# Patient Record
Sex: MALE | Race: WHITE | NOT HISPANIC OR LATINO | Employment: OTHER | URBAN - METROPOLITAN AREA
[De-identification: names, ages, dates, MRNs, and addresses within clinical notes are randomized per-mention and may not be internally consistent; named-entity substitution may affect disease eponyms.]

---

## 2017-03-17 ENCOUNTER — LAB (OUTPATIENT)
Dept: LAB | Facility: HOSPITAL | Age: 67
End: 2017-03-17

## 2017-03-17 ENCOUNTER — OFFICE VISIT (OUTPATIENT)
Dept: ONCOLOGY | Facility: CLINIC | Age: 67
End: 2017-03-17

## 2017-03-17 VITALS
DIASTOLIC BLOOD PRESSURE: 70 MMHG | RESPIRATION RATE: 18 BRPM | WEIGHT: 171.2 LBS | SYSTOLIC BLOOD PRESSURE: 120 MMHG | BODY MASS INDEX: 24.51 KG/M2 | TEMPERATURE: 97.7 F | OXYGEN SATURATION: 97 % | HEART RATE: 50 BPM | HEIGHT: 70 IN

## 2017-03-17 DIAGNOSIS — D72.829 LEUKOCYTOSIS, UNSPECIFIED TYPE: Primary | ICD-10-CM

## 2017-03-17 DIAGNOSIS — C91.10 CLL (CHRONIC LYMPHOCYTIC LEUKEMIA) (HCC): ICD-10-CM

## 2017-03-17 LAB
ALBUMIN SERPL-MCNC: 4.2 G/DL (ref 3.5–5.2)
ALBUMIN/GLOB SERPL: 2 G/DL (ref 1.1–2.4)
ALP SERPL-CCNC: 86 U/L (ref 38–116)
ALT SERPL W P-5'-P-CCNC: 24 U/L (ref 0–41)
ANION GAP SERPL CALCULATED.3IONS-SCNC: 12.3 MMOL/L
AST SERPL-CCNC: 22 U/L (ref 0–40)
BASOPHILS # BLD AUTO: 0.1 10*3/MM3 (ref 0–0.1)
BASOPHILS NFR BLD AUTO: 0.4 % (ref 0–1.1)
BILIRUB SERPL-MCNC: 0.6 MG/DL (ref 0.1–1.2)
BUN BLD-MCNC: 12 MG/DL (ref 6–20)
BUN/CREAT SERPL: 12 (ref 7.3–30)
CALCIUM SPEC-SCNC: 9.3 MG/DL (ref 8.5–10.2)
CHLORIDE SERPL-SCNC: 100 MMOL/L (ref 98–107)
CO2 SERPL-SCNC: 27.7 MMOL/L (ref 22–29)
CREAT BLD-MCNC: 1 MG/DL (ref 0.7–1.3)
DEPRECATED RDW RBC AUTO: 46.5 FL (ref 37–49)
EOSINOPHIL # BLD AUTO: 0.4 10*3/MM3 (ref 0–0.36)
EOSINOPHIL NFR BLD AUTO: 1.6 % (ref 1–5)
ERYTHROCYTE [DISTWIDTH] IN BLOOD BY AUTOMATED COUNT: 13.7 % (ref 11.7–14.5)
GFR SERPL CREATININE-BSD FRML MDRD: 75 ML/MIN/1.73
GLOBULIN UR ELPH-MCNC: 2.1 GM/DL (ref 1.8–3.5)
GLUCOSE BLD-MCNC: 196 MG/DL (ref 74–124)
HCT VFR BLD AUTO: 47.5 % (ref 40–49)
HGB BLD-MCNC: 16 G/DL (ref 13.5–16.5)
HOLD SPECIMEN: NORMAL
IMM GRANULOCYTES # BLD: 0.07 10*3/MM3 (ref 0–0.03)
IMM GRANULOCYTES NFR BLD: 0.3 % (ref 0–0.5)
LDH SERPL-CCNC: 147 U/L (ref 99–259)
LYMPHOCYTES # BLD AUTO: 19.26 10*3/MM3 (ref 1–3.5)
LYMPHOCYTES NFR BLD AUTO: 76.7 % (ref 20–49)
MCH RBC QN AUTO: 31.6 PG (ref 27–33)
MCHC RBC AUTO-ENTMCNC: 33.7 G/DL (ref 32–35)
MCV RBC AUTO: 93.7 FL (ref 83–97)
MONOCYTES # BLD AUTO: 0.46 10*3/MM3 (ref 0.25–0.8)
MONOCYTES NFR BLD AUTO: 1.8 % (ref 4–12)
NEUTROPHILS # BLD AUTO: 4.81 10*3/MM3 (ref 1.5–7)
NEUTROPHILS NFR BLD AUTO: 19.2 % (ref 39–75)
NRBC BLD MANUAL-RTO: 0.1 /100 WBC (ref 0–0)
PLATELET # BLD AUTO: 164 10*3/MM3 (ref 150–375)
PMV BLD AUTO: 9.8 FL (ref 8.9–12.1)
POTASSIUM BLD-SCNC: 4.4 MMOL/L (ref 3.5–4.7)
PROT SERPL-MCNC: 6.3 G/DL (ref 6.3–8)
RBC # BLD AUTO: 5.07 10*6/MM3 (ref 4.3–5.5)
SODIUM BLD-SCNC: 140 MMOL/L (ref 134–145)
WBC NRBC COR # BLD: 25.1 10*3/MM3 (ref 4–10)

## 2017-03-17 PROCEDURE — 85025 COMPLETE CBC W/AUTO DIFF WBC: CPT

## 2017-03-17 PROCEDURE — 80053 COMPREHEN METABOLIC PANEL: CPT

## 2017-03-17 PROCEDURE — 99213 OFFICE O/P EST LOW 20 MIN: CPT | Performed by: INTERNAL MEDICINE

## 2017-03-17 PROCEDURE — 36415 COLL VENOUS BLD VENIPUNCTURE: CPT

## 2017-03-17 PROCEDURE — 83615 LACTATE (LD) (LDH) ENZYME: CPT

## 2017-03-19 PROBLEM — C91.10 CLL (CHRONIC LYMPHOCYTIC LEUKEMIA) (HCC): Status: ACTIVE | Noted: 2017-03-19

## 2017-03-20 NOTE — PROGRESS NOTES
Subjective     REASON FOR VISIT:  Chronic lymphoid leukemia, stage 0.     Patient ID: Macho Stephenson is a 66 y.o. year old male   History of Present Illness    The patient is a 59-year-old gentleman with the above history, currently here for followup. He denies any complaints. His white count is stable around 19,000. He does not have any recurrent infections, he is not fatigued and he feels reasonably good. He has not started any new medications. He just recently retired from his job.       PAST MEDICAL HISTORY:   1. Recurrent atrial fibrillation followed by cardiology. He has had drug eluting stent placed x 3.   2. High cholesterol.   3. Hypertension.       HEMATOLOGIC/ONCOLOGIC HISTORY:       The patient is 62-year-old gentleman with the above history currently here for followup. He has no complaints. He denies fever, night sweats or weight loss. He does not have any lymphadenopathy. He feels good. He had some fatigue but his CBC shows a go od hemoglobin.   The patient is followed by Dr. Kevin Chandra. He had seen Dr. Chandra 7/18/13 for a history of coronary artery disease status drug eluting stent placement to the right coronary artery and left anterior descending artery in February 2011. Histor y of paroxysmal atrial fibrillation and hyperlipidemia. He presented to [facility] on 6/23/13 with palpitations and was found to have atrial fibrillation with rapid ventricular response. He was given IV Cardizem and converted spontaneously to normal sinus rhythm. He was found to have elevated white count at 18.9 and denied any symptoms of infection or urinary complaints. TSH was normal, troponin levels were negative. He was asked to continue aspirin and metoprolol for that. If he contin u ed to have atrial fibrillation, they will consider antiarrhythmic therapy with or without a short term anticoagulation therapy. However, currently the patient is feeling reasonably good. He has no complaints. His CBC  7/22/13 showed WBC 17,000, hemoglob i n 16.4 and platelet count of 174,000. Back 9/28/13 his hemoglobin was 15.1, WBC 13.3 and platelets 197,000. He has had a persistently elevated WBC but he is totally asymptomatic. He does not have any fever, night sweats or lymphadenopathy. He is here to be evaluated for his elevated white count.       Peripheral blood flow cytometry done 08/16/13 shows 22% chronic lymphoid leukemia cells, and they expressed ZAP-70 but not CD38. The total number of cells approximated 60% T cells, 32% B cells and 1% natural k iller cells. The B cells were monoclonal and expressed CD19, CD20, CD22, CD5, CD23, CD11c, ZAP-70 and T cell antigens. There was a normal CD4/CD8 ratio. CT of the chest, abdomen and pelvis does not show evidence of metastasis. Peripheral blood for DILLON -2 was negative. It was negative for T11:14 translocation.       CT scan done on 08/21/2013 shows 5 to 6 mm noncalcified nodule in the left lower lobe which is unchanged from December 2010. Otherwise no evidence of any lymphadenopathy or hepatosplenomegaly.       MRI of the spine shows arthritis and disc bulge and likely hemangiomas, with 1 lesion showing increased signal intensity at T2, which is likely a hemangioma. Bone scan could be done, but patient does not even have much pain there. CT scan of the chest, a bdomen and pelvis was done on 11/23/2014. He has tiny lymph nodes in the neck which are difficult to palpate. Right jugular one is 1.4 x 0.9 and left supraclavicular one is 1.5 x 1.1. He also has a subcarinal lymph node which is 1.7 x 1.2 cm, and he ha s a portocaval lymph node which has increased from 2.5 to 2.8. Patient is totally asymptomatic. He does not have any B symptoms, so will continue followup with observation.       MEDICATIONS: The current medication list was reviewed with the patient and updated in the EMR this date per the medical assistant. Medication dosages and frequencies were confirmed to be  accurate.       ALLERGIES: PENICILLIN which causes him to swell up. He is allergic to IV CONTRAST DYE.   SOCIAL HISTORY: He is . He smokes one pack per day for 35 years. He consumes three to four alcoholic drinks per week. He has no tattoos and no previous transfusions.       FAMILY HISTORY: Father  at 82 with MI. Mother  at 82 with bone cancer. He has one brother age 65 in good health and two sisters 69 and 57 in good health.   Past Medical History, Social History, and Family History are unchanged from my prior documentation on     Review of Systems     Objective      Chief Complaint   Patient presents with   • Follow-up     yrly, no concerns        Patient Active Problem List   Diagnosis   • Leukocytosis         MEDICATIONS:  Medication Reconciliation for the patient has been reviewed by me and documented in the patients chart.    ALLERGIES:    Allergies   Allergen Reactions   • Penicillins      swelling   • Shellfish-Derived Products Swelling         Vitals:    17 0900   BP: 120/70   Pulse: 50   Resp: 18   Temp: 97.7 °F (36.5 °C)   SpO2: 97%        Current Status 3/17/2017   ECOG score 0       Physical Exam   GENERAL:  Well-developed, well-nourished in no acute distress.   SKIN:  Warm, dry without rashes, purpura or petechiae.  EYES:  Pupils equal, round and reactive to light.  EOMs intact.  Conjunctivae normal.  EARS:  Hearing intact.  NOSE:  Septum midline.  No excoriations or nasal discharge.  MOUTH:  Tongue is well-papillated; no stomatitis or ulcers.  Lips normal.  THROAT:  Oropharynx without lesions or exudates.  NECK:  Supple with good range of motion; no thyromegaly or masses, no JVD.  LYMPHATICS:  No cervical, supraclavicular, axillary or inguinal adenopathy.  CHEST:  Lungs clear to auscultation. Good airflow.  CARDIAC:  Regular rate and rhythm without murmurs, rubs or gallops. Normal S1,S2.  ABDOMEN:  Soft, nontender with no hepatosplenomegaly or masses.  EXTREMITIES:   No clubbing, cyanosis or edema.  NEUROLOGICAL:  Cranial Nerves II-XII grossly intact.  No focal neurological deficits.  PSYCHIATRIC:  Normal affect and mood.      RECENT LABS:  Hematology WBC   Date Value Ref Range Status   03/17/2017 25.10 (H) 4.00 - 10.00 10*3/mm3 Final   03/03/2015 19.69 (H) 4.50 - 10.70 K/Cumm Final     RBC   Date Value Ref Range Status   03/17/2017 5.07 4.30 - 5.50 10*6/mm3 Final   03/03/2015 5.08 4.60 - 6.00 Million Final     HEMOGLOBIN   Date Value Ref Range Status   03/17/2017 16.0 13.5 - 16.5 g/dL Final   03/03/2015 15.7 13.7 - 17.6 g/dL Final     HEMATOCRIT   Date Value Ref Range Status   03/17/2017 47.5 40.0 - 49.0 % Final   03/03/2015 45.9 40.4 - 52.2 % Final     PLATELETS   Date Value Ref Range Status   03/17/2017 164 150 - 375 10*3/mm3 Final   03/03/2015 186 140 - 500 K/Cumm Final        Assessment/Plan   This is a patient with a history of stage 0 CLL without evidence of any disease progression. We will plan to bring the patient back in 12 months with labs at 6 months, with nurse review, CBC, CMP. Currently he does not show any evidence of progression. There is no lymphadenopathy, no evidence of recurrent infections, no anemia or thrombocytopenia, so we will go ahead and see t he patient in 6 months with labs, just with nurse review, and see the patient back in 12 months with OTONIEL Moya MD

## 2017-03-25 RX ORDER — ATORVASTATIN CALCIUM 20 MG/1
TABLET, FILM COATED ORAL
Qty: 90 TABLET | Refills: 0 | Status: SHIPPED | OUTPATIENT
Start: 2017-03-25 | End: 2017-03-29 | Stop reason: SDUPTHER

## 2017-03-29 RX ORDER — ATORVASTATIN CALCIUM 20 MG/1
20 TABLET, FILM COATED ORAL DAILY
Qty: 90 TABLET | Refills: 3 | Status: SHIPPED | OUTPATIENT
Start: 2017-03-29 | End: 2018-01-02 | Stop reason: SDUPTHER

## 2017-09-01 ENCOUNTER — LAB (OUTPATIENT)
Dept: LAB | Facility: HOSPITAL | Age: 67
End: 2017-09-01

## 2017-09-01 ENCOUNTER — CLINICAL SUPPORT (OUTPATIENT)
Dept: ONCOLOGY | Facility: HOSPITAL | Age: 67
End: 2017-09-01

## 2017-09-01 DIAGNOSIS — C91.10 CLL (CHRONIC LYMPHOCYTIC LEUKEMIA) (HCC): Primary | ICD-10-CM

## 2017-09-01 LAB
BASOPHILS # BLD AUTO: 0.15 10*3/MM3 (ref 0–0.1)
BASOPHILS NFR BLD AUTO: 0.4 % (ref 0–1.1)
DEPRECATED RDW RBC AUTO: 46.5 FL (ref 37–49)
EOSINOPHIL # BLD AUTO: 0.44 10*3/MM3 (ref 0–0.36)
EOSINOPHIL NFR BLD AUTO: 1.2 % (ref 1–5)
ERYTHROCYTE [DISTWIDTH] IN BLOOD BY AUTOMATED COUNT: 13.6 % (ref 11.7–14.5)
HCT VFR BLD AUTO: 41.5 % (ref 40–49)
HGB BLD-MCNC: 14.3 G/DL (ref 13.5–16.5)
IMM GRANULOCYTES # BLD: 0.21 10*3/MM3 (ref 0–0.03)
IMM GRANULOCYTES NFR BLD: 0.6 % (ref 0–0.5)
LYMPHOCYTES # BLD AUTO: 28.79 10*3/MM3 (ref 1–3.5)
LYMPHOCYTES NFR BLD AUTO: 75.8 % (ref 20–49)
MCH RBC QN AUTO: 31.7 PG (ref 27–33)
MCHC RBC AUTO-ENTMCNC: 34.5 G/DL (ref 32–35)
MCV RBC AUTO: 92 FL (ref 83–97)
MONOCYTES # BLD AUTO: 0.78 10*3/MM3 (ref 0.25–0.8)
MONOCYTES NFR BLD AUTO: 2.1 % (ref 4–12)
NEUTROPHILS # BLD AUTO: 7.63 10*3/MM3 (ref 1.5–7)
NEUTROPHILS NFR BLD AUTO: 19.9 % (ref 39–75)
NRBC BLD MANUAL-RTO: 0.2 /100 WBC (ref 0–0)
PLATELET # BLD AUTO: 142 10*3/MM3 (ref 150–375)
PMV BLD AUTO: 9.8 FL (ref 8.9–12.1)
RBC # BLD AUTO: 4.51 10*6/MM3 (ref 4.3–5.5)
WBC NRBC COR # BLD: 38 10*3/MM3 (ref 4–10)

## 2017-09-01 PROCEDURE — 85025 COMPLETE CBC W/AUTO DIFF WBC: CPT | Performed by: INTERNAL MEDICINE

## 2017-09-01 PROCEDURE — 36416 COLLJ CAPILLARY BLOOD SPEC: CPT | Performed by: INTERNAL MEDICINE

## 2017-09-01 NOTE — PROGRESS NOTES
Pt here today for CBC and RN review.  CBC reviewed with pt and copy given to pt.  Hgb 14.3, Plts 142, ANC 7.6, WBC 38.  WBC and ANC have increased since last visit.  Pt denies having any s/sx of infection or bleeding.  Pt voiced no complaints or concerns.  Denied having night sweats.   Dr Moya with another pt at this time.  Explained to pt RN will review CBC with MD and if any changes will contact pt.  Pt v/u.    Pt v/u to call office with any questions or concerns.

## 2017-09-01 NOTE — PROGRESS NOTES
Reviewed CBC with Dr Moya. MD Ordered for pt to return in 2 months to see MD and have CBC drawn.  Called pt and reviewed with pt and pt v/u.  Note sent to scheduling and informed pt scheduling would call him to make appt.

## 2017-09-06 ENCOUNTER — TELEPHONE (OUTPATIENT)
Dept: ONCOLOGY | Facility: CLINIC | Age: 67
End: 2017-09-06

## 2017-11-06 ENCOUNTER — LAB (OUTPATIENT)
Dept: LAB | Facility: HOSPITAL | Age: 67
End: 2017-11-06

## 2017-11-06 ENCOUNTER — OFFICE VISIT (OUTPATIENT)
Dept: ONCOLOGY | Facility: CLINIC | Age: 67
End: 2017-11-06

## 2017-11-06 VITALS
HEIGHT: 71 IN | SYSTOLIC BLOOD PRESSURE: 148 MMHG | WEIGHT: 170 LBS | DIASTOLIC BLOOD PRESSURE: 78 MMHG | BODY MASS INDEX: 23.8 KG/M2 | HEART RATE: 47 BPM | OXYGEN SATURATION: 96 % | TEMPERATURE: 97.6 F | RESPIRATION RATE: 16 BRPM

## 2017-11-06 DIAGNOSIS — C91.10 CLL (CHRONIC LYMPHOCYTIC LEUKEMIA) (HCC): ICD-10-CM

## 2017-11-06 DIAGNOSIS — C91.10 CLL (CHRONIC LYMPHOCYTIC LEUKEMIA) (HCC): Primary | ICD-10-CM

## 2017-11-06 LAB
BASOPHILS # BLD AUTO: 0.19 10*3/MM3 (ref 0–0.1)
BASOPHILS NFR BLD AUTO: 0.6 % (ref 0–1.1)
DEPRECATED RDW RBC AUTO: 44.5 FL (ref 37–49)
EOSINOPHIL # BLD AUTO: 0.49 10*3/MM3 (ref 0–0.36)
EOSINOPHIL NFR BLD AUTO: 1.4 % (ref 1–5)
ERYTHROCYTE [DISTWIDTH] IN BLOOD BY AUTOMATED COUNT: 13.2 % (ref 11.7–14.5)
HCT VFR BLD AUTO: 47.1 % (ref 40–49)
HGB BLD-MCNC: 16 G/DL (ref 13.5–16.5)
IMM GRANULOCYTES # BLD: 0.18 10*3/MM3 (ref 0–0.03)
IMM GRANULOCYTES NFR BLD: 0.5 % (ref 0–0.5)
LYMPHOCYTES # BLD AUTO: 25.24 10*3/MM3 (ref 1–3.5)
LYMPHOCYTES NFR BLD AUTO: 74.5 % (ref 20–49)
MCH RBC QN AUTO: 31 PG (ref 27–33)
MCHC RBC AUTO-ENTMCNC: 34 G/DL (ref 32–35)
MCV RBC AUTO: 91.3 FL (ref 83–97)
MONOCYTES # BLD AUTO: 0.69 10*3/MM3 (ref 0.25–0.8)
MONOCYTES NFR BLD AUTO: 2 % (ref 4–12)
NEUTROPHILS # BLD AUTO: 7.08 10*3/MM3 (ref 1.5–7)
NEUTROPHILS NFR BLD AUTO: 21 % (ref 39–75)
NRBC BLD MANUAL-RTO: 0.1 /100 WBC (ref 0–0)
PLATELET # BLD AUTO: 147 10*3/MM3 (ref 150–375)
PMV BLD AUTO: 10.2 FL (ref 8.9–12.1)
RBC # BLD AUTO: 5.16 10*6/MM3 (ref 4.3–5.5)
WBC NRBC COR # BLD: 33.87 10*3/MM3 (ref 4–10)

## 2017-11-06 PROCEDURE — 85025 COMPLETE CBC W/AUTO DIFF WBC: CPT | Performed by: INTERNAL MEDICINE

## 2017-11-06 PROCEDURE — 36416 COLLJ CAPILLARY BLOOD SPEC: CPT | Performed by: INTERNAL MEDICINE

## 2017-11-06 PROCEDURE — 99213 OFFICE O/P EST LOW 20 MIN: CPT | Performed by: INTERNAL MEDICINE

## 2017-11-06 RX ORDER — AMLODIPINE BESYLATE 10 MG/1
TABLET ORAL
COMMUNITY
Start: 2017-08-01 | End: 2017-11-06

## 2017-11-06 NOTE — PROGRESS NOTES
Subjective     REASON FOR VISIT:  Chronic lymphoid leukemia, stage 0.     Patient ID: Macho Stephenson is a 67 y.o. year old male   History of Present Illness    The patient is a 59-year-old gentleman with the above history, currently here for followup. He denies any complaints. His white count is stable around 19,000. He does not have any recurrent infections, he is not fatigued and he feels reasonably good. He has not started any new medications. He just recently retired from his job.   Patient denies any complaints.  No fever or night sweats.  No weight loss.      PAST MEDICAL HISTORY:   1. Recurrent atrial fibrillation followed by cardiology. He has had drug eluting stent placed x 3.   2. High cholesterol.   3. Hypertension.       HEMATOLOGIC/ONCOLOGIC HISTORY:       The patient is 62-year-old gentleman with the above history currently here for followup. He has no complaints. He denies fever, night sweats or weight loss. He does not have any lymphadenopathy. He feels good. He had some fatigue but his CBC shows a go od hemoglobin.   The patient is followed by Dr. Kevin Chandra. He had seen Dr. Chandra 7/18/13 for a history of coronary artery disease status drug eluting stent placement to the right coronary artery and left anterior descending artery in February 2011. Histor y of paroxysmal atrial fibrillation and hyperlipidemia. He presented to [facility] on 6/23/13 with palpitations and was found to have atrial fibrillation with rapid ventricular response. He was given IV Cardizem and converted spontaneously to normal sinus rhythm. He was found to have elevated white count at 18.9 and denied any symptoms of infection or urinary complaints. TSH was normal, troponin levels were negative. He was asked to continue aspirin and metoprolol for that. If he contin u ed to have atrial fibrillation, they will consider antiarrhythmic therapy with or without a short term anticoagulation therapy. However,  currently the patient is feeling reasonably good. He has no complaints. His CBC 7/22/13 showed WBC 17,000, hemoglob i n 16.4 and platelet count of 174,000. Back 9/28/13 his hemoglobin was 15.1, WBC 13.3 and platelets 197,000. He has had a persistently elevated WBC but he is totally asymptomatic. He does not have any fever, night sweats or lymphadenopathy. He is here to be evaluated for his elevated white count.       Peripheral blood flow cytometry done 08/16/13 shows 22% chronic lymphoid leukemia cells, and they expressed ZAP-70 but not CD38. The total number of cells approximated 60% T cells, 32% B cells and 1% natural k iller cells. The B cells were monoclonal and expressed CD19, CD20, CD22, CD5, CD23, CD11c, ZAP-70 and T cell antigens. There was a normal CD4/CD8 ratio. CT of the chest, abdomen and pelvis does not show evidence of metastasis. Peripheral blood for DILLON -2 was negative. It was negative for T11:14 translocation.       CT scan done on 08/21/2013 shows 5 to 6 mm noncalcified nodule in the left lower lobe which is unchanged from December 2010. Otherwise no evidence of any lymphadenopathy or hepatosplenomegaly.       MRI of the spine shows arthritis and disc bulge and likely hemangiomas, with 1 lesion showing increased signal intensity at T2, which is likely a hemangioma. Bone scan could be done, but patient does not even have much pain there. CT scan of the chest, a bdomen and pelvis was done on 11/23/2014. He has tiny lymph nodes in the neck which are difficult to palpate. Right jugular one is 1.4 x 0.9 and left supraclavicular one is 1.5 x 1.1. He also has a subcarinal lymph node which is 1.7 x 1.2 cm, and he ha s a portocaval lymph node which has increased from 2.5 to 2.8. Patient is totally asymptomatic. He does not have any B symptoms, so will continue followup with observation.       MEDICATIONS: The current medication list was reviewed with the patient and updated in the EMR this date per the  medical assistant. Medication dosages and frequencies were confirmed to be accurate.       ALLERGIES: PENICILLIN which causes him to swell up. He is allergic to IV CONTRAST DYE.   SOCIAL HISTORY: He is . He smokes one pack per day for 35 years. He consumes three to four alcoholic drinks per week. He has no tattoos and no previous transfusions.       FAMILY HISTORY: Father  at 82 with MI. Mother  at 82 with bone cancer. He has one brother age 65 in good health and two sisters 69 and 57 in good health.   Past Medical History, Social History, and Family History are unchanged from my prior documentation on     Review of Systems     Objective      Chief Complaint   Patient presents with   • Follow-up     blood work, pt has c/o upset stomach and fatigue        Patient Active Problem List   Diagnosis   • CLL (chronic lymphocytic leukemia)         MEDICATIONS:  Medication Reconciliation for the patient has been reviewed by me and documented in the patients chart.    ALLERGIES:    Allergies   Allergen Reactions   • Penicillins      swelling         Vitals:    17 0946   BP: 148/78   Pulse: (!) 47   Resp: 16   Temp: 97.6 °F (36.4 °C)   SpO2: 96%        Current Status 2017   ECOG score 0       Physical Exam   GENERAL:  Well-developed, well-nourished in no acute distress.   SKIN:  Warm, dry without rashes, purpura or petechiae.  EYES:  Pupils equal, round and reactive to light.  EOMs intact.  Conjunctivae normal.  EARS:  Hearing intact.  NOSE:  Septum midline.  No excoriations or nasal discharge.  MOUTH:  Tongue is well-papillated; no stomatitis or ulcers.  Lips normal.  THROAT:  Oropharynx without lesions or exudates.  NECK:  Supple with good range of motion; no thyromegaly or masses, no JVD.  LYMPHATICS:  No cervical, supraclavicular, axillary or inguinal adenopathy.  CHEST:  Lungs clear to auscultation. Good airflow.  CARDIAC:  Regular rate and rhythm without murmurs, rubs or gallops.  Normal S1,S2.  ABDOMEN:  Soft, nontender with no hepatosplenomegaly or masses.  EXTREMITIES:  No clubbing, cyanosis or edema.  NEUROLOGICAL:  Cranial Nerves II-XII grossly intact.  No focal neurological deficits.  PSYCHIATRIC:  Normal affect and mood.      RECENT LABS:  Hematology WBC   Date Value Ref Range Status   11/06/2017 33.87 (H) 4.00 - 10.00 10*3/mm3 Final     RBC   Date Value Ref Range Status   11/06/2017 5.16 4.30 - 5.50 10*6/mm3 Final     Hemoglobin   Date Value Ref Range Status   11/06/2017 16.0 13.5 - 16.5 g/dL Final     Hematocrit   Date Value Ref Range Status   11/06/2017 47.1 40.0 - 49.0 % Final     Platelets   Date Value Ref Range Status   11/06/2017 147 (L) 150 - 375 10*3/mm3 Final        Assessment/Plan   This is a patient with a history of stage 0 CLL without evidence of any disease progression. We will plan to bring the patient back in 12 months with labs at 6 months, with nurse review, CBC, CMP. Currently he does not show any evidence of progression. There is no lymphadenopathy, no evidence of recurrent infections, no anemia or thrombocytopenia, so we will go ahead and see t he patient in 4 months with labs,And obtain CT scan 1 week prior to M.D. Appointment.    Susannah Moya MD

## 2018-01-02 RX ORDER — ATORVASTATIN CALCIUM 20 MG/1
TABLET, FILM COATED ORAL
Qty: 90 TABLET | Refills: 0 | Status: SHIPPED | OUTPATIENT
Start: 2018-01-02 | End: 2018-01-15 | Stop reason: SDUPTHER

## 2018-01-15 ENCOUNTER — TELEPHONE (OUTPATIENT)
Dept: FAMILY MEDICINE CLINIC | Facility: CLINIC | Age: 68
End: 2018-01-15

## 2018-01-15 RX ORDER — ATORVASTATIN CALCIUM 20 MG/1
20 TABLET, FILM COATED ORAL NIGHTLY
Qty: 90 TABLET | Refills: 1 | Status: SHIPPED | OUTPATIENT
Start: 2018-01-15 | End: 2018-07-01 | Stop reason: SDUPTHER

## 2018-03-19 ENCOUNTER — HOSPITAL ENCOUNTER (OUTPATIENT)
Dept: PET IMAGING | Facility: HOSPITAL | Age: 68
Discharge: HOME | End: 2018-03-19
Attending: INTERNAL MEDICINE | Admitting: INTERNAL MEDICINE

## 2018-03-19 DIAGNOSIS — C91.10 CLL (CHRONIC LYMPHOCYTIC LEUKEMIA) (HCC): ICD-10-CM

## 2018-03-19 LAB — CREAT BLDA-MCNC: 1 MG/DL (ref 0.6–1.3)

## 2018-03-19 PROCEDURE — 71260 CT THORAX DX C+: CPT

## 2018-03-19 PROCEDURE — 82565 ASSAY OF CREATININE: CPT

## 2018-03-19 PROCEDURE — 70491 CT SOFT TISSUE NECK W/DYE: CPT

## 2018-03-19 PROCEDURE — 25010000002 IOPAMIDOL 61 % SOLUTION: Performed by: INTERNAL MEDICINE

## 2018-03-19 PROCEDURE — 74177 CT ABD & PELVIS W/CONTRAST: CPT

## 2018-03-19 PROCEDURE — 0 DIATRIZOATE MEGLUMINE & SODIUM PER 1 ML: Performed by: INTERNAL MEDICINE

## 2018-03-19 RX ADMIN — IOPAMIDOL 85 ML: 612 INJECTION, SOLUTION INTRAVENOUS at 08:20

## 2018-03-19 RX ADMIN — DIATRIZOATE MEGLUMINE AND DIATRIZOATE SODIUM 30 ML: 660; 100 LIQUID ORAL; RECTAL at 07:20

## 2018-03-26 ENCOUNTER — APPOINTMENT (OUTPATIENT)
Dept: LAB | Facility: HOSPITAL | Age: 68
End: 2018-03-26

## 2018-04-16 ENCOUNTER — DOCUMENTATION (OUTPATIENT)
Dept: ONCOLOGY | Facility: CLINIC | Age: 68
End: 2018-04-16

## 2018-04-16 DIAGNOSIS — C91.10 CLL (CHRONIC LYMPHOCYTIC LEUKEMIA) (HCC): Primary | ICD-10-CM

## 2018-04-17 ENCOUNTER — OFFICE VISIT (OUTPATIENT)
Dept: ONCOLOGY | Facility: CLINIC | Age: 68
End: 2018-04-17

## 2018-04-17 ENCOUNTER — LAB (OUTPATIENT)
Dept: LAB | Facility: HOSPITAL | Age: 68
End: 2018-04-17

## 2018-04-17 VITALS
BODY MASS INDEX: 23.6 KG/M2 | WEIGHT: 168.6 LBS | HEIGHT: 71 IN | TEMPERATURE: 98 F | DIASTOLIC BLOOD PRESSURE: 60 MMHG | OXYGEN SATURATION: 98 % | RESPIRATION RATE: 20 BRPM | SYSTOLIC BLOOD PRESSURE: 120 MMHG | HEART RATE: 82 BPM

## 2018-04-17 DIAGNOSIS — I71.9 AORTIC ANEURYSM WITHOUT RUPTURE, UNSPECIFIED PORTION OF AORTA (HCC): Primary | ICD-10-CM

## 2018-04-17 DIAGNOSIS — C91.10 CLL (CHRONIC LYMPHOCYTIC LEUKEMIA) (HCC): ICD-10-CM

## 2018-04-17 LAB
ALBUMIN SERPL-MCNC: 4.5 G/DL (ref 3.5–5.2)
ALBUMIN/GLOB SERPL: 2.3 G/DL (ref 1.1–2.4)
ALP SERPL-CCNC: 95 U/L (ref 38–116)
ALT SERPL W P-5'-P-CCNC: 16 U/L (ref 0–41)
ANION GAP SERPL CALCULATED.3IONS-SCNC: 9.8 MMOL/L
AST SERPL-CCNC: 18 U/L (ref 0–40)
BASOPHILS # BLD AUTO: 0.14 10*3/MM3 (ref 0–0.1)
BASOPHILS NFR BLD AUTO: 0.5 % (ref 0–1.1)
BILIRUB SERPL-MCNC: 0.4 MG/DL (ref 0.1–1.2)
BUN BLD-MCNC: 13 MG/DL (ref 6–20)
BUN/CREAT SERPL: 13.1 (ref 7.3–30)
CALCIUM SPEC-SCNC: 9.3 MG/DL (ref 8.5–10.2)
CHLORIDE SERPL-SCNC: 103 MMOL/L (ref 98–107)
CO2 SERPL-SCNC: 29.2 MMOL/L (ref 22–29)
CREAT BLD-MCNC: 0.99 MG/DL (ref 0.7–1.3)
DEPRECATED RDW RBC AUTO: 50.7 FL (ref 37–49)
EOSINOPHIL # BLD AUTO: 0.28 10*3/MM3 (ref 0–0.36)
EOSINOPHIL NFR BLD AUTO: 0.9 % (ref 1–5)
ERYTHROCYTE [DISTWIDTH] IN BLOOD BY AUTOMATED COUNT: 14.8 % (ref 11.7–14.5)
GFR SERPL CREATININE-BSD FRML MDRD: 75 ML/MIN/1.73
GLOBULIN UR ELPH-MCNC: 2 GM/DL (ref 1.8–3.5)
GLUCOSE BLD-MCNC: 114 MG/DL (ref 74–124)
HCT VFR BLD AUTO: 44.6 % (ref 40–49)
HGB BLD-MCNC: 14.2 G/DL (ref 13.5–16.5)
IMM GRANULOCYTES # BLD: 0.07 10*3/MM3 (ref 0–0.03)
IMM GRANULOCYTES NFR BLD: 0.2 % (ref 0–0.5)
LDH SERPL-CCNC: 160 U/L (ref 99–259)
LYMPHOCYTES # BLD AUTO: 25.06 10*3/MM3 (ref 1–3.5)
LYMPHOCYTES NFR BLD AUTO: 81.8 % (ref 20–49)
MCH RBC QN AUTO: 29.6 PG (ref 27–33)
MCHC RBC AUTO-ENTMCNC: 31.8 G/DL (ref 32–35)
MCV RBC AUTO: 92.9 FL (ref 83–97)
MONOCYTES # BLD AUTO: 0.65 10*3/MM3 (ref 0.25–0.8)
MONOCYTES NFR BLD AUTO: 2.1 % (ref 4–12)
NEUTROPHILS # BLD AUTO: 4.44 10*3/MM3 (ref 1.5–7)
NEUTROPHILS NFR BLD AUTO: 14.5 % (ref 39–75)
NRBC BLD MANUAL-RTO: 0 /100 WBC (ref 0–0)
PLATELET # BLD AUTO: 147 10*3/MM3 (ref 150–375)
PMV BLD AUTO: 10.1 FL (ref 8.9–12.1)
POTASSIUM BLD-SCNC: 4.3 MMOL/L (ref 3.5–4.7)
PROT SERPL-MCNC: 6.5 G/DL (ref 6.3–8)
RBC # BLD AUTO: 4.8 10*6/MM3 (ref 4.3–5.5)
SODIUM BLD-SCNC: 142 MMOL/L (ref 134–145)
WBC NRBC COR # BLD: 30.64 10*3/MM3 (ref 4–10)

## 2018-04-17 PROCEDURE — 99214 OFFICE O/P EST MOD 30 MIN: CPT | Performed by: INTERNAL MEDICINE

## 2018-04-17 PROCEDURE — 80053 COMPREHEN METABOLIC PANEL: CPT | Performed by: INTERNAL MEDICINE

## 2018-04-17 PROCEDURE — 36415 COLL VENOUS BLD VENIPUNCTURE: CPT | Performed by: INTERNAL MEDICINE

## 2018-04-17 PROCEDURE — 85025 COMPLETE CBC W/AUTO DIFF WBC: CPT | Performed by: INTERNAL MEDICINE

## 2018-04-17 PROCEDURE — 83615 LACTATE (LD) (LDH) ENZYME: CPT | Performed by: INTERNAL MEDICINE

## 2018-04-17 RX ORDER — WARFARIN SODIUM 5 MG/1
TABLET ORAL
COMMUNITY
Start: 2018-01-15 | End: 2018-10-02

## 2018-04-17 RX ORDER — SOTALOL HYDROCHLORIDE 80 MG/1
80 TABLET ORAL 2 TIMES DAILY
COMMUNITY
Start: 2018-01-23

## 2018-04-18 NOTE — PROGRESS NOTES
Subjective     REASON FOR VISIT:  Chronic lymphoid leukemia, stage 0.     Patient ID: Macho Stephenson is a 67 y.o. year old male   History of Present Illness    The patient is a 59-year-old gentleman with the above history, currently here for followup. He denies any complaints. His white count is stable around 19,000. He does not have any recurrent infections, he is not fatigued and he feels reasonably good. He has not started any new medications. He just recently retired from his job.   Patient denies any complaints.  No fever or night sweats.  No weight loss.    Interval history: Patient continues to deny any weight loss or fevers.  He does not have night sweats.  He has a good appetite.  We had obtained a CT scan and he is here for the results.  I have reviewed the CT scan personally with the patient and there is minimal progression but patient is asymptomatic.  CT was done on March 19, 2018.      PAST MEDICAL HISTORY:   1. Recurrent atrial fibrillation followed by cardiology. He has had drug eluting stent placed x 3.   2. High cholesterol.   3. Hypertension.       HEMATOLOGIC/ONCOLOGIC HISTORY:       The patient is 62-year-old gentleman with the above history currently here for followup. He has no complaints. He denies fever, night sweats or weight loss. He does not have any lymphadenopathy. He feels good. He had some fatigue but his CBC shows a go od hemoglobin.   The patient is followed by Dr. Kevin Chandra. He had seen Dr. Chandra 7/18/13 for a history of coronary artery disease status drug eluting stent placement to the right coronary artery and left anterior descending artery in February 2011. Histor y of paroxysmal atrial fibrillation and hyperlipidemia. He presented to [facility] on 6/23/13 with palpitations and was found to have atrial fibrillation with rapid ventricular response. He was given IV Cardizem and converted spontaneously to normal sinus rhythm. He was found to have elevated  white count at 18.9 and denied any symptoms of infection or urinary complaints. TSH was normal, troponin levels were negative. He was asked to continue aspirin and metoprolol for that. If he contin u ed to have atrial fibrillation, they will consider antiarrhythmic therapy with or without a short term anticoagulation therapy. However, currently the patient is feeling reasonably good. He has no complaints. His CBC 7/22/13 showed WBC 17,000, hemoglob i n 16.4 and platelet count of 174,000. Back 9/28/13 his hemoglobin was 15.1, WBC 13.3 and platelets 197,000. He has had a persistently elevated WBC but he is totally asymptomatic. He does not have any fever, night sweats or lymphadenopathy. He is here to be evaluated for his elevated white count.       Peripheral blood flow cytometry done 08/16/13 shows 22% chronic lymphoid leukemia cells, and they expressed ZAP-70 but not CD38. The total number of cells approximated 60% T cells, 32% B cells and 1% natural k iller cells. The B cells were monoclonal and expressed CD19, CD20, CD22, CD5, CD23, CD11c, ZAP-70 and T cell antigens. There was a normal CD4/CD8 ratio. CT of the chest, abdomen and pelvis does not show evidence of metastasis. Peripheral blood for DILLON -2 was negative. It was negative for T11:14 translocation.       CT scan done on 08/21/2013 shows 5 to 6 mm noncalcified nodule in the left lower lobe which is unchanged from December 2010. Otherwise no evidence of any lymphadenopathy or hepatosplenomegaly.       MRI of the spine shows arthritis and disc bulge and likely hemangiomas, with 1 lesion showing increased signal intensity at T2, which is likely a hemangioma. Bone scan could be done, but patient does not even have much pain there. CT scan of the chest, a bdomen and pelvis was done on 11/23/2014. He has tiny lymph nodes in the neck which are difficult to palpate. Right jugular one is 1.4 x 0.9 and left supraclavicular one is 1.5 x 1.1. He also has a subcarinal  lymph node which is 1.7 x 1.2 cm, and he ha s a portocaval lymph node which has increased from 2.5 to 2.8. Patient is totally asymptomatic. He does not have any B symptoms, so will continue followup with observation.       MEDICATIONS: The current medication list was reviewed with the patient and updated in the EMR this date per the medical assistant. Medication dosages and frequencies were confirmed to be accurate.       ALLERGIES: PENICILLIN which causes him to swell up. He is allergic to IV CONTRAST DYE.   SOCIAL HISTORY: He is . He smokes one pack per day for 35 years. He consumes three to four alcoholic drinks per week. He has no tattoos and no previous transfusions.       FAMILY HISTORY: Father  at 82 with MI. Mother  at 82 with bone cancer. He has one brother age 65 in good health and two sisters 69 and 57 in good health.   Past Medical History, Social History, and Family History are unchanged from my prior documentation on     Review of Systems   Constitutional: Negative for fatigue and unexpected weight change.   Respiratory: Negative for cough, chest tightness and shortness of breath.    Cardiovascular: Negative for palpitations and leg swelling.   Gastrointestinal: Negative for abdominal pain, nausea and vomiting.   All other systems reviewed and are negative.       Objective      Chief Complaint   Patient presents with   • Follow-up     scan results        Patient Active Problem List   Diagnosis   • CLL (chronic lymphocytic leukemia)         MEDICATIONS:  Medication Reconciliation for the patient has been reviewed by me and documented in the patients chart.    ALLERGIES:    Allergies   Allergen Reactions   • Penicillins      swelling         Vitals:    18 0817   BP: 120/60   Pulse: 82   Resp: 20   Temp: 98 °F (36.7 °C)   SpO2: 98%        Current Status 2018   ECOG score 0       Physical Exam   GENERAL:  Well-developed, well-nourished in no acute distress.   NECK:   Supple with good range of motion; no thyromegaly or masses, no JVD.  LYMPHATICS:  No cervical, supraclavicular, axillary or inguinal adenopathy.  CHEST:  Lungs clear to auscultation. Good airflow.  CARDIAC:  Regular rate and rhythm without murmurs, rubs or gallops. Normal S1,S2.  ABDOMEN:  Soft, nontender with no hepatosplenomegaly or masses.  EXTREMITIES:  No clubbing, cyanosis or edema.  NEUROLOGICAL:  Cranial Nerves II-XII grossly intact.  No focal neurological deficits.  PSYCHIATRIC:  Normal affect and mood.      RECENT LABS:  Hematology WBC   Date Value Ref Range Status   04/17/2018 30.64 (H) 4.00 - 10.00 10*3/mm3 Final     RBC   Date Value Ref Range Status   04/17/2018 4.80 4.30 - 5.50 10*6/mm3 Final     Hemoglobin   Date Value Ref Range Status   04/17/2018 14.2 13.5 - 16.5 g/dL Final     Hematocrit   Date Value Ref Range Status   04/17/2018 44.6 40.0 - 49.0 % Final     Platelets   Date Value Ref Range Status   04/17/2018 147 (L) 150 - 375 10*3/mm3 Final        Assessment/Plan   1.This is a patient with a history of stage 0 CLL without evidence of any disease progression. We will plan to bring the patient back in 12 months with labs at 6 months, with nurse review, CBC, CMP. Currently he does not show any evidence of progression    I have reviewed the CT scan from 3/19/2018 there is minimal progression but clinically patient is asymptomatic and we will follow with observation.  His white count is 30,000 and decreased is stable.    2.  Mild thrombocytopenia likely secondary to bone marrow involvement with CLL.  There is no evidence of any active bleeding.    3.  Aortic aneurysm which is slightly increased as well as right I'll iliac artery stenosis.  This was seen on his present CT scan.    Plan 1.  Refer patient to vascular surgery.    2.  I'll see him back in 6 months with labs.          Stacey Moya MD

## 2018-06-29 ENCOUNTER — TRANSCRIBE ORDERS (OUTPATIENT)
Dept: ADMINISTRATIVE | Facility: HOSPITAL | Age: 68
End: 2018-06-29

## 2018-06-29 ENCOUNTER — TRANSCRIBE ORDERS (OUTPATIENT)
Dept: WOUND CARE | Facility: HOSPITAL | Age: 68
End: 2018-06-29

## 2018-06-29 DIAGNOSIS — R10.31 GROIN DISCOMFORT, RIGHT: ICD-10-CM

## 2018-06-29 DIAGNOSIS — I73.9 PAD (PERIPHERAL ARTERY DISEASE) (HCC): Primary | ICD-10-CM

## 2018-07-02 RX ORDER — ATORVASTATIN CALCIUM 20 MG/1
TABLET, FILM COATED ORAL
Qty: 90 TABLET | Refills: 1 | Status: SHIPPED | OUTPATIENT
Start: 2018-07-02 | End: 2019-01-02 | Stop reason: SDUPTHER

## 2018-07-05 ENCOUNTER — HOSPITAL ENCOUNTER (OUTPATIENT)
Dept: CARDIOLOGY | Facility: HOSPITAL | Age: 68
Discharge: HOME | End: 2018-07-05
Attending: SURGERY | Admitting: SURGERY

## 2018-07-05 DIAGNOSIS — I73.9 PAD (PERIPHERAL ARTERY DISEASE) (HCC): ICD-10-CM

## 2018-07-05 LAB
BH CV LOWER ARTERIAL LEFT ABI RATIO: 1
BH CV LOWER ARTERIAL LEFT DORSALIS PEDIS SYS MAX: 138 MMHG
BH CV LOWER ARTERIAL LEFT GREAT TOE SYS MAX: 105 MMHG
BH CV LOWER ARTERIAL LEFT POST EX ABI RATIO: 1
BH CV LOWER ARTERIAL LEFT POST TIBIAL SYS MAX: 130 MMHG
BH CV LOWER ARTERIAL LEFT TBI RATIO: 0.78
BH CV LOWER ARTERIAL RIGHT ABI RATIO: 1
BH CV LOWER ARTERIAL RIGHT DORSALIS PEDIS SYS MAX: 146 MMHG
BH CV LOWER ARTERIAL RIGHT GREAT TOE SYS MAX: 88 MMHG
BH CV LOWER ARTERIAL RIGHT POST EX ABI RATIO: 1
BH CV LOWER ARTERIAL RIGHT POST TIBIAL SYS MAX: 134 MMHG
BH CV LOWER ARTERIAL RIGHT TBI RATIO: 0.65
UPPER ARTERIAL LEFT ARM BRACHIAL SYS MAX: 135 MMHG
UPPER ARTERIAL RIGHT ARM BRACHIAL SYS MAX: 134 MMHG

## 2018-07-05 PROCEDURE — 93924 LWR XTR VASC STDY BILAT: CPT

## 2018-10-01 NOTE — PROGRESS NOTES
Subjective     REASON FOR VISIT:  Chronic lymphoid leukemia, stage 2.     Patient ID: Macho Stephenson is a 68 y.o. year old male   History of Present Illness    The patient is a 59-year-old gentleman with the above history, currently here for followup. He denies any complaints. His white count is stable around 19,000. He does not have any recurrent infections, he is not fatigued and he feels reasonably good. He has not started any new medications. He just recently retired from his job.   Patient denies any complaints.  No fever or night sweats.  No weight loss.    Interval history: Patient is totally asymptomatic.  He did code the vascular surgeon  since the CT abdomen had shown evidence of abdominal aortic aneurysm at the level of the renal vessels.  Patient underwent Dopplers and was told that there is no concern at present.  Patient has no fevers, night sweats or weight loss.  He doesn't have any lymphadenopathy.  His last CT had shown minimal increase in the spleen size from 12.5-13.5 cm.  His white count is stable at 33,000 and his platelets are stable at 1 42,000.  PAST MEDICAL HISTORY:   1. Recurrent atrial fibrillation followed by cardiology. He has had drug eluting stent placed x 3.   2. High cholesterol.   3. Hypertension.       HEMATOLOGIC/ONCOLOGIC HISTORY:       The patient is 62-year-old gentleman with the above history currently here for followup. He has no complaints. He denies fever, night sweats or weight loss. He does not have any lymphadenopathy. He feels good. He had some fatigue but his CBC shows a go od hemoglobin.   The patient is followed by Dr. Kevin Chandra. He had seen Dr. Chandra 7/18/13 for a history of coronary artery disease status drug eluting stent placement to the right coronary artery and left anterior descending artery in February 2011. Histor y of paroxysmal atrial fibrillation and hyperlipidemia. He presented to [facility] on 6/23/13 with palpitations and was  found to have atrial fibrillation with rapid ventricular response. He was given IV Cardizem and converted spontaneously to normal sinus rhythm. He was found to have elevated white count at 18.9 and denied any symptoms of infection or urinary complaints. TSH was normal, troponin levels were negative. He was asked to continue aspirin and metoprolol for that. If he contin u ed to have atrial fibrillation, they will consider antiarrhythmic therapy with or without a short term anticoagulation therapy. However, currently the patient is feeling reasonably good. He has no complaints. His CBC 7/22/13 showed WBC 17,000, hemoglob i n 16.4 and platelet count of 174,000. Back 9/28/13 his hemoglobin was 15.1, WBC 13.3 and platelets 197,000. He has had a persistently elevated WBC but he is totally asymptomatic. He does not have any fever, night sweats or lymphadenopathy. He is here to be evaluated for his elevated white count.       Peripheral blood flow cytometry done 08/16/13 shows 22% chronic lymphoid leukemia cells, and they expressed ZAP-70 but not CD38. The total number of cells approximated 60% T cells, 32% B cells and 1% natural k iller cells. The B cells were monoclonal and expressed CD19, CD20, CD22, CD5, CD23, CD11c, ZAP-70 and T cell antigens. There was a normal CD4/CD8 ratio. CT of the chest, abdomen and pelvis does not show evidence of metastasis. Peripheral blood for DILLON -2 was negative. It was negative for T11:14 translocation.       CT scan done on 08/21/2013 shows 5 to 6 mm noncalcified nodule in the left lower lobe which is unchanged from December 2010. Otherwise no evidence of any lymphadenopathy or hepatosplenomegaly.       MRI of the spine shows arthritis and disc bulge and likely hemangiomas, with 1 lesion showing increased signal intensity at T2, which is likely a hemangioma. Bone scan could be done, but patient does not even have much pain there. CT scan of the chest, a bdomen and pelvis was done on  2014. He has tiny lymph nodes in the neck which are difficult to palpate. Right jugular one is 1.4 x 0.9 and left supraclavicular one is 1.5 x 1.1. He also has a subcarinal lymph node which is 1.7 x 1.2 cm, and he ha s a portocaval lymph node which has increased from 2.5 to 2.8. Patient is totally asymptomatic. He does not have any B symptoms, so will continue followup with observation.       MEDICATIONS: The current medication list was reviewed with the patient and updated in the EMR this date per the medical assistant. Medication dosages and frequencies were confirmed to be accurate.       ALLERGIES: PENICILLIN which causes him to swell up. He is allergic to IV CONTRAST DYE.   SOCIAL HISTORY: He is . He smokes one pack per day for 35 years. He consumes three to four alcoholic drinks per week. He has no tattoos and no previous transfusions.       FAMILY HISTORY: Father  at 82 with MI. Mother  at 82 with bone cancer. He has one brother age 65 in good health and two sisters 69 and 57 in good health.   Past Medical History, Social History, and Family History are unchanged from my prior documentation on     Review of Systems   Constitutional: Negative for appetite change, chills, diaphoresis, fatigue, fever and unexpected weight change.   HENT: Negative for hearing loss, sore throat and trouble swallowing.    Respiratory: Negative for cough, chest tightness, shortness of breath and wheezing.    Cardiovascular: Negative for chest pain, palpitations and leg swelling.   Gastrointestinal: Negative for abdominal distention, abdominal pain, constipation, diarrhea, nausea and vomiting.   Genitourinary: Negative for dysuria, frequency, hematuria and urgency.   Musculoskeletal: Negative for joint swelling.        No muscle weakness.   Skin: Negative for rash and wound.   Neurological: Negative for seizures, syncope, speech difficulty, weakness, numbness and headaches.   Hematological:  Negative for adenopathy. Does not bruise/bleed easily.   Psychiatric/Behavioral: Negative for behavioral problems, confusion and suicidal ideas.   All other systems reviewed and are negative.       Objective      Chief Complaint   Patient presents with   • Follow-up        Patient Active Problem List   Diagnosis   • CLL (chronic lymphocytic leukemia) (CMS/HCC)         MEDICATIONS:  Medication Reconciliation for the patient has been reviewed by me and documented in the patients chart.    ALLERGIES:    Allergies   Allergen Reactions   • Penicillins      swelling         Vitals:    10/02/18 0931   BP: 118/64   Pulse: 52   Resp: 16   Temp: 97.7 °F (36.5 °C)   SpO2: 98%        Current Status 10/2/2018   ECOG score 0       Physical Exam     GENERAL:  Well-developed, well-nourished in no acute distress.   SKIN:  Warm, dry without rashes, purpura or petechiae.  EYES:  Pupils equal, round and reactive to light.  EOMs intact.  Conjunctivae normal.  EARS:  Hearing intact.  NOSE:  Septum midline.  No excoriations or nasal discharge.  MOUTH:  Tongue is well-papillated; no stomatitis or ulcers.  Lips normal.  THROAT:  Oropharynx without lesions or exudates.  NECK:  Supple with good range of motion; no thyromegaly or masses, no JVD.  LYMPHATICS:  No cervical, supraclavicular, axillary or inguinal adenopathy.  CHEST:  Lungs clear to auscultation. Good airflow.  CARDIAC:  Regular rate and rhythm without murmurs, rubs or gallops. Normal S1,S2.  ABDOMEN:  Soft, nontender with no hepatosplenomegaly or masses.  EXTREMITIES:  No clubbing, cyanosis or edema.  NEUROLOGICAL:  Cranial Nerves II-XII grossly intact.  No focal neurological deficits.  PSYCHIATRIC:  Normal affect and mood.    RECENT LABS:  Hematology WBC   Date Value Ref Range Status   10/02/2018 33.87 (H) 4.00 - 10.00 10*3/mm3 Final     RBC   Date Value Ref Range Status   10/02/2018 4.34 4.30 - 5.50 10*6/mm3 Final     Hemoglobin   Date Value Ref Range Status   10/02/2018 13.0  (L) 13.5 - 16.5 g/dL Final     Hematocrit   Date Value Ref Range Status   10/02/2018 40.2 40.0 - 49.0 % Final     Platelets   Date Value Ref Range Status   10/02/2018 145 (L) 150 - 375 10*3/mm3 Final        Assessment/Plan   1.This is a patient with a history of stage 2 CLL without evidence of any disease progression.  I have reviewed the CT scan from 3/19/2018 there is minimal progression but clinically patient is asymptomatic and we will follow with observation.  His white count is 33,000 and decreased is stable.  No evidence of any frequent infections, no worsening of his white count.  He has no fever or night sweats.    · With mild lymphadenopathy and screen him again is seen in the last CT from March 2018 he is more like a stage II CLL rather than a stage 0 now.    2.  Mild thrombocytopenia likely secondary to bone marrow involvement with CLL.  There is no evidence of any active bleeding.  Mild anemia and thrombocytopenia could also be because of slightly increased spleen.    3.  Aortic aneurysm which is slightly increased as well as right I'll iliac artery stenosis.  This was seen on his present CT scan.  Vascular has already seen him.    Plan 1.  Follow-up in 6 months with labs.    2.  Will follow with observation.      Susannah Moya MD        Cc: Dr. Kevin Chandra

## 2018-10-02 ENCOUNTER — LAB (OUTPATIENT)
Dept: LAB | Facility: HOSPITAL | Age: 68
End: 2018-10-02

## 2018-10-02 ENCOUNTER — OFFICE VISIT (OUTPATIENT)
Dept: ONCOLOGY | Facility: CLINIC | Age: 68
End: 2018-10-02

## 2018-10-02 VITALS
RESPIRATION RATE: 16 BRPM | TEMPERATURE: 97.7 F | HEIGHT: 71 IN | BODY MASS INDEX: 23.88 KG/M2 | SYSTOLIC BLOOD PRESSURE: 118 MMHG | HEART RATE: 52 BPM | OXYGEN SATURATION: 98 % | WEIGHT: 170.6 LBS | DIASTOLIC BLOOD PRESSURE: 64 MMHG

## 2018-10-02 DIAGNOSIS — C91.10 CLL (CHRONIC LYMPHOCYTIC LEUKEMIA) (HCC): Primary | ICD-10-CM

## 2018-10-02 DIAGNOSIS — I71.9 AORTIC ANEURYSM WITHOUT RUPTURE, UNSPECIFIED PORTION OF AORTA (HCC): ICD-10-CM

## 2018-10-02 LAB
ALBUMIN SERPL-MCNC: 4.3 G/DL (ref 3.5–5.2)
ALBUMIN/GLOB SERPL: 2.4 G/DL (ref 1.1–2.4)
ALP SERPL-CCNC: 81 U/L (ref 38–116)
ALT SERPL W P-5'-P-CCNC: 10 U/L (ref 0–41)
ANION GAP SERPL CALCULATED.3IONS-SCNC: 10.9 MMOL/L
AST SERPL-CCNC: 17 U/L (ref 0–40)
BASOPHILS # BLD AUTO: 0.11 10*3/MM3 (ref 0–0.1)
BASOPHILS NFR BLD AUTO: 0.3 % (ref 0–1.1)
BILIRUB SERPL-MCNC: 0.6 MG/DL (ref 0.1–1.2)
BUN BLD-MCNC: 13 MG/DL (ref 6–20)
BUN/CREAT SERPL: 13.4 (ref 7.3–30)
CALCIUM SPEC-SCNC: 8.7 MG/DL (ref 8.5–10.2)
CHLORIDE SERPL-SCNC: 103 MMOL/L (ref 98–107)
CO2 SERPL-SCNC: 26.1 MMOL/L (ref 22–29)
CREAT BLD-MCNC: 0.97 MG/DL (ref 0.7–1.3)
DEPRECATED RDW RBC AUTO: 48.2 FL (ref 37–49)
EOSINOPHIL # BLD AUTO: 0.34 10*3/MM3 (ref 0–0.36)
EOSINOPHIL NFR BLD AUTO: 1 % (ref 1–5)
ERYTHROCYTE [DISTWIDTH] IN BLOOD BY AUTOMATED COUNT: 14.2 % (ref 11.7–14.5)
GFR SERPL CREATININE-BSD FRML MDRD: 77 ML/MIN/1.73
GLOBULIN UR ELPH-MCNC: 1.8 GM/DL (ref 1.8–3.5)
GLUCOSE BLD-MCNC: 118 MG/DL (ref 74–124)
HCT VFR BLD AUTO: 40.2 % (ref 40–49)
HGB BLD-MCNC: 13 G/DL (ref 13.5–16.5)
IMM GRANULOCYTES # BLD: 0.12 10*3/MM3 (ref 0–0.03)
IMM GRANULOCYTES NFR BLD: 0.4 % (ref 0–0.5)
LDH SERPL-CCNC: 170 U/L (ref 99–259)
LYMPHOCYTES # BLD AUTO: 27.94 10*3/MM3 (ref 1–3.5)
LYMPHOCYTES NFR BLD AUTO: 82.5 % (ref 20–49)
MCH RBC QN AUTO: 30 PG (ref 27–33)
MCHC RBC AUTO-ENTMCNC: 32.3 G/DL (ref 32–35)
MCV RBC AUTO: 92.6 FL (ref 83–97)
MONOCYTES # BLD AUTO: 1.11 10*3/MM3 (ref 0.25–0.8)
MONOCYTES NFR BLD AUTO: 3.3 % (ref 4–12)
NEUTROPHILS # BLD AUTO: 4.25 10*3/MM3 (ref 1.5–7)
NEUTROPHILS NFR BLD AUTO: 12.5 % (ref 39–75)
NRBC BLD MANUAL-RTO: 0.1 /100 WBC (ref 0–0)
PLATELET # BLD AUTO: 145 10*3/MM3 (ref 150–375)
PMV BLD AUTO: 9.4 FL (ref 8.9–12.1)
POTASSIUM BLD-SCNC: 4.4 MMOL/L (ref 3.5–4.7)
PROT SERPL-MCNC: 6.1 G/DL (ref 6.3–8)
RBC # BLD AUTO: 4.34 10*6/MM3 (ref 4.3–5.5)
SODIUM BLD-SCNC: 140 MMOL/L (ref 134–145)
WBC NRBC COR # BLD: 33.87 10*3/MM3 (ref 4–10)

## 2018-10-02 PROCEDURE — 99213 OFFICE O/P EST LOW 20 MIN: CPT | Performed by: INTERNAL MEDICINE

## 2018-10-02 PROCEDURE — 83615 LACTATE (LD) (LDH) ENZYME: CPT | Performed by: INTERNAL MEDICINE

## 2018-10-02 PROCEDURE — 36415 COLL VENOUS BLD VENIPUNCTURE: CPT | Performed by: INTERNAL MEDICINE

## 2018-10-02 PROCEDURE — 85025 COMPLETE CBC W/AUTO DIFF WBC: CPT | Performed by: INTERNAL MEDICINE

## 2018-10-02 PROCEDURE — 80053 COMPREHEN METABOLIC PANEL: CPT | Performed by: INTERNAL MEDICINE

## 2018-10-02 RX ORDER — INFLUENZA A VIRUS A/MICHIGAN/45/2015 X-275 (H1N1) ANTIGEN (FORMALDEHYDE INACTIVATED), INFLUENZA A VIRUS A/SINGAPORE/INFIMH-16-0019/2016 IVR-186 (H3N2) ANTIGEN (FORMALDEHYDE INACTIVATED), AND INFLUENZA B VIRUS B/MARYLAND/15/2016 BX-69A (A B/COLORADO/6/2017-LIKE VIRUS) ANTIGEN (FORMALDEHYDE INACTIVATED) 60; 60; 60 UG/.5ML; UG/.5ML; UG/.5ML
INJECTION, SUSPENSION INTRAMUSCULAR
Refills: 0 | COMMUNITY
Start: 2018-09-19 | End: 2020-07-24

## 2019-01-02 RX ORDER — ATORVASTATIN CALCIUM 20 MG/1
TABLET, FILM COATED ORAL
Qty: 90 TABLET | Refills: 1 | Status: SHIPPED | OUTPATIENT
Start: 2019-01-02 | End: 2019-08-10 | Stop reason: SDUPTHER

## 2019-04-09 ENCOUNTER — APPOINTMENT (OUTPATIENT)
Dept: LAB | Facility: HOSPITAL | Age: 69
End: 2019-04-09

## 2019-04-09 ENCOUNTER — APPOINTMENT (OUTPATIENT)
Dept: ONCOLOGY | Facility: CLINIC | Age: 69
End: 2019-04-09

## 2019-05-02 ENCOUNTER — LAB (OUTPATIENT)
Dept: LAB | Facility: HOSPITAL | Age: 69
End: 2019-05-02

## 2019-05-02 ENCOUNTER — OFFICE VISIT (OUTPATIENT)
Dept: ONCOLOGY | Facility: CLINIC | Age: 69
End: 2019-05-02

## 2019-05-02 VITALS
HEART RATE: 54 BPM | OXYGEN SATURATION: 99 % | SYSTOLIC BLOOD PRESSURE: 133 MMHG | WEIGHT: 165.9 LBS | HEIGHT: 71 IN | TEMPERATURE: 97.6 F | RESPIRATION RATE: 16 BRPM | DIASTOLIC BLOOD PRESSURE: 63 MMHG | BODY MASS INDEX: 23.23 KG/M2

## 2019-05-02 DIAGNOSIS — C91.10 CLL (CHRONIC LYMPHOCYTIC LEUKEMIA) (HCC): ICD-10-CM

## 2019-05-02 DIAGNOSIS — E61.1 IRON DEFICIENCY: ICD-10-CM

## 2019-05-02 DIAGNOSIS — C91.10 CLL (CHRONIC LYMPHOCYTIC LEUKEMIA) (HCC): Primary | ICD-10-CM

## 2019-05-02 LAB
ALBUMIN SERPL-MCNC: 4.4 G/DL (ref 3.5–5.2)
ALBUMIN/GLOB SERPL: 2 G/DL (ref 1.1–2.4)
ALP SERPL-CCNC: 85 U/L (ref 38–116)
ALT SERPL W P-5'-P-CCNC: 11 U/L (ref 0–41)
ANION GAP SERPL CALCULATED.3IONS-SCNC: 11.1 MMOL/L
AST SERPL-CCNC: 18 U/L (ref 0–40)
BASOPHILS # BLD AUTO: 0.09 10*3/MM3 (ref 0–0.2)
BASOPHILS NFR BLD AUTO: 0.2 % (ref 0–1.5)
BILIRUB SERPL-MCNC: 0.6 MG/DL (ref 0.2–1.2)
BUN BLD-MCNC: 15 MG/DL (ref 6–20)
BUN/CREAT SERPL: 14.4 (ref 7.3–30)
CALCIUM SPEC-SCNC: 9.2 MG/DL (ref 8.5–10.2)
CHLORIDE SERPL-SCNC: 105 MMOL/L (ref 98–107)
CO2 SERPL-SCNC: 24.9 MMOL/L (ref 22–29)
CREAT BLD-MCNC: 1.04 MG/DL (ref 0.7–1.3)
DEPRECATED RDW RBC AUTO: 50.1 FL (ref 37–54)
EOSINOPHIL # BLD AUTO: 0.28 10*3/MM3 (ref 0–0.4)
EOSINOPHIL NFR BLD AUTO: 0.7 % (ref 0.3–6.2)
ERYTHROCYTE [DISTWIDTH] IN BLOOD BY AUTOMATED COUNT: 15.7 % (ref 12.3–15.4)
ERYTHROCYTE [SEDIMENTATION RATE] IN BLOOD: 5 MM/HR (ref 0–20)
FERRITIN SERPL-MCNC: 22.9 NG/ML (ref 30–400)
GFR SERPL CREATININE-BSD FRML MDRD: 71 ML/MIN/1.73
GLOBULIN UR ELPH-MCNC: 2.2 GM/DL (ref 1.8–3.5)
GLUCOSE BLD-MCNC: 97 MG/DL (ref 74–124)
HCT VFR BLD AUTO: 39.9 % (ref 37.5–51)
HGB BLD-MCNC: 12.2 G/DL (ref 13–17.7)
IMM GRANULOCYTES # BLD AUTO: 0.15 10*3/MM3 (ref 0–0.05)
IMM GRANULOCYTES NFR BLD AUTO: 0.4 % (ref 0–0.5)
IRON 24H UR-MRATE: 41 MCG/DL (ref 59–158)
IRON SATN MFR SERPL: 9 % (ref 14–48)
LDH SERPL-CCNC: 161 U/L (ref 99–259)
LYMPHOCYTES # BLD AUTO: 30.61 10*3/MM3 (ref 0.7–3.1)
LYMPHOCYTES NFR BLD AUTO: 75.6 % (ref 19.6–45.3)
MCH RBC QN AUTO: 27.2 PG (ref 26.6–33)
MCHC RBC AUTO-ENTMCNC: 30.6 G/DL (ref 31.5–35.7)
MCV RBC AUTO: 88.9 FL (ref 79–97)
MONOCYTES # BLD AUTO: 2.45 10*3/MM3 (ref 0.1–0.9)
MONOCYTES NFR BLD AUTO: 6 % (ref 5–12)
NEUTROPHILS # BLD AUTO: 6.93 10*3/MM3 (ref 1.7–7)
NEUTROPHILS NFR BLD AUTO: 17.1 % (ref 42.7–76)
NRBC BLD AUTO-RTO: 0 /100 WBC (ref 0–0.2)
PLATELET # BLD AUTO: 158 10*3/MM3 (ref 140–450)
PMV BLD AUTO: 9.5 FL (ref 6–12)
POTASSIUM BLD-SCNC: 4.5 MMOL/L (ref 3.5–4.7)
PROT SERPL-MCNC: 6.6 G/DL (ref 6.3–8)
RBC # BLD AUTO: 4.49 10*6/MM3 (ref 4.14–5.8)
SODIUM BLD-SCNC: 141 MMOL/L (ref 134–145)
TIBC SERPL-MCNC: 445 MCG/DL (ref 249–505)
TRANSFERRIN SERPL-MCNC: 318 MG/DL (ref 200–360)
VIT B12 BLD-MCNC: 417 PG/ML (ref 211–946)
WBC NRBC COR # BLD: 40.51 10*3/MM3 (ref 3.4–10.8)

## 2019-05-02 PROCEDURE — 83540 ASSAY OF IRON: CPT | Performed by: INTERNAL MEDICINE

## 2019-05-02 PROCEDURE — 84466 ASSAY OF TRANSFERRIN: CPT | Performed by: INTERNAL MEDICINE

## 2019-05-02 PROCEDURE — 85025 COMPLETE CBC W/AUTO DIFF WBC: CPT | Performed by: INTERNAL MEDICINE

## 2019-05-02 PROCEDURE — 82728 ASSAY OF FERRITIN: CPT | Performed by: INTERNAL MEDICINE

## 2019-05-02 PROCEDURE — 85652 RBC SED RATE AUTOMATED: CPT | Performed by: INTERNAL MEDICINE

## 2019-05-02 PROCEDURE — 82607 VITAMIN B-12: CPT | Performed by: INTERNAL MEDICINE

## 2019-05-02 PROCEDURE — 83615 LACTATE (LD) (LDH) ENZYME: CPT | Performed by: INTERNAL MEDICINE

## 2019-05-02 PROCEDURE — 80053 COMPREHEN METABOLIC PANEL: CPT | Performed by: INTERNAL MEDICINE

## 2019-05-02 PROCEDURE — 36415 COLL VENOUS BLD VENIPUNCTURE: CPT | Performed by: INTERNAL MEDICINE

## 2019-05-02 PROCEDURE — 99213 OFFICE O/P EST LOW 20 MIN: CPT | Performed by: INTERNAL MEDICINE

## 2019-05-02 NOTE — PROGRESS NOTES
Subjective     REASON FOR VISIT:  Chronic lymphoid leukemia, stage 2.     Patient ID: Macho Stephenson is a 68 y.o. year old male   History of Present Illness    The patient is a 59-year-old gentleman with the above history, currently here for followup. He does not have any recurrent infections, he is not fatigued and he feels reasonably good. He has not started any new medications. Patient denies any complaints.  No fever, fatigue, shortness of breath, or night sweats.  No weight loss. Patient is totally asymptomatic.     Labs done today with platelets of 158, WBC of 40. Lymphocyte of 75.6. His WBC have been stable in 30's.     His hemoglobin is a little low at 12.2. He denies any blood in stool. Last colonoscopy was normal done 5 years ago which was normal.     PAST MEDICAL HISTORY:   1. Recurrent atrial fibrillation followed by cardiology. He has had drug eluting stent placed x 3.   2. High cholesterol.   3. Hypertension.       HEMATOLOGIC/ONCOLOGIC HISTORY:       The patient is 62-year-old gentleman with the above history currently here for followup. He has no complaints. He denies fever, night sweats or weight loss. He does not have any lymphadenopathy. He feels good. He had some fatigue but his CBC shows a go od hemoglobin.   The patient is followed by Dr. Kevin Chandra. He had seen Dr. Chandra 7/18/13 for a history of coronary artery disease status drug eluting stent placement to the right coronary artery and left anterior descending artery in February 2011. Histor y of paroxysmal atrial fibrillation and hyperlipidemia. He presented to [facility] on 6/23/13 with palpitations and was found to have atrial fibrillation with rapid ventricular response. He was given IV Cardizem and converted spontaneously to normal sinus rhythm. He was found to have elevated white count at 18.9 and denied any symptoms of infection or urinary complaints. TSH was normal, troponin levels were negative. He was asked to  continue aspirin and metoprolol for that. If he contin u ed to have atrial fibrillation, they will consider antiarrhythmic therapy with or without a short term anticoagulation therapy. However, currently the patient is feeling reasonably good. He has no complaints. His CBC 7/22/13 showed WBC 17,000, hemoglob i n 16.4 and platelet count of 174,000. Back 9/28/13 his hemoglobin was 15.1, WBC 13.3 and platelets 197,000. He has had a persistently elevated WBC but he is totally asymptomatic. He does not have any fever, night sweats or lymphadenopathy. He is here to be evaluated for his elevated white count.       Peripheral blood flow cytometry done 08/16/13 shows 22% chronic lymphoid leukemia cells, and they expressed ZAP-70 but not CD38. The total number of cells approximated 60% T cells, 32% B cells and 1% natural k iller cells. The B cells were monoclonal and expressed CD19, CD20, CD22, CD5, CD23, CD11c, ZAP-70 and T cell antigens. There was a normal CD4/CD8 ratio. CT of the chest, abdomen and pelvis does not show evidence of metastasis. Peripheral blood for DILLON -2 was negative. It was negative for T11:14 translocation.       CT scan done on 08/21/2013 shows 5 to 6 mm noncalcified nodule in the left lower lobe which is unchanged from December 2010. Otherwise no evidence of any lymphadenopathy or hepatosplenomegaly.       MRI of the spine shows arthritis and disc bulge and likely hemangiomas, with 1 lesion showing increased signal intensity at T2, which is likely a hemangioma. Bone scan could be done, but patient does not even have much pain there. CT scan of the chest, a bdomen and pelvis was done on 11/23/2014. He has tiny lymph nodes in the neck which are difficult to palpate. Right jugular one is 1.4 x 0.9 and left supraclavicular one is 1.5 x 1.1. He also has a subcarinal lymph node which is 1.7 x 1.2 cm, and he ha s a portocaval lymph node which has increased from 2.5 to 2.8. Patient is totally asymptomatic. He  does not have any B symptoms, so will continue followup with observation.       MEDICATIONS: The current medication list was reviewed with the patient and updated in the EMR this date per the medical assistant. Medication dosages and frequencies were confirmed to be accurate.       ALLERGIES: PENICILLIN which causes him to swell up. He is allergic to IV CONTRAST DYE.   SOCIAL HISTORY: He is . He smokes one pack per day for 35 years. He consumes three to four alcoholic drinks per week. He has no tattoos and no previous transfusions.       FAMILY HISTORY: Father  at 82 with MI. Mother  at 82 with bone cancer. He has one brother age 65 in good health and two sisters 69 and 57 in good health.   Past Medical History, Social History, and Family History are unchanged from my prior documentation on     Review of Systems   Constitutional: Negative for appetite change, chills, diaphoresis, fatigue, fever and unexpected weight change.   HENT: Negative for hearing loss, sore throat and trouble swallowing.    Respiratory: Negative for cough, chest tightness, shortness of breath and wheezing.    Cardiovascular: Negative for chest pain, palpitations and leg swelling.   Gastrointestinal: Negative for abdominal distention, abdominal pain, constipation, diarrhea, nausea and vomiting.   Genitourinary: Negative for dysuria, frequency, hematuria and urgency.   Musculoskeletal: Negative for joint swelling.        No muscle weakness.   Skin: Negative for rash and wound.   Neurological: Negative for seizures, syncope, speech difficulty, weakness, numbness and headaches.   Hematological: Negative for adenopathy. Does not bruise/bleed easily.   Psychiatric/Behavioral: Negative for behavioral problems, confusion and suicidal ideas.   All other systems reviewed and are negative.       Objective      No chief complaint on file.       Patient Active Problem List   Diagnosis   • CLL (chronic lymphocytic leukemia)  (CMS/[facility])         MEDICATIONS:  Medication Reconciliation for the patient has been reviewed by me and documented in the patients chart.    ALLERGIES:    Allergies   Allergen Reactions   • Penicillins      swelling         There were no vitals filed for this visit.     Current Status 10/2/2018   ECOG score 0       Physical Exam   GENERAL:  Well-developed, well-nourished in no acute distress.   NECK:  Supple with good range of motion; no thyromegaly or masses, no JVD.  LYMPHATICS:  No cervical, supraclavicular, axillary or inguinal adenopathy.  CHEST:  Lungs clear to auscultation. Good airflow.  CARDIAC:  Regular rate and rhythm without murmurs, rubs or gallops. Normal S1,S2.  ABDOMEN:  Soft, nontender with no hepatosplenomegaly or masses.  EXTREMITIES:  No clubbing, cyanosis or edema.  NEUROLOGICAL:  Cranial Nerves II-XII grossly intact.  No focal neurological deficits.  PSYCHIATRIC:  Normal affect and mood.      RECENT LABS:  Hematology WBC   Date Value Ref Range Status   10/02/2018 33.87 (H) 4.00 - 10.00 10*3/mm3 Final     RBC   Date Value Ref Range Status   10/02/2018 4.34 4.30 - 5.50 10*6/mm3 Final     Hemoglobin   Date Value Ref Range Status   10/02/2018 13.0 (L) 13.5 - 16.5 g/dL Final     Hematocrit   Date Value Ref Range Status   10/02/2018 40.2 40.0 - 49.0 % Final     Platelets   Date Value Ref Range Status   10/02/2018 145 (L) 150 - 375 10*3/mm3 Final        Assessment/Plan   1.This is a patient with a history of stage 2 CLL without evidence of any disease progression.  I have reviewed the CT scan from 3/19/2018 there is minimal progression but clinically patient is asymptomatic and we will follow with observation.  His white count is slightly elevated at 40,000, and his baseline in past has been around 30,000. Will monitor with labs.   No evidence of any frequent infections, no major worsening of his white count. He has no fever or night sweats or any other symptoms.     2.  Mild thrombocytopenia- Improved.  Platelets of 158 today.    3. Mild anemia- Patient's hb today is 12.2. He is currently asymptomatic and does not complain of fatigue, blood in stool or any other symptom. Ordered labs today to further evaluate and will call patient with results.     Plan   1. Patient's labs have been stable; mild anemia with Hb of 12.2- Ordered labs to be done today including iron profile, ferritin, B-12, sed rate, FERNANDO, direct quirino and TIARA, PE, FLC serum; will call patient with results.   2. Labs (CBC) in 3 months with nurse review  3. Follow-up in 6 months with labs.    I, Zahra Sinha, acted as scribe for Susannah Moya MD  in documenting the service or procedure. To the best of my knowledge, I recorded what was dictated by MD Susannah Daugherty MD      Cc: Dr. Kevin Chandra

## 2019-05-05 DIAGNOSIS — E61.1 IRON DEFICIENCY: Primary | ICD-10-CM

## 2019-05-05 RX ORDER — FERROUS SULFATE 325(65) MG
325 TABLET ORAL
Qty: 90 TABLET | Refills: 2 | Status: SHIPPED | OUTPATIENT
Start: 2019-05-05 | End: 2020-07-24

## 2019-05-06 LAB
Lab: NORMAL
METHYLMALONATE SERPL-SCNC: 240 NMOL/L (ref 0–378)

## 2019-07-22 ENCOUNTER — OFFICE VISIT (OUTPATIENT)
Dept: FAMILY MEDICINE CLINIC | Facility: CLINIC | Age: 69
End: 2019-07-22

## 2019-07-22 VITALS
TEMPERATURE: 97.9 F | SYSTOLIC BLOOD PRESSURE: 136 MMHG | BODY MASS INDEX: 22.82 KG/M2 | WEIGHT: 163 LBS | HEIGHT: 71 IN | OXYGEN SATURATION: 98 % | DIASTOLIC BLOOD PRESSURE: 66 MMHG | HEART RATE: 53 BPM

## 2019-07-22 DIAGNOSIS — C91.10 CLL (CHRONIC LYMPHOCYTIC LEUKEMIA) (HCC): ICD-10-CM

## 2019-07-22 DIAGNOSIS — D50.8 OTHER IRON DEFICIENCY ANEMIA: ICD-10-CM

## 2019-07-22 DIAGNOSIS — Z72.0 TOBACCO ABUSE: ICD-10-CM

## 2019-07-22 DIAGNOSIS — R10.30 LOWER ABDOMINAL PAIN: Primary | ICD-10-CM

## 2019-07-22 DIAGNOSIS — R63.4 WEIGHT LOSS: ICD-10-CM

## 2019-07-22 DIAGNOSIS — Z00.00 MEDICARE ANNUAL WELLNESS VISIT, INITIAL: ICD-10-CM

## 2019-07-22 LAB
ALBUMIN SERPL-MCNC: 4.5 G/DL (ref 3.5–5.2)
ALBUMIN/GLOB SERPL: 1.6 G/DL
ALP SERPL-CCNC: 91 U/L (ref 39–117)
ALT SERPL W P-5'-P-CCNC: 17 U/L (ref 1–41)
AMORPH URATE CRY URNS QL MICRO: ABNORMAL /HPF
ANION GAP SERPL CALCULATED.3IONS-SCNC: 9.2 MMOL/L (ref 5–15)
AST SERPL-CCNC: 22 U/L (ref 1–40)
BACTERIA UR QL AUTO: ABNORMAL /HPF
BILIRUB SERPL-MCNC: 0.4 MG/DL (ref 0.2–1.2)
BILIRUB UR QL STRIP: NEGATIVE
BUN BLD-MCNC: 15 MG/DL (ref 8–23)
BUN/CREAT SERPL: 16.9 (ref 7–25)
CALCIUM SPEC-SCNC: 10 MG/DL (ref 8.6–10.5)
CHLORIDE SERPL-SCNC: 101 MMOL/L (ref 98–107)
CLARITY UR: CLEAR
CO2 SERPL-SCNC: 28.8 MMOL/L (ref 22–29)
COLOR UR: YELLOW
CREAT BLD-MCNC: 0.89 MG/DL (ref 0.76–1.27)
ERYTHROCYTE [DISTWIDTH] IN BLOOD BY AUTOMATED COUNT: 17.4 % (ref 12.3–15.4)
GFR SERPL CREATININE-BSD FRML MDRD: 85 ML/MIN/1.73
GLOBULIN UR ELPH-MCNC: 2.8 GM/DL
GLUCOSE BLD-MCNC: 119 MG/DL (ref 65–99)
GLUCOSE UR STRIP-MCNC: NEGATIVE MG/DL
HCT VFR BLD AUTO: 41.4 % (ref 37.5–51)
HGB BLD-MCNC: 13.4 G/DL (ref 13–17.7)
HGB UR QL STRIP.AUTO: ABNORMAL
KETONES UR QL STRIP: NEGATIVE
LEUKOCYTE ESTERASE UR QL STRIP.AUTO: NEGATIVE
LYMPHOCYTES # BLD AUTO: 35.8 10*3/MM3 (ref 0.7–3.1)
LYMPHOCYTES NFR BLD AUTO: 79.8 % (ref 19.6–45.3)
MCH RBC QN AUTO: 28 PG (ref 26.6–33)
MCHC RBC AUTO-ENTMCNC: 32.3 G/DL (ref 31.5–35.7)
MCV RBC AUTO: 86.6 FL (ref 79–97)
MONOCYTES # BLD AUTO: 1.8 10*3/MM3 (ref 0.1–0.9)
MONOCYTES NFR BLD AUTO: 4.1 % (ref 5–12)
NEUTROPHILS # BLD AUTO: 7.2 10*3/MM3 (ref 1.7–7)
NEUTROPHILS NFR BLD AUTO: 16.1 % (ref 42.7–76)
NITRITE UR QL STRIP: NEGATIVE
PH UR STRIP.AUTO: 6.5 [PH] (ref 4.6–8)
PLATELET # BLD AUTO: 153 10*3/MM3 (ref 140–450)
PMV BLD AUTO: 7.7 FL (ref 6–12)
POTASSIUM BLD-SCNC: 4.7 MMOL/L (ref 3.5–5.2)
PROT SERPL-MCNC: 7.3 G/DL (ref 6–8.5)
PROT UR QL STRIP: ABNORMAL
RBC # BLD AUTO: 4.78 10*6/MM3 (ref 4.14–5.8)
RBC # UR: ABNORMAL /HPF
REF LAB TEST METHOD: ABNORMAL
SODIUM BLD-SCNC: 139 MMOL/L (ref 136–145)
SP GR UR STRIP: 1.01 (ref 1–1.03)
SQUAMOUS #/AREA URNS HPF: ABNORMAL /HPF
UROBILINOGEN UR QL STRIP: ABNORMAL
WBC NRBC COR # BLD: 44.8 10*3/MM3 (ref 3.4–10.8)
WBC UR QL AUTO: ABNORMAL /HPF

## 2019-07-22 PROCEDURE — 80053 COMPREHEN METABOLIC PANEL: CPT | Performed by: FAMILY MEDICINE

## 2019-07-22 PROCEDURE — 99213 OFFICE O/P EST LOW 20 MIN: CPT | Performed by: FAMILY MEDICINE

## 2019-07-22 PROCEDURE — 85025 COMPLETE CBC W/AUTO DIFF WBC: CPT | Performed by: FAMILY MEDICINE

## 2019-07-22 PROCEDURE — 36415 COLL VENOUS BLD VENIPUNCTURE: CPT | Performed by: FAMILY MEDICINE

## 2019-07-22 PROCEDURE — G0438 PPPS, INITIAL VISIT: HCPCS | Performed by: FAMILY MEDICINE

## 2019-07-22 PROCEDURE — G0009 ADMIN PNEUMOCOCCAL VACCINE: HCPCS | Performed by: FAMILY MEDICINE

## 2019-07-22 PROCEDURE — 81001 URINALYSIS AUTO W/SCOPE: CPT | Performed by: FAMILY MEDICINE

## 2019-07-22 PROCEDURE — 90732 PPSV23 VACC 2 YRS+ SUBQ/IM: CPT | Performed by: FAMILY MEDICINE

## 2019-07-22 RX ORDER — NISOLDIPINE 17 MG/1
TABLET, FILM COATED, EXTENDED RELEASE ORAL
Qty: 180 TABLET | Refills: 1 | Status: SHIPPED | OUTPATIENT
Start: 2019-07-22 | End: 2019-07-22

## 2019-07-22 NOTE — PROGRESS NOTES
The ABCs of the Annual Wellness Visit  Initial Medicare Wellness Visit    Chief Complaint   Patient presents with   • Abdominal Pain     low lt side       Subjective   History of Present Illness:  Macho Stephenson is a 68 y.o. male who presents for an Initial Medicare Wellness Visit.    HEALTH RISK ASSESSMENT    Recent Hospitalizations:  No hospitalization(s) within the last year.    Current Medical Providers:  Patient Care Team:  Kevin Chandra MD as PCP - General  Susannah Moya MD as PCP - Claims Attributed  Susannah Moya MD as Consulting Physician (Hematology and Oncology)  Kevin Chandra MD as Referring Physician (Family Medicine)  Robert Rodriguez MD as Consulting Physician (Interventional Cardiology)    Smoking Status:  Social History     Tobacco Use   Smoking Status Current Every Day Smoker   • Packs/day: 0.01   • Years: 40.00   • Pack years: 0.40   • Types: Cigarettes   Smokeless Tobacco Never Used   Tobacco Comment    quit 5 yrs ago       Alcohol Consumption:  Social History     Substance and Sexual Activity   Alcohol Use Yes    Comment: occasionally       Depression Screen:   PHQ-2/PHQ-9 Depression Screening 7/22/2019   Little interest or pleasure in doing things 0   Feeling down, depressed, or hopeless 0   Trouble falling or staying asleep, or sleeping too much 1   Feeling tired or having little energy 0   Poor appetite or overeating 0   Feeling bad about yourself - or that you are a failure or have let yourself or your family down 0   Trouble concentrating on things, such as reading the newspaper or watching television 0   Moving or speaking so slowly that other people could have noticed. Or the opposite - being so fidgety or restless that you have been moving around a lot more than usual 0   Thoughts that you would be better off dead, or of hurting yourself in some way 0   Total Score 1   If you checked off any problems, how difficult have these problems made it for you to do your work, take  care of things at home, or get along with other people? Not difficult at all       Fall Risk Screen:  CORIN Fall Risk Assessment has not been completed.    Health Habits and Functional and Cognitive Screening:  Functional & Cognitive Status 7/22/2019   Do you have difficulty preparing food and eating? No   Do you have difficulty bathing yourself, getting dressed or grooming yourself? No   Do you have difficulty using the toilet? No   Do you have difficulty moving around from place to place? No   Do you have trouble with steps or getting out of a bed or a chair? No   Current Diet Well Balanced Diet   Dental Exam Up to date   Eye Exam Up to date   Exercise (times per week) 0 times per week   Current Exercise Activities Include None   Do you need help using the phone?  No   Are you deaf or do you have serious difficulty hearing?  No   Do you need help with transportation? No   Do you need help shopping? No   Do you need help preparing meals?  No   Do you need help with housework?  No   Do you need help with laundry? No   Do you need help taking your medications? No   Do you need help managing money? No   Do you ever drive or ride in a car without wearing a seat belt? No   Have you felt unusual stress, anger or loneliness in the last month? No   Who do you live with? Spouse   If you need help, do you have trouble finding someone available to you? No   Do you have difficulty concentrating, remembering or making decisions? No         Does the patient have evidence of cognitive impairment? No    Asprin use counseling:Taking ASA appropriately as indicated    Age-appropriate Screening Schedule:  Refer to the list below for future screening recommendations based on patient's age, sex and/or medical conditions. Orders for these recommended tests are listed in the plan section. The patient has been provided with a written plan.    Health Maintenance   Topic Date Due   • TDAP/TD VACCINES (1 - Tdap) 09/05/1968   • ZOSTER VACCINE  (1 of 2) 09/05/1999   • LIPID PANEL  08/26/2016   • INFLUENZA VACCINE  08/01/2019   • COLONOSCOPY  07/16/2029   • PNEUMOCOCCAL VACCINES (65+ LOW/MEDIUM RISK)  Completed          The following portions of the patient's history were reviewed and updated as appropriate: past family history and past social history.    Outpatient Medications Prior to Visit   Medication Sig Dispense Refill   • aspirin 81 MG EC tablet Take 81 mg by mouth daily.     • atorvastatin (LIPITOR) 20 MG tablet TAKE 1 TABLET EVERY NIGHT 90 tablet 1   • ferrous sulfate 325 (65 FE) MG tablet Take 1 tablet by mouth 3 (Three) Times a Day With Meals. 90 tablet 2   • lisinopril (PRINIVIL,ZESTRIL) 10 MG tablet TK 1 T PO QD  0   • nitroglycerin (NITROSTAT) 0.4 MG SL tablet Place 0.4 mg under the tongue every 5 (five) minutes as needed for chest pain. Take no more than 3 doses in 15 minutes.     • Omega-3 Fatty Acids (FISH OIL) 1000 MG capsule capsule Take 1 capsule by mouth Daily.     • sotalol (BETAPACE) 80 MG tablet TAKE 1 TABLET BY MOUTH TWO  TIMES DAILY     • warfarin (COUMADIN) 5 MG tablet 1.5 daily  2   • FLUZONE HIGH-DOSE 0.5 ML suspension prefilled syringe injection TO BE ADMINISTERED BY PHARMACIST FOR IMMUNIZATION  0     No facility-administered medications prior to visit.        Patient Active Problem List   Diagnosis   • CLL (chronic lymphocytic leukemia) (CMS/formerly [facility])       Advanced Care Planning:  Patient does not have an advance directive - not interested in additional information    Review of Systems    Compared to one year ago, the patient feels his physical health is the same.  Compared to one year ago, the patient feels his mental health is the same.    Reviewed chart for potential of high risk medication in the elderly: not applicable  Reviewed chart for potential of harmful drug interactions in the elderly:not applicable    Objective         Vitals:    07/22/19 1124   BP: 136/66   BP Location: Left arm   Patient Position: Sitting   Cuff  "Size: Adult   Pulse: 53   Temp: 97.9 °F (36.6 °C)   TempSrc: Oral   SpO2: 98%   Weight: 73.9 kg (163 lb)   Height: 180 cm (70.87\")   PainSc:   4   PainLoc: Abdomen       Body mass index is 22.82 kg/m².  Discussed the patient's BMI with him. The BMI is above average; no BMI management plan is appropriate..    Physical Exam          Assessment/Plan   Medicare Risks and Personalized Health Plan  CMS Preventative Services Quick Reference  Cardiovascular risk  Tobacco Use/Dependance (use dotphrase .tobaccocessation for documentation)    The above risks/problems have been discussed with the patient.  Pertinent information has been shared with the patient in the After Visit Summary.  Follow up plans and orders are seen below in the Assessment/Plan Section.    Diagnoses and all orders for this visit:    1. Lower abdominal pain (Primary)  -     CBC & Differential  -     Urinalysis With Microscopic - Urine, Clean Catch  -     Cancel: Basic Metabolic Panel  -     Comprehensive Metabolic Panel  -     CBC Auto Differential  -     Urinalysis without microscopic (no culture) - Urine, Clean Catch  -     Urinalysis, Microscopic Only - Urine, Clean Catch  -     CT Abdomen Pelvis With Contrast; Future    2. CLL (chronic lymphocytic leukemia) (CMS/[facility])    3. Weight loss    4. Other iron deficiency anemia    5. Tobacco abuse    6. Medicare annual wellness visit, initial    Other orders  -     Pneumococcal Polysaccharide Vaccine 23-Valent (PPSV23) Greater Than or Equal To 1yo Subcutaneous / IM  -     Discontinue: nisoldipine (SULAR) 17 MG 24 hr tablet; Take 2 tablets  daily  Dispense: 180 tablet; Refill: 1      Follow Up:  No Follow-up on file.     An After Visit Summary and PPPS were given to the patient.           "

## 2019-07-22 NOTE — PROGRESS NOTES
SUBJECTIVE:  The patient is a 68-year-old white male who is here for abdominal pain.  This is been going on intermittently for about 3 weeks.  He has a history of leukemia.  He actually had a colonoscopy a week ago and states a polyp was found.  There is no evidence of diverticulosis.   refer to report.  He denies any  symptoms.  He states he is also losing weight.    PAST MEDICAL HISTORY:  Reviewed.    REVIEW OF SYSTEMS:  Please see above; all others reviewed and are negative.      OBJECTIVE:   Vitals signs are reviewed and are stable.    General:  Well-nourished.  Alert and oriented x3 in no acute distress.  HEENT: PERRLA.   Neck:  Supple.   Lungs:  Clear.    Heart:  Regular rate and rhythm.   Abdomen:   Soft, nontender.  Bowel sounds present  Extremities:  No cyanosis, clubbing or edema.   Neurological:  Grossly intact without motor or sensory deficits.     ASSESSMENT:    Abdominal pain  Weight loss  CLL-WBC today is 44,000.  Hemoglobin up slightly at 13.2  PLAN: CBC CMP urinalysis ordered.  CAT scan of the abdomen pelvis ordered.  Patient will follow-up on labs.  He will follow-up after his CT scan.  He will follow-up with his oncologist.  He will call me if any problems or go to the emergency room.  Discontinue smoking.    Dictated utilizing Dragon dictation.

## 2019-07-22 NOTE — PATIENT INSTRUCTIONS
Medicare Wellness  Personal Prevention Plan of Service     Date of Office Visit:  2019  Encounter Provider:  Kevin Chandra MD  Place of Service:  [facility]  Patient Name: Macho Stephenson  :  1950    As part of the Medicare Wellness portion of your visit today, we are providing you with this personalized preventive plan of services (PPPS). This plan is based upon recommendations of the [place] Preventive Services Task Force (USPSTF) and the Advisory Committee on Immunization Practices (ACIP).    This lists the preventive care services that should be considered, and provides dates of when you are due. Items listed as completed are up-to-date and do not require any further intervention.    Health Maintenance   Topic Date Due   • TDAP/TD VACCINES (1 - Tdap) 1968   • ZOSTER VACCINE (1 of 2) 1999   • HEPATITIS C SCREENING  2016   • LIPID PANEL  2016   • INFLUENZA VACCINE  2019   • MEDICARE ANNUAL WELLNESS  2020   • COLONOSCOPY  2029   • PNEUMOCOCCAL VACCINES (65+ LOW/MEDIUM RISK)  Completed   • AAA SCREEN (ONE-TIME)  Completed       Orders Placed This Encounter   Procedures   • CT Abdomen Pelvis With Contrast     Standing Status:   Future     Standing Expiration Date:   2020     Scheduling Instructions:      Abdominal pain.  Negative colonoscopy.  Having weight loss and anemia.     Order Specific Question:   Will Oral Contrast be needed for this procedure?     Answer:   No   • Pneumococcal Polysaccharide Vaccine 23-Valent (PPSV23) Greater Than or Equal To 3yo Subcutaneous / IM   • Comprehensive Metabolic Panel   • CBC Auto Differential   • Urinalysis without microscopic (no culture) - Urine, Clean Catch   • Urinalysis, Microscopic Only - Urine, Clean Catch   • CBC & Differential     Order Specific Question:   Manual Differential     Answer:   No   • Urinalysis With Microscopic - Urine, Clean Catch       No  Follow-up on file.

## 2019-07-25 ENCOUNTER — APPOINTMENT (OUTPATIENT)
Dept: LAB | Facility: HOSPITAL | Age: 69
End: 2019-07-25

## 2019-07-25 ENCOUNTER — APPOINTMENT (OUTPATIENT)
Dept: ONCOLOGY | Facility: HOSPITAL | Age: 69
End: 2019-07-25

## 2019-08-05 ENCOUNTER — HOSPITAL ENCOUNTER (OUTPATIENT)
Dept: CT IMAGING | Facility: HOSPITAL | Age: 69
Discharge: HOME | End: 2019-08-05
Admitting: FAMILY MEDICINE

## 2019-08-05 DIAGNOSIS — R10.30 LOWER ABDOMINAL PAIN: ICD-10-CM

## 2019-08-05 LAB — CREAT BLDA-MCNC: 1 MG/DL (ref 0.6–1.3)

## 2019-08-05 PROCEDURE — 82565 ASSAY OF CREATININE: CPT

## 2019-08-05 PROCEDURE — 74177 CT ABD & PELVIS W/CONTRAST: CPT

## 2019-08-05 PROCEDURE — 25010000002 IOPAMIDOL 61 % SOLUTION: Performed by: FAMILY MEDICINE

## 2019-08-05 RX ADMIN — IOPAMIDOL 85 ML: 612 INJECTION, SOLUTION INTRAVENOUS at 09:03

## 2019-08-06 DIAGNOSIS — R10.9 ABDOMINAL PAIN, UNSPECIFIED ABDOMINAL LOCATION: ICD-10-CM

## 2019-08-06 DIAGNOSIS — R16.1 SPLENOMEGALY: Primary | ICD-10-CM

## 2019-08-11 RX ORDER — ATORVASTATIN CALCIUM 20 MG/1
TABLET, FILM COATED ORAL
Qty: 90 TABLET | Refills: 1 | Status: SHIPPED | OUTPATIENT
Start: 2019-08-11 | End: 2019-12-31

## 2019-10-10 ENCOUNTER — OFFICE VISIT (OUTPATIENT)
Dept: ONCOLOGY | Facility: CLINIC | Age: 69
End: 2019-10-10

## 2019-10-10 ENCOUNTER — LAB (OUTPATIENT)
Dept: LAB | Facility: HOSPITAL | Age: 69
End: 2019-10-10

## 2019-10-10 VITALS
OXYGEN SATURATION: 97 % | RESPIRATION RATE: 16 BRPM | WEIGHT: 166.8 LBS | SYSTOLIC BLOOD PRESSURE: 124 MMHG | HEART RATE: 58 BPM | TEMPERATURE: 98.5 F | BODY MASS INDEX: 23.35 KG/M2 | HEIGHT: 71 IN | DIASTOLIC BLOOD PRESSURE: 73 MMHG

## 2019-10-10 DIAGNOSIS — D64.9 ANEMIA, UNSPECIFIED TYPE: ICD-10-CM

## 2019-10-10 DIAGNOSIS — C91.10 CLL (CHRONIC LYMPHOCYTIC LEUKEMIA) (HCC): ICD-10-CM

## 2019-10-10 DIAGNOSIS — C91.10 CLL (CHRONIC LYMPHOCYTIC LEUKEMIA) (HCC): Primary | ICD-10-CM

## 2019-10-10 LAB
BASOPHILS # BLD AUTO: 0.13 10*3/MM3 (ref 0–0.2)
BASOPHILS NFR BLD AUTO: 0.3 % (ref 0–1.5)
DEPRECATED RDW RBC AUTO: 52.8 FL (ref 37–54)
EOSINOPHIL # BLD AUTO: 0.28 10*3/MM3 (ref 0–0.4)
EOSINOPHIL NFR BLD AUTO: 0.7 % (ref 0.3–6.2)
ERYTHROCYTE [DISTWIDTH] IN BLOOD BY AUTOMATED COUNT: 16 % (ref 12.3–15.4)
HCT VFR BLD AUTO: 42.6 % (ref 37.5–51)
HGB BLD-MCNC: 13.3 G/DL (ref 13–17.7)
IMM GRANULOCYTES # BLD AUTO: 0.1 10*3/MM3 (ref 0–0.05)
IMM GRANULOCYTES NFR BLD AUTO: 0.3 % (ref 0–0.5)
LYMPHOCYTES # BLD AUTO: 31.11 10*3/MM3 (ref 0.7–3.1)
LYMPHOCYTES NFR BLD AUTO: 82.6 % (ref 19.6–45.3)
MCH RBC QN AUTO: 28.2 PG (ref 26.6–33)
MCHC RBC AUTO-ENTMCNC: 31.2 G/DL (ref 31.5–35.7)
MCV RBC AUTO: 90.4 FL (ref 79–97)
MONOCYTES # BLD AUTO: 1.24 10*3/MM3 (ref 0.1–0.9)
MONOCYTES NFR BLD AUTO: 3.3 % (ref 5–12)
NEUTROPHILS # BLD AUTO: 4.8 10*3/MM3 (ref 1.7–7)
NEUTROPHILS NFR BLD AUTO: 12.8 % (ref 42.7–76)
NRBC BLD AUTO-RTO: 0 /100 WBC (ref 0–0.2)
PLATELET # BLD AUTO: 134 10*3/MM3 (ref 140–450)
PMV BLD AUTO: 10.2 FL (ref 6–12)
RBC # BLD AUTO: 4.71 10*6/MM3 (ref 4.14–5.8)
WBC NRBC COR # BLD: 37.66 10*3/MM3 (ref 3.4–10.8)

## 2019-10-10 PROCEDURE — 36416 COLLJ CAPILLARY BLOOD SPEC: CPT

## 2019-10-10 PROCEDURE — G0463 HOSPITAL OUTPT CLINIC VISIT: HCPCS | Performed by: INTERNAL MEDICINE

## 2019-10-10 PROCEDURE — 85025 COMPLETE CBC W/AUTO DIFF WBC: CPT

## 2019-10-10 PROCEDURE — 99214 OFFICE O/P EST MOD 30 MIN: CPT | Performed by: INTERNAL MEDICINE

## 2019-10-10 NOTE — PROGRESS NOTES
Subjective     REASON FOR VISIT:  Chronic lymphoid leukemia, stage 2.     Patient ID: Macho Stephenson is a 69 y.o. year old male   History of Present Illness    The patient is a 59-year-old gentleman with the above history, currently here for followup.     Patient denies any fatigue, tingling, numbness, and back pain.  He is not working anymore.  He denies any rashes, fever, night sweats, and infections.    His labs from 10/10/19 are normal.      He underwent colonoscopy.  He was iron-deficient but had no blood in his stools.  Pt has angiodysplasia and required cauterization.    He has Hess's esophagus and he will get scoped again in a couple of years.    PAST MEDICAL HISTORY:   1. Recurrent atrial fibrillation followed by cardiology. He has had drug eluting stent placed x 3.   2. High cholesterol.   3. Hypertension.       HEMATOLOGIC/ONCOLOGIC HISTORY:       The patient is 62-year-old gentleman with the above history currently here for followup. He has no complaints. He denies fever, night sweats or weight loss. He does not have any lymphadenopathy. He feels good. He had some fatigue but his CBC shows a go od hemoglobin.   The patient is followed by Dr. Kevin Chandra. He had seen Dr. Chandra 7/18/13 for a history of coronary artery disease status drug eluting stent placement to the right coronary artery and left anterior descending artery in February 2011. Histor y of paroxysmal atrial fibrillation and hyperlipidemia. He presented to [facility] on 6/23/13 with palpitations and was found to have atrial fibrillation with rapid ventricular response. He was given IV Cardizem and converted spontaneously to normal sinus rhythm. He was found to have elevated white count at 18.9 and denied any symptoms of infection or urinary complaints. TSH was normal, troponin levels were negative. He was asked to continue aspirin and metoprolol for that. If he contin u ed to have atrial fibrillation, they will consider  antiarrhythmic therapy with or without a short term anticoagulation therapy. However, currently the patient is feeling reasonably good. He has no complaints. His CBC 7/22/13 showed WBC 17,000, hemoglob i n 16.4 and platelet count of 174,000. Back 9/28/13 his hemoglobin was 15.1, WBC 13.3 and platelets 197,000. He has had a persistently elevated WBC but he is totally asymptomatic. He does not have any fever, night sweats or lymphadenopathy. He is here to be evaluated for his elevated white count.       Peripheral blood flow cytometry done 08/16/13 shows 22% chronic lymphoid leukemia cells, and they expressed ZAP-70 but not CD38. The total number of cells approximated 60% T cells, 32% B cells and 1% natural k iller cells. The B cells were monoclonal and expressed CD19, CD20, CD22, CD5, CD23, CD11c, ZAP-70 and T cell antigens. There was a normal CD4/CD8 ratio. CT of the chest, abdomen and pelvis does not show evidence of metastasis. Peripheral blood for DILLON -2 was negative. It was negative for T11:14 translocation.       CT scan done on 08/21/2013 shows 5 to 6 mm noncalcified nodule in the left lower lobe which is unchanged from December 2010. Otherwise no evidence of any lymphadenopathy or hepatosplenomegaly.       MRI of the spine shows arthritis and disc bulge and likely hemangiomas, with 1 lesion showing increased signal intensity at T2, which is likely a hemangioma. Bone scan could be done, but patient does not even have much pain there. CT scan of the chest, a bdomen and pelvis was done on 11/23/2014. He has tiny lymph nodes in the neck which are difficult to palpate. Right jugular one is 1.4 x 0.9 and left supraclavicular one is 1.5 x 1.1. He also has a subcarinal lymph node which is 1.7 x 1.2 cm, and he ha s a portocaval lymph node which has increased from 2.5 to 2.8. Patient is totally asymptomatic. He does not have any B symptoms, so will continue followup with observation.       MEDICATIONS: The current  medication list was reviewed with the patient and updated in the EMR this date per the medical assistant. Medication dosages and frequencies were confirmed to be accurate.       ALLERGIES: PENICILLIN which causes him to swell up. He is allergic to IV CONTRAST DYE.   SOCIAL HISTORY: He is . He smokes one pack per day for 35 years. He consumes three to four alcoholic drinks per week. He has no tattoos and no previous transfusions.       FAMILY HISTORY: Father  at 82 with MI. Mother  at 82 with bone cancer. He has one brother age 65 in good health and two sisters 69 and 57 in good health.   Past Medical History, Social History, and Family History are unchanged from my prior documentation on     Review of Systems   Constitutional: Negative for appetite change, chills, diaphoresis, fatigue, fever and unexpected weight change.   HENT: Negative for hearing loss, sore throat and trouble swallowing.    Respiratory: Negative for cough, chest tightness, shortness of breath and wheezing.    Cardiovascular: Negative for chest pain, palpitations and leg swelling.   Gastrointestinal: Negative for abdominal distention, abdominal pain, constipation, diarrhea, nausea and vomiting.   Genitourinary: Negative for dysuria, frequency, hematuria and urgency.   Musculoskeletal: Negative for joint swelling.        No muscle weakness.   Skin: Negative for rash and wound.   Neurological: Negative for seizures, syncope, speech difficulty, weakness, numbness and headaches.   Hematological: Negative for adenopathy. Does not bruise/bleed easily.   Psychiatric/Behavioral: Negative for behavioral problems, confusion and suicidal ideas.   All other systems reviewed and are negative.       Objective      Chief Complaint   Patient presents with   • Follow-up        Patient Active Problem List   Diagnosis   • CLL (chronic lymphocytic leukemia) (CMS/[facility])         MEDICATIONS:  Medication Reconciliation for the patient has  been reviewed by me and documented in the patients chart.    ALLERGIES:    Allergies   Allergen Reactions   • Penicillins      swelling         Vitals:    10/10/19 1013   BP: 124/73   Pulse: 58   Resp: 16   Temp: 98.5 °F (36.9 °C)   SpO2: 97%        Current Status 10/10/2019   ECOG score 0       Physical Exam   GENERAL:  Well-developed, well-nourished in no acute distress.   NECK:  Supple with good range of motion; no thyromegaly or masses, no JVD.  LYMPHATICS:  No cervical, supraclavicular, axillary or inguinal adenopathy.  CHEST:  Lungs clear to auscultation. Good airflow.  CARDIAC:  Regular rate and rhythm without murmurs, rubs or gallops. Normal S1,S2.  ABDOMEN:  Soft, nontender with no hepatosplenomegaly or masses.  EXTREMITIES:  No clubbing, cyanosis or edema.  NEUROLOGICAL:  Cranial Nerves II-XII grossly intact.  No focal neurological deficits.  PSYCHIATRIC:  Normal affect and mood.      RECENT LABS:  Hematology WBC   Date Value Ref Range Status   10/10/2019 37.66 (H) 3.40 - 10.80 10*3/mm3 Final     RBC   Date Value Ref Range Status   10/10/2019 4.71 4.14 - 5.80 10*6/mm3 Final     Hemoglobin   Date Value Ref Range Status   10/10/2019 13.3 13.0 - 17.7 g/dL Final     Hematocrit   Date Value Ref Range Status   10/10/2019 42.6 37.5 - 51.0 % Final     Platelets   Date Value Ref Range Status   10/10/2019 134 (L) 140 - 450 10*3/mm3 Final        08/05/19 - CT Abdomen Pelvis  IMPRESSION:  Mild splenomegaly with slight increase in size of the spleen  since the most recent previous CT scan of the abdomen dated 03/19/2018.  Mild to moderately enlarged lymph nodes in the shorty hepatis showing  equivocal slight increase in size. Moderate sigmoid diverticulosis  without evidence of diverticulitis. Cholelithiasis. Fusiform infrarenal  abdominal aortic aneurysm measuring 3.6 cm in diameter showing slight  increase in size. Moderately high-grade stenosis involving the distal  right common iliac artery. Bilateral spondylolysis  at L5-S1 with grade 1  spondylolisthesis.    Assessment/Plan   1.This is a patient with a history of stage 2 CLL without evidence of any disease progression.  I have reviewed the CT scan from 3/19/2018 there is minimal progression but clinically patient is asymptomatic and we will follow with observation.  Will monitor with labs.   No evidence of any frequent infections, no major worsening of his white count. He has no fever or night sweats or any other symptoms.     2.  Mild thrombocytopenia- Improved.     3. Mild anemia- He is currently asymptomatic and does not complain of fatigue, blood in stool or any other symptom. Ordered labs today to further evaluate and will call patient with results.     Plan   1. Labs (CBC) in 3 months with nurse review  2. Follow-up with Nita in 6 months with labs.  3. We reviewed his CT scans from 10/05/19.  Pt has mild increase in splenomegaly and mild increase in in abdominal lymphadenopathy but asymptomatic and will follow him with observation.    I, Winifred Carlin, acted as scribe for Susannah Moya MD  in documenting the service or procedure. To the best of my knowledge, I recorded what was dictated by MD Susannah Daugherty MD      Cc: Dr. Kevin Chandra

## 2019-11-05 ENCOUNTER — OFFICE VISIT (OUTPATIENT)
Dept: FAMILY MEDICINE CLINIC | Facility: CLINIC | Age: 69
End: 2019-11-05

## 2019-11-05 VITALS
OXYGEN SATURATION: 99 % | DIASTOLIC BLOOD PRESSURE: 60 MMHG | HEART RATE: 56 BPM | RESPIRATION RATE: 18 BRPM | SYSTOLIC BLOOD PRESSURE: 140 MMHG | WEIGHT: 170 LBS | HEIGHT: 71 IN | TEMPERATURE: 98 F | BODY MASS INDEX: 23.8 KG/M2

## 2019-11-05 DIAGNOSIS — F41.9 ANXIETY: ICD-10-CM

## 2019-11-05 DIAGNOSIS — Z72.0 TOBACCO ABUSE: ICD-10-CM

## 2019-11-05 DIAGNOSIS — E78.2 MIXED HYPERLIPIDEMIA: ICD-10-CM

## 2019-11-05 DIAGNOSIS — I10 ESSENTIAL HYPERTENSION: Primary | ICD-10-CM

## 2019-11-05 DIAGNOSIS — C91.10 CLL (CHRONIC LYMPHOCYTIC LEUKEMIA) (HCC): ICD-10-CM

## 2019-11-05 LAB
ALBUMIN SERPL-MCNC: 4.4 G/DL (ref 3.5–5.2)
ALBUMIN/GLOB SERPL: 2.4 G/DL
ALP SERPL-CCNC: 89 U/L (ref 39–117)
ALT SERPL W P-5'-P-CCNC: 15 U/L (ref 1–41)
ANION GAP SERPL CALCULATED.3IONS-SCNC: 9.4 MMOL/L (ref 5–15)
AST SERPL-CCNC: 14 U/L (ref 1–40)
BILIRUB SERPL-MCNC: 0.3 MG/DL (ref 0.2–1.2)
BUN BLD-MCNC: 19 MG/DL (ref 8–23)
BUN/CREAT SERPL: 19.2 (ref 7–25)
CALCIUM SPEC-SCNC: 9.5 MG/DL (ref 8.6–10.5)
CHLORIDE SERPL-SCNC: 98 MMOL/L (ref 98–107)
CHOLEST SERPL-MCNC: 96 MG/DL (ref 0–200)
CO2 SERPL-SCNC: 26.6 MMOL/L (ref 22–29)
CREAT BLD-MCNC: 0.99 MG/DL (ref 0.76–1.27)
GFR SERPL CREATININE-BSD FRML MDRD: 75 ML/MIN/1.73
GLOBULIN UR ELPH-MCNC: 1.8 GM/DL
GLUCOSE BLD-MCNC: 137 MG/DL (ref 65–99)
HDLC SERPL-MCNC: 20 MG/DL (ref 40–60)
LDLC SERPL CALC-MCNC: 39 MG/DL (ref 0–100)
LDLC/HDLC SERPL: 1.96 {RATIO}
POTASSIUM BLD-SCNC: 4.4 MMOL/L (ref 3.5–5.2)
PROT SERPL-MCNC: 6.2 G/DL (ref 6–8.5)
SODIUM BLD-SCNC: 134 MMOL/L (ref 136–145)
TRIGL SERPL-MCNC: 184 MG/DL (ref 0–150)
TSH SERPL DL<=0.05 MIU/L-ACNC: 2.3 UIU/ML (ref 0.27–4.2)
VLDLC SERPL-MCNC: 36.8 MG/DL (ref 5–40)

## 2019-11-05 PROCEDURE — 80053 COMPREHEN METABOLIC PANEL: CPT | Performed by: FAMILY MEDICINE

## 2019-11-05 PROCEDURE — 80061 LIPID PANEL: CPT | Performed by: FAMILY MEDICINE

## 2019-11-05 PROCEDURE — 84443 ASSAY THYROID STIM HORMONE: CPT | Performed by: FAMILY MEDICINE

## 2019-11-05 PROCEDURE — 36415 COLL VENOUS BLD VENIPUNCTURE: CPT | Performed by: FAMILY MEDICINE

## 2019-11-05 PROCEDURE — 99214 OFFICE O/P EST MOD 30 MIN: CPT | Performed by: FAMILY MEDICINE

## 2019-11-05 RX ORDER — BUPROPION HYDROCHLORIDE 150 MG/1
150 TABLET, EXTENDED RELEASE ORAL 2 TIMES DAILY
Qty: 180 TABLET | Refills: 3 | Status: SHIPPED | OUTPATIENT
Start: 2019-11-05 | End: 2019-12-04

## 2019-11-05 NOTE — PROGRESS NOTES
SUBJECTIVE:  The patient is a 69-year-old white male.  He comes in today because of symptoms of anxiety.  He has CLL, coronary artery disease, hypertension and hyperlipidemia.  He has paroxysmal atrial fibrillation.  His wife was diagnosed with thyroid cancer.  He has a lot of health issues himself.  States he is open to trying medication to help his anxiety symptoms.  He states he is not really depressed but more anxious.  He has not been treated for anxiety or depression in the past.    PAST MEDICAL HISTORY:  Reviewed.    REVIEW OF SYSTEMS:  Please see above; 14 point ROS negative.      OBJECTIVE:   Vitals signs are reviewed and are stable.    General:  Well-nourished.  Alert and oriented x3 in no acute distress.  HEENT: PERRLA.   Neck:  Supple.   Lungs:  Clear.    Heart:  Regular rate and rhythm.   Abdomen:   Soft, nontender.   Extremities:  No cyanosis, clubbing or edema.   Neurological:  Grossly intact without motor or sensory deficits.     ASSESSMENT:    Anxiety  Hypertension  Hyperlipidemia  CLL  Tobacco abuse  PLAN: CMP fasting lipids TSH thyroid profile ordered.  Wellbutrin  mg daily for 1 week then twice daily.  Discontinue smoking.  Patient will me know if he is feeling better in 2 to 3 weeks.  He will call if problems.  Follow-up on labs.    Dictated utilizing Dragon dictation.

## 2019-11-18 ENCOUNTER — TELEPHONE (OUTPATIENT)
Dept: FAMILY MEDICINE CLINIC | Facility: CLINIC | Age: 69
End: 2019-11-18

## 2019-11-19 RX ORDER — BUPROPION HYDROCHLORIDE 150 MG/1
150 TABLET, EXTENDED RELEASE ORAL 2 TIMES DAILY
Qty: 180 TABLET | Refills: 3 | Status: SHIPPED | OUTPATIENT
Start: 2019-11-19 | End: 2019-12-04 | Stop reason: SDUPTHER

## 2019-11-19 RX ORDER — BUSPIRONE HYDROCHLORIDE 5 MG/1
5 TABLET ORAL 3 TIMES DAILY
Qty: 90 TABLET | Refills: 3 | Status: SHIPPED | OUTPATIENT
Start: 2019-11-19 | End: 2019-12-16 | Stop reason: SDUPTHER

## 2019-12-04 ENCOUNTER — OFFICE VISIT (OUTPATIENT)
Dept: FAMILY MEDICINE CLINIC | Facility: CLINIC | Age: 69
End: 2019-12-04

## 2019-12-04 VITALS
WEIGHT: 169 LBS | BODY MASS INDEX: 23.66 KG/M2 | HEIGHT: 71 IN | OXYGEN SATURATION: 97 % | RESPIRATION RATE: 18 BRPM | HEART RATE: 60 BPM | TEMPERATURE: 98 F | DIASTOLIC BLOOD PRESSURE: 70 MMHG | SYSTOLIC BLOOD PRESSURE: 128 MMHG

## 2019-12-04 DIAGNOSIS — M25.521 RIGHT ELBOW PAIN: Primary | ICD-10-CM

## 2019-12-04 PROCEDURE — 73080 X-RAY EXAM OF ELBOW: CPT | Performed by: FAMILY MEDICINE

## 2019-12-04 PROCEDURE — 99213 OFFICE O/P EST LOW 20 MIN: CPT | Performed by: FAMILY MEDICINE

## 2019-12-04 NOTE — PROGRESS NOTES
SUBJECTIVE:  The patient is a 69-year-old white male.  He is here because of right elbow pain.  He fell 3 weeks ago.    PAST MEDICAL HISTORY:  Reviewed.    REVIEW OF SYSTEMS:  Please see above; 14 point ROS negative.      OBJECTIVE:   Vitals signs are reviewed and are stable.    General:  Well-nourished.  Alert and oriented x3 in no acute distress.    Extremities:  No cyanosis, clubbing or edema.  Tenderness is present over the ulnar aspect of the elbow.  There is pain with supination and pronation.  There is also some discomfort with flexion.  Distal neurovascular status intact.  No ecchymoses warmth or redness..   Three-view x-ray of the right elbow was done here and interpreted by me.  None for comparison.  Indication right elbow pain from fall.  X-ray shows no obvious fracture.  There is a spur posteriorly but the patient is not in any discomfort in this area.  ASSESSMENT:    Right elbow pain  PLAN: He will wrap his elbow use ice/heat.  I will refer him to orthopedist since it is getting worse in the fall was 3 weeks ago.

## 2019-12-16 RX ORDER — BUSPIRONE HYDROCHLORIDE 5 MG/1
5 TABLET ORAL 3 TIMES DAILY
Qty: 270 TABLET | Refills: 3 | Status: SHIPPED | OUTPATIENT
Start: 2019-12-16 | End: 2020-07-24

## 2019-12-31 RX ORDER — ATORVASTATIN CALCIUM 20 MG/1
TABLET, FILM COATED ORAL
Qty: 90 TABLET | Refills: 1 | Status: SHIPPED | OUTPATIENT
Start: 2019-12-31 | End: 2021-01-18 | Stop reason: SDUPTHER

## 2020-01-10 ENCOUNTER — APPOINTMENT (OUTPATIENT)
Dept: LAB | Facility: HOSPITAL | Age: 70
End: 2020-01-10

## 2020-01-10 ENCOUNTER — APPOINTMENT (OUTPATIENT)
Dept: ONCOLOGY | Facility: HOSPITAL | Age: 70
End: 2020-01-10

## 2020-01-20 ENCOUNTER — LAB (OUTPATIENT)
Dept: LAB | Facility: HOSPITAL | Age: 70
End: 2020-01-20

## 2020-01-20 ENCOUNTER — CLINICAL SUPPORT (OUTPATIENT)
Dept: ONCOLOGY | Facility: HOSPITAL | Age: 70
End: 2020-01-20

## 2020-01-20 VITALS — TEMPERATURE: 98.3 F

## 2020-01-20 DIAGNOSIS — C91.10 CLL (CHRONIC LYMPHOCYTIC LEUKEMIA) (HCC): Primary | ICD-10-CM

## 2020-01-20 LAB
BASOPHILS # BLD AUTO: 0.18 10*3/MM3 (ref 0–0.2)
BASOPHILS NFR BLD AUTO: 0.3 % (ref 0–1.5)
DEPRECATED RDW RBC AUTO: 50.5 FL (ref 37–54)
EOSINOPHIL # BLD AUTO: 0.3 10*3/MM3 (ref 0–0.4)
EOSINOPHIL NFR BLD AUTO: 0.6 % (ref 0.3–6.2)
ERYTHROCYTE [DISTWIDTH] IN BLOOD BY AUTOMATED COUNT: 15.6 % (ref 12.3–15.4)
HCT VFR BLD AUTO: 38.2 % (ref 37.5–51)
HGB BLD-MCNC: 12.1 G/DL (ref 13–17.7)
IMM GRANULOCYTES # BLD AUTO: 0.14 10*3/MM3 (ref 0–0.05)
IMM GRANULOCYTES NFR BLD AUTO: 0.3 % (ref 0–0.5)
LYMPHOCYTES # BLD AUTO: 45.67 10*3/MM3 (ref 0.7–3.1)
LYMPHOCYTES NFR BLD AUTO: 88 % (ref 19.6–45.3)
MCH RBC QN AUTO: 28.5 PG (ref 26.6–33)
MCHC RBC AUTO-ENTMCNC: 31.7 G/DL (ref 31.5–35.7)
MCV RBC AUTO: 90.1 FL (ref 79–97)
MONOCYTES # BLD AUTO: 1.35 10*3/MM3 (ref 0.1–0.9)
MONOCYTES NFR BLD AUTO: 2.6 % (ref 5–12)
NEUTROPHILS # BLD AUTO: 4.28 10*3/MM3 (ref 1.7–7)
NEUTROPHILS NFR BLD AUTO: 8.2 % (ref 42.7–76)
NRBC BLD AUTO-RTO: 0 /100 WBC (ref 0–0.2)
PLATELET # BLD AUTO: 150 10*3/MM3 (ref 140–450)
PMV BLD AUTO: 9.7 FL (ref 6–12)
RBC # BLD AUTO: 4.24 10*6/MM3 (ref 4.14–5.8)
WBC NRBC COR # BLD: 51.92 10*3/MM3 (ref 3.4–10.8)

## 2020-01-20 PROCEDURE — 36415 COLL VENOUS BLD VENIPUNCTURE: CPT

## 2020-01-20 PROCEDURE — 85025 COMPLETE CBC W/AUTO DIFF WBC: CPT

## 2020-01-20 NOTE — PROGRESS NOTES
Pt is here for lab with RN review.  CBC reviewed with Dr. Moya and pt. No new orders at this time. Pt has no complaints and is afebrile today.  Copy of labs given to pt and f/u appt reviewed. Pt is instructed to call the office with any concerns or new symptoms prior to next visit. Pt vu.   Lab Results   Component Value Date    WBC 51.92 (H) 01/20/2020    HGB 12.1 (L) 01/20/2020    HCT 38.2 01/20/2020    MCV 90.1 01/20/2020     01/20/2020

## 2020-04-20 ENCOUNTER — OFFICE VISIT (OUTPATIENT)
Dept: ONCOLOGY | Facility: CLINIC | Age: 70
End: 2020-04-20

## 2020-04-20 DIAGNOSIS — C91.10 CLL (CHRONIC LYMPHOCYTIC LEUKEMIA) (HCC): Primary | ICD-10-CM

## 2020-04-20 PROCEDURE — 99442 PR PHYS/QHP TELEPHONE EVALUATION 11-20 MIN: CPT | Performed by: INTERNAL MEDICINE

## 2020-04-20 NOTE — PROGRESS NOTES
Subjective     You have chosen to receive care through a telephone visit. Do you consent to use a telephone visit for your medical care today?  Yes    REASON FOR VISIT:    1. Chronic lymphoid leukemia, stage 2.     2.  History of angiodysplasia requiring cauterization and history of Hess's esophagus    Patient ID: Macho Stephenson is a 69 y.o. year old male   History of Present Illness    The patient is a 59-year-old gentleman with the above history, currently here for followup.     Patient denies any fatigue, tingling, numbness, and back pain.  He is not working anymore.  He denies any rashes, fever, night sweats, and infections.  He has Hess's esophagus and he will get scoped again in a couple of years.    He denies any fever or night sweats or weight loss.  He does not have any sore throat.  I told him that given that he has CLL he needs to be careful not to get infections and needs to use a mask and close when he goes to the grocery store    PAST MEDICAL HISTORY:   1. Recurrent atrial fibrillation followed by cardiology. He has had drug eluting stent placed x 3.   2. High cholesterol.   3. Hypertension.       HEMATOLOGIC/ONCOLOGIC HISTORY:       The patient is 62-year-old gentleman with the above history currently here for followup. He has no complaints. He denies fever, night sweats or weight loss. He does not have any lymphadenopathy. He feels good. He had some fatigue but his CBC shows a go od hemoglobin.   The patient is followed by Dr. Kevin Chandra. He had seen Dr. Chandra 7/18/13 for a history of coronary artery disease status drug eluting stent placement to the right coronary artery and left anterior descending artery in February 2011. Histor y of paroxysmal atrial fibrillation and hyperlipidemia. He presented to [facility] on 6/23/13 with palpitations and was found to have atrial fibrillation with rapid ventricular response. He was given IV Cardizem and converted spontaneously to  normal sinus rhythm. He was found to have elevated white count at 18.9 and denied any symptoms of infection or urinary complaints. TSH was normal, troponin levels were negative. He was asked to continue aspirin and metoprolol for that. If he contin u ed to have atrial fibrillation, they will consider antiarrhythmic therapy with or without a short term anticoagulation therapy. However, currently the patient is feeling reasonably good. He has no complaints. His CBC 7/22/13 showed WBC 17,000, hemoglob i n 16.4 and platelet count of 174,000. Back 9/28/13 his hemoglobin was 15.1, WBC 13.3 and platelets 197,000. He has had a persistently elevated WBC but he is totally asymptomatic. He does not have any fever, night sweats or lymphadenopathy. He is here to be evaluated for his elevated white count.       Peripheral blood flow cytometry done 08/16/13 shows 22% chronic lymphoid leukemia cells, and they expressed ZAP-70 but not CD38. The total number of cells approximated 60% T cells, 32% B cells and 1% natural k iller cells. The B cells were monoclonal and expressed CD19, CD20, CD22, CD5, CD23, CD11c, ZAP-70 and T cell antigens. There was a normal CD4/CD8 ratio. CT of the chest, abdomen and pelvis does not show evidence of metastasis. Peripheral blood for DILLON -2 was negative. It was negative for T11:14 translocation.       CT scan done on 08/21/2013 shows 5 to 6 mm noncalcified nodule in the left lower lobe which is unchanged from December 2010. Otherwise no evidence of any lymphadenopathy or hepatosplenomegaly.       MRI of the spine shows arthritis and disc bulge and likely hemangiomas, with 1 lesion showing increased signal intensity at T2, which is likely a hemangioma. Bone scan could be done, but patient does not even have much pain there. CT scan of the chest, a bdomen and pelvis was done on 11/23/2014. He has tiny lymph nodes in the neck which are difficult to palpate. Right jugular one is 1.4 x 0.9 and left  supraclavicular one is 1.5 x 1.1. He also has a subcarinal lymph node which is 1.7 x 1.2 cm, and he ha s a portocaval lymph node which has increased from 2.5 to 2.8. Patient is totally asymptomatic. He does not have any B symptoms, so will continue followup with observation.       MEDICATIONS: The current medication list was reviewed with the patient and updated in the EMR this date per the medical assistant. Medication dosages and frequencies were confirmed to be accurate.       ALLERGIES: PENICILLIN which causes him to swell up. He is allergic to IV CONTRAST DYE.   SOCIAL HISTORY: He is . He smokes one pack per day for 35 years. He consumes three to four alcoholic drinks per week. He has no tattoos and no previous transfusions.       FAMILY HISTORY: Father  at 82 with MI. Mother  at 82 with bone cancer. He has one brother age 65 in good health and two sisters 69 and 57 in good health.   Past Medical History, Social History, and Family History are unchanged from my prior documentation on     Review of Systems   Constitutional: Negative for appetite change, chills, diaphoresis, fatigue, fever and unexpected weight change.   HENT: Negative for hearing loss, sore throat and trouble swallowing.    Respiratory: Negative for cough, chest tightness, shortness of breath and wheezing.    Cardiovascular: Negative for chest pain, palpitations and leg swelling.   Gastrointestinal: Negative for abdominal distention, abdominal pain, constipation, diarrhea, nausea and vomiting.   Genitourinary: Negative for dysuria, frequency, hematuria and urgency.   Musculoskeletal: Negative for joint swelling.        No muscle weakness.   Skin: Negative for rash and wound.   Neurological: Negative for seizures, syncope, speech difficulty, weakness, numbness and headaches.   Hematological: Negative for adenopathy. Does not bruise/bleed easily.   Psychiatric/Behavioral: Negative for behavioral problems, confusion  and suicidal ideas.   All other systems reviewed and are negative.  No change in his review of systems    Objective      No chief complaint on file.       Patient Active Problem List   Diagnosis   • CLL (chronic lymphocytic leukemia) (CMS/HCC)   • Anemia         MEDICATIONS:  Medication Reconciliation for the patient has been reviewed by me and documented in the patients chart.    ALLERGIES:    Allergies   Allergen Reactions   • Penicillins      swelling         There were no vitals filed for this visit.     Current Status 10/10/2019   ECOG score 0       Physical Exam   This is a telephone visit and no physical exam done    RECENT LABS:  Hematology WBC   Date Value Ref Range Status   01/20/2020 51.92 (H) 3.40 - 10.80 10*3/mm3 Final     RBC   Date Value Ref Range Status   01/20/2020 4.24 4.14 - 5.80 10*6/mm3 Final     Hemoglobin   Date Value Ref Range Status   01/20/2020 12.1 (L) 13.0 - 17.7 g/dL Final     Hematocrit   Date Value Ref Range Status   01/20/2020 38.2 37.5 - 51.0 % Final     Platelets   Date Value Ref Range Status   01/20/2020 150 140 - 450 10*3/mm3 Final        08/05/19 - CT Abdomen Pelvis  IMPRESSION:  Mild splenomegaly with slight increase in size of the spleen  since the most recent previous CT scan of the abdomen dated 03/19/2018.  Mild to moderately enlarged lymph nodes in the shorty hepatis showing  equivocal slight increase in size. Moderate sigmoid diverticulosis  without evidence of diverticulitis. Cholelithiasis. Fusiform infrarenal  abdominal aortic aneurysm measuring 3.6 cm in diameter showing slight  increase in size. Moderately high-grade stenosis involving the distal  right common iliac artery. Bilateral spondylolysis at L5-S1 with grade 1  spondylolisthesis.    Assessment/Plan   1.This is a patient with a history of stage 2 CLL without evidence of any disease progression.  I have reviewed the CT scan from 3/19/2018 there is minimal progression but clinically patient is asymptomatic and we  will follow with observation.  Will monitor with labs.   No evidence of any frequent infections, no major worsening of his white count. He has no fever or night sweats or any other symptoms.   · Patient knows that he is prone for infections and he is mainly staying at home during the COVID-19 situation time    2.  Mild thrombocytopenia- Improved.     3.  History of angiodysplasia requiring cauterization and Hess's esophagus mild anemia- He is currently asymptomatic        Plan     1. Follow-up in 3 months with labs CBC CMP.  2. Patient to call us prior to that if he has any  Problems  3. He has been informed that he is prone for infection with his history of CLL and needs to be careful      This visit has been rescheduled as a phone visit to comply with patient safety concerns in accordance with CDC recommendations. Total time of discussion was 15 minutes.      Susannah Moya MD      Cc: Dr. Kevin Chandra

## 2020-07-13 ENCOUNTER — TELEPHONE (OUTPATIENT)
Dept: ONCOLOGY | Facility: CLINIC | Age: 70
End: 2020-07-13

## 2020-07-13 ENCOUNTER — APPOINTMENT (OUTPATIENT)
Dept: LAB | Facility: HOSPITAL | Age: 70
End: 2020-07-13

## 2020-07-15 ENCOUNTER — TELEPHONE (OUTPATIENT)
Dept: ONCOLOGY | Facility: CLINIC | Age: 70
End: 2020-07-15

## 2020-07-15 NOTE — TELEPHONE ENCOUNTER
PT WOULD LIKE TO SCHEDULE HIS HOSPITAL FOLLOW UP APPT WITH DR CEJA. HE HAS BEEN DISCHARGED FROM [facility] FOR LOST OF BLOOD. THEY GAVE HIM 3 PINTS OF BLOOD AND WAS CONCERNED ABOUT HIS PLATELETS.    PLEASE GIVE PT A CALL -[phone]

## 2020-07-17 ENCOUNTER — LAB (OUTPATIENT)
Dept: LAB | Facility: HOSPITAL | Age: 70
End: 2020-07-17

## 2020-07-17 ENCOUNTER — OFFICE VISIT (OUTPATIENT)
Dept: ONCOLOGY | Facility: CLINIC | Age: 70
End: 2020-07-17

## 2020-07-17 ENCOUNTER — TELEPHONE (OUTPATIENT)
Dept: ONCOLOGY | Facility: CLINIC | Age: 70
End: 2020-07-17

## 2020-07-17 VITALS
BODY MASS INDEX: 22.3 KG/M2 | DIASTOLIC BLOOD PRESSURE: 60 MMHG | RESPIRATION RATE: 16 BRPM | HEIGHT: 71 IN | SYSTOLIC BLOOD PRESSURE: 95 MMHG | WEIGHT: 159.3 LBS | HEART RATE: 65 BPM | TEMPERATURE: 97.8 F | OXYGEN SATURATION: 99 %

## 2020-07-17 DIAGNOSIS — C91.10 CLL (CHRONIC LYMPHOCYTIC LEUKEMIA) (HCC): Primary | ICD-10-CM

## 2020-07-17 DIAGNOSIS — D69.6 THROMBOCYTOPENIA (HCC): ICD-10-CM

## 2020-07-17 DIAGNOSIS — Z11.59 ENCOUNTER FOR HEPATITIS C SCREENING TEST FOR LOW RISK PATIENT: ICD-10-CM

## 2020-07-17 DIAGNOSIS — D64.9 ANEMIA, UNSPECIFIED TYPE: ICD-10-CM

## 2020-07-17 DIAGNOSIS — E61.1 IRON DEFICIENCY: ICD-10-CM

## 2020-07-17 LAB
ALBUMIN SERPL-MCNC: 4 G/DL (ref 3.5–5.2)
ALBUMIN/GLOB SERPL: 2 G/DL (ref 1.1–2.4)
ALP SERPL-CCNC: 80 U/L (ref 38–116)
ALT SERPL W P-5'-P-CCNC: 12 U/L (ref 0–41)
ANION GAP SERPL CALCULATED.3IONS-SCNC: 12 MMOL/L (ref 5–15)
AST SERPL-CCNC: 19 U/L (ref 0–40)
B2 MICROGLOB SERPL-MCNC: 4.2 MG/L (ref 0.8–2.2)
BASOPHILS # BLD AUTO: 0.07 10*3/MM3 (ref 0–0.2)
BASOPHILS NFR BLD AUTO: 0.3 % (ref 0–1.5)
BILIRUB SERPL-MCNC: 1.2 MG/DL (ref 0.2–1.2)
BUN SERPL-MCNC: 18 MG/DL (ref 6–20)
BUN/CREAT SERPL: 15.9 (ref 7.3–30)
CALCIUM SPEC-SCNC: 9.1 MG/DL (ref 8.5–10.2)
CHLORIDE SERPL-SCNC: 102 MMOL/L (ref 98–107)
CO2 SERPL-SCNC: 24 MMOL/L (ref 22–29)
CREAT SERPL-MCNC: 1.13 MG/DL (ref 0.7–1.3)
DAT POLY-SP REAG RBC QL: NEGATIVE
DEPRECATED RDW RBC AUTO: 58.3 FL (ref 37–54)
EOSINOPHIL # BLD AUTO: 0.04 10*3/MM3 (ref 0–0.4)
EOSINOPHIL NFR BLD AUTO: 0.2 % (ref 0.3–6.2)
ERYTHROCYTE [DISTWIDTH] IN BLOOD BY AUTOMATED COUNT: 18.1 % (ref 12.3–15.4)
FERRITIN SERPL-MCNC: 89.6 NG/ML (ref 30–400)
GFR SERPL CREATININE-BSD FRML MDRD: 64 ML/MIN/1.73
GLOBULIN UR ELPH-MCNC: 2 GM/DL (ref 1.8–3.5)
GLUCOSE SERPL-MCNC: 131 MG/DL (ref 74–124)
HAPTOGLOB SERPL-MCNC: 148 MG/DL (ref 30–200)
HCT VFR BLD AUTO: 29.6 % (ref 37.5–51)
HGB BLD-MCNC: 9.4 G/DL (ref 13–17.7)
HGB RETIC QN AUTO: 30.9 PG (ref 29.8–36.1)
IGA1 MFR SER: 46 MG/DL (ref 70–400)
IGG1 SER-MCNC: 360 MG/DL (ref 700–1600)
IGM SERPL-MCNC: 9 MG/DL (ref 40–230)
IMM GRANULOCYTES # BLD AUTO: 0.17 10*3/MM3 (ref 0–0.05)
IMM GRANULOCYTES NFR BLD AUTO: 0.8 % (ref 0–0.5)
IMM RETICS NFR: 30.1 % (ref 3–15.8)
IRON 24H UR-MRATE: 147 MCG/DL (ref 59–158)
IRON SATN MFR SERPL: 41 % (ref 14–48)
LDH SERPL-CCNC: 288 U/L (ref 99–259)
LYMPHOCYTES # BLD AUTO: 18.16 10*3/MM3 (ref 0.7–3.1)
LYMPHOCYTES NFR BLD AUTO: 90.4 % (ref 19.6–45.3)
MCH RBC QN AUTO: 29.4 PG (ref 26.6–33)
MCHC RBC AUTO-ENTMCNC: 31.8 G/DL (ref 31.5–35.7)
MCV RBC AUTO: 92.5 FL (ref 79–97)
MONOCYTES # BLD AUTO: 0.43 10*3/MM3 (ref 0.1–0.9)
MONOCYTES NFR BLD AUTO: 2.1 % (ref 5–12)
NEUTROPHILS NFR BLD AUTO: 1.22 10*3/MM3 (ref 1.7–7)
NEUTROPHILS NFR BLD AUTO: 6.2 % (ref 42.7–76)
NRBC BLD AUTO-RTO: 0.9 /100 WBC (ref 0–0.2)
PLATELET # BLD AUTO: 35 10*3/MM3 (ref 140–450)
PLATELETS.RETICULATED NFR BLD AUTO: 6.3 % (ref 0.9–6.5)
PMV BLD AUTO: 9.7 FL (ref 6–12)
POTASSIUM SERPL-SCNC: 4.5 MMOL/L (ref 3.5–4.7)
PROT SERPL-MCNC: 6 G/DL (ref 6.3–8)
RBC # BLD AUTO: 3.2 10*6/MM3 (ref 4.14–5.8)
RETICS/RBC NFR AUTO: 2.32 % (ref 0.7–1.9)
SODIUM SERPL-SCNC: 138 MMOL/L (ref 134–145)
TIBC SERPL-MCNC: 357 MCG/DL (ref 249–505)
TRANSFERRIN SERPL-MCNC: 255 MG/DL (ref 200–360)
VIT B12 BLD-MCNC: 334 PG/ML (ref 211–946)
WBC # BLD AUTO: 20.09 10*3/MM3 (ref 3.4–10.8)

## 2020-07-17 PROCEDURE — 84466 ASSAY OF TRANSFERRIN: CPT | Performed by: INTERNAL MEDICINE

## 2020-07-17 PROCEDURE — 99214 OFFICE O/P EST MOD 30 MIN: CPT | Performed by: INTERNAL MEDICINE

## 2020-07-17 PROCEDURE — 82232 ASSAY OF BETA-2 PROTEIN: CPT | Performed by: INTERNAL MEDICINE

## 2020-07-17 PROCEDURE — 83615 LACTATE (LD) (LDH) ENZYME: CPT | Performed by: INTERNAL MEDICINE

## 2020-07-17 PROCEDURE — 82607 VITAMIN B-12: CPT | Performed by: INTERNAL MEDICINE

## 2020-07-17 PROCEDURE — 83540 ASSAY OF IRON: CPT | Performed by: INTERNAL MEDICINE

## 2020-07-17 PROCEDURE — 82784 ASSAY IGA/IGD/IGG/IGM EACH: CPT | Performed by: INTERNAL MEDICINE

## 2020-07-17 PROCEDURE — 36415 COLL VENOUS BLD VENIPUNCTURE: CPT

## 2020-07-17 PROCEDURE — 82728 ASSAY OF FERRITIN: CPT | Performed by: INTERNAL MEDICINE

## 2020-07-17 PROCEDURE — 85055 RETICULATED PLATELET ASSAY: CPT | Performed by: INTERNAL MEDICINE

## 2020-07-17 PROCEDURE — 80053 COMPREHEN METABOLIC PANEL: CPT

## 2020-07-17 PROCEDURE — 85025 COMPLETE CBC W/AUTO DIFF WBC: CPT

## 2020-07-17 PROCEDURE — 88377 M/PHMTRC ALYS ISHQUANT/SEMIQ: CPT

## 2020-07-17 PROCEDURE — 86880 COOMBS TEST DIRECT: CPT | Performed by: INTERNAL MEDICINE

## 2020-07-17 PROCEDURE — 83010 ASSAY OF HAPTOGLOBIN QUANT: CPT | Performed by: INTERNAL MEDICINE

## 2020-07-17 PROCEDURE — 85046 RETICYTE/HGB CONCENTRATE: CPT | Performed by: INTERNAL MEDICINE

## 2020-07-17 NOTE — TELEPHONE ENCOUNTER
Today I received a call from the wife of Macho Stephenson.    He had an appt on 7.17.20 at 8:30. When he checked in, he was accidently given the white sticker issued from [facility] for Lakhwinder Sánchez. Both are [facility] patients who had appointments in the office on 7.17.20 with labs.    I notified Tia with the front office at the [facility] and also entered a SAFE report for Macho Stephenson.    Tia recorded the pt's info and immediately notified management.

## 2020-07-17 NOTE — PROGRESS NOTES
CT Guided bone marrow biopsy and aspiration with labs: tissue pathology, flow cytometry and comprehensive panel to be done early next week per in-basket message Dr. Moya

## 2020-07-17 NOTE — H&P (VIEW-ONLY)
Subjective       REASON FOR VISIT:    1. Chronic lymphoid leukemia, stage 2.     2.  History of angiodysplasia requiring cauterization and history of Hess's esophagus    Patient ID: Macho Stephenson is a 69 y.o. year old male   History of Present Illness    Patient is a 69-year-old gentleman with history of chronic lymphocytic leukemia/SLL who recently got admitted to [facility] because of GI bleed.  He was seen by Dr. Montero.  He underwent EGD colonoscopy.  He was found to have angiodysplasia for which he underwent argon coagulation.  He required 3 units of blood.  Patient had a normal esophagus normal stomach and normal duodenum CLOtest was negative he was asked to take Protonix 40 mg daily.  Colonoscopy showed blood in the entire examined colon but there was a single bleeding colonic angiectasia which was treated with argon plasma coagulation.    He was admitted twice.  Initially he was admitted on July 10 at which time he stayed 3 days and he was evaluated for chest pain.  He underwent a cardiac work-up with stress test and echo.  The echo was normal but the stress test showed medium sized moderate severe severity fixed perfusion defect.  Of the inferior wall consistent with infarction.  However according to patient cardiology did not think he had any cardiac problems.  I have left a message for patient's cardiologist to call me today.  Patient subsequently got readmitted with GI bleed and required 1/3 unit of transfusion.  He was found to have thrombocytopenia and hematology was consulted.    His hemoglobin had dropped down to as low as 7 and his platelets had gone down to 87.  Today it is 35 and his hemoglobin is 9.4 today.  He denies active bleeding.  He has lost weight recently.  He does not have any fever or night sweats.    He had ultrasound of spleen which showed enlarged spleen centimeters in length        PAST MEDICAL HISTORY:   1. Recurrent atrial fibrillation followed by cardiology. He has  had drug eluting stent placed x 3.   2. High cholesterol.   3. Hypertension.       HEMATOLOGIC/ONCOLOGIC HISTORY:       The patient is 62-year-old gentleman with the above history currently here for followup. He has no complaints. He denies fever, night sweats or weight loss. He does not have any lymphadenopathy. He feels good. He had some fatigue but his CBC shows a go od hemoglobin.   The patient is followed by Dr. Kevin Chandra. He had seen Dr. Chandra 7/18/13 for a history of coronary artery disease status drug eluting stent placement to the right coronary artery and left anterior descending artery in February 2011. Histor y of paroxysmal atrial fibrillation and hyperlipidemia. He presented to [facility] on 6/23/13 with palpitations and was found to have atrial fibrillation with rapid ventricular response. He was given IV Cardizem and converted spontaneously to normal sinus rhythm. He was found to have elevated white count at 18.9 and denied any symptoms of infection or urinary complaints. TSH was normal, troponin levels were negative. He was asked to continue aspirin and metoprolol for that. If he contin u ed to have atrial fibrillation, they will consider antiarrhythmic therapy with or without a short term anticoagulation therapy. However, currently the patient is feeling reasonably good. He has no complaints. His CBC 7/22/13 showed WBC 17,000, hemoglob i n 16.4 and platelet count of 174,000. Back 9/28/13 his hemoglobin was 15.1, WBC 13.3 and platelets 197,000. He has had a persistently elevated WBC but he is totally asymptomatic. He does not have any fever, night sweats or lymphadenopathy. He is here to be evaluated for his elevated white count.       Peripheral blood flow cytometry done 08/16/13 shows 22% chronic lymphoid leukemia cells, and they expressed ZAP-70 but not CD38. The total number of cells approximated 60% T cells, 32% B cells and 1% natural k iller cells. The B cells were monoclonal  and expressed CD19, CD20, CD22, CD5, CD23, CD11c, ZAP-70 and T cell antigens. There was a normal CD4/CD8 ratio. CT of the chest, abdomen and pelvis does not show evidence of metastasis. Peripheral blood for DILLON -2 was negative. It was negative for T11:14 translocation.       CT scan done on 2013 shows 5 to 6 mm noncalcified nodule in the left lower lobe which is unchanged from 2010. Otherwise no evidence of any lymphadenopathy or hepatosplenomegaly.       MRI of the spine shows arthritis and disc bulge and likely hemangiomas, with 1 lesion showing increased signal intensity at T2, which is likely a hemangioma. Bone scan could be done, but patient does not even have much pain there. CT scan of the chest, a bdomen and pelvis was done on 2014. He has tiny lymph nodes in the neck which are difficult to palpate. Right jugular one is 1.4 x 0.9 and left supraclavicular one is 1.5 x 1.1. He also has a subcarinal lymph node which is 1.7 x 1.2 cm, and he ha s a portocaval lymph node which has increased from 2.5 to 2.8. Patient is totally asymptomatic. He does not have any B symptoms, so will continue followup with observation.       MEDICATIONS: The current medication list was reviewed with the patient and updated in the EMR this date per the medical assistant. Medication dosages and frequencies were confirmed to be accurate.       ALLERGIES: PENICILLIN which causes him to swell up. He is allergic to IV CONTRAST DYE.   SOCIAL HISTORY: He is . He smokes one pack per day for 35 years. He consumes three to four alcoholic drinks per week. He has no tattoos and no previous transfusions.       FAMILY HISTORY: Father  at 82 with MI. Mother  at 82 with bone cancer. He has one brother age 65 in good health and two sisters 69 and 57 in good health.   Past Medical History, Social History, and Family History are unchanged from my prior documentation on     Review of Systems    Constitutional: Negative for appetite change, chills, diaphoresis, fatigue, fever and unexpected weight change.   HENT: Negative for hearing loss, sore throat and trouble swallowing.    Respiratory: Negative for cough, chest tightness, shortness of breath and wheezing.    Cardiovascular: Negative for chest pain, palpitations and leg swelling.   Gastrointestinal: Negative for abdominal distention, abdominal pain, constipation, diarrhea, nausea and vomiting.   Genitourinary: Negative for dysuria, frequency, hematuria and urgency.   Musculoskeletal: Negative for joint swelling.        No muscle weakness.   Skin: Negative for rash and wound.   Neurological: Negative for seizures, syncope, speech difficulty, weakness, numbness and headaches.   Hematological: Negative for adenopathy. Does not bruise/bleed easily.   Psychiatric/Behavioral: Negative for behavioral problems, confusion and suicidal ideas.   All other systems reviewed and are negative.  I have reviewed and confirmed the accuracy of the ROS as documented by the MA/LPN/RN Susannah Moya MD        Objective      Chief Complaint   Patient presents with   • Follow-up        Patient Active Problem List   Diagnosis   • CLL (chronic lymphocytic leukemia) (CMS/[facility])   • Anemia         MEDICATIONS:  Medication Reconciliation for the patient has been reviewed by me and documented in the patients chart.    ALLERGIES:    Allergies   Allergen Reactions   • Contrast Dye Unknown - High Severity   • Penicillins      swelling         Vitals:    07/17/20 0838   BP: 95/60   Pulse: 65   Resp: 16   Temp: 97.8 °F (36.6 °C)   SpO2: 99%        Current Status 10/10/2019   ECOG score 0       Physical Exam       CONSTITUTIONAL:  Vital signs reviewed.  No distress, looks comfortable.  EYES:  Conjunctiva and lids unremarkable.  PERRLA  EARS,NOSE,MOUTH,THROAT:  Ears and nose appear unremarkable.  Lips, teeth, gums appear unremarkable.  RESPIRATORY:  Normal respiratory effort.  Lungs clear  to auscultation bilaterally.  CARDIOVASCULAR:  Normal S1, S2.  No murmurs rubs or gallops.  No significant lower extremity edema.  GASTROINTESTINAL: Abdomen appears unremarkable.  Nontender.  No hepatomegaly.  No splenomegaly.  LYMPHATIC:  No cervical, supraclavicular, axillary lymphadenopathy.  SKIN:  Warm.  No rashes.  PSYCHIATRIC:  Normal judgment and insight.  Normal mood and affect.  Occultly appreciate any adenopathy in the neck axilla or groin  RECENT LABS:  Hematology WBC   Date Value Ref Range Status   07/17/2020 20.09 (H) 3.40 - 10.80 10*3/mm3 Final   07/15/2020 13.77 (H) 4.5 - 11.0 10*3/uL Final     RBC   Date Value Ref Range Status   07/17/2020 3.20 (L) 4.14 - 5.80 10*6/mm3 Final   07/15/2020 2.96 (L) 4.5 - 5.9 10*6/uL Final     Hemoglobin   Date Value Ref Range Status   07/17/2020 9.4 (L) 13.0 - 17.7 g/dL Final   07/15/2020 8.5 (L) 13.5 - 17.5 g/dL Final     Hematocrit   Date Value Ref Range Status   07/17/2020 29.6 (L) 37.5 - 51.0 % Final   07/15/2020 26.4 (L) 41.0 - 53.0 % Final     Platelets   Date Value Ref Range Status   07/17/2020 35 (L) 140 - 450 10*3/mm3 Final   07/15/2020 27 (L) 140 - 440 10*3/uL Final     Comment:     CHECKED        08/05/19 - CT Abdomen Pelvis  IMPRESSION:  Mild splenomegaly with slight increase in size of the spleen  since the most recent previous CT scan of the abdomen dated 03/19/2018.  Mild to moderately enlarged lymph nodes in the shorty hepatis showing  equivocal slight increase in size. Moderate sigmoid diverticulosis  without evidence of diverticulitis. Cholelithiasis. Fusiform infrarenal  abdominal aortic aneurysm measuring 3.6 cm in diameter showing slight  increase in size. Moderately high-grade stenosis involving the distal  right common iliac artery. Bilateral spondylolysis at L5-S1 with grade 1  spondylolisthesis.    Assessment/Plan   1.This is a patient with a history of stage 2 CLL without evidence of any disease progression.  I have reviewed the CT scan from  3/19/2018 there is minimal progression but clinically patient is asymptomatic and we will follow with observation.  Will monitor with labs.   No evidence of any frequent infections, no major worsening of his white count. He has no fever or night sweats or any other symptoms.   · Patient knows that he is prone for infections and he is mainly staying at home during the COVID-19 situation time  · Recent admission to [facility] July 10 2020×2.  Initially admitted with chest pain and underwent stress test and echo.  Echo was negative.  Stress test is abnormal but have left message for patient's cardiologist to call us.  His cardiologist is been sent [place].  · He then got admitted the second time with worsening GI bleed and required total of 3 units of blood.  EGD was negative but colonoscopy showed angiectasia for which he underwent argon ablation.  Currently hemoglobin stable and no active bleeding.  · He was also found to have low platelets with platelets dropping down to 27k and hemoglobin was 7.  Ultrasound showed splenomegaly 15 cm.  Concern is whether he has progressed from his CLL point of view and requires treatment.    2.  Worsening thrombocytopenia, looking back his platelets were always in the 150s and during this admission he dropped down to 27K.  His LDH also was elevated and he was anemic.    3.  History of angiodysplasia requiring cauterization and Hess's esophagus mild anemia- He is currently asymptomatic status post recent cauterization       Plan     1. Reviewed his recent admission from [facility] where he got admitted with GI bleed and required transfusion  2. He was thrombocytopenic with a platelet of 27K, hematology consulted but do not see the notes.  Concern was whether he has progressed  3. Reviewed ultrasound of the spleen which shows enlargement  4. Will obtain bone marrow aspiration biopsy  5. Obtain CT scan of the neck chest abdomen pelvis  6. Will check LDH, haptoglobin,  beta-2 microglobulin, C-reactive protein, CMP today as well as hepatitis profile  7. Patient to follow-up with nurse practitioner in 1 week and follow-up with me in 2 weeks with labs.      Susannah Moya MD    July 17 2020    Cc: Dr. Kevin Chandra

## 2020-07-17 NOTE — PROGRESS NOTES
Subjective       REASON FOR VISIT:    1. Chronic lymphoid leukemia, stage 2.     2.  History of angiodysplasia requiring cauterization and history of Hess's esophagus    Patient ID: Macho Stephenson is a 69 y.o. year old male   History of Present Illness    Patient is a 69-year-old gentleman with history of chronic lymphocytic leukemia/SLL who recently got admitted to [facility] because of GI bleed.  He was seen by Dr. Montero.  He underwent EGD colonoscopy.  He was found to have angiodysplasia for which he underwent argon coagulation.  He required 3 units of blood.  Patient had a normal esophagus normal stomach and normal duodenum CLOtest was negative he was asked to take Protonix 40 mg daily.  Colonoscopy showed blood in the entire examined colon but there was a single bleeding colonic angiectasia which was treated with argon plasma coagulation.    He was admitted twice.  Initially he was admitted on July 10 at which time he stayed 3 days and he was evaluated for chest pain.  He underwent a cardiac work-up with stress test and echo.  The echo was normal but the stress test showed medium sized moderate severe severity fixed perfusion defect.  Of the inferior wall consistent with infarction.  However according to patient cardiology did not think he had any cardiac problems.  I have left a message for patient's cardiologist to call me today.  Patient subsequently got readmitted with GI bleed and required 1/3 unit of transfusion.  He was found to have thrombocytopenia and hematology was consulted.    His hemoglobin had dropped down to as low as 7 and his platelets had gone down to 87.  Today it is 35 and his hemoglobin is 9.4 today.  He denies active bleeding.  He has lost weight recently.  He does not have any fever or night sweats.    He had ultrasound of spleen which showed enlarged spleen centimeters in length        PAST MEDICAL HISTORY:   1. Recurrent atrial fibrillation followed by cardiology. He has  had drug eluting stent placed x 3.   2. High cholesterol.   3. Hypertension.       HEMATOLOGIC/ONCOLOGIC HISTORY:       The patient is 62-year-old gentleman with the above history currently here for followup. He has no complaints. He denies fever, night sweats or weight loss. He does not have any lymphadenopathy. He feels good. He had some fatigue but his CBC shows a go od hemoglobin.   The patient is followed by Dr. Kevin Chandra. He had seen Dr. Chandra 7/18/13 for a history of coronary artery disease status drug eluting stent placement to the right coronary artery and left anterior descending artery in February 2011. Histor y of paroxysmal atrial fibrillation and hyperlipidemia. He presented to [facility] on 6/23/13 with palpitations and was found to have atrial fibrillation with rapid ventricular response. He was given IV Cardizem and converted spontaneously to normal sinus rhythm. He was found to have elevated white count at 18.9 and denied any symptoms of infection or urinary complaints. TSH was normal, troponin levels were negative. He was asked to continue aspirin and metoprolol for that. If he contin u ed to have atrial fibrillation, they will consider antiarrhythmic therapy with or without a short term anticoagulation therapy. However, currently the patient is feeling reasonably good. He has no complaints. His CBC 7/22/13 showed WBC 17,000, hemoglob i n 16.4 and platelet count of 174,000. Back 9/28/13 his hemoglobin was 15.1, WBC 13.3 and platelets 197,000. He has had a persistently elevated WBC but he is totally asymptomatic. He does not have any fever, night sweats or lymphadenopathy. He is here to be evaluated for his elevated white count.       Peripheral blood flow cytometry done 08/16/13 shows 22% chronic lymphoid leukemia cells, and they expressed ZAP-70 but not CD38. The total number of cells approximated 60% T cells, 32% B cells and 1% natural k iller cells. The B cells were monoclonal  and expressed CD19, CD20, CD22, CD5, CD23, CD11c, ZAP-70 and T cell antigens. There was a normal CD4/CD8 ratio. CT of the chest, abdomen and pelvis does not show evidence of metastasis. Peripheral blood for DILLON -2 was negative. It was negative for T11:14 translocation.       CT scan done on 2013 shows 5 to 6 mm noncalcified nodule in the left lower lobe which is unchanged from 2010. Otherwise no evidence of any lymphadenopathy or hepatosplenomegaly.       MRI of the spine shows arthritis and disc bulge and likely hemangiomas, with 1 lesion showing increased signal intensity at T2, which is likely a hemangioma. Bone scan could be done, but patient does not even have much pain there. CT scan of the chest, a bdomen and pelvis was done on 2014. He has tiny lymph nodes in the neck which are difficult to palpate. Right jugular one is 1.4 x 0.9 and left supraclavicular one is 1.5 x 1.1. He also has a subcarinal lymph node which is 1.7 x 1.2 cm, and he ha s a portocaval lymph node which has increased from 2.5 to 2.8. Patient is totally asymptomatic. He does not have any B symptoms, so will continue followup with observation.       MEDICATIONS: The current medication list was reviewed with the patient and updated in the EMR this date per the medical assistant. Medication dosages and frequencies were confirmed to be accurate.       ALLERGIES: PENICILLIN which causes him to swell up. He is allergic to IV CONTRAST DYE.   SOCIAL HISTORY: He is . He smokes one pack per day for 35 years. He consumes three to four alcoholic drinks per week. He has no tattoos and no previous transfusions.       FAMILY HISTORY: Father  at 82 with MI. Mother  at 82 with bone cancer. He has one brother age 65 in good health and two sisters 69 and 57 in good health.   Past Medical History, Social History, and Family History are unchanged from my prior documentation on     Review of Systems    Constitutional: Negative for appetite change, chills, diaphoresis, fatigue, fever and unexpected weight change.   HENT: Negative for hearing loss, sore throat and trouble swallowing.    Respiratory: Negative for cough, chest tightness, shortness of breath and wheezing.    Cardiovascular: Negative for chest pain, palpitations and leg swelling.   Gastrointestinal: Negative for abdominal distention, abdominal pain, constipation, diarrhea, nausea and vomiting.   Genitourinary: Negative for dysuria, frequency, hematuria and urgency.   Musculoskeletal: Negative for joint swelling.        No muscle weakness.   Skin: Negative for rash and wound.   Neurological: Negative for seizures, syncope, speech difficulty, weakness, numbness and headaches.   Hematological: Negative for adenopathy. Does not bruise/bleed easily.   Psychiatric/Behavioral: Negative for behavioral problems, confusion and suicidal ideas.   All other systems reviewed and are negative.  I have reviewed and confirmed the accuracy of the ROS as documented by the MA/LPN/RN Susannah Moya MD        Objective      Chief Complaint   Patient presents with   • Follow-up        Patient Active Problem List   Diagnosis   • CLL (chronic lymphocytic leukemia) (CMS/[facility])   • Anemia         MEDICATIONS:  Medication Reconciliation for the patient has been reviewed by me and documented in the patients chart.    ALLERGIES:    Allergies   Allergen Reactions   • Contrast Dye Unknown - High Severity   • Penicillins      swelling         Vitals:    07/17/20 0838   BP: 95/60   Pulse: 65   Resp: 16   Temp: 97.8 °F (36.6 °C)   SpO2: 99%        Current Status 10/10/2019   ECOG score 0       Physical Exam       CONSTITUTIONAL:  Vital signs reviewed.  No distress, looks comfortable.  EYES:  Conjunctiva and lids unremarkable.  PERRLA  EARS,NOSE,MOUTH,THROAT:  Ears and nose appear unremarkable.  Lips, teeth, gums appear unremarkable.  RESPIRATORY:  Normal respiratory effort.  Lungs clear  to auscultation bilaterally.  CARDIOVASCULAR:  Normal S1, S2.  No murmurs rubs or gallops.  No significant lower extremity edema.  GASTROINTESTINAL: Abdomen appears unremarkable.  Nontender.  No hepatomegaly.  No splenomegaly.  LYMPHATIC:  No cervical, supraclavicular, axillary lymphadenopathy.  SKIN:  Warm.  No rashes.  PSYCHIATRIC:  Normal judgment and insight.  Normal mood and affect.  Occultly appreciate any adenopathy in the neck axilla or groin  RECENT LABS:  Hematology WBC   Date Value Ref Range Status   07/17/2020 20.09 (H) 3.40 - 10.80 10*3/mm3 Final   07/15/2020 13.77 (H) 4.5 - 11.0 10*3/uL Final     RBC   Date Value Ref Range Status   07/17/2020 3.20 (L) 4.14 - 5.80 10*6/mm3 Final   07/15/2020 2.96 (L) 4.5 - 5.9 10*6/uL Final     Hemoglobin   Date Value Ref Range Status   07/17/2020 9.4 (L) 13.0 - 17.7 g/dL Final   07/15/2020 8.5 (L) 13.5 - 17.5 g/dL Final     Hematocrit   Date Value Ref Range Status   07/17/2020 29.6 (L) 37.5 - 51.0 % Final   07/15/2020 26.4 (L) 41.0 - 53.0 % Final     Platelets   Date Value Ref Range Status   07/17/2020 35 (L) 140 - 450 10*3/mm3 Final   07/15/2020 27 (L) 140 - 440 10*3/uL Final     Comment:     CHECKED        08/05/19 - CT Abdomen Pelvis  IMPRESSION:  Mild splenomegaly with slight increase in size of the spleen  since the most recent previous CT scan of the abdomen dated 03/19/2018.  Mild to moderately enlarged lymph nodes in the shorty hepatis showing  equivocal slight increase in size. Moderate sigmoid diverticulosis  without evidence of diverticulitis. Cholelithiasis. Fusiform infrarenal  abdominal aortic aneurysm measuring 3.6 cm in diameter showing slight  increase in size. Moderately high-grade stenosis involving the distal  right common iliac artery. Bilateral spondylolysis at L5-S1 with grade 1  spondylolisthesis.    Assessment/Plan   1.This is a patient with a history of stage 2 CLL without evidence of any disease progression.  I have reviewed the CT scan from  3/19/2018 there is minimal progression but clinically patient is asymptomatic and we will follow with observation.  Will monitor with labs.   No evidence of any frequent infections, no major worsening of his white count. He has no fever or night sweats or any other symptoms.   · Patient knows that he is prone for infections and he is mainly staying at home during the COVID-19 situation time  · [facility]  Initially admitted with chest pain and underwent stress test and echo.  Echo was negative.  Stress test is abnormal but have left message for patient's cardiologist to call us.  His cardiologist is been sent [facility].  · He then got admitted the second time with worsening GI bleed and required total of 3 units of blood.  EGD was negative but colonoscopy showed angiectasia for which he underwent argon ablation.  Currently hemoglobin stable and no active bleeding.  · He was also found to have low platelets with platelets dropping down to 27k and hemoglobin was 7.  Ultrasound showed splenomegaly 15 cm.  Concern is whether he has progressed from his CLL point of view and requires treatment.    2.  Worsening thrombocytopenia, looking back his platelets were always in the 150s and during this admission he dropped down to 27K.  His LDH also was elevated and he was anemic.    3.  History of angiodysplasia requiring cauterization and Hess's esophagus mild anemia- He is currently asymptomatic status post recent cauterization       Plan     1. Reviewed [facility] got [facility] with GI bleed and required transfusion  2. He was thrombocytopenic with a platelet of 27K, hematology consulted but do not see the notes.  Concern was whether he has progressed  3. Reviewed ultrasound of the spleen which shows enlargement  4. Will obtain bone marrow aspiration biopsy  5. Obtain CT scan of the neck chest abdomen pelvis  6. Will check LDH, haptoglobin,  beta-2 microglobulin, C-reactive protein, CMP today as well as hepatitis profile  7. Patient to follow-up with nurse practitioner in 1 week and follow-up with me in 2 weeks with labs.      Susannah Moya MD    July 17 2020    Cc: Dr. Kevin Chandra

## 2020-07-19 LAB
FOLATE BLD-MCNC: 329 NG/ML
FOLATE RBC-MCNC: 1089 NG/ML
HCT VFR BLD AUTO: 30.2 % (ref 37.5–51)

## 2020-07-20 ENCOUNTER — HOSPITAL ENCOUNTER (OUTPATIENT)
Dept: CT IMAGING | Facility: HOSPITAL | Age: 70
Discharge: HOME | End: 2020-07-20
Admitting: INTERNAL MEDICINE

## 2020-07-20 DIAGNOSIS — C91.10 CLL (CHRONIC LYMPHOCYTIC LEUKEMIA) (HCC): ICD-10-CM

## 2020-07-20 LAB
ALBUMIN SERPL-MCNC: 3.5 G/DL (ref 2.9–4.4)
ALBUMIN/GLOB SERPL: 1.6 {RATIO} (ref 0.7–1.7)
ALPHA1 GLOB FLD ELPH-MCNC: 0.3 G/DL (ref 0–0.4)
ALPHA2 GLOB SERPL ELPH-MCNC: 0.8 G/DL (ref 0.4–1)
B-GLOBULIN SERPL ELPH-MCNC: 0.8 G/DL (ref 0.7–1.3)
GAMMA GLOB SERPL ELPH-MCNC: 0.3 G/DL (ref 0.4–1.8)
GLOBULIN SER CALC-MCNC: 2.2 G/DL (ref 2.2–3.9)
HBV CORE AB SER DONR QL IA: NEGATIVE
HBV CORE IGM SERPL QL IA: NEGATIVE
HBV E AB SERPL QL IA: NEGATIVE
HBV E AG SERPL QL IA: NEGATIVE
HBV SURFACE AB SER QL: NON REACTIVE
HBV SURFACE AG SERPL QL IA: NEGATIVE
HCV AB S/CO SERPL IA: <0.1 S/CO RATIO (ref 0–0.9)
IGA SERPL-MCNC: 47 MG/DL (ref 61–437)
IGG SERPL-MCNC: 333 MG/DL (ref 603–1613)
IGM SERPL-MCNC: 9 MG/DL (ref 20–172)
INTERPRETATION SERPL IEP-IMP: ABNORMAL
KAPPA LC SERPL-MCNC: 89.6 MG/L (ref 3.3–19.4)
KAPPA LC/LAMBDA SER: 11.2 {RATIO} (ref 0.26–1.65)
LABORATORY COMMENT REPORT: NORMAL
LAMBDA LC FREE SERPL-MCNC: 8 MG/L (ref 5.7–26.3)
Lab: ABNORMAL
M-SPIKE: ABNORMAL G/DL
PROT SERPL-MCNC: 5.7 G/DL (ref 6–8.5)

## 2020-07-20 PROCEDURE — 71250 CT THORAX DX C-: CPT

## 2020-07-20 PROCEDURE — 74176 CT ABD & PELVIS W/O CONTRAST: CPT

## 2020-07-20 PROCEDURE — 70490 CT SOFT TISSUE NECK W/O DYE: CPT

## 2020-07-20 PROCEDURE — 0 DIATRIZOATE MEGLUMINE & SODIUM PER 1 ML: Performed by: INTERNAL MEDICINE

## 2020-07-20 RX ADMIN — DIATRIZOATE MEGLUMINE AND DIATRIZOATE SODIUM 30 ML: 660; 100 LIQUID ORAL; RECTAL at 07:50

## 2020-07-22 ENCOUNTER — HOSPITAL ENCOUNTER (OUTPATIENT)
Dept: CT IMAGING | Facility: HOSPITAL | Age: 70
Discharge: HOME | End: 2020-07-22
Admitting: RADIOLOGY

## 2020-07-22 VITALS
OXYGEN SATURATION: 97 % | DIASTOLIC BLOOD PRESSURE: 56 MMHG | BODY MASS INDEX: 20.54 KG/M2 | WEIGHT: 155 LBS | SYSTOLIC BLOOD PRESSURE: 101 MMHG | HEART RATE: 61 BPM | HEIGHT: 73 IN | TEMPERATURE: 97.7 F | RESPIRATION RATE: 20 BRPM

## 2020-07-22 DIAGNOSIS — E61.1 IRON DEFICIENCY: ICD-10-CM

## 2020-07-22 DIAGNOSIS — C91.10 CLL (CHRONIC LYMPHOCYTIC LEUKEMIA) (HCC): ICD-10-CM

## 2020-07-22 DIAGNOSIS — D64.9 ANEMIA, UNSPECIFIED TYPE: ICD-10-CM

## 2020-07-22 LAB — REF LAB TEST METHOD: NORMAL

## 2020-07-22 PROCEDURE — 88185 FLOWCYTOMETRY/TC ADD-ON: CPT

## 2020-07-22 PROCEDURE — 88305 TISSUE EXAM BY PATHOLOGIST: CPT | Performed by: INTERNAL MEDICINE

## 2020-07-22 PROCEDURE — 88377 M/PHMTRC ALYS ISHQUANT/SEMIQ: CPT

## 2020-07-22 PROCEDURE — A9270 NON-COVERED ITEM OR SERVICE: HCPCS | Performed by: RADIOLOGY

## 2020-07-22 PROCEDURE — 88313 SPECIAL STAINS GROUP 2: CPT

## 2020-07-22 PROCEDURE — 88184 FLOWCYTOMETRY/ TC 1 MARKER: CPT

## 2020-07-22 PROCEDURE — 88280 CHROMOSOME KARYOTYPE STUDY: CPT

## 2020-07-22 PROCEDURE — 88237 TISSUE CULTURE BONE MARROW: CPT

## 2020-07-22 PROCEDURE — 88342 IMHCHEM/IMCYTCHM 1ST ANTB: CPT | Performed by: INTERNAL MEDICINE

## 2020-07-22 PROCEDURE — 88323 CONSLTJ&REPRT MATRL PREP SLD: CPT

## 2020-07-22 PROCEDURE — 88311 DECALCIFY TISSUE: CPT | Performed by: INTERNAL MEDICINE

## 2020-07-22 PROCEDURE — 77012 CT SCAN FOR NEEDLE BIOPSY: CPT

## 2020-07-22 PROCEDURE — 88341 IMHCHEM/IMCYTCHM EA ADD ANTB: CPT | Performed by: INTERNAL MEDICINE

## 2020-07-22 PROCEDURE — 88285 CHROMOSOME COUNT ADDITIONAL: CPT

## 2020-07-22 PROCEDURE — 25010000003 LIDOCAINE 1 % SOLUTION: Performed by: RADIOLOGY

## 2020-07-22 PROCEDURE — 88182 CELL MARKER STUDY: CPT

## 2020-07-22 PROCEDURE — 88300 SURGICAL PATH GROSS: CPT | Performed by: INTERNAL MEDICINE

## 2020-07-22 PROCEDURE — 88261 CHROMOSOME ANALYSIS 5: CPT

## 2020-07-22 PROCEDURE — 63710000001 ALPRAZOLAM 0.5 MG TABLET: Performed by: RADIOLOGY

## 2020-07-22 PROCEDURE — 88360 TUMOR IMMUNOHISTOCHEM/MANUAL: CPT

## 2020-07-22 PROCEDURE — 88313 SPECIAL STAINS GROUP 2: CPT | Performed by: INTERNAL MEDICINE

## 2020-07-22 PROCEDURE — 85097 BONE MARROW INTERPRETATION: CPT | Performed by: INTERNAL MEDICINE

## 2020-07-22 RX ORDER — ALPRAZOLAM 0.5 MG/1
0.5 TABLET ORAL ONCE
Status: COMPLETED | OUTPATIENT
Start: 2020-07-22 | End: 2020-07-22

## 2020-07-22 RX ORDER — SODIUM CHLORIDE 0.9 % (FLUSH) 0.9 %
1-10 SYRINGE (ML) INJECTION AS NEEDED
Status: DISCONTINUED | OUTPATIENT
Start: 2020-07-22 | End: 2020-07-23 | Stop reason: HOSPADM

## 2020-07-22 RX ORDER — LIDOCAINE HYDROCHLORIDE 10 MG/ML
20 INJECTION, SOLUTION INFILTRATION; PERINEURAL ONCE
Status: COMPLETED | OUTPATIENT
Start: 2020-07-22 | End: 2020-07-22

## 2020-07-22 RX ORDER — SODIUM CHLORIDE 0.9 % (FLUSH) 0.9 %
3 SYRINGE (ML) INJECTION EVERY 12 HOURS SCHEDULED
Status: DISCONTINUED | OUTPATIENT
Start: 2020-07-22 | End: 2020-07-23 | Stop reason: HOSPADM

## 2020-07-22 RX ADMIN — LIDOCAINE HYDROCHLORIDE 20 ML: 10 INJECTION, SOLUTION INFILTRATION; PERINEURAL at 08:18

## 2020-07-22 RX ADMIN — ALPRAZOLAM 0.5 MG: 0.5 TABLET ORAL at 07:10

## 2020-07-22 NOTE — DISCHARGE INSTRUCTIONS
EDUCATION /DISCHARGE INSTRUCTIONS  CT/US guided biopsy:  A biopsy is a procedure done to remove tissue for further analysis.  Before images are taken to locate the target area.  Images can be obtained using ultrasound, CT or MRI.  A physician will clean your skin with antiseptic soap, place a sterile towel around the site and administer a local anesthetic to numb the area.  The physician will then insert a special needle.  Sometimes images are taken of the needle after it is inserted to ensure the needle is in the correct area to be biopsied.   A sample is obtained and sent to the laboratory for study.  Occasionally the laboratory is unable to make a diagnosis from the sample and the procedure may need to be repeated.  Within a week the radiologist will send a report to your physician.  A pathologist will also examine the tissue and send a report.    Risks of the procedure include but are not limited to:   *  Bleeding    *  Infection   *  Puncture of surrounding organs *  Death     *  Lung collapse if the biopsy is near the chest which may require insertion of a      chest tube to re-inflate the lung if severe.    Benefits of the procedure:  Using x-ray helps to locate the area that requires a biopsy. The procedure is less invasive than a surgical procedure, there are no large incisions and it does not require anesthesia.    Alternatives to the procedure:  A biopsy can be performed surgically.  Risks of a surgical biopsy include exposure to anesthesia, infection, excessive bleeding and injury to abdominal organs.  A benefit of surgical biopsy is the ability to see the area to be biopsied and remove of a larger piece of tissue.    THIS EDUCATION INFORMATION WAS REVIEWED PRIOR TO PROCEDURE AND CONSENT. Patient initials__________________Time____0700_______________    Post Procedure:    *  Expect the biopsy site may be tender up to one week.    *  Rest today (no pushing pulling or straining).   *  Slowly increase  activity tomorrow.    *  If you received sedation do not drive for 24 hours.   *  Keep dressing clean and dry.   *  Leave dressing on puncture site for 24 hours.    *  You may shower when dressing removed.  Call your doctor if experiencing:   *  Signs of infection such as redness, swelling, excessive pain and / or foul        smelling drainage from the puncture site.   *  Chills or fever over 101 degrees (by mouth).   *  Unrelieved pain.   *  Any new or severe symptoms.   *  If experiencing sudden / severe shortness of breath or chest pain go to the       nearest emergency room.   Following the procedure:     Follow-up with the ordering physician as directed.    Continue to take other medications as directed by your physician unless    otherwise instructed.   If you have any concerns please call the Radiology Nurses Desk at [phone].  You are the most important factor in your recovery.  Follow the above instructions carefully.

## 2020-07-22 NOTE — NURSING NOTE
Patient in triage bay 2, mask compliant, I am wearing a mask and glasses to care for him. His wife is scheduled to drive him home today.

## 2020-07-22 NOTE — NURSING NOTE
Patient dressed, to wheelchair, taken to discharge via w/c with staff member, wife driving him home today.

## 2020-07-23 ENCOUNTER — TELEPHONE (OUTPATIENT)
Dept: INTERVENTIONAL RADIOLOGY/VASCULAR | Facility: HOSPITAL | Age: 70
End: 2020-07-23

## 2020-07-24 ENCOUNTER — OFFICE VISIT (OUTPATIENT)
Dept: ONCOLOGY | Facility: CLINIC | Age: 70
End: 2020-07-24

## 2020-07-24 ENCOUNTER — LAB (OUTPATIENT)
Dept: LAB | Facility: HOSPITAL | Age: 70
End: 2020-07-24

## 2020-07-24 VITALS
TEMPERATURE: 97.5 F | HEIGHT: 73 IN | WEIGHT: 159.9 LBS | DIASTOLIC BLOOD PRESSURE: 57 MMHG | RESPIRATION RATE: 18 BRPM | BODY MASS INDEX: 21.19 KG/M2 | HEART RATE: 60 BPM | OXYGEN SATURATION: 99 % | SYSTOLIC BLOOD PRESSURE: 106 MMHG

## 2020-07-24 DIAGNOSIS — D69.6 THROMBOCYTOPENIA (HCC): ICD-10-CM

## 2020-07-24 DIAGNOSIS — C91.10 CLL (CHRONIC LYMPHOCYTIC LEUKEMIA) (HCC): Primary | ICD-10-CM

## 2020-07-24 DIAGNOSIS — C91.10 CLL (CHRONIC LYMPHOCYTIC LEUKEMIA) (HCC): ICD-10-CM

## 2020-07-24 DIAGNOSIS — D64.9 ANEMIA, UNSPECIFIED TYPE: ICD-10-CM

## 2020-07-24 PROBLEM — E78.5 HYPERLIPIDEMIA: Status: ACTIVE | Noted: 2020-07-24

## 2020-07-24 PROBLEM — F17.200 SMOKER: Status: ACTIVE | Noted: 2019-12-05
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
## 2020-07-24 PROBLEM — S52.023A FRACTURE, OLECRANON: Status: ACTIVE | Noted: 2020-04-11

## 2020-07-24 PROBLEM — K92.2 LOWER GI BLEED: Status: ACTIVE | Noted: 2020-07-11

## 2020-07-24 PROBLEM — Z79.01 LONG TERM (CURRENT) USE OF ANTICOAGULANTS: Status: ACTIVE | Noted: 2018-10-25

## 2020-07-24 PROBLEM — M70.20 OLECRANON BURSITIS: Status: ACTIVE | Noted: 2020-04-11

## 2020-07-24 PROBLEM — M25.519 SHOULDER PAIN: Status: ACTIVE | Noted: 2020-04-11

## 2020-07-24 PROBLEM — I48.91 ATRIAL FIBRILLATION (HCC): Status: ACTIVE | Noted: 2020-07-24

## 2020-07-24 PROBLEM — Z95.5 STENTED CORONARY ARTERY: Status: ACTIVE | Noted: 2020-07-24

## 2020-07-24 LAB
ALBUMIN SERPL-MCNC: 4.3 G/DL (ref 3.5–5.2)
ALBUMIN/GLOB SERPL: 2.2 G/DL (ref 1.1–2.4)
ALP SERPL-CCNC: 83 U/L (ref 38–116)
ALT SERPL W P-5'-P-CCNC: 11 U/L (ref 0–41)
ANION GAP SERPL CALCULATED.3IONS-SCNC: 12.2 MMOL/L (ref 5–15)
AST SERPL-CCNC: 18 U/L (ref 0–40)
BASOPHILS # BLD AUTO: 0.09 10*3/MM3 (ref 0–0.2)
BASOPHILS NFR BLD AUTO: 0.4 % (ref 0–1.5)
BILIRUB SERPL-MCNC: 1.1 MG/DL (ref 0.2–1.2)
BUN SERPL-MCNC: 17 MG/DL (ref 6–20)
BUN/CREAT SERPL: 16.3 (ref 7.3–30)
CALCIUM SPEC-SCNC: 9.1 MG/DL (ref 8.5–10.2)
CHLORIDE SERPL-SCNC: 100 MMOL/L (ref 98–107)
CO2 SERPL-SCNC: 23.8 MMOL/L (ref 22–29)
CREAT SERPL-MCNC: 1.04 MG/DL (ref 0.7–1.3)
DEPRECATED RDW RBC AUTO: 62.4 FL (ref 37–54)
EOSINOPHIL # BLD AUTO: 0.06 10*3/MM3 (ref 0–0.4)
EOSINOPHIL NFR BLD AUTO: 0.3 % (ref 0.3–6.2)
ERYTHROCYTE [DISTWIDTH] IN BLOOD BY AUTOMATED COUNT: 18.6 % (ref 12.3–15.4)
GFR SERPL CREATININE-BSD FRML MDRD: 71 ML/MIN/1.73
GLOBULIN UR ELPH-MCNC: 2 GM/DL (ref 1.8–3.5)
GLUCOSE SERPL-MCNC: 132 MG/DL (ref 74–124)
HCT VFR BLD AUTO: 27.9 % (ref 37.5–51)
HGB BLD-MCNC: 8.7 G/DL (ref 13–17.7)
IMM GRANULOCYTES # BLD AUTO: 0.24 10*3/MM3 (ref 0–0.05)
IMM GRANULOCYTES NFR BLD AUTO: 1 % (ref 0–0.5)
LYMPHOCYTES # BLD AUTO: 20.98 10*3/MM3 (ref 0.7–3.1)
LYMPHOCYTES NFR BLD AUTO: 90.4 % (ref 19.6–45.3)
MCH RBC QN AUTO: 29.5 PG (ref 26.6–33)
MCHC RBC AUTO-ENTMCNC: 31.2 G/DL (ref 31.5–35.7)
MCV RBC AUTO: 94.6 FL (ref 79–97)
MONOCYTES # BLD AUTO: 0.38 10*3/MM3 (ref 0.1–0.9)
MONOCYTES NFR BLD AUTO: 1.6 % (ref 5–12)
NEUTROPHILS NFR BLD AUTO: 1.45 10*3/MM3 (ref 1.7–7)
NEUTROPHILS NFR BLD AUTO: 6.3 % (ref 42.7–76)
NRBC BLD AUTO-RTO: 1.2 /100 WBC (ref 0–0.2)
PLATELET # BLD AUTO: 37 10*3/MM3 (ref 140–450)
PMV BLD AUTO: 10.6 FL (ref 6–12)
POTASSIUM SERPL-SCNC: 4.6 MMOL/L (ref 3.5–4.7)
PROT SERPL-MCNC: 6.3 G/DL (ref 6.3–8)
RBC # BLD AUTO: 2.95 10*6/MM3 (ref 4.14–5.8)
SODIUM SERPL-SCNC: 136 MMOL/L (ref 134–145)
WBC # BLD AUTO: 23.2 10*3/MM3 (ref 3.4–10.8)

## 2020-07-24 PROCEDURE — 80053 COMPREHEN METABOLIC PANEL: CPT

## 2020-07-24 PROCEDURE — 99215 OFFICE O/P EST HI 40 MIN: CPT | Performed by: NURSE PRACTITIONER

## 2020-07-24 PROCEDURE — 36415 COLL VENOUS BLD VENIPUNCTURE: CPT

## 2020-07-24 PROCEDURE — 85025 COMPLETE CBC W/AUTO DIFF WBC: CPT

## 2020-07-24 NOTE — PROGRESS NOTES
Subjective       REASON FOR VISIT:    1. Chronic lymphoid leukemia, stage 2.     2.  History of angiodysplasia requiring cauterization and history of Hess's esophagus    Patient ID: Macho Stephenson is a 69 y.o. year old male     History of Present Illness patient is a 69-year-old male with the above-mentioned history who is here today to review scans and bone marrow biopsy report.  He reports that he has been doing well.  He denies fevers, chills, night sweats.  He denies any palpable adenopathy.  He reports that overall he is feeling fairly well, and his energy level is well.    Denies bleeding issues.  He has stopped his aspirin and anticoagulation due to thrombocytopenia.    PAST MEDICAL HISTORY:   1. Recurrent atrial fibrillation followed by cardiology. He has had drug eluting stent placed x 3.   2. High cholesterol.   3. Hypertension.       HEMATOLOGIC/ONCOLOGIC HISTORY:       The patient is 62-year-old gentleman with the above history currently here for followup. He has no complaints. He denies fever, night sweats or weight loss. He does not have any lymphadenopathy. He feels good. He had some fatigue but his CBC shows a go od hemoglobin.   The patient is followed by Dr. Kevin Chandra. He had seen Dr. Chandra 7/18/13 for a history of coronary artery disease status drug eluting stent placement to the right coronary artery and left anterior descending artery in February 2011. Histor y of paroxysmal atrial fibrillation and hyperlipidemia. He presented to [facility] on 6/23/13 with palpitations and was found to have atrial fibrillation with rapid ventricular response. He was given IV Cardizem and converted spontaneously to normal sinus rhythm. He was found to have elevated white count at 18.9 and denied any symptoms of infection or urinary complaints. TSH was normal, troponin levels were negative. He was asked to continue aspirin and metoprolol for that. If he contin u ed to have atrial  fibrillation, they will consider antiarrhythmic therapy with or without a short term anticoagulation therapy. However, currently the patient is feeling reasonably good. He has no complaints. His CBC 7/22/13 showed WBC 17,000, hemoglob i n 16.4 and platelet count of 174,000. Back 9/28/13 his hemoglobin was 15.1, WBC 13.3 and platelets 197,000. He has had a persistently elevated WBC but he is totally asymptomatic. He does not have any fever, night sweats or lymphadenopathy. He is here to be evaluated for his elevated white count.       Peripheral blood flow cytometry done 08/16/13 shows 22% chronic lymphoid leukemia cells, and they expressed ZAP-70 but not CD38. The total number of cells approximated 60% T cells, 32% B cells and 1% natural k iller cells. The B cells were monoclonal and expressed CD19, CD20, CD22, CD5, CD23, CD11c, ZAP-70 and T cell antigens. There was a normal CD4/CD8 ratio. CT of the chest, abdomen and pelvis does not show evidence of metastasis. Peripheral blood for DILLON -2 was negative. It was negative for T11:14 translocation.       CT scan done on 08/21/2013 shows 5 to 6 mm noncalcified nodule in the left lower lobe which is unchanged from December 2010. Otherwise no evidence of any lymphadenopathy or hepatosplenomegaly.       MRI of the spine shows arthritis and disc bulge and likely hemangiomas, with 1 lesion showing increased signal intensity at T2, which is likely a hemangioma. Bone scan could be done, but patient does not even have much pain there. CT scan of the chest, a bdomen and pelvis was done on 11/23/2014. He has tiny lymph nodes in the neck which are difficult to palpate. Right jugular one is 1.4 x 0.9 and left supraclavicular one is 1.5 x 1.1. He also has a subcarinal lymph node which is 1.7 x 1.2 cm, and he ha s a portocaval lymph node which has increased from 2.5 to 2.8. Patient is totally asymptomatic. He does not have any B symptoms, so will continue followup with observation.            Patient is a 69-year-old gentleman with history of chronic lymphocytic leukemia/SLL who recently got admitted to [facility] because of GI bleed.  He was seen by Dr. Montero.  He underwent EGD colonoscopy.  He was found to have angiodysplasia for which he underwent argon coagulation.  He required 3 units of blood.  Patient had a normal esophagus normal stomach and normal duodenum CLOtest was negative he was asked to take Protonix 40 mg daily.  Colonoscopy showed blood in the entire examined colon but there was a single bleeding colonic angiectasia which was treated with argon plasma coagulation.    He was admitted twice.  Initially he was admitted on July 10 at which time he stayed 3 days and he was evaluated for chest pain.  He underwent a cardiac work-up with stress test and echo.  The echo was normal but the stress test showed medium sized moderate severe severity fixed perfusion defect.  Of the inferior wall consistent with infarction.  However according to patient cardiology did not think he had any cardiac problems.  I have left a message for patient's cardiologist to call me today.  Patient subsequently got readmitted with GI bleed and required 1/3 unit of transfusion.  He was found to have thrombocytopenia and hematology was consulted.    His hemoglobin had dropped down to as low as 7 and his platelets had gone down to 87.  Today it is 35 and his hemoglobin is 9.4 today.  He denies active bleeding.  He has lost weight recently.  He does not have any fever or night sweats.    He had ultrasound of spleen which showed enlarged spleen centimeters in length            MEDICATIONS: The current medication list was reviewed with the patient and updated in the EMR this date per the medical assistant. Medication dosages and frequencies were confirmed to be accurate.       ALLERGIES: PENICILLIN which causes him to swell up. He is allergic to IV CONTRAST DYE.     SOCIAL HISTORY: He is maintenance  supervisor. He smokes one pack per day for 35 years. He consumes three to four alcoholic drinks per week. He has no tattoos and no previous transfusions.       FAMILY HISTORY: Father  at 82 with MI. Mother  at 82 with bone cancer. He has one brother age 65 in good health and two sisters 69 and 57 in good health.   Past Medical History, Social History, and Family History are unchanged from my prior documentation on     Review of Systems   Constitutional: Negative for activity change, appetite change, chills, fatigue and fever.   HENT: Negative for mouth sores, nosebleeds and trouble swallowing.    Respiratory: Negative for cough and shortness of breath.    Cardiovascular: Negative for chest pain and leg swelling.   Gastrointestinal: Negative for abdominal pain, constipation, diarrhea, nausea and vomiting.   Genitourinary: Negative for difficulty urinating.   Skin: Negative for rash.   Neurological: Negative for dizziness, weakness and numbness.   Hematological: Negative for adenopathy. Does not bruise/bleed easily.   Psychiatric/Behavioral: Negative for sleep disturbance.         Objective      Chief Complaint   Patient presents with   • Follow-up        Patient Active Problem List   Diagnosis   • CLL (chronic lymphocytic leukemia) (CMS/HCC)   • Anemia   • Encounter for hepatitis C screening test for low risk patient    • Thrombocytopenia (CMS/HCC)   • H/O heart artery stent   • Stented coronary artery   • Arteriosclerosis of coronary artery   • Atrial fibrillation (CMS/HCC)   • Paroxysmal atrial fibrillation (CMS/HCC)   • Chest pain, rule out acute myocardial infarction   • Fall   • Fracture, olecranon   • Hyperlipidemia   • Long term (current) use of anticoagulants   • Lower GI bleed   • Olecranon bursitis   • Shoulder pain   • Smoker   • CLL (chronic lymphocytic leukemia) (CMS/HCC)         MEDICATIONS:  Medication Reconciliation for the patient has been reviewed by me and documented in the patients  chart.    ALLERGIES:    Allergies   Allergen Reactions   • Contrast Dye Unknown - High Severity   • Penicillins      swelling         Vitals:    07/24/20 1126   BP: 106/57   Pulse: 60   Resp: 18   Temp: 97.5 °F (36.4 °C)   SpO2: 99%        Current Status 10/10/2019   ECOG score 0        Physical Exam   Constitutional: He is oriented to person, place, and time. He appears well-developed and well-nourished. No distress.   Pulmonary/Chest: Effort normal. No respiratory distress.   Musculoskeletal: He exhibits no edema.   Neurological: He is alert and oriented to person, place, and time.   Skin: Skin is warm and dry.   Psychiatric: He has a normal mood and affect.        Patient screened negative for depression based on a PHQ-9 score of 0 on 7/24/2020.     RECENT LABS:  Hematology WBC   Date Value Ref Range Status   07/24/2020 23.20 (H) 3.40 - 10.80 10*3/mm3 Final   07/15/2020 13.77 (H) 4.5 - 11.0 10*3/uL Final     RBC   Date Value Ref Range Status   07/24/2020 2.95 (L) 4.14 - 5.80 10*6/mm3 Final   07/15/2020 2.96 (L) 4.5 - 5.9 10*6/uL Final     Hemoglobin   Date Value Ref Range Status   07/24/2020 8.7 (L) 13.0 - 17.7 g/dL Final   07/15/2020 8.5 (L) 13.5 - 17.5 g/dL Final     Hematocrit   Date Value Ref Range Status   07/24/2020 27.9 (L) 37.5 - 51.0 % Final   07/15/2020 26.4 (L) 41.0 - 53.0 % Final     Platelets   Date Value Ref Range Status   07/24/2020 37 (L) 140 - 450 10*3/mm3 Final   07/15/2020 27 (L) 140 - 440 10*3/uL Final     Comment:     CHECKED        CT NECK, CHEST, ABDOMEN, AND PELVIS WITHOUT IV CONTRAST. 7/20/2020   HISTORY: 69-year-old male with CLL/SLL.     TECHNIQUE: Radiation dose reduction techniques were utilized, including  automated exposure control and exposure modulation based on body size. 3  mm images were obtained through the neck, chest, abdomen, and pelvis  without the administration of IV contrast. Compared with CT of the  abdomen and pelvis from 08/05/2019 and previous CTs from 03/19/2018.      FINDINGS:  NECK: There is no significant change in the 1.4 x 1.0 cm left  supraclavicular node or in the smaller supraclavicular nodes  bilaterally. A reference 1.4 x 0.7 cm right jugular chain node is  stable. There are shotty nodes scattered throughout the neck. No new  lymphadenopathy is seen. Noncontrasted parotid, submandibular, and  thyroid glands appear unremarkable.     CHEST: There has been interval decrease in the size of the shotty and  borderline enlarged nodes at the axilla. Reference 1.6 x 0.7 cm right  axillary node previously measured 2.0 x 1.2 cm. There has been a  decrease in the size of all of the shotty mediastinal and hilar nodes.  There are no new pulmonary opacities and there are no pleural or  pericardial effusions.     ABDOMEN/PELVIS: Splenic size has slightly increased measuring 15.4 cm in  diameter and previously measuring 13.5 cm, both measured on the coronal  reconstruction series. There is no change in the shotty nodes at the  gastrohepatic ligament and shorty hepatis. There are no pathologically  enlarged nodes. There is no acute abnormality involving the  noncontrasted liver. A single gallstone is noted within the gallbladder.  Noncontrasted pancreas, adrenals, and kidneys appear unremarkable. There  is no acute bowel abnormality. There is extensive sigmoid  diverticulosis. No free fluid. There are extensive abdominal aortic  atherosclerotic changes and there is a stable 3.7 cm infrarenal  abdominal aortic aneurysm.     IMPRESSION:  1. There has been slight interval increase in splenomegaly since the CT  of the abdomen from 08/05/2019.  2. Interval decrease in the size of the axillary nodes and the shotty  mediastinal nodes. There is no change in the shotty nodes within the  neck and supraclavicular regions. There is no new lymphadenopathy.  3. Stable 3.7 cm infrarenal abdominal aortic aneurysm.      Assessment/Plan   1.This is a patient with a history of stage 2 CLL without evidence  "of any disease progression.  I have reviewed the CT scan from 3/19/2018 there is minimal progression but clinically patient is asymptomatic and we will follow with observation.  Will monitor with labs.   No evidence of any frequent infections, no major worsening of his white count. He has no fever or night sweats or any other symptoms.   · Patient knows that he is prone for infections and he is mainly staying at home during the COVID-19 situation time  · Recent admission to [facility] July 10 2020×2.  Initially admitted with chest pain and underwent stress test and echo.  Echo was negative.  Stress test is abnormal but have left message for patient's cardiologist to call us.  His cardiologist is been sent [facility].  · He then got admitted the second time with worsening GI bleed and required total of 3 units of blood.  EGD was negative but colonoscopy showed angiectasia for which he underwent argon ablation.  Currently hemoglobin stable and no active bleeding.  · He was also found to have low platelets with platelets dropping down to 27k and hemoglobin was 7.  Ultrasound showed splenomegaly 15 cm.  Concern is whether he has progressed from his CLL point of view and requires treatment.  · CT scan performed 7/20/2020 did show the increased splenomegaly, but interval decrease in size of the axillary nodes, and shotty mediastinal nodes.  No change in the shotty nodes within the neck and supraclavicular regions.  No new lymphadenopathy.  Bone marrow biopsy however showed preliminarily that there is bone marrow involvement.  Remainder of bone marrow biopsy report is pending.  · Scans were reviewed with the patient today.  Dr. Moya also discussed with the patient and recommended he initiate treatment with chlorambucil and Gazyva.  He would not be a good candidate for Imbruvica due to his history of A. fib\".  He cannot take anticoagulation at this time.  The patient was provided with chemotherapy education today, " including  Handouts.  He will need a port placed so that we can initiate treatment.    2.  Worsening thrombocytopenia, looking back his platelets were always in the 150s and during this admission he dropped down to 27K.  His LDH also was elevated and he was anemic.  · Repeat labs 7/24/2020 show a WBC 23.2, ANC 1.45, Hgb 8.7, platelet count 37,000.  He denies bleeding, and is feeling well.  We will continue to closely monitor.    3.  History of angiodysplasia requiring cauterization and Hess's esophagus mild anemia- He is currently asymptomatic status post recent cauterization       Plan     1. Refer the patient to have a port placed.  2. Patient will return in 1 week for follow-up visit with nurse practitioner with repeat lab.  3. Patient scheduled return in 2 weeks for follow-up visit with Dr. Moya with repeat labs.  Hopefully we can start treatment sometime between then.      MARIAH Valencia    40 minutes were spent with the patient with 100% of the time in face-to-face counseling and management including reviewing scans, bone marrow biopsy, reviewing treatment/ chemotherapy plans and possible side effects.       Cc: Dr. Kevin Chandra

## 2020-07-27 ENCOUNTER — PREP FOR SURGERY (OUTPATIENT)
Dept: OTHER | Facility: HOSPITAL | Age: 70
End: 2020-07-27

## 2020-07-27 DIAGNOSIS — C91.10 CLL (CHRONIC LYMPHOCYTIC LEUKEMIA) (HCC): Primary | ICD-10-CM

## 2020-07-27 RX ORDER — CEFAZOLIN SODIUM 2 G/100ML
2 INJECTION, SOLUTION INTRAVENOUS ONCE
Status: CANCELLED | OUTPATIENT
Start: 2020-07-31 | End: 2020-07-27

## 2020-07-29 ENCOUNTER — TELEPHONE (OUTPATIENT)
Dept: ONCOLOGY | Facility: CLINIC | Age: 70
End: 2020-07-29

## 2020-07-29 ENCOUNTER — APPOINTMENT (OUTPATIENT)
Dept: PREADMISSION TESTING | Facility: HOSPITAL | Age: 70
End: 2020-07-29

## 2020-07-29 VITALS
HEIGHT: 73 IN | RESPIRATION RATE: 20 BRPM | TEMPERATURE: 98.5 F | BODY MASS INDEX: 21.2 KG/M2 | SYSTOLIC BLOOD PRESSURE: 117 MMHG | OXYGEN SATURATION: 99 % | DIASTOLIC BLOOD PRESSURE: 69 MMHG | WEIGHT: 160 LBS | HEART RATE: 67 BPM

## 2020-07-29 LAB
DEPRECATED RDW RBC AUTO: 60.9 FL (ref 37–54)
ERYTHROCYTE [DISTWIDTH] IN BLOOD BY AUTOMATED COUNT: 18.7 % (ref 12.3–15.4)
HCT VFR BLD AUTO: 23.6 % (ref 37.5–51)
HGB BLD-MCNC: 7.6 G/DL (ref 13–17.7)
LYMPHOCYTES # BLD MANUAL: 18.46 10*3/MM3 (ref 0.7–3.1)
LYMPHOCYTES NFR BLD MANUAL: 1 % (ref 5–12)
LYMPHOCYTES NFR BLD MANUAL: 92 % (ref 19.6–45.3)
MCH RBC QN AUTO: 29.5 PG (ref 26.6–33)
MCHC RBC AUTO-ENTMCNC: 32.2 G/DL (ref 31.5–35.7)
MCV RBC AUTO: 91.5 FL (ref 79–97)
MONOCYTES # BLD AUTO: 0.2 10*3/MM3 (ref 0.1–0.9)
NEUTROPHILS # BLD AUTO: 1.4 10*3/MM3 (ref 1.7–7)
NEUTROPHILS NFR BLD MANUAL: 7 % (ref 42.7–76)
NRBC SPEC MANUAL: 3 /100 WBC (ref 0–0.2)
PLAT MORPH BLD: NORMAL
PLATELET # BLD AUTO: 35 10*3/MM3 (ref 140–450)
PMV BLD AUTO: 10.3 FL (ref 6–12)
RBC # BLD AUTO: 2.58 10*6/MM3 (ref 4.14–5.8)
RBC MORPH BLD: NORMAL
WBC # BLD AUTO: 20.07 10*3/MM3 (ref 3.4–10.8)
WBC MORPH BLD: NORMAL

## 2020-07-29 PROCEDURE — U0004 COV-19 TEST NON-CDC HGH THRU: HCPCS | Performed by: NURSE PRACTITIONER

## 2020-07-29 PROCEDURE — 36415 COLL VENOUS BLD VENIPUNCTURE: CPT

## 2020-07-29 PROCEDURE — 85007 BL SMEAR W/DIFF WBC COUNT: CPT | Performed by: SURGERY

## 2020-07-29 PROCEDURE — C9803 HOPD COVID-19 SPEC COLLECT: HCPCS

## 2020-07-29 PROCEDURE — 85025 COMPLETE CBC W/AUTO DIFF WBC: CPT | Performed by: SURGERY

## 2020-07-29 RX ORDER — CHLORHEXIDINE GLUCONATE 500 MG/1
1 CLOTH TOPICAL
COMMUNITY
End: 2020-07-31 | Stop reason: HOSPADM

## 2020-07-29 RX ORDER — ACETAMINOPHEN 325 MG/1
650 TABLET ORAL EVERY 6 HOURS PRN
COMMUNITY
End: 2021-09-29

## 2020-07-29 RX ORDER — CALCIUM CARBONATE 200(500)MG
1 TABLET,CHEWABLE ORAL AS NEEDED
COMMUNITY
End: 2021-01-04

## 2020-07-30 LAB
REF LAB TEST METHOD: NORMAL
SARS-COV-2 RNA RESP QL NAA+PROBE: NOT DETECTED

## 2020-07-31 ENCOUNTER — APPOINTMENT (OUTPATIENT)
Dept: GENERAL RADIOLOGY | Facility: HOSPITAL | Age: 70
End: 2020-07-31

## 2020-07-31 ENCOUNTER — APPOINTMENT (OUTPATIENT)
Dept: LAB | Facility: HOSPITAL | Age: 70
End: 2020-07-31

## 2020-07-31 ENCOUNTER — ANESTHESIA (OUTPATIENT)
Dept: PERIOP | Facility: HOSPITAL | Age: 70
End: 2020-07-31

## 2020-07-31 ENCOUNTER — HOSPITAL ENCOUNTER (OUTPATIENT)
Facility: HOSPITAL | Age: 70
Setting detail: HOSPITAL OUTPATIENT SURGERY
Discharge: HOME | End: 2020-07-31
Attending: SURGERY | Admitting: SURGERY

## 2020-07-31 ENCOUNTER — ANESTHESIA EVENT (OUTPATIENT)
Dept: PERIOP | Facility: HOSPITAL | Age: 70
End: 2020-07-31

## 2020-07-31 VITALS
SYSTOLIC BLOOD PRESSURE: 123 MMHG | OXYGEN SATURATION: 98 % | RESPIRATION RATE: 16 BRPM | HEART RATE: 58 BPM | DIASTOLIC BLOOD PRESSURE: 61 MMHG | TEMPERATURE: 96 F

## 2020-07-31 DIAGNOSIS — C91.10 CLL (CHRONIC LYMPHOCYTIC LEUKEMIA) (HCC): ICD-10-CM

## 2020-07-31 LAB
ABO GROUP BLD: NORMAL
BLD GP AB SCN SERPL QL: NEGATIVE
RH BLD: POSITIVE
T&S EXPIRATION DATE: NORMAL

## 2020-07-31 PROCEDURE — 36430 TRANSFUSION BLD/BLD COMPNT: CPT

## 2020-07-31 PROCEDURE — C1788 PORT, INDWELLING, IMP: HCPCS | Performed by: SURGERY

## 2020-07-31 PROCEDURE — P9035 PLATELET PHERES LEUKOREDUCED: HCPCS

## 2020-07-31 PROCEDURE — 25010000002 FENTANYL CITRATE (PF) 100 MCG/2ML SOLUTION: Performed by: NURSE ANESTHETIST, CERTIFIED REGISTERED

## 2020-07-31 PROCEDURE — 86850 RBC ANTIBODY SCREEN: CPT | Performed by: SURGERY

## 2020-07-31 PROCEDURE — 25010000002 PROPOFOL 10 MG/ML EMULSION: Performed by: NURSE ANESTHETIST, CERTIFIED REGISTERED

## 2020-07-31 PROCEDURE — 77001 FLUOROGUIDE FOR VEIN DEVICE: CPT | Performed by: SURGERY

## 2020-07-31 PROCEDURE — 86900 BLOOD TYPING SEROLOGIC ABO: CPT | Performed by: SURGERY

## 2020-07-31 PROCEDURE — 76000 FLUOROSCOPY <1 HR PHYS/QHP: CPT

## 2020-07-31 PROCEDURE — 25010000003 CEFAZOLIN IN DEXTROSE 2-4 GM/100ML-% SOLUTION: Performed by: SURGERY

## 2020-07-31 PROCEDURE — 36561 INSERT TUNNELED CV CATH: CPT | Performed by: SURGERY

## 2020-07-31 PROCEDURE — S0260 H&P FOR SURGERY: HCPCS | Performed by: SURGERY

## 2020-07-31 PROCEDURE — 86901 BLOOD TYPING SEROLOGIC RH(D): CPT | Performed by: SURGERY

## 2020-07-31 DEVICE — POWERPORT M.R.I. IMPLANTABLE PORT WITH ATTACHABLE 8F CHRONOFLEX OPEN-ENDED SINGLE-LUMEN VENOUS CATHETER INTERMEDIATE KIT (WITH SUTURE PLUGS)
Type: IMPLANTABLE DEVICE | Site: SUBCLAVIAN | Status: FUNCTIONAL
Brand: POWERPORT M.R.I., CHRONOFLEX

## 2020-07-31 RX ORDER — HYDRALAZINE HYDROCHLORIDE 20 MG/ML
5 INJECTION INTRAMUSCULAR; INTRAVENOUS
Status: DISCONTINUED | OUTPATIENT
Start: 2020-07-31 | End: 2020-07-31 | Stop reason: HOSPADM

## 2020-07-31 RX ORDER — HYDROMORPHONE HYDROCHLORIDE 1 MG/ML
0.5 INJECTION, SOLUTION INTRAMUSCULAR; INTRAVENOUS; SUBCUTANEOUS
Status: DISCONTINUED | OUTPATIENT
Start: 2020-07-31 | End: 2020-07-31 | Stop reason: HOSPADM

## 2020-07-31 RX ORDER — HYDROCODONE BITARTRATE AND ACETAMINOPHEN 7.5; 325 MG/1; MG/1
1 TABLET ORAL ONCE AS NEEDED
Status: COMPLETED | OUTPATIENT
Start: 2020-07-31 | End: 2020-07-31

## 2020-07-31 RX ORDER — LABETALOL HYDROCHLORIDE 5 MG/ML
5 INJECTION, SOLUTION INTRAVENOUS
Status: DISCONTINUED | OUTPATIENT
Start: 2020-07-31 | End: 2020-07-31 | Stop reason: HOSPADM

## 2020-07-31 RX ORDER — LIDOCAINE HYDROCHLORIDE 10 MG/ML
0.5 INJECTION, SOLUTION EPIDURAL; INFILTRATION; INTRACAUDAL; PERINEURAL ONCE AS NEEDED
Status: DISCONTINUED | OUTPATIENT
Start: 2020-07-31 | End: 2020-07-31 | Stop reason: HOSPADM

## 2020-07-31 RX ORDER — FENTANYL CITRATE 50 UG/ML
50 INJECTION, SOLUTION INTRAMUSCULAR; INTRAVENOUS
Status: DISCONTINUED | OUTPATIENT
Start: 2020-07-31 | End: 2020-07-31 | Stop reason: HOSPADM

## 2020-07-31 RX ORDER — BUPIVACAINE HYDROCHLORIDE AND EPINEPHRINE 5; 5 MG/ML; UG/ML
INJECTION, SOLUTION PERINEURAL AS NEEDED
Status: DISCONTINUED | OUTPATIENT
Start: 2020-07-31 | End: 2020-07-31 | Stop reason: HOSPADM

## 2020-07-31 RX ORDER — ACETAMINOPHEN 650 MG/1
650 SUPPOSITORY RECTAL ONCE AS NEEDED
Status: DISCONTINUED | OUTPATIENT
Start: 2020-07-31 | End: 2020-07-31 | Stop reason: HOSPADM

## 2020-07-31 RX ORDER — NALOXONE HCL 0.4 MG/ML
0.2 VIAL (ML) INJECTION AS NEEDED
Status: DISCONTINUED | OUTPATIENT
Start: 2020-07-31 | End: 2020-07-31 | Stop reason: HOSPADM

## 2020-07-31 RX ORDER — PROMETHAZINE HYDROCHLORIDE 25 MG/ML
6.25 INJECTION, SOLUTION INTRAMUSCULAR; INTRAVENOUS
Status: DISCONTINUED | OUTPATIENT
Start: 2020-07-31 | End: 2020-07-31 | Stop reason: HOSPADM

## 2020-07-31 RX ORDER — PROPOFOL 10 MG/ML
VIAL (ML) INTRAVENOUS CONTINUOUS PRN
Status: DISCONTINUED | OUTPATIENT
Start: 2020-07-31 | End: 2020-07-31 | Stop reason: SURG

## 2020-07-31 RX ORDER — SODIUM CHLORIDE 0.9 % (FLUSH) 0.9 %
3-10 SYRINGE (ML) INJECTION AS NEEDED
Status: DISCONTINUED | OUTPATIENT
Start: 2020-07-31 | End: 2020-07-31 | Stop reason: HOSPADM

## 2020-07-31 RX ORDER — PROMETHAZINE HYDROCHLORIDE 25 MG/1
25 TABLET ORAL ONCE AS NEEDED
Status: DISCONTINUED | OUTPATIENT
Start: 2020-07-31 | End: 2020-07-31 | Stop reason: HOSPADM

## 2020-07-31 RX ORDER — ACETAMINOPHEN 325 MG/1
650 TABLET ORAL ONCE AS NEEDED
Status: DISCONTINUED | OUTPATIENT
Start: 2020-07-31 | End: 2020-07-31 | Stop reason: HOSPADM

## 2020-07-31 RX ORDER — LIDOCAINE HYDROCHLORIDE 20 MG/ML
INJECTION, SOLUTION INFILTRATION; PERINEURAL AS NEEDED
Status: DISCONTINUED | OUTPATIENT
Start: 2020-07-31 | End: 2020-07-31 | Stop reason: SURG

## 2020-07-31 RX ORDER — FAMOTIDINE 10 MG/ML
20 INJECTION, SOLUTION INTRAVENOUS ONCE
Status: COMPLETED | OUTPATIENT
Start: 2020-07-31 | End: 2020-07-31

## 2020-07-31 RX ORDER — PROMETHAZINE HYDROCHLORIDE 25 MG/1
25 SUPPOSITORY RECTAL ONCE AS NEEDED
Status: DISCONTINUED | OUTPATIENT
Start: 2020-07-31 | End: 2020-07-31 | Stop reason: HOSPADM

## 2020-07-31 RX ORDER — EPHEDRINE SULFATE 50 MG/ML
5 INJECTION, SOLUTION INTRAVENOUS ONCE AS NEEDED
Status: DISCONTINUED | OUTPATIENT
Start: 2020-07-31 | End: 2020-07-31 | Stop reason: HOSPADM

## 2020-07-31 RX ORDER — FLUMAZENIL 0.1 MG/ML
0.2 INJECTION INTRAVENOUS AS NEEDED
Status: DISCONTINUED | OUTPATIENT
Start: 2020-07-31 | End: 2020-07-31 | Stop reason: HOSPADM

## 2020-07-31 RX ORDER — DIPHENHYDRAMINE HYDROCHLORIDE 50 MG/ML
12.5 INJECTION INTRAMUSCULAR; INTRAVENOUS
Status: DISCONTINUED | OUTPATIENT
Start: 2020-07-31 | End: 2020-07-31 | Stop reason: HOSPADM

## 2020-07-31 RX ORDER — CEFAZOLIN SODIUM 2 G/100ML
2 INJECTION, SOLUTION INTRAVENOUS ONCE
Status: COMPLETED | OUTPATIENT
Start: 2020-07-31 | End: 2020-07-31

## 2020-07-31 RX ORDER — PROMETHAZINE HYDROCHLORIDE 25 MG/ML
12.5 INJECTION, SOLUTION INTRAMUSCULAR; INTRAVENOUS ONCE AS NEEDED
Status: DISCONTINUED | OUTPATIENT
Start: 2020-07-31 | End: 2020-07-31 | Stop reason: HOSPADM

## 2020-07-31 RX ORDER — OXYCODONE AND ACETAMINOPHEN 7.5; 325 MG/1; MG/1
1 TABLET ORAL ONCE AS NEEDED
Status: DISCONTINUED | OUTPATIENT
Start: 2020-07-31 | End: 2020-07-31 | Stop reason: HOSPADM

## 2020-07-31 RX ORDER — ONDANSETRON 4 MG/1
4 TABLET, FILM COATED ORAL EVERY 6 HOURS PRN
Qty: 10 TABLET | Refills: 1 | Status: SHIPPED | OUTPATIENT
Start: 2020-07-31 | End: 2021-09-29

## 2020-07-31 RX ORDER — HYDROCODONE BITARTRATE AND ACETAMINOPHEN 5; 325 MG/1; MG/1
TABLET ORAL
Qty: 24 TABLET | Refills: 0 | Status: SHIPPED | OUTPATIENT
Start: 2020-07-31 | End: 2020-08-20

## 2020-07-31 RX ORDER — ONDANSETRON 2 MG/ML
4 INJECTION INTRAMUSCULAR; INTRAVENOUS ONCE AS NEEDED
Status: DISCONTINUED | OUTPATIENT
Start: 2020-07-31 | End: 2020-07-31 | Stop reason: HOSPADM

## 2020-07-31 RX ORDER — SODIUM CHLORIDE 0.9 % (FLUSH) 0.9 %
3 SYRINGE (ML) INJECTION EVERY 12 HOURS SCHEDULED
Status: DISCONTINUED | OUTPATIENT
Start: 2020-07-31 | End: 2020-07-31 | Stop reason: HOSPADM

## 2020-07-31 RX ORDER — DIPHENHYDRAMINE HCL 25 MG
25 CAPSULE ORAL
Status: DISCONTINUED | OUTPATIENT
Start: 2020-07-31 | End: 2020-07-31 | Stop reason: HOSPADM

## 2020-07-31 RX ORDER — PROPOFOL 10 MG/ML
VIAL (ML) INTRAVENOUS AS NEEDED
Status: DISCONTINUED | OUTPATIENT
Start: 2020-07-31 | End: 2020-07-31 | Stop reason: SURG

## 2020-07-31 RX ORDER — FENTANYL CITRATE 50 UG/ML
INJECTION, SOLUTION INTRAMUSCULAR; INTRAVENOUS AS NEEDED
Status: DISCONTINUED | OUTPATIENT
Start: 2020-07-31 | End: 2020-07-31 | Stop reason: SURG

## 2020-07-31 RX ORDER — SODIUM CHLORIDE, SODIUM LACTATE, POTASSIUM CHLORIDE, CALCIUM CHLORIDE 600; 310; 30; 20 MG/100ML; MG/100ML; MG/100ML; MG/100ML
9 INJECTION, SOLUTION INTRAVENOUS CONTINUOUS
Status: DISCONTINUED | OUTPATIENT
Start: 2020-07-31 | End: 2020-07-31 | Stop reason: HOSPADM

## 2020-07-31 RX ADMIN — CEFAZOLIN SODIUM 2 G: 2 INJECTION, SOLUTION INTRAVENOUS at 10:48

## 2020-07-31 RX ADMIN — FENTANYL CITRATE 50 MCG: 50 INJECTION INTRAMUSCULAR; INTRAVENOUS at 11:23

## 2020-07-31 RX ADMIN — FENTANYL CITRATE 50 MCG: 50 INJECTION INTRAMUSCULAR; INTRAVENOUS at 11:15

## 2020-07-31 RX ADMIN — FAMOTIDINE 20 MG: 10 INJECTION INTRAVENOUS at 10:06

## 2020-07-31 RX ADMIN — SODIUM CHLORIDE, POTASSIUM CHLORIDE, SODIUM LACTATE AND CALCIUM CHLORIDE 9 ML/HR: 600; 310; 30; 20 INJECTION, SOLUTION INTRAVENOUS at 09:22

## 2020-07-31 RX ADMIN — PROPOFOL 50 MG: 10 INJECTION, EMULSION INTRAVENOUS at 11:15

## 2020-07-31 RX ADMIN — PROPOFOL 160 MCG/KG/MIN: 10 INJECTION, EMULSION INTRAVENOUS at 11:15

## 2020-07-31 RX ADMIN — LIDOCAINE HYDROCHLORIDE 60 MG: 20 INJECTION, SOLUTION INFILTRATION; PERINEURAL at 11:15

## 2020-07-31 RX ADMIN — HYDROCODONE BITARTRATE AND ACETAMINOPHEN 1 TABLET: 7.5; 325 TABLET ORAL at 12:26

## 2020-07-31 NOTE — ANESTHESIA PREPROCEDURE EVALUATION
Anesthesia Evaluation     Patient summary reviewed and Nursing notes reviewed   no history of anesthetic complications:  NPO Solid Status: > 8 hours  NPO Liquid Status: > 2 hours           Airway   Mallampati: II  TM distance: >3 FB  Neck ROM: full  No difficulty expected  Dental    (+) edentulous, lower dentures and upper dentures    Pulmonary - normal exam   (+) a smoker Former,   Cardiovascular   Exercise tolerance: good (4-7 METS)    ECG reviewed  Patient on routine beta blocker and Beta blocker given within 24 hours of surgery    (+) hypertension, valvular problems/murmurs murmur, CAD, cardiac stents Drug eluting stent more than 12 months ago dysrhythmias Atrial Fib, POLLARD, murmur, hyperlipidemia,   (-) angina      Neuro/Psych  GI/Hepatic/Renal/Endo    (+)  GERD, GI bleeding (lower, 3 weeks ago, received transfusion of 3 prbcs) ,     Musculoskeletal     Abdominal  - normal exam   Substance History      OB/GYN          Other   blood dyscrasia (CLL) anemia thrombocytopenia,   history of cancer                    Anesthesia Plan    ASA 3     MAC   (I have reviewed the patient's history with the patient and the chart, including all pertinent laboratory results and imaging. I have explained the risks of anesthesia including but not limited to dental damage, corneal abrasion, nerve injury, MI, stroke, and death.  )  intravenous induction     Anesthetic plan, all risks, benefits, and alternatives have been provided, discussed and informed consent has been obtained with: patient.    Plan discussed with CRNA.

## 2020-07-31 NOTE — ANESTHESIA POSTPROCEDURE EVALUATION
Patient: Macho Stephenson    Procedure Summary     Date:  07/31/20 Room / Location:   [facility]    Anesthesia Start:  1110 Anesthesia Stop:  1137    Procedure:  INSERTION VENOUS ACCESS DEVICE (N/A Abdomen) Diagnosis:       CLL (chronic lymphocytic leukemia) (CMS/HCC)      (CLL (chronic lymphocytic leukemia) (CMS/HCC) [C91.10])    Surgeon:  Darius Lui MD Provider:  Sharif Lin MD    Anesthesia Type:  MAC ASA Status:  3          Anesthesia Type: MAC    Vitals  Vitals Value Taken Time   /61 7/31/2020 12:10 PM   Temp 35.6 °C (96 °F) 7/31/2020 11:20 AM   Pulse 58 7/31/2020 12:10 PM   Resp 16 7/31/2020 12:10 PM   SpO2 98 % 7/31/2020 12:10 PM           Post Anesthesia Care and Evaluation    Patient location during evaluation: bedside  Patient participation: complete - patient participated  Level of consciousness: awake and alert  Pain management: adequate  Airway patency: patent  Anesthetic complications: No anesthetic complications    Cardiovascular status: acceptable  Respiratory status: acceptable  Hydration status: acceptable    Comments: /61 (BP Location: Left arm, Patient Position: Lying)   Pulse 58   Temp 35.6 °C (96 °F) (Temporal)   Resp 16   SpO2 98%

## 2020-07-31 NOTE — ADDENDUM NOTE
Addendum  created 07/31/20 1534 by Render, Sharif Barkley MD    Attestation recorded in Intraprocedure, Intraprocedure Attestations filed

## 2020-08-01 LAB
BH BB BLOOD EXPIRATION DATE: NORMAL
BH BB BLOOD TYPE BARCODE: 5100
BH BB DISPENSE STATUS: NORMAL
BH BB PRODUCT CODE: NORMAL
BH BB UNIT NUMBER: NORMAL
UNIT  ABO: NORMAL
UNIT  RH: NORMAL

## 2020-08-03 ENCOUNTER — TELEPHONE (OUTPATIENT)
Dept: ONCOLOGY | Facility: CLINIC | Age: 70
End: 2020-08-03

## 2020-08-03 NOTE — TELEPHONE ENCOUNTER
Patients wife Nohemi called, she would like to be with patient at his appointment on 8/6/20 it is a biopsy result appt.  Best call back number [phone]

## 2020-08-03 NOTE — TELEPHONE ENCOUNTER
----- Message from Jayant Bernardo sent at 8/3/2020 10:22 AM EDT -----  Patients wife Nohemi called, she would like to be with patient at his appointment on 8/6/20 it is a biopsy result appt.  Best call back number [phone]    Request for wife to attend nhaeed't with pt. Please let me know when you get a moment please.    Thank you! Tia

## 2020-08-06 ENCOUNTER — OFFICE VISIT (OUTPATIENT)
Dept: ONCOLOGY | Facility: CLINIC | Age: 70
End: 2020-08-06

## 2020-08-06 ENCOUNTER — LAB (OUTPATIENT)
Dept: LAB | Facility: HOSPITAL | Age: 70
End: 2020-08-06

## 2020-08-06 VITALS
RESPIRATION RATE: 16 BRPM | HEIGHT: 73 IN | HEART RATE: 72 BPM | SYSTOLIC BLOOD PRESSURE: 115 MMHG | OXYGEN SATURATION: 9 % | BODY MASS INDEX: 21.27 KG/M2 | WEIGHT: 160.5 LBS | TEMPERATURE: 97.8 F | DIASTOLIC BLOOD PRESSURE: 64 MMHG

## 2020-08-06 DIAGNOSIS — C91.10 CLL (CHRONIC LYMPHOCYTIC LEUKEMIA) (HCC): Primary | ICD-10-CM

## 2020-08-06 DIAGNOSIS — D64.9 ANEMIA, UNSPECIFIED TYPE: ICD-10-CM

## 2020-08-06 DIAGNOSIS — C91.10 CLL (CHRONIC LYMPHOCYTIC LEUKEMIA) (HCC): ICD-10-CM

## 2020-08-06 LAB
ABO GROUP BLD: NORMAL
ALBUMIN SERPL-MCNC: 4.1 G/DL (ref 3.5–5.2)
ALBUMIN/GLOB SERPL: 2.1 G/DL (ref 1.1–2.4)
ALP SERPL-CCNC: 90 U/L (ref 38–116)
ALT SERPL W P-5'-P-CCNC: 9 U/L (ref 0–41)
ANION GAP SERPL CALCULATED.3IONS-SCNC: 10.4 MMOL/L (ref 5–15)
AST SERPL-CCNC: 17 U/L (ref 0–40)
BASOPHILS # BLD AUTO: 0.07 10*3/MM3 (ref 0–0.2)
BASOPHILS NFR BLD AUTO: 0.5 % (ref 0–1.5)
BILIRUB SERPL-MCNC: 1.2 MG/DL (ref 0.2–1.2)
BLD GP AB SCN SERPL QL: NEGATIVE
BUN SERPL-MCNC: 20 MG/DL (ref 6–20)
BUN/CREAT SERPL: 18.9 (ref 7.3–30)
CALCIUM SPEC-SCNC: 9.3 MG/DL (ref 8.5–10.2)
CHLORIDE SERPL-SCNC: 104 MMOL/L (ref 98–107)
CO2 SERPL-SCNC: 23.6 MMOL/L (ref 22–29)
CREAT SERPL-MCNC: 1.06 MG/DL (ref 0.7–1.3)
DEPRECATED RDW RBC AUTO: 69.4 FL (ref 37–54)
EOSINOPHIL # BLD AUTO: 0.02 10*3/MM3 (ref 0–0.4)
EOSINOPHIL NFR BLD AUTO: 0.1 % (ref 0.3–6.2)
ERYTHROCYTE [DISTWIDTH] IN BLOOD BY AUTOMATED COUNT: 20.5 % (ref 12.3–15.4)
GFR SERPL CREATININE-BSD FRML MDRD: 69 ML/MIN/1.73
GLOBULIN UR ELPH-MCNC: 2 GM/DL (ref 1.8–3.5)
GLUCOSE SERPL-MCNC: 126 MG/DL (ref 74–124)
HCT VFR BLD AUTO: 24.8 % (ref 37.5–51)
HGB BLD-MCNC: 7.8 G/DL (ref 13–17.7)
IMM GRANULOCYTES # BLD AUTO: 0.28 10*3/MM3 (ref 0–0.05)
IMM GRANULOCYTES NFR BLD AUTO: 1.9 % (ref 0–0.5)
LYMPHOCYTES # BLD AUTO: 12.76 10*3/MM3 (ref 0.7–3.1)
LYMPHOCYTES NFR BLD AUTO: 87.6 % (ref 19.6–45.3)
MCH RBC QN AUTO: 30.1 PG (ref 26.6–33)
MCHC RBC AUTO-ENTMCNC: 31.5 G/DL (ref 31.5–35.7)
MCV RBC AUTO: 95.8 FL (ref 79–97)
MONOCYTES # BLD AUTO: 0.36 10*3/MM3 (ref 0.1–0.9)
MONOCYTES NFR BLD AUTO: 2.5 % (ref 5–12)
NEUTROPHILS NFR BLD AUTO: 1.08 10*3/MM3 (ref 1.7–7)
NEUTROPHILS NFR BLD AUTO: 7.4 % (ref 42.7–76)
NRBC BLD AUTO-RTO: 1.8 /100 WBC (ref 0–0.2)
PLATELET # BLD AUTO: 34 10*3/MM3 (ref 140–450)
PMV BLD AUTO: 10.4 FL (ref 6–12)
POTASSIUM SERPL-SCNC: 4.2 MMOL/L (ref 3.5–4.7)
PROT SERPL-MCNC: 6.1 G/DL (ref 6.3–8)
RBC # BLD AUTO: 2.59 10*6/MM3 (ref 4.14–5.8)
RH BLD: POSITIVE
SODIUM SERPL-SCNC: 138 MMOL/L (ref 134–145)
T&S EXPIRATION DATE: NORMAL
WBC # BLD AUTO: 14.57 10*3/MM3 (ref 3.4–10.8)

## 2020-08-06 PROCEDURE — 86850 RBC ANTIBODY SCREEN: CPT

## 2020-08-06 PROCEDURE — 80053 COMPREHEN METABOLIC PANEL: CPT

## 2020-08-06 PROCEDURE — 36415 COLL VENOUS BLD VENIPUNCTURE: CPT

## 2020-08-06 PROCEDURE — 86901 BLOOD TYPING SEROLOGIC RH(D): CPT

## 2020-08-06 PROCEDURE — 86900 BLOOD TYPING SEROLOGIC ABO: CPT

## 2020-08-06 PROCEDURE — 86923 COMPATIBILITY TEST ELECTRIC: CPT

## 2020-08-06 PROCEDURE — 99215 OFFICE O/P EST HI 40 MIN: CPT | Performed by: INTERNAL MEDICINE

## 2020-08-06 PROCEDURE — 85025 COMPLETE CBC W/AUTO DIFF WBC: CPT

## 2020-08-06 RX ORDER — DIPHENHYDRAMINE HCL 25 MG
25 CAPSULE ORAL ONCE
Status: CANCELLED | OUTPATIENT
Start: 2020-08-08 | End: 2020-08-06

## 2020-08-06 RX ORDER — SODIUM CHLORIDE 9 MG/ML
250 INJECTION, SOLUTION INTRAVENOUS AS NEEDED
Status: CANCELLED | OUTPATIENT
Start: 2020-08-08

## 2020-08-06 RX ORDER — ACETAMINOPHEN 325 MG/1
650 TABLET ORAL ONCE
Status: CANCELLED | OUTPATIENT
Start: 2020-08-08 | End: 2020-08-06

## 2020-08-06 NOTE — PROGRESS NOTES
Subjective       REASON FOR VISIT:    1. Chronic lymphoid leukemia, stage 4.     2.  History of angiodysplasia requiring cauterization and history of Hess's esophagus    3.  Bone marrow aspiration biopsy shows hypercellular marrow with 98% involvement with CLL    Patient ID: Macho Stephenson is a 69 y.o. year old male     History of Present Illness patient is a 69-year-old male with the above-mentioned history who is here today to review scans and bone marrow biopsy report.  He reports that he has been doing well.  He denies fevers, chills, night sweats.  He denies any palpable adenopathy.  He reports that overall he is feeling fairly well, and his energy level is well.    Denies bleeding issues.  He has stopped his aspirin and anticoagulation due to thrombocytopenia.    Interval history: I reviewed the bone marrow in length with patient it is involved with 98% CLL cells.  Patient needs to start treatment and will plan to start chlorambucil/Gazyva.  He had port placement and needs  chemo education    PAST MEDICAL HISTORY:   1. Recurrent atrial fibrillation followed by cardiology. He has had drug eluting stent placed x 3.   2. High cholesterol.   3. Hypertension.       HEMATOLOGIC/ONCOLOGIC HISTORY:       The patient is 62-year-old gentleman with the above history currently here for followup. He has no complaints. He denies fever, night sweats or weight loss. He does not have any lymphadenopathy. He feels good. He had some fatigue but his CBC shows a go od hemoglobin.   The patient is followed by Dr. Kevin Chandra. He had seen Dr. Chandra 7/18/13 for a history of coronary artery disease status drug eluting stent placement to the right coronary artery and left anterior descending artery in February 2011. Histor y of paroxysmal atrial fibrillation and hyperlipidemia. He presented to [facility] on 6/23/13 with palpitations and was found to have atrial fibrillation with rapid ventricular response. He was  given IV Cardizem and converted spontaneously to normal sinus rhythm. He was found to have elevated white count at 18.9 and denied any symptoms of infection or urinary complaints. TSH was normal, troponin levels were negative. He was asked to continue aspirin and metoprolol for that. If he contin u ed to have atrial fibrillation, they will consider antiarrhythmic therapy with or without a short term anticoagulation therapy. However, currently the patient is feeling reasonably good. He has no complaints. His CBC 7/22/13 showed WBC 17,000, hemoglob i n 16.4 and platelet count of 174,000. Back 9/28/13 his hemoglobin was 15.1, WBC 13.3 and platelets 197,000. He has had a persistently elevated WBC but he is totally asymptomatic. He does not have any fever, night sweats or lymphadenopathy. He is here to be evaluated for his elevated white count.       Peripheral blood flow cytometry done 08/16/13 shows 22% chronic lymphoid leukemia cells, and they expressed ZAP-70 but not CD38. The total number of cells approximated 60% T cells, 32% B cells and 1% natural k iller cells. The B cells were monoclonal and expressed CD19, CD20, CD22, CD5, CD23, CD11c, ZAP-70 and T cell antigens. There was a normal CD4/CD8 ratio. CT of the chest, abdomen and pelvis does not show evidence of metastasis. Peripheral blood for DILLON -2 was negative. It was negative for T11:14 translocation.       CT scan done on 08/21/2013 shows 5 to 6 mm noncalcified nodule in the left lower lobe which is unchanged from December 2010. Otherwise no evidence of any lymphadenopathy or hepatosplenomegaly.       MRI of the spine shows arthritis and disc bulge and likely hemangiomas, with 1 lesion showing increased signal intensity at T2, which is likely a hemangioma. Bone scan could be done, but patient does not even have much pain there. CT scan of the chest, a bdomen and pelvis was done on 11/23/2014. He has tiny lymph nodes in the neck which are difficult to palpate.  Right jugular one is 1.4 x 0.9 and left supraclavicular one is 1.5 x 1.1. He also has a subcarinal lymph node which is 1.7 x 1.2 cm, and he ha s a portocaval lymph node which has increased from 2.5 to 2.8. Patient is totally asymptomatic. He does not have any B symptoms, so will continue followup with observation.           Patient is a 69-year-old gentleman with history of chronic lymphocytic leukemia/SLL who recently got admitted to [facility] because of GI bleed.  He was seen by Dr. Montero.  He underwent EGD colonoscopy.  He was found to have angiodysplasia for which he underwent argon coagulation.  He required 3 units of blood.  Patient had a normal esophagus normal stomach and normal duodenum CLOtest was negative he was asked to take Protonix 40 mg daily.  Colonoscopy showed blood in the entire examined colon but there was a single bleeding colonic angiectasia which was treated with argon plasma coagulation.    He was admitted twice.  Initially he was admitted on July 10 at which time he stayed 3 days and he was evaluated for chest pain.  He underwent a cardiac work-up with stress test and echo.  The echo was normal but the stress test showed medium sized moderate severe severity fixed perfusion defect.  Of the inferior wall consistent with infarction.  However according to patient cardiology did not think he had any cardiac problems.  I have left a message for patient's cardiologist to call me today.  Patient subsequently got readmitted with GI bleed and required 1/3 unit of transfusion.  He was found to have thrombocytopenia and hematology was consulted.    His hemoglobin had dropped down to as low as 7 and his platelets had gone down to 87.  Today it is 35 and his hemoglobin is 9.4 today.  He denies active bleeding.  He has lost weight recently.  He does not have any fever or night sweats.    He had ultrasound of spleen which showed enlarged spleen centimeters in length            MEDICATIONS: The  current medication list was reviewed with the patient and updated in the EMR this date per the medical assistant. Medication dosages and frequencies were confirmed to be accurate.       ALLERGIES: PENICILLIN which causes him to swell up. He is allergic to IV CONTRAST DYE.     SOCIAL HISTORY: He is . He smokes one pack per day for 35 years. He consumes three to four alcoholic drinks per week. He has no tattoos and no previous transfusions.       FAMILY HISTORY: Father  at 82 with MI. Mother  at 82 with bone cancer. He has one brother age 65 in good health and two sisters 69 and 57 in good health.   Past Medical History, Social History, and Family History are unchanged from my prior documentation on     Review of Systems   Constitutional: Negative for activity change, appetite change, chills, diaphoresis, fatigue, fever and unexpected weight change.   HENT: Negative for hearing loss, mouth sores, nosebleeds, sore throat and trouble swallowing.    Respiratory: Negative for cough, chest tightness, shortness of breath and wheezing.    Cardiovascular: Negative for chest pain, palpitations and leg swelling.   Gastrointestinal: Negative for abdominal distention, abdominal pain, constipation, diarrhea, nausea and vomiting.   Genitourinary: Negative for difficulty urinating, dysuria, frequency, hematuria and urgency.   Musculoskeletal: Negative for joint swelling.        No muscle weakness.   Skin: Negative for rash and wound.   Neurological: Negative for dizziness, seizures, syncope, speech difficulty, weakness, numbness and headaches.   Hematological: Negative for adenopathy. Does not bruise/bleed easily.   Psychiatric/Behavioral: Negative for behavioral problems, confusion, sleep disturbance and suicidal ideas.     I have reviewed and confirmed the accuracy of the ROS as documented by the MA/LPN/RN Susannah Moya MD        Objective      Chief Complaint   Patient presents with   • Follow-up         Patient Active Problem List   Diagnosis   • CLL (chronic lymphocytic leukemia) (CMS/HCC)   • Anemia   • Encounter for hepatitis C screening test for low risk patient    • Thrombocytopenia (CMS/HCC)   • H/O heart artery stent   • Stented coronary artery   • Arteriosclerosis of coronary artery   • Atrial fibrillation (CMS/HCC)   • Paroxysmal atrial fibrillation (CMS/HCC)   • Chest pain, rule out acute myocardial infarction   • Fall   • Fracture, olecranon   • Hyperlipidemia   • Long term (current) use of anticoagulants   • Lower GI bleed   • Olecranon bursitis   • Shoulder pain   • Smoker   • CLL (chronic lymphocytic leukemia) (CMS/HCC)         MEDICATIONS:  Medication Reconciliation for the patient has been reviewed by me and documented in the patients chart.    ALLERGIES:    Allergies   Allergen Reactions   • Contrast Dye Swelling   • Penicillins Swelling         Vitals:    08/06/20 1430   BP: 115/64   Pulse: 72   Resp: 16   Temp: 97.8 °F (36.6 °C)   SpO2: (!) 9%        Current Status 8/6/2020   ECOG score 0        Physical Exam   Constitutional: He is oriented to person, place, and time. He appears well-developed and well-nourished. No distress.   Pulmonary/Chest: Effort normal. No respiratory distress.   Musculoskeletal: He exhibits no edema.   Neurological: He is alert and oriented to person, place, and time.   Skin: Skin is warm and dry.   Psychiatric: He has a normal mood and affect.      I have reexamined the patient and the results are consistent with the previously documented exam. Susannah Moya MD     RECENT LABS:  Hematology WBC   Date Value Ref Range Status   08/06/2020 14.57 (H) 3.40 - 10.80 10*3/mm3 Final   07/15/2020 13.77 (H) 4.5 - 11.0 10*3/uL Final     RBC   Date Value Ref Range Status   08/06/2020 2.59 (L) 4.14 - 5.80 10*6/mm3 Final   07/15/2020 2.96 (L) 4.5 - 5.9 10*6/uL Final     Hemoglobin   Date Value Ref Range Status   08/06/2020 7.8 (C) 13.0 - 17.7 g/dL Final   07/15/2020 8.5 (L)  13.5 - 17.5 g/dL Final     Hematocrit   Date Value Ref Range Status   08/06/2020 24.8 (L) 37.5 - 51.0 % Final   07/15/2020 26.4 (L) 41.0 - 53.0 % Final     Platelets   Date Value Ref Range Status   08/06/2020 34 (L) 140 - 450 10*3/mm3 Final   07/15/2020 27 (L) 140 - 440 10*3/uL Final     Comment:     CHECKED        XR CHEST POST CVA PORT-07/31/20    IMPRESSION:  Chest port placement. No pneumothorax.       CT NECK, CHEST, ABDOMEN, AND PELVIS WITHOUT IV CONTRAST. 7/20/2020   HISTORY: 69-year-old male with CLL/SLL.     TECHNIQUE: Radiation dose reduction techniques were utilized, including  automated exposure control and exposure modulation based on body size. 3  mm images were obtained through the neck, chest, abdomen, and pelvis  without the administration of IV contrast. Compared with CT of the  abdomen and pelvis from 08/05/2019 and previous CTs from 03/19/2018.     FINDINGS:  NECK: There is no significant change in the 1.4 x 1.0 cm left  supraclavicular node or in the smaller supraclavicular nodes  bilaterally. A reference 1.4 x 0.7 cm right jugular chain node is  stable. There are shotty nodes scattered throughout the neck. No new  lymphadenopathy is seen. Noncontrasted parotid, submandibular, and  thyroid glands appear unremarkable.     CHEST: There has been interval decrease in the size of the shotty and  borderline enlarged nodes at the axilla. Reference 1.6 x 0.7 cm right  axillary node previously measured 2.0 x 1.2 cm. There has been a  decrease in the size of all of the shotty mediastinal and hilar nodes.  There are no new pulmonary opacities and there are no pleural or  pericardial effusions.     ABDOMEN/PELVIS: Splenic size has slightly increased measuring 15.4 cm in  diameter and previously measuring 13.5 cm, both measured on the coronal  reconstruction series. There is no change in the shotty nodes at the  gastrohepatic ligament and shorty hepatis. There are no pathologically  enlarged nodes. There is  no acute abnormality involving the  noncontrasted liver. A single gallstone is noted within the gallbladder.  Noncontrasted pancreas, adrenals, and kidneys appear unremarkable. There  is no acute bowel abnormality. There is extensive sigmoid  diverticulosis. No free fluid. There are extensive abdominal aortic  atherosclerotic changes and there is a stable 3.7 cm infrarenal  abdominal aortic aneurysm.     IMPRESSION:  1. There has been slight interval increase in splenomegaly since the CT  of the abdomen from 08/05/2019.  2. Interval decrease in the size of the axillary nodes and the shotty  mediastinal nodes. There is no change in the shotty nodes within the  neck and supraclavicular regions. There is no new lymphadenopathy.  3. Stable 3.7 cm infrarenal abdominal aortic aneurysm.      Assessment/Plan   1.This is a patient with a history of stage 2 CLL without evidence of any disease progression.  I have reviewed the CT scan from 3/19/2018 there is minimal progression but clinically patient is asymptomatic and we will follow with observation.  Will monitor with labs.   No evidence of any frequent infections, no major worsening of his white count. He has no fever or night sweats or any other symptoms.   · Patient knows that he is prone for infections and he is mainly staying at home during the COVID-19 situation time  · Recent admission to [facility] July 10 2020×2.  Initially admitted with chest pain and underwent stress test and echo.  Echo was negative.  Stress test is abnormal but have left message for patient's cardiologist to call us.  His cardiologist is been sent [facility].  · He then got admitted the second time with worsening GI bleed and required total of 3 units of blood.  EGD was negative but colonoscopy showed angiectasia for which he underwent argon ablation.  Currently hemoglobin stable and no active bleeding.  · He was also found to have low platelets with platelets dropping down to 27k and  "hemoglobin was 7.  Ultrasound showed splenomegaly 15 cm.  Concern is whether he has progressed from his CLL point of view and requires treatment.  · CT scan performed 7/20/2020 did show the increased splenomegaly, but interval decrease in size of the axillary nodes, and shotty mediastinal nodes.  No change in the shotty nodes within the neck and supraclavicular regions.  No new lymphadenopathy.  Bone marrow biopsy however showed preliminarily that there is bone marrow involvement.  Remainder of bone marrow biopsy report is pending.  · Scans were reviewed with the patient today.  Dr. Moya also discussed with the patient and recommended he initiate treatment with chlorambucil and Gazyva.  He would not be a good candidate for Imbruvica due to his history of A. fib\".  He cannot take anticoagulation at this time.  The patient was provided with chemotherapy education today, including  Handouts.  He will need a port placed so that we can initiate treatment.    2.  Worsening thrombocytopenia, looking back his platelets were always in the 150s and during this admission he dropped down to 27K.  His LDH also was elevated and he was anemic.  · Repeat labs 7/24/2020 show a WBC 23.2, ANC 1.45, Hgb 8.7, platelet count 37,000.  He denies bleeding, and is feeling well.  We will continue to closely monitor.    3.  History of angiodysplasia requiring cauterization and Hess's esophagus mild anemia- He is currently asymptomatic status post recent cauterization       Plan   1.  Patient to receive 2 units of packed red blood cells  Reviewed bone marrow.   2.  Follow-up in 1 week with nurse practitioner to start Gazyva day 1 and day 2 next Tuesday  3.  Patient with me in 2 weeks at which time he will be due for Gazyva.  4.  I have educated him about side effects as well.      Susannah Moya MD        Cc: Dr. Kevin Chandra   "

## 2020-08-08 ENCOUNTER — INFUSION (OUTPATIENT)
Dept: ONCOLOGY | Facility: HOSPITAL | Age: 70
End: 2020-08-08

## 2020-08-08 VITALS
HEART RATE: 64 BPM | RESPIRATION RATE: 16 BRPM | TEMPERATURE: 97.5 F | DIASTOLIC BLOOD PRESSURE: 63 MMHG | SYSTOLIC BLOOD PRESSURE: 122 MMHG | OXYGEN SATURATION: 98 %

## 2020-08-08 DIAGNOSIS — D64.9 ANEMIA, UNSPECIFIED TYPE: ICD-10-CM

## 2020-08-08 DIAGNOSIS — C91.10 CLL (CHRONIC LYMPHOCYTIC LEUKEMIA) (HCC): ICD-10-CM

## 2020-08-08 PROCEDURE — 36430 TRANSFUSION BLD/BLD COMPNT: CPT

## 2020-08-08 PROCEDURE — 25010000003 HEPARIN LOCK FLUSH PER 10 UNITS: Performed by: INTERNAL MEDICINE

## 2020-08-08 PROCEDURE — 86900 BLOOD TYPING SEROLOGIC ABO: CPT

## 2020-08-08 PROCEDURE — P9016 RBC LEUKOCYTES REDUCED: HCPCS

## 2020-08-08 PROCEDURE — 63710000001 DIPHENHYDRAMINE PER 50 MG: Performed by: INTERNAL MEDICINE

## 2020-08-08 RX ORDER — DIPHENHYDRAMINE HCL 25 MG
25 CAPSULE ORAL ONCE
Status: COMPLETED | OUTPATIENT
Start: 2020-08-08 | End: 2020-08-08

## 2020-08-08 RX ORDER — HEPARIN SODIUM (PORCINE) LOCK FLUSH IV SOLN 100 UNIT/ML 100 UNIT/ML
500 SOLUTION INTRAVENOUS AS NEEDED
Status: DISCONTINUED | OUTPATIENT
Start: 2020-08-08 | End: 2021-09-29

## 2020-08-08 RX ORDER — ACETAMINOPHEN 325 MG/1
650 TABLET ORAL ONCE
Status: COMPLETED | OUTPATIENT
Start: 2020-08-08 | End: 2020-08-08

## 2020-08-08 RX ORDER — SODIUM CHLORIDE 9 MG/ML
250 INJECTION, SOLUTION INTRAVENOUS AS NEEDED
Status: DISCONTINUED | OUTPATIENT
Start: 2020-08-08 | End: 2020-08-08 | Stop reason: HOSPADM

## 2020-08-08 RX ADMIN — SODIUM CHLORIDE, PRESERVATIVE FREE 500 UNITS: 5 INJECTION INTRAVENOUS at 13:53

## 2020-08-08 RX ADMIN — DIPHENHYDRAMINE HYDROCHLORIDE 25 MG: 25 CAPSULE ORAL at 09:07

## 2020-08-08 RX ADMIN — ACETAMINOPHEN 650 MG: 325 TABLET, FILM COATED ORAL at 09:07

## 2020-08-09 LAB
BH BB BLOOD EXPIRATION DATE: NORMAL
BH BB BLOOD EXPIRATION DATE: NORMAL
BH BB BLOOD TYPE BARCODE: 5100
BH BB BLOOD TYPE BARCODE: 5100
BH BB DISPENSE STATUS: NORMAL
BH BB DISPENSE STATUS: NORMAL
BH BB PRODUCT CODE: NORMAL
BH BB PRODUCT CODE: NORMAL
BH BB UNIT NUMBER: NORMAL
BH BB UNIT NUMBER: NORMAL
CROSSMATCH INTERPRETATION: NORMAL
CROSSMATCH INTERPRETATION: NORMAL
UNIT  ABO: NORMAL
UNIT  ABO: NORMAL
UNIT  RH: NORMAL
UNIT  RH: NORMAL

## 2020-08-10 ENCOUNTER — TELEPHONE (OUTPATIENT)
Dept: ONCOLOGY | Facility: CLINIC | Age: 70
End: 2020-08-10

## 2020-08-10 ENCOUNTER — TELEMEDICINE (OUTPATIENT)
Dept: ONCOLOGY | Facility: CLINIC | Age: 70
End: 2020-08-10

## 2020-08-10 DIAGNOSIS — C91.10 CLL (CHRONIC LYMPHOCYTIC LEUKEMIA) (HCC): Primary | ICD-10-CM

## 2020-08-10 PROCEDURE — 99443 PR PHYS/QHP TELEPHONE EVALUATION 21-30 MIN: CPT | Performed by: NURSE PRACTITIONER

## 2020-08-10 RX ORDER — ACYCLOVIR 400 MG/1
400 TABLET ORAL 2 TIMES DAILY
Qty: 60 TABLET | Refills: 5 | Status: SHIPPED | OUTPATIENT
Start: 2020-08-10 | End: 2021-02-02 | Stop reason: SDUPTHER

## 2020-08-10 RX ORDER — PROMETHAZINE HYDROCHLORIDE 25 MG/1
25 TABLET ORAL EVERY 6 HOURS PRN
Qty: 60 TABLET | Refills: 1 | Status: SHIPPED | OUTPATIENT
Start: 2020-08-10 | End: 2021-09-29

## 2020-08-10 NOTE — TELEPHONE ENCOUNTER
Nohemi Stephenson(spouse) calling about new prescriptions. Said Dr. Moya was supposed to write two, one for shingles and one for gout. Was not sure of the name of the medication. Sonys sent to [facility] - [address] - [phone] [facility] [phone] FX. Would like a brandon;l when these have been sent over. Her callback is [phone]

## 2020-08-10 NOTE — PROGRESS NOTES
____________________PATIENT EDUCATION____________________    PATIENT EDUCATION:  Today I met with the patient and his wife via tele-visit to discuss the chemotherapy regimen recommended for treatment of his CLL.  The patient was given explanation of treatment premed side effects including office policy that prohibits patients to drive if sedating medications are administered, MD explanation given regarding benefits, side effects, toxicities and goals of treatment.  The patient received a Chemotherapy/Biotherapy Plan Summary including diagnosis and specific treatment plan.    SIDE EFFECTS:  Common side effects were discussed with the patient and/or significant other.  Discussion included hair loss/discoloration, anemia/fatigue, infection/chills/fever, appetite, bleeding risk/precautions, constipation, diarrhea, mouth sores, taste alteration, loss of appetite,nausea/vomiting, peripheral neuropathy, skin/nail changes, rash, muscle aches/weakness, photosensitivity, weight gain/loss, hearing loss, dizziness, menopausal symptoms, menstrrual irregularity, sterility, high blood pressure, heart damage, liver damage, lung damage, kidney damage, DVT/PE risk, fluid retention, pleural/pericardial effusion, somnolence, electrolyte/LFT imbalance, vein exercises and/or the possible need for vascular access/port placement.  The patient was advice that although uncommon, leakage of an infused medication from the vein or venous access device (port) may lead to skin breakdown and/or other tissue damage.  The patient was advised that he/she may have pain, bleeding, and/or bruising from the insertion of a needle in their vein or venous access device (port).  The patient was further advised that, in spite of proper technique, infection with redness and irritation may rarely occur at the site where the needle was inserted.  The patient was advised that if complications occur, additional medical treatment is available.    Discussion also  included side effects specific to drugs in the treatment plan, specifically chlorambucil and Gazyva.    PHYSICAL EXAM:  In no acute distress.  Awake, alert, appropriately verbal.  Breathing unlabored  No focal deficits.  Asking appropriate questions.    Patient already has Zofran on hand but is requesting to have Phenergan as well as he is responded well to this in the past for nausea.    We discussed prophylactic medications including allopurinol and acyclovir.  Patient will begin allopurinol 300 mg daily as soon as prescription is filled as well as acyclovir 400 mg twice a day until instructed to discontinue.  We discussed the importance of hydrating with water throughout treatment.  We discussed the importance of close monitoring of his blood work with biweekly counts at first.  He and his wife verbalized understanding.    Finally Leukeran dosing per Dr. Moya is to be 8 tablets (16 mg) given on days 1 and day 15.  Prescription sent to patient's pharmacy.    A total of 40 minutes were spent with the patient, with 100% of time spent in education and counseling.    This visit was originally intended for video visit however due to technology malfunction, this visit was rescheduled as a phone visit to comply with patient safety concerns in accordance with CDC recommendations. Total time of discussion was 40 minutes.

## 2020-08-11 ENCOUNTER — TELEPHONE (OUTPATIENT)
Dept: ONCOLOGY | Facility: CLINIC | Age: 70
End: 2020-08-11

## 2020-08-11 ENCOUNTER — DOCUMENTATION (OUTPATIENT)
Dept: ONCOLOGY | Facility: CLINIC | Age: 70
End: 2020-08-11

## 2020-08-11 DIAGNOSIS — C91.10 CLL (CHRONIC LYMPHOCYTIC LEUKEMIA) (HCC): Primary | ICD-10-CM

## 2020-08-11 RX ORDER — ALLOPURINOL 300 MG/1
300 TABLET ORAL DAILY
Qty: 30 TABLET | Refills: 2 | Status: SHIPPED | OUTPATIENT
Start: 2020-08-11 | End: 2020-10-26

## 2020-08-11 NOTE — TELEPHONE ENCOUNTER
Patient's wife, Nohemi, calling.    Patient had video visit with Valarie Mclean yesterday.  She said that she was going to call in 4 meds, but only 2 were called in.    Chlorambucil was not at the pharmacy. He is supposed to start taking this on Thursday or Friday (she isn't sure which)  The other prescription not received was for gout.    Pharmacy in [facility] is correct ([facility])    [phone]

## 2020-08-11 NOTE — TELEPHONE ENCOUNTER
Pt's wife called concerning scripts for Leukeran and allopurinol which were not received by the pt's pharmacy. They also questioned whether he is to start the Leukeran Thursday or Friday. R/w Theresa CABEZAS who had chemo ed with pt/wife yesterday. Pt is to start allopurinol and acyclovir as soon as those medications are received. Pt is to start Leukeran on Thursday (8/13). Pt received acyclovir and phenergan yesterday but Leukeran and allopurinol not yet sent. Per Theresa CABEZAS, orders for these medications were released from pt's treatment plan. Pt's wife v/u.

## 2020-08-11 NOTE — PROGRESS NOTES
Staff message rec from Lianne GLOVER RN-she states pharmacy called pt to let him know the Leukeran copay was $400. She asks if assistance is available.    I contacted Nohemi, pts wife, and explained I can look into assistance. When I asked about their income-she stated he is still working and doesn't think they will qualify. She states maybe after he stops working they will as they will be on SS only. She said they were going to pay the copay this time and see what it is at the next fill. My direct phone number was provided and I asked her to contact me should she change her mind.    Nolvia Minor, ROMIE sent to [facility]             Just sent in script for Leukeran on this pt and his pharmacy called to let them know it will be $400. Is there any assistance available for them?

## 2020-08-13 ENCOUNTER — INFUSION (OUTPATIENT)
Dept: ONCOLOGY | Facility: HOSPITAL | Age: 70
End: 2020-08-13

## 2020-08-13 VITALS
OXYGEN SATURATION: 98 % | DIASTOLIC BLOOD PRESSURE: 62 MMHG | TEMPERATURE: 97.9 F | RESPIRATION RATE: 16 BRPM | BODY MASS INDEX: 20.9 KG/M2 | WEIGHT: 158.4 LBS | SYSTOLIC BLOOD PRESSURE: 108 MMHG | HEART RATE: 62 BPM

## 2020-08-13 DIAGNOSIS — C91.10 CLL (CHRONIC LYMPHOCYTIC LEUKEMIA) (HCC): ICD-10-CM

## 2020-08-13 DIAGNOSIS — C91.10 CLL (CHRONIC LYMPHOCYTIC LEUKEMIA) (HCC): Primary | ICD-10-CM

## 2020-08-13 LAB
ALBUMIN SERPL-MCNC: 4.1 G/DL (ref 3.5–5.2)
ALBUMIN/GLOB SERPL: 2.2 G/DL (ref 1.1–2.4)
ALP SERPL-CCNC: 86 U/L (ref 38–116)
ALT SERPL W P-5'-P-CCNC: 9 U/L (ref 0–41)
ANION GAP SERPL CALCULATED.3IONS-SCNC: 10.4 MMOL/L (ref 5–15)
AST SERPL-CCNC: 17 U/L (ref 0–40)
BASOPHILS # BLD AUTO: 0.1 10*3/MM3 (ref 0–0.2)
BASOPHILS NFR BLD AUTO: 0.5 % (ref 0–1.5)
BILIRUB SERPL-MCNC: 1.2 MG/DL (ref 0.2–1.2)
BUN SERPL-MCNC: 23 MG/DL (ref 6–20)
BUN/CREAT SERPL: 23.7 (ref 7.3–30)
CALCIUM SPEC-SCNC: 9.7 MG/DL (ref 8.5–10.2)
CHLORIDE SERPL-SCNC: 101 MMOL/L (ref 98–107)
CO2 SERPL-SCNC: 25.6 MMOL/L (ref 22–29)
CREAT SERPL-MCNC: 0.97 MG/DL (ref 0.7–1.3)
DEPRECATED RDW RBC AUTO: 62.2 FL (ref 37–54)
EOSINOPHIL # BLD AUTO: 0.03 10*3/MM3 (ref 0–0.4)
EOSINOPHIL NFR BLD AUTO: 0.1 % (ref 0.3–6.2)
ERYTHROCYTE [DISTWIDTH] IN BLOOD BY AUTOMATED COUNT: 18.8 % (ref 12.3–15.4)
GFR SERPL CREATININE-BSD FRML MDRD: 77 ML/MIN/1.73
GLOBULIN UR ELPH-MCNC: 1.9 GM/DL (ref 1.8–3.5)
GLUCOSE SERPL-MCNC: 206 MG/DL (ref 74–124)
HCT VFR BLD AUTO: 34.3 % (ref 37.5–51)
HGB BLD-MCNC: 10.7 G/DL (ref 13–17.7)
IMM GRANULOCYTES # BLD AUTO: 0.19 10*3/MM3 (ref 0–0.05)
IMM GRANULOCYTES NFR BLD AUTO: 0.9 % (ref 0–0.5)
LDH SERPL-CCNC: 287 U/L (ref 99–259)
LYMPHOCYTES # BLD AUTO: 18.61 10*3/MM3 (ref 0.7–3.1)
LYMPHOCYTES NFR BLD AUTO: 91.3 % (ref 19.6–45.3)
MAGNESIUM SERPL-MCNC: 1.9 MG/DL (ref 1.8–2.5)
MCH RBC QN AUTO: 29.6 PG (ref 26.6–33)
MCHC RBC AUTO-ENTMCNC: 31.2 G/DL (ref 31.5–35.7)
MCV RBC AUTO: 94.8 FL (ref 79–97)
MONOCYTES # BLD AUTO: 0.26 10*3/MM3 (ref 0.1–0.9)
MONOCYTES NFR BLD AUTO: 1.3 % (ref 5–12)
NEUTROPHILS NFR BLD AUTO: 1.19 10*3/MM3 (ref 1.7–7)
NEUTROPHILS NFR BLD AUTO: 5.9 % (ref 42.7–76)
NRBC BLD AUTO-RTO: 1.1 /100 WBC (ref 0–0.2)
PHOSPHATE SERPL-MCNC: 3.8 MG/DL (ref 2.5–4.5)
PLATELET # BLD AUTO: 25 10*3/MM3 (ref 140–450)
PMV BLD AUTO: 10 FL (ref 6–12)
POTASSIUM SERPL-SCNC: 4 MMOL/L (ref 3.5–4.7)
PROT SERPL-MCNC: 6 G/DL (ref 6.3–8)
RBC # BLD AUTO: 3.62 10*6/MM3 (ref 4.14–5.8)
SODIUM SERPL-SCNC: 137 MMOL/L (ref 134–145)
URATE SERPL-MCNC: 5.5 MG/DL (ref 2.8–7.4)
WBC # BLD AUTO: 20.38 10*3/MM3 (ref 3.4–10.8)

## 2020-08-13 PROCEDURE — 96361 HYDRATE IV INFUSION ADD-ON: CPT

## 2020-08-13 PROCEDURE — 85025 COMPLETE CBC W/AUTO DIFF WBC: CPT

## 2020-08-13 PROCEDURE — 84550 ASSAY OF BLOOD/URIC ACID: CPT

## 2020-08-13 PROCEDURE — 84100 ASSAY OF PHOSPHORUS: CPT

## 2020-08-13 PROCEDURE — 25010000002 OBINUTUZUMAB 1000 MG/40ML SOLUTION 40 ML VIAL: Performed by: INTERNAL MEDICINE

## 2020-08-13 PROCEDURE — 80053 COMPREHEN METABOLIC PANEL: CPT

## 2020-08-13 PROCEDURE — 96375 TX/PRO/DX INJ NEW DRUG ADDON: CPT

## 2020-08-13 PROCEDURE — 96415 CHEMO IV INFUSION ADDL HR: CPT

## 2020-08-13 PROCEDURE — 96413 CHEMO IV INFUSION 1 HR: CPT

## 2020-08-13 PROCEDURE — 25010000002 DEXAMETHASONE PER 1 MG: Performed by: INTERNAL MEDICINE

## 2020-08-13 PROCEDURE — 25010000002 ONDANSETRON PER 1 MG: Performed by: NURSE PRACTITIONER

## 2020-08-13 PROCEDURE — 25010000002 DIPHENHYDRAMINE PER 50 MG: Performed by: INTERNAL MEDICINE

## 2020-08-13 PROCEDURE — 83615 LACTATE (LD) (LDH) ENZYME: CPT

## 2020-08-13 PROCEDURE — 83735 ASSAY OF MAGNESIUM: CPT

## 2020-08-13 RX ORDER — FAMOTIDINE 10 MG/ML
20 INJECTION, SOLUTION INTRAVENOUS AS NEEDED
Status: CANCELLED | OUTPATIENT
Start: 2020-08-14

## 2020-08-13 RX ORDER — FAMOTIDINE 10 MG/ML
20 INJECTION, SOLUTION INTRAVENOUS AS NEEDED
Status: CANCELLED | OUTPATIENT
Start: 2020-08-20

## 2020-08-13 RX ORDER — SODIUM CHLORIDE 9 MG/ML
250 INJECTION, SOLUTION INTRAVENOUS ONCE
Status: CANCELLED | OUTPATIENT
Start: 2020-08-20

## 2020-08-13 RX ORDER — MEPERIDINE HYDROCHLORIDE 50 MG/ML
25 INJECTION INTRAMUSCULAR; INTRAVENOUS; SUBCUTANEOUS
Status: CANCELLED | OUTPATIENT
Start: 2020-08-14

## 2020-08-13 RX ORDER — ACETAMINOPHEN 325 MG/1
650 TABLET ORAL ONCE
Status: CANCELLED | OUTPATIENT
Start: 2020-08-14

## 2020-08-13 RX ORDER — SODIUM CHLORIDE 9 MG/ML
500 INJECTION, SOLUTION INTRAVENOUS ONCE
Status: CANCELLED | OUTPATIENT
Start: 2020-08-14

## 2020-08-13 RX ORDER — FAMOTIDINE 10 MG/ML
20 INJECTION, SOLUTION INTRAVENOUS AS NEEDED
Status: CANCELLED | OUTPATIENT
Start: 2020-08-13

## 2020-08-13 RX ORDER — DIPHENHYDRAMINE HYDROCHLORIDE 50 MG/ML
50 INJECTION INTRAMUSCULAR; INTRAVENOUS AS NEEDED
Status: CANCELLED | OUTPATIENT
Start: 2020-08-20

## 2020-08-13 RX ORDER — DIPHENHYDRAMINE HYDROCHLORIDE 50 MG/ML
50 INJECTION INTRAMUSCULAR; INTRAVENOUS AS NEEDED
Status: CANCELLED | OUTPATIENT
Start: 2020-08-13

## 2020-08-13 RX ORDER — SODIUM CHLORIDE 9 MG/ML
250 INJECTION, SOLUTION INTRAVENOUS ONCE
Status: COMPLETED | OUTPATIENT
Start: 2020-08-13 | End: 2020-08-13

## 2020-08-13 RX ORDER — SODIUM CHLORIDE 9 MG/ML
250 INJECTION, SOLUTION INTRAVENOUS ONCE
Status: CANCELLED | OUTPATIENT
Start: 2020-08-14

## 2020-08-13 RX ORDER — MEPERIDINE HYDROCHLORIDE 50 MG/ML
25 INJECTION INTRAMUSCULAR; INTRAVENOUS; SUBCUTANEOUS
Status: CANCELLED | OUTPATIENT
Start: 2020-08-20

## 2020-08-13 RX ORDER — DIPHENHYDRAMINE HYDROCHLORIDE 50 MG/ML
50 INJECTION INTRAMUSCULAR; INTRAVENOUS AS NEEDED
Status: CANCELLED | OUTPATIENT
Start: 2020-08-14

## 2020-08-13 RX ORDER — DIPHENHYDRAMINE HYDROCHLORIDE 50 MG/ML
50 INJECTION INTRAMUSCULAR; INTRAVENOUS AS NEEDED
Status: CANCELLED | OUTPATIENT
Start: 2020-08-27

## 2020-08-13 RX ORDER — SODIUM CHLORIDE 9 MG/ML
500 INJECTION, SOLUTION INTRAVENOUS ONCE
Status: CANCELLED | OUTPATIENT
Start: 2020-08-27

## 2020-08-13 RX ORDER — ACETAMINOPHEN 325 MG/1
650 TABLET ORAL ONCE
Status: COMPLETED | OUTPATIENT
Start: 2020-08-13 | End: 2020-08-13

## 2020-08-13 RX ORDER — SODIUM CHLORIDE 9 MG/ML
500 INJECTION, SOLUTION INTRAVENOUS ONCE
Status: CANCELLED | OUTPATIENT
Start: 2020-08-20

## 2020-08-13 RX ORDER — ACETAMINOPHEN 325 MG/1
650 TABLET ORAL ONCE
Status: CANCELLED | OUTPATIENT
Start: 2020-08-27

## 2020-08-13 RX ORDER — FAMOTIDINE 10 MG/ML
20 INJECTION, SOLUTION INTRAVENOUS AS NEEDED
Status: CANCELLED | OUTPATIENT
Start: 2020-08-27

## 2020-08-13 RX ORDER — ACETAMINOPHEN 325 MG/1
650 TABLET ORAL ONCE
Status: CANCELLED | OUTPATIENT
Start: 2020-08-13

## 2020-08-13 RX ORDER — SODIUM CHLORIDE 9 MG/ML
250 INJECTION, SOLUTION INTRAVENOUS ONCE
Status: CANCELLED | OUTPATIENT
Start: 2020-08-27

## 2020-08-13 RX ORDER — MEPERIDINE HYDROCHLORIDE 50 MG/ML
25 INJECTION INTRAMUSCULAR; INTRAVENOUS; SUBCUTANEOUS
Status: CANCELLED | OUTPATIENT
Start: 2020-08-13

## 2020-08-13 RX ORDER — ACETAMINOPHEN 325 MG/1
650 TABLET ORAL ONCE
Status: CANCELLED | OUTPATIENT
Start: 2020-08-20

## 2020-08-13 RX ORDER — SODIUM CHLORIDE 9 MG/ML
500 INJECTION, SOLUTION INTRAVENOUS ONCE
Status: CANCELLED | OUTPATIENT
Start: 2020-08-13

## 2020-08-13 RX ORDER — SODIUM CHLORIDE 9 MG/ML
500 INJECTION, SOLUTION INTRAVENOUS ONCE
Status: COMPLETED | OUTPATIENT
Start: 2020-08-13 | End: 2020-08-13

## 2020-08-13 RX ORDER — SODIUM CHLORIDE 9 MG/ML
250 INJECTION, SOLUTION INTRAVENOUS ONCE
Status: CANCELLED | OUTPATIENT
Start: 2020-08-13

## 2020-08-13 RX ORDER — FAMOTIDINE 10 MG/ML
20 INJECTION, SOLUTION INTRAVENOUS ONCE
Status: COMPLETED | OUTPATIENT
Start: 2020-08-13 | End: 2020-08-13

## 2020-08-13 RX ORDER — MEPERIDINE HYDROCHLORIDE 50 MG/ML
25 INJECTION INTRAMUSCULAR; INTRAVENOUS; SUBCUTANEOUS
Status: CANCELLED | OUTPATIENT
Start: 2020-08-27

## 2020-08-13 RX ADMIN — SODIUM CHLORIDE 250 ML: 9 INJECTION, SOLUTION INTRAVENOUS at 09:11

## 2020-08-13 RX ADMIN — DEXAMETHASONE SODIUM PHOSPHATE 20 MG: 10 INJECTION INTRAMUSCULAR; INTRAVENOUS at 09:12

## 2020-08-13 RX ADMIN — ACETAMINOPHEN 650 MG: 325 TABLET ORAL at 09:11

## 2020-08-13 RX ADMIN — SODIUM CHLORIDE 500 ML: 9 INJECTION, SOLUTION INTRAVENOUS at 09:46

## 2020-08-13 RX ADMIN — ONDANSETRON HYDROCHLORIDE 8 MG: 2 SOLUTION INTRAMUSCULAR; INTRAVENOUS at 10:37

## 2020-08-13 RX ADMIN — FAMOTIDINE 20 MG: 10 INJECTION INTRAVENOUS at 09:44

## 2020-08-13 RX ADMIN — DIPHENHYDRAMINE HYDROCHLORIDE 50 MG: 50 INJECTION, SOLUTION INTRAMUSCULAR; INTRAVENOUS at 09:28

## 2020-08-13 RX ADMIN — OBINUTUZUMAB 100 MG: 1000 INJECTION, SOLUTION, CONCENTRATE INTRAVENOUS at 10:24

## 2020-08-13 NOTE — NURSING NOTE
Chemo consent signed by pt's wife with MARIAH Cardenas. Written info/chemo folder given and pt/wife v/u.    1032: Pt feeling very nauseous after gazyva started.  Gazyva stopped. JOCELYNN Causey at bedside.    1035: Pt vomiting.  /61, HR 61    1037: Zofran 8mg IV given.    1042: /66, HR 63    1049: nausea improving but still present. No SOA or flushing noted with episode    1108: Nausea resolved. /71, HR 55. D/W MARIAH Causey and ok to resume gazyva.  Resumed at 25ml/hr for 4 hours.    1120: Pt tolerating restart well. No further nausea/vomiting at this time.  Resting comfortably. Wife remains at bedside. Will monitor. Notified JOCELYNN Causey.  Will add zofran 8mg IV to treatment plan for tomorrow. Notified Meghan CASTRO, RN to add to day 2 tomorrow.    1155: /73. Pt continues resting comfortably

## 2020-08-14 ENCOUNTER — MEDICATION THERAPY MANAGEMENT (OUTPATIENT)
Dept: ONCOLOGY | Facility: HOSPITAL | Age: 70
End: 2020-08-14

## 2020-08-14 ENCOUNTER — INFUSION (OUTPATIENT)
Dept: ONCOLOGY | Facility: HOSPITAL | Age: 70
End: 2020-08-14

## 2020-08-14 VITALS
HEART RATE: 57 BPM | TEMPERATURE: 97.5 F | OXYGEN SATURATION: 98 % | SYSTOLIC BLOOD PRESSURE: 116 MMHG | DIASTOLIC BLOOD PRESSURE: 63 MMHG | RESPIRATION RATE: 16 BRPM | BODY MASS INDEX: 21.33 KG/M2 | WEIGHT: 161.6 LBS

## 2020-08-14 DIAGNOSIS — C91.10 CLL (CHRONIC LYMPHOCYTIC LEUKEMIA) (HCC): Primary | ICD-10-CM

## 2020-08-14 LAB
ANION GAP SERPL CALCULATED.3IONS-SCNC: 10.9 MMOL/L (ref 5–15)
BASOPHILS # BLD AUTO: 0 10*3/MM3 (ref 0–0.2)
BASOPHILS NFR BLD AUTO: 0 % (ref 0–1.5)
BUN SERPL-MCNC: 27 MG/DL (ref 6–20)
BUN/CREAT SERPL: 26.5 (ref 7.3–30)
CALCIUM SPEC-SCNC: 9.1 MG/DL (ref 8.5–10.2)
CHLORIDE SERPL-SCNC: 101 MMOL/L (ref 98–107)
CO2 SERPL-SCNC: 20.1 MMOL/L (ref 22–29)
CREAT SERPL-MCNC: 1.02 MG/DL (ref 0.7–1.3)
DEPRECATED RDW RBC AUTO: 61.1 FL (ref 37–54)
EOSINOPHIL # BLD AUTO: 0 10*3/MM3 (ref 0–0.4)
EOSINOPHIL NFR BLD AUTO: 0 % (ref 0.3–6.2)
ERYTHROCYTE [DISTWIDTH] IN BLOOD BY AUTOMATED COUNT: 18.6 % (ref 12.3–15.4)
GFR SERPL CREATININE-BSD FRML MDRD: 72 ML/MIN/1.73
GLUCOSE SERPL-MCNC: 292 MG/DL (ref 74–124)
HCT VFR BLD AUTO: 29 % (ref 37.5–51)
HGB BLD-MCNC: 9.4 G/DL (ref 13–17.7)
IMM GRANULOCYTES # BLD AUTO: 0.07 10*3/MM3 (ref 0–0.05)
IMM GRANULOCYTES NFR BLD AUTO: 2.1 % (ref 0–0.5)
LDH SERPL-CCNC: 361 U/L (ref 99–259)
LYMPHOCYTES # BLD AUTO: 1.45 10*3/MM3 (ref 0.7–3.1)
LYMPHOCYTES NFR BLD AUTO: 43 % (ref 19.6–45.3)
MAGNESIUM SERPL-MCNC: 1.9 MG/DL (ref 1.8–2.5)
MCH RBC QN AUTO: 29.9 PG (ref 26.6–33)
MCHC RBC AUTO-ENTMCNC: 32.4 G/DL (ref 31.5–35.7)
MCV RBC AUTO: 92.4 FL (ref 79–97)
MONOCYTES # BLD AUTO: 0.05 10*3/MM3 (ref 0.1–0.9)
MONOCYTES NFR BLD AUTO: 1.5 % (ref 5–12)
NEUTROPHILS NFR BLD AUTO: 1.8 10*3/MM3 (ref 1.7–7)
NEUTROPHILS NFR BLD AUTO: 53.4 % (ref 42.7–76)
NRBC BLD AUTO-RTO: 2.4 /100 WBC (ref 0–0.2)
PHOSPHATE SERPL-MCNC: 3.7 MG/DL (ref 2.5–4.5)
PLATELET # BLD AUTO: 16 10*3/MM3 (ref 140–450)
PMV BLD AUTO: 9.6 FL (ref 6–12)
POTASSIUM SERPL-SCNC: 4.4 MMOL/L (ref 3.5–4.7)
RBC # BLD AUTO: 3.14 10*6/MM3 (ref 4.14–5.8)
SODIUM SERPL-SCNC: 132 MMOL/L (ref 134–145)
URATE SERPL-MCNC: 4.9 MG/DL (ref 2.8–7.4)
WBC # BLD AUTO: 3.37 10*3/MM3 (ref 3.4–10.8)

## 2020-08-14 PROCEDURE — 25010000002 DEXAMETHASONE SODIUM PHOSPHATE 100 MG/10ML SOLUTION 10 ML VIAL: Performed by: INTERNAL MEDICINE

## 2020-08-14 PROCEDURE — 85025 COMPLETE CBC W/AUTO DIFF WBC: CPT

## 2020-08-14 PROCEDURE — 25010000002 DIPHENHYDRAMINE PER 50 MG: Performed by: INTERNAL MEDICINE

## 2020-08-14 PROCEDURE — 25010000002 ONDANSETRON PER 1 MG: Performed by: INTERNAL MEDICINE

## 2020-08-14 PROCEDURE — 96375 TX/PRO/DX INJ NEW DRUG ADDON: CPT

## 2020-08-14 PROCEDURE — 96415 CHEMO IV INFUSION ADDL HR: CPT

## 2020-08-14 PROCEDURE — 96367 TX/PROPH/DG ADDL SEQ IV INF: CPT

## 2020-08-14 PROCEDURE — 25010000002 OBINUTUZUMAB 1000 MG/40ML SOLUTION 40 ML VIAL: Performed by: INTERNAL MEDICINE

## 2020-08-14 PROCEDURE — 80048 BASIC METABOLIC PNL TOTAL CA: CPT

## 2020-08-14 PROCEDURE — 84100 ASSAY OF PHOSPHORUS: CPT

## 2020-08-14 PROCEDURE — 83735 ASSAY OF MAGNESIUM: CPT

## 2020-08-14 PROCEDURE — 84550 ASSAY OF BLOOD/URIC ACID: CPT

## 2020-08-14 PROCEDURE — 96361 HYDRATE IV INFUSION ADD-ON: CPT

## 2020-08-14 PROCEDURE — 83615 LACTATE (LD) (LDH) ENZYME: CPT

## 2020-08-14 PROCEDURE — 96413 CHEMO IV INFUSION 1 HR: CPT

## 2020-08-14 RX ORDER — SODIUM CHLORIDE 9 MG/ML
250 INJECTION, SOLUTION INTRAVENOUS ONCE
Status: COMPLETED | OUTPATIENT
Start: 2020-08-14 | End: 2020-08-14

## 2020-08-14 RX ORDER — ACETAMINOPHEN 325 MG/1
650 TABLET ORAL ONCE
Status: COMPLETED | OUTPATIENT
Start: 2020-08-14 | End: 2020-08-14

## 2020-08-14 RX ORDER — SODIUM CHLORIDE 9 MG/ML
500 INJECTION, SOLUTION INTRAVENOUS ONCE
Status: COMPLETED | OUTPATIENT
Start: 2020-08-14 | End: 2020-08-14

## 2020-08-14 RX ORDER — FAMOTIDINE 10 MG/ML
20 INJECTION, SOLUTION INTRAVENOUS ONCE
Status: COMPLETED | OUTPATIENT
Start: 2020-08-14 | End: 2020-08-14

## 2020-08-14 RX ADMIN — FAMOTIDINE 20 MG: 10 INJECTION INTRAVENOUS at 09:45

## 2020-08-14 RX ADMIN — DEXAMETHASONE SODIUM PHOSPHATE 20 MG: 10 INJECTION, SOLUTION INTRAMUSCULAR; INTRAVENOUS at 09:45

## 2020-08-14 RX ADMIN — OBINUTUZUMAB 900 MG: 1000 INJECTION, SOLUTION, CONCENTRATE INTRAVENOUS at 10:40

## 2020-08-14 RX ADMIN — SODIUM CHLORIDE 250 ML: 9 INJECTION, SOLUTION INTRAVENOUS at 08:22

## 2020-08-14 RX ADMIN — ONDANSETRON 8 MG: 2 INJECTION, SOLUTION INTRAMUSCULAR; INTRAVENOUS at 10:02

## 2020-08-14 RX ADMIN — DIPHENHYDRAMINE HYDROCHLORIDE 50 MG: 50 INJECTION, SOLUTION INTRAMUSCULAR; INTRAVENOUS at 09:30

## 2020-08-14 RX ADMIN — ACETAMINOPHEN 650 MG: 325 TABLET ORAL at 09:44

## 2020-08-14 RX ADMIN — SODIUM CHLORIDE 500 ML: 9 INJECTION, SOLUTION INTRAVENOUS at 08:22

## 2020-08-14 NOTE — PROGRESS NOTES
[facility] lab review ( gazyva+leukeran)        8/13/2020  Day 1   WBC 3.40 - 10.80 10*3/mm3 20.38 (A)   Neutrophils Absolute 1.70 - 7.00 10*3/mm3 1.19 (A)   Hemoglobin 13.0 - 17.7 g/dL 10.7 (A)   Hematocrit 37.5 - 51.0 % 34.3 (A)   Platelets 140 - 450 10*3/mm3 25 (A)   Creatinine 0.70 - 1.30 mg/dL 0.97   eGFR Non African Am >60 mL/min/1.73 77   BUN 6 - 20 mg/dL 23 (A)   Sodium 134 - 145 mmol/L 137   Potassium 3.5 - 4.7 mmol/L 4.0   Glucose 74 - 124 mg/dL 206 (A)   Magnesium 1.8 - 2.5 mg/dL 1.9   Calcium 8.5 - 10.2 mg/dL 9.7   Albumin 3.50 - 5.20 g/dL 4.10   Total Protein 6.3 - 8.0 g/dL 6.0 (A)   AST (SGOT) 0 - 40 U/L 17   ALT (SGPT) 0 - 41 U/L 9   Alkaline Phosphatase 38 - 116 U/L 86   Total Bilirubin 0.2 - 1.2 mg/dL 1.2           8/14/2020  Day 2   WBC 3.40 - 10.80 10*3/mm3 3.37 (A)   Neutrophils Absolute 1.70 - 7.00 10*3/mm3 1.80   Hemoglobin 13.0 - 17.7 g/dL 9.4 (A)   Hematocrit 37.5 - 51.0 % 29.0 (A)   Platelets 140 - 450 10*3/mm3 16 (A)   Creatinine 0.70 - 1.30 mg/dL 1.02   eGFR Non African Am >60 mL/min/1.73 72   BUN 6 - 20 mg/dL 27 (A)   Sodium 134 - 145 mmol/L 132 (A)   Potassium 3.5 - 4.7 mmol/L 4.4   Glucose 74 - 124 mg/dL 292 (A)   Magnesium 1.8 - 2.5 mg/dL 1.9   Calcium 8.5 - 10.2 mg/dL 9.1     Pharmacy will continue to follow.

## 2020-08-17 ENCOUNTER — INFUSION (OUTPATIENT)
Dept: ONCOLOGY | Facility: HOSPITAL | Age: 70
End: 2020-08-17

## 2020-08-17 VITALS
SYSTOLIC BLOOD PRESSURE: 105 MMHG | HEART RATE: 63 BPM | DIASTOLIC BLOOD PRESSURE: 66 MMHG | TEMPERATURE: 98.2 F | WEIGHT: 161.6 LBS | OXYGEN SATURATION: 99 % | BODY MASS INDEX: 21.33 KG/M2 | RESPIRATION RATE: 16 BRPM

## 2020-08-17 DIAGNOSIS — C91.10 CLL (CHRONIC LYMPHOCYTIC LEUKEMIA) (HCC): ICD-10-CM

## 2020-08-17 LAB
ALBUMIN SERPL-MCNC: 3.4 G/DL (ref 3.5–5.2)
ALBUMIN/GLOB SERPL: 1.6 G/DL (ref 1.1–2.4)
ALP SERPL-CCNC: 68 U/L (ref 38–116)
ALT SERPL W P-5'-P-CCNC: 20 U/L (ref 0–41)
ANION GAP SERPL CALCULATED.3IONS-SCNC: 9.8 MMOL/L (ref 5–15)
AST SERPL-CCNC: 26 U/L (ref 0–40)
BASOPHILS # BLD AUTO: 0.01 10*3/MM3 (ref 0–0.2)
BASOPHILS NFR BLD AUTO: 0.6 % (ref 0–1.5)
BILIRUB SERPL-MCNC: 1.3 MG/DL (ref 0.2–1.2)
BUN SERPL-MCNC: 24 MG/DL (ref 6–20)
BUN/CREAT SERPL: 25.3 (ref 7.3–30)
CALCIUM SPEC-SCNC: 8.7 MG/DL (ref 8.5–10.2)
CHLORIDE SERPL-SCNC: 99 MMOL/L (ref 98–107)
CO2 SERPL-SCNC: 23.2 MMOL/L (ref 22–29)
CREAT SERPL-MCNC: 0.95 MG/DL (ref 0.7–1.3)
DEPRECATED RDW RBC AUTO: 59.7 FL (ref 37–54)
EOSINOPHIL # BLD AUTO: 0 10*3/MM3 (ref 0–0.4)
EOSINOPHIL NFR BLD AUTO: 0 % (ref 0.3–6.2)
ERYTHROCYTE [DISTWIDTH] IN BLOOD BY AUTOMATED COUNT: 18.2 % (ref 12.3–15.4)
GFR SERPL CREATININE-BSD FRML MDRD: 78 ML/MIN/1.73
GLOBULIN UR ELPH-MCNC: 2.1 GM/DL (ref 1.8–3.5)
GLUCOSE SERPL-MCNC: 135 MG/DL (ref 74–124)
HCT VFR BLD AUTO: 29.3 % (ref 37.5–51)
HGB BLD-MCNC: 9.4 G/DL (ref 13–17.7)
IMM GRANULOCYTES # BLD AUTO: 0.14 10*3/MM3 (ref 0–0.05)
IMM GRANULOCYTES NFR BLD AUTO: 9.1 % (ref 0–0.5)
LDH SERPL-CCNC: 250 U/L (ref 99–259)
LYMPHOCYTES # BLD AUTO: 0.89 10*3/MM3 (ref 0.7–3.1)
LYMPHOCYTES NFR BLD AUTO: 57.8 % (ref 19.6–45.3)
MCH RBC QN AUTO: 29.4 PG (ref 26.6–33)
MCHC RBC AUTO-ENTMCNC: 32.1 G/DL (ref 31.5–35.7)
MCV RBC AUTO: 91.6 FL (ref 79–97)
MONOCYTES # BLD AUTO: 0.09 10*3/MM3 (ref 0.1–0.9)
MONOCYTES NFR BLD AUTO: 5.8 % (ref 5–12)
NEUTROPHILS NFR BLD AUTO: 0.41 10*3/MM3 (ref 1.7–7)
NEUTROPHILS NFR BLD AUTO: 26.7 % (ref 42.7–76)
NRBC BLD AUTO-RTO: 3.2 /100 WBC (ref 0–0.2)
PLATELET # BLD AUTO: 13 10*3/MM3 (ref 140–450)
PMV BLD AUTO: 9.7 FL (ref 6–12)
POTASSIUM SERPL-SCNC: 4 MMOL/L (ref 3.5–4.7)
PROT SERPL-MCNC: 5.5 G/DL (ref 6.3–8)
RBC # BLD AUTO: 3.2 10*6/MM3 (ref 4.14–5.8)
SODIUM SERPL-SCNC: 132 MMOL/L (ref 134–145)
URATE SERPL-MCNC: 4.1 MG/DL (ref 2.8–7.4)
WBC # BLD AUTO: 1.54 10*3/MM3 (ref 3.4–10.8)

## 2020-08-17 PROCEDURE — 83615 LACTATE (LD) (LDH) ENZYME: CPT

## 2020-08-17 PROCEDURE — 25010000003 HEPARIN LOCK FLUSH PER 10 UNITS: Performed by: INTERNAL MEDICINE

## 2020-08-17 PROCEDURE — 84550 ASSAY OF BLOOD/URIC ACID: CPT

## 2020-08-17 PROCEDURE — 96523 IRRIG DRUG DELIVERY DEVICE: CPT

## 2020-08-17 PROCEDURE — G0463 HOSPITAL OUTPT CLINIC VISIT: HCPCS

## 2020-08-17 PROCEDURE — 85025 COMPLETE CBC W/AUTO DIFF WBC: CPT

## 2020-08-17 PROCEDURE — 80053 COMPREHEN METABOLIC PANEL: CPT

## 2020-08-17 RX ORDER — LEVOFLOXACIN 500 MG/1
500 TABLET, FILM COATED ORAL DAILY
Qty: 7 TABLET | Refills: 0 | Status: SHIPPED | OUTPATIENT
Start: 2020-08-17 | End: 2020-08-21 | Stop reason: SDUPTHER

## 2020-08-17 RX ORDER — HEPARIN SODIUM (PORCINE) LOCK FLUSH IV SOLN 100 UNIT/ML 100 UNIT/ML
500 SOLUTION INTRAVENOUS ONCE
Status: COMPLETED | OUTPATIENT
Start: 2020-08-17 | End: 2020-08-17

## 2020-08-17 RX ADMIN — SODIUM CHLORIDE, PRESERVATIVE FREE 500 UNITS: 5 INJECTION INTRAVENOUS at 14:55

## 2020-08-17 NOTE — NURSING NOTE
Labs all reviewed with Meghan CABEZAS. No need for IVF at this time, however does need script for levaquin called in for a week due to neutropenia. escribed to pharmacy. Pt was instructed to call office with any fever or bleeding. Pt v/u

## 2020-08-19 ENCOUNTER — MEDICATION THERAPY MANAGEMENT (OUTPATIENT)
Dept: ONCOLOGY | Facility: HOSPITAL | Age: 70
End: 2020-08-19

## 2020-08-19 NOTE — PROGRESS NOTES
MTM telephone encounter re adherence and side effects ( Gazyva+Leukeran)      Mr Stephenson is not home this am per wife; he is at work.  She stated that she manages his medications and she voiced understanding of how to dose Leukeran on days 1 and 15- no questions in that regard.  She reported that her  had a history of cluster headaches and that since beginning infusions those headaches have returned.  We made plans for an in person visit during infusion tomorrow.  His medication list has been reviewed for DDI- none noted requiring change of therapy.  His medications have also been evaluated for HA risk.  18% of patients receiving Gazyva report HA as a side effect.  Other possible culprits include acyclovir=2%, Levaquin=6%, Ondansetron=9-27%, and Sotalol=12%.  We will discuss this tomorrow.  His wife stated he had a neurologist and he is considering an appointment.  SHe voiced concerns of DDI.  An offer was made to check any new medications for DDI with his current meds, should neurology add medications for HA.  She was also counseled that APAP as a beginning strategy for management was acceptable.

## 2020-08-20 ENCOUNTER — INFUSION (OUTPATIENT)
Dept: ONCOLOGY | Facility: HOSPITAL | Age: 70
End: 2020-08-20

## 2020-08-20 ENCOUNTER — TELEPHONE (OUTPATIENT)
Dept: ONCOLOGY | Facility: CLINIC | Age: 70
End: 2020-08-20

## 2020-08-20 ENCOUNTER — OFFICE VISIT (OUTPATIENT)
Dept: ONCOLOGY | Facility: CLINIC | Age: 70
End: 2020-08-20

## 2020-08-20 ENCOUNTER — HOSPITAL ENCOUNTER (OUTPATIENT)
Dept: CT IMAGING | Facility: HOSPITAL | Age: 70
Discharge: HOME | End: 2020-08-20
Admitting: INTERNAL MEDICINE

## 2020-08-20 ENCOUNTER — MEDICATION THERAPY MANAGEMENT (OUTPATIENT)
Dept: ONCOLOGY | Facility: HOSPITAL | Age: 70
End: 2020-08-20

## 2020-08-20 VITALS
OXYGEN SATURATION: 100 % | HEIGHT: 73 IN | HEART RATE: 68 BPM | DIASTOLIC BLOOD PRESSURE: 63 MMHG | BODY MASS INDEX: 20.79 KG/M2 | TEMPERATURE: 97.7 F | RESPIRATION RATE: 16 BRPM | WEIGHT: 156.9 LBS | SYSTOLIC BLOOD PRESSURE: 112 MMHG

## 2020-08-20 DIAGNOSIS — C91.10 CLL (CHRONIC LYMPHOCYTIC LEUKEMIA) (HCC): ICD-10-CM

## 2020-08-20 DIAGNOSIS — I48.20 CHRONIC ATRIAL FIBRILLATION (HCC): ICD-10-CM

## 2020-08-20 DIAGNOSIS — C91.10 CLL (CHRONIC LYMPHOCYTIC LEUKEMIA) (HCC): Primary | ICD-10-CM

## 2020-08-20 DIAGNOSIS — E86.0 DEHYDRATION: ICD-10-CM

## 2020-08-20 DIAGNOSIS — D64.9 ANEMIA, UNSPECIFIED TYPE: ICD-10-CM

## 2020-08-20 DIAGNOSIS — D69.6 THROMBOCYTOPENIA (HCC): ICD-10-CM

## 2020-08-20 LAB
ALBUMIN SERPL-MCNC: 3.5 G/DL (ref 3.5–5.2)
ALBUMIN/GLOB SERPL: 1.7 G/DL (ref 1.1–2.4)
ALP SERPL-CCNC: 90 U/L (ref 38–116)
ALT SERPL W P-5'-P-CCNC: 20 U/L (ref 0–41)
ANION GAP SERPL CALCULATED.3IONS-SCNC: 10.5 MMOL/L (ref 5–15)
AST SERPL-CCNC: 21 U/L (ref 0–40)
BASOPHILS # BLD AUTO: 0.01 10*3/MM3 (ref 0–0.2)
BASOPHILS NFR BLD AUTO: 0.7 % (ref 0–1.5)
BILIRUB SERPL-MCNC: 1.1 MG/DL (ref 0.2–1.2)
BUN SERPL-MCNC: 20 MG/DL (ref 6–20)
BUN/CREAT SERPL: 20.8 (ref 7.3–30)
CALCIUM SPEC-SCNC: 8.6 MG/DL (ref 8.5–10.2)
CHLORIDE SERPL-SCNC: 101 MMOL/L (ref 98–107)
CO2 SERPL-SCNC: 21.5 MMOL/L (ref 22–29)
CREAT SERPL-MCNC: 0.96 MG/DL (ref 0.7–1.3)
DEPRECATED RDW RBC AUTO: 60.1 FL (ref 37–54)
EOSINOPHIL # BLD AUTO: 0.01 10*3/MM3 (ref 0–0.4)
EOSINOPHIL NFR BLD AUTO: 0.7 % (ref 0.3–6.2)
ERYTHROCYTE [DISTWIDTH] IN BLOOD BY AUTOMATED COUNT: 18.6 % (ref 12.3–15.4)
GFR SERPL CREATININE-BSD FRML MDRD: 77 ML/MIN/1.73
GLOBULIN UR ELPH-MCNC: 2.1 GM/DL (ref 1.8–3.5)
GLUCOSE SERPL-MCNC: 241 MG/DL (ref 74–124)
HCT VFR BLD AUTO: 28.2 % (ref 37.5–51)
HGB BLD-MCNC: 9.1 G/DL (ref 13–17.7)
IMM GRANULOCYTES # BLD AUTO: 0.26 10*3/MM3 (ref 0–0.05)
IMM GRANULOCYTES NFR BLD AUTO: 17.9 % (ref 0–0.5)
LDH SERPL-CCNC: 228 U/L (ref 99–259)
LYMPHOCYTES # BLD AUTO: 0.6 10*3/MM3 (ref 0.7–3.1)
LYMPHOCYTES NFR BLD AUTO: 41.4 % (ref 19.6–45.3)
MAGNESIUM SERPL-MCNC: 2 MG/DL (ref 1.8–2.5)
MCH RBC QN AUTO: 29.1 PG (ref 26.6–33)
MCHC RBC AUTO-ENTMCNC: 32.3 G/DL (ref 31.5–35.7)
MCV RBC AUTO: 90.1 FL (ref 79–97)
MONOCYTES # BLD AUTO: 0.1 10*3/MM3 (ref 0.1–0.9)
MONOCYTES NFR BLD AUTO: 6.9 % (ref 5–12)
NEUTROPHILS NFR BLD AUTO: 0.47 10*3/MM3 (ref 1.7–7)
NEUTROPHILS NFR BLD AUTO: 32.4 % (ref 42.7–76)
NRBC BLD AUTO-RTO: 3.4 /100 WBC (ref 0–0.2)
PHOSPHATE SERPL-MCNC: 2.9 MG/DL (ref 2.5–4.5)
PLATELET # BLD AUTO: 20 10*3/MM3 (ref 140–450)
PMV BLD AUTO: 10.8 FL (ref 6–12)
POTASSIUM SERPL-SCNC: 4.5 MMOL/L (ref 3.5–4.7)
PROT SERPL-MCNC: 5.6 G/DL (ref 6.3–8)
RBC # BLD AUTO: 3.13 10*6/MM3 (ref 4.14–5.8)
SODIUM SERPL-SCNC: 133 MMOL/L (ref 134–145)
URATE SERPL-MCNC: 3.6 MG/DL (ref 2.8–7.4)
WBC # BLD AUTO: 1.45 10*3/MM3 (ref 3.4–10.8)

## 2020-08-20 PROCEDURE — 84100 ASSAY OF PHOSPHORUS: CPT

## 2020-08-20 PROCEDURE — 96375 TX/PRO/DX INJ NEW DRUG ADDON: CPT

## 2020-08-20 PROCEDURE — 25010000002 OBINUTUZUMAB 1000 MG/40ML SOLUTION 40 ML VIAL: Performed by: INTERNAL MEDICINE

## 2020-08-20 PROCEDURE — 83615 LACTATE (LD) (LDH) ENZYME: CPT

## 2020-08-20 PROCEDURE — 96415 CHEMO IV INFUSION ADDL HR: CPT

## 2020-08-20 PROCEDURE — 96413 CHEMO IV INFUSION 1 HR: CPT

## 2020-08-20 PROCEDURE — 80053 COMPREHEN METABOLIC PANEL: CPT | Performed by: INTERNAL MEDICINE

## 2020-08-20 PROCEDURE — 84550 ASSAY OF BLOOD/URIC ACID: CPT

## 2020-08-20 PROCEDURE — 85025 COMPLETE CBC W/AUTO DIFF WBC: CPT

## 2020-08-20 PROCEDURE — 70450 CT HEAD/BRAIN W/O DYE: CPT

## 2020-08-20 PROCEDURE — 83735 ASSAY OF MAGNESIUM: CPT

## 2020-08-20 PROCEDURE — 25010000002 ONDANSETRON PER 1 MG: Performed by: INTERNAL MEDICINE

## 2020-08-20 PROCEDURE — 99215 OFFICE O/P EST HI 40 MIN: CPT | Performed by: INTERNAL MEDICINE

## 2020-08-20 RX ORDER — ACETAMINOPHEN 325 MG/1
650 TABLET ORAL ONCE
Status: COMPLETED | OUTPATIENT
Start: 2020-08-20 | End: 2020-08-20

## 2020-08-20 RX ORDER — SODIUM CHLORIDE 9 MG/ML
250 INJECTION, SOLUTION INTRAVENOUS ONCE
Status: COMPLETED | OUTPATIENT
Start: 2020-08-20 | End: 2020-08-20

## 2020-08-20 RX ORDER — PREDNISONE 10 MG/1
10 TABLET ORAL DAILY
Qty: 15 TABLET | Refills: 1 | Status: SHIPPED | OUTPATIENT
Start: 2020-08-20 | End: 2021-01-04

## 2020-08-20 RX ORDER — SODIUM CHLORIDE 9 MG/ML
250 INJECTION, SOLUTION INTRAVENOUS AS NEEDED
Status: CANCELLED | OUTPATIENT
Start: 2020-08-20

## 2020-08-20 RX ORDER — SODIUM CHLORIDE 9 MG/ML
500 INJECTION, SOLUTION INTRAVENOUS ONCE
Status: COMPLETED | OUTPATIENT
Start: 2020-08-20 | End: 2020-08-20

## 2020-08-20 RX ADMIN — OBINUTUZUMAB 1000 MG: 1000 INJECTION, SOLUTION, CONCENTRATE INTRAVENOUS at 11:18

## 2020-08-20 RX ADMIN — SODIUM CHLORIDE 500 ML: 9 INJECTION, SOLUTION INTRAVENOUS at 09:49

## 2020-08-20 RX ADMIN — ONDANSETRON 8 MG: 2 INJECTION, SOLUTION INTRAMUSCULAR; INTRAVENOUS at 10:13

## 2020-08-20 RX ADMIN — ACETAMINOPHEN 650 MG: 325 TABLET ORAL at 09:49

## 2020-08-20 RX ADMIN — SODIUM CHLORIDE 250 ML: 9 INJECTION, SOLUTION INTRAVENOUS at 10:29

## 2020-08-20 NOTE — PROGRESS NOTES
Subjective       REASON FOR VISIT:    1. Chronic lymphoid leukemia, stage 4.     2.  History of angiodysplasia requiring cauterization and history of Hess's esophagus    3.  Bone marrow aspiration biopsy shows hypercellular marrow with 98% involvement with CLL    Patient ID: Macho Stephenson is a 69 y.o. year old male     History of Present Illness patient is a 69-year-old male with the above-mentioned history who is here today to review scans and bone marrow biopsy report.  He reports that he has been doing well.  He denies fevers, chills, night sweats.  He denies any palpable adenopathy.  He reports that overall he is feeling fairly well, and his energy level is well.    Denies bleeding issues.  He has stopped his aspirin and anticoagulation due to thrombocytopenia.    Interval history: I reviewed the bone marrow in length with patient it is involved with 98% CLL cells.  Patient needs to start treatment and will plan to start chlorambucil/Gazyva.  He had port placement and needs  chemo education  Patient is here to start day 8 Gazyva today.  His platelets are down to 20.  He has severe headache as a result we obtained a stat CT of the head which is negative for bleeding.  He has cluster headaches for which he used to follow-up with Dr. Len Bobby.    PAST MEDICAL HISTORY:   1. Recurrent atrial fibrillation followed by cardiology. He has had drug eluting stent placed x 3.   2. High cholesterol.   3. Hypertension.       HEMATOLOGIC/ONCOLOGIC HISTORY:       The patient is 62-year-old gentleman with the above history currently here for followup. He has no complaints. He denies fever, night sweats or weight loss. He does not have any lymphadenopathy. He feels good. He had some fatigue but his CBC shows a go od hemoglobin.   The patient is followed by Dr. Kevin Chandra. He had seen Dr. Chandra 7/18/13 for a history of coronary artery disease status drug eluting stent placement to the right coronary artery and left  anterior descending artery in February 2011. Histor y of paroxysmal atrial fibrillation and hyperlipidemia. He presented to [facility] on [facility]/23/13 with palpitations and was found to have atrial fibrillation with rapid ventricular response. He was given IV Cardizem and converted spontaneously to normal sinus rhythm. He was found to have elevated white count at 18.9 and denied any symptoms of infection or urinary complaints. TSH was normal, troponin levels were negative. He was asked to continue aspirin and metoprolol for that. If he contin u ed to have atrial fibrillation, they will consider antiarrhythmic therapy with or without a short term anticoagulation therapy. However, currently the patient is feeling reasonably good. He has no complaints. His CBC 7/22/13 showed WBC 17,000, hemoglob i n 16.4 and platelet count of 174,000. Back 9/28/13 his hemoglobin was 15.1, WBC 13.3 and platelets 197,000. He has had a persistently elevated WBC but he is totally asymptomatic. He does not have any fever, night sweats or lymphadenopathy. He is here to be evaluated for his elevated white count.       Peripheral blood flow cytometry done 08/16/13 shows 22% chronic lymphoid leukemia cells, and they expressed ZAP-70 but not CD38. The total number of cells approximated 60% T cells, 32% B cells and 1% natural k iller cells. The B cells were monoclonal and expressed CD19, CD20, CD22, CD5, CD23, CD11c, ZAP-70 and T cell antigens. There was a normal CD4/CD8 ratio. CT of the chest, abdomen and pelvis does not show evidence of metastasis. Peripheral blood for DILLON -2 was negative. It was negative for T11:14 translocation.       CT scan done on 08/21/2013 shows 5 to 6 mm noncalcified nodule in the left lower lobe which is unchanged from December 2010. Otherwise no evidence of any lymphadenopathy or hepatosplenomegaly.       MRI of the spine shows arthritis and disc bulge and likely hemangiomas, with 1 lesion showing increased  signal intensity at T2, which is likely a hemangioma. Bone scan could be done, but patient does not even have much pain there. CT scan of the chest, a bdomen and pelvis was done on 11/23/2014. He has tiny lymph nodes in the neck which are difficult to palpate. Right jugular one is 1.4 x 0.9 and left supraclavicular one is 1.5 x 1.1. He also has a subcarinal lymph node which is 1.7 x 1.2 cm, and he ha s a portocaval lymph node which has increased from 2.5 to 2.8. Patient is totally asymptomatic. He does not have any B symptoms, so will continue followup with observation.           Patient is a 69-year-old gentleman with history of chronic lymphocytic leukemia/SLL who recently got admitted to [facility] because of GI bleed.  He was seen by Dr. Montero.  He underwent EGD colonoscopy.  He was found to have angiodysplasia for which he underwent argon coagulation.  He required 3 units of blood.  Patient had a normal esophagus normal stomach and normal duodenum CLOtest was negative he was asked to take Protonix 40 mg daily.  Colonoscopy showed blood in the entire examined colon but there was a single bleeding colonic angiectasia which was treated with argon plasma coagulation.    He was admitted twice.  Initially he was admitted on July 10 at which time he stayed 3 days and he was evaluated for chest pain.  He underwent a cardiac work-up with stress test and echo.  The echo was normal but the stress test showed medium sized moderate severe severity fixed perfusion defect.  Of the inferior wall consistent with infarction.  However according to patient cardiology did not think he had any cardiac problems.  I have left a message for patient's cardiologist to call me today.  Patient subsequently got readmitted with GI bleed and required 1/3 unit of transfusion.  He was found to have thrombocytopenia and hematology was consulted.    His hemoglobin had dropped down to as low as 7 and his platelets had gone down to 87.   Today it is 35 and his hemoglobin is 9.4 today.  He denies active bleeding.  He has lost weight recently.  He does not have any fever or night sweats.    He had ultrasound of spleen which showed enlarged spleen centimeters in length            MEDICATIONS: The current medication list was reviewed with the patient and updated in the EMR this date per the medical assistant. Medication dosages and frequencies were confirmed to be accurate.       ALLERGIES: PENICILLIN which causes him to swell up. He is allergic to IV CONTRAST DYE.     SOCIAL HISTORY: He is . He smokes one pack per day for 35 years. He consumes three to four alcoholic drinks per week. He has no tattoos and no previous transfusions.       FAMILY HISTORY: Father  at 82 with MI. Mother  at 82 with bone cancer. He has one brother age 65 in good health and two sisters 69 and 57 in good health.   Past Medical History, Social History, and Family History are unchanged from my prior documentation on     Review of Systems   Constitutional: Positive for fatigue (20-New). Negative for activity change, appetite change, chills, diaphoresis, fever and unexpected weight change.   HENT: Negative for hearing loss, mouth sores, nosebleeds, sore throat and trouble swallowing.    Respiratory: Negative for cough, chest tightness, shortness of breath and wheezing.    Cardiovascular: Negative for chest pain, palpitations and leg swelling.   Gastrointestinal: Positive for nausea (20-New). Negative for abdominal distention, abdominal pain, constipation, diarrhea and vomiting.   Genitourinary: Negative for difficulty urinating, dysuria, frequency, hematuria and urgency.   Musculoskeletal: Negative for joint swelling.        No muscle weakness.   Skin: Negative for rash and wound.   Neurological: Positive for headaches (20-New). Negative for dizziness, seizures, syncope, speech difficulty, weakness and numbness.   Hematological:  Negative for adenopathy. Does not bruise/bleed easily.   Psychiatric/Behavioral: Negative for behavioral problems, confusion, sleep disturbance and suicidal ideas.     I have reviewed and confirmed the accuracy of the ROS as documented by the MA/LPN/RN Susannah Moya MD            Objective      Chief Complaint   Patient presents with   • Follow-up        Patient Active Problem List   Diagnosis   • CLL (chronic lymphocytic leukemia) (CMS/HCC)   • Anemia   • Encounter for hepatitis C screening test for low risk patient    • Thrombocytopenia (CMS/HCC)   • H/O heart artery stent   • Stented coronary artery   • Arteriosclerosis of coronary artery   • Atrial fibrillation (CMS/HCC)   • Paroxysmal atrial fibrillation (CMS/HCC)   • Chest pain, rule out acute myocardial infarction   • Fall   • Fracture, olecranon   • Hyperlipidemia   • Long term (current) use of anticoagulants   • Lower GI bleed   • Olecranon bursitis   • Shoulder pain   • Smoker   • CLL (chronic lymphocytic leukemia) (CMS/HCC)   • Dehydration         MEDICATIONS:  Medication Reconciliation for the patient has been reviewed by me and documented in the patients chart.    ALLERGIES:    Allergies   Allergen Reactions   • Contrast Dye Swelling   • Penicillins Swelling         Vitals:    08/20/20 0750   BP: 112/63   Pulse: 68   Resp: 16   Temp: 97.7 °F (36.5 °C)   SpO2: 100%        Current Status 8/20/2020   ECOG score 0        Physical Exam   Constitutional: He is oriented to person, place, and time. He appears well-developed and well-nourished. No distress.   HENT:   Head: Normocephalic and atraumatic.   Mouth/Throat: Mucous membranes are normal. Normal dentition.   Eyes: Pupils are equal, round, and reactive to light. EOM and lids are normal.   Neck: Trachea normal and normal range of motion. Neck supple. No tracheal deviation present. No thyroid mass and no thyromegaly present.   Cardiovascular: Normal rate and regular rhythm. Exam reveals no gallop and no  friction rub.   No murmur heard.  Pulmonary/Chest: Effort normal and breath sounds normal. No respiratory distress.   Abdominal: Soft. Normal appearance and bowel sounds are normal. He exhibits no distension and no mass. There is no tenderness. There is no rebound and no guarding. No hernia.   Musculoskeletal: Normal range of motion. He exhibits no edema.    Normal gait and station.  FROMx4.  No digital cyanosis.   Lymphadenopathy:     He has no cervical adenopathy.        Right: No inguinal adenopathy present.        Left: No inguinal adenopathy present.   Neurological: He is alert and oriented to person, place, and time.   Skin: Skin is warm, dry and intact. No lesion and no rash noted.   Psychiatric: He has a normal mood and affect. Thought content normal. Cognition and memory are normal.   Nursing note and vitals reviewed.       RECENT LABS:  Hematology WBC   Date Value Ref Range Status   08/20/2020 1.45 (L) 3.40 - 10.80 10*3/mm3 Final   07/15/2020 13.77 (H) 4.5 - 11.0 10*3/uL Final     RBC   Date Value Ref Range Status   08/20/2020 3.13 (L) 4.14 - 5.80 10*6/mm3 Final   07/15/2020 2.96 (L) 4.5 - 5.9 10*6/uL Final     Hemoglobin   Date Value Ref Range Status   08/20/2020 9.1 (L) 13.0 - 17.7 g/dL Final   07/15/2020 8.5 (L) 13.5 - 17.5 g/dL Final     Hematocrit   Date Value Ref Range Status   08/20/2020 28.2 (L) 37.5 - 51.0 % Final   07/15/2020 26.4 (L) 41.0 - 53.0 % Final     Platelets   Date Value Ref Range Status   08/20/2020 20 (L) 140 - 450 10*3/mm3 Final   07/15/2020 27 (L) 140 - 440 10*3/uL Final     Comment:     CHECKED        XR CHEST POST CVA PORT-07/31/20    IMPRESSION:  Chest port placement. No pneumothorax.       CT NECK, CHEST, ABDOMEN, AND PELVIS WITHOUT IV CONTRAST. 7/20/2020   HISTORY: 69-year-old male with CLL/SLL.     TECHNIQUE: Radiation dose reduction techniques were utilized, including  automated exposure control and exposure modulation based on body size. 3  mm images were obtained through  the neck, chest, abdomen, and pelvis  without the administration of IV contrast. Compared with CT of the  abdomen and pelvis from 08/05/2019 and previous CTs from 03/19/2018.     FINDINGS:  NECK: There is no significant change in the 1.4 x 1.0 cm left  supraclavicular node or in the smaller supraclavicular nodes  bilaterally. A reference 1.4 x 0.7 cm right jugular chain node is  stable. There are shotty nodes scattered throughout the neck. No new  lymphadenopathy is seen. Noncontrasted parotid, submandibular, and  thyroid glands appear unremarkable.     CHEST: There has been interval decrease in the size of the shotty and  borderline enlarged nodes at the axilla. Reference 1.6 x 0.7 cm right  axillary node previously measured 2.0 x 1.2 cm. There has been a  decrease in the size of all of the shotty mediastinal and hilar nodes.  There are no new pulmonary opacities and there are no pleural or  pericardial effusions.     ABDOMEN/PELVIS: Splenic size has slightly increased measuring 15.4 cm in  diameter and previously measuring 13.5 cm, both measured on the coronal  reconstruction series. There is no change in the shotty nodes at the  gastrohepatic ligament and shorty hepatis. There are no pathologically  enlarged nodes. There is no acute abnormality involving the  noncontrasted liver. A single gallstone is noted within the gallbladder.  Noncontrasted pancreas, adrenals, and kidneys appear unremarkable. There  is no acute bowel abnormality. There is extensive sigmoid  diverticulosis. No free fluid. There are extensive abdominal aortic  atherosclerotic changes and there is a stable 3.7 cm infrarenal  abdominal aortic aneurysm.     IMPRESSION:  1. There has been slight interval increase in splenomegaly since the CT  of the abdomen from 08/05/2019.  2. Interval decrease in the size of the axillary nodes and the shotty  mediastinal nodes. There is no change in the shotty nodes within the  neck and supraclavicular regions.  There is no new lymphadenopathy.  3. Stable 3.7 cm infrarenal abdominal aortic aneurysm.      Assessment/Plan   1.This is a patient with a history of stage 2 CLL without evidence of any disease progression.  I have reviewed the CT scan from 3/19/2018 there is minimal progression but clinically patient is asymptomatic and we will follow with observation.  Will monitor with labs.   No evidence of any frequent infections, no major worsening of his white count. He has no fever or night sweats or any other symptoms.   · Patient knows that he is prone for infections and he is mainly staying at home during the COVID-19 situation time  · Recent admission to [facility] July 10 2020×2.  Initially admitted with chest pain and underwent stress test and echo.  Echo was negative.  Stress test is abnormal but have left message for patient's cardiologist to call us.  His cardiologist is been sent [facility].  · He then got admitted the second time with worsening GI bleed and required total of 3 units of blood.  EGD was negative but colonoscopy showed angiectasia for which he underwent argon ablation.  Currently hemoglobin stable and no active bleeding.  · He was also found to have low platelets with platelets dropping down to 27k and hemoglobin was 7.  Ultrasound showed splenomegaly 15 cm.  Concern is whether he has progressed from his CLL point of view and requires treatment.  · CT scan performed 7/20/2020 did show the increased splenomegaly, but interval decrease in size of the axillary nodes, and shotty mediastinal nodes.  No change in the shotty nodes within the neck and supraclavicular regions.  No new lymphadenopathy.  Bone marrow biopsy however showed preliminarily that there is bone marrow involvement.  Remainder of bone marrow biopsy report is pending.  · Scans were reviewed with the patient today.  Dr. Moya also discussed with the patient and recommended he initiate treatment with chlorambucil and Gazyva.  He would  "not be a good candidate for Imbruvica due to his history of A. fib\".  He cannot take anticoagulation at this time.  The patient was provided with chemotherapy education today, including  Handouts.  He will need a port placed so that we can initiate treatment  · August. 13 2020: Gazyva day 1, August 14, 1980  · August 20 cycle 1 day 8 Gazyva.  He is going to receive chlorambucil along with this.  He still take 8 mg p.o. day 1 day 15.  We can increase the dose once we know he tolerates and his platelets improved  · Will need close monitoring of his CBC    2.  Worsening thrombocytopenia, looking back his platelets were always in the 150s and during this admission he dropped down to 27K.  His LDH also was elevated and he was anemic.  · Repeat labs 7/24/2020 show a WBC 23.2, ANC 1.45, Hgb 8.7, platelet count 37,000.  He denies bleeding, and is feeling well.  We will continue to closely monitor.    3.  History of angiodysplasia requiring cauterization and Hess's esophagus mild anemia- He is currently asymptomatic status post recent cauterization    4.  History of cluster headaches for which she has to be treated with steroids in the past and has followed up with Dr. Len Walker.today with significant headaches       Plan   1.  CT head ordered stat and reviewed results with the radiologist it is negative for bleed  2.  We will start prednisone 10 mg daily which hopefully will help his cluster headaches  3.   allopurinol 100 mg daily  4.  Cycle 1 day 8 Gazyva  5.  Follow-up with me in 1 week for Gazyva day 15 cycle 1  6.  Will transfuse 1 unit of platelet transfusion  7.  We will give IV fluids tomorrow normal saline 1 L and again next week.  8.  Patient to see nurse practitioner on Monday to monitor the labs as he is significantly pancytopenic      Susannah Moya MD        Cc: Dr. Kevin Chandra   "

## 2020-08-20 NOTE — PROGRESS NOTES
MTM in person visit re adherence and side effects ( leukeran)      Mr Stephenson was given a paper copy of his medications today with risk of headache associated with potential offenders.  The most likely agents are gazyva and ondansetron.  Mr Stephenson verified that he awakens every night after approximately 1 hour of sleep with a headache that feels like a gripping sensation to his left eye.  We discussed asking for a potential change in ondansetron to prochlorperazine- in basket message sent to Dr Moya.

## 2020-08-21 ENCOUNTER — APPOINTMENT (OUTPATIENT)
Dept: ONCOLOGY | Facility: HOSPITAL | Age: 70
End: 2020-08-21

## 2020-08-21 ENCOUNTER — MEDICATION THERAPY MANAGEMENT (OUTPATIENT)
Dept: ONCOLOGY | Facility: HOSPITAL | Age: 70
End: 2020-08-21

## 2020-08-21 ENCOUNTER — HOSPITAL ENCOUNTER (OUTPATIENT)
Dept: INFUSION THERAPY | Facility: HOSPITAL | Age: 70
Setting detail: INFUSION SERIES
Discharge: HOME | End: 2020-08-21

## 2020-08-21 VITALS
OXYGEN SATURATION: 100 % | SYSTOLIC BLOOD PRESSURE: 119 MMHG | DIASTOLIC BLOOD PRESSURE: 62 MMHG | RESPIRATION RATE: 20 BRPM | HEART RATE: 68 BPM | TEMPERATURE: 97.5 F

## 2020-08-21 DIAGNOSIS — E86.0 DEHYDRATION: ICD-10-CM

## 2020-08-21 DIAGNOSIS — D69.6 THROMBOCYTOPENIA (HCC): ICD-10-CM

## 2020-08-21 DIAGNOSIS — C91.10 CLL (CHRONIC LYMPHOCYTIC LEUKEMIA) (HCC): Primary | ICD-10-CM

## 2020-08-21 LAB
ALBUMIN SERPL-MCNC: 3.4 G/DL (ref 3.5–5.2)
ALBUMIN/GLOB SERPL: 2.1 G/DL
ALP SERPL-CCNC: 96 U/L (ref 39–117)
ALT SERPL W P-5'-P-CCNC: 17 U/L (ref 1–41)
ANION GAP SERPL CALCULATED.3IONS-SCNC: 6.6 MMOL/L (ref 5–15)
AST SERPL-CCNC: 15 U/L (ref 1–40)
BILIRUB SERPL-MCNC: 0.8 MG/DL (ref 0–1.2)
BUN SERPL-MCNC: 14 MG/DL (ref 8–23)
BUN/CREAT SERPL: 17.1 (ref 7–25)
CALCIUM SPEC-SCNC: 8 MG/DL (ref 8.6–10.5)
CHLORIDE SERPL-SCNC: 106 MMOL/L (ref 98–107)
CO2 SERPL-SCNC: 22.4 MMOL/L (ref 22–29)
CREAT SERPL-MCNC: 0.82 MG/DL (ref 0.76–1.27)
DEPRECATED RDW RBC AUTO: 56.2 FL (ref 37–54)
ERYTHROCYTE [DISTWIDTH] IN BLOOD BY AUTOMATED COUNT: 17.6 % (ref 12.3–15.4)
GFR SERPL CREATININE-BSD FRML MDRD: 93 ML/MIN/1.73
GLOBULIN UR ELPH-MCNC: 1.6 GM/DL
GLUCOSE SERPL-MCNC: 278 MG/DL (ref 65–99)
HCT VFR BLD AUTO: 24.1 % (ref 37.5–51)
HGB BLD-MCNC: 8.1 G/DL (ref 13–17.7)
LDH SERPL-CCNC: 190 U/L (ref 135–225)
MCH RBC QN AUTO: 29.9 PG (ref 26.6–33)
MCHC RBC AUTO-ENTMCNC: 33.6 G/DL (ref 31.5–35.7)
MCV RBC AUTO: 88.9 FL (ref 79–97)
PHOSPHATE SERPL-MCNC: 1.4 MG/DL (ref 2.5–4.5)
PLATELET # BLD AUTO: 44 10*3/MM3 (ref 140–450)
PMV BLD AUTO: 10.8 FL (ref 6–12)
POTASSIUM SERPL-SCNC: 4.3 MMOL/L (ref 3.5–5.2)
PROT SERPL-MCNC: 5 G/DL (ref 6–8.5)
RBC # BLD AUTO: 2.71 10*6/MM3 (ref 4.14–5.8)
SODIUM SERPL-SCNC: 135 MMOL/L (ref 136–145)
URATE SERPL-MCNC: 3 MG/DL (ref 3.4–7)
WBC # BLD AUTO: 0.83 10*3/MM3 (ref 3.4–10.8)

## 2020-08-21 PROCEDURE — 25010000003 HEPARIN LOCK FLUSH PER 10 UNITS: Performed by: INTERNAL MEDICINE

## 2020-08-21 PROCEDURE — 96360 HYDRATION IV INFUSION INIT: CPT

## 2020-08-21 PROCEDURE — 96374 THER/PROPH/DIAG INJ IV PUSH: CPT

## 2020-08-21 PROCEDURE — 96361 HYDRATE IV INFUSION ADD-ON: CPT

## 2020-08-21 PROCEDURE — 84100 ASSAY OF PHOSPHORUS: CPT | Performed by: INTERNAL MEDICINE

## 2020-08-21 PROCEDURE — 83615 LACTATE (LD) (LDH) ENZYME: CPT | Performed by: INTERNAL MEDICINE

## 2020-08-21 PROCEDURE — 36591 DRAW BLOOD OFF VENOUS DEVICE: CPT

## 2020-08-21 PROCEDURE — 36430 TRANSFUSION BLD/BLD COMPNT: CPT

## 2020-08-21 PROCEDURE — 96375 TX/PRO/DX INJ NEW DRUG ADDON: CPT

## 2020-08-21 PROCEDURE — 25010000002 DIPHENHYDRAMINE PER 50 MG

## 2020-08-21 PROCEDURE — 80053 COMPREHEN METABOLIC PANEL: CPT | Performed by: INTERNAL MEDICINE

## 2020-08-21 PROCEDURE — 84550 ASSAY OF BLOOD/URIC ACID: CPT | Performed by: INTERNAL MEDICINE

## 2020-08-21 PROCEDURE — P9035 PLATELET PHERES LEUKOREDUCED: HCPCS

## 2020-08-21 PROCEDURE — 85025 COMPLETE CBC W/AUTO DIFF WBC: CPT | Performed by: INTERNAL MEDICINE

## 2020-08-21 PROCEDURE — 25010000002 METHYLPREDNISOLONE PER 125 MG

## 2020-08-21 RX ORDER — PROCHLORPERAZINE MALEATE 10 MG
10 TABLET ORAL EVERY 6 HOURS PRN
Qty: 30 TABLET | Refills: 6 | Status: SHIPPED | OUTPATIENT
Start: 2020-08-21 | End: 2021-10-07

## 2020-08-21 RX ORDER — SODIUM CHLORIDE 9 MG/ML
250 INJECTION, SOLUTION INTRAVENOUS AS NEEDED
Status: DISCONTINUED | OUTPATIENT
Start: 2020-08-21 | End: 2020-08-23 | Stop reason: HOSPADM

## 2020-08-21 RX ORDER — SODIUM CHLORIDE 9 MG/ML
250 INJECTION, SOLUTION INTRAVENOUS AS NEEDED
Status: CANCELLED | OUTPATIENT
Start: 2020-08-23

## 2020-08-21 RX ORDER — DIPHENHYDRAMINE HYDROCHLORIDE 50 MG/ML
INJECTION INTRAMUSCULAR; INTRAVENOUS
Status: COMPLETED
Start: 2020-08-21 | End: 2020-08-21

## 2020-08-21 RX ORDER — DIPHENHYDRAMINE HYDROCHLORIDE 50 MG/ML
25 INJECTION INTRAMUSCULAR; INTRAVENOUS ONCE
Status: DISCONTINUED | OUTPATIENT
Start: 2020-08-21 | End: 2020-08-23 | Stop reason: HOSPADM

## 2020-08-21 RX ORDER — METHYLPREDNISOLONE SODIUM SUCCINATE 125 MG/2ML
INJECTION, POWDER, LYOPHILIZED, FOR SOLUTION INTRAMUSCULAR; INTRAVENOUS
Status: COMPLETED
Start: 2020-08-21 | End: 2020-08-21

## 2020-08-21 RX ORDER — LEVOFLOXACIN 500 MG/1
500 TABLET, FILM COATED ORAL DAILY
Qty: 7 TABLET | Refills: 0 | Status: SHIPPED | OUTPATIENT
Start: 2020-08-21 | End: 2020-09-24

## 2020-08-21 RX ORDER — HEPARIN SODIUM (PORCINE) LOCK FLUSH IV SOLN 100 UNIT/ML 100 UNIT/ML
500 SOLUTION INTRAVENOUS ONCE
Status: COMPLETED | OUTPATIENT
Start: 2020-08-21 | End: 2020-08-21

## 2020-08-21 RX ORDER — FUROSEMIDE 10 MG/ML
20 INJECTION INTRAMUSCULAR; INTRAVENOUS ONCE
Status: CANCELLED | OUTPATIENT
Start: 2020-08-23 | End: 2020-08-21

## 2020-08-21 RX ORDER — ACETAMINOPHEN 325 MG/1
650 TABLET ORAL ONCE
Status: CANCELLED | OUTPATIENT
Start: 2020-08-23 | End: 2020-08-21

## 2020-08-21 RX ORDER — DIPHENHYDRAMINE HCL 25 MG
25 CAPSULE ORAL ONCE
Status: CANCELLED | OUTPATIENT
Start: 2020-08-23 | End: 2020-08-21

## 2020-08-21 RX ORDER — SODIUM PHOSPHATE, DIBASIC, ANHYDROUS, POTASSIUM PHOSPHATE, MONOBASIC, AND SODIUM PHOSPHATE, MONOBASIC, MONOHYDRATE 852; 155; 130 MG/1; MG/1; MG/1
1 TABLET, COATED ORAL 3 TIMES DAILY
Qty: 90 EACH | Refills: 1 | Status: SHIPPED | OUTPATIENT
Start: 2020-08-21 | End: 2020-10-27 | Stop reason: SDUPTHER

## 2020-08-21 RX ADMIN — HEPARIN 500 UNITS: 100 SYRINGE at 12:06

## 2020-08-21 RX ADMIN — SODIUM CHLORIDE 1000 ML: 9 INJECTION, SOLUTION INTRAVENOUS at 08:39

## 2020-08-21 RX ADMIN — METHYLPREDNISOLONE SODIUM SUCCINATE 100 MG: 125 INJECTION, POWDER, FOR SOLUTION INTRAMUSCULAR; INTRAVENOUS at 08:31

## 2020-08-21 RX ADMIN — DIPHENHYDRAMINE HYDROCHLORIDE 25 MG: 50 INJECTION, SOLUTION INTRAMUSCULAR; INTRAVENOUS at 08:31

## 2020-08-21 NOTE — PROGRESS NOTES
Per v/o Dr Moya:  Pt hgb today 8.1, plt 44, pt to get 2 units PRBC's and 1 unit platelets tomorrow.  Dr CORTES states she is concerned his labs will drop further over the weekend in light of treatment yesterday.  He will have premed tylenol, benadryl and solu-cortef as he had some itching and redness with platelet transfusion today.  He will also get 20 IV lasix in between two units of blood. STAT CMP and Phos to be drawn and called to the on call physician.  K-Phos neutral TID and Levaquin 500 daily x7 escribed to pt pharmacy.  Contacted pt by phone, spoke with his wife and given all of the above information.  Explained after orders entered scheduling will contact her for appt.  She v/u.

## 2020-08-21 NOTE — PROGRESS NOTES
Finished platelets slowly with no further itching or hives. Complete normal saline infusion and labs drawn and sent. Medi port de-accessed and cover with band aide. Discharged home ambulatory at 1212. Instructed patient with future platelet infusions to request pre medications.

## 2020-08-21 NOTE — PROGRESS NOTES
MTM telephone encounter ( follow up on change in nausea medication)    Dr Moya agreed to try prochlorperazine for nausea management rather than ondansetron in an effort to reduce risk of headaches.  Rx has been escribed and Mr Stephenson was notified. He verbalized understanding. Prednisone 10 mg daily also added yesterday.   Labs reviewed      8/20/2020  Day 8   WBC 3.40 - 10.80 10*3/mm3 1.45 (A)   Neutrophils Absolute 1.70 - 7.00 10*3/mm3 0.47 (A)   Hemoglobin 13.0 - 17.7 g/dL 9.1 (A)   Hematocrit 37.5 - 51.0 % 28.2 (A)   Platelets 140 - 450 10*3/mm3 20 (A)   Creatinine 0.70 - 1.30 mg/dL 0.96   eGFR Non African Am >60 mL/min/1.73 77   BUN 6 - 20 mg/dL 20   Sodium 134 - 145 mmol/L 133 (A)   Potassium 3.5 - 4.7 mmol/L 4.5   Glucose 74 - 124 mg/dL 241 (A)   Magnesium 1.8 - 2.5 mg/dL 2.0   Calcium 8.5 - 10.2 mg/dL 8.6   Albumin 3.50 - 5.20 g/dL 3.50   Total Protein 6.3 - 8.0 g/dL 5.6 (A)   AST (SGOT) 0 - 40 U/L 21   ALT (SGPT) 0 - 41 U/L 20   Alkaline Phosphatase 38 - 116 U/L 90   Total Bilirubin 0.2 - 1.2 mg/dL 1.1     Mr Stephenson stated he will be back for follow up labs on Monday.

## 2020-08-21 NOTE — PROGRESS NOTES
Patient c/o itching and hives noted on right arm. Platelets stopped. Normal saline started and doctor called.

## 2020-08-21 NOTE — PROGRESS NOTES
Left mediport was already accessed when patient arrived. It has a good blood return and flushes easily. Blood consent signed.

## 2020-08-22 LAB
BH BB BLOOD EXPIRATION DATE: NORMAL
BH BB BLOOD TYPE BARCODE: 6200
BH BB DISPENSE STATUS: NORMAL
BH BB PRODUCT CODE: NORMAL
BH BB UNIT NUMBER: NORMAL
UNIT  ABO: NORMAL
UNIT  RH: NORMAL

## 2020-08-23 ENCOUNTER — INFUSION (OUTPATIENT)
Dept: ONCOLOGY | Facility: HOSPITAL | Age: 70
End: 2020-08-23

## 2020-08-23 VITALS
TEMPERATURE: 97.2 F | DIASTOLIC BLOOD PRESSURE: 57 MMHG | RESPIRATION RATE: 16 BRPM | OXYGEN SATURATION: 98 % | HEART RATE: 62 BPM | SYSTOLIC BLOOD PRESSURE: 145 MMHG

## 2020-08-23 DIAGNOSIS — C91.10 CLL (CHRONIC LYMPHOCYTIC LEUKEMIA) (HCC): ICD-10-CM

## 2020-08-23 LAB
ABO GROUP BLD: NORMAL
ANISOCYTOSIS BLD QL: ABNORMAL
BASOPHILS # BLD MANUAL: 0.02 10*3/MM3 (ref 0–0.2)
BASOPHILS NFR BLD AUTO: 1 % (ref 0–1.5)
BLD GP AB SCN SERPL QL: NEGATIVE
DEPRECATED RDW RBC AUTO: 57.4 FL (ref 37–54)
ERYTHROCYTE [DISTWIDTH] IN BLOOD BY AUTOMATED COUNT: 17.5 % (ref 12.3–15.4)
HCT VFR BLD AUTO: 25.4 % (ref 37.5–51)
HGB BLD-MCNC: 8.3 G/DL (ref 13–17.7)
LYMPHOCYTES # BLD MANUAL: 0.41 10*3/MM3 (ref 0.7–3.1)
LYMPHOCYTES NFR BLD MANUAL: 2.1 % (ref 5–12)
LYMPHOCYTES NFR BLD MANUAL: 25.8 % (ref 19.6–45.3)
MCH RBC QN AUTO: 29.6 PG (ref 26.6–33)
MCHC RBC AUTO-ENTMCNC: 32.7 G/DL (ref 31.5–35.7)
MCV RBC AUTO: 90.7 FL (ref 79–97)
MONOCYTES # BLD AUTO: 0.03 10*3/MM3 (ref 0.1–0.9)
NEUTROPHILS # BLD AUTO: 1.12 10*3/MM3 (ref 1.7–7)
NEUTROPHILS NFR BLD MANUAL: 71.1 % (ref 42.7–76)
NRBC SPEC MANUAL: 5.2 /100 WBC (ref 0–0.2)
PHOSPHATE SERPL-MCNC: 4.2 MG/DL (ref 2.5–4.5)
PLAT MORPH BLD: NORMAL
PLATELET # BLD AUTO: 37 10*3/MM3 (ref 140–450)
PMV BLD AUTO: 10.8 FL (ref 6–12)
RBC # BLD AUTO: 2.8 10*6/MM3 (ref 4.14–5.8)
RH BLD: POSITIVE
T&S EXPIRATION DATE: NORMAL
WBC # BLD AUTO: 1.57 10*3/MM3 (ref 3.4–10.8)
WBC MORPH BLD: NORMAL

## 2020-08-23 PROCEDURE — 85025 COMPLETE CBC W/AUTO DIFF WBC: CPT

## 2020-08-23 PROCEDURE — 25010000002 HYDROCORTISONE SODIUM SUCCINATE 100 MG RECONSTITUTED SOLUTION: Performed by: INTERNAL MEDICINE

## 2020-08-23 PROCEDURE — 25010000003 HEPARIN LOCK FLUSH PER 10 UNITS: Performed by: INTERNAL MEDICINE

## 2020-08-23 PROCEDURE — 86900 BLOOD TYPING SEROLOGIC ABO: CPT

## 2020-08-23 PROCEDURE — 84100 ASSAY OF PHOSPHORUS: CPT

## 2020-08-23 PROCEDURE — P9035 PLATELET PHERES LEUKOREDUCED: HCPCS

## 2020-08-23 PROCEDURE — 86850 RBC ANTIBODY SCREEN: CPT

## 2020-08-23 PROCEDURE — 80053 COMPREHEN METABOLIC PANEL: CPT

## 2020-08-23 PROCEDURE — 96374 THER/PROPH/DIAG INJ IV PUSH: CPT

## 2020-08-23 PROCEDURE — 25010000002 FUROSEMIDE PER 20 MG: Performed by: INTERNAL MEDICINE

## 2020-08-23 PROCEDURE — P9016 RBC LEUKOCYTES REDUCED: HCPCS

## 2020-08-23 PROCEDURE — 86923 COMPATIBILITY TEST ELECTRIC: CPT

## 2020-08-23 PROCEDURE — 96375 TX/PRO/DX INJ NEW DRUG ADDON: CPT

## 2020-08-23 PROCEDURE — 85007 BL SMEAR W/DIFF WBC COUNT: CPT

## 2020-08-23 PROCEDURE — 86901 BLOOD TYPING SEROLOGIC RH(D): CPT

## 2020-08-23 PROCEDURE — 63710000001 DIPHENHYDRAMINE PER 50 MG: Performed by: INTERNAL MEDICINE

## 2020-08-23 PROCEDURE — 36430 TRANSFUSION BLD/BLD COMPNT: CPT

## 2020-08-23 RX ORDER — FUROSEMIDE 10 MG/ML
20 INJECTION INTRAMUSCULAR; INTRAVENOUS ONCE
Status: COMPLETED | OUTPATIENT
Start: 2020-08-23 | End: 2020-08-23

## 2020-08-23 RX ORDER — SODIUM CHLORIDE 9 MG/ML
250 INJECTION, SOLUTION INTRAVENOUS AS NEEDED
Status: DISCONTINUED | OUTPATIENT
Start: 2020-08-23 | End: 2020-08-23 | Stop reason: HOSPADM

## 2020-08-23 RX ORDER — DIPHENHYDRAMINE HCL 25 MG
25 CAPSULE ORAL ONCE
Status: COMPLETED | OUTPATIENT
Start: 2020-08-23 | End: 2020-08-23

## 2020-08-23 RX ORDER — HEPARIN SODIUM (PORCINE) LOCK FLUSH IV SOLN 100 UNIT/ML 100 UNIT/ML
500 SOLUTION INTRAVENOUS AS NEEDED
Status: DISCONTINUED | OUTPATIENT
Start: 2020-08-23 | End: 2021-09-29

## 2020-08-23 RX ORDER — ACETAMINOPHEN 325 MG/1
650 TABLET ORAL ONCE
Status: COMPLETED | OUTPATIENT
Start: 2020-08-23 | End: 2020-08-23

## 2020-08-23 RX ADMIN — FUROSEMIDE 20 MG: 10 INJECTION, SOLUTION INTRAMUSCULAR; INTRAVENOUS at 07:59

## 2020-08-23 RX ADMIN — HYDROCORTISONE SODIUM SUCCINATE 100 MG: 100 INJECTION, POWDER, FOR SOLUTION INTRAMUSCULAR; INTRAVENOUS at 08:03

## 2020-08-23 RX ADMIN — ACETAMINOPHEN 650 MG: 325 TABLET, FILM COATED ORAL at 08:03

## 2020-08-23 RX ADMIN — DIPHENHYDRAMINE HYDROCHLORIDE 25 MG: 25 CAPSULE ORAL at 08:03

## 2020-08-23 RX ADMIN — Medication 500 UNITS: at 14:24

## 2020-08-24 ENCOUNTER — OFFICE VISIT (OUTPATIENT)
Dept: ONCOLOGY | Facility: CLINIC | Age: 70
End: 2020-08-24

## 2020-08-24 ENCOUNTER — INFUSION (OUTPATIENT)
Dept: ONCOLOGY | Facility: HOSPITAL | Age: 70
End: 2020-08-24

## 2020-08-24 VITALS
SYSTOLIC BLOOD PRESSURE: 125 MMHG | HEART RATE: 61 BPM | OXYGEN SATURATION: 98 % | DIASTOLIC BLOOD PRESSURE: 63 MMHG | RESPIRATION RATE: 18 BRPM | HEIGHT: 73 IN | WEIGHT: 163.7 LBS | BODY MASS INDEX: 21.69 KG/M2 | TEMPERATURE: 97.8 F

## 2020-08-24 DIAGNOSIS — G44.019 EPISODIC CLUSTER HEADACHE, NOT INTRACTABLE: ICD-10-CM

## 2020-08-24 DIAGNOSIS — C91.10 CLL (CHRONIC LYMPHOCYTIC LEUKEMIA) (HCC): Primary | ICD-10-CM

## 2020-08-24 DIAGNOSIS — D61.818 PANCYTOPENIA (HCC): ICD-10-CM

## 2020-08-24 LAB
ALBUMIN SERPL-MCNC: 3.5 G/DL (ref 3.5–5.2)
ALBUMIN SERPL-MCNC: 3.5 G/DL (ref 3.5–5.2)
ALBUMIN/GLOB SERPL: 1.8 G/DL
ALBUMIN/GLOB SERPL: 1.8 G/DL (ref 1.1–2.4)
ALP SERPL-CCNC: 108 U/L (ref 38–116)
ALP SERPL-CCNC: 93 U/L (ref 39–117)
ALT SERPL W P-5'-P-CCNC: 15 U/L (ref 0–41)
ALT SERPL W P-5'-P-CCNC: 17 U/L (ref 1–41)
ANION GAP SERPL CALCULATED.3IONS-SCNC: 11.2 MMOL/L (ref 5–15)
ANION GAP SERPL CALCULATED.3IONS-SCNC: 7.7 MMOL/L (ref 5–15)
AST SERPL-CCNC: 13 U/L (ref 1–40)
AST SERPL-CCNC: 14 U/L (ref 0–40)
BASOPHILS # BLD AUTO: 0.01 10*3/MM3 (ref 0–0.2)
BASOPHILS NFR BLD AUTO: 0.5 % (ref 0–1.5)
BH BB BLOOD EXPIRATION DATE: NORMAL
BH BB BLOOD TYPE BARCODE: 5100
BH BB BLOOD TYPE BARCODE: 5100
BH BB BLOOD TYPE BARCODE: 9500
BH BB DISPENSE STATUS: NORMAL
BH BB PRODUCT CODE: NORMAL
BH BB UNIT NUMBER: NORMAL
BILIRUB SERPL-MCNC: 1.2 MG/DL (ref 0.2–1.2)
BILIRUB SERPL-MCNC: 1.4 MG/DL (ref 0–1.2)
BUN SERPL-MCNC: 19 MG/DL (ref 8–23)
BUN SERPL-MCNC: 22 MG/DL (ref 6–20)
BUN/CREAT SERPL: 21.6 (ref 7–25)
BUN/CREAT SERPL: 21.8 (ref 7.3–30)
CALCIUM SPEC-SCNC: 8.6 MG/DL (ref 8.6–10.5)
CALCIUM SPEC-SCNC: 8.7 MG/DL (ref 8.5–10.2)
CHLORIDE SERPL-SCNC: 99 MMOL/L (ref 98–107)
CHLORIDE SERPL-SCNC: 99 MMOL/L (ref 98–107)
CO2 SERPL-SCNC: 24.3 MMOL/L (ref 22–29)
CO2 SERPL-SCNC: 25.8 MMOL/L (ref 22–29)
CREAT SERPL-MCNC: 0.88 MG/DL (ref 0.76–1.27)
CREAT SERPL-MCNC: 1.01 MG/DL (ref 0.7–1.3)
CROSSMATCH INTERPRETATION: NORMAL
CROSSMATCH INTERPRETATION: NORMAL
DEPRECATED RDW RBC AUTO: 62.3 FL (ref 37–54)
EOSINOPHIL # BLD AUTO: 0.01 10*3/MM3 (ref 0–0.4)
EOSINOPHIL NFR BLD AUTO: 0.5 % (ref 0.3–6.2)
ERYTHROCYTE [DISTWIDTH] IN BLOOD BY AUTOMATED COUNT: 19.5 % (ref 12.3–15.4)
GFR SERPL CREATININE-BSD FRML MDRD: 73 ML/MIN/1.73
GFR SERPL CREATININE-BSD FRML MDRD: 86 ML/MIN/1.73
GLOBULIN UR ELPH-MCNC: 1.9 GM/DL
GLOBULIN UR ELPH-MCNC: 1.9 GM/DL (ref 1.8–3.5)
GLUCOSE SERPL-MCNC: 287 MG/DL (ref 74–124)
GLUCOSE SERPL-MCNC: 310 MG/DL (ref 65–99)
HCT VFR BLD AUTO: 30.2 % (ref 37.5–51)
HGB BLD-MCNC: 9.7 G/DL (ref 13–17.7)
IMM GRANULOCYTES # BLD AUTO: 0.17 10*3/MM3 (ref 0–0.05)
IMM GRANULOCYTES NFR BLD AUTO: 8.6 % (ref 0–0.5)
LDH SERPL-CCNC: 196 U/L (ref 99–259)
LYMPHOCYTES # BLD AUTO: 0.65 10*3/MM3 (ref 0.7–3.1)
LYMPHOCYTES NFR BLD AUTO: 33 % (ref 19.6–45.3)
MCH RBC QN AUTO: 28.5 PG (ref 26.6–33)
MCHC RBC AUTO-ENTMCNC: 32.1 G/DL (ref 31.5–35.7)
MCV RBC AUTO: 88.8 FL (ref 79–97)
MONOCYTES # BLD AUTO: 0.14 10*3/MM3 (ref 0.1–0.9)
MONOCYTES NFR BLD AUTO: 7.1 % (ref 5–12)
NEUTROPHILS NFR BLD AUTO: 0.99 10*3/MM3 (ref 1.7–7)
NEUTROPHILS NFR BLD AUTO: 50.3 % (ref 42.7–76)
NRBC BLD AUTO-RTO: 6.6 /100 WBC (ref 0–0.2)
PLATELET # BLD AUTO: 46 10*3/MM3 (ref 140–450)
PMV BLD AUTO: 9.9 FL (ref 6–12)
POTASSIUM SERPL-SCNC: 3.7 MMOL/L (ref 3.5–4.7)
POTASSIUM SERPL-SCNC: 4 MMOL/L (ref 3.5–5.2)
PROT SERPL-MCNC: 5.4 G/DL (ref 6.3–8)
PROT SERPL-MCNC: 5.4 G/DL (ref 6–8.5)
RBC # BLD AUTO: 3.4 10*6/MM3 (ref 4.14–5.8)
SODIUM SERPL-SCNC: 131 MMOL/L (ref 136–145)
SODIUM SERPL-SCNC: 136 MMOL/L (ref 134–145)
UNIT  ABO: NORMAL
UNIT  RH: NORMAL
URATE SERPL-MCNC: 3.4 MG/DL (ref 2.8–7.4)
WBC # BLD AUTO: 1.97 10*3/MM3 (ref 3.4–10.8)

## 2020-08-24 PROCEDURE — 25010000003 HEPARIN LOCK FLUSH PER 10 UNITS: Performed by: NURSE PRACTITIONER

## 2020-08-24 PROCEDURE — 99214 OFFICE O/P EST MOD 30 MIN: CPT | Performed by: NURSE PRACTITIONER

## 2020-08-24 PROCEDURE — 85025 COMPLETE CBC W/AUTO DIFF WBC: CPT

## 2020-08-24 PROCEDURE — 84550 ASSAY OF BLOOD/URIC ACID: CPT

## 2020-08-24 PROCEDURE — 36591 DRAW BLOOD OFF VENOUS DEVICE: CPT

## 2020-08-24 PROCEDURE — 83615 LACTATE (LD) (LDH) ENZYME: CPT

## 2020-08-24 PROCEDURE — 80053 COMPREHEN METABOLIC PANEL: CPT

## 2020-08-24 RX ORDER — SODIUM CHLORIDE 0.9 % (FLUSH) 0.9 %
10 SYRINGE (ML) INJECTION AS NEEDED
Status: CANCELLED | OUTPATIENT
Start: 2020-08-24

## 2020-08-24 RX ORDER — HEPARIN SODIUM (PORCINE) LOCK FLUSH IV SOLN 100 UNIT/ML 100 UNIT/ML
500 SOLUTION INTRAVENOUS AS NEEDED
Status: CANCELLED | OUTPATIENT
Start: 2020-08-24

## 2020-08-24 RX ORDER — HEPARIN SODIUM (PORCINE) LOCK FLUSH IV SOLN 100 UNIT/ML 100 UNIT/ML
500 SOLUTION INTRAVENOUS AS NEEDED
Status: DISCONTINUED | OUTPATIENT
Start: 2020-08-24 | End: 2020-08-24 | Stop reason: HOSPADM

## 2020-08-24 RX ORDER — SODIUM CHLORIDE 0.9 % (FLUSH) 0.9 %
10 SYRINGE (ML) INJECTION AS NEEDED
Status: DISCONTINUED | OUTPATIENT
Start: 2020-08-24 | End: 2020-08-24 | Stop reason: HOSPADM

## 2020-08-24 RX ADMIN — SODIUM CHLORIDE, PRESERVATIVE FREE 500 UNITS: 5 INJECTION INTRAVENOUS at 12:12

## 2020-08-24 RX ADMIN — Medication 10 ML: at 12:12

## 2020-08-24 NOTE — PROGRESS NOTES
Subjective       REASON FOR VISIT:    1. Chronic lymphoid leukemia, stage 4.     2.  History of angiodysplasia requiring cauterization and history of Hess's esophagus    3.  Bone marrow aspiration biopsy shows hypercellular marrow with 98% involvement with CLL    Patient ID: Macho Stephenson is a 69 y.o. year old male     History of Present Illness patient is a 69-year-old male with the above-mentioned history who is here today for lab review and toxicity check.  Patient received cycle 1, day 8 Gazyva 8/20/2020.  He also takes chlorambucil 16 mg (eight,  2 mg tablets) on days 1 and 15.   Patient's wife assisted with history today through speaker phone.     He reports that he is feeling quite well.  He denies fevers, chills, night sweats.  He denies bleeding issues.  Patient received a transfusion of 2 units of packed red blood cells and 1 unit of platelets yesterday, 8/23/2020.  He denies issues with nausea or vomiting.  He has a good appetite and good energy level.    Patient continues to complain of issues with cluster headaches.  Dr. Moya has started him on prednisone 10 mg daily.  He states that he is still having nightly headaches.  He did have a CT scan last week which was negative.  The patient states he had to be on high-dose steroids in the past, which were prescribed by his neurologist, Dr. Walker.  He has not seen Dr. Walker in at least 5 years.      PAST MEDICAL HISTORY:   1. Recurrent atrial fibrillation followed by cardiology. He has had drug eluting stent placed x 3.   2. High cholesterol.   3. Hypertension.       HEMATOLOGIC/ONCOLOGIC HISTORY:       The patient is 62-year-old gentleman with the above history currently here for followup. He has no complaints. He denies fever, night sweats or weight loss. He does not have any lymphadenopathy. He feels good. He had some fatigue but his CBC shows a go od hemoglobin.   The patient is followed by Dr. Kevin Chandra. He had seen Dr. Chandra 7/18/13 for a  history of coronary artery disease status drug eluting stent placement to the right coronary artery and left anterior descending artery in February 2011. Histor y of paroxysmal atrial fibrillation and hyperlipidemia. He presented to [facility] on 6/23/13 with palpitations and was found to have atrial fibrillation with rapid ventricular response. He was given IV Cardizem and converted spontaneously to normal sinus rhythm. He was found to have elevated white count at 18.9 and denied any symptoms of infection or urinary complaints. TSH was normal, troponin levels were negative. He was asked to continue aspirin and metoprolol for that. If he contin u ed to have atrial fibrillation, they will consider antiarrhythmic therapy with or without a short term anticoagulation therapy. However, currently the patient is feeling reasonably good. He has no complaints. His CBC 7/22/13 showed WBC 17,000, hemoglob i n 16.4 and platelet count of 174,000. Back 9/28/13 his hemoglobin was 15.1, WBC 13.3 and platelets 197,000. He has had a persistently elevated WBC but he is totally asymptomatic. He does not have any fever, night sweats or lymphadenopathy. He is here to be evaluated for his elevated white count.       Peripheral blood flow cytometry done 08/16/13 shows 22% chronic lymphoid leukemia cells, and they expressed ZAP-70 but not CD38. The total number of cells approximated 60% T cells, 32% B cells and 1% natural k iller cells. The B cells were monoclonal and expressed CD19, CD20, CD22, CD5, CD23, CD11c, ZAP-70 and T cell antigens. There was a normal CD4/CD8 ratio. CT of the chest, abdomen and pelvis does not show evidence of metastasis. Peripheral blood for DILLON -2 was negative. It was negative for T11:14 translocation.       CT scan done on 08/21/2013 shows 5 to 6 mm noncalcified nodule in the left lower lobe which is unchanged from December 2010. Otherwise no evidence of any lymphadenopathy or hepatosplenomegaly.        MRI of the spine shows arthritis and disc bulge and likely hemangiomas, with 1 lesion showing increased signal intensity at T2, which is likely a hemangioma. Bone scan could be done, but patient does not even have much pain there. CT scan of the chest, a bdomen and pelvis was done on 11/23/2014. He has tiny lymph nodes in the neck which are difficult to palpate. Right jugular one is 1.4 x 0.9 and left supraclavicular one is 1.5 x 1.1. He also has a subcarinal lymph node which is 1.7 x 1.2 cm, and he ha s a portocaval lymph node which has increased from 2.5 to 2.8. Patient is totally asymptomatic. He does not have any B symptoms, so will continue followup with observation.           Patient is a 69-year-old gentleman with history of chronic lymphocytic leukemia/SLL who recently got admitted to [facility] because of GI bleed.  He was seen by Dr. Montero.  He underwent EGD colonoscopy.  He was found to have angiodysplasia for which he underwent argon coagulation.  He required 3 units of blood.  Patient had a normal esophagus normal stomach and normal duodenum CLOtest was negative he was asked to take Protonix 40 mg daily.  Colonoscopy showed blood in the entire examined colon but there was a single bleeding colonic angiectasia which was treated with argon plasma coagulation.    He was admitted twice.  Initially he was admitted on July 10 at which time he stayed 3 days and he was evaluated for chest pain.  He underwent a cardiac work-up with stress test and echo.  The echo was normal but the stress test showed medium sized moderate severe severity fixed perfusion defect.  Of the inferior wall consistent with infarction.  However according to patient cardiology did not think he had any cardiac problems.  I have left a message for patient's cardiologist to call me today.  Patient subsequently got readmitted with GI bleed and required 1/3 unit of transfusion.  He was found to have thrombocytopenia and hematology  was consulted.    His hemoglobin had dropped down to as low as 7 and his platelets had gone down to 87.  Today it is 35 and his hemoglobin is 9.4 today.  He denies active bleeding.  He has lost weight recently.  He does not have any fever or night sweats.    He had ultrasound of spleen which showed enlarged spleen centimeters in length            MEDICATIONS: The current medication list was reviewed with the patient and updated in the EMR this date per the medical assistant. Medication dosages and frequencies were confirmed to be accurate.       ALLERGIES: PENICILLIN which causes him to swell up. He is allergic to IV CONTRAST DYE.     SOCIAL HISTORY: He is . He smokes one pack per day for 35 years. He consumes three to four alcoholic drinks per week. He has no tattoos and no previous transfusions.       FAMILY HISTORY: Father  at 82 with MI. Mother  at 82 with bone cancer. He has one brother age 65 in good health and two sisters 69 and 57 in good health.   Past Medical History, Social History, and Family History are unchanged from my prior documentation on     Review of Systems   Constitutional: Positive for fatigue (20-New). Negative for activity change, appetite change, chills, diaphoresis, fever and unexpected weight change.   HENT: Negative for hearing loss, mouth sores, nosebleeds, sore throat and trouble swallowing.    Respiratory: Negative for cough, chest tightness, shortness of breath and wheezing.    Cardiovascular: Negative for chest pain, palpitations and leg swelling.   Gastrointestinal: Negative for abdominal distention, abdominal pain, constipation, diarrhea, nausea and vomiting.   Genitourinary: Negative for difficulty urinating, dysuria, frequency, hematuria and urgency.   Musculoskeletal: Negative for joint swelling.   Skin: Negative for rash and wound.   Neurological: Positive for headaches (see HPI). Negative for dizziness, seizures, syncope, speech difficulty,  weakness and numbness.   Hematological: Negative for adenopathy. Does not bruise/bleed easily.   Psychiatric/Behavioral: Negative for behavioral problems, confusion, sleep disturbance and suicidal ideas.   8/24/2020 ROS unchanged from above except as updated.    Objective      Chief Complaint   Patient presents with   • Follow-up        Patient Active Problem List   Diagnosis   • CLL (chronic lymphocytic leukemia) (CMS/HCC)   • Anemia   • Encounter for hepatitis C screening test for low risk patient    • Thrombocytopenia (CMS/HCC)   • H/O heart artery stent   • Stented coronary artery   • Arteriosclerosis of coronary artery   • Atrial fibrillation (CMS/HCC)   • Paroxysmal atrial fibrillation (CMS/HCC)   • Chest pain, rule out acute myocardial infarction   • Fall   • Fracture, olecranon   • Hyperlipidemia   • Long term (current) use of anticoagulants   • Lower GI bleed   • Olecranon bursitis   • Shoulder pain   • Smoker   • CLL (chronic lymphocytic leukemia) (CMS/HCC)   • Dehydration         MEDICATIONS:  Medication Reconciliation for the patient has been reviewed by me and documented in the patients chart.    ALLERGIES:    Allergies   Allergen Reactions   • Contrast Dye Swelling   • Penicillins Swelling         Vitals:    08/24/20 1253   BP: 125/63   Pulse: 61   Resp: 18   Temp: 97.8 °F (36.6 °C)   SpO2: 98%        Current Status 8/20/2020   ECOG score 0        Physical Exam   Constitutional: He is oriented to person, place, and time. He appears well-developed and well-nourished. No distress.   HENT:   Head: Normocephalic and atraumatic.   Mouth/Throat: Oropharynx is clear and moist and mucous membranes are normal. No oropharyngeal exudate.   Eyes: Pupils are equal, round, and reactive to light.   Neck: Normal range of motion.   Cardiovascular: Normal rate, regular rhythm and normal heart sounds.   No murmur heard.  Pulmonary/Chest: Effort normal and breath sounds normal. No respiratory distress. He has no wheezes.  He has no rhonchi. He has no rales.   Abdominal: Soft. Normal appearance and bowel sounds are normal. He exhibits no distension. There is no hepatosplenomegaly.   Musculoskeletal: Normal range of motion. He exhibits no edema.   Neurological: He is alert and oriented to person, place, and time.   Skin: Skin is warm and dry. No rash noted.   Psychiatric: He has a normal mood and affect.   Vitals reviewed.       RECENT LABS:  Hematology WBC   Date Value Ref Range Status   08/24/2020 1.97 (L) 3.40 - 10.80 10*3/mm3 Final   07/15/2020 13.77 (H) 4.5 - 11.0 10*3/uL Final     RBC   Date Value Ref Range Status   08/24/2020 3.40 (L) 4.14 - 5.80 10*6/mm3 Final   07/15/2020 2.96 (L) 4.5 - 5.9 10*6/uL Final     Hemoglobin   Date Value Ref Range Status   08/24/2020 9.7 (L) 13.0 - 17.7 g/dL Final   07/15/2020 8.5 (L) 13.5 - 17.5 g/dL Final     Hematocrit   Date Value Ref Range Status   08/24/2020 30.2 (L) 37.5 - 51.0 % Final   07/15/2020 26.4 (L) 41.0 - 53.0 % Final     Platelets   Date Value Ref Range Status   08/24/2020 46 (L) 140 - 450 10*3/mm3 Final   07/15/2020 27 (L) 140 - 440 10*3/uL Final     Comment:     CHECKED        XR CHEST POST CVA PORT-07/31/20    IMPRESSION:  Chest port placement. No pneumothorax.       CT NECK, CHEST, ABDOMEN, AND PELVIS WITHOUT IV CONTRAST. 7/20/2020   HISTORY: 69-year-old male with CLL/SLL.     TECHNIQUE: Radiation dose reduction techniques were utilized, including  automated exposure control and exposure modulation based on body size. 3  mm images were obtained through the neck, chest, abdomen, and pelvis  without the administration of IV contrast. Compared with CT of the  abdomen and pelvis from 08/05/2019 and previous CTs from 03/19/2018.     FINDINGS:  NECK: There is no significant change in the 1.4 x 1.0 cm left  supraclavicular node or in the smaller supraclavicular nodes  bilaterally. A reference 1.4 x 0.7 cm right jugular chain node is  stable. There are shotty nodes scattered throughout  the neck. No new  lymphadenopathy is seen. Noncontrasted parotid, submandibular, and  thyroid glands appear unremarkable.     CHEST: There has been interval decrease in the size of the shotty and  borderline enlarged nodes at the axilla. Reference 1.6 x 0.7 cm right  axillary node previously measured 2.0 x 1.2 cm. There has been a  decrease in the size of all of the shotty mediastinal and hilar nodes.  There are no new pulmonary opacities and there are no pleural or  pericardial effusions.     ABDOMEN/PELVIS: Splenic size has slightly increased measuring 15.4 cm in  diameter and previously measuring 13.5 cm, both measured on the coronal  reconstruction series. There is no change in the shotty nodes at the  gastrohepatic ligament and shorty hepatis. There are no pathologically  enlarged nodes. There is no acute abnormality involving the  noncontrasted liver. A single gallstone is noted within the gallbladder.  Noncontrasted pancreas, adrenals, and kidneys appear unremarkable. There  is no acute bowel abnormality. There is extensive sigmoid  diverticulosis. No free fluid. There are extensive abdominal aortic  atherosclerotic changes and there is a stable 3.7 cm infrarenal  abdominal aortic aneurysm.     IMPRESSION:  1. There has been slight interval increase in splenomegaly since the CT  of the abdomen from 08/05/2019.  2. Interval decrease in the size of the axillary nodes and the shotty  mediastinal nodes. There is no change in the shotty nodes within the  neck and supraclavicular regions. There is no new lymphadenopathy.  3. Stable 3.7 cm infrarenal abdominal aortic aneurysm.      Assessment/Plan   1.This is a patient with a history of stage 2 CLL without evidence of any disease progression.  I have reviewed the CT scan from 3/19/2018 there is minimal progression but clinically patient is asymptomatic and we will follow with observation.  Will monitor with labs.   No evidence of any frequent infections, no major  "worsening of his white count. He has no fever or night sweats or any other symptoms.   · Patient knows that he is prone for infections and he is mainly staying at home during the COVID-19 situation time  · Recent admission to [facility] July 10 2020×2.  Initially admitted with chest pain and underwent stress test and echo.  Echo was negative.  Stress test is abnormal but have left message for patient's cardiologist to call us.  His cardiologist is been sent [facility].  · He then got admitted the second time with worsening GI bleed and required total of 3 units of blood.  EGD was negative but colonoscopy showed angiectasia for which he underwent argon ablation.  Currently hemoglobin stable and no active bleeding.  · He was also found to have low platelets with platelets dropping down to 27k and hemoglobin was 7.  Ultrasound showed splenomegaly 15 cm.  Concern is whether he has progressed from his CLL point of view and requires treatment.  · CT scan performed 7/20/2020 did show the increased splenomegaly, but interval decrease in size of the axillary nodes, and shotty mediastinal nodes.  No change in the shotty nodes within the neck and supraclavicular regions.  No new lymphadenopathy.  Bone marrow biopsy however showed preliminarily that there is bone marrow involvement.  Remainder of bone marrow biopsy report is pending.  · Scans were reviewed with the patient today.  Dr. Moya also discussed with the patient and recommended he initiate treatment with chlorambucil and Gazyva.  He would not be a good candidate for Imbruvica due to his history of A. fib\".  He cannot take anticoagulation at this time.  The patient was provided with chemotherapy education today, including  Handouts.  He will need a port placed so that we can initiate treatment  · August 13, 2020: Gazyva day 1, August 14, 1980  · August 20 cycle 1 day 8 Gazyva.  He is going to receive chlorambucil along with this, 16 mg p.o. on day 1 day 15.  We " can increase the dose once we know he tolerates and his platelets improved  · Will need close monitoring of his CBC    2.  Worsening thrombocytopenia, looking back his platelets were always in the 150s and during this admission he dropped down to 27K.  His LDH also was elevated and he was anemic.  · Repeat labs 7/24/2020 show a WBC 23.2, ANC 1.45, Hgb 8.7, platelet count 37,000.  He denies bleeding, and is feeling well.  We will continue to closely monitor.  · 8/24/2020 plts 46,000.  Just has a platelet transfusion yesterday. Denies bleeding.     3.  History of angiodysplasia requiring cauterization and Hess's esophagus mild anemia- He is currently asymptomatic status post recent cauterization    4.  History of cluster headaches for which she has to be treated with steroids in the past and has followed up with Dr. Len Walker.today with significant headaches.  · Patient was sent for a CT of the head which was negative.  · He was started on prednisone 10 mg daily.,  So far this has not benefited.  · Patient reports he has required high-dose steroids in the past, but there is concern starting him on high-dose steroids for possible neutropenic sepsis.  He has seen neurologist, Dr. Walker in the past.  Dr. Moya plans to discuss with his neurologist       Plan   1.  Continue prednisone 10 mg daily.  2.  Dr. Moya will discuss treatment of cluster headaches with neurologist, Dr. Walker.  3.  Return on 8/27/2020 for reevaluation by Dr. Moya in anticipation of cycle 1, day 15 Gazyva.  Patient will be due for chlorambucil 16 mg that day as well.  I have instructed him to bring the medication with him to that appointment, so that we can assess his labs prior.    MARIAH Valencia        Cc: Dr. Kevin Chandra

## 2020-08-25 ENCOUNTER — MEDICATION THERAPY MANAGEMENT (OUTPATIENT)
Dept: ONCOLOGY | Facility: HOSPITAL | Age: 70
End: 2020-08-25

## 2020-08-26 NOTE — PROGRESS NOTES
Subjective       REASON FOR VISIT:    1. Chronic lymphoid leukemia, stage 4.     2.  History of angiodysplasia requiring cauterization and history of Hess's esophagus    3.  Bone marrow aspiration biopsy shows hypercellular marrow with 98% involvement with CLL    Patient ID: Macho Stephenson is a 69 y.o. year old male     History of Present Illness patient is a 69-year-old male with the above-mentioned history who is here today for lab review and toxicity check.  Patient received cycle 1, day 8 Gazyva 8/20/2020.  He also takes chlorambucil 16 mg (eight,  2 mg tablets) on days 1 and 15.   Patient's wife assisted with history today through speaker phone.     He reports that he is feeling quite well.  He denies fevers, chills, night sweats.  He denies bleeding issues.  Patient received a transfusion of 2 units of packed red blood cells and 1 unit of platelets yesterday, 8/23/2020.  He denies issues with nausea or vomiting.  He has a good appetite and good energy level.    Patient continues to complain of issues with cluster headaches.  Dr. Moya has started him on prednisone 10 mg daily.  He states that he is still having nightly headaches.  He did have a CT scan last week which was negative.  The patient states he had to be on high-dose steroids in the past, which were prescribed by his neurologist, Dr. Walker.  He has not seen Dr. Walker in at least 5 years.    Interval history: Patient's blood counts have dropped.  His blood neutrophil count continues to drop down.  He is due to receive day 15 chlorambucil today.  Patient has been on antibiotics Levaquin because of very low ANC.  He denies fever or chills    PAST MEDICAL HISTORY:   1. Recurrent atrial fibrillation followed by cardiology. He has had drug eluting stent placed x 3.   2. High cholesterol.   3. Hypertension.       HEMATOLOGIC/ONCOLOGIC HISTORY:       The patient is 62-year-old gentleman with the above history currently here for followup. He has no  complaints. He denies fever, night sweats or weight loss. He does not have any lymphadenopathy. He feels good. He had some fatigue but his CBC shows a go od hemoglobin.   The patient is followed by Dr. Kevin Chandra. He had seen Dr. Chandra 7/18/13 for a history of coronary artery disease status drug eluting stent placement to the right coronary artery and left anterior descending artery in February 2011. Histor y of paroxysmal atrial fibrillation and hyperlipidemia. He presented to [facility] on 6/23/13 with palpitations and was found to have atrial fibrillation with rapid ventricular response. He was given IV Cardizem and converted spontaneously to normal sinus rhythm. He was found to have elevated white count at 18.9 and denied any symptoms of infection or urinary complaints. TSH was normal, troponin levels were negative. He was asked to continue aspirin and metoprolol for that. If he contin u ed to have atrial fibrillation, they will consider antiarrhythmic therapy with or without a short term anticoagulation therapy. However, currently the patient is feeling reasonably good. He has no complaints. His CBC 7/22/13 showed WBC 17,000, hemoglob i n 16.4 and platelet count of 174,000. Back 9/28/13 his hemoglobin was 15.1, WBC 13.3 and platelets 197,000. He has had a persistently elevated WBC but he is totally asymptomatic. He does not have any fever, night sweats or lymphadenopathy. He is here to be evaluated for his elevated white count.       Peripheral blood flow cytometry done 08/16/13 shows 22% chronic lymphoid leukemia cells, and they expressed ZAP-70 but not CD38. The total number of cells approximated 60% T cells, 32% B cells and 1% natural k iller cells. The B cells were monoclonal and expressed CD19, CD20, CD22, CD5, CD23, CD11c, ZAP-70 and T cell antigens. There was a normal CD4/CD8 ratio. CT of the chest, abdomen and pelvis does not show evidence of metastasis. Peripheral blood for DILLON -2 was  negative. It was negative for T11:14 translocation.       CT scan done on 08/21/2013 shows 5 to 6 mm noncalcified nodule in the left lower lobe which is unchanged from December 2010. Otherwise no evidence of any lymphadenopathy or hepatosplenomegaly.       MRI of the spine shows arthritis and disc bulge and likely hemangiomas, with 1 lesion showing increased signal intensity at T2, which is likely a hemangioma. Bone scan could be done, but patient does not even have much pain there. CT scan of the chest, a bdomen and pelvis was done on 11/23/2014. He has tiny lymph nodes in the neck which are difficult to palpate. Right jugular one is 1.4 x 0.9 and left supraclavicular one is 1.5 x 1.1. He also has a subcarinal lymph node which is 1.7 x 1.2 cm, and he ha s a portocaval lymph node which has increased from 2.5 to 2.8. Patient is totally asymptomatic. He does not have any B symptoms, so will continue followup with observation.           Patient is a 69-year-old gentleman with history of chronic lymphocytic leukemia/SLL who recently got admitted to [facility] because of GI bleed.  He was seen by Dr. Montero.  He underwent EGD colonoscopy.  He was found to have angiodysplasia for which he underwent argon coagulation.  He required 3 units of blood.  Patient had a normal esophagus normal stomach and normal duodenum CLOtest was negative he was asked to take Protonix 40 mg daily.  Colonoscopy showed blood in the entire examined colon but there was a single bleeding colonic angiectasia which was treated with argon plasma coagulation.    He was admitted twice.  Initially he was admitted on July 10 at which time he stayed 3 days and he was evaluated for chest pain.  He underwent a cardiac work-up with stress test and echo.  The echo was normal but the stress test showed medium sized moderate severe severity fixed perfusion defect.  Of the inferior wall consistent with infarction.  However according to patient cardiology  did not think he had any cardiac problems.  I have left a message for patient's cardiologist to call me today.  Patient subsequently got readmitted with GI bleed and required 1/3 unit of transfusion.  He was found to have thrombocytopenia and hematology was consulted.    His hemoglobin had dropped down to as low as 7 and his platelets had gone down to 87.  Today it is 35 and his hemoglobin is 9.4 today.  He denies active bleeding.  He has lost weight recently.  He does not have any fever or night sweats.    He had ultrasound of spleen which showed enlarged spleen centimeters in length            MEDICATIONS: The current medication list was reviewed with the patient and updated in the EMR this date per the medical assistant. Medication dosages and frequencies were confirmed to be accurate.       ALLERGIES: PENICILLIN which causes him to swell up. He is allergic to IV CONTRAST DYE.     SOCIAL HISTORY: He is . He smokes one pack per day for 35 years. He consumes three to four alcoholic drinks per week. He has no tattoos and no previous transfusions.       FAMILY HISTORY: Father  at 82 with MI. Mother  at 82 with bone cancer. He has one brother age 65 in good health and two sisters 69 and 57 in good health.   Past Medical History, Social History, and Family History are unchanged from my prior documentation on     Review of Systems   Constitutional: Positive for fatigue (20-Unchanged). Negative for activity change, appetite change, chills, diaphoresis, fever and unexpected weight change.   HENT: Negative for hearing loss, mouth sores, nosebleeds, sore throat and trouble swallowing.    Respiratory: Negative for cough, chest tightness, shortness of breath and wheezing.    Cardiovascular: Negative for chest pain, palpitations and leg swelling.   Gastrointestinal: Negative for abdominal distention, abdominal pain, constipation, diarrhea, nausea and vomiting.   Genitourinary: Negative for  difficulty urinating, dysuria, frequency, hematuria and urgency.   Musculoskeletal: Negative for joint swelling.        No muscle weakness.   Skin: Negative for rash and wound.   Neurological: Positive for headaches (see HPI 08/27/20-Unchanged). Negative for dizziness, seizures, syncope, speech difficulty, weakness and numbness.   Hematological: Negative for adenopathy. Does not bruise/bleed easily.   Psychiatric/Behavioral: Negative for behavioral problems, confusion, sleep disturbance and suicidal ideas.   8/24/2020 ROS unchanged from above except as updated.    Objective      No chief complaint on file.       Patient Active Problem List   Diagnosis   • CLL (chronic lymphocytic leukemia) (CMS/HCC)   • Anemia   • Encounter for hepatitis C screening test for low risk patient    • Thrombocytopenia (CMS/HCC)   • H/O heart artery stent   • Stented coronary artery   • Arteriosclerosis of coronary artery   • Atrial fibrillation (CMS/HCC)   • Paroxysmal atrial fibrillation (CMS/HCC)   • Chest pain, rule out acute myocardial infarction   • Fall   • Fracture, olecranon   • Hyperlipidemia   • Long term (current) use of anticoagulants   • Lower GI bleed   • Olecranon bursitis   • Shoulder pain   • Smoker   • CLL (chronic lymphocytic leukemia) (CMS/HCC)   • Dehydration         MEDICATIONS:  Medication Reconciliation for the patient has been reviewed by me and documented in the patients chart.    ALLERGIES:    Allergies   Allergen Reactions   • Contrast Dye Swelling   • Penicillins Swelling         There were no vitals filed for this visit.     Current Status 8/20/2020   ECOG score 0        Physical Exam   Constitutional: He is oriented to person, place, and time. He appears well-developed and well-nourished. No distress.   HENT:   Head: Normocephalic and atraumatic.   Mouth/Throat: Oropharynx is clear and moist and mucous membranes are normal. No oropharyngeal exudate.   Eyes: Pupils are equal, round, and reactive to light.    Neck: Normal range of motion.   Cardiovascular: Normal rate, regular rhythm and normal heart sounds.   No murmur heard.  Pulmonary/Chest: Effort normal and breath sounds normal. No respiratory distress. He has no wheezes. He has no rhonchi. He has no rales.   Abdominal: Soft. Normal appearance and bowel sounds are normal. He exhibits no distension. There is no hepatosplenomegaly.   Musculoskeletal: Normal range of motion. He exhibits no edema.   Neurological: He is alert and oriented to person, place, and time.   Skin: Skin is warm and dry. No rash noted.   Psychiatric: He has a normal mood and affect.   Vitals reviewed.       RECENT LABS:  Hematology WBC   Date Value Ref Range Status   08/24/2020 1.97 (L) 3.40 - 10.80 10*3/mm3 Final   07/15/2020 13.77 (H) 4.5 - 11.0 10*3/uL Final     RBC   Date Value Ref Range Status   08/24/2020 3.40 (L) 4.14 - 5.80 10*6/mm3 Final   07/15/2020 2.96 (L) 4.5 - 5.9 10*6/uL Final     Hemoglobin   Date Value Ref Range Status   08/24/2020 9.7 (L) 13.0 - 17.7 g/dL Final   07/15/2020 8.5 (L) 13.5 - 17.5 g/dL Final     Hematocrit   Date Value Ref Range Status   08/24/2020 30.2 (L) 37.5 - 51.0 % Final   07/15/2020 26.4 (L) 41.0 - 53.0 % Final     Platelets   Date Value Ref Range Status   08/24/2020 46 (L) 140 - 450 10*3/mm3 Final   07/15/2020 27 (L) 140 - 440 10*3/uL Final     Comment:     CHECKED        STAT NONCONTRAST HEAD CT 08/20/2020  IMPRESSION:  1. No acute abnormality is seen with no significant change when compared  to prior noncontrast head CT from [facility] 03/03/2015.  2. There is mild small vessel disease in the frontal periventricular  white matter and there are calcified atherosclerotic plaques in the  cavernous internal carotid arteries bilaterally.  3. Patient has had previous paranasal sinus surgery with bilateral  uncinectomies and ethmoidectomies and the paranasal sinuses are  currently clear. The remainder of the head CT is within normal  limits,  specifically no acute intracranial hemorrhage is seen. Etiology of  headaches in this patient with history of leukemia and thrombocytopenia  is not established on this exam. Results were communicated to Susannah Moya, the oncologist taking care of the patient by telephone on  08/20/2020 at 9:20 AM.      XR CHEST POST CVA PORT-07/31/20    IMPRESSION:  Chest port placement. No pneumothorax.       CT NECK, CHEST, ABDOMEN, AND PELVIS WITHOUT IV CONTRAST. 7/20/2020   HISTORY: 69-year-old male with CLL/SLL.     TECHNIQUE: Radiation dose reduction techniques were utilized, including  automated exposure control and exposure modulation based on body size. 3  mm images were obtained through the neck, chest, abdomen, and pelvis  without the administration of IV contrast. Compared with CT of the  abdomen and pelvis from 08/05/2019 and previous CTs from 03/19/2018.     FINDINGS:  NECK: There is no significant change in the 1.4 x 1.0 cm left  supraclavicular node or in the smaller supraclavicular nodes  bilaterally. A reference 1.4 x 0.7 cm right jugular chain node is  stable. There are shotty nodes scattered throughout the neck. No new  lymphadenopathy is seen. Noncontrasted parotid, submandibular, and  thyroid glands appear unremarkable.     CHEST: There has been interval decrease in the size of the shotty and  borderline enlarged nodes at the axilla. Reference 1.6 x 0.7 cm right  axillary node previously measured 2.0 x 1.2 cm. There has been a  decrease in the size of all of the shotty mediastinal and hilar nodes.  There are no new pulmonary opacities and there are no pleural or  pericardial effusions.     ABDOMEN/PELVIS: Splenic size has slightly increased measuring 15.4 cm in  diameter and previously measuring 13.5 cm, both measured on the coronal  reconstruction series. There is no change in the shotty nodes at the  gastrohepatic ligament and shorty hepatis. There are no pathologically  enlarged nodes. There is no  acute abnormality involving the  noncontrasted liver. A single gallstone is noted within the gallbladder.  Noncontrasted pancreas, adrenals, and kidneys appear unremarkable. There  is no acute bowel abnormality. There is extensive sigmoid  diverticulosis. No free fluid. There are extensive abdominal aortic  atherosclerotic changes and there is a stable 3.7 cm infrarenal  abdominal aortic aneurysm.     IMPRESSION:  1. There has been slight interval increase in splenomegaly since the CT  of the abdomen from 08/05/2019.  2. Interval decrease in the size of the axillary nodes and the shotty  mediastinal nodes. There is no change in the shotty nodes within the  neck and supraclavicular regions. There is no new lymphadenopathy.  3. Stable 3.7 cm infrarenal abdominal aortic aneurysm.      Assessment/Plan   1.This is a patient with a history of stage 2 CLL without evidence of any disease progression.  I have reviewed the CT scan from 3/19/2018 there is minimal progression but clinically patient is asymptomatic and we will follow with observation.  Will monitor with labs.   No evidence of any frequent infections, no major worsening of his white count. He has no fever or night sweats or any other symptoms.   · Patient knows that he is prone for infections and he is mainly staying at home during the COVID-19 situation time  · [facility] 2020[facility]2.  Initially admitted with chest pain and underwent stress test and echo.  Echo was negative.  Stress test is abnormal but have left message for patient's cardiologist to call us.  His cardiologist is been sent [place].  · He then got admitted the second time with worsening GI bleed and required total of 3 units of blood.  EGD was negative but colonoscopy showed angiectasia for which he underwent argon ablation.  Currently hemoglobin stable and no active bleeding.  · He was also found to have low platelets with platelets dropping down to 27k and  "hemoglobin was 7.  Ultrasound showed splenomegaly 15 cm.  Concern is whether he has progressed from his CLL point of view and requires treatment.  · CT scan performed 7/20/2020 did show the increased splenomegaly, but interval decrease in size of the axillary nodes, and shotty mediastinal nodes.  No change in the shotty nodes within the neck and supraclavicular regions.  No new lymphadenopathy.  Bone marrow biopsy however showed preliminarily that there is bone marrow involvement.  Remainder of bone marrow biopsy report is pending.  · Scans were reviewed with the patient today.  Dr. Moya also discussed with the patient and recommended he initiate treatment with chlorambucil and Gazyva.  He would not be a good candidate for Imbruvica due to his history of A. fib\".  He cannot take anticoagulation at this time.  The patient was provided with chemotherapy education today, including  Handouts.  He will need a port placed so that we can initiate treatment  · August 13, 2020: Gazyva day 1, August 14, 1980  · August 20 cycle 1 day 8 Gazyva.  He is going to receive chlorambucil along with this, 16 mg p.o. on day 1 day 15.  We can increase the dose once we know he tolerates and his platelets improved  Will need close monitoring of his CBC  · Patient is tolerating but has become significantly neutropenic and thrombocytopenic but no active bleeding  2.  Worsening thrombocytopenia, looking back his platelets were always in the 150s and during this admission he dropped down to 27K.  His LDH also was elevated and he was anemic.  · Repeat labs 7/24/2020 show a WBC 23.2, ANC 1.45, Hgb 8.7, platelet count 37,000.  He denies bleeding, and is feeling well.  We will continue to closely monitor.  · 8/24/2020 plts 46,000.  Just has a platelet transfusion yesterday. Denies bleeding.     3.  History of angiodysplasia requiring cauterization and Hess's esophagus mild anemia- He is currently asymptomatic status post recent " cauterization    4.  History of cluster headaches for which she has to be treated with steroids in the past and has followed up with Dr. Len Walker.today with significant headaches.  · Patient was sent for a CT of the head which was negative.  · He was started on prednisone 10 mg daily.,  So far this has not benefited.  · Patient reports he has required high-dose steroids in the past, but there is concern starting him on high-dose steroids for possible neutropenic sepsis.  He has seen neurologist, Dr. Walker in the past.  Dr. Moya plans to discuss with his neurologist       Plan   1.  Continue prednisone 10 mg daily.  2.  Dr. Moya will discuss treatment of cluster headaches with neurologist, Dr. Walker.  3.  Patient is here to receive day 15 Gazfanta  4.  Given the neutropenia being significant we will give only 10 mg of Leukeran along with obinutuzumab.  Patient is taking allopurinol as well as acyclovir    We will recheck CBC tomorrow in case he needs any transfusions nurse to review.  He will be back once a week for 4 weeks with nurse review and nurse practitioner will see him with day 15 Gazyva.  We will up with me in 4 weeks   Susannah Moya MD        Cc: Dr. Kevin Chandra

## 2020-08-27 ENCOUNTER — INFUSION (OUTPATIENT)
Dept: ONCOLOGY | Facility: HOSPITAL | Age: 70
End: 2020-08-27

## 2020-08-27 ENCOUNTER — OFFICE VISIT (OUTPATIENT)
Dept: ONCOLOGY | Facility: CLINIC | Age: 70
End: 2020-08-27

## 2020-08-27 VITALS
HEART RATE: 60 BPM | SYSTOLIC BLOOD PRESSURE: 115 MMHG | TEMPERATURE: 98 F | RESPIRATION RATE: 18 BRPM | BODY MASS INDEX: 20.91 KG/M2 | OXYGEN SATURATION: 98 % | HEIGHT: 73 IN | WEIGHT: 157.8 LBS | DIASTOLIC BLOOD PRESSURE: 69 MMHG

## 2020-08-27 VITALS — DIASTOLIC BLOOD PRESSURE: 69 MMHG | SYSTOLIC BLOOD PRESSURE: 120 MMHG | HEART RATE: 54 BPM

## 2020-08-27 DIAGNOSIS — C91.10 CLL (CHRONIC LYMPHOCYTIC LEUKEMIA) (HCC): Primary | ICD-10-CM

## 2020-08-27 DIAGNOSIS — C91.10 CLL (CHRONIC LYMPHOCYTIC LEUKEMIA) (HCC): ICD-10-CM

## 2020-08-27 LAB
ALBUMIN SERPL-MCNC: 3.5 G/DL (ref 3.5–5.2)
ALBUMIN/GLOB SERPL: 1.8 G/DL (ref 1.1–2.4)
ALP SERPL-CCNC: 126 U/L (ref 38–116)
ALT SERPL W P-5'-P-CCNC: 13 U/L (ref 0–41)
ANION GAP SERPL CALCULATED.3IONS-SCNC: 10 MMOL/L (ref 5–15)
AST SERPL-CCNC: 15 U/L (ref 0–40)
BASOPHILS # BLD AUTO: 0.02 10*3/MM3 (ref 0–0.2)
BASOPHILS NFR BLD AUTO: 1.3 % (ref 0–1.5)
BILIRUB SERPL-MCNC: 1.3 MG/DL (ref 0.2–1.2)
BUN SERPL-MCNC: 20 MG/DL (ref 6–20)
BUN/CREAT SERPL: 23.8 (ref 7.3–30)
CALCIUM SPEC-SCNC: 8.5 MG/DL (ref 8.5–10.2)
CHLORIDE SERPL-SCNC: 100 MMOL/L (ref 98–107)
CO2 SERPL-SCNC: 23 MMOL/L (ref 22–29)
CREAT SERPL-MCNC: 0.84 MG/DL (ref 0.7–1.3)
DEPRECATED RDW RBC AUTO: 63.3 FL (ref 37–54)
EOSINOPHIL # BLD AUTO: 0 10*3/MM3 (ref 0–0.4)
EOSINOPHIL NFR BLD AUTO: 0 % (ref 0.3–6.2)
ERYTHROCYTE [DISTWIDTH] IN BLOOD BY AUTOMATED COUNT: 19.9 % (ref 12.3–15.4)
GFR SERPL CREATININE-BSD FRML MDRD: 90 ML/MIN/1.73
GLOBULIN UR ELPH-MCNC: 1.9 GM/DL (ref 1.8–3.5)
GLUCOSE SERPL-MCNC: 320 MG/DL (ref 74–124)
HCT VFR BLD AUTO: 32.8 % (ref 37.5–51)
HGB BLD-MCNC: 10.5 G/DL (ref 13–17.7)
IMM GRANULOCYTES # BLD AUTO: 0.21 10*3/MM3 (ref 0–0.05)
IMM GRANULOCYTES NFR BLD AUTO: 14.1 % (ref 0–0.5)
LDH SERPL-CCNC: 215 U/L (ref 99–259)
LYMPHOCYTES # BLD AUTO: 0.49 10*3/MM3 (ref 0.7–3.1)
LYMPHOCYTES NFR BLD AUTO: 32.9 % (ref 19.6–45.3)
MAGNESIUM SERPL-MCNC: 2 MG/DL (ref 1.8–2.5)
MCH RBC QN AUTO: 28.7 PG (ref 26.6–33)
MCHC RBC AUTO-ENTMCNC: 32 G/DL (ref 31.5–35.7)
MCV RBC AUTO: 89.6 FL (ref 79–97)
MONOCYTES # BLD AUTO: 0.1 10*3/MM3 (ref 0.1–0.9)
MONOCYTES NFR BLD AUTO: 6.7 % (ref 5–12)
NEUTROPHILS NFR BLD AUTO: 0.67 10*3/MM3 (ref 1.7–7)
NEUTROPHILS NFR BLD AUTO: 45 % (ref 42.7–76)
NRBC BLD AUTO-RTO: 1.3 /100 WBC (ref 0–0.2)
PHOSPHATE SERPL-MCNC: 3.6 MG/DL (ref 2.5–4.5)
PLATELET # BLD AUTO: 30 10*3/MM3 (ref 140–450)
PMV BLD AUTO: 9.5 FL (ref 6–12)
POTASSIUM SERPL-SCNC: 4.2 MMOL/L (ref 3.5–4.7)
PROT SERPL-MCNC: 5.4 G/DL (ref 6.3–8)
RBC # BLD AUTO: 3.66 10*6/MM3 (ref 4.14–5.8)
SODIUM SERPL-SCNC: 133 MMOL/L (ref 134–145)
URATE SERPL-MCNC: 3.1 MG/DL (ref 2.8–7.4)
WBC # BLD AUTO: 1.49 10*3/MM3 (ref 3.4–10.8)

## 2020-08-27 PROCEDURE — 25010000002 OBINUTUZUMAB 1000 MG/40ML SOLUTION 40 ML VIAL: Performed by: INTERNAL MEDICINE

## 2020-08-27 PROCEDURE — 83735 ASSAY OF MAGNESIUM: CPT

## 2020-08-27 PROCEDURE — 83615 LACTATE (LD) (LDH) ENZYME: CPT

## 2020-08-27 PROCEDURE — 84100 ASSAY OF PHOSPHORUS: CPT

## 2020-08-27 PROCEDURE — 25010000003 HEPARIN LOCK FLUSH PER 10 UNITS: Performed by: NURSE PRACTITIONER

## 2020-08-27 PROCEDURE — 80053 COMPREHEN METABOLIC PANEL: CPT

## 2020-08-27 PROCEDURE — 99213 OFFICE O/P EST LOW 20 MIN: CPT | Performed by: INTERNAL MEDICINE

## 2020-08-27 PROCEDURE — 96415 CHEMO IV INFUSION ADDL HR: CPT

## 2020-08-27 PROCEDURE — 84550 ASSAY OF BLOOD/URIC ACID: CPT

## 2020-08-27 PROCEDURE — 85025 COMPLETE CBC W/AUTO DIFF WBC: CPT

## 2020-08-27 PROCEDURE — 63710000001 PROCHLORPERAZINE MALEATE PER 10 MG: Performed by: INTERNAL MEDICINE

## 2020-08-27 PROCEDURE — 96413 CHEMO IV INFUSION 1 HR: CPT

## 2020-08-27 PROCEDURE — 96361 HYDRATE IV INFUSION ADD-ON: CPT

## 2020-08-27 RX ORDER — ACETAMINOPHEN 325 MG/1
650 TABLET ORAL ONCE
Status: COMPLETED | OUTPATIENT
Start: 2020-08-27 | End: 2020-08-27

## 2020-08-27 RX ORDER — SODIUM CHLORIDE 9 MG/ML
250 INJECTION, SOLUTION INTRAVENOUS ONCE
Status: COMPLETED | OUTPATIENT
Start: 2020-08-27 | End: 2020-08-27

## 2020-08-27 RX ORDER — PROCHLORPERAZINE MALEATE 10 MG
10 TABLET ORAL ONCE
Status: COMPLETED | OUTPATIENT
Start: 2020-08-27 | End: 2020-08-27

## 2020-08-27 RX ORDER — SODIUM CHLORIDE 0.9 % (FLUSH) 0.9 %
10 SYRINGE (ML) INJECTION AS NEEDED
Status: CANCELLED | OUTPATIENT
Start: 2020-08-27

## 2020-08-27 RX ORDER — PROCHLORPERAZINE MALEATE 10 MG
10 TABLET ORAL ONCE
Status: CANCELLED
Start: 2020-08-27

## 2020-08-27 RX ORDER — HEPARIN SODIUM (PORCINE) LOCK FLUSH IV SOLN 100 UNIT/ML 100 UNIT/ML
500 SOLUTION INTRAVENOUS AS NEEDED
Status: DISCONTINUED | OUTPATIENT
Start: 2020-08-27 | End: 2020-08-27 | Stop reason: HOSPADM

## 2020-08-27 RX ORDER — SODIUM CHLORIDE 9 MG/ML
500 INJECTION, SOLUTION INTRAVENOUS ONCE
Status: COMPLETED | OUTPATIENT
Start: 2020-08-27 | End: 2020-08-27

## 2020-08-27 RX ORDER — HEPARIN SODIUM (PORCINE) LOCK FLUSH IV SOLN 100 UNIT/ML 100 UNIT/ML
500 SOLUTION INTRAVENOUS AS NEEDED
Status: CANCELLED | OUTPATIENT
Start: 2020-08-27

## 2020-08-27 RX ADMIN — ACETAMINOPHEN 650 MG: 325 TABLET ORAL at 11:05

## 2020-08-27 RX ADMIN — SODIUM CHLORIDE 250 ML: 9 INJECTION, SOLUTION INTRAVENOUS at 11:03

## 2020-08-27 RX ADMIN — OBINUTUZUMAB 1000 MG: 1000 INJECTION, SOLUTION, CONCENTRATE INTRAVENOUS at 12:03

## 2020-08-27 RX ADMIN — Medication 500 UNITS: at 15:23

## 2020-08-27 RX ADMIN — SODIUM CHLORIDE 500 ML: 9 INJECTION, SOLUTION INTRAVENOUS at 11:03

## 2020-08-27 RX ADMIN — PROCHLORPERAZINE MALEATE 10 MG: 10 TABLET ORAL at 11:05

## 2020-08-28 ENCOUNTER — INFUSION (OUTPATIENT)
Dept: ONCOLOGY | Facility: HOSPITAL | Age: 70
End: 2020-08-28

## 2020-08-28 ENCOUNTER — LAB (OUTPATIENT)
Dept: LAB | Facility: HOSPITAL | Age: 70
End: 2020-08-28

## 2020-08-28 ENCOUNTER — DOCUMENTATION (OUTPATIENT)
Dept: ONCOLOGY | Facility: CLINIC | Age: 70
End: 2020-08-28

## 2020-08-28 ENCOUNTER — MEDICATION THERAPY MANAGEMENT (OUTPATIENT)
Dept: ONCOLOGY | Facility: HOSPITAL | Age: 70
End: 2020-08-28

## 2020-08-28 DIAGNOSIS — C91.10 CLL (CHRONIC LYMPHOCYTIC LEUKEMIA) (HCC): ICD-10-CM

## 2020-08-28 DIAGNOSIS — C91.10 CLL (CHRONIC LYMPHOCYTIC LEUKEMIA) (HCC): Primary | ICD-10-CM

## 2020-08-28 LAB
ALBUMIN SERPL-MCNC: 3.6 G/DL (ref 3.5–5.2)
ALBUMIN/GLOB SERPL: 1.7 G/DL (ref 1.1–2.4)
ALP SERPL-CCNC: 120 U/L (ref 38–116)
ALT SERPL W P-5'-P-CCNC: 13 U/L (ref 0–41)
ANION GAP SERPL CALCULATED.3IONS-SCNC: 10.3 MMOL/L (ref 5–15)
AST SERPL-CCNC: 15 U/L (ref 0–40)
BASOPHILS # BLD AUTO: 0.03 10*3/MM3 (ref 0–0.2)
BASOPHILS NFR BLD AUTO: 1.4 % (ref 0–1.5)
BILIRUB SERPL-MCNC: 1.3 MG/DL (ref 0.2–1.2)
BUN SERPL-MCNC: 17 MG/DL (ref 6–20)
BUN/CREAT SERPL: 21.5 (ref 7.3–30)
CALCIUM SPEC-SCNC: 8.6 MG/DL (ref 8.5–10.2)
CHLORIDE SERPL-SCNC: 101 MMOL/L (ref 98–107)
CO2 SERPL-SCNC: 23.7 MMOL/L (ref 22–29)
CREAT SERPL-MCNC: 0.79 MG/DL (ref 0.7–1.3)
DEPRECATED RDW RBC AUTO: 61.1 FL (ref 37–54)
EOSINOPHIL # BLD AUTO: 0.02 10*3/MM3 (ref 0–0.4)
EOSINOPHIL NFR BLD AUTO: 1 % (ref 0.3–6.2)
ERYTHROCYTE [DISTWIDTH] IN BLOOD BY AUTOMATED COUNT: 19.7 % (ref 12.3–15.4)
GFR SERPL CREATININE-BSD FRML MDRD: 97 ML/MIN/1.73
GLOBULIN UR ELPH-MCNC: 2.1 GM/DL (ref 1.8–3.5)
GLUCOSE SERPL-MCNC: 197 MG/DL (ref 74–124)
HCT VFR BLD AUTO: 34.6 % (ref 37.5–51)
HGB BLD-MCNC: 11.4 G/DL (ref 13–17.7)
IMM GRANULOCYTES # BLD AUTO: 0.28 10*3/MM3 (ref 0–0.05)
IMM GRANULOCYTES NFR BLD AUTO: 13.4 % (ref 0–0.5)
LYMPHOCYTES # BLD AUTO: 0.81 10*3/MM3 (ref 0.7–3.1)
LYMPHOCYTES NFR BLD AUTO: 38.8 % (ref 19.6–45.3)
MCH RBC QN AUTO: 28.6 PG (ref 26.6–33)
MCHC RBC AUTO-ENTMCNC: 32.9 G/DL (ref 31.5–35.7)
MCV RBC AUTO: 86.9 FL (ref 79–97)
MONOCYTES # BLD AUTO: 0.22 10*3/MM3 (ref 0.1–0.9)
MONOCYTES NFR BLD AUTO: 10.5 % (ref 5–12)
NEUTROPHILS NFR BLD AUTO: 0.73 10*3/MM3 (ref 1.7–7)
NEUTROPHILS NFR BLD AUTO: 34.9 % (ref 42.7–76)
NRBC BLD AUTO-RTO: 1.4 /100 WBC (ref 0–0.2)
PHOSPHATE SERPL-MCNC: 3.8 MG/DL (ref 2.5–4.5)
PLATELET # BLD AUTO: 21 10*3/MM3 (ref 140–450)
POTASSIUM SERPL-SCNC: 4.2 MMOL/L (ref 3.5–4.7)
PROT SERPL-MCNC: 5.7 G/DL (ref 6.3–8)
RBC # BLD AUTO: 3.98 10*6/MM3 (ref 4.14–5.8)
SODIUM SERPL-SCNC: 135 MMOL/L (ref 134–145)
URATE SERPL-MCNC: 3.1 MG/DL (ref 2.8–7.4)
WBC # BLD AUTO: 2.09 10*3/MM3 (ref 3.4–10.8)

## 2020-08-28 PROCEDURE — G0463 HOSPITAL OUTPT CLINIC VISIT: HCPCS

## 2020-08-28 PROCEDURE — 84100 ASSAY OF PHOSPHORUS: CPT

## 2020-08-28 PROCEDURE — 36415 COLL VENOUS BLD VENIPUNCTURE: CPT

## 2020-08-28 PROCEDURE — 80053 COMPREHEN METABOLIC PANEL: CPT | Performed by: INTERNAL MEDICINE

## 2020-08-28 PROCEDURE — 84550 ASSAY OF BLOOD/URIC ACID: CPT | Performed by: INTERNAL MEDICINE

## 2020-08-28 PROCEDURE — 85025 COMPLETE CBC W/AUTO DIFF WBC: CPT

## 2020-08-28 RX ORDER — FAMOTIDINE 10 MG/ML
20 INJECTION, SOLUTION INTRAVENOUS ONCE
Status: CANCELLED | OUTPATIENT
Start: 2020-08-29 | End: 2020-08-28

## 2020-08-28 RX ORDER — DIPHENHYDRAMINE HCL 25 MG
25 CAPSULE ORAL ONCE
Status: CANCELLED | OUTPATIENT
Start: 2020-08-29 | End: 2020-08-28

## 2020-08-28 RX ORDER — SODIUM CHLORIDE 9 MG/ML
250 INJECTION, SOLUTION INTRAVENOUS AS NEEDED
Status: CANCELLED | OUTPATIENT
Start: 2020-08-29

## 2020-08-28 RX ORDER — ACETAMINOPHEN 325 MG/1
650 TABLET ORAL ONCE
Status: CANCELLED | OUTPATIENT
Start: 2020-08-29 | End: 2020-08-28

## 2020-08-28 NOTE — PROGRESS NOTES
[facility] lab review ( Gazyva+leukeran)        8/27/2020  Day 15   WBC 3.40 - 10.80 10*3/mm3 1.49 (A)   Neutrophils Absolute 1.70 - 7.00 10*3/mm3 0.67 (A)   Hemoglobin 13.0 - 17.7 g/dL 10.5 (A)   Hematocrit 37.5 - 51.0 % 32.8 (A)   Platelets 140 - 450 10*3/mm3 30 (A)   Creatinine 0.70 - 1.30 mg/dL 0.84   eGFR Non African Am >60 mL/min/1.73 90   BUN 6 - 20 mg/dL 20   Sodium 134 - 145 mmol/L 133 (A)   Potassium 3.5 - 4.7 mmol/L 4.2   Glucose 74 - 124 mg/dL 320 (A)   Magnesium 1.8 - 2.5 mg/dL 2.0   Calcium 8.5 - 10.2 mg/dL 8.5   Albumin 3.50 - 5.20 g/dL 3.50   Total Protein 6.3 - 8.0 g/dL 5.4 (A)   AST (SGOT) 0 - 40 U/L 15   ALT (SGPT) 0 - 41 U/L 13   Alkaline Phosphatase 38 - 116 U/L 126 (A)   Total Bilirubin 0.2 - 1.2 mg/dL 1.3 (A)     Leukeran reduced to 10 mg ( neutropenia and thrombocytopenia) per Dr Moya's dictation.

## 2020-08-28 NOTE — NURSING NOTE
Pt given copy of all labs and pt verified understanding of appt time for infusion tomorrow.  Pt requested to remain accessed for port to be used for transfusion.  Added biopatch, flushed and heparin locked port.  
Pt here for RN review with labs to be reviewed with Dr. Moya.  MD ordered transfusion of 1 unit platelets for Saturday or Sunday.  Also CMP, uric acid and phos labs stat.  Pt agreeable to this plan and orders placed.  Due to previous reaction with platelets, premeds ordered included tylenol, pepcid, benadryl and solumedrol.  Pt given scheduled time for transfusion on Saturday 8/19 at 8:30am.  
124

## 2020-08-28 NOTE — PROGRESS NOTES
Staff message rec from KENDRICK Mcgarry Phamacist-Dr Moya has changed pts Leukeran dose to 10 mg.     Pt has a supply on hand.     Leela Rawls [facility] sent to [facility] Tan Dr reduced his leukeran yesterday to 10 mg due to neutropenia

## 2020-08-29 ENCOUNTER — INFUSION (OUTPATIENT)
Dept: ONCOLOGY | Facility: HOSPITAL | Age: 70
End: 2020-08-29

## 2020-08-29 VITALS
RESPIRATION RATE: 18 BRPM | HEART RATE: 64 BPM | OXYGEN SATURATION: 100 % | TEMPERATURE: 97.2 F | SYSTOLIC BLOOD PRESSURE: 104 MMHG | DIASTOLIC BLOOD PRESSURE: 64 MMHG

## 2020-08-29 DIAGNOSIS — C91.10 CLL (CHRONIC LYMPHOCYTIC LEUKEMIA) (HCC): Primary | ICD-10-CM

## 2020-08-29 PROCEDURE — 96374 THER/PROPH/DIAG INJ IV PUSH: CPT

## 2020-08-29 PROCEDURE — 25010000003 HEPARIN LOCK FLUSH PER 10 UNITS: Performed by: NURSE PRACTITIONER

## 2020-08-29 PROCEDURE — 96375 TX/PRO/DX INJ NEW DRUG ADDON: CPT

## 2020-08-29 PROCEDURE — 25010000002 HYDROCORTISONE SODIUM SUCCINATE 100 MG RECONSTITUTED SOLUTION: Performed by: INTERNAL MEDICINE

## 2020-08-29 PROCEDURE — P9035 PLATELET PHERES LEUKOREDUCED: HCPCS

## 2020-08-29 PROCEDURE — 36430 TRANSFUSION BLD/BLD COMPNT: CPT

## 2020-08-29 PROCEDURE — 63710000001 DIPHENHYDRAMINE PER 50 MG: Performed by: INTERNAL MEDICINE

## 2020-08-29 RX ORDER — SODIUM CHLORIDE 0.9 % (FLUSH) 0.9 %
10 SYRINGE (ML) INJECTION AS NEEDED
Status: CANCELLED | OUTPATIENT
Start: 2020-08-29

## 2020-08-29 RX ORDER — FAMOTIDINE 10 MG/ML
20 INJECTION, SOLUTION INTRAVENOUS ONCE
Status: COMPLETED | OUTPATIENT
Start: 2020-08-29 | End: 2020-08-29

## 2020-08-29 RX ORDER — HEPARIN SODIUM (PORCINE) LOCK FLUSH IV SOLN 100 UNIT/ML 100 UNIT/ML
500 SOLUTION INTRAVENOUS AS NEEDED
Status: DISCONTINUED | OUTPATIENT
Start: 2020-08-29 | End: 2020-08-29 | Stop reason: HOSPADM

## 2020-08-29 RX ORDER — SODIUM CHLORIDE 9 MG/ML
250 INJECTION, SOLUTION INTRAVENOUS AS NEEDED
Status: DISCONTINUED | OUTPATIENT
Start: 2020-08-29 | End: 2020-08-29 | Stop reason: HOSPADM

## 2020-08-29 RX ORDER — SODIUM CHLORIDE 0.9 % (FLUSH) 0.9 %
10 SYRINGE (ML) INJECTION AS NEEDED
Status: DISCONTINUED | OUTPATIENT
Start: 2020-08-29 | End: 2020-08-29 | Stop reason: HOSPADM

## 2020-08-29 RX ORDER — HEPARIN SODIUM (PORCINE) LOCK FLUSH IV SOLN 100 UNIT/ML 100 UNIT/ML
500 SOLUTION INTRAVENOUS AS NEEDED
Status: CANCELLED | OUTPATIENT
Start: 2020-08-29

## 2020-08-29 RX ORDER — DIPHENHYDRAMINE HCL 25 MG
25 CAPSULE ORAL ONCE
Status: COMPLETED | OUTPATIENT
Start: 2020-08-29 | End: 2020-08-29

## 2020-08-29 RX ORDER — ACETAMINOPHEN 325 MG/1
650 TABLET ORAL ONCE
Status: COMPLETED | OUTPATIENT
Start: 2020-08-29 | End: 2020-08-29

## 2020-08-29 RX ADMIN — DIPHENHYDRAMINE HYDROCHLORIDE 25 MG: 25 CAPSULE ORAL at 08:45

## 2020-08-29 RX ADMIN — HEPARIN 500 UNITS: 100 SYRINGE at 09:26

## 2020-08-29 RX ADMIN — HYDROCORTISONE SODIUM SUCCINATE 100 MG: 100 INJECTION, POWDER, FOR SOLUTION INTRAMUSCULAR; INTRAVENOUS at 08:46

## 2020-08-29 RX ADMIN — FAMOTIDINE 20 MG: 10 INJECTION, SOLUTION INTRAVENOUS at 08:46

## 2020-08-29 RX ADMIN — ACETAMINOPHEN 650 MG: 325 TABLET, FILM COATED ORAL at 08:45

## 2020-08-31 ENCOUNTER — DOCUMENTATION (OUTPATIENT)
Dept: ONCOLOGY | Facility: CLINIC | Age: 70
End: 2020-08-31

## 2020-08-31 NOTE — PROGRESS NOTES
Staff messages rec from Dr Moya-Pt cannot tolerate the Chlorambucil and needs to start Venclexta. I have submitted the PA to [facility] through [employer].    Waiting for a decision      8/29/20 at 2:19 pm  Susannah Moya MD sent to [facility] Tan             Harika   Patient cannot tolerate chlorambucil.  Can we please get approval for venetoclax.  He has got chronic lymphocytic leukemia and he needs to receive venatoclax with Gazyva.  Please let me know.     Susannah Moya MD      8/30/2020 at 2:26 pm  Susannah Moya MD sent to [facility] Tan.             Can you please make sure that this patient has approval for venetoclax as he cannot tolerate chlorambucil.  He will take venetoclax along with Gazyva.  Thanks   Susannah Moya MD

## 2020-09-02 ENCOUNTER — DOCUMENTATION (OUTPATIENT)
Dept: ONCOLOGY | Facility: CLINIC | Age: 70
End: 2020-09-02

## 2020-09-02 NOTE — PROGRESS NOTES
Staff message rec from KENDRICK Mcgarry pharmacist yesterday about ramp up dosing for Venclexta.    Venetoclax 20 mg daily for week 1   Venetoclax 50 mg daily for week 2   Venetoclax 100 mg daily for week 3 and after.    Pt will be put on Vorizonazole on Week 3    I have escribed the rx to JACQUELINE Diaz for processing

## 2020-09-03 ENCOUNTER — SPECIALTY PHARMACY (OUTPATIENT)
Dept: ONCOLOGY | Facility: CLINIC | Age: 70
End: 2020-09-03

## 2020-09-03 ENCOUNTER — DOCUMENTATION (OUTPATIENT)
Dept: ONCOLOGY | Facility: CLINIC | Age: 70
End: 2020-09-03

## 2020-09-03 ENCOUNTER — INFUSION (OUTPATIENT)
Dept: ONCOLOGY | Facility: HOSPITAL | Age: 70
End: 2020-09-03

## 2020-09-03 ENCOUNTER — LAB (OUTPATIENT)
Dept: LAB | Facility: HOSPITAL | Age: 70
End: 2020-09-03

## 2020-09-03 DIAGNOSIS — C91.10 CLL (CHRONIC LYMPHOCYTIC LEUKEMIA) (HCC): ICD-10-CM

## 2020-09-03 LAB
BASOPHILS # BLD AUTO: 0.03 10*3/MM3 (ref 0–0.2)
BASOPHILS NFR BLD AUTO: 1.1 % (ref 0–1.5)
DEPRECATED RDW RBC AUTO: 59.9 FL (ref 37–54)
EOSINOPHIL # BLD AUTO: 0.02 10*3/MM3 (ref 0–0.4)
EOSINOPHIL NFR BLD AUTO: 0.7 % (ref 0.3–6.2)
ERYTHROCYTE [DISTWIDTH] IN BLOOD BY AUTOMATED COUNT: 19.5 % (ref 12.3–15.4)
HCT VFR BLD AUTO: 30.6 % (ref 37.5–51)
HGB BLD-MCNC: 10.3 G/DL (ref 13–17.7)
IMM GRANULOCYTES # BLD AUTO: 0.3 10*3/MM3 (ref 0–0.05)
IMM GRANULOCYTES NFR BLD AUTO: 10.8 % (ref 0–0.5)
LYMPHOCYTES # BLD AUTO: 1.01 10*3/MM3 (ref 0.7–3.1)
LYMPHOCYTES NFR BLD AUTO: 36.3 % (ref 19.6–45.3)
MCH RBC QN AUTO: 29 PG (ref 26.6–33)
MCHC RBC AUTO-ENTMCNC: 33.7 G/DL (ref 31.5–35.7)
MCV RBC AUTO: 86.2 FL (ref 79–97)
MONOCYTES # BLD AUTO: 0.25 10*3/MM3 (ref 0.1–0.9)
MONOCYTES NFR BLD AUTO: 9 % (ref 5–12)
NEUTROPHILS NFR BLD AUTO: 1.17 10*3/MM3 (ref 1.7–7)
NEUTROPHILS NFR BLD AUTO: 42.1 % (ref 42.7–76)
NRBC BLD AUTO-RTO: 2.5 /100 WBC (ref 0–0.2)
PLATELET # BLD AUTO: 43 10*3/MM3 (ref 140–450)
PMV BLD AUTO: 11.5 FL (ref 6–12)
RBC # BLD AUTO: 3.55 10*6/MM3 (ref 4.14–5.8)
WBC # BLD AUTO: 2.78 10*3/MM3 (ref 3.4–10.8)

## 2020-09-03 PROCEDURE — 85025 COMPLETE CBC W/AUTO DIFF WBC: CPT

## 2020-09-03 PROCEDURE — 36415 COLL VENOUS BLD VENIPUNCTURE: CPT

## 2020-09-03 PROCEDURE — G0463 HOSPITAL OUTPT CLINIC VISIT: HCPCS

## 2020-09-03 NOTE — NURSING NOTE
Lab Results   Component Value Date    WBC 2.78 (L) 09/03/2020    HGB 10.3 (L) 09/03/2020    HCT 30.6 (L) 09/03/2020    MCV 86.2 09/03/2020    PLT 43 (L) 09/03/2020     Pt is here for lab with RN review.  CBC reviewed with pt, counts are stable for this pt at this time. Pt has no complaints.  Copy of labs given to pt and f/u appt reviewed. Pt is instructed to call the office with any concerns or new symptoms prior to next visit. Pt vu

## 2020-09-03 NOTE — PROGRESS NOTES
I was able to speak with pts wife about the copay for Venclexta ($690). I was able to get income information and I have applied on pts behalf to [facility]. Pt has been approved for $8400 and information given to JACQUELINE Diaz for processing.    Member ID: [id number]  Group ID: [id number]  Robson ID: [id number]  PCN: [id number]  Eligibility Start Date: 06/05/2020  Eligibility End Date: 09/02/2021  Assistance Amount: $8,400.00

## 2020-09-03 NOTE — PROGRESS NOTES
Oral Chemotherapy Teaching      Patient Name/:  Macho Stephenson   1950  Oral Chemotherapy Regimen:  Venetoclax 20 mg daily for 1 week, then 50 mg daily for 1 week, then 100 mg daily thereafter.  After patient has taken 1 week of venetoclax then he should begin Voriconazole 200 mg po bid and continue Venetoclax 100 mg daily for 12 cycles. ( Pt has previously received education on Gazyva from [facility]).   Date Started Medication: Plan to start   In person education session  Initial Teaching Follow Up Comments     Safety     Storage instructions (away from children; away from heat/cold, sunlight, or moisture), handling - use of gloves (caregivers), washing hands after touching pills, managing waste     “How are you storing your medications?”, reminders on storage, proper handling (caregivers using gloves, washing hands, away from children, managing waste, etc.), disposal of medication with D/C or dosage change    We discussed storage at room temp in a dry location, protected from light and secured from children and pets. He was counseled that should he become ill and his wife helped administer medications, that she should wear gloves.     Adherence      patient and/or caregiver on how to take medication, take with/without food, assess their adherence potential, stress importance of adherence, ways to manage adherence (pill boxes, phone reminders, calendars), what to do if miss a dose   “How are you taking your medication?” “How are you remembering to take your medication?”, “How many doses have you missed?”, determine reasons for non-adherence (not remembering, side effects, etc), ways to improve, overadherence? Remind patient of ways to improve/maintain adherence   Venetoclax 20 mg po daily for 7 days, then 50 mg daily for 7 days, then 100 mg daily thereafter.  Take with food at approximately same time daily. Once patient has been on Venetoclax 100 mg po daily for 7 days, then Voriconazole 200mg  po bid will be added. His wife phoned in to listen to education session and he was given an adherence calendar.  He was also counseled to stop taking Leukeran.      Side Effects/Adverse Reactions      patient on potential side effects, s/s, ways to manage, when to call MD/seek help     Determine if patient experiencing side effects, ways to manage  We discussed potential for low WBCs and infection control precautions such as handwashing, wearing a mask and avoiding crowds.  He was counseled to call for a temp exceeding 100.4.  Diarrhea and its management with Imodium was reviewed.  Nausea and its management with prochlorperazine discussed.  The family does not think ondansetron was contributing to his previous complaints of headaches, so he was counseled that he could use that for nausea control if he wished.  We discussed fatigue as a side effect as well as electrolyte abberations and TLS.  He was advised to drink 1.5-2 liters of fluids 72 hours prior to initiation as well as 72 hours prior to each ramp up.  He was also counseled to continue taking Allopurinol.  His wife verified that is available at home for him.  An in basket message has been sent o Dr Moya requesting if lab work the day after each ramp up dose is desired.       Miscellaneous     Food interactions, DDIs, financial issues Determine if patient started any new medications since being placed on oral chemo (analyze for DDI) No DDI per UP To Date     Additional Notes:  He was given information re delivery of medication from [facility].  He asked about copay amounts and message was sent to Harika Tan, pharmacy benefit advocate.  She obtained copay assistance and has left a VM for his wife.  He and his wife were given the opportunity to ask questions.  CCA and consents were signed.

## 2020-09-04 ENCOUNTER — MEDICATION THERAPY MANAGEMENT (OUTPATIENT)
Dept: ONCOLOGY | Facility: HOSPITAL | Age: 70
End: 2020-09-04

## 2020-09-04 ENCOUNTER — TELEPHONE (OUTPATIENT)
Dept: ONCOLOGY | Facility: CLINIC | Age: 70
End: 2020-09-04

## 2020-09-04 NOTE — TELEPHONE ENCOUNTER
----- Message from Leela Rawls RPH sent at 9/4/2020  8:06 AM EDT -----  So based on how he will be taking his Venetoclax, it would make sense for him to get labs checked on 9/15 9/22  9/29 and 10/6 if you can schedule that with him.  Thanks!    Osvaldo [place]  ----- Message -----  From: Susannah Moya MD  Sent: 9/3/2020   7:21 PM EDT  To: Leela Rawls RPH    Reasonable to do it once a week.  If he can make sure it scheduled    Susannah Moya MD      ----- Message -----  From: Leela Rawls RPH  Sent: 9/3/2020   3:17 PM EDT  To: MD Dr SEBASTIAN Daugherty,     Would you like him to come in for labs after each dose increase on Venetoclax?    Thanks,   Jane

## 2020-09-04 NOTE — PROGRESS NOTES
[facility] telephone encounter re follow up on venetoclax education    Spoke with Mr Stephenson's wife today.  The have received information on their copay assistance.  They also understand that labs have been ordered on the day following each venetoclax ramp up.  Ms Stephenson was once again given an opportunity to ask questions.  No concerns were voiced today.

## 2020-09-09 NOTE — PROGRESS NOTES
Subjective       REASON FOR VISIT:    1. Chronic lymphoid leukemia, stage 4.     2.  History of angiodysplasia requiring cauterization and history of Hess's esophagus    3.  Bone marrow aspiration biopsy shows hypercellular marrow with 98% involvement with CLL    Patient ID: Macho Stephenson is a 70 y.o. year old male     History of Present Illness Mr. Stephenson returns today due for cycle 2 of Gazyva.  He is also taking chlorambucil on days 1 and 15.  He was originally taking a total dose of 16 mg however he was noted on cycle 1 day 15 to be neutropenic with an ANC of 0.73 and thrombocytopenic with platelet count of 21,000.  We therefore asked him to reduce his dose of chlorambucil to a total of 10 mg along with continuing Gazyva.  However it is felt in general the patient cannot tolerate chlorambucil and we now plan to switch him to Venetoclax.  Patient did also undergo platelet transfusion shortly thereafter.    The patient has undergone formal education with our pharmacy team for Venetoclax and the medication will be shipped to his home to begin early next week.  We plan to repeat labs as new baseline data early next week.    We reviewed that currently his ANC is 0.8, platelets improved to 55,000, hemoglobin 9.3.  He feels well today and denies any new concerns.    PAST MEDICAL HISTORY:   1. Recurrent atrial fibrillation followed by cardiology. He has had drug eluting stent placed x 3.   2. High cholesterol.   3. Hypertension.       HEMATOLOGIC/ONCOLOGIC HISTORY:       The patient is 62-year-old gentleman with the above history currently here for followup. He has no complaints. He denies fever, night sweats or weight loss. He does not have any lymphadenopathy. He feels good. He had some fatigue but his CBC shows a go od hemoglobin.   The patient is followed by Dr. Kevin Chandra. He had seen Dr. Chandra 7/18/13 for a history of coronary artery disease status drug eluting stent placement to the right coronary  artery and left anterior descending artery in February 2011. Histor y of paroxysmal atrial fibrillation and hyperlipidemia. He presented to [facility] on 6/23/13 with palpitations and was found to have atrial fibrillation with rapid ventricular response. He was given IV Cardizem and converted spontaneously to normal sinus rhythm. He was found to have elevated white count at 18.9 and denied any symptoms of infection or urinary complaints. TSH was normal, troponin levels were negative. He was asked to continue aspirin and metoprolol for that. If he contin u ed to have atrial fibrillation, they will consider antiarrhythmic therapy with or without a short term anticoagulation therapy. However, currently the patient is feeling reasonably good. He has no complaints. His CBC 7/22/13 showed WBC 17,000, hemoglob i n 16.4 and platelet count of 174,000. Back 9/28/13 his hemoglobin was 15.1, WBC 13.3 and platelets 197,000. He has had a persistently elevated WBC but he is totally asymptomatic. He does not have any fever, night sweats or lymphadenopathy. He is here to be evaluated for his elevated white count.       Peripheral blood flow cytometry done 08/16/13 shows 22% chronic lymphoid leukemia cells, and they expressed ZAP-70 but not CD38. The total number of cells approximated 60% T cells, 32% B cells and 1% natural k iller cells. The B cells were monoclonal and expressed CD19, CD20, CD22, CD5, CD23, CD11c, ZAP-70 and T cell antigens. There was a normal CD4/CD8 ratio. CT of the chest, abdomen and pelvis does not show evidence of metastasis. Peripheral blood for DILLON -2 was negative. It was negative for T11:14 translocation.       CT scan done on 08/21/2013 shows 5 to 6 mm noncalcified nodule in the left lower lobe which is unchanged from December 2010. Otherwise no evidence of any lymphadenopathy or hepatosplenomegaly.       MRI of the spine shows arthritis and disc bulge and likely hemangiomas, with 1 lesion  showing increased signal intensity at T2, which is likely a hemangioma. Bone scan could be done, but patient does not even have much pain there. CT scan of the chest, a bdomen and pelvis was done on 11/23/2014. He has tiny lymph nodes in the neck which are difficult to palpate. Right jugular one is 1.4 x 0.9 and left supraclavicular one is 1.5 x 1.1. He also has a subcarinal lymph node which is 1.7 x 1.2 cm, and he ha s a portocaval lymph node which has increased from 2.5 to 2.8. Patient is totally asymptomatic. He does not have any B symptoms, so will continue followup with observation.           Patient is a 69-year-old gentleman with history of chronic lymphocytic leukemia/SLL who recently got admitted to [facility] because of GI bleed.  He was seen by Dr. Montero.  He underwent EGD colonoscopy.  He was found to have angiodysplasia for which he underwent argon coagulation.  He required 3 units of blood.  Patient had a normal esophagus normal stomach and normal duodenum CLOtest was negative he was asked to take Protonix 40 mg daily.  Colonoscopy showed blood in the entire examined colon but there was a single bleeding colonic angiectasia which was treated with argon plasma coagulation.    He was admitted twice.  Initially he was admitted on July 10 at which time he stayed 3 days and he was evaluated for chest pain.  He underwent a cardiac work-up with stress test and echo.  The echo was normal but the stress test showed medium sized moderate severe severity fixed perfusion defect.  Of the inferior wall consistent with infarction.  However according to patient cardiology did not think he had any cardiac problems.  I have left a message for patient's cardiologist to call me today.  Patient subsequently got readmitted with GI bleed and required 1/3 unit of transfusion.  He was found to have thrombocytopenia and hematology was consulted.    His hemoglobin had dropped down to as low as 7 and his platelets had gone  down to 87.  Today it is 35 and his hemoglobin is 9.4 today.  He denies active bleeding.  He has lost weight recently.  He does not have any fever or night sweats.    He had ultrasound of spleen which showed enlarged spleen centimeters in length       MEDICATIONS: The current medication list was reviewed with the patient and updated in the EMR this date per the medical assistant. Medication dosages and frequencies were confirmed to be accurate.       ALLERGIES: PENICILLIN which causes him to swell up. He is allergic to IV CONTRAST DYE.     SOCIAL HISTORY: He is . He smokes one pack per day for 35 years. He consumes three to four alcoholic drinks per week. He has no tattoos and no previous transfusions.       FAMILY HISTORY: Father  at 82 with MI. Mother  at 82 with bone cancer. He has one brother age 65 in good health and two sisters 69 and 57 in good health.   Past Medical History, Social History, and Family History are unchanged from my prior documentation on     Review of Systems   Constitutional: Positive for fatigue (9/10/2020-Unchanged). Negative for activity change, appetite change, chills, diaphoresis, fever and unexpected weight change.   HENT: Negative for hearing loss, mouth sores, nosebleeds, sore throat and trouble swallowing.    Respiratory: Negative for cough, chest tightness, shortness of breath and wheezing.    Cardiovascular: Negative for chest pain, palpitations and leg swelling.   Gastrointestinal: Negative for abdominal distention, abdominal pain, constipation, diarrhea, nausea and vomiting.   Genitourinary: Negative for difficulty urinating, dysuria, frequency, hematuria and urgency.   Musculoskeletal: Negative for joint swelling.        No muscle weakness.   Skin: Negative for rash and wound.   Neurological: Positive for headaches (stable 9/10/2020). Negative for dizziness, seizures, syncope, speech difficulty, weakness and numbness.   Hematological: Negative for  adenopathy. Does not bruise/bleed easily.   Psychiatric/Behavioral: Negative for behavioral problems, confusion, sleep disturbance and suicidal ideas.   9/10/2020 ROS unchanged from above except as updated.    Objective      Chief Complaint   Patient presents with   • Follow-up        Patient Active Problem List   Diagnosis   • CLL (chronic lymphocytic leukemia) (CMS/HCC)   • Anemia   • Encounter for hepatitis C screening test for low risk patient    • Thrombocytopenia (CMS/HCC)   • H/O heart artery stent   • Stented coronary artery   • Arteriosclerosis of coronary artery   • Atrial fibrillation (CMS/HCC)   • Paroxysmal atrial fibrillation (CMS/HCC)   • Chest pain, rule out acute myocardial infarction   • Fall   • Fracture, olecranon   • Hyperlipidemia   • Long term (current) use of anticoagulants   • Lower GI bleed   • Olecranon bursitis   • Shoulder pain   • Smoker   • CLL (chronic lymphocytic leukemia) (CMS/HCC)   • Dehydration         MEDICATIONS:  Medication Reconciliation for the patient has been reviewed by me and documented in the patients chart.    ALLERGIES:    Allergies   Allergen Reactions   • Contrast Dye Swelling   • Penicillins Swelling         Vitals:    09/10/20 0843   BP: 108/66   Pulse: 62   Resp: 16   Temp: 97.7 °F (36.5 °C)   SpO2: 98%        Current Status 9/10/2020   ECOG score 0        Physical Exam   Constitutional: He is oriented to person, place, and time. He appears well-developed and well-nourished. No distress.   HENT:   Head: Normocephalic and atraumatic.   Mouth/Throat: Oropharynx is clear and moist and mucous membranes are normal. No oropharyngeal exudate.   Eyes: Pupils are equal, round, and reactive to light.   Neck: Normal range of motion.   Cardiovascular: Normal rate, regular rhythm and normal heart sounds.   No murmur heard.  Pulmonary/Chest: Effort normal and breath sounds normal. No respiratory distress. He has no wheezes. He has no rhonchi. He has no rales.   Abdominal:  Soft. Normal appearance and bowel sounds are normal. He exhibits no distension. There is no hepatosplenomegaly.   Musculoskeletal: Normal range of motion. He exhibits no edema.   Neurological: He is alert and oriented to person, place, and time.   Skin: Skin is warm and dry. No rash noted.   Psychiatric: He has a normal mood and affect.   Vitals reviewed.       RECENT LABS:  Results from last 7 days   Lab Units 09/10/20  0825 09/03/20  1434   WBC 10*3/mm3 1.53* 2.78*   NEUTROS ABS 10*3/mm3 0.88* 1.17*   HEMOGLOBIN g/dL 9.3* 10.3*   HEMATOCRIT % 28.9* 30.6*   PLATELETS 10*3/mm3 55* 43*     Results from last 7 days   Lab Units 09/10/20  0825   SODIUM mmol/L 135   POTASSIUM mmol/L 4.2   CHLORIDE mmol/L 100   CO2 mmol/L 25.2   BUN mg/dL 14   CREATININE mg/dL 0.93   CALCIUM mg/dL 9.0   ALBUMIN g/dL 3.80   BILIRUBIN mg/dL 1.7*   ALK PHOS U/L 156*   ALT (SGPT) U/L 10   AST (SGOT) U/L 15   GLUCOSE mg/dL 307*             STAT NONCONTRAST HEAD CT 08/20/2020  IMPRESSION:  1. No acute abnormality is seen with no significant change when compared  to prior noncontrast head CT from Psychiatric 03/03/2015.  2. There is mild small vessel disease in the frontal periventricular  white matter and there are calcified atherosclerotic plaques in the  cavernous internal carotid arteries bilaterally.  3. Patient has had previous paranasal sinus surgery with bilateral  uncinectomies and ethmoidectomies and the paranasal sinuses are  currently clear. The remainder of the head CT is within normal limits,  specifically no acute intracranial hemorrhage is seen. Etiology of  headaches in this patient with history of leukemia and thrombocytopenia  is not established on this exam. Results were communicated to Susannah Moya, the oncologist taking care of the patient by telephone on  08/20/2020 at 9:20 AM.      XR CHEST POST CVA PORT-07/31/20    IMPRESSION:  Chest port placement. No pneumothorax.       CT NECK, CHEST, ABDOMEN, AND  PELVIS WITHOUT IV CONTRAST. 7/20/2020   HISTORY: 69-year-old male with CLL/SLL.     TECHNIQUE: Radiation dose reduction techniques were utilized, including  automated exposure control and exposure modulation based on body size. 3  mm images were obtained through the neck, chest, abdomen, and pelvis  without the administration of IV contrast. Compared with CT of the  abdomen and pelvis from 08/05/2019 and previous CTs from 03/19/2018.     FINDINGS:  NECK: There is no significant change in the 1.4 x 1.0 cm left  supraclavicular node or in the smaller supraclavicular nodes  bilaterally. A reference 1.4 x 0.7 cm right jugular chain node is  stable. There are shotty nodes scattered throughout the neck. No new  lymphadenopathy is seen. Noncontrasted parotid, submandibular, and  thyroid glands appear unremarkable.     CHEST: There has been interval decrease in the size of the shotty and  borderline enlarged nodes at the axilla. Reference 1.6 x 0.7 cm right  axillary node previously measured 2.0 x 1.2 cm. There has been a  decrease in the size of all of the shotty mediastinal and hilar nodes.  There are no new pulmonary opacities and there are no pleural or  pericardial effusions.     ABDOMEN/PELVIS: Splenic size has slightly increased measuring 15.4 cm in  diameter and previously measuring 13.5 cm, both measured on the coronal  reconstruction series. There is no change in the shotty nodes at the  gastrohepatic ligament and shorty hepatis. There are no pathologically  enlarged nodes. There is no acute abnormality involving the  noncontrasted liver. A single gallstone is noted within the gallbladder.  Noncontrasted pancreas, adrenals, and kidneys appear unremarkable. There  is no acute bowel abnormality. There is extensive sigmoid  diverticulosis. No free fluid. There are extensive abdominal aortic  atherosclerotic changes and there is a stable 3.7 cm infrarenal  abdominal aortic aneurysm.     IMPRESSION:  1. There has been  slight interval increase in splenomegaly since the CT  of the abdomen from 08/05/2019.  2. Interval decrease in the size of the axillary nodes and the shotty  mediastinal nodes. There is no change in the shotty nodes within the  neck and supraclavicular regions. There is no new lymphadenopathy.  3. Stable 3.7 cm infrarenal abdominal aortic aneurysm.      Assessment/Plan   1. Stage 2 CLL without evidence of any disease progression.  I have reviewed the CT scan from 3/19/2018 there is minimal progression but clinically patient is asymptomatic and we will follow with observation.  Will monitor with labs.   No evidence of any frequent infections, no major worsening of his white count. He has no fever or night sweats or any other symptoms.   · Patient knows that he is prone for infections and he is mainly staying at home during the COVID-19 situation time  · Recent admission to [facility] July 10 2020×2.  Initially admitted with chest pain and underwent stress test and echo.  Echo was negative.  Stress test is abnormal but have left message for patient's cardiologist to call us.  His cardiologist is been sent [facility].  · He then got admitted the second time with worsening GI bleed and required total of 3 units of blood.  EGD was negative but colonoscopy showed angiectasia for which he underwent argon ablation.  Currently hemoglobin stable and no active bleeding.  · He was also found to have low platelets with platelets dropping down to 27k and hemoglobin was 7.  Ultrasound showed splenomegaly 15 cm.  Concern is whether he has progressed from his CLL point of view and requires treatment.  · CT scan performed 7/20/2020 did show the increased splenomegaly, but interval decrease in size of the axillary nodes, and shotty mediastinal nodes.  No change in the shotty nodes within the neck and supraclavicular regions.  No new lymphadenopathy.  Bone marrow biopsy however showed preliminarily that there is bone marrow  "involvement.  Remainder of bone marrow biopsy report is pending.  · Scans were reviewed with the patient today.  Dr. Moya also discussed with the patient and recommended he initiate treatment with chlorambucil and Gazyva.  He would not be a good candidate for Imbruvica due to his history of A. fib\".  He cannot take anticoagulation at this time.  The patient was provided with chemotherapy education today, including  Handouts.  He will need a port placed so that we can initiate treatment  · 8/13/2020: Gazyva day 1. Plan also [id number] mg p.o. on day 1 day 15.  We can increase the dose once we know he tolerates and his platelets improved.  · Patient developing pancytopenia when reviewed cycle 1 day 15.  Chlorambucil dose modified to 10 mg for day 15 however it is felt that he cannot tolerate this ongoing.  Plan to switch to Venetoclax along with continuing Gazyva.    · Patient due today for cycle 2-day 1 Gazyva.  He will proceed today as scheduled.  Venetoclax will be shipped to his home with plans to begin this next week on 9/14/2020.      2.  Worsening thrombocytopenia, looking back his platelets were always in the 150s and during this admission he dropped down to 27K. His LDH also was elevated and he was anemic.  · It was dropping on chlorambucil.  Plans to change therapy as detailed above.  Patient did require transfusion and platelets are currently improved to 55,000.    3.  History of angiodysplasia requiring cauterization and Hess's esophagus mild anemia. He is currently asymptomatic.    4.  History of cluster headaches for which she has to be treated with steroids in the past and has followed up with Dr. Len Péreztoday with significant headaches.  · Patient had CT of the head 8/20/2020 which was negative.  · He was started on prednisone 10 mg daily which was not helpful.  · Patient saw Dr. Bobby, neurology who gave him 2 shots of a new medication Emgality.  This is something that can be given once " a month.  Patient has not noticed much difference yet.  He still tends to get a headache roughly an hour after he goes to bed which can usually be improved by taking a hot shower interestingly.  No over-the-counter medications help.  He is continuing the prednisone for now.     PLAN:  1.  Proceed with cycle 2-day 1 Gazyva.  2.  Patient will no longer take chlorambucil as detailed above.  3.  Patient will return Monday, 9/14/2020 for CBC with RN review.  This is new baseline CBC prior to initiating Venetoclax.  4.  Patient is to initiate Venetoclax next Monday, 9/14/2020.  He has undergone formal chemotherapy education with our pharmacy team.  5.  Continue follow-up with neurology for headaches.  6.  Return in 2 weeks for follow-up with Dr. Moya and cycle 2-day 15 Gazyva.  7.  Patient to call with any concerns prior to scheduled return.      This patient is on drug therapy requiring intensive monitoring for toxicity.  We did more than just assess side effects of treatment today.       MARIAH Garcia        Cc: Dr. Kevin Chandra

## 2020-09-10 ENCOUNTER — INFUSION (OUTPATIENT)
Dept: ONCOLOGY | Facility: HOSPITAL | Age: 70
End: 2020-09-10

## 2020-09-10 ENCOUNTER — OFFICE VISIT (OUTPATIENT)
Dept: ONCOLOGY | Facility: CLINIC | Age: 70
End: 2020-09-10

## 2020-09-10 ENCOUNTER — DOCUMENTATION (OUTPATIENT)
Dept: ONCOLOGY | Facility: CLINIC | Age: 70
End: 2020-09-10

## 2020-09-10 VITALS
HEIGHT: 73 IN | TEMPERATURE: 97.7 F | HEART RATE: 62 BPM | DIASTOLIC BLOOD PRESSURE: 66 MMHG | BODY MASS INDEX: 20.73 KG/M2 | OXYGEN SATURATION: 98 % | WEIGHT: 156.4 LBS | SYSTOLIC BLOOD PRESSURE: 108 MMHG | RESPIRATION RATE: 16 BRPM

## 2020-09-10 DIAGNOSIS — C91.10 CLL (CHRONIC LYMPHOCYTIC LEUKEMIA) (HCC): Primary | ICD-10-CM

## 2020-09-10 DIAGNOSIS — Z79.899 HIGH RISK MEDICATION USE: ICD-10-CM

## 2020-09-10 DIAGNOSIS — D69.6 THROMBOCYTOPENIA (HCC): ICD-10-CM

## 2020-09-10 DIAGNOSIS — C91.10 CLL (CHRONIC LYMPHOCYTIC LEUKEMIA) (HCC): ICD-10-CM

## 2020-09-10 DIAGNOSIS — D64.9 ANEMIA, UNSPECIFIED TYPE: ICD-10-CM

## 2020-09-10 LAB
ALBUMIN SERPL-MCNC: 3.8 G/DL (ref 3.5–5.2)
ALBUMIN/GLOB SERPL: 2.1 G/DL (ref 1.1–2.4)
ALP SERPL-CCNC: 156 U/L (ref 38–116)
ALT SERPL W P-5'-P-CCNC: 10 U/L (ref 0–41)
ANION GAP SERPL CALCULATED.3IONS-SCNC: 9.8 MMOL/L (ref 5–15)
AST SERPL-CCNC: 15 U/L (ref 0–40)
BASOPHILS # BLD AUTO: 0.02 10*3/MM3 (ref 0–0.2)
BASOPHILS NFR BLD AUTO: 1.3 % (ref 0–1.5)
BILIRUB SERPL-MCNC: 1.7 MG/DL (ref 0.2–1.2)
BUN SERPL-MCNC: 14 MG/DL (ref 6–20)
BUN/CREAT SERPL: 15.1 (ref 7.3–30)
CALCIUM SPEC-SCNC: 9 MG/DL (ref 8.5–10.2)
CHLORIDE SERPL-SCNC: 100 MMOL/L (ref 98–107)
CO2 SERPL-SCNC: 25.2 MMOL/L (ref 22–29)
CREAT SERPL-MCNC: 0.93 MG/DL (ref 0.7–1.3)
DEPRECATED RDW RBC AUTO: 64.6 FL (ref 37–54)
EOSINOPHIL # BLD AUTO: 0.03 10*3/MM3 (ref 0–0.4)
EOSINOPHIL NFR BLD AUTO: 2 % (ref 0.3–6.2)
ERYTHROCYTE [DISTWIDTH] IN BLOOD BY AUTOMATED COUNT: 19.6 % (ref 12.3–15.4)
GFR SERPL CREATININE-BSD FRML MDRD: 80 ML/MIN/1.73
GLOBULIN UR ELPH-MCNC: 1.8 GM/DL (ref 1.8–3.5)
GLUCOSE SERPL-MCNC: 307 MG/DL (ref 74–124)
HCT VFR BLD AUTO: 28.9 % (ref 37.5–51)
HGB BLD-MCNC: 9.3 G/DL (ref 13–17.7)
IMM GRANULOCYTES # BLD AUTO: 0.07 10*3/MM3 (ref 0–0.05)
IMM GRANULOCYTES NFR BLD AUTO: 4.6 % (ref 0–0.5)
LYMPHOCYTES # BLD AUTO: 0.45 10*3/MM3 (ref 0.7–3.1)
LYMPHOCYTES NFR BLD AUTO: 29.4 % (ref 19.6–45.3)
MCH RBC QN AUTO: 29.2 PG (ref 26.6–33)
MCHC RBC AUTO-ENTMCNC: 32.2 G/DL (ref 31.5–35.7)
MCV RBC AUTO: 90.9 FL (ref 79–97)
MONOCYTES # BLD AUTO: 0.08 10*3/MM3 (ref 0.1–0.9)
MONOCYTES NFR BLD AUTO: 5.2 % (ref 5–12)
NEUTROPHILS NFR BLD AUTO: 0.88 10*3/MM3 (ref 1.7–7)
NEUTROPHILS NFR BLD AUTO: 57.5 % (ref 42.7–76)
NRBC BLD AUTO-RTO: 1.3 /100 WBC (ref 0–0.2)
PLATELET # BLD AUTO: 55 10*3/MM3 (ref 140–450)
PMV BLD AUTO: 9.8 FL (ref 6–12)
POTASSIUM SERPL-SCNC: 4.2 MMOL/L (ref 3.5–4.7)
PROT SERPL-MCNC: 5.6 G/DL (ref 6.3–8)
RBC # BLD AUTO: 3.18 10*6/MM3 (ref 4.14–5.8)
SODIUM SERPL-SCNC: 135 MMOL/L (ref 134–145)
WBC # BLD AUTO: 1.53 10*3/MM3 (ref 3.4–10.8)

## 2020-09-10 PROCEDURE — 63710000001 PROCHLORPERAZINE MALEATE PER 10 MG: Performed by: NURSE PRACTITIONER

## 2020-09-10 PROCEDURE — 96413 CHEMO IV INFUSION 1 HR: CPT

## 2020-09-10 PROCEDURE — 99214 OFFICE O/P EST MOD 30 MIN: CPT | Performed by: NURSE PRACTITIONER

## 2020-09-10 PROCEDURE — 80053 COMPREHEN METABOLIC PANEL: CPT

## 2020-09-10 PROCEDURE — 96360 HYDRATION IV INFUSION INIT: CPT

## 2020-09-10 PROCEDURE — 25010000002 OBINUTUZUMAB 1000 MG/40ML SOLUTION 40 ML VIAL: Performed by: NURSE PRACTITIONER

## 2020-09-10 PROCEDURE — 96415 CHEMO IV INFUSION ADDL HR: CPT

## 2020-09-10 PROCEDURE — 85025 COMPLETE CBC W/AUTO DIFF WBC: CPT

## 2020-09-10 PROCEDURE — 96361 HYDRATE IV INFUSION ADD-ON: CPT

## 2020-09-10 RX ORDER — SODIUM CHLORIDE 9 MG/ML
250 INJECTION, SOLUTION INTRAVENOUS ONCE
Status: COMPLETED | OUTPATIENT
Start: 2020-09-10 | End: 2020-09-10

## 2020-09-10 RX ORDER — DIPHENHYDRAMINE HYDROCHLORIDE 50 MG/ML
50 INJECTION INTRAMUSCULAR; INTRAVENOUS AS NEEDED
Status: CANCELLED | OUTPATIENT
Start: 2020-09-10

## 2020-09-10 RX ORDER — SODIUM CHLORIDE 9 MG/ML
250 INJECTION, SOLUTION INTRAVENOUS ONCE
Status: CANCELLED | OUTPATIENT
Start: 2020-09-10

## 2020-09-10 RX ORDER — SODIUM CHLORIDE 9 MG/ML
500 INJECTION, SOLUTION INTRAVENOUS ONCE
Status: COMPLETED | OUTPATIENT
Start: 2020-09-10 | End: 2020-09-10

## 2020-09-10 RX ORDER — PROCHLORPERAZINE MALEATE 10 MG
10 TABLET ORAL ONCE
Status: COMPLETED | OUTPATIENT
Start: 2020-09-10 | End: 2020-09-10

## 2020-09-10 RX ORDER — SODIUM CHLORIDE 9 MG/ML
500 INJECTION, SOLUTION INTRAVENOUS ONCE
Status: CANCELLED | OUTPATIENT
Start: 2020-09-10

## 2020-09-10 RX ORDER — ACETAMINOPHEN 325 MG/1
650 TABLET ORAL ONCE
Status: CANCELLED | OUTPATIENT
Start: 2020-09-10

## 2020-09-10 RX ORDER — FAMOTIDINE 10 MG/ML
20 INJECTION, SOLUTION INTRAVENOUS AS NEEDED
Status: CANCELLED | OUTPATIENT
Start: 2020-09-10

## 2020-09-10 RX ORDER — PROCHLORPERAZINE MALEATE 10 MG
10 TABLET ORAL ONCE
Status: CANCELLED
Start: 2020-09-24

## 2020-09-10 RX ORDER — ACETAMINOPHEN 325 MG/1
650 TABLET ORAL ONCE
Status: COMPLETED | OUTPATIENT
Start: 2020-09-10 | End: 2020-09-10

## 2020-09-10 RX ORDER — PROCHLORPERAZINE MALEATE 10 MG
10 TABLET ORAL ONCE
Status: CANCELLED
Start: 2020-09-10

## 2020-09-10 RX ADMIN — SODIUM CHLORIDE 500 ML: 9 INJECTION, SOLUTION INTRAVENOUS at 09:38

## 2020-09-10 RX ADMIN — PROCHLORPERAZINE MALEATE 10 MG: 10 TABLET ORAL at 09:37

## 2020-09-10 RX ADMIN — SODIUM CHLORIDE 250 ML: 9 INJECTION, SOLUTION INTRAVENOUS at 09:36

## 2020-09-10 RX ADMIN — ACETAMINOPHEN 650 MG: 325 TABLET ORAL at 09:37

## 2020-09-10 RX ADMIN — OBINUTUZUMAB 1000 MG: 1000 INJECTION, SOLUTION, CONCENTRATE INTRAVENOUS at 10:42

## 2020-09-10 NOTE — NURSING NOTE
Pt began experiencing migraine during infusion of gazyva, not reaction related.  Pt put on 1L of O2 as this has helped before to ease migraine tension.  After approx. One hour of oxygen and covering eyes with warm compress, but states migraine subsided.  Pt reports no migraine tension at end of treatment.

## 2020-09-11 ENCOUNTER — MEDICATION THERAPY MANAGEMENT (OUTPATIENT)
Dept: ONCOLOGY | Facility: HOSPITAL | Age: 70
End: 2020-09-11

## 2020-09-11 ENCOUNTER — TELEPHONE (OUTPATIENT)
Dept: ONCOLOGY | Facility: CLINIC | Age: 70
End: 2020-09-11

## 2020-09-11 NOTE — PROGRESS NOTES
MTM telephone encounter re ventoclax delivery    Mr Stephenson states he has received Venetoclax.  He understands that he should drink fluids over the weekend ( 1.5-2 liters daily), obtain baseline CBC Monday before taking Venetoclax.    He is aware of his 9/14 appointment, but wants to change the time.  In basket message sent to scheduling.  We also discussed pricing of Voriconazole which will be added at end of ramp up.  Mailing a good rx card to his home with [employer] pricing quoted as $101.48.  We made plans for him to think this over and then decide which pharmacy he would like the rx escribed.

## 2020-09-11 NOTE — TELEPHONE ENCOUNTER
----- Message from Leela Rawls RPH sent at 9/11/2020 12:00 PM EDT -----  He wants scheduling to call him.  He needs to change 9/14 time of appointment.  Thanks!

## 2020-09-11 NOTE — TELEPHONE ENCOUNTER
PT NEEDED THE AFTERNOON APPT DATE AND TIME AS HE HAS ANOTHER APPT IN THE MORNING - SPOKE WITH PT AND THIS IS A GOOD TIME FOR HIM

## 2020-09-14 ENCOUNTER — APPOINTMENT (OUTPATIENT)
Dept: ONCOLOGY | Facility: HOSPITAL | Age: 70
End: 2020-09-14

## 2020-09-14 ENCOUNTER — APPOINTMENT (OUTPATIENT)
Dept: LAB | Facility: HOSPITAL | Age: 70
End: 2020-09-14

## 2020-09-14 ENCOUNTER — LAB (OUTPATIENT)
Dept: LAB | Facility: HOSPITAL | Age: 70
End: 2020-09-14

## 2020-09-14 ENCOUNTER — INFUSION (OUTPATIENT)
Dept: ONCOLOGY | Facility: HOSPITAL | Age: 70
End: 2020-09-14

## 2020-09-14 DIAGNOSIS — C91.10 CLL (CHRONIC LYMPHOCYTIC LEUKEMIA) (HCC): Primary | ICD-10-CM

## 2020-09-14 LAB
BASOPHILS # BLD AUTO: 0.03 10*3/MM3 (ref 0–0.2)
BASOPHILS NFR BLD AUTO: 1.2 % (ref 0–1.5)
DEPRECATED RDW RBC AUTO: 62.4 FL (ref 37–54)
EOSINOPHIL # BLD AUTO: 0.03 10*3/MM3 (ref 0–0.4)
EOSINOPHIL NFR BLD AUTO: 1.2 % (ref 0.3–6.2)
ERYTHROCYTE [DISTWIDTH] IN BLOOD BY AUTOMATED COUNT: 20 % (ref 12.3–15.4)
HCT VFR BLD AUTO: 29.3 % (ref 37.5–51)
HGB BLD-MCNC: 10.2 G/DL (ref 13–17.7)
IMM GRANULOCYTES # BLD AUTO: 0.11 10*3/MM3 (ref 0–0.05)
IMM GRANULOCYTES NFR BLD AUTO: 4.2 % (ref 0–0.5)
LYMPHOCYTES # BLD AUTO: 1 10*3/MM3 (ref 0.7–3.1)
LYMPHOCYTES NFR BLD AUTO: 38.5 % (ref 19.6–45.3)
MCH RBC QN AUTO: 30.6 PG (ref 26.6–33)
MCHC RBC AUTO-ENTMCNC: 34.8 G/DL (ref 31.5–35.7)
MCV RBC AUTO: 88 FL (ref 79–97)
MONOCYTES # BLD AUTO: 0.38 10*3/MM3 (ref 0.1–0.9)
MONOCYTES NFR BLD AUTO: 14.6 % (ref 5–12)
NEUTROPHILS NFR BLD AUTO: 1.05 10*3/MM3 (ref 1.7–7)
NEUTROPHILS NFR BLD AUTO: 40.3 % (ref 42.7–76)
NRBC BLD AUTO-RTO: 1.9 /100 WBC (ref 0–0.2)
PLATELET # BLD AUTO: 48 10*3/MM3 (ref 140–450)
PMV BLD AUTO: 9.7 FL (ref 6–12)
RBC # BLD AUTO: 3.33 10*6/MM3 (ref 4.14–5.8)
WBC # BLD AUTO: 2.6 10*3/MM3 (ref 3.4–10.8)

## 2020-09-14 PROCEDURE — 85025 COMPLETE CBC W/AUTO DIFF WBC: CPT

## 2020-09-14 PROCEDURE — G0463 HOSPITAL OUTPT CLINIC VISIT: HCPCS

## 2020-09-14 PROCEDURE — 36415 COLL VENOUS BLD VENIPUNCTURE: CPT

## 2020-09-14 NOTE — NURSING NOTE
Lab Results   Component Value Date    WBC 2.60 (L) 09/14/2020    HGB 10.2 (L) 09/14/2020    HCT 29.3 (L) 09/14/2020    MCV 88.0 09/14/2020    PLT 48 (L) 09/14/2020     Pt is here for lab with RN review.  CBC reviewed with pt, counts are stable for this pt at this time. Pt has no complaints.  Copy of labs given to pt and f/u appt reviewed. Pt is instructed to call the office with any concerns or new symptoms prior to next visit. Pt vu

## 2020-09-15 ENCOUNTER — DOCUMENTATION (OUTPATIENT)
Dept: ONCOLOGY | Facility: CLINIC | Age: 70
End: 2020-09-15

## 2020-09-15 ENCOUNTER — MEDICATION THERAPY MANAGEMENT (OUTPATIENT)
Dept: ONCOLOGY | Facility: HOSPITAL | Age: 70
End: 2020-09-15

## 2020-09-15 ENCOUNTER — APPOINTMENT (OUTPATIENT)
Dept: LAB | Facility: HOSPITAL | Age: 70
End: 2020-09-15

## 2020-09-15 ENCOUNTER — APPOINTMENT (OUTPATIENT)
Dept: ONCOLOGY | Facility: HOSPITAL | Age: 70
End: 2020-09-15

## 2020-09-15 NOTE — PROGRESS NOTES
MTM telephone encounter re adherence and side effects ( Venetoclax)      No answer.   direct line [phone].

## 2020-09-15 NOTE — PROGRESS NOTES
MTM telephone encounter re adherence and side effects ( ventoclax)    Mr Stephenson reports that he is taking Venetoclax 20 mg daily as of Monday 9/14.  He stated he did experience some nausea with first dose, but he took ondansetron and it was relieved.  He has not received the Good Rx card yet- will check in later this week to see if he received it and wants Voriconazole sent to [facility].

## 2020-09-17 ENCOUNTER — APPOINTMENT (OUTPATIENT)
Dept: LAB | Facility: HOSPITAL | Age: 70
End: 2020-09-17

## 2020-09-17 ENCOUNTER — MEDICATION THERAPY MANAGEMENT (OUTPATIENT)
Dept: ONCOLOGY | Facility: HOSPITAL | Age: 70
End: 2020-09-17

## 2020-09-17 ENCOUNTER — APPOINTMENT (OUTPATIENT)
Dept: ONCOLOGY | Facility: HOSPITAL | Age: 70
End: 2020-09-17

## 2020-09-17 RX ORDER — VORICONAZOLE 200 MG/1
200 TABLET, FILM COATED ORAL 2 TIMES DAILY
Qty: 60 TABLET | Refills: 3 | Status: SHIPPED | OUTPATIENT
Start: 2020-09-17 | End: 2021-02-02 | Stop reason: SDUPTHER

## 2020-09-17 NOTE — PROGRESS NOTES
[facility] telephone encounter re adherence and side effects ( venetoclax)    Mr Stephenson left a VM yesterday stating that he had received the Good Rx card for Voriconazole.  Spoke with Ms Stephenson this am and advised her that rx for Voriconazole was escribed to [facility] this am.  We reviewed that he should not begin this medication until venetoclax ramp has reached 100 mg and he has taken that dose for a full week.  She verbalized understanding.  We also discussed DDI with Lipitor and she was counseled to call the office for any reports of myalgias and that labs with LFTs will be monitored.

## 2020-09-22 ENCOUNTER — TELEPHONE (OUTPATIENT)
Dept: ONCOLOGY | Facility: CLINIC | Age: 70
End: 2020-09-22

## 2020-09-22 ENCOUNTER — LAB (OUTPATIENT)
Dept: ONCOLOGY | Facility: HOSPITAL | Age: 70
End: 2020-09-22

## 2020-09-22 DIAGNOSIS — C91.10 CLL (CHRONIC LYMPHOCYTIC LEUKEMIA) (HCC): ICD-10-CM

## 2020-09-22 LAB
ALBUMIN SERPL-MCNC: 3.9 G/DL (ref 3.5–5.2)
ALBUMIN/GLOB SERPL: 2.1 G/DL (ref 1.1–2.4)
ALP SERPL-CCNC: 180 U/L (ref 38–116)
ALT SERPL W P-5'-P-CCNC: 10 U/L (ref 0–41)
ANION GAP SERPL CALCULATED.3IONS-SCNC: 10 MMOL/L (ref 5–15)
AST SERPL-CCNC: 15 U/L (ref 0–40)
BASOPHILS # BLD AUTO: 0.01 10*3/MM3 (ref 0–0.2)
BASOPHILS NFR BLD AUTO: 0.9 % (ref 0–1.5)
BILIRUB SERPL-MCNC: 1.2 MG/DL (ref 0.2–1.2)
BUN SERPL-MCNC: 15 MG/DL (ref 6–20)
BUN/CREAT SERPL: 17 (ref 7.3–30)
CALCIUM SPEC-SCNC: 9.2 MG/DL (ref 8.5–10.2)
CHLORIDE SERPL-SCNC: 102 MMOL/L (ref 98–107)
CO2 SERPL-SCNC: 24 MMOL/L (ref 22–29)
CREAT SERPL-MCNC: 0.88 MG/DL (ref 0.7–1.3)
DEPRECATED RDW RBC AUTO: 67.4 FL (ref 37–54)
EOSINOPHIL # BLD AUTO: 0.02 10*3/MM3 (ref 0–0.4)
EOSINOPHIL NFR BLD AUTO: 1.7 % (ref 0.3–6.2)
ERYTHROCYTE [DISTWIDTH] IN BLOOD BY AUTOMATED COUNT: 20.2 % (ref 12.3–15.4)
GFR SERPL CREATININE-BSD FRML MDRD: 85 ML/MIN/1.73
GLOBULIN UR ELPH-MCNC: 1.9 GM/DL (ref 1.8–3.5)
GLUCOSE SERPL-MCNC: 146 MG/DL (ref 74–124)
HCT VFR BLD AUTO: 28.8 % (ref 37.5–51)
HGB BLD-MCNC: 9.3 G/DL (ref 13–17.7)
IMM GRANULOCYTES # BLD AUTO: 0 10*3/MM3 (ref 0–0.05)
IMM GRANULOCYTES NFR BLD AUTO: 0 % (ref 0–0.5)
LYMPHOCYTES # BLD AUTO: 0.9 10*3/MM3 (ref 0.7–3.1)
LYMPHOCYTES NFR BLD AUTO: 76.9 % (ref 19.6–45.3)
MCH RBC QN AUTO: 29.8 PG (ref 26.6–33)
MCHC RBC AUTO-ENTMCNC: 32.3 G/DL (ref 31.5–35.7)
MCV RBC AUTO: 92.3 FL (ref 79–97)
MONOCYTES # BLD AUTO: 0.2 10*3/MM3 (ref 0.1–0.9)
MONOCYTES NFR BLD AUTO: 17.1 % (ref 5–12)
NEUTROPHILS NFR BLD AUTO: 0.04 10*3/MM3 (ref 1.7–7)
NEUTROPHILS NFR BLD AUTO: 3.4 % (ref 42.7–76)
NRBC BLD AUTO-RTO: 0 /100 WBC (ref 0–0.2)
PLATELET # BLD AUTO: 62 10*3/MM3 (ref 140–450)
PMV BLD AUTO: 9.7 FL (ref 6–12)
POTASSIUM SERPL-SCNC: 4.1 MMOL/L (ref 3.5–4.7)
PROT SERPL-MCNC: 5.8 G/DL (ref 6.3–8)
RBC # BLD AUTO: 3.12 10*6/MM3 (ref 4.14–5.8)
SODIUM SERPL-SCNC: 136 MMOL/L (ref 134–145)
WBC # BLD AUTO: 1.17 10*3/MM3 (ref 3.4–10.8)

## 2020-09-22 PROCEDURE — 36591 DRAW BLOOD OFF VENOUS DEVICE: CPT

## 2020-09-22 PROCEDURE — 85025 COMPLETE CBC W/AUTO DIFF WBC: CPT

## 2020-09-22 PROCEDURE — 80053 COMPREHEN METABOLIC PANEL: CPT

## 2020-09-22 PROCEDURE — 96523 IRRIG DRUG DELIVERY DEVICE: CPT

## 2020-09-22 NOTE — NURSING NOTE
Pt here for port flush on 9/22.  Per appt schedule pt sees Dr. Moya and gazyva infusion on Thursday.  Per Pharmacist, Neva cabrales can be used for infusion on 9/24/20.  Noted on appt line as well.   Pt also needs infusion appt time for Thursday 9/24, request sent to scheduling.  Copy of today's CBC printed and given to Dr. Moya.

## 2020-09-23 NOTE — PROGRESS NOTES
Subjective       REASON FOR VISIT:    1. Chronic lymphoid leukemia, stage 4.     2.  History of angiodysplasia requiring cauterization and history of Hess's esophagus    3.  Bone marrow aspiration biopsy shows hypercellular marrow with 98% involvement with CLL    Patient ID: Macho Stephenson is a 70 y.o. year old male     History of Present Illness Mr. Stephenson returns today due for cycle 2 of Gazyva.  He is also taking chlorambucil on days 1 and 15.  He was originally taking a total dose of 16 mg however he was noted on cycle 1 day 15 to be neutropenic with an ANC of 0.73 and thrombocytopenic with platelet count of 21,000.  We therefore asked him to reduce his dose of chlorambucil to a total of 10 mg along with continuing Gazyva.  However it is felt in general the patient cannot tolerate chlorambucil and we now plan to switch him to Venetoclax.  Patient did also undergo platelet transfusion shortly thereafter.    The patient has undergone formal education with our pharmacy team for Venetoclax and the medication will be shipped to his home to begin early next week.  We plan to repeat labs as new baseline data early next week.    Interval history: Patient is here for cycle 2-day 15.  He is now on venatoclax.  His dose has been increased from 20 mg to 50 mg.  He will start 100 mg next week.  He is going to start with voriconazole.  So far he has tolerated treatment very well.  He is planning to go to [place] between October 4 for 1 week.    His headaches are reduced.  His hemoglobin is 9.3, white count of 1.17 and a platelet count of 62.  His absolute neutrophil count is very low at 0.04.  He cannot get Levaquin because he is on sotalol and they interact.  As a result we will start him on ciprofloxacin.  We may need to check a EKG with patient receiving Cipro and venatoclax next week.     He does not have evidence of any neutropenic fevers.  He feels reasonably good.  He is tolerating venatoclax.    PAST MEDICAL  HISTORY:   1. Recurrent atrial fibrillation followed by cardiology. He has had drug eluting stent placed x 3.   2. High cholesterol.   3. Hypertension.       HEMATOLOGIC/ONCOLOGIC HISTORY:       The patient is 62-year-old gentleman with the above history currently here for followup. He has no complaints. He denies fever, night sweats or weight loss. He does not have any lymphadenopathy. He feels good. He had some fatigue but his CBC shows a go od hemoglobin.   The patient is followed by Dr. Kevin Chandra. He had seen Dr. Chandra 7/18/13 for a history of coronary artery disease status drug eluting stent placement to the right coronary artery and left anterior descending artery in February 2011. Histor y of paroxysmal atrial fibrillation and hyperlipidemia. He presented to [facility] on 6/23/13 with palpitations and was found to have atrial fibrillation with rapid ventricular response. He was given IV Cardizem and converted spontaneously to normal sinus rhythm. He was found to have elevated white count at 18.9 and denied any symptoms of infection or urinary complaints. TSH was normal, troponin levels were negative. He was asked to continue aspirin and metoprolol for that. If he contin u ed to have atrial fibrillation, they will consider antiarrhythmic therapy with or without a short term anticoagulation therapy. However, currently the patient is feeling reasonably good. He has no complaints. His CBC 7/22/13 showed WBC 17,000, hemoglob i n 16.4 and platelet count of 174,000. Back 9/28/13 his hemoglobin was 15.1, WBC 13.3 and platelets 197,000. He has had a persistently elevated WBC but he is totally asymptomatic. He does not have any fever, night sweats or lymphadenopathy. He is here to be evaluated for his elevated white count.       Peripheral blood flow cytometry done 08/16/13 shows 22% chronic lymphoid leukemia cells, and they expressed ZAP-70 but not CD38. The total number of cells approximated 60% T  cells, 32% B cells and 1% natural k iller cells. The B cells were monoclonal and expressed CD19, CD20, CD22, CD5, CD23, CD11c, ZAP-70 and T cell antigens. There was a normal CD4/CD8 ratio. CT of the chest, abdomen and pelvis does not show evidence of metastasis. Peripheral blood for DILLON -2 was negative. It was negative for T11:14 translocation.       CT scan done on 08/21/2013 shows 5 to 6 mm noncalcified nodule in the left lower lobe which is unchanged from December 2010. Otherwise no evidence of any lymphadenopathy or hepatosplenomegaly.       MRI of the spine shows arthritis and disc bulge and likely hemangiomas, with 1 lesion showing increased signal intensity at T2, which is likely a hemangioma. Bone scan could be done, but patient does not even have much pain there. CT scan of the chest, a bdomen and pelvis was done on 11/23/2014. He has tiny lymph nodes in the neck which are difficult to palpate. Right jugular one is 1.4 x 0.9 and left supraclavicular one is 1.5 x 1.1. He also has a subcarinal lymph node which is 1.7 x 1.2 cm, and he ha s a portocaval lymph node which has increased from 2.5 to 2.8. Patient is totally asymptomatic. He does not have any B symptoms, so will continue followup with observation.           Patient is a 69-year-old gentleman with history of chronic lymphocytic leukemia/SLL who recently got admitted to [facility] because of GI bleed.  He was seen by Dr. Montero.  He underwent EGD colonoscopy.  He was found to have angiodysplasia for which he underwent argon coagulation.  He required 3 units of blood.  Patient had a normal esophagus normal stomach and normal duodenum CLOtest was negative he was asked to take Protonix 40 mg daily.  Colonoscopy showed blood in the entire examined colon but there was a single bleeding colonic angiectasia which was treated with argon plasma coagulation.    He was admitted twice.  Initially he was admitted on July 10 at which time he stayed 3 days and  he was evaluated for chest pain.  He underwent a cardiac work-up with stress test and echo.  The echo was normal but the stress test showed medium sized moderate severe severity fixed perfusion defect.  Of the inferior wall consistent with infarction.  However according to patient cardiology did not think he had any cardiac problems.  I have left a message for patient's cardiologist to call me today.  Patient subsequently got readmitted with GI bleed and required 1/3 unit of transfusion.  He was found to have thrombocytopenia and hematology was consulted.    His hemoglobin had dropped down to as low as 7 and his platelets had gone down to 87.  Today it is 35 and his hemoglobin is 9.4 today.  He denies active bleeding.  He has lost weight recently.  He does not have any fever or night sweats.    He had ultrasound of spleen which showed enlarged spleen centimeters in length       MEDICATIONS: The current medication list was reviewed with the patient and updated in the EMR this date per the medical assistant. Medication dosages and frequencies were confirmed to be accurate.       ALLERGIES: PENICILLIN which causes him to swell up. He is allergic to IV CONTRAST DYE.     SOCIAL HISTORY: He is . He smokes one pack per day for 35 years. He consumes three to four alcoholic drinks per week. He has no tattoos and no previous transfusions.       FAMILY HISTORY: Father  at 82 with MI. Mother  at 82 with bone cancer. He has one brother age 65 in good health and two sisters 69 and 57 in good health.   Past Medical History, Social History, and Family History are unchanged from my prior documentation on     Review of Systems   Constitutional: Positive for fatigue (2020-Unchanged). Negative for activity change, appetite change, chills, diaphoresis, fever and unexpected weight change.   HENT: Negative for hearing loss, mouth sores, nosebleeds, sore throat and trouble swallowing.    Respiratory:  Negative for cough, chest tightness, shortness of breath and wheezing.    Cardiovascular: Negative for chest pain, palpitations and leg swelling.   Gastrointestinal: Positive for nausea (Burping and acid refux-09/24/20). Negative for abdominal distention, abdominal pain, constipation, diarrhea and vomiting.   Genitourinary: Negative for difficulty urinating, dysuria, frequency, hematuria and urgency.   Musculoskeletal: Negative for joint swelling.        No muscle weakness.   Skin: Negative for rash and wound.   Neurological: Positive for headaches (stable 9/24/2020-Unchanged). Negative for dizziness, seizures, syncope, speech difficulty, weakness and numbness.   Hematological: Negative for adenopathy. Does not bruise/bleed easily.   Psychiatric/Behavioral: Negative for behavioral problems, confusion, sleep disturbance and suicidal ideas.   All other systems reviewed and are negative.  I have reviewed and confirmed the accuracy of the ROS as documented by the MA/LPN/RN Susannah Moya MD        Objective      Chief Complaint   Patient presents with   • Follow-up        Patient Active Problem List   Diagnosis   • CLL (chronic lymphocytic leukemia) (CMS/HCC)   • Anemia   • Encounter for hepatitis C screening test for low risk patient    • Thrombocytopenia (CMS/HCC)   • H/O heart artery stent   • Stented coronary artery   • Arteriosclerosis of coronary artery   • Atrial fibrillation (CMS/HCC)   • Paroxysmal atrial fibrillation (CMS/HCC)   • Chest pain, rule out acute myocardial infarction   • Fall   • Fracture, olecranon   • Hyperlipidemia   • Long term (current) use of anticoagulants   • Lower GI bleed   • Olecranon bursitis   • Shoulder pain   • Smoker   • CLL (chronic lymphocytic leukemia) (CMS/HCC)   • Dehydration         MEDICATIONS:  Medication Reconciliation for the patient has been reviewed by me and documented in the patients chart.    ALLERGIES:    Allergies   Allergen Reactions   • Contrast Dye Swelling   •  Penicillins Swelling         Vitals:    09/24/20 0747   BP: 110/52   Pulse: 68   Resp: 16   Temp: 97.1 °F (36.2 °C)   SpO2: 99%        Current Status 9/24/2020   ECOG score 0        Physical Exam   Constitutional: He is oriented to person, place, and time. He appears well-developed. No distress.   HENT:   Head: Normocephalic and atraumatic.   Mouth/Throat: No oropharyngeal exudate.   Eyes: Pupils are equal, round, and reactive to light.   Neck: Normal range of motion.   Cardiovascular: Normal rate, regular rhythm and normal heart sounds.   No murmur heard.  Pulmonary/Chest: Effort normal and breath sounds normal. No respiratory distress. He has no wheezes. He has no rhonchi. He has no rales.   Abdominal: Soft. Normal appearance and bowel sounds are normal. He exhibits no distension.   Musculoskeletal: Normal range of motion.   Neurological: He is alert and oriented to person, place, and time.   Skin: Skin is warm and dry. No rash noted.   Vitals reviewed.   I have reexamined the patient and the results are consistent with the previously documented exam. Susannah Moya MD       RECENT LABS:  Results from last 7 days   Lab Units 09/22/20  1447   WBC 10*3/mm3 1.17*   NEUTROS ABS 10*3/mm3 0.04*   HEMOGLOBIN g/dL 9.3*   HEMATOCRIT % 28.8*   PLATELETS 10*3/mm3 62*     Results from last 7 days   Lab Units 09/22/20  1452   SODIUM mmol/L 136   POTASSIUM mmol/L 4.1   CHLORIDE mmol/L 102   CO2 mmol/L 24.0   BUN mg/dL 15   CREATININE mg/dL 0.88   CALCIUM mg/dL 9.2   ALBUMIN g/dL 3.90   BILIRUBIN mg/dL 1.2   ALK PHOS U/L 180*   ALT (SGPT) U/L 10   AST (SGOT) U/L 15   GLUCOSE mg/dL 146*             STAT NONCONTRAST HEAD CT 08/20/2020  IMPRESSION:  1. No acute abnormality is seen with no significant change when compared  to prior noncontrast head CT [facility]/03[facility].  2. There is mild small vessel disease in the frontal periventricular  white matter and there are calcified atherosclerotic plaques in  the  cavernous internal carotid arteries bilaterally.  3. Patient has had previous paranasal sinus surgery with bilateral  uncinectomies and ethmoidectomies and the paranasal sinuses are  currently clear. The remainder of the head CT is within normal limits,  specifically no acute intracranial hemorrhage is seen. Etiology of  headaches in this patient with history of leukemia and thrombocytopenia  is not established on this exam. Results were communicated to Susannah Moya, the oncologist taking care of the patient by telephone on  08/20/2020 at 9:20 AM.      XR CHEST POST CVA PORT-07/31/20    IMPRESSION:  Chest port placement. No pneumothorax.       CT NECK, CHEST, ABDOMEN, AND PELVIS WITHOUT IV CONTRAST. 7/20/2020   HISTORY: 69-year-old male with CLL/SLL.     TECHNIQUE: Radiation dose reduction techniques were utilized, including  automated exposure control and exposure modulation based on body size. 3  mm images were obtained through the neck, chest, abdomen, and pelvis  without the administration of IV contrast. Compared with CT of the  abdomen and pelvis from 08/05/2019 and previous CTs from 03/19/2018.     FINDINGS:  NECK: There is no significant change in the 1.4 x 1.0 cm left  supraclavicular node or in the smaller supraclavicular nodes  bilaterally. A reference 1.4 x 0.7 cm right jugular chain node is  stable. There are shotty nodes scattered throughout the neck. No new  lymphadenopathy is seen. Noncontrasted parotid, submandibular, and  thyroid glands appear unremarkable.     CHEST: There has been interval decrease in the size of the shotty and  borderline enlarged nodes at the axilla. Reference 1.6 x 0.7 cm right  axillary node previously measured 2.0 x 1.2 cm. There has been a  decrease in the size of all of the shotty mediastinal and hilar nodes.  There are no new pulmonary opacities and there are no pleural or  pericardial effusions.     ABDOMEN/PELVIS: Splenic size has slightly increased measuring  15.4 cm in  diameter and previously measuring 13.5 cm, both measured on the coronal  reconstruction series. There is no change in the shotty nodes at the  gastrohepatic ligament and shorty hepatis. There are no pathologically  enlarged nodes. There is no acute abnormality involving the  noncontrasted liver. A single gallstone is noted within the gallbladder.  Noncontrasted pancreas, adrenals, and kidneys appear unremarkable. There  is no acute bowel abnormality. There is extensive sigmoid  diverticulosis. No free fluid. There are extensive abdominal aortic  atherosclerotic changes and there is a stable 3.7 cm infrarenal  abdominal aortic aneurysm.     IMPRESSION:  1. There has been slight interval increase in splenomegaly since the CT  of the abdomen from 08/05/2019.  2. Interval decrease in the size of the axillary nodes and the shotty  mediastinal nodes. There is no change in the shotty nodes within the  neck and supraclavicular regions. There is no new lymphadenopathy.  3. Stable 3.7 cm infrarenal abdominal aortic aneurysm.      Assessment/Plan   1. Stage 2 CLL without evidence of any disease progression.  I have reviewed the CT scan from 3/19/2018 there is minimal progression but clinically patient is asymptomatic and we will follow with observation.  Will monitor with labs.   No evidence of any frequent infections, no major worsening of his white count. He has no fever or night sweats or any other symptoms.   · Patient knows that he is prone for infections and he is mainly staying at home during the COVID-19 situation time  · [facility]  Initially admitted with chest pain and underwent stress test and echo.  Echo was negative.  Stress test is abnormal but have left message for patient's cardiologist to call us.  His cardiologist is been sent [facility].  · He then got admitted the second time with worsening GI bleed and required total of 3 units of blood.  EGD was negative  "but colonoscopy showed angiectasia for which he underwent argon ablation.  Currently hemoglobin stable and no active bleeding.  · He was also found to have low platelets with platelets dropping down to 27k and hemoglobin was 7.  Ultrasound showed splenomegaly 15 cm.  Concern is whether he has progressed from his CLL point of view and requires treatment.  · CT scan performed 7/20/2020 did show the increased splenomegaly, but interval decrease in size of the axillary nodes, and shotty mediastinal nodes.  No change in the shotty nodes within the neck and supraclavicular regions.  No new lymphadenopathy.  Bone marrow biopsy however showed preliminarily that there is bone marrow involvement.  Remainder of bone marrow biopsy report is pending.    · Bone marrow shows hypercellular marrow with 100% involvement of chronic lymphocytic leukemia/small lymphocytic lymphoma and diffuse pattern with at least 98% of the total marrow cellularity.  Rare foci of erythropoiesis and rare megakaryocytes are noted.     · Negative for BCL-2 and BCL 6.  Chromosomes shows normal karyotype.  I GVH mutation present.  Positive for gain of 1 q., monosomy 13 and loss of IGH.  Negative for deletion T p53, negative for gain of chromosomes 9 and 11, negative for 11, 14 fusion.    · The combination of monosomy 13 and gain of 1 q. is thought to be associated with plasma cell myeloma.  However this patient does not have myeloma.    · Scans were reviewed with the patient today.  Dr. Moya also discussed with the patient and recommended he initiate treatment with chlorambucil and Gazyva.  He would not be a good candidate for Imbruvica due to his history of A. fib\".  He cannot take anticoagulation at this time.  The patient was provided with chemotherapy education today, including  Handouts.  He will need a port placed so that we can initiate treatment  · 8/13/2020: Gazyva day 1. Plan also [id number] mg p.o. on day 1 day 15.  We can increase the " dose once we know he tolerates and his platelets improved.  · Patient developing pancytopenia when reviewed cycle 1 day 15.  Chlorambucil dose modified to 10 mg for day 15 however it is felt that he cannot tolerate this ongoing.   ·  Plan to switch to Venetoclax along with continuing Gazyva.    · Patient due today for cycle 2-day 1 Gazyva.  He will proceed today as scheduled.  Venetoclax will be shipped to his home with plans to begin this next week on 9/14/2020.    · Patient is tolerating venatoclax.  He is on monthly obinutuzumab starting cycle 2    2.  Worsening thrombocytopenia, looking back his platelets were always in the 150s and during this admission he dropped down to 27K. His LDH also was elevated and he was anemic.  · It was dropping on chlorambucil.  Plans to change therapy as detailed above.  Patient did require transfusion and platelets are currently improved to 55,000.    3.  History of angiodysplasia requiring cauterization and Hess's esophagus mild anemia. He is currently asymptomatic.    4.  History of cluster headaches for which she has to be treated with steroids in the past and has followed up with Dr. Len Péreztoday with significant headaches.  · Patient had CT of the head 8/20/2020 which was negative.  · He was started on prednisone 10 mg daily which was not helpful.  · Patient saw Dr. Bobby, neurology who gave him 2 shots of a new medication Emgality.  This is something that can be given once a month.    · Headaches improved    5.  Neutropenia without fever, will start Cipro    PLAN:  1.  Chlorambucil has been discontinued secondary to intolerance and headaches  2. Started venetoclax as of September 14, 2020.  Started 20 mg followed by 50 mg daily and  100 mg daily to be started next Monday  3. Patient to start voriconazole  4. Given neutropenia we have added ciprofloxacin  5. Patient is due for Gazyva on October 15.  6. Follow-up with me in 4 weeks with samra Moya,  MD        Cc: Dr. Kevin Chandra

## 2020-09-24 ENCOUNTER — OFFICE VISIT (OUTPATIENT)
Dept: ONCOLOGY | Facility: CLINIC | Age: 70
End: 2020-09-24

## 2020-09-24 ENCOUNTER — APPOINTMENT (OUTPATIENT)
Dept: ONCOLOGY | Facility: HOSPITAL | Age: 70
End: 2020-09-24

## 2020-09-24 ENCOUNTER — TELEPHONE (OUTPATIENT)
Dept: ONCOLOGY | Facility: CLINIC | Age: 70
End: 2020-09-24

## 2020-09-24 VITALS
OXYGEN SATURATION: 99 % | RESPIRATION RATE: 16 BRPM | DIASTOLIC BLOOD PRESSURE: 52 MMHG | BODY MASS INDEX: 20.87 KG/M2 | HEART RATE: 68 BPM | WEIGHT: 157.5 LBS | HEIGHT: 73 IN | SYSTOLIC BLOOD PRESSURE: 110 MMHG | TEMPERATURE: 97.1 F

## 2020-09-24 DIAGNOSIS — Z79.899 HIGH RISK MEDICATION USE: Primary | ICD-10-CM

## 2020-09-24 DIAGNOSIS — D64.9 ANEMIA, UNSPECIFIED TYPE: ICD-10-CM

## 2020-09-24 DIAGNOSIS — C91.10 CLL (CHRONIC LYMPHOCYTIC LEUKEMIA) (HCC): Primary | ICD-10-CM

## 2020-09-24 DIAGNOSIS — G44.019 EPISODIC CLUSTER HEADACHE, NOT INTRACTABLE: ICD-10-CM

## 2020-09-24 DIAGNOSIS — C91.10 CLL (CHRONIC LYMPHOCYTIC LEUKEMIA) (HCC): ICD-10-CM

## 2020-09-24 DIAGNOSIS — D61.818 PANCYTOPENIA (HCC): ICD-10-CM

## 2020-09-24 PROCEDURE — 99214 OFFICE O/P EST MOD 30 MIN: CPT | Performed by: INTERNAL MEDICINE

## 2020-09-24 RX ORDER — LEVOFLOXACIN 500 MG/1
500 TABLET, FILM COATED ORAL DAILY
Qty: 7 TABLET | Refills: 0 | Status: CANCELLED | OUTPATIENT
Start: 2020-09-24 | End: 2020-10-01

## 2020-09-24 RX ORDER — CIPROFLOXACIN 500 MG/1
500 TABLET, FILM COATED ORAL 2 TIMES DAILY
Qty: 60 TABLET | Refills: 0 | Status: SHIPPED | OUTPATIENT
Start: 2020-09-24 | End: 2020-10-12

## 2020-09-24 NOTE — TELEPHONE ENCOUNTER
----- Message from Meghan Cummins RN sent at 9/24/2020 10:58 AM EDT -----  Please add EKG appt to 10/12 appts     Thank you!

## 2020-09-24 NOTE — PROGRESS NOTES
Dr CORTES asked that I contact pharmacy for recommendation of antibiotic for Mr Stephenson.  I spoke with KENDRICK Lara pharmacist and she recommended cipro 500mg BID for prophylaxis.  Explained that this does still have an intermediate reaction risk with his sotalol, but less than Levaquin.  She recommended he have periodic EKG because of this.  Reviewed the above with Dr CORTES, and ok to proceed with this plan. Pt will have EKG when he returns in two weeks for his Gazyva.  This was ordered.  I escribed the cipro and contacted Mr Stephenson by phone.  I explained all of the above to both him and his wife.  They stated to me they would be in [place] the week of 10/4-10/10 and needed an order to have a CBC drawn while there.  This was also ordered.  They stated they needed a note from Dr CORTES explaining his medical condition to take with them.  I instructed them that I would print her office note from today when it is complete along with the cbc order and mail to their home so they have it for their trip..  They both v/u.

## 2020-09-28 ENCOUNTER — INFUSION (OUTPATIENT)
Dept: ONCOLOGY | Facility: HOSPITAL | Age: 70
End: 2020-09-28

## 2020-09-28 ENCOUNTER — TELEPHONE (OUTPATIENT)
Dept: GENERAL RADIOLOGY | Facility: HOSPITAL | Age: 70
End: 2020-09-28

## 2020-09-28 ENCOUNTER — LAB (OUTPATIENT)
Dept: LAB | Facility: HOSPITAL | Age: 70
End: 2020-09-28

## 2020-09-28 VITALS
HEART RATE: 63 BPM | SYSTOLIC BLOOD PRESSURE: 131 MMHG | OXYGEN SATURATION: 99 % | TEMPERATURE: 96.9 F | WEIGHT: 157.8 LBS | BODY MASS INDEX: 20.82 KG/M2 | DIASTOLIC BLOOD PRESSURE: 61 MMHG | RESPIRATION RATE: 16 BRPM

## 2020-09-28 DIAGNOSIS — C91.10 CLL (CHRONIC LYMPHOCYTIC LEUKEMIA) (HCC): Primary | ICD-10-CM

## 2020-09-28 LAB
ALBUMIN SERPL-MCNC: 3.9 G/DL (ref 3.5–5.2)
ALBUMIN/GLOB SERPL: 2.1 G/DL (ref 1.1–2.4)
ALP SERPL-CCNC: 188 U/L (ref 38–116)
ALT SERPL W P-5'-P-CCNC: 12 U/L (ref 0–41)
ANION GAP SERPL CALCULATED.3IONS-SCNC: 8 MMOL/L (ref 5–15)
AST SERPL-CCNC: 15 U/L (ref 0–40)
BILIRUB SERPL-MCNC: 0.9 MG/DL (ref 0.2–1.2)
BUN SERPL-MCNC: 13 MG/DL (ref 6–20)
BUN/CREAT SERPL: 15.7 (ref 7.3–30)
CALCIUM SPEC-SCNC: 9.2 MG/DL (ref 8.5–10.2)
CHLORIDE SERPL-SCNC: 102 MMOL/L (ref 98–107)
CO2 SERPL-SCNC: 25 MMOL/L (ref 22–29)
CREAT SERPL-MCNC: 0.83 MG/DL (ref 0.7–1.3)
GFR SERPL CREATININE-BSD FRML MDRD: 91 ML/MIN/1.73
GLOBULIN UR ELPH-MCNC: 1.9 GM/DL (ref 1.8–3.5)
GLUCOSE SERPL-MCNC: 343 MG/DL (ref 74–124)
LDH SERPL-CCNC: 167 U/L (ref 99–259)
PHOSPHATE SERPL-MCNC: 3.5 MG/DL (ref 2.5–4.5)
POTASSIUM SERPL-SCNC: 4 MMOL/L (ref 3.5–4.7)
PROT SERPL-MCNC: 5.8 G/DL (ref 6.3–8)
SODIUM SERPL-SCNC: 135 MMOL/L (ref 134–145)
URATE SERPL-MCNC: 3.3 MG/DL (ref 2.8–7.4)

## 2020-09-28 PROCEDURE — 80053 COMPREHEN METABOLIC PANEL: CPT

## 2020-09-28 PROCEDURE — 84100 ASSAY OF PHOSPHORUS: CPT

## 2020-09-28 PROCEDURE — 96360 HYDRATION IV INFUSION INIT: CPT

## 2020-09-28 PROCEDURE — 84550 ASSAY OF BLOOD/URIC ACID: CPT

## 2020-09-28 PROCEDURE — 96372 THER/PROPH/DIAG INJ SC/IM: CPT

## 2020-09-28 PROCEDURE — 25010000002 PEGFILGRASTIM 6 MG/0.6ML SOLUTION PREFILLED SYRINGE: Performed by: INTERNAL MEDICINE

## 2020-09-28 PROCEDURE — 83615 LACTATE (LD) (LDH) ENZYME: CPT

## 2020-09-28 PROCEDURE — 25010000003 HEPARIN LOCK FLUSH PER 10 UNITS: Performed by: NURSE PRACTITIONER

## 2020-09-28 RX ORDER — SODIUM CHLORIDE 9 MG/ML
1000 INJECTION, SOLUTION INTRAVENOUS ONCE
Status: CANCELLED
Start: 2020-09-28

## 2020-09-28 RX ORDER — SODIUM CHLORIDE 0.9 % (FLUSH) 0.9 %
10 SYRINGE (ML) INJECTION AS NEEDED
Status: DISCONTINUED | OUTPATIENT
Start: 2020-09-28 | End: 2020-09-28 | Stop reason: HOSPADM

## 2020-09-28 RX ORDER — HEPARIN SODIUM (PORCINE) LOCK FLUSH IV SOLN 100 UNIT/ML 100 UNIT/ML
500 SOLUTION INTRAVENOUS AS NEEDED
Status: CANCELLED | OUTPATIENT
Start: 2020-09-28

## 2020-09-28 RX ORDER — HEPARIN SODIUM (PORCINE) LOCK FLUSH IV SOLN 100 UNIT/ML 100 UNIT/ML
500 SOLUTION INTRAVENOUS AS NEEDED
Status: DISCONTINUED | OUTPATIENT
Start: 2020-09-28 | End: 2020-09-28 | Stop reason: HOSPADM

## 2020-09-28 RX ORDER — SODIUM CHLORIDE 0.9 % (FLUSH) 0.9 %
10 SYRINGE (ML) INJECTION AS NEEDED
Status: CANCELLED | OUTPATIENT
Start: 2020-09-28

## 2020-09-28 RX ORDER — SODIUM CHLORIDE 9 MG/ML
1000 INJECTION, SOLUTION INTRAVENOUS ONCE
Status: CANCELLED
Start: 2020-10-02

## 2020-09-28 RX ADMIN — SODIUM CHLORIDE, PRESERVATIVE FREE 10 ML: 5 INJECTION INTRAVENOUS at 14:46

## 2020-09-28 RX ADMIN — Medication 500 UNITS: at 14:47

## 2020-09-28 RX ADMIN — SODIUM CHLORIDE 1000 ML: 900 INJECTION, SOLUTION INTRAVENOUS at 13:33

## 2020-09-28 RX ADMIN — PEGFILGRASTIM 6 MG: 6 INJECTION SUBCUTANEOUS at 14:48

## 2020-09-28 NOTE — TELEPHONE ENCOUNTER
----- Message from Meghan Cummins RN sent at 9/28/2020  8:16 AM EDT -----  Per Dr CORTES, please schedule pt TODAY for labs, stat CMP, phos, uric acid, LDH, 1L normal saline and neulasta.  And please scheduled the same for Friday and add NP visit.    Thank you!

## 2020-09-28 NOTE — NURSING NOTE
Pt in for fluids and stat labs. Labs satisfactory for pt at this time when comparing them to labs from last week.  His blood glucose 343 today. He has been eating hard candy today and he thinks that may be the issue, but I informed him that the glucose has been up and down recently.  He will have CMP redrawn as scheduled on Friday.  He is scheduled to see MARIAH Chang on Friday and I did send her a message about the blood sugar today.    He also was approved for neulasta today. We went over possible side effects. He will stop on the way home to get claritin and he will take it daily until he comes back on Friday.      Instructed him to call with any questions or concerns prior to his visit on Friday.

## 2020-09-28 NOTE — TELEPHONE ENCOUNTER
----- Message from Akosua Tan RN sent at 9/28/2020  2:24 PM EDT -----  On Friday 10/2/20, pt needs a port flush appt instead of a lab appt. He is stat labs and NP visit and IV fluids that day. Port nurse needs to access port before NP visit Friday. Can you please change this on his schedule.

## 2020-09-29 ENCOUNTER — MEDICATION THERAPY MANAGEMENT (OUTPATIENT)
Dept: ONCOLOGY | Facility: HOSPITAL | Age: 70
End: 2020-09-29

## 2020-09-29 ENCOUNTER — APPOINTMENT (OUTPATIENT)
Dept: LAB | Facility: HOSPITAL | Age: 70
End: 2020-09-29

## 2020-09-29 NOTE — PROGRESS NOTES
MTM telephone encounter re adherence and side effects ( Ventoclax)    Labs reviewed      9/28/2020  Additional Day   Creatinine 0.70 - 1.30 mg/dL 0.83   eGFR Non African Am >60 mL/min/1.73 91   BUN 6 - 20 mg/dL 13   Sodium 134 - 145 mmol/L 135   Potassium 3.5 - 4.7 mmol/L 4.0   Glucose 74 - 124 mg/dL 343 (A)   Calcium 8.5 - 10.2 mg/dL 9.2   Albumin 3.50 - 5.20 g/dL 3.90   Total Protein 6.3 - 8.0 g/dL 5.8 (A)   AST (SGOT) 0 - 40 U/L 15   ALT (SGPT) 0 - 41 U/L 12   Alkaline Phosphatase 38 - 116 U/L 188 (A)   Total Bilirubin 0.2 - 1.2 mg/dL 0.9       Mr Stephenson reports that he is taking Venetoclax 100 mg daily now.  He is experiencing some nausea and states he feels 'a little yucky.'  He reported that when he ramped to 50 mg he felt the same, but after a few days on that dose, he adjusted and felt fine.  He is taking nausea medication, but is not sure whether he is using the ondansetron or the prochlorperazine. He is not vomiting and he reports that he is able to eat and hydrate  He verified that he did get Cipro rx filled and is taking 500 mg po bid.  We discussed that after he is on Venetoclax 100 mg for 7 days, that he will then initiate Voriconazole 200 mg po bid.  He verified that he has picked up that prescription from [facility] and understands that he should initiate it after ramp up to 100 mg for 1 week.  It was explained that Voriconazole affects Venetoclax's metabolism and that is why his dose will be 100 mg rather than usual 400 mg.  He is questioning how he will obtain refill for Venetoclax.  Sending in basket message to Harika Tan, pharmacy benefit advocate with his supply count.

## 2020-09-29 NOTE — TELEPHONE ENCOUNTER
Staff message rec from KENDRICK Mcgarry Pharmacist-pt wanted us to know he has enough Venetoclax (100 mg dose) through next week. He will remain on the 100 mg daily due to adding Voriconazole.    I have escribed a new rx to JACQUELINE Diaz.    Leela Rawls, [facility] sent to Harika Tan,     He started Venetoclax 100 mg this week, then he has supply for 100 mg next week.  He just wanted to make sure we knew that is all he had and will need refill after.  He stays at 100 mg bc he will be adding Voriconazole.     Thanks,   Jane

## 2020-10-02 ENCOUNTER — INFUSION (OUTPATIENT)
Dept: ONCOLOGY | Facility: HOSPITAL | Age: 70
End: 2020-10-02

## 2020-10-02 ENCOUNTER — OFFICE VISIT (OUTPATIENT)
Dept: ONCOLOGY | Facility: CLINIC | Age: 70
End: 2020-10-02

## 2020-10-02 ENCOUNTER — APPOINTMENT (OUTPATIENT)
Dept: LAB | Facility: HOSPITAL | Age: 70
End: 2020-10-02

## 2020-10-02 ENCOUNTER — TELEPHONE (OUTPATIENT)
Dept: ONCOLOGY | Facility: CLINIC | Age: 70
End: 2020-10-02

## 2020-10-02 VITALS
WEIGHT: 158.4 LBS | OXYGEN SATURATION: 99 % | RESPIRATION RATE: 18 BRPM | SYSTOLIC BLOOD PRESSURE: 109 MMHG | DIASTOLIC BLOOD PRESSURE: 65 MMHG | HEART RATE: 62 BPM | HEIGHT: 73 IN | TEMPERATURE: 97.3 F | BODY MASS INDEX: 20.99 KG/M2

## 2020-10-02 DIAGNOSIS — R25.2 MUSCLE CRAMPS: ICD-10-CM

## 2020-10-02 DIAGNOSIS — C91.10 CLL (CHRONIC LYMPHOCYTIC LEUKEMIA) (HCC): Primary | ICD-10-CM

## 2020-10-02 DIAGNOSIS — C91.10 CLL (CHRONIC LYMPHOCYTIC LEUKEMIA) (HCC): ICD-10-CM

## 2020-10-02 LAB
ALBUMIN SERPL-MCNC: 3.9 G/DL (ref 3.5–5.2)
ALBUMIN/GLOB SERPL: 2.2 G/DL (ref 1.1–2.4)
ALP SERPL-CCNC: 229 U/L (ref 38–116)
ALT SERPL W P-5'-P-CCNC: 10 U/L (ref 0–41)
ANION GAP SERPL CALCULATED.3IONS-SCNC: 10.1 MMOL/L (ref 5–15)
AST SERPL-CCNC: 14 U/L (ref 0–40)
BASOPHILS # BLD AUTO: 0.08 10*3/MM3 (ref 0–0.2)
BASOPHILS NFR BLD AUTO: 0.6 % (ref 0–1.5)
BILIRUB SERPL-MCNC: 1.1 MG/DL (ref 0.2–1.2)
BUN SERPL-MCNC: 14 MG/DL (ref 6–20)
BUN/CREAT SERPL: 15.1 (ref 7.3–30)
CALCIUM SPEC-SCNC: 8.9 MG/DL (ref 8.5–10.2)
CHLORIDE SERPL-SCNC: 104 MMOL/L (ref 98–107)
CO2 SERPL-SCNC: 24.9 MMOL/L (ref 22–29)
CREAT SERPL-MCNC: 0.93 MG/DL (ref 0.7–1.3)
DEPRECATED RDW RBC AUTO: 70.9 FL (ref 37–54)
EOSINOPHIL # BLD AUTO: 0.01 10*3/MM3 (ref 0–0.4)
EOSINOPHIL NFR BLD AUTO: 0.1 % (ref 0.3–6.2)
ERYTHROCYTE [DISTWIDTH] IN BLOOD BY AUTOMATED COUNT: 20 % (ref 12.3–15.4)
GFR SERPL CREATININE-BSD FRML MDRD: 80 ML/MIN/1.73
GLOBULIN UR ELPH-MCNC: 1.8 GM/DL (ref 1.8–3.5)
GLUCOSE SERPL-MCNC: 206 MG/DL (ref 74–124)
HCT VFR BLD AUTO: 33.3 % (ref 37.5–51)
HGB BLD-MCNC: 10.6 G/DL (ref 13–17.7)
IMM GRANULOCYTES # BLD AUTO: 0.66 10*3/MM3 (ref 0–0.05)
IMM GRANULOCYTES NFR BLD AUTO: 4.6 % (ref 0–0.5)
LDH SERPL-CCNC: 153 U/L (ref 99–259)
LYMPHOCYTES # BLD AUTO: 1.2 10*3/MM3 (ref 0.7–3.1)
LYMPHOCYTES NFR BLD AUTO: 8.5 % (ref 19.6–45.3)
MAGNESIUM SERPL-MCNC: 1.9 MG/DL (ref 1.8–2.5)
MCH RBC QN AUTO: 30.6 PG (ref 26.6–33)
MCHC RBC AUTO-ENTMCNC: 31.8 G/DL (ref 31.5–35.7)
MCV RBC AUTO: 96.2 FL (ref 79–97)
MONOCYTES # BLD AUTO: 1.02 10*3/MM3 (ref 0.1–0.9)
MONOCYTES NFR BLD AUTO: 7.2 % (ref 5–12)
NEUTROPHILS NFR BLD AUTO: 11.23 10*3/MM3 (ref 1.7–7)
NEUTROPHILS NFR BLD AUTO: 79 % (ref 42.7–76)
NRBC BLD AUTO-RTO: 0.2 /100 WBC (ref 0–0.2)
PHOSPHATE SERPL-MCNC: 3.8 MG/DL (ref 2.5–4.5)
PLATELET # BLD AUTO: 77 10*3/MM3 (ref 140–450)
PMV BLD AUTO: 9.6 FL (ref 6–12)
POTASSIUM SERPL-SCNC: 4.1 MMOL/L (ref 3.5–4.7)
PROT SERPL-MCNC: 5.7 G/DL (ref 6.3–8)
RBC # BLD AUTO: 3.46 10*6/MM3 (ref 4.14–5.8)
SODIUM SERPL-SCNC: 139 MMOL/L (ref 134–145)
URATE SERPL-MCNC: 4.3 MG/DL (ref 2.8–7.4)
WBC # BLD AUTO: 14.2 10*3/MM3 (ref 3.4–10.8)

## 2020-10-02 PROCEDURE — 25010000003 HEPARIN LOCK FLUSH PER 10 UNITS: Performed by: NURSE PRACTITIONER

## 2020-10-02 PROCEDURE — 83735 ASSAY OF MAGNESIUM: CPT | Performed by: NURSE PRACTITIONER

## 2020-10-02 PROCEDURE — 83615 LACTATE (LD) (LDH) ENZYME: CPT

## 2020-10-02 PROCEDURE — 85025 COMPLETE CBC W/AUTO DIFF WBC: CPT

## 2020-10-02 PROCEDURE — 84550 ASSAY OF BLOOD/URIC ACID: CPT

## 2020-10-02 PROCEDURE — 80053 COMPREHEN METABOLIC PANEL: CPT

## 2020-10-02 PROCEDURE — 84100 ASSAY OF PHOSPHORUS: CPT

## 2020-10-02 PROCEDURE — 99214 OFFICE O/P EST MOD 30 MIN: CPT | Performed by: NURSE PRACTITIONER

## 2020-10-02 PROCEDURE — 96361 HYDRATE IV INFUSION ADD-ON: CPT

## 2020-10-02 PROCEDURE — 96360 HYDRATION IV INFUSION INIT: CPT

## 2020-10-02 RX ORDER — HEPARIN SODIUM (PORCINE) LOCK FLUSH IV SOLN 100 UNIT/ML 100 UNIT/ML
500 SOLUTION INTRAVENOUS AS NEEDED
Status: DISCONTINUED | OUTPATIENT
Start: 2020-10-02 | End: 2020-10-02 | Stop reason: HOSPADM

## 2020-10-02 RX ORDER — SODIUM CHLORIDE 9 MG/ML
500 INJECTION, SOLUTION INTRAVENOUS ONCE
Status: COMPLETED | OUTPATIENT
Start: 2020-10-02 | End: 2020-10-02

## 2020-10-02 RX ORDER — SODIUM CHLORIDE 0.9 % (FLUSH) 0.9 %
10 SYRINGE (ML) INJECTION AS NEEDED
Status: CANCELLED | OUTPATIENT
Start: 2020-10-02

## 2020-10-02 RX ORDER — HEPARIN SODIUM (PORCINE) LOCK FLUSH IV SOLN 100 UNIT/ML 100 UNIT/ML
500 SOLUTION INTRAVENOUS AS NEEDED
Status: CANCELLED | OUTPATIENT
Start: 2020-10-02

## 2020-10-02 RX ADMIN — Medication 500 UNITS: at 10:06

## 2020-10-02 RX ADMIN — SODIUM CHLORIDE 500 ML: 9 INJECTION, SOLUTION INTRAVENOUS at 08:41

## 2020-10-02 RX ADMIN — SODIUM CHLORIDE 500 ML: 9 INJECTION, SOLUTION INTRAVENOUS at 09:23

## 2020-10-02 NOTE — NURSING NOTE
Pt's blood glucose is 206 today. Pt reports he hasn't eaten today. Reviewed with JOCELYNN Chang. Pt advised to follow up with his PCP. Pt v/u.

## 2020-10-05 ENCOUNTER — MEDICATION THERAPY MANAGEMENT (OUTPATIENT)
Dept: ONCOLOGY | Facility: HOSPITAL | Age: 70
End: 2020-10-05

## 2020-10-05 NOTE — PROGRESS NOTES
MT lab review ( gazyva+venetoclax)         10/2/2020  Additional Day   WBC 3.40 - 10.80 10*3/mm3 14.20 (A)   Neutrophils Absolute 1.70 - 7.00 10*3/mm3 11.23 (A)   Hemoglobin 13.0 - 17.7 g/dL 10.6 (A)   Hematocrit 37.5 - 51.0 % 33.3 (A)   Platelets 140 - 450 10*3/mm3 77 (A)   Creatinine 0.70 - 1.30 mg/dL 0.93   eGFR Non African Am >60 mL/min/1.73 80   BUN 6 - 20 mg/dL 14   Sodium 134 - 145 mmol/L 139   Potassium 3.5 - 4.7 mmol/L 4.1   Glucose 74 - 124 mg/dL 206 (A)   Magnesium 1.8 - 2.5 mg/dL 1.9   Calcium 8.5 - 10.2 mg/dL 8.9   Albumin 3.50 - 5.20 g/dL 3.90   Total Protein 6.3 - 8.0 g/dL 5.7 (A)   AST (SGOT) 0 - 40 U/L 14   ALT (SGPT) 0 - 41 U/L 10   Alkaline Phosphatase 38 - 116 U/L 229 (A)   Total Bilirubin 0.2 - 1.2 mg/dL 1.1     APRN dictation is noted.  Cipro discontinued for now; Voriconazole 200 mg po bid should begin today, continue Venteoclax 100mg po daily.    Pt is going on vacation- will return to office 10/12.

## 2020-10-06 ENCOUNTER — APPOINTMENT (OUTPATIENT)
Dept: LAB | Facility: HOSPITAL | Age: 70
End: 2020-10-06

## 2020-10-06 ENCOUNTER — DOCUMENTATION (OUTPATIENT)
Dept: ONCOLOGY | Facility: CLINIC | Age: 70
End: 2020-10-06

## 2020-10-12 ENCOUNTER — INFUSION (OUTPATIENT)
Dept: ONCOLOGY | Facility: HOSPITAL | Age: 70
End: 2020-10-12

## 2020-10-12 ENCOUNTER — MEDICATION THERAPY MANAGEMENT (OUTPATIENT)
Dept: ONCOLOGY | Facility: HOSPITAL | Age: 70
End: 2020-10-12

## 2020-10-12 ENCOUNTER — CLINICAL SUPPORT (OUTPATIENT)
Dept: ONCOLOGY | Facility: HOSPITAL | Age: 70
End: 2020-10-12

## 2020-10-12 ENCOUNTER — OFFICE VISIT (OUTPATIENT)
Dept: ONCOLOGY | Facility: CLINIC | Age: 70
End: 2020-10-12

## 2020-10-12 VITALS
SYSTOLIC BLOOD PRESSURE: 100 MMHG | BODY MASS INDEX: 20.41 KG/M2 | HEART RATE: 64 BPM | RESPIRATION RATE: 16 BRPM | HEIGHT: 73 IN | WEIGHT: 154 LBS | OXYGEN SATURATION: 98 % | TEMPERATURE: 97.5 F | DIASTOLIC BLOOD PRESSURE: 64 MMHG

## 2020-10-12 VITALS
SYSTOLIC BLOOD PRESSURE: 100 MMHG | TEMPERATURE: 97.5 F | RESPIRATION RATE: 16 BRPM | HEART RATE: 60 BPM | HEIGHT: 73 IN | DIASTOLIC BLOOD PRESSURE: 64 MMHG | OXYGEN SATURATION: 98 % | WEIGHT: 154 LBS | BODY MASS INDEX: 20.41 KG/M2

## 2020-10-12 DIAGNOSIS — C91.10 CLL (CHRONIC LYMPHOCYTIC LEUKEMIA) (HCC): Primary | ICD-10-CM

## 2020-10-12 DIAGNOSIS — T50.905A DRUG-INDUCED NAUSEA AND VOMITING: ICD-10-CM

## 2020-10-12 DIAGNOSIS — Z79.899 HIGH RISK MEDICATION USE: Primary | ICD-10-CM

## 2020-10-12 DIAGNOSIS — D69.6 THROMBOCYTOPENIA (HCC): ICD-10-CM

## 2020-10-12 DIAGNOSIS — D64.9 ANEMIA, UNSPECIFIED TYPE: ICD-10-CM

## 2020-10-12 DIAGNOSIS — Z79.899 HIGH RISK MEDICATION USE: ICD-10-CM

## 2020-10-12 DIAGNOSIS — R11.2 DRUG-INDUCED NAUSEA AND VOMITING: ICD-10-CM

## 2020-10-12 DIAGNOSIS — C91.10 CLL (CHRONIC LYMPHOCYTIC LEUKEMIA) (HCC): ICD-10-CM

## 2020-10-12 LAB
ALBUMIN SERPL-MCNC: 4.1 G/DL (ref 3.5–5.2)
ALBUMIN/GLOB SERPL: 2.1 G/DL (ref 1.1–2.4)
ALP SERPL-CCNC: 176 U/L (ref 38–116)
ALT SERPL W P-5'-P-CCNC: 9 U/L (ref 0–41)
ANION GAP SERPL CALCULATED.3IONS-SCNC: 8.3 MMOL/L (ref 5–15)
AST SERPL-CCNC: 14 U/L (ref 0–40)
BASOPHILS # BLD AUTO: 0.01 10*3/MM3 (ref 0–0.2)
BASOPHILS NFR BLD AUTO: 0.2 % (ref 0–1.5)
BILIRUB SERPL-MCNC: 0.8 MG/DL (ref 0.2–1.2)
BUN SERPL-MCNC: 15 MG/DL (ref 6–20)
BUN/CREAT SERPL: 15.8 (ref 7.3–30)
CALCIUM SPEC-SCNC: 9.2 MG/DL (ref 8.5–10.2)
CHLORIDE SERPL-SCNC: 102 MMOL/L (ref 98–107)
CO2 SERPL-SCNC: 25.7 MMOL/L (ref 22–29)
CREAT SERPL-MCNC: 0.95 MG/DL (ref 0.7–1.3)
DEPRECATED RDW RBC AUTO: 66.4 FL (ref 37–54)
EOSINOPHIL # BLD AUTO: 0 10*3/MM3 (ref 0–0.4)
EOSINOPHIL NFR BLD AUTO: 0 % (ref 0.3–6.2)
ERYTHROCYTE [DISTWIDTH] IN BLOOD BY AUTOMATED COUNT: 18.7 % (ref 12.3–15.4)
GFR SERPL CREATININE-BSD FRML MDRD: 78 ML/MIN/1.73
GLOBULIN UR ELPH-MCNC: 2 GM/DL (ref 1.8–3.5)
GLUCOSE SERPL-MCNC: 215 MG/DL (ref 74–124)
HCT VFR BLD AUTO: 33 % (ref 37.5–51)
HGB BLD-MCNC: 10.6 G/DL (ref 13–17.7)
IMM GRANULOCYTES # BLD AUTO: 0.04 10*3/MM3 (ref 0–0.05)
IMM GRANULOCYTES NFR BLD AUTO: 0.6 % (ref 0–0.5)
LDH SERPL-CCNC: 187 U/L (ref 99–259)
LYMPHOCYTES # BLD AUTO: 0.63 10*3/MM3 (ref 0.7–3.1)
LYMPHOCYTES NFR BLD AUTO: 9.5 % (ref 19.6–45.3)
MAGNESIUM SERPL-MCNC: 2.1 MG/DL (ref 1.8–2.5)
MCH RBC QN AUTO: 31.2 PG (ref 26.6–33)
MCHC RBC AUTO-ENTMCNC: 32.1 G/DL (ref 31.5–35.7)
MCV RBC AUTO: 97.1 FL (ref 79–97)
MONOCYTES # BLD AUTO: 0.39 10*3/MM3 (ref 0.1–0.9)
MONOCYTES NFR BLD AUTO: 5.9 % (ref 5–12)
NEUTROPHILS NFR BLD AUTO: 5.54 10*3/MM3 (ref 1.7–7)
NEUTROPHILS NFR BLD AUTO: 83.8 % (ref 42.7–76)
NRBC BLD AUTO-RTO: 0 /100 WBC (ref 0–0.2)
PLATELET # BLD AUTO: 138 10*3/MM3 (ref 140–450)
PMV BLD AUTO: 9.9 FL (ref 6–12)
POTASSIUM SERPL-SCNC: 4.8 MMOL/L (ref 3.5–4.7)
PROT SERPL-MCNC: 6.1 G/DL (ref 6.3–8)
RBC # BLD AUTO: 3.4 10*6/MM3 (ref 4.14–5.8)
SODIUM SERPL-SCNC: 136 MMOL/L (ref 134–145)
URATE SERPL-MCNC: 4 MG/DL (ref 2.8–7.4)
WBC # BLD AUTO: 6.61 10*3/MM3 (ref 3.4–10.8)

## 2020-10-12 PROCEDURE — 36415 COLL VENOUS BLD VENIPUNCTURE: CPT | Performed by: NURSE PRACTITIONER

## 2020-10-12 PROCEDURE — 80053 COMPREHEN METABOLIC PANEL: CPT

## 2020-10-12 PROCEDURE — 99214 OFFICE O/P EST MOD 30 MIN: CPT | Performed by: NURSE PRACTITIONER

## 2020-10-12 PROCEDURE — 83615 LACTATE (LD) (LDH) ENZYME: CPT | Performed by: NURSE PRACTITIONER

## 2020-10-12 PROCEDURE — 93010 ELECTROCARDIOGRAM REPORT: CPT | Performed by: INTERNAL MEDICINE

## 2020-10-12 PROCEDURE — 93005 ELECTROCARDIOGRAM TRACING: CPT | Performed by: NURSE PRACTITIONER

## 2020-10-12 PROCEDURE — 63710000001 PROCHLORPERAZINE MALEATE PER 10 MG: Performed by: NURSE PRACTITIONER

## 2020-10-12 PROCEDURE — 83735 ASSAY OF MAGNESIUM: CPT | Performed by: NURSE PRACTITIONER

## 2020-10-12 PROCEDURE — 96413 CHEMO IV INFUSION 1 HR: CPT

## 2020-10-12 PROCEDURE — 96415 CHEMO IV INFUSION ADDL HR: CPT

## 2020-10-12 PROCEDURE — 25010000002 PEGFILGRASTIM 6 MG/0.6ML PREFILLED SYRINGE KIT: Performed by: NURSE PRACTITIONER

## 2020-10-12 PROCEDURE — 25010000002 OBINUTUZUMAB 1000 MG/40ML SOLUTION 40 ML VIAL: Performed by: NURSE PRACTITIONER

## 2020-10-12 PROCEDURE — 85025 COMPLETE CBC W/AUTO DIFF WBC: CPT

## 2020-10-12 PROCEDURE — 96377 APPLICATON ON-BODY INJECTOR: CPT

## 2020-10-12 PROCEDURE — 96361 HYDRATE IV INFUSION ADD-ON: CPT

## 2020-10-12 PROCEDURE — 84550 ASSAY OF BLOOD/URIC ACID: CPT | Performed by: NURSE PRACTITIONER

## 2020-10-12 RX ORDER — SODIUM CHLORIDE 9 MG/ML
500 INJECTION, SOLUTION INTRAVENOUS ONCE
Status: CANCELLED | OUTPATIENT
Start: 2020-10-12

## 2020-10-12 RX ORDER — SODIUM CHLORIDE 9 MG/ML
250 INJECTION, SOLUTION INTRAVENOUS ONCE
Status: COMPLETED | OUTPATIENT
Start: 2020-10-12 | End: 2020-10-12

## 2020-10-12 RX ORDER — FAMOTIDINE 10 MG/ML
20 INJECTION, SOLUTION INTRAVENOUS AS NEEDED
Status: CANCELLED | OUTPATIENT
Start: 2020-10-12

## 2020-10-12 RX ORDER — FAMOTIDINE 20 MG/1
20 TABLET, FILM COATED ORAL 2 TIMES DAILY
Qty: 60 TABLET | Refills: 2 | Status: SHIPPED | OUTPATIENT
Start: 2020-10-12 | End: 2021-01-04

## 2020-10-12 RX ORDER — SODIUM CHLORIDE 9 MG/ML
250 INJECTION, SOLUTION INTRAVENOUS ONCE
Status: CANCELLED | OUTPATIENT
Start: 2020-10-12

## 2020-10-12 RX ORDER — ACETAMINOPHEN 325 MG/1
650 TABLET ORAL ONCE
Status: CANCELLED | OUTPATIENT
Start: 2020-10-12

## 2020-10-12 RX ORDER — DIPHENHYDRAMINE HYDROCHLORIDE 50 MG/ML
50 INJECTION INTRAMUSCULAR; INTRAVENOUS AS NEEDED
Status: CANCELLED | OUTPATIENT
Start: 2020-10-12

## 2020-10-12 RX ORDER — PROCHLORPERAZINE MALEATE 10 MG
10 TABLET ORAL ONCE
Status: COMPLETED | OUTPATIENT
Start: 2020-10-12 | End: 2020-10-12

## 2020-10-12 RX ORDER — PROCHLORPERAZINE MALEATE 10 MG
10 TABLET ORAL ONCE
Status: CANCELLED
Start: 2020-10-12

## 2020-10-12 RX ORDER — ACETAMINOPHEN 325 MG/1
650 TABLET ORAL ONCE
Status: COMPLETED | OUTPATIENT
Start: 2020-10-12 | End: 2020-10-12

## 2020-10-12 RX ORDER — SODIUM CHLORIDE 9 MG/ML
500 INJECTION, SOLUTION INTRAVENOUS ONCE
Status: COMPLETED | OUTPATIENT
Start: 2020-10-12 | End: 2020-10-12

## 2020-10-12 RX ADMIN — SODIUM CHLORIDE 500 ML: 9 INJECTION, SOLUTION INTRAVENOUS at 10:17

## 2020-10-12 RX ADMIN — OBINUTUZUMAB 1000 MG: 1000 INJECTION, SOLUTION, CONCENTRATE INTRAVENOUS at 11:13

## 2020-10-12 RX ADMIN — ACETAMINOPHEN 650 MG: 325 TABLET ORAL at 10:16

## 2020-10-12 RX ADMIN — PROCHLORPERAZINE MALEATE 10 MG: 10 TABLET ORAL at 10:17

## 2020-10-12 RX ADMIN — PEGFILGRASTIM 6 MG: KIT SUBCUTANEOUS at 11:20

## 2020-10-12 RX ADMIN — SODIUM CHLORIDE 250 ML: 9 INJECTION, SOLUTION INTRAVENOUS at 10:16

## 2020-10-12 NOTE — PROGRESS NOTES
Subjective       REASON FOR VISIT:    1. Chronic lymphoid leukemia, stage 4.     2.  History of angiodysplasia requiring cauterization and history of Hess's esophagus    3.  Bone marrow aspiration biopsy shows hypercellular marrow with 98% involvement with CLL    Patient ID: Macho Stephenson is a 70 y.o. year old male     History of Present Illness Mr. Stephenson currently undergoing treatment with Gazyva and venetoclax.    Interval history: Patient is here today for follow-up continuing on Gazyva and venetoclax, due for cycle 2-day 15 Gazyva today.  He r continues on venetoclax dosing at 100 mg daily.  This dosing will remain stable as he did start voriconazole.     Patient did go to [place] last week but states he stayed in the [facility] the majority of the time as people were not socially distancing or wearing masks.    He continues having some nausea.  He does feel like this is reflux related.  He has been using ondansetron in the mornings and prochlorperazine at night.  He denies any difficulties with his bowels.  His weight is down some which he thinks is related to traveling.    He denies any new areas of pain or recent fevers.    PAST MEDICAL HISTORY:   1. Recurrent atrial fibrillation followed by cardiology. He has had drug eluting stent placed x 3.   2. High cholesterol.   3. Hypertension.       HEMATOLOGIC/ONCOLOGIC HISTORY:       The patient is 62-year-old gentleman with the above history currently here for followup. He has no complaints. He denies fever, night sweats or weight loss. He does not have any lymphadenopathy. He feels good. He had some fatigue but his CBC shows a go od hemoglobin.   The patient is followed by Dr. Kevin Chandra. He had seen Dr. Chandra 7/18/13 for a history of coronary artery disease status drug eluting stent placement to the right coronary artery and left anterior descending artery in February 2011. Histor y of paroxysmal atrial fibrillation and hyperlipidemia. He presented to  [facility] on 6/23/13 with palpitations and was found to have atrial fibrillation with rapid ventricular response. He was given IV Cardizem and converted spontaneously to normal sinus rhythm. He was found to have elevated white count at 18.9 and denied any symptoms of infection or urinary complaints. TSH was normal, troponin levels were negative. He was asked to continue aspirin and metoprolol for that. If he contin u ed to have atrial fibrillation, they will consider antiarrhythmic therapy with or without a short term anticoagulation therapy. However, currently the patient is feeling reasonably good. He has no complaints. His CBC 7/22/13 showed WBC 17,000, hemoglob i n 16.4 and platelet count of 174,000. Back 9/28/13 his hemoglobin was 15.1, WBC 13.3 and platelets 197,000. He has had a persistently elevated WBC but he is totally asymptomatic. He does not have any fever, night sweats or lymphadenopathy. He is here to be evaluated for his elevated white count.       Peripheral blood flow cytometry done 08/16/13 shows 22% chronic lymphoid leukemia cells, and they expressed ZAP-70 but not CD38. The total number of cells approximated 60% T cells, 32% B cells and 1% natural k iller cells. The B cells were monoclonal and expressed CD19, CD20, CD22, CD5, CD23, CD11c, ZAP-70 and T cell antigens. There was a normal CD4/CD8 ratio. CT of the chest, abdomen and pelvis does not show evidence of metastasis. Peripheral blood for DILLON -2 was negative. It was negative for T11:14 translocation.       CT scan done on 08/21/2013 shows 5 to 6 mm noncalcified nodule in the left lower lobe which is unchanged from December 2010. Otherwise no evidence of any lymphadenopathy or hepatosplenomegaly.       MRI of the spine shows arthritis and disc bulge and likely hemangiomas, with 1 lesion showing increased signal intensity at T2, which is likely a hemangioma. Bone scan could be done, but patient does not even have much pain  there. CT scan of the chest, a bdomen and pelvis was done on 11/23/2014. He has tiny lymph nodes in the neck which are difficult to palpate. Right jugular one is 1.4 x 0.9 and left supraclavicular one is 1.5 x 1.1. He also has a subcarinal lymph node which is 1.7 x 1.2 cm, and he ha s a portocaval lymph node which has increased from 2.5 to 2.8. Patient is totally asymptomatic. He does not have any B symptoms, so will continue followup with observation.           Patient is a 69-year-old gentleman with history of chronic lymphocytic leukemia/SLL who recently got admitted to [facility] because of GI bleed.  He was seen by Dr. Montero.  He underwent EGD colonoscopy.  He was found to have angiodysplasia for which he underwent argon coagulation.  He required 3 units of blood.  Patient had a normal esophagus normal stomach and normal duodenum CLOtest was negative he was asked to take Protonix 40 mg daily.  Colonoscopy showed blood in the entire examined colon but there was a single bleeding colonic angiectasia which was treated with argon plasma coagulation.    He was admitted twice.  Initially he was admitted on July 10 at which time he stayed 3 days and he was evaluated for chest pain.  He underwent a cardiac work-up with stress test and echo.  The echo was normal but the stress test showed medium sized moderate severe severity fixed perfusion defect.  Of the inferior wall consistent with infarction.  However according to patient cardiology did not think he had any cardiac problems.  I have left a message for patient's cardiologist to call me today.  Patient subsequently got readmitted with GI bleed and required 1/3 unit of transfusion.  He was found to have thrombocytopenia and hematology was consulted.    His hemoglobin had dropped down to as low as 7 and his platelets had gone down to 87.  Today it is 35 and his hemoglobin is 9.4 today.  He denies active bleeding.  He has lost weight recently.  He does not have  any fever or night sweats.    He had ultrasound of spleen which showed enlarged spleen centimeters in length       MEDICATIONS: The current medication list was reviewed with the patient and updated in the EMR this date per the medical assistant. Medication dosages and frequencies were confirmed to be accurate.       ALLERGIES: PENICILLIN which causes him to swell up. He is allergic to IV CONTRAST DYE.     SOCIAL HISTORY: He is . He smokes one pack per day for 35 years. He consumes three to four alcoholic drinks per week. He has no tattoos and no previous transfusions.       FAMILY HISTORY: Father  at 82 with MI. Mother  at 82 with bone cancer. He has one brother age 65 in good health and two sisters 69 and 57 in good health.   Past Medical History, Social History, and Family History are unchanged from my prior documentation on     Review of Systems   Constitutional: Positive for fatigue. Negative for activity change, appetite change, chills, diaphoresis, fever and unexpected weight change.   HENT: Negative for hearing loss, mouth sores, nosebleeds, sore throat and trouble swallowing.    Respiratory: Negative for cough, chest tightness, shortness of breath and wheezing.    Cardiovascular: Negative for chest pain, palpitations and leg swelling.   Gastrointestinal: Positive for nausea. Negative for abdominal distention, abdominal pain, constipation, diarrhea and vomiting.   Genitourinary: Negative for difficulty urinating, dysuria, frequency, hematuria and urgency.   Musculoskeletal: Negative for joint swelling.        No muscle weakness.   Skin: Negative for rash and wound.   Neurological: Positive for headaches (  ). Negative for dizziness, seizures, syncope, speech difficulty, weakness and numbness.   Hematological: Negative for adenopathy. Does not bruise/bleed easily.   Psychiatric/Behavioral: Negative for behavioral problems, confusion, sleep disturbance and suicidal ideas.   All  other systems reviewed and are negative.        Objective           Patient Active Problem List   Diagnosis   • CLL (chronic lymphocytic leukemia) (CMS/HCC)   • Anemia   • Encounter for hepatitis C screening test for low risk patient    • Thrombocytopenia (CMS/HCC)   • H/O heart artery stent   • Stented coronary artery   • Arteriosclerosis of coronary artery   • Atrial fibrillation (CMS/HCC)   • Paroxysmal atrial fibrillation (CMS/HCC)   • Chest pain, rule out acute myocardial infarction   • Fall   • Fracture, olecranon   • Hyperlipidemia   • Long term (current) use of anticoagulants   • Lower GI bleed   • Olecranon bursitis   • Shoulder pain   • Smoker   • CLL (chronic lymphocytic leukemia) (CMS/HCC)   • Dehydration         MEDICATIONS:  Medication Reconciliation for the patient has been reviewed by me and documented in the patients chart.    ALLERGIES:    Allergies   Allergen Reactions   • Contrast Dye Swelling   • Penicillins Swelling         Vitals:    10/12/20 0855   BP: 100/64   Pulse: 64   Resp: 16   Temp: 97.5 °F (36.4 °C)   SpO2: 98%        Current Status 10/2/2020   ECOG score 0        Physical Exam   Constitutional: He is oriented to person, place, and time. He appears well-developed. No distress.   HENT:   Head: Normocephalic and atraumatic.   Mouth/Throat: No oropharyngeal exudate.   Eyes: Pupils are equal, round, and reactive to light.   Neck: Normal range of motion.   Cardiovascular: Normal rate, regular rhythm and normal heart sounds.   No murmur heard.  Pulmonary/Chest: Effort normal and breath sounds normal. No respiratory distress. He has no wheezes. He has no rhonchi. He has no rales.   Abdominal: Soft. Normal appearance and bowel sounds are normal. He exhibits no distension.   Musculoskeletal: Normal range of motion.   Neurological: He is alert and oriented to person, place, and time.   Skin: Skin is warm and dry. No rash noted.   Vitals reviewed.   I have reexamined the patient and the results  are consistent with the previously documented exam. MARIAH Luna       RECENT LABS:  Results from last 7 days   Lab Units 10/12/20  0902   WBC 10*3/mm3 6.61   NEUTROS ABS 10*3/mm3 5.54   HEMOGLOBIN g/dL 10.6*   HEMATOCRIT % 33.0*   PLATELETS 10*3/mm3 138*                 STAT NONCONTRAST HEAD CT 08/20/2020  IMPRESSION:  1. No acute abnormality is seen with no significant change when compared  to prior noncontrast head CT from [facility] 03/03/2015.  2. There is mild small vessel disease in the frontal periventricular  white matter and there are calcified atherosclerotic plaques in the  cavernous internal carotid arteries bilaterally.  3. Patient has had previous paranasal sinus surgery with bilateral  uncinectomies and ethmoidectomies and the paranasal sinuses are  currently clear. The remainder of the head CT is within normal limits,  specifically no acute intracranial hemorrhage is seen. Etiology of  headaches in this patient with history of leukemia and thrombocytopenia  is not established on this exam. Results were communicated to Susannah Moya, the oncologist taking care of the patient by telephone on  08/20/2020 at 9:20 AM.      XR CHEST POST CVA PORT-07/31/20    IMPRESSION:  Chest port placement. No pneumothorax.       CT NECK, CHEST, ABDOMEN, AND PELVIS WITHOUT IV CONTRAST. 7/20/2020   HISTORY: 69-year-old male with CLL/SLL.     TECHNIQUE: Radiation dose reduction techniques were utilized, including  automated exposure control and exposure modulation based on body size. 3  mm images were obtained through the neck, chest, abdomen, and pelvis  without the administration of IV contrast. Compared with CT of the  abdomen and pelvis from 08/05/2019 and previous CTs from 03/19/2018.     FINDINGS:  NECK: There is no significant change in the 1.4 x 1.0 cm left  supraclavicular node or in the smaller supraclavicular nodes  bilaterally. A reference 1.4 x 0.7 cm right jugular chain node  is  stable. There are shotty nodes scattered throughout the neck. No new  lymphadenopathy is seen. Noncontrasted parotid, submandibular, and  thyroid glands appear unremarkable.     CHEST: There has been interval decrease in the size of the shotty and  borderline enlarged nodes at the axilla. Reference 1.6 x 0.7 cm right  axillary node previously measured 2.0 x 1.2 cm. There has been a  decrease in the size of all of the shotty mediastinal and hilar nodes.  There are no new pulmonary opacities and there are no pleural or  pericardial effusions.     ABDOMEN/PELVIS: Splenic size has slightly increased measuring 15.4 cm in  diameter and previously measuring 13.5 cm, both measured on the coronal  reconstruction series. There is no change in the shotty nodes at the  gastrohepatic ligament and shorty hepatis. There are no pathologically  enlarged nodes. There is no acute abnormality involving the  noncontrasted liver. A single gallstone is noted within the gallbladder.  Noncontrasted pancreas, adrenals, and kidneys appear unremarkable. There  is no acute bowel abnormality. There is extensive sigmoid  diverticulosis. No free fluid. There are extensive abdominal aortic  atherosclerotic changes and there is a stable 3.7 cm infrarenal  abdominal aortic aneurysm.     IMPRESSION:  1. There has been slight interval increase in splenomegaly since the CT  of the abdomen from 08/05/2019.  2. Interval decrease in the size of the axillary nodes and the shotty  mediastinal nodes. There is no change in the shotty nodes within the  neck and supraclavicular regions. There is no new lymphadenopathy.  3. Stable 3.7 cm infrarenal abdominal aortic aneurysm.      Assessment/Plan   1. Stage 2 CLL without evidence of any disease progression.  I have reviewed the CT scan from 3/19/2018 there is minimal progression but clinically patient is asymptomatic and we will follow with observation.  Will monitor with labs.   No evidence of any frequent  infections, no major worsening of his white count. He has no fever or night sweats or any other symptoms.   · Patient knows that he is prone for infections and he is mainly staying at home during the COVID-19 situation time  · Recent admission to [facility] July 10 2020×2.  Initially admitted with chest pain and underwent stress test and echo.  Echo was negative.  Stress test is abnormal but have left message for patient's cardiologist to call us.  His cardiologist is been sent [facility].  · He then got admitted the second time with worsening GI bleed and required total of 3 units of blood.  EGD was negative but colonoscopy showed angiectasia for which he underwent argon ablation.  Currently hemoglobin stable and no active bleeding.  · He was also found to have low platelets with platelets dropping down to 27k and hemoglobin was 7.  Ultrasound showed splenomegaly 15 cm.  Concern is whether he has progressed from his CLL point of view and requires treatment.  · CT scan performed 7/20/2020 did show the increased splenomegaly, but interval decrease in size of the axillary nodes, and shotty mediastinal nodes.  No change in the shotty nodes within the neck and supraclavicular regions.  No new lymphadenopathy.  Bone marrow biopsy however showed preliminarily that there is bone marrow involvement.  Remainder of bone marrow biopsy report is pending.    · Bone marrow shows hypercellular marrow with 100% involvement of chronic lymphocytic leukemia/small lymphocytic lymphoma and diffuse pattern with at least 98% of the total marrow cellularity.  Rare foci of erythropoiesis and rare megakaryocytes are noted.     · Negative for BCL-2 and BCL 6.  Chromosomes shows normal karyotype.  I GVH mutation present.  Positive for gain of 1 q., monosomy 13 and loss of IGH.  Negative for deletion T p53, negative for gain of chromosomes 9 and 11, negative for 11, 14 fusion.    · The combination of monosomy 13 and gain of 1 q. is thought  "to be associated with plasma cell myeloma.  However this patient does not have myeloma.    · Scans were reviewed with the patient today.  Dr. Moya also discussed with the patient and recommended he initiate treatment with chlorambucil and Gazyva.  He would not be a good candidate for Imbruvica due to his history of A. fib\".  He cannot take anticoagulation at this time.  The patient was provided with chemotherapy education today, including  Handouts.  He will need a port placed so that we can initiate treatment  · 8/13/2020: Gazyva day 1. Plan also [id number] mg p.o. on day 1 day 15.  We can increase the dose once we know he tolerates and his platelets improved.  · Patient developing pancytopenia when reviewed cycle 1 day 15.  Chlorambucil dose modified to 10 mg for day 15 however it is felt that he cannot tolerate this ongoing.   ·  Plan to switch to Venetoclax along with continuing Gazyva.    · Patient due today for cycle 2-day 1 Gazyva.  He will proceed today as scheduled.  Venetoclax will be shipped to his home with plans to begin this next week on 9/14/2020.    · Patient did receive 1 L normal saline and Neulasta on 9/28/2020 secondary to neutropenia with an ANC of 0.04.  · Patient seen on 10/02/2020 continuing on venetoclax, currently on 100 mg dosing.  He would not increase his dose as he will start on voriconazole after being on venetoclax x1 week which affects the metabolism of venetoclax.  He is currently on day 5 of the 100 mg dosing.  Patient states overall he feels the same except for increased fatigue and nausea.  His nauseousness is controlled with ondansetron however.  Patient will be given 1 L normal saline in the office today as planned.  · Patient returns on 10/12/2020 continuing on venetoclax 100 mg daily as well as voriconazole.  He did travel to [place] last week but states he stayed in the [facility] the majority of the time due to concerns of others not socially distancing or wearing masks. "  He has had no issues with fevers, increased shortness of breath, or new cough.  He does have some nausea throughout the day.  He does feel like this could be reflux related.  He has been using ondansetron and prochlorperazine without much improvement.  He has had some weight loss but states this is more so related to his travel.  He does continue with stable fatigue.  EKG was performed in the office of the same day as appointment and compared to previous at that time showing prolonged QT.  We did discuss with the patient reducing the use of ondansetron and prochlorperazine which is the reason we also have encouraged him to use Pepcid 20 mg twice daily.    2.  Worsening thrombocytopenia, looking back his platelets were always in the 150s and during this admission he dropped down to 27K. His LDH also was elevated and he was anemic.  · It was dropping on chlorambucil.  Plans to change therapy as detailed above.  Patient did require transfusion and platelets are currently improved to 55,000.  · Patient's platelet count has improved to 138,000 today.    3.  History of angiodysplasia requiring cauterization and Hess's esophagus mild anemia. He is currently asymptomatic.    4.  History of cluster headaches for which she has to be treated with steroids in the past and has followed up with Dr. Len Péreztoday with significant headaches.  · Patient had CT of the head 8/20/2020 which was negative.  · He was started on prednisone 10 mg daily which was not helpful.  · Patient saw Dr. Bobby, neurology who gave him 2 shots of a new medication Emgality.  This is something that can be given once a month.    · Headaches improved    5.  Neutropenia without fever.Cipro 500 mg twice daily was started.  Patient was given Neulasta on 9/28/2020.  Cipro can be held today because his counts have improved.    PLAN:  1. Cycle 2-day 15 Gazyva today.  2. Continue venetoclax 100 mg daily.  3. Continue voriconazole.  4. Begin Pepcid 20 mg  twice daily.  5. Follow-up with Dr. Moya on 10/22/2020 with ECG.  Patient is on high risk medication with frequent monitoring for toxicity needed.    MARIAH Luna        Cc: Dr. Kevin Chandra     ADDENDUM: Overread from EKG read on 10/16/2020 showing prolonged QTC which when compared in the office is noted.  Had discussed with the patient reduce the use of his antiemetics to see if this helped and started the patient on Pepcid 20 mg twice daily to see if this helps control the nausea.  Will have repeat ECG when he returns the office next week.

## 2020-10-12 NOTE — PROGRESS NOTES
MTM in person encounter re adherence and side effects ( Venetoclax)    Mr Stephenson presents to the clinic today for EKG, labs, and toxicity check.  His reported adherence to Venetoclax 100 mg po daily is appropriate.  He stated that after adding Voriconazole he has been nauseated.  He achieves some relief with prochlorperazine, but stated it makes him sleepy.  He also has an rx for ondansetron, but it has possibly caused some headaches in the past for him.  He was encouraged to discuss his nausea symptoms with APRN during his toxicity check this am. He stated the nausea is not intolerable.

## 2020-10-13 ENCOUNTER — MEDICATION THERAPY MANAGEMENT (OUTPATIENT)
Dept: ONCOLOGY | Facility: HOSPITAL | Age: 70
End: 2020-10-13

## 2020-10-13 NOTE — PROGRESS NOTES
[facility] lab review ( Gazyva+ventoclax)        10/12/2020  Day 1   WBC 3.40 - 10.80 10*3/mm3 6.61   Neutrophils Absolute 1.70 - 7.00 10*3/mm3 5.54   Hemoglobin 13.0 - 17.7 g/dL 10.6 (A)   Hematocrit 37.5 - 51.0 % 33.0 (A)   Platelets 140 - 450 10*3/mm3 138 (A)   Creatinine 0.70 - 1.30 mg/dL 0.95   eGFR Non African Am >60 mL/min/1.73 78   BUN 6 - 20 mg/dL 15   Sodium 134 - 145 mmol/L 136   Potassium 3.5 - 4.7 mmol/L 4.8 (A)   Glucose 74 - 124 mg/dL 215 (A)   Magnesium 1.8 - 2.5 mg/dL 2.1   Calcium 8.5 - 10.2 mg/dL 9.2   Albumin 3.50 - 5.20 g/dL 4.10   Total Protein 6.3 - 8.0 g/dL 6.1 (A)   AST (SGOT) 0 - 40 U/L 14   ALT (SGPT) 0 - 41 U/L 9   Alkaline Phosphatase 38 - 116 U/L 176 (A)   Total Bilirubin 0.2 - 1.2 mg/dL 0.8     APRN dictation is noted.  Pepcid 20 mg po bid has been added for nausea/heartburn.  Continue Venetoclax 100 mg po daily.

## 2020-10-13 NOTE — PROGRESS NOTES
The last EKG for this patient that is scanned in was on 7/24/20.    Please correct and send back for over read.  I'm sorry, I'm not allowed to correct or change anything on the scan.    Thank you,  Arleen Cadet RN  [facility]  Triage

## 2020-10-22 ENCOUNTER — INFUSION (OUTPATIENT)
Dept: ONCOLOGY | Facility: HOSPITAL | Age: 70
End: 2020-10-22

## 2020-10-22 ENCOUNTER — OFFICE VISIT (OUTPATIENT)
Dept: ONCOLOGY | Facility: CLINIC | Age: 70
End: 2020-10-22

## 2020-10-22 ENCOUNTER — APPOINTMENT (OUTPATIENT)
Dept: ONCOLOGY | Facility: HOSPITAL | Age: 70
End: 2020-10-22

## 2020-10-22 VITALS
DIASTOLIC BLOOD PRESSURE: 66 MMHG | OXYGEN SATURATION: 98 % | RESPIRATION RATE: 18 BRPM | HEART RATE: 63 BPM | BODY MASS INDEX: 20.58 KG/M2 | TEMPERATURE: 97.1 F | HEIGHT: 73 IN | WEIGHT: 155.3 LBS | SYSTOLIC BLOOD PRESSURE: 127 MMHG

## 2020-10-22 DIAGNOSIS — C91.10 CLL (CHRONIC LYMPHOCYTIC LEUKEMIA) (HCC): Primary | ICD-10-CM

## 2020-10-22 DIAGNOSIS — G25.81 RESTLESS LEG SYNDROME: ICD-10-CM

## 2020-10-22 LAB
ALBUMIN SERPL-MCNC: 4.1 G/DL (ref 3.5–5.2)
ALBUMIN/GLOB SERPL: 1.9 G/DL (ref 1.1–2.4)
ALP SERPL-CCNC: 183 U/L (ref 38–116)
ALT SERPL W P-5'-P-CCNC: 11 U/L (ref 0–41)
ANION GAP SERPL CALCULATED.3IONS-SCNC: 10.4 MMOL/L (ref 5–15)
AST SERPL-CCNC: 19 U/L (ref 0–40)
BASOPHILS # BLD AUTO: 0.01 10*3/MM3 (ref 0–0.2)
BASOPHILS NFR BLD AUTO: 0.1 % (ref 0–1.5)
BILIRUB SERPL-MCNC: 0.6 MG/DL (ref 0.2–1.2)
BUN SERPL-MCNC: 23 MG/DL (ref 6–20)
BUN/CREAT SERPL: 23.5 (ref 7.3–30)
CALCIUM SPEC-SCNC: 9 MG/DL (ref 8.5–10.2)
CHLORIDE SERPL-SCNC: 102 MMOL/L (ref 98–107)
CO2 SERPL-SCNC: 22.6 MMOL/L (ref 22–29)
CREAT SERPL-MCNC: 0.98 MG/DL (ref 0.7–1.3)
DEPRECATED RDW RBC AUTO: 65.3 FL (ref 37–54)
EOSINOPHIL # BLD AUTO: 0 10*3/MM3 (ref 0–0.4)
EOSINOPHIL NFR BLD AUTO: 0 % (ref 0.3–6.2)
ERYTHROCYTE [DISTWIDTH] IN BLOOD BY AUTOMATED COUNT: 17.4 % (ref 12.3–15.4)
FERRITIN SERPL-MCNC: 338.5 NG/ML (ref 30–400)
GFR SERPL CREATININE-BSD FRML MDRD: 75 ML/MIN/1.73
GLOBULIN UR ELPH-MCNC: 2.2 GM/DL (ref 1.8–3.5)
GLUCOSE SERPL-MCNC: 108 MG/DL (ref 74–124)
HCT VFR BLD AUTO: 35.4 % (ref 37.5–51)
HGB BLD-MCNC: 11 G/DL (ref 13–17.7)
HGB RETIC QN AUTO: 34.1 PG (ref 29.8–36.1)
IMM GRANULOCYTES # BLD AUTO: 0.07 10*3/MM3 (ref 0–0.05)
IMM GRANULOCYTES NFR BLD AUTO: 0.7 % (ref 0–0.5)
IMM RETICS NFR: 9.3 % (ref 3–15.8)
IRON 24H UR-MRATE: 45 MCG/DL (ref 59–158)
IRON SATN MFR SERPL: 16 % (ref 14–48)
LYMPHOCYTES # BLD AUTO: 0.84 10*3/MM3 (ref 0.7–3.1)
LYMPHOCYTES NFR BLD AUTO: 8 % (ref 19.6–45.3)
MCH RBC QN AUTO: 31.3 PG (ref 26.6–33)
MCHC RBC AUTO-ENTMCNC: 31.1 G/DL (ref 31.5–35.7)
MCV RBC AUTO: 100.6 FL (ref 79–97)
MONOCYTES # BLD AUTO: 0.33 10*3/MM3 (ref 0.1–0.9)
MONOCYTES NFR BLD AUTO: 3.2 % (ref 5–12)
NEUTROPHILS NFR BLD AUTO: 88 % (ref 42.7–76)
NEUTROPHILS NFR BLD AUTO: 9.21 10*3/MM3 (ref 1.7–7)
NRBC BLD AUTO-RTO: 0 /100 WBC (ref 0–0.2)
PLATELET # BLD AUTO: 116 10*3/MM3 (ref 140–450)
PMV BLD AUTO: 10.4 FL (ref 6–12)
POTASSIUM SERPL-SCNC: 4.3 MMOL/L (ref 3.5–4.7)
PROT SERPL-MCNC: 6.3 G/DL (ref 6.3–8)
RBC # BLD AUTO: 3.52 10*6/MM3 (ref 4.14–5.8)
RETICS # AUTO: 0.04 10*6/MM3 (ref 0.02–0.13)
RETICS/RBC NFR AUTO: 1.22 % (ref 0.7–1.9)
SODIUM SERPL-SCNC: 135 MMOL/L (ref 134–145)
TIBC SERPL-MCNC: 287 MCG/DL (ref 249–505)
TRANSFERRIN SERPL-MCNC: 205 MG/DL (ref 200–360)
WBC # BLD AUTO: 10.46 10*3/MM3 (ref 3.4–10.8)

## 2020-10-22 PROCEDURE — 85025 COMPLETE CBC W/AUTO DIFF WBC: CPT

## 2020-10-22 PROCEDURE — 85046 RETICYTE/HGB CONCENTRATE: CPT | Performed by: INTERNAL MEDICINE

## 2020-10-22 PROCEDURE — 80053 COMPREHEN METABOLIC PANEL: CPT

## 2020-10-22 PROCEDURE — 84466 ASSAY OF TRANSFERRIN: CPT | Performed by: INTERNAL MEDICINE

## 2020-10-22 PROCEDURE — 82728 ASSAY OF FERRITIN: CPT | Performed by: INTERNAL MEDICINE

## 2020-10-22 PROCEDURE — 83540 ASSAY OF IRON: CPT | Performed by: INTERNAL MEDICINE

## 2020-10-22 PROCEDURE — 99214 OFFICE O/P EST MOD 30 MIN: CPT | Performed by: INTERNAL MEDICINE

## 2020-10-22 PROCEDURE — 25010000003 HEPARIN LOCK FLUSH PER 10 UNITS: Performed by: NURSE PRACTITIONER

## 2020-10-22 PROCEDURE — 36591 DRAW BLOOD OFF VENOUS DEVICE: CPT

## 2020-10-22 RX ORDER — HEPARIN SODIUM (PORCINE) LOCK FLUSH IV SOLN 100 UNIT/ML 100 UNIT/ML
500 SOLUTION INTRAVENOUS AS NEEDED
Status: CANCELLED | OUTPATIENT
Start: 2020-10-22

## 2020-10-22 RX ORDER — SODIUM CHLORIDE 0.9 % (FLUSH) 0.9 %
10 SYRINGE (ML) INJECTION AS NEEDED
Status: DISCONTINUED | OUTPATIENT
Start: 2020-10-22 | End: 2020-10-22 | Stop reason: HOSPADM

## 2020-10-22 RX ORDER — HEPARIN SODIUM (PORCINE) LOCK FLUSH IV SOLN 100 UNIT/ML 100 UNIT/ML
500 SOLUTION INTRAVENOUS AS NEEDED
Status: DISCONTINUED | OUTPATIENT
Start: 2020-10-22 | End: 2020-10-22 | Stop reason: HOSPADM

## 2020-10-22 RX ORDER — SODIUM CHLORIDE 0.9 % (FLUSH) 0.9 %
10 SYRINGE (ML) INJECTION AS NEEDED
Status: CANCELLED | OUTPATIENT
Start: 2020-10-22

## 2020-10-22 RX ADMIN — Medication 500 UNITS: at 14:02

## 2020-10-22 RX ADMIN — SODIUM CHLORIDE, PRESERVATIVE FREE 10 ML: 5 INJECTION INTRAVENOUS at 14:02

## 2020-10-23 ENCOUNTER — TELEPHONE (OUTPATIENT)
Dept: ONCOLOGY | Facility: CLINIC | Age: 70
End: 2020-10-23

## 2020-10-23 ENCOUNTER — MEDICATION THERAPY MANAGEMENT (OUTPATIENT)
Dept: ONCOLOGY | Facility: HOSPITAL | Age: 70
End: 2020-10-23

## 2020-10-23 NOTE — PROGRESS NOTES
[facility] lab review ( venetoclax)        10/22/2020   WBC 3.40 - 10.80 10*3/mm3 10.46   Neutrophils Absolute 1.70 - 7.00 10*3/mm3 9.21 (A)   Hemoglobin 13.0 - 17.7 g/dL 11.0 (A)   Hematocrit 37.5 - 51.0 % 35.4 (A)   Platelets 140 - 450 10*3/mm3 116 (A)   Creatinine 0.70 - 1.30 mg/dL 0.98   eGFR Non African Am >60 mL/min/1.73 75   BUN 6 - 20 mg/dL 23 (A)   Sodium 134 - 145 mmol/L 135   Potassium 3.5 - 4.7 mmol/L 4.3   Glucose 74 - 124 mg/dL 108   Calcium 8.5 - 10.2 mg/dL 9.0   Albumin 3.50 - 5.20 g/dL 4.10   Total Protein 6.3 - 8.0 g/dL 6.3   AST (SGOT) 0 - 40 U/L 19   ALT (SGPT) 0 - 41 U/L 11   Alkaline Phosphatase 38 - 116 U/L 183 (A)   Total Bilirubin 0.2 - 1.2 mg/dL 0.6   Reticulated Hgb 29.8 - 36.1 pg 34.1   Iron 59 - 158 mcg/dL 45 (A)   TIBC 249 - 505 mcg/dL 287   Ferritin 30.00 - 400.00 ng/mL 338.50   Iron Saturation 14 - 48 % 16     Venetoclax 100 mg po daily continues ( Voriconazole continues as well) , along with monthly Gazyva infusions.

## 2020-10-23 NOTE — TELEPHONE ENCOUNTER
Caller: JANIE GARAY    Relationship to patient: WIFE    Best call back number: [phone]    PT NEEDS TO RESCHEDULE HIS CT SCAN ON 11/2. PTS WIFE IS HAVING A PROCEDURE DONE THAT DAY AND HE NEEDS TO DRIVE HER. ASKS TO BE RESCHEDULED BEFORE 11/9 WHEN HE SEES DR CEJA

## 2020-10-25 DIAGNOSIS — C91.10 CLL (CHRONIC LYMPHOCYTIC LEUKEMIA) (HCC): ICD-10-CM

## 2020-10-26 ENCOUNTER — TELEPHONE (OUTPATIENT)
Dept: ONCOLOGY | Facility: CLINIC | Age: 70
End: 2020-10-26

## 2020-10-26 ENCOUNTER — SPECIALTY PHARMACY (OUTPATIENT)
Dept: PHARMACY | Facility: HOSPITAL | Age: 70
End: 2020-10-26

## 2020-10-26 RX ORDER — ALLOPURINOL 300 MG/1
300 TABLET ORAL DAILY
Qty: 90 TABLET | Refills: 1 | Status: SHIPPED | OUTPATIENT
Start: 2020-10-26 | End: 2021-05-11 | Stop reason: SDUPTHER

## 2020-10-26 NOTE — TELEPHONE ENCOUNTER
Pt would like to ask Jane the Pharmacist if there is something to replace the medication K Phos [place]-Sod Phos Di & Mono [id number] MG tablet.    The pill is so big and hard to swallow. A very nasty pill.    Best call back # [phone]

## 2020-10-27 ENCOUNTER — MEDICATION THERAPY MANAGEMENT (OUTPATIENT)
Dept: ONCOLOGY | Facility: HOSPITAL | Age: 70
End: 2020-10-27

## 2020-10-27 ENCOUNTER — TELEPHONE (OUTPATIENT)
Dept: ONCOLOGY | Facility: CLINIC | Age: 70
End: 2020-10-27

## 2020-10-27 ENCOUNTER — PATIENT OUTREACH (OUTPATIENT)
Dept: CASE MANAGEMENT | Facility: OTHER | Age: 70
End: 2020-10-27

## 2020-10-27 ENCOUNTER — TELEPHONE (OUTPATIENT)
Dept: PHARMACY | Facility: HOSPITAL | Age: 70
End: 2020-10-27

## 2020-10-27 RX ORDER — SODIUM PHOSPHATE, DIBASIC, ANHYDROUS, POTASSIUM PHOSPHATE, MONOBASIC, AND SODIUM PHOSPHATE, MONOBASIC, MONOHYDRATE 852; 155; 130 MG/1; MG/1; MG/1
1 TABLET, COATED ORAL 3 TIMES DAILY
Qty: 90 EACH | Refills: 1 | Status: SHIPPED | OUTPATIENT
Start: 2020-10-27 | End: 2021-01-04

## 2020-10-27 RX ORDER — SOD PHOS DI, MONO/K PHOS MONO 250 MG
TABLET ORAL
Qty: 90 TABLET | OUTPATIENT
Start: 2020-10-27

## 2020-10-27 NOTE — TELEPHONE ENCOUNTER
----- Message from Gely Cox RN sent at 10/27/2020  9:58 AM EDT -----  Dr. Nita Burger said it would be fine for Mr. Stephenson to stop K-Phos.   I have spoken with his wife and they are aware it can be stopped and will not need refill.    Thank you,  Gely  ----- Message -----  From: Carolee Muñoz  Sent: 10/26/2020   2:52 PM EDT  To: Susannah Moya MD    Does pt need to still be on K-Phos? I can not find any notes on it in the dictations.

## 2020-10-27 NOTE — TELEPHONE ENCOUNTER
Spoke with patient's wife and let her know Dr. Moya was fine with stopping the K-Phos.  She verbalized understanding.  Message also sent to FRANTZ Muñoz in response to her previous message.

## 2020-10-27 NOTE — PROGRESS NOTES
MTM telephone encounter re K Phos Neutral    Mr Stephenson called the [facility] office back and is reporting difficulty swallowing K Phos Neutral.  We discussed the possibility of dissolving these in orange juice, and he has agreed to try this approach.  He was counseled that Neutra Phos packets have potassium in them and based on his potassium levels on upper end of normal, it might affect his potassium levels to switch products.

## 2020-10-27 NOTE — OUTREACH NOTE
Care Coordination Note     Call to pt with message left regarding need for AWV. Requested pt to call the office at his earliest convenience.    Gely Sheikh RN  Ambulatory     10/27/2020, 13:09 EDT

## 2020-10-28 LAB
CYTO UR: NORMAL
DX PRELIMINARY: NORMAL
LAB AP CASE REPORT: NORMAL
LAB AP CLINICAL INFORMATION: NORMAL
LAB AP SPECIAL STAINS: NORMAL
Lab: NORMAL
PATH REPORT.ADDENDUM SPEC: NORMAL
PATH REPORT.FINAL DX SPEC: NORMAL
PATH REPORT.GROSS SPEC: NORMAL

## 2020-11-02 ENCOUNTER — APPOINTMENT (OUTPATIENT)
Dept: ONCOLOGY | Facility: HOSPITAL | Age: 70
End: 2020-11-02

## 2020-11-02 ENCOUNTER — APPOINTMENT (OUTPATIENT)
Dept: PET IMAGING | Facility: HOSPITAL | Age: 70
End: 2020-11-02

## 2020-11-04 ENCOUNTER — HOSPITAL ENCOUNTER (OUTPATIENT)
Dept: PET IMAGING | Facility: HOSPITAL | Age: 70
Discharge: HOME | End: 2020-11-04
Admitting: INTERNAL MEDICINE

## 2020-11-04 ENCOUNTER — APPOINTMENT (OUTPATIENT)
Dept: ONCOLOGY | Facility: HOSPITAL | Age: 70
End: 2020-11-04

## 2020-11-04 DIAGNOSIS — C91.10 CLL (CHRONIC LYMPHOCYTIC LEUKEMIA) (HCC): ICD-10-CM

## 2020-11-04 PROCEDURE — 74176 CT ABD & PELVIS W/O CONTRAST: CPT

## 2020-11-04 PROCEDURE — 0 DIATRIZOATE MEGLUMINE & SODIUM PER 1 ML: Performed by: INTERNAL MEDICINE

## 2020-11-04 PROCEDURE — 71250 CT THORAX DX C-: CPT

## 2020-11-04 PROCEDURE — 70490 CT SOFT TISSUE NECK W/O DYE: CPT

## 2020-11-04 RX ADMIN — DIATRIZOATE MEGLUMINE AND DIATRIZOATE SODIUM 30 ML: 660; 100 LIQUID ORAL; RECTAL at 11:40

## 2020-11-06 ENCOUNTER — TELEPHONE (OUTPATIENT)
Dept: ONCOLOGY | Facility: CLINIC | Age: 70
End: 2020-11-06

## 2020-11-06 NOTE — TELEPHONE ENCOUNTER
----- Message from Gely Cox RN sent at 11/6/2020  9:35 AM EST -----  Please make sure patient has appointment for EKG on 11/9/20 visit.  Thank you,  Gely  ----- Message -----  From: Leela Rawls, [facility]  Sent: 11/6/2020   9:19 AM EST  To: Valarie Lewis RN, Gely Cox RN    He left without getting his ekg last visit and Dr CORTES dictated to get it next time.  Just a heads up he comes in Monday 11/9

## 2020-11-09 ENCOUNTER — OFFICE VISIT (OUTPATIENT)
Dept: ONCOLOGY | Facility: CLINIC | Age: 70
End: 2020-11-09

## 2020-11-09 ENCOUNTER — CLINICAL SUPPORT (OUTPATIENT)
Dept: ONCOLOGY | Facility: HOSPITAL | Age: 70
End: 2020-11-09

## 2020-11-09 ENCOUNTER — INFUSION (OUTPATIENT)
Dept: ONCOLOGY | Facility: HOSPITAL | Age: 70
End: 2020-11-09

## 2020-11-09 VITALS
BODY MASS INDEX: 20.9 KG/M2 | WEIGHT: 157.7 LBS | OXYGEN SATURATION: 99 % | HEART RATE: 55 BPM | SYSTOLIC BLOOD PRESSURE: 181 MMHG | DIASTOLIC BLOOD PRESSURE: 56 MMHG | HEIGHT: 73 IN | RESPIRATION RATE: 16 BRPM | TEMPERATURE: 96.5 F

## 2020-11-09 VITALS — DIASTOLIC BLOOD PRESSURE: 78 MMHG | SYSTOLIC BLOOD PRESSURE: 166 MMHG | HEART RATE: 57 BPM

## 2020-11-09 DIAGNOSIS — D70.9 NEUTROPENIA, UNSPECIFIED TYPE (HCC): ICD-10-CM

## 2020-11-09 DIAGNOSIS — Z79.899 ENCOUNTER FOR LONG-TERM (CURRENT) USE OF OTHER MEDICATIONS: ICD-10-CM

## 2020-11-09 DIAGNOSIS — G25.81 RESTLESS LEG SYNDROME: ICD-10-CM

## 2020-11-09 DIAGNOSIS — C91.10 CLL (CHRONIC LYMPHOCYTIC LEUKEMIA) (HCC): ICD-10-CM

## 2020-11-09 DIAGNOSIS — C91.10 CLL (CHRONIC LYMPHOCYTIC LEUKEMIA) (HCC): Primary | ICD-10-CM

## 2020-11-09 LAB
ALBUMIN SERPL-MCNC: 3.9 G/DL (ref 3.5–5.2)
ALBUMIN/GLOB SERPL: 2.1 G/DL (ref 1.1–2.4)
ALP SERPL-CCNC: 231 U/L (ref 38–116)
ALT SERPL W P-5'-P-CCNC: 9 U/L (ref 0–41)
ANION GAP SERPL CALCULATED.3IONS-SCNC: 9.8 MMOL/L (ref 5–15)
AST SERPL-CCNC: 17 U/L (ref 0–40)
BASOPHILS # BLD AUTO: 0 10*3/MM3 (ref 0–0.2)
BASOPHILS NFR BLD AUTO: 0 % (ref 0–1.5)
BILIRUB SERPL-MCNC: 0.5 MG/DL (ref 0.2–1.2)
BUN SERPL-MCNC: 12 MG/DL (ref 6–20)
BUN/CREAT SERPL: 14 (ref 7.3–30)
CALCIUM SPEC-SCNC: 8.8 MG/DL (ref 8.5–10.2)
CHLORIDE SERPL-SCNC: 106 MMOL/L (ref 98–107)
CO2 SERPL-SCNC: 24.2 MMOL/L (ref 22–29)
CREAT SERPL-MCNC: 0.86 MG/DL (ref 0.7–1.3)
DEPRECATED RDW RBC AUTO: 55.8 FL (ref 37–54)
EOSINOPHIL # BLD AUTO: 0 10*3/MM3 (ref 0–0.4)
EOSINOPHIL NFR BLD AUTO: 0 % (ref 0.3–6.2)
ERYTHROCYTE [DISTWIDTH] IN BLOOD BY AUTOMATED COUNT: 15.6 % (ref 12.3–15.4)
GFR SERPL CREATININE-BSD FRML MDRD: 88 ML/MIN/1.73
GLOBULIN UR ELPH-MCNC: 1.9 GM/DL (ref 1.8–3.5)
GLUCOSE SERPL-MCNC: 210 MG/DL (ref 74–124)
HCT VFR BLD AUTO: 36.6 % (ref 37.5–51)
HGB BLD-MCNC: 11.8 G/DL (ref 13–17.7)
IMM GRANULOCYTES # BLD AUTO: 0.01 10*3/MM3 (ref 0–0.05)
IMM GRANULOCYTES NFR BLD AUTO: 0.5 % (ref 0–0.5)
LYMPHOCYTES # BLD AUTO: 0.49 10*3/MM3 (ref 0.7–3.1)
LYMPHOCYTES NFR BLD AUTO: 25.9 % (ref 19.6–45.3)
MCH RBC QN AUTO: 31.3 PG (ref 26.6–33)
MCHC RBC AUTO-ENTMCNC: 32.2 G/DL (ref 31.5–35.7)
MCV RBC AUTO: 97.1 FL (ref 79–97)
MONOCYTES # BLD AUTO: 0.24 10*3/MM3 (ref 0.1–0.9)
MONOCYTES NFR BLD AUTO: 12.7 % (ref 5–12)
NEUTROPHILS NFR BLD AUTO: 1.15 10*3/MM3 (ref 1.7–7)
NEUTROPHILS NFR BLD AUTO: 60.9 % (ref 42.7–76)
NRBC BLD AUTO-RTO: 0 /100 WBC (ref 0–0.2)
PLATELET # BLD AUTO: 108 10*3/MM3 (ref 140–450)
PMV BLD AUTO: 11 FL (ref 6–12)
POTASSIUM SERPL-SCNC: 4 MMOL/L (ref 3.5–4.7)
PROT SERPL-MCNC: 5.8 G/DL (ref 6.3–8)
RBC # BLD AUTO: 3.77 10*6/MM3 (ref 4.14–5.8)
SODIUM SERPL-SCNC: 140 MMOL/L (ref 134–145)
WBC # BLD AUTO: 1.89 10*3/MM3 (ref 3.4–10.8)

## 2020-11-09 PROCEDURE — 63710000001 PROCHLORPERAZINE MALEATE PER 10 MG: Performed by: INTERNAL MEDICINE

## 2020-11-09 PROCEDURE — 96415 CHEMO IV INFUSION ADDL HR: CPT

## 2020-11-09 PROCEDURE — 96361 HYDRATE IV INFUSION ADD-ON: CPT

## 2020-11-09 PROCEDURE — 85025 COMPLETE CBC W/AUTO DIFF WBC: CPT

## 2020-11-09 PROCEDURE — 96377 APPLICATON ON-BODY INJECTOR: CPT

## 2020-11-09 PROCEDURE — 25010000002 OBINUTUZUMAB 1000 MG/40ML SOLUTION 40 ML VIAL: Performed by: INTERNAL MEDICINE

## 2020-11-09 PROCEDURE — 80053 COMPREHEN METABOLIC PANEL: CPT

## 2020-11-09 PROCEDURE — 93005 ELECTROCARDIOGRAM TRACING: CPT

## 2020-11-09 PROCEDURE — 93010 ELECTROCARDIOGRAM REPORT: CPT | Performed by: INTERNAL MEDICINE

## 2020-11-09 PROCEDURE — 99215 OFFICE O/P EST HI 40 MIN: CPT | Performed by: INTERNAL MEDICINE

## 2020-11-09 PROCEDURE — 25010000002 PEGFILGRASTIM 6 MG/0.6ML PREFILLED SYRINGE KIT: Performed by: INTERNAL MEDICINE

## 2020-11-09 PROCEDURE — 25010000003 HEPARIN LOCK FLUSH PER 10 UNITS: Performed by: INTERNAL MEDICINE

## 2020-11-09 PROCEDURE — 96413 CHEMO IV INFUSION 1 HR: CPT

## 2020-11-09 RX ORDER — DIPHENHYDRAMINE HYDROCHLORIDE 50 MG/ML
50 INJECTION INTRAMUSCULAR; INTRAVENOUS AS NEEDED
Status: CANCELLED | OUTPATIENT
Start: 2020-11-09

## 2020-11-09 RX ORDER — SODIUM CHLORIDE 9 MG/ML
250 INJECTION, SOLUTION INTRAVENOUS ONCE
Status: CANCELLED | OUTPATIENT
Start: 2020-11-09

## 2020-11-09 RX ORDER — SODIUM CHLORIDE 9 MG/ML
500 INJECTION, SOLUTION INTRAVENOUS ONCE
Status: CANCELLED | OUTPATIENT
Start: 2020-11-09

## 2020-11-09 RX ORDER — ACETAMINOPHEN 325 MG/1
650 TABLET ORAL ONCE
Status: COMPLETED | OUTPATIENT
Start: 2020-11-09 | End: 2020-11-09

## 2020-11-09 RX ORDER — MEPERIDINE HYDROCHLORIDE 50 MG/ML
25 INJECTION INTRAMUSCULAR; INTRAVENOUS; SUBCUTANEOUS
Status: CANCELLED | OUTPATIENT
Start: 2020-11-09

## 2020-11-09 RX ORDER — PROCHLORPERAZINE MALEATE 10 MG
10 TABLET ORAL ONCE
Status: CANCELLED
Start: 2020-11-09

## 2020-11-09 RX ORDER — PROCHLORPERAZINE MALEATE 10 MG
10 TABLET ORAL ONCE
Status: COMPLETED | OUTPATIENT
Start: 2020-11-09 | End: 2020-11-09

## 2020-11-09 RX ORDER — SODIUM CHLORIDE 9 MG/ML
500 INJECTION, SOLUTION INTRAVENOUS ONCE
Status: COMPLETED | OUTPATIENT
Start: 2020-11-09 | End: 2020-11-09

## 2020-11-09 RX ORDER — SODIUM CHLORIDE 9 MG/ML
250 INJECTION, SOLUTION INTRAVENOUS ONCE
Status: COMPLETED | OUTPATIENT
Start: 2020-11-09 | End: 2020-11-09

## 2020-11-09 RX ORDER — ACETAMINOPHEN 325 MG/1
650 TABLET ORAL ONCE
Status: CANCELLED | OUTPATIENT
Start: 2020-11-09

## 2020-11-09 RX ORDER — FAMOTIDINE 10 MG/ML
20 INJECTION, SOLUTION INTRAVENOUS AS NEEDED
Status: CANCELLED | OUTPATIENT
Start: 2020-11-09

## 2020-11-09 RX ADMIN — Medication 500 UNITS: at 13:12

## 2020-11-09 RX ADMIN — PROCHLORPERAZINE MALEATE 10 MG: 10 TABLET ORAL at 08:47

## 2020-11-09 RX ADMIN — SODIUM CHLORIDE 250 ML: 9 INJECTION, SOLUTION INTRAVENOUS at 08:48

## 2020-11-09 RX ADMIN — SODIUM CHLORIDE 500 ML: 9 INJECTION, SOLUTION INTRAVENOUS at 08:51

## 2020-11-09 RX ADMIN — ACETAMINOPHEN 650 MG: 325 TABLET ORAL at 08:47

## 2020-11-09 RX ADMIN — OBINUTUZUMAB 1000 MG: 1000 INJECTION, SOLUTION, CONCENTRATE INTRAVENOUS at 09:43

## 2020-11-09 RX ADMIN — PEGFILGRASTIM 6 MG: KIT SUBCUTANEOUS at 13:09

## 2020-11-09 NOTE — PROGRESS NOTES
Subjective       REASON FOR VISIT:    1. Chronic lymphoid leukemia, stage 4, presented initially with significant pancytopenia    2.  History of angiodysplasia requiring cauterization and history of Hess's esophagus    3.  Bone marrow aspiration biopsy shows hypercellular marrow with 98% involvement with CLL    Patient ID: Macho Stephenson is a 70 y.o. year old male     History of Present Illness Mr. Stephenson currently undergoing treatment with Gazyva and venetoclax.  Patient is doing well with the treatment.  He has no nausea vomiting or abdominal pain.  His white count is down to 1.87 but his absolute neutrophil count is still 1.15.  He is due for Gazyva today.  He is currently on venetoclax and tolerating well.  Since we are giving him the Neulasta tomorrow I think it will be reasonable to continue venetoclax which does cause thrombocytopenia and neutropenia.  He had a CT scan of the neck chest abdomen pelvis.  I have reviewed the results with him and he had a significant response.  His spleen decreased from 15.5 to 12.4 cm.  And his lymphadenopathy is decreased.    He has been gaining weight and he has a good appetite.  He has a 1.7 cm gallstone but he does not have any evidence of obstruction and he is asymptomatic    PAST MEDICAL HISTORY:   1. Recurrent atrial fibrillation followed by cardiology. He has had drug eluting stent placed x 3.   2. High cholesterol.   3. Hypertension.       HEMATOLOGIC/ONCOLOGIC HISTORY:       The patient is 62-year-old gentleman with the above history currently here for followup. He has no complaints. He denies fever, night sweats or weight loss. He does not have any lymphadenopathy. He feels good. He had some fatigue but his CBC shows a go od hemoglobin.   The patient is followed by Dr. Kevin Chandra. He had seen Dr. Chandra 7/18/13 for a history of coronary artery disease status drug eluting stent placement to the right coronary artery and left anterior descending artery in  February 2011. Histor y of paroxysmal atrial fibrillation and hyperlipidemia. He presented to [facility] on 6/23/13 with palpitations and was found to have atrial fibrillation with rapid ventricular response. He was given IV Cardizem and converted spontaneously to normal sinus rhythm. He was found to have elevated white count at 18.9 and denied any symptoms of infection or urinary complaints. TSH was normal, troponin levels were negative. He was asked to continue aspirin and metoprolol for that. If he contin u ed to have atrial fibrillation, they will consider antiarrhythmic therapy with or without a short term anticoagulation therapy. However, currently the patient is feeling reasonably good. He has no complaints. His CBC 7/22/13 showed WBC 17,000, hemoglob i n 16.4 and platelet count of 174,000. Back 9/28/13 his hemoglobin was 15.1, WBC 13.3 and platelets 197,000. He has had a persistently elevated WBC but he is totally asymptomatic. He does not have any fever, night sweats or lymphadenopathy. He is here to be evaluated for his elevated white count.       Peripheral blood flow cytometry done 08/16/13 shows 22% chronic lymphoid leukemia cells, and they expressed ZAP-70 but not CD38. The total number of cells approximated 60% T cells, 32% B cells and 1% natural k iller cells. The B cells were monoclonal and expressed CD19, CD20, CD22, CD5, CD23, CD11c, ZAP-70 and T cell antigens. There was a normal CD4/CD8 ratio. CT of the chest, abdomen and pelvis does not show evidence of metastasis. Peripheral blood for DILLON -2 was negative. It was negative for T11:14 translocation.       CT scan done on 08/21/2013 shows 5 to 6 mm noncalcified nodule in the left lower lobe which is unchanged from December 2010. Otherwise no evidence of any lymphadenopathy or hepatosplenomegaly.       MRI of the spine shows arthritis and disc bulge and likely hemangiomas, with 1 lesion showing increased signal intensity at T2, which  is likely a hemangioma. Bone scan could be done, but patient does not even have much pain there. CT scan of the chest, a bdomen and pelvis was done on 11/23/2014. He has tiny lymph nodes in the neck which are difficult to palpate. Right jugular one is 1.4 x 0.9 and left supraclavicular one is 1.5 x 1.1. He also has a subcarinal lymph node which is 1.7 x 1.2 cm, and he ha s a portocaval lymph node which has increased from 2.5 to 2.8. Patient is totally asymptomatic. He does not have any B symptoms, so will continue followup with observation.           Patient is a 69-year-old gentleman with history of chronic lymphocytic leukemia/SLL who recently got admitted to [facility] because of GI bleed.  He was seen by Dr. Montero.  He underwent EGD colonoscopy.  He was found to have angiodysplasia for which he underwent argon coagulation.  He required 3 units of blood.  Patient had a normal esophagus normal stomach and normal duodenum CLOtest was negative he was asked to take Protonix 40 mg daily.  Colonoscopy showed blood in the entire examined colon but there was a single bleeding colonic angiectasia which was treated with argon plasma coagulation.    He was admitted twice.  Initially he was admitted on July 10 at which time he stayed 3 days and he was evaluated for chest pain.  He underwent a cardiac work-up with stress test and echo.  The echo was normal but the stress test showed medium sized moderate severe severity fixed perfusion defect.  Of the inferior wall consistent with infarction.  However according to patient cardiology did not think he had any cardiac problems.  I have left a message for patient's cardiologist to call me today.  Patient subsequently got readmitted with GI bleed and required 1/3 unit of transfusion.  He was found to have thrombocytopenia and hematology was consulted.    His hemoglobin had dropped down to as low as 7 and his platelets had gone down to 87.  Today it is 35 and his hemoglobin  is 9.4 today.  He denies active bleeding.  He has lost weight recently.  He does not have any fever or night sweats.    He had ultrasound of spleen which showed enlarged spleen centimeters in length    Patient was started on Gazyva with Leukeran but subsequently switched to venetoclax with Gazyva.  His venetoclax dose is 100 mg daily and he receives Gazyva once a month.    CT scan done 2020 shows significant response     MEDICATIONS: The current medication list was reviewed with the patient and updated in the EMR this date per the medical assistant. Medication dosages and frequencies were confirmed to be accurate.       ALLERGIES: PENICILLIN which causes him to swell up. He is allergic to IV CONTRAST DYE.     SOCIAL HISTORY: He is . He smokes one pack per day for 35 years. He consumes three to four alcoholic drinks per week. He has no tattoos and no previous transfusions.       FAMILY HISTORY: Father  at 82 with MI. Mother  at 82 with bone cancer. He has one brother age 65 in good health and two sisters 69 and 57 in good health.   Past Medical History, Social History, and Family History are unchanged from my prior documentation on     Review of Systems   Constitutional: Positive for fatigue (20-Unchanged). Negative for activity change, appetite change, chills, diaphoresis, fever and unexpected weight change.   HENT: Negative for hearing loss, mouth sores, nosebleeds, sore throat and trouble swallowing.    Respiratory: Negative for cough, chest tightness, shortness of breath and wheezing.    Cardiovascular: Negative for chest pain, palpitations and leg swelling.   Gastrointestinal: Positive for nausea (20-Unchanged). Negative for abdominal distention, abdominal pain, constipation, diarrhea and vomiting.   Genitourinary: Negative for difficulty urinating, dysuria, frequency, hematuria and urgency.   Musculoskeletal: Negative for joint swelling.        No muscle  weakness.   Skin: Negative for rash and wound.   Neurological: Positive for headaches (11/09/20-Improved). Negative for dizziness, seizures, syncope, speech difficulty, weakness and numbness.   Hematological: Negative for adenopathy. Does not bruise/bleed easily.   Psychiatric/Behavioral: Negative for behavioral problems, confusion, sleep disturbance and suicidal ideas.   All other systems reviewed and are negative.  I have reviewed and confirmed the accuracy of the ROS as documented by the MA/LPN/ROMIE Moya MD  I have reviewed and confirmed the accuracy of the ROS as documented by the MA/LPN/ROMIE Moya MD            Patient Active Problem List   Diagnosis   • CLL (chronic lymphocytic leukemia) (CMS/HCC)   • Anemia   • Encounter for hepatitis C screening test for low risk patient    • Thrombocytopenia (CMS/HCC)   • H/O heart artery stent   • Stented coronary artery   • Arteriosclerosis of coronary artery   • Atrial fibrillation (CMS/HCC)   • Paroxysmal atrial fibrillation (CMS/HCC)   • Chest pain, rule out acute myocardial infarction   • Fall   • Fracture, olecranon   • Hyperlipidemia   • Long term (current) use of anticoagulants   • Lower GI bleed   • Olecranon bursitis   • Shoulder pain   • Smoker   • CLL (chronic lymphocytic leukemia) (CMS/HCC)   • Dehydration   • Drug-induced nausea and vomiting   • Encounter for long-term (current) use of other medications         MEDICATIONS:  Medication Reconciliation for the patient has been reviewed by me and documented in the patients chart.    ALLERGIES:    Allergies   Allergen Reactions   • Contrast Dye Swelling   • Penicillins Swelling         Vitals:    11/09/20 0747   BP: (!) 181/56   Pulse: 55   Resp: 16   Temp: 96.5 °F (35.8 °C)   SpO2: 99%        Current Status 11/9/2020   ECOG score 0        CONSTITUTIONAL:  Vital signs reviewed.    No distress, looks comfortable.  EYES:  Conjunctiva and lids unremarkable.  Extraocular eye movements  intact.  EARS,NOSE,MOUTH,THROAT:  Ears and nose appear unremarkable.  Lips, teeth, gums appear unremarkable.  RESPIRATORY:  Normal respiratory effort.    CARDIOVASCULAR: Regular rate rhythm, no murmur  No significant lower extremity edema.  SKIN: No wounds.  No rashes.  MUSCULOSKELETAL/EXTREMITIES: No clubbing or cyanosis.  No apparent unilateral weakness.  NEURO: CN 2-12 appear intact. No focal neurological deficits noted.  PSYCHIATRIC:  Normal judgment and insight.  Normal mood and affect.  I have reexamined the patient and the results are consistent with the previously documented exam. Susannah Moya MD     RECENT LABS:  Results from last 7 days   Lab Units 11/09/20  0729   WBC 10*3/mm3 1.89*   NEUTROS ABS 10*3/mm3 1.15*   HEMOGLOBIN g/dL 11.8*   HEMATOCRIT % 36.6*   PLATELETS 10*3/mm3 108*     Results from last 7 days   Lab Units 11/09/20  0729   SODIUM mmol/L 140   POTASSIUM mmol/L 4.0   CHLORIDE mmol/L 106   CO2 mmol/L 24.2   BUN mg/dL 12   CREATININE mg/dL 0.86   CALCIUM mg/dL 8.8   ALBUMIN g/dL 3.90   BILIRUBIN mg/dL 0.5   ALK PHOS U/L 231*   ALT (SGPT) U/L 9   AST (SGOT) U/L 17   GLUCOSE mg/dL 210*           CT NECK, CHEST, ABDOMEN, AND PELVIS WITHOUT IV CONTRAST 11/04/20    IMPRESSION:  1. Some of the lymphadenopathy has resolved and the remaining  lymphadenopathy is significantly smaller than previously.   2. Splenic size has decreased as well. There is no new abnormality.         STAT NONCONTRAST HEAD CT 08/20/2020  IMPRESSION:  1. No acute abnormality is seen with no significant change when compared  to prior noncontrast head CT from [facility] 03/03/2015.  2. There is mild small vessel disease in the frontal periventricular  white matter and there are calcified atherosclerotic plaques in the  cavernous internal carotid arteries bilaterally.  3. Patient has had previous paranasal sinus surgery with bilateral  uncinectomies and ethmoidectomies and the paranasal sinuses are  currently  clear. The remainder of the head CT is within normal limits,  specifically no acute intracranial hemorrhage is seen. Etiology of  headaches in this patient with history of leukemia and thrombocytopenia  is not established on this exam. Results were communicated to Susannah Moya, the oncologist taking care of the patient by telephone on  08/20/2020 at 9:20 AM.      XR CHEST POST CVA PORT-07/31/20    IMPRESSION:  Chest port placement. No pneumothorax.       CT NECK, CHEST, ABDOMEN, AND PELVIS WITHOUT IV CONTRAST. 7/20/2020   HISTORY: 69-year-old male with CLL/SLL.     TECHNIQUE: Radiation dose reduction techniques were utilized, including  automated exposure control and exposure modulation based on body size. 3  mm images were obtained through the neck, chest, abdomen, and pelvis  without the administration of IV contrast. Compared with CT of the  abdomen and pelvis from 08/05/2019 and previous CTs from 03/19/2018.     FINDINGS:  NECK: There is no significant change in the 1.4 x 1.0 cm left  supraclavicular node or in the smaller supraclavicular nodes  bilaterally. A reference 1.4 x 0.7 cm right jugular chain node is  stable. There are shotty nodes scattered throughout the neck. No new  lymphadenopathy is seen. Noncontrasted parotid, submandibular, and  thyroid glands appear unremarkable.     CHEST: There has been interval decrease in the size of the shotty and  borderline enlarged nodes at the axilla. Reference 1.6 x 0.7 cm right  axillary node previously measured 2.0 x 1.2 cm. There has been a  decrease in the size of all of the shotty mediastinal and hilar nodes.  There are no new pulmonary opacities and there are no pleural or  pericardial effusions.     ABDOMEN/PELVIS: Splenic size has slightly increased measuring 15.4 cm in  diameter and previously measuring 13.5 cm, both measured on the coronal  reconstruction series. There is no change in the shotty nodes at the  gastrohepatic ligament and shorty hepatis.  There are no pathologically  enlarged nodes. There is no acute abnormality involving the  noncontrasted liver. A single gallstone is noted within the gallbladder.  Noncontrasted pancreas, adrenals, and kidneys appear unremarkable. There  is no acute bowel abnormality. There is extensive sigmoid  diverticulosis. No free fluid. There are extensive abdominal aortic  atherosclerotic changes and there is a stable 3.7 cm infrarenal  abdominal aortic aneurysm.     IMPRESSION:  1. There has been slight interval increase in splenomegaly since the CT  of the abdomen from 08/05/2019.  2. Interval decrease in the size of the axillary nodes and the shotty  mediastinal nodes. There is no change in the shotty nodes within the  neck and supraclavicular regions. There is no new lymphadenopathy.  3. Stable 3.7 cm infrarenal abdominal aortic aneurysm.      Assessment/Plan   1. Stage 2 CLL without evidence of any disease progression.  I have reviewed the CT scan from 3/19/2018 there is minimal progression but clinically patient is asymptomatic and we will follow with observation.  Will monitor with labs.   No evidence of any frequent infections, no major worsening of his white count. He has no fever or night sweats or any other symptoms.   · Patient knows that he is prone for infections and he is mainly staying at home during the COVID-19 situation time  · Recent admission to [facility] July 10 2020×2.  Initially admitted with chest pain and underwent stress test and echo.  Echo was negative.  Stress test is abnormal but have left message for patient's cardiologist to call us.  His cardiologist is been sent [facility].  · He then got admitted the second time with worsening GI bleed and required total of 3 units of blood.  EGD was negative but colonoscopy showed angiectasia for which he underwent argon ablation.  Currently hemoglobin stable and no active bleeding.  · He was also found to have low platelets with platelets dropping  "down to 27k and hemoglobin was 7.  Ultrasound showed splenomegaly 15 cm.  Concern is whether he has progressed from his CLL point of view and requires treatment.  · CT scan performed 7/20/2020 did show the increased splenomegaly, but interval decrease in size of the axillary nodes, and shotty mediastinal nodes.  No change in the shotty nodes within the neck and supraclavicular regions.  No new lymphadenopathy.  Bone marrow biopsy however showed preliminarily that there is bone marrow involvement.  Remainder of bone marrow biopsy report is pending.    · Bone marrow shows hypercellular marrow with 100% involvement of chronic lymphocytic leukemia/small lymphocytic lymphoma and diffuse pattern with at least 98% of the total marrow cellularity.  Rare foci of erythropoiesis and rare megakaryocytes are noted.     · Negative for BCL-2 and BCL 6.  Chromosomes shows normal karyotype.  I GVH mutation present.  Positive for gain of 1 q., monosomy 13 and loss of IGH.  Negative for deletion T p53, negative for gain of chromosomes 9 and 11, negative for 11, 14 fusion.    · The combination of monosomy 13 and gain of 1 q. is thought to be associated with plasma cell myeloma.  However this patient does not have myeloma.    · Scans were reviewed with the patient today.  Dr. Moya also discussed with the patient and recommended he initiate treatment with chlorambucil and Gazyva.  He would not be a good candidate for Imbruvica due to his history of A. fib\".  He cannot take anticoagulation at this time.  The patient was provided with chemotherapy education today, including  Handouts.  He will need a port placed so that we can initiate treatment  · 8/13/2020: Gazyva day 1. Plan also [id number] mg p.o. on day 1 day 15.  We can increase the dose once we know he tolerates and his platelets improved.  · Patient developing pancytopenia when reviewed cycle 1 day 15.  Chlorambucil dose modified to 10 mg for day 15 however it is felt that " he cannot tolerate this ongoing.   ·  Plan to switch to Venetoclax along with continuing Gazyva.    · Patient due today for cycle 2-day 1 Gazyva.  He will proceed today as scheduled.  Venetoclax will be shipped to his home with plans to begin this next week on 9/14/2020.    · Patient did receive 1 L normal saline and Neulasta on 9/28/2020 secondary to neutropenia with an ANC of 0.04.  · Patient seen on 10/02/2020 continuing on venetoclax, currently on 100 mg dosing.  He would not increase his dose as he will start on voriconazole after being on venetoclax x1 week which affects the metabolism of venetoclax.  He is currently on day 5 of the 100 mg dosing.  Patient states overall he feels the same except for increased fatigue and nausea.  His nauseousness is controlled with ondansetron however.  Patient will be given 1 L normal saline in the office today as planned.  · Patient returns on 10/12/2020 continuing on venetoclax 100 mg daily as well as voriconazole.  · Patient is doing well with these treatments except fatigue.  Next cycle 4 due as of number ninth prior to which will obtain CT scans  · November 9, 2020: White count down to 1.89 but patient started Pepcid.  We will hold off Pepcid.       2.  Worsening thrombocytopenia, looking back his platelets were always in the 150s and during this admission he dropped down to 27K. His LDH also was elevated and he was anemic.  · It was dropping on chlorambucil.  Plans to change therapy as detailed above.  Patient did require transfusion and platelets are currently improved to 55,000.  · Patient's platelet count has improved to 138,000 today.  · Stable platelets.  Improved platelets    3.  History of angiodysplasia requiring cauterization and Hess's esophagus mild anemia. He is currently asymptomatic.  · No evidence of active bleeding.  Stable    4.  History of cluster headaches for which she has to be treated with steroids in the past and has followed up with   Len Péreztoday with significant headaches.  · Patient had CT of the head 8/20/2020 which was negative.  · He was started on prednisone 10 mg daily which was not helpful.  · Patient saw Dr. Bobby, neurology who gave him 2 shots of a new medication Emgality.  This is something that can be given once a month.    · Headaches improved.  Resolved    5.  Neutropenia without fever.Cipro 500 mg twice daily was started.  Patient was given Neulasta on 9/28/2020.  Cipro can be held today because his counts have improved.  · Significant drop in his white count to 1.89 but ANC 1.15 as of November 9, 2020    6.  Restless leg syndrome, we will obtain iron studies today    PLAN:  1. Cycle 4 Gazyva day 1 due November 9, 2020.  Tolerating well  2. Reviewed CT scan with excellent response  3. Given neutropenia with ANC of 1.15 we will recheck labs with nurse review in 1 week  4. Nurse practitioner review in 2 weeks  5. Follow-up with me on December 7 for cycle 5 Gazyva  6. He will continue voriconazole and venetoclax  7. He will receive Neulasta on body injector today  8. Continue total of 6 cycles of Gazyva followed by venetoclax for total of year.      I spent 40 total minutes, face-to-face, caring for Macho today. >50%of this time involved counseling and/or coordination of care as documented within this note.    Susannah Moya MD          Cc: Dr. Kevin Chandra

## 2020-11-10 ENCOUNTER — MEDICATION THERAPY MANAGEMENT (OUTPATIENT)
Dept: ONCOLOGY | Facility: HOSPITAL | Age: 70
End: 2020-11-10

## 2020-11-10 VITALS — HEART RATE: 57 BPM

## 2020-11-10 NOTE — PROGRESS NOTES
[facility] lab review ( Gazyva and venetoclax)        11/9/2020  Day 1   WBC 3.40 - 10.80 10*3/mm3 1.89 (A)   Neutrophils Absolute 1.70 - 7.00 10*3/mm3 1.15 (A)   Hemoglobin 13.0 - 17.7 g/dL 11.8 (A)   Hematocrit 37.5 - 51.0 % 36.6 (A)   Platelets 140 - 450 10*3/mm3 108 (A)   Creatinine 0.70 - 1.30 mg/dL 0.86   eGFR Non African Am >60 mL/min/1.73 88   BUN 6 - 20 mg/dL 12   Sodium 134 - 145 mmol/L 140   Potassium 3.5 - 4.7 mmol/L 4.0   Glucose 74 - 124 mg/dL 210 (A)   Calcium 8.5 - 10.2 mg/dL 8.8   Albumin 3.50 - 5.20 g/dL 3.90   Total Protein 6.3 - 8.0 g/dL 5.8 (A)   AST (SGOT) 0 - 40 U/L 17   ALT (SGPT) 0 - 41 U/L 9   Alkaline Phosphatase 38 - 116 U/L 231 (A)   Total Bilirubin 0.2 - 1.2 mg/dL 0.5     K phos and pepcid discontinued. Dr Moya's dictation is noted.  Continue Gazyva for total of 6 cycles and Venetoclax for 12 cycles.

## 2020-11-17 ENCOUNTER — LAB (OUTPATIENT)
Dept: LAB | Facility: HOSPITAL | Age: 70
End: 2020-11-17

## 2020-11-17 ENCOUNTER — CLINICAL SUPPORT (OUTPATIENT)
Dept: ONCOLOGY | Facility: HOSPITAL | Age: 70
End: 2020-11-17

## 2020-11-17 DIAGNOSIS — C91.10 CLL (CHRONIC LYMPHOCYTIC LEUKEMIA) (HCC): Primary | ICD-10-CM

## 2020-11-17 LAB
BASOPHILS # BLD AUTO: 0.08 10*3/MM3 (ref 0–0.2)
BASOPHILS NFR BLD AUTO: 0.5 % (ref 0–1.5)
DEPRECATED RDW RBC AUTO: 54.4 FL (ref 37–54)
EOSINOPHIL # BLD AUTO: 0.01 10*3/MM3 (ref 0–0.4)
EOSINOPHIL NFR BLD AUTO: 0.1 % (ref 0.3–6.2)
ERYTHROCYTE [DISTWIDTH] IN BLOOD BY AUTOMATED COUNT: 15.8 % (ref 12.3–15.4)
HCT VFR BLD AUTO: 40.3 % (ref 37.5–51)
HGB BLD-MCNC: 13.5 G/DL (ref 13–17.7)
IMM GRANULOCYTES # BLD AUTO: 0.22 10*3/MM3 (ref 0–0.05)
IMM GRANULOCYTES NFR BLD AUTO: 1.4 % (ref 0–0.5)
LYMPHOCYTES # BLD AUTO: 1.44 10*3/MM3 (ref 0.7–3.1)
LYMPHOCYTES NFR BLD AUTO: 8.9 % (ref 19.6–45.3)
MCH RBC QN AUTO: 31.8 PG (ref 26.6–33)
MCHC RBC AUTO-ENTMCNC: 33.5 G/DL (ref 31.5–35.7)
MCV RBC AUTO: 94.8 FL (ref 79–97)
MONOCYTES # BLD AUTO: 0.69 10*3/MM3 (ref 0.1–0.9)
MONOCYTES NFR BLD AUTO: 4.3 % (ref 5–12)
NEUTROPHILS NFR BLD AUTO: 13.65 10*3/MM3 (ref 1.7–7)
NEUTROPHILS NFR BLD AUTO: 84.8 % (ref 42.7–76)
NRBC BLD AUTO-RTO: 0 /100 WBC (ref 0–0.2)
PLATELET # BLD AUTO: 35 10*3/MM3 (ref 140–450)
RBC # BLD AUTO: 4.25 10*6/MM3 (ref 4.14–5.8)
WBC # BLD AUTO: 16.09 10*3/MM3 (ref 3.4–10.8)

## 2020-11-17 PROCEDURE — G0463 HOSPITAL OUTPT CLINIC VISIT: HCPCS

## 2020-11-17 PROCEDURE — 36415 COLL VENOUS BLD VENIPUNCTURE: CPT

## 2020-11-17 PROCEDURE — 85025 COMPLETE CBC W/AUTO DIFF WBC: CPT

## 2020-11-17 NOTE — NURSING NOTE
Pt here for CBC and RN review. WBC 16.09, hgb 13.5, plts 35, and ANC 13.65. Pt had neulasta onpro on 11/9. Pt states he has been feeling ok, has had an upset stomach. He takes zofran, but is no longer on pepcid. No signs/symtpoms of bleeding at this time. Copy of labs given to pt and f/u appts reviewed.       Lab Results   Component Value Date    WBC 16.09 (H) 11/17/2020    HGB 13.5 11/17/2020    HCT 40.3 11/17/2020    MCV 94.8 11/17/2020    PLT 35 (L) 11/17/2020     Jane in pharmacy was concerned about pt's low plts heading into the weekend and pt on venetoclax. Called Dr. Moya to review labs. Per Dr. Moya pt to hold his venetoclax until he is back for labs and to see an NP on Monday 11/23. Called and spoke with pt's wife. Pt's wife v/u.

## 2020-11-18 ENCOUNTER — MEDICATION THERAPY MANAGEMENT (OUTPATIENT)
Dept: ONCOLOGY | Facility: HOSPITAL | Age: 70
End: 2020-11-18

## 2020-11-18 NOTE — PROGRESS NOTES
[facility] lab review ( venetoclax)        11/17/2020   WBC 3.40 - 10.80 10*3/mm3 16.09 (A)   Neutrophils Absolute 1.70 - 7.00 10*3/mm3 13.65 (A)   Hemoglobin 13.0 - 17.7 g/dL 13.5   Hematocrit 37.5 - 51.0 % 40.3   Platelets 140 - 450 10*3/mm3 35 (A)  plts verified=39     Spoke with Aminah Barnard and she is reporting platelet count to Dr Moya.

## 2020-11-23 ENCOUNTER — OFFICE VISIT (OUTPATIENT)
Dept: ONCOLOGY | Facility: CLINIC | Age: 70
End: 2020-11-23

## 2020-11-23 ENCOUNTER — INFUSION (OUTPATIENT)
Dept: ONCOLOGY | Facility: HOSPITAL | Age: 70
End: 2020-11-23

## 2020-11-23 ENCOUNTER — SPECIALTY PHARMACY (OUTPATIENT)
Dept: PHARMACY | Facility: HOSPITAL | Age: 70
End: 2020-11-23

## 2020-11-23 VITALS
BODY MASS INDEX: 20.37 KG/M2 | OXYGEN SATURATION: 98 % | HEIGHT: 73 IN | TEMPERATURE: 97.1 F | WEIGHT: 153.7 LBS | DIASTOLIC BLOOD PRESSURE: 68 MMHG | RESPIRATION RATE: 16 BRPM | SYSTOLIC BLOOD PRESSURE: 118 MMHG | HEART RATE: 59 BPM

## 2020-11-23 DIAGNOSIS — C91.10 CLL (CHRONIC LYMPHOCYTIC LEUKEMIA) (HCC): Primary | ICD-10-CM

## 2020-11-23 LAB
ALBUMIN SERPL-MCNC: 4.1 G/DL (ref 3.5–5.2)
ALBUMIN/GLOB SERPL: 1.8 G/DL (ref 1.1–2.4)
ALP SERPL-CCNC: 249 U/L (ref 38–116)
ALT SERPL W P-5'-P-CCNC: 14 U/L (ref 0–41)
ANION GAP SERPL CALCULATED.3IONS-SCNC: 11.2 MMOL/L (ref 5–15)
AST SERPL-CCNC: 23 U/L (ref 0–40)
BASOPHILS # BLD AUTO: 0 10*3/MM3 (ref 0–0.2)
BASOPHILS NFR BLD AUTO: 0 % (ref 0–1.5)
BILIRUB SERPL-MCNC: 0.4 MG/DL (ref 0.2–1.2)
BUN SERPL-MCNC: 18 MG/DL (ref 6–20)
BUN/CREAT SERPL: 21.7 (ref 7.3–30)
CALCIUM SPEC-SCNC: 9.1 MG/DL (ref 8.5–10.2)
CHLORIDE SERPL-SCNC: 103 MMOL/L (ref 98–107)
CO2 SERPL-SCNC: 23.8 MMOL/L (ref 22–29)
CREAT SERPL-MCNC: 0.83 MG/DL (ref 0.7–1.3)
DEPRECATED RDW RBC AUTO: 54.6 FL (ref 37–54)
EOSINOPHIL # BLD AUTO: 0 10*3/MM3 (ref 0–0.4)
EOSINOPHIL NFR BLD AUTO: 0 % (ref 0.3–6.2)
ERYTHROCYTE [DISTWIDTH] IN BLOOD BY AUTOMATED COUNT: 15.1 % (ref 12.3–15.4)
GFR SERPL CREATININE-BSD FRML MDRD: 91 ML/MIN/1.73
GLOBULIN UR ELPH-MCNC: 2.3 GM/DL (ref 1.8–3.5)
GLUCOSE SERPL-MCNC: 149 MG/DL (ref 74–124)
HCT VFR BLD AUTO: 38.8 % (ref 37.5–51)
HGB BLD-MCNC: 12.4 G/DL (ref 13–17.7)
IMM GRANULOCYTES # BLD AUTO: 0.02 10*3/MM3 (ref 0–0.05)
IMM GRANULOCYTES NFR BLD AUTO: 0.3 % (ref 0–0.5)
LYMPHOCYTES # BLD AUTO: 0.77 10*3/MM3 (ref 0.7–3.1)
LYMPHOCYTES NFR BLD AUTO: 12.1 % (ref 19.6–45.3)
MCH RBC QN AUTO: 31.5 PG (ref 26.6–33)
MCHC RBC AUTO-ENTMCNC: 32 G/DL (ref 31.5–35.7)
MCV RBC AUTO: 98.5 FL (ref 79–97)
MONOCYTES # BLD AUTO: 0.4 10*3/MM3 (ref 0.1–0.9)
MONOCYTES NFR BLD AUTO: 6.3 % (ref 5–12)
NEUTROPHILS NFR BLD AUTO: 5.19 10*3/MM3 (ref 1.7–7)
NEUTROPHILS NFR BLD AUTO: 81.3 % (ref 42.7–76)
NRBC BLD AUTO-RTO: 0 /100 WBC (ref 0–0.2)
PLATELET # BLD AUTO: 121 10*3/MM3 (ref 140–450)
PMV BLD AUTO: 10.6 FL (ref 6–12)
POTASSIUM SERPL-SCNC: 4.4 MMOL/L (ref 3.5–4.7)
PROT SERPL-MCNC: 6.4 G/DL (ref 6.3–8)
RBC # BLD AUTO: 3.94 10*6/MM3 (ref 4.14–5.8)
SODIUM SERPL-SCNC: 138 MMOL/L (ref 134–145)
WBC # BLD AUTO: 6.38 10*3/MM3 (ref 3.4–10.8)

## 2020-11-23 PROCEDURE — 99214 OFFICE O/P EST MOD 30 MIN: CPT | Performed by: NURSE PRACTITIONER

## 2020-11-23 PROCEDURE — 25010000003 HEPARIN LOCK FLUSH PER 10 UNITS: Performed by: INTERNAL MEDICINE

## 2020-11-23 PROCEDURE — 85025 COMPLETE CBC W/AUTO DIFF WBC: CPT

## 2020-11-23 PROCEDURE — 80053 COMPREHEN METABOLIC PANEL: CPT

## 2020-11-23 PROCEDURE — 36591 DRAW BLOOD OFF VENOUS DEVICE: CPT

## 2020-11-23 RX ORDER — SODIUM CHLORIDE 0.9 % (FLUSH) 0.9 %
10 SYRINGE (ML) INJECTION AS NEEDED
Status: CANCELLED | OUTPATIENT
Start: 2020-11-23

## 2020-11-23 RX ORDER — HEPARIN SODIUM (PORCINE) LOCK FLUSH IV SOLN 100 UNIT/ML 100 UNIT/ML
500 SOLUTION INTRAVENOUS AS NEEDED
Status: CANCELLED | OUTPATIENT
Start: 2020-11-23

## 2020-11-23 RX ORDER — SODIUM CHLORIDE 0.9 % (FLUSH) 0.9 %
10 SYRINGE (ML) INJECTION AS NEEDED
Status: DISCONTINUED | OUTPATIENT
Start: 2020-11-23 | End: 2020-11-23 | Stop reason: HOSPADM

## 2020-11-23 RX ORDER — HEPARIN SODIUM (PORCINE) LOCK FLUSH IV SOLN 100 UNIT/ML 100 UNIT/ML
500 SOLUTION INTRAVENOUS AS NEEDED
Status: DISCONTINUED | OUTPATIENT
Start: 2020-11-23 | End: 2020-11-23 | Stop reason: HOSPADM

## 2020-11-23 RX ADMIN — SODIUM CHLORIDE, PRESERVATIVE FREE 10 ML: 5 INJECTION INTRAVENOUS at 10:47

## 2020-11-23 RX ADMIN — Medication 500 UNITS: at 10:47

## 2020-11-23 NOTE — PROGRESS NOTES
Subjective       REASON FOR VISIT:    1. Chronic lymphoid leukemia, stage 4, presented initially with significant pancytopenia    2.  History of angiodysplasia requiring cauterization and history of Hess's esophagus    3.  Bone marrow aspiration biopsy shows hypercellular marrow with 98% involvement with CLL    Patient ID: Macho Stephenson is a 70 y.o. year old male     History of Present Illness Mr. Stephenson is a 70 year old male with the above mentioned history here today for follow-up.  He has been receiving treatment with Gazyva and venetoclax.  The patient was here last week, his platelet count had dropped and we advised him to hold his venetoclax.  He denies any bleeding issues.  He denies fevers or chills.  He does struggle significantly with his appetite.  The patient is drinking 1 Ensure per day.    Denies issues with nausea or vomiting.  He does have some occasional constipation for which he uses stool softener.  No diarrhea.    Patient also reports being intermittently dizzy or lightheaded.  Denies syncopal episodes or falls.  He is on 2 different blood pressure medications.  He does not check blood pressures at home typically.    PAST MEDICAL HISTORY:   1. Recurrent atrial fibrillation followed by cardiology. He has had drug eluting stent placed x 3.   2. High cholesterol.   3. Hypertension.       HEMATOLOGIC/ONCOLOGIC HISTORY:       The patient is 62-year-old gentleman with the above history currently here for followup. He has no complaints. He denies fever, night sweats or weight loss. He does not have any lymphadenopathy. He feels good. He had some fatigue but his CBC shows a go od hemoglobin.   The patient is followed by Dr. Kevin Chandra. He had seen Dr. Chandra 7/18/13 for a history of coronary artery disease status drug eluting stent placement to the right coronary artery and left anterior descending artery in February 2011. Histor y of paroxysmal atrial fibrillation and hyperlipidemia. He  presented to [facility] on 6/23/13 with palpitations and was found to have atrial fibrillation with rapid ventricular response. He was given IV Cardizem and converted spontaneously to normal sinus rhythm. He was found to have elevated white count at 18.9 and denied any symptoms of infection or urinary complaints. TSH was normal, troponin levels were negative. He was asked to continue aspirin and metoprolol for that. If he contin u ed to have atrial fibrillation, they will consider antiarrhythmic therapy with or without a short term anticoagulation therapy. However, currently the patient is feeling reasonably good. He has no complaints. His CBC 7/22/13 showed WBC 17,000, hemoglob i n 16.4 and platelet count of 174,000. Back 9/28/13 his hemoglobin was 15.1, WBC 13.3 and platelets 197,000. He has had a persistently elevated WBC but he is totally asymptomatic. He does not have any fever, night sweats or lymphadenopathy. He is here to be evaluated for his elevated white count.       Peripheral blood flow cytometry done 08/16/13 shows 22% chronic lymphoid leukemia cells, and they expressed ZAP-70 but not CD38. The total number of cells approximated 60% T cells, 32% B cells and 1% natural k iller cells. The B cells were monoclonal and expressed CD19, CD20, CD22, CD5, CD23, CD11c, ZAP-70 and T cell antigens. There was a normal CD4/CD8 ratio. CT of the chest, abdomen and pelvis does not show evidence of metastasis. Peripheral blood for DILLON -2 was negative. It was negative for T11:14 translocation.       CT scan done on 08/21/2013 shows 5 to 6 mm noncalcified nodule in the left lower lobe which is unchanged from December 2010. Otherwise no evidence of any lymphadenopathy or hepatosplenomegaly.       MRI of the spine shows arthritis and disc bulge and likely hemangiomas, with 1 lesion showing increased signal intensity at T2, which is likely a hemangioma. Bone scan could be done, but patient does not even have  much pain there. CT scan of the chest, a bdomen and pelvis was done on 11/23/2014. He has tiny lymph nodes in the neck which are difficult to palpate. Right jugular one is 1.4 x 0.9 and left supraclavicular one is 1.5 x 1.1. He also has a subcarinal lymph node which is 1.7 x 1.2 cm, and he ha s a portocaval lymph node which has increased from 2.5 to 2.8. Patient is totally asymptomatic. He does not have any B symptoms, so will continue followup with observation.           Patient is a 69-year-old gentleman with history of chronic lymphocytic leukemia/SLL who recently got admitted to [facility] because of GI bleed.  He was seen by Dr. Montero.  He underwent EGD colonoscopy.  He was found to have angiodysplasia for which he underwent argon coagulation.  He required 3 units of blood.  Patient had a normal esophagus normal stomach and normal duodenum CLOtest was negative he was asked to take Protonix 40 mg daily.  Colonoscopy showed blood in the entire examined colon but there was a single bleeding colonic angiectasia which was treated with argon plasma coagulation.    He was admitted twice.  Initially he was admitted on July 10 at which time he stayed 3 days and he was evaluated for chest pain.  He underwent a cardiac work-up with stress test and echo.  The echo was normal but the stress test showed medium sized moderate severe severity fixed perfusion defect.  Of the inferior wall consistent with infarction.  However according to patient cardiology did not think he had any cardiac problems.  I have left a message for patient's cardiologist to call me today.  Patient subsequently got readmitted with GI bleed and required 1/3 unit of transfusion.  He was found to have thrombocytopenia and hematology was consulted.    His hemoglobin had dropped down to as low as 7 and his platelets had gone down to 87.  Today it is 35 and his hemoglobin is 9.4 today.  He denies active bleeding.  He has lost weight recently.  He does  not have any fever or night sweats.    He had ultrasound of spleen which showed enlarged spleen centimeters in length    Patient was started on Gazyva with Leukeran but subsequently switched to venetoclax with Gazyva.  His venetoclax dose is 100 mg daily and he receives Gazyva once a month.    CT scan done 2020 shows significant response     MEDICATIONS: The current medication list was reviewed with the patient and updated in the EMR this date per the medical assistant. Medication dosages and frequencies were confirmed to be accurate.       ALLERGIES: PENICILLIN which causes him to swell up. He is allergic to IV CONTRAST DYE.     SOCIAL HISTORY: He is . He smokes one pack per day for 35 years. He consumes three to four alcoholic drinks per week. He has no tattoos and no previous transfusions.       FAMILY HISTORY: Father  at 82 with MI. Mother  at 82 with bone cancer. He has one brother age 65 in good health and two sisters 69 and 57 in good health.   Past Medical History, Social History, and Family History are unchanged from my prior documentation on     Review of Systems   Constitutional: Positive for appetite change and fatigue. Negative for activity change, chills and fever.   HENT: Negative for mouth sores, nosebleeds and trouble swallowing.    Respiratory: Negative for cough and shortness of breath.    Cardiovascular: Negative for chest pain and leg swelling.   Gastrointestinal: Positive for constipation. Negative for abdominal pain, diarrhea, nausea and vomiting.   Genitourinary: Negative for difficulty urinating.   Skin: Negative for rash.   Neurological: Positive for light-headedness. Negative for dizziness, weakness and numbness.   Hematological: Negative for adenopathy. Does not bruise/bleed easily.   Psychiatric/Behavioral: Negative for sleep disturbance.         Patient Active Problem List   Diagnosis   • CLL (chronic lymphocytic leukemia) (CMS/[facility])   • Anemia    • Encounter for hepatitis C screening test for low risk patient    • Thrombocytopenia (CMS/HCC)   • H/O heart artery stent   • Stented coronary artery   • Arteriosclerosis of coronary artery   • Atrial fibrillation (CMS/HCC)   • Paroxysmal atrial fibrillation (CMS/HCC)   • Chest pain, rule out acute myocardial infarction   • Fall   • Fracture, olecranon   • Hyperlipidemia   • Long term (current) use of anticoagulants   • Lower GI bleed   • Olecranon bursitis   • Shoulder pain   • Smoker   • CLL (chronic lymphocytic leukemia) (CMS/HCC)   • Dehydration   • Drug-induced nausea and vomiting   • Encounter for long-term (current) use of other medications   • Neutropenia (CMS/HCC)         MEDICATIONS:  Medication Reconciliation for the patient has been reviewed by me and documented in the patients chart.    ALLERGIES:    Allergies   Allergen Reactions   • Contrast Dye Swelling   • Penicillins Swelling         Vitals:    11/23/20 1112   Pulse: 59   Resp: 16   Temp: 97.1 °F (36.2 °C)   SpO2: 98%        Current Status 11/9/2020   ECOG score 0        Physical Exam  Vitals signs reviewed.   Constitutional:       General: He is not in acute distress.     Appearance: Normal appearance. He is well-developed.   HENT:      Head: Normocephalic and atraumatic.      Mouth/Throat:      Pharynx: No oropharyngeal exudate.   Eyes:      Pupils: Pupils are equal, round, and reactive to light.   Neck:      Musculoskeletal: Normal range of motion.   Cardiovascular:      Rate and Rhythm: Normal rate and regular rhythm.      Heart sounds: Normal heart sounds. No murmur.   Pulmonary:      Effort: Pulmonary effort is normal. No respiratory distress.      Breath sounds: Normal breath sounds. No wheezing, rhonchi or rales.   Abdominal:      General: Bowel sounds are normal. There is no distension.      Palpations: Abdomen is soft.   Musculoskeletal: Normal range of motion.   Skin:     General: Skin is warm and dry.      Findings: No rash.    Neurological:      Mental Status: He is alert and oriented to person, place, and time.         RECENT LABS:  Results from last 7 days   Lab Units 11/23/20  1047 11/17/20  1236   WBC 10*3/mm3 6.38 16.09*   NEUTROS ABS 10*3/mm3 5.19 13.65*   HEMOGLOBIN g/dL 12.4* 13.5   HEMATOCRIT % 38.8 40.3   PLATELETS 10*3/mm3 121* 35*               CT NECK, CHEST, ABDOMEN, AND PELVIS WITHOUT IV CONTRAST 11/04/20    IMPRESSION:  1. Some of the lymphadenopathy has resolved and the remaining  lymphadenopathy is significantly smaller than previously.   2. Splenic size has decreased as well. There is no new abnormality.         STAT NONCONTRAST HEAD CT 08/20/2020  IMPRESSION:  1. No acute abnormality is seen with no significant change when compared  to prior noncontrast head CT from [facility] 03/03/2015.  2. There is mild small vessel disease in the frontal periventricular  white matter and there are calcified atherosclerotic plaques in the  cavernous internal carotid arteries bilaterally.  3. Patient has had previous paranasal sinus surgery with bilateral  uncinectomies and ethmoidectomies and the paranasal sinuses are  currently clear. The remainder of the head CT is within normal limits,  specifically no acute intracranial hemorrhage is seen. Etiology of  headaches in this patient with history of leukemia and thrombocytopenia  is not established on this exam. Results were communicated to Susannah Moya, the oncologist taking care of the patient by telephone on  08/20/2020 at 9:20 AM.      XR CHEST POST CVA PORT-07/31/20    IMPRESSION:  Chest port placement. No pneumothorax.       CT NECK, CHEST, ABDOMEN, AND PELVIS WITHOUT IV CONTRAST. 7/20/2020   HISTORY: 69-year-old male with CLL/SLL.     TECHNIQUE: Radiation dose reduction techniques were utilized, including  automated exposure control and exposure modulation based on body size. 3  mm images were obtained through the neck, chest, abdomen, and pelvis  without  the administration of IV contrast. Compared with CT of the  abdomen and pelvis from 08/05/2019 and previous CTs from 03/19/2018.     FINDINGS:  NECK: There is no significant change in the 1.4 x 1.0 cm left  supraclavicular node or in the smaller supraclavicular nodes  bilaterally. A reference 1.4 x 0.7 cm right jugular chain node is  stable. There are shotty nodes scattered throughout the neck. No new  lymphadenopathy is seen. Noncontrasted parotid, submandibular, and  thyroid glands appear unremarkable.     CHEST: There has been interval decrease in the size of the shotty and  borderline enlarged nodes at the axilla. Reference 1.6 x 0.7 cm right  axillary node previously measured 2.0 x 1.2 cm. There has been a  decrease in the size of all of the shotty mediastinal and hilar nodes.  There are no new pulmonary opacities and there are no pleural or  pericardial effusions.     ABDOMEN/PELVIS: Splenic size has slightly increased measuring 15.4 cm in  diameter and previously measuring 13.5 cm, both measured on the coronal  reconstruction series. There is no change in the shotty nodes at the  gastrohepatic ligament and shorty hepatis. There are no pathologically  enlarged nodes. There is no acute abnormality involving the  noncontrasted liver. A single gallstone is noted within the gallbladder.  Noncontrasted pancreas, adrenals, and kidneys appear unremarkable. There  is no acute bowel abnormality. There is extensive sigmoid  diverticulosis. No free fluid. There are extensive abdominal aortic  atherosclerotic changes and there is a stable 3.7 cm infrarenal  abdominal aortic aneurysm.     IMPRESSION:  1. There has been slight interval increase in splenomegaly since the CT  of the abdomen from 08/05/2019.  2. Interval decrease in the size of the axillary nodes and the shotty  mediastinal nodes. There is no change in the shotty nodes within the  neck and supraclavicular regions. There is no new lymphadenopathy.  3. Stable 3.7  cm infrarenal abdominal aortic aneurysm.      Assessment/Plan   1. Stage 2 CLL without evidence of any disease progression.  I have reviewed the CT scan from 3/19/2018 there is minimal progression but clinically patient is asymptomatic and we will follow with observation.  Will monitor with labs.   No evidence of any frequent infections, no major worsening of his white count. He has no fever or night sweats or any other symptoms.   · Patient knows that he is prone for infections and he is mainly staying at home during the COVID-19 situation time  · Recent admission to [facility] July 10 2020×2.  Initially admitted with chest pain and underwent stress test and echo.  Echo was negative.  Stress test is abnormal but have left message for patient's cardiologist to call us.  His cardiologist is been sent [facility].  · He then got admitted the second time with worsening GI bleed and required total of 3 units of blood.  EGD was negative but colonoscopy showed angiectasia for which he underwent argon ablation.  Currently hemoglobin stable and no active bleeding.  · He was also found to have low platelets with platelets dropping down to 27k and hemoglobin was 7.  Ultrasound showed splenomegaly 15 cm.  Concern is whether he has progressed from his CLL point of view and requires treatment.  · CT scan performed 7/20/2020 did show the increased splenomegaly, but interval decrease in size of the axillary nodes, and shotty mediastinal nodes.  No change in the shotty nodes within the neck and supraclavicular regions.  No new lymphadenopathy.  Bone marrow biopsy however showed preliminarily that there is bone marrow involvement.  Remainder of bone marrow biopsy report is pending.    · Bone marrow shows hypercellular marrow with 100% involvement of chronic lymphocytic leukemia/small lymphocytic lymphoma and diffuse pattern with at least 98% of the total marrow cellularity.  Rare foci of erythropoiesis and rare megakaryocytes are  "noted.     · Negative for BCL-2 and BCL 6.  Chromosomes shows normal karyotype.  I GVH mutation present.  Positive for gain of 1 q., monosomy 13 and loss of IGH.  Negative for deletion T p53, negative for gain of chromosomes 9 and 11, negative for 11, 14 fusion.    · The combination of monosomy 13 and gain of 1 q. is thought to be associated with plasma cell myeloma.  However this patient does not have myeloma.    · Scans were reviewed with the patient today.  Dr. Moya also discussed with the patient and recommended he initiate treatment with chlorambucil and Gazyva.  He would not be a good candidate for Imbruvica due to his history of A. fib\".  He cannot take anticoagulation at this time.  The patient was provided with chemotherapy education today, including  Handouts.  He will need a port placed so that we can initiate treatment  · 8/13/2020: Gazyva day 1. Plan also [id number] mg p.o. on day 1 day 15.  We can increase the dose once we know he tolerates and his platelets improved.  · Patient developing pancytopenia when reviewed cycle 1 day 15.  Chlorambucil dose modified to 10 mg for day 15 however it is felt that he cannot tolerate this ongoing.   ·  Plan to switch to Venetoclax along with continuing Gazyva.    · Patient due today for cycle 2-day 1 Gazyva.  He will proceed today as scheduled.  Venetoclax will be shipped to his home with plans to begin this next week on 9/14/2020.    · Patient did receive 1 L normal saline and Neulasta on 9/28/2020 secondary to neutropenia with an ANC of 0.04.  · Patient seen on 10/02/2020 continuing on venetoclax, currently on 100 mg dosing.  He would not increase his dose as he will start on voriconazole after being on venetoclax x1 week which affects the metabolism of venetoclax.  He is currently on day 5 of the 100 mg dosing.  Patient states overall he feels the same except for increased fatigue and nausea.  His nauseousness is controlled with ondansetron however.  " Patient will be given 1 L normal saline in the office today as planned.  · Patient returns on 10/12/2020 continuing on venetoclax 100 mg daily as well as voriconazole.  · Patient is doing well with these treatments except fatigue.  Next cycle 4 due as of number ninth prior to which will obtain CT scans  · November 9, 2020: White count down to 1.89 but patient started Pepcid.  We will hold off Pepcid.  · 11/17/2020 platelet count dropped to 35,000 patient was instructed to hold venetoclax.  · Returns 11/23/2020 WBC up to 6.38, ANC 5.19, hemoglobin 12.4, platelets up to 121.  I have advised him to resume venetoclax 100 mg daily.       2.  Worsening thrombocytopenia, looking back his platelets were always in the 150s and during this admission he dropped down to 27K. His LDH also was elevated and he was anemic.  · It was dropping on chlorambucil.  Plans to change therapy as detailed above.  Patient did require transfusion and platelets are currently improved to 55,000.  · 11/17/2020 platelets dropped 35,000.  Venetoclax was temporarily held.  · 11/23/2020 platelets back up to 121 venetoclax resumed.    3.  History of angiodysplasia requiring cauterization and Hess's esophagus mild anemia. He is currently asymptomatic.  · No evidence of active bleeding.  Stable    4.  History of cluster headaches for which she has to be treated with steroids in the past and has followed up with Dr. Len Péreztoday with significant headaches.  · Patient had CT of the head 8/20/2020 which was negative.  · He was started on prednisone 10 mg daily which was not helpful.  · Patient saw Dr. Bobby, neurology who gave him 2 shots of a new medication Emgality.  This is something that can be given once a month.    · Headaches improved.  Resolved    5.  Neutropenia without fever.Cipro 500 mg twice daily was started.  Patient was given Neulasta on 9/28/2020.  Cipro can be held today because his counts have improved.  · Significant drop in his  white count to 1.89 but ANC 1.15 as of November 9, 2020  · 11/23/2020 WBC 6.38, ANC 5.19.  We will continue to closely monitor.    6.  Restless leg syndrome: Iron studies checked 10/22/2020 ferritin 338.5, iron saturation 16%.    7.  Episodes of being lightheaded: Patient reports worse when changing position.  He related to possible inner ear problems.  We also discussed that could be related to changes in blood pressure.  We discussed making sure he is drinking plenty of fluids and occasionally monitoring blood pressure at home, as he is on 2 medications that could affect his blood pressure.        PLAN:  1. Resume venetoclax 100 mg daily.  2. Continue voriconazole.  3. We discussed increasing oral intake, which the patient would like to try prior to starting on any new medication.    4. Return in 2 weeks for follow-up visit with Dr. Moya for reevaluation prior to his next cycle of Gazyva.    5. Patient has been advised to intermittently monitor blood pressure at home to make sure that he is not having too low blood pressure since he has had some weight loss.          MARIAH Valencia          Cc: Dr. Kevin Chandra

## 2020-11-24 ENCOUNTER — MEDICATION THERAPY MANAGEMENT (OUTPATIENT)
Dept: ONCOLOGY | Facility: HOSPITAL | Age: 70
End: 2020-11-24

## 2020-11-24 NOTE — PROGRESS NOTES
[facility] lab review ( venetoclax+gazyva)        11/23/2020   WBC 3.40 - 10.80 10*3/mm3 6.38   Neutrophils Absolute 1.70 - 7.00 10*3/mm3 5.19   Hemoglobin 13.0 - 17.7 g/dL 12.4 (A)   Hematocrit 37.5 - 51.0 % 38.8   Platelets 140 - 450 10*3/mm3 121 (A)   Creatinine 0.70 - 1.30 mg/dL 0.83   eGFR Non African Am >60 mL/min/1.73 91   BUN 6 - 20 mg/dL 18   Sodium 134 - 145 mmol/L 138   Potassium 3.5 - 4.7 mmol/L 4.4   Glucose 74 - 124 mg/dL 149 (A)   Calcium 8.5 - 10.2 mg/dL 9.1   Albumin 3.50 - 5.20 g/dL 4.10   Total Protein 6.3 - 8.0 g/dL 6.4   AST (SGOT) 0 - 40 U/L 23   ALT (SGPT) 0 - 41 U/L 14   Alkaline Phosphatase 38 - 116 U/L 249 (A)   Total Bilirubin 0.2 - 1.2 mg/dL 0.4     APRN dictation is noted.  Resume Venetoclax 100 mg po daily as platelets have recovered.

## 2020-11-30 ENCOUNTER — OFFICE VISIT (OUTPATIENT)
Dept: FAMILY MEDICINE CLINIC | Facility: CLINIC | Age: 70
End: 2020-11-30

## 2020-11-30 VITALS
BODY MASS INDEX: 20.81 KG/M2 | OXYGEN SATURATION: 99 % | RESPIRATION RATE: 18 BRPM | SYSTOLIC BLOOD PRESSURE: 132 MMHG | HEART RATE: 82 BPM | HEIGHT: 73 IN | TEMPERATURE: 97.5 F | WEIGHT: 157 LBS | DIASTOLIC BLOOD PRESSURE: 70 MMHG

## 2020-11-30 DIAGNOSIS — E78.5 HYPERLIPIDEMIA, UNSPECIFIED HYPERLIPIDEMIA TYPE: ICD-10-CM

## 2020-11-30 DIAGNOSIS — C91.10 CLL (CHRONIC LYMPHOCYTIC LEUKEMIA) (HCC): ICD-10-CM

## 2020-11-30 DIAGNOSIS — Z00.00 MEDICARE ANNUAL WELLNESS VISIT, SUBSEQUENT: Primary | ICD-10-CM

## 2020-11-30 DIAGNOSIS — H92.01 ACUTE OTALGIA, RIGHT: ICD-10-CM

## 2020-11-30 LAB
CHOLEST SERPL-MCNC: 79 MG/DL (ref 0–200)
HDLC SERPL-MCNC: 24 MG/DL (ref 40–60)
LDLC SERPL CALC-MCNC: 30 MG/DL (ref 0–100)
LDLC/HDLC SERPL: 1.13 {RATIO}
TRIGL SERPL-MCNC: 140 MG/DL (ref 0–150)
VLDLC SERPL-MCNC: 25 MG/DL (ref 5–40)

## 2020-11-30 PROCEDURE — 80061 LIPID PANEL: CPT | Performed by: FAMILY MEDICINE

## 2020-11-30 PROCEDURE — G0439 PPPS, SUBSEQ VISIT: HCPCS | Performed by: FAMILY MEDICINE

## 2020-11-30 PROCEDURE — 36415 COLL VENOUS BLD VENIPUNCTURE: CPT | Performed by: FAMILY MEDICINE

## 2020-11-30 NOTE — PROGRESS NOTES
"SUBJECTIVE:  The patient is a 70-year-old white male.  He will receive a Medicare wellness exam today.  He has hyperlipidemia.  He has CLL.  Chemotherapy is causing him some nausea and making him tired.  Otherwise he has no specific complaints.  His right ear was hurting him when he made the appointment however this has improved.    PAST MEDICAL HISTORY:  Reviewed.    REVIEW OF SYSTEMS:  Please see above.  All others reviewed and are negative.      OBJECTIVE:   /70 (BP Location: Right arm, Patient Position: Sitting)   Pulse 82   Temp 97.5 °F (36.4 °C) (Infrared)   Resp 18   Ht 185.2 cm (72.9\")   Wt 71.2 kg (157 lb)   SpO2 99%   BMI 20.77 kg/m²    Vitals signs are reviewed and are stable.    General:  Well-nourished.  Alert and oriented x3 in no acute distress.  HEENT: PERRLA.   Neck:  Supple.   Lungs:  Clear.    Heart:  Regular rate and rhythm.   Abdomen:   Soft, nontender.   Extremities:  No cyanosis, clubbing or edema.   Neurological:  Grossly intact without motor or sensory deficits.     ASSESSMENT:      Diagnoses and all orders for this visit:    1. Medicare annual wellness visit, subsequent (Primary)  -     Lipid Panel    2. Acute otalgia, right  -     Lipid Panel    3. Hyperlipidemia, unspecified hyperlipidemia type  -     Lipid Panel    4. CLL (chronic lymphocytic leukemia) (CMS/[facility])  -     Lipid Panel         PLAN: See above.  Patient advised to take over-the-counter antihistamine should his ear bother him again.  We will follow-up on labs.  Healthy lifestyle discussed.  Call if problems.    Dictated utilizing Dragon dictation.    "

## 2020-11-30 NOTE — PROGRESS NOTES
The ABCs of the Annual Wellness Visit  Subsequent Medicare Wellness Visit    Chief Complaint   Patient presents with   • Medicare Wellness-subsequent   • earpain     right side painful       Subjective   History of Present Illness:  Macho Stephenson is a 70 y.o. male who presents for a Subsequent Medicare Wellness Visit.    HEALTH RISK ASSESSMENT    Recent Hospitalizations:  No hospitalization(s) within the last year.    Current Medical Providers:  Patient Care Team:  Kevin Chandra MD as PCP - General  Susannah Moya MD as Consulting Physician (Hematology and Oncology)  Kevin Chandra MD as Referring Physician (Family Medicine)  Robert Rodriguez MD as Consulting Physician (Interventional Cardiology)  Leonardo Wyman MD as Consulting Physician (Vascular Surgery)  Bernabe Ulloa MD as Consulting Physician (Gastroenterology)  Len Walker DO as Consulting Physician (Neurology)    Smoking Status:  Social History     Tobacco Use   Smoking Status Former Smoker   • Packs/day: 0.50   • Years: 40.00   • Pack years: 20.00   • Types: Cigarettes   • Quit date: 7/10/2020   • Years since quittin.3   Smokeless Tobacco Never Used       Alcohol Consumption:  Social History     Substance and Sexual Activity   Alcohol Use Yes   • Alcohol/week: 2.0 standard drinks   • Types: 2 Cans of beer per week       Depression Screen:   PHQ-2/PHQ-9 Depression Screening 2020   Little interest or pleasure in doing things 0   Feeling down, depressed, or hopeless 0   Trouble falling or staying asleep, or sleeping too much 1   Feeling tired or having little energy 3   Poor appetite or overeating 0   Feeling bad about yourself - or that you are a failure or have let yourself or your family down 0   Trouble concentrating on things, such as reading the newspaper or watching television 0   Moving or speaking so slowly that other people could have noticed. Or the opposite - being so fidgety or restless that you have been moving  around a lot more than usual 0   Thoughts that you would be better off dead, or of hurting yourself in some way 0   Total Score 4   If you checked off any problems, how difficult have these problems made it for you to do your work, take care of things at home, or get along with other people? Not difficult at all       Fall Risk Screen:  CORIN Fall Risk Assessment has not been completed.    Health Habits and Functional and Cognitive Screening:  Functional & Cognitive Status 11/30/2020   Do you have difficulty preparing food and eating? No   Do you have difficulty bathing yourself, getting dressed or grooming yourself? No   Do you have difficulty using the toilet? No   Do you have difficulty moving around from place to place? No   Do you have trouble with steps or getting out of a bed or a chair? No   Current Diet Well Balanced Diet   Dental Exam Up to date   Eye Exam Up to date   Exercise (times per week) 5 times per week   Current Exercises Include Walking   Current Exercise Activities Include -   Do you need help using the phone?  No   Are you deaf or do you have serious difficulty hearing?  No   Do you need help with transportation? No   Do you need help shopping? No   Do you need help preparing meals?  No   Do you need help with housework?  No   Do you need help with laundry? No   Do you need help taking your medications? No   Do you need help managing money? No   Do you ever drive or ride in a car without wearing a seat belt? No   Have you felt unusual stress, anger or loneliness in the last month? No   Who do you live with? Spouse   If you need help, do you have trouble finding someone available to you? No   Have you been bothered in the last four weeks by sexual problems? No   Do you have difficulty concentrating, remembering or making decisions? No         Does the patient have evidence of cognitive impairment? No    Asprin use counseling:Does not need ASA (and currently is not on it)    Age-appropriate  Screening Schedule:  Refer to the list below for future screening recommendations based on patient's age, sex and/or medical conditions. Orders for these recommended tests are listed in the plan section. The patient has been provided with a written plan.    Health Maintenance   Topic Date Due   • TDAP/TD VACCINES (1 - Tdap) 09/[birth year]/1968   • ZOSTER VACCINE (1 of 2) 09/05/1999   • INFLUENZA VACCINE  08/01/2020   • LIPID PANEL  11/05/2020   • COLONOSCOPY  07/12/2030          The following portions of the patient's history were reviewed and updated as appropriate: past family history and past social history.    Outpatient Medications Prior to Visit   Medication Sig Dispense Refill   • acetaminophen (TYLENOL) 325 MG tablet Take 650 mg by mouth Every 6 (Six) Hours As Needed for Mild Pain .     • acyclovir (ZOVIRAX) 400 MG tablet Take 1 tablet by mouth 2 (Two) Times a Day. 60 tablet 5   • allopurinol (ZYLOPRIM) 300 MG tablet TAKE 1 TABLET BY MOUTH DAILY. 90 tablet 1   • atorvastatin (LIPITOR) 20 MG tablet TAKE 1 TABLET EVERY NIGHT 90 tablet 1   • calcium carbonate (TUMS) 500 MG chewable tablet Chew 1 tablet As Needed for Indigestion or Heartburn.     • famotidine (PEPCID) 20 MG tablet Take 1 tablet by mouth 2 (Two) Times a Day. 60 tablet 2   • galcanezumab-gnlm (Emgality) 120 MG/ML prefilled syringe Inject 240 mg under the skin into the appropriate area as directed Every 30 (Thirty) Days.     • K Phos [place]-Sod Phos Di & Mono (K-Phos-Neutral) [phone] MG tablet Take 1 tablet by mouth 3 (Three) Times a Day. 90 each 1   • lisinopril (PRINIVIL,ZESTRIL) 10 MG tablet Take 10 mg by mouth Every Night.  0   • nitroglycerin (NITROSTAT) 0.4 MG SL tablet Place 0.4 mg under the tongue every 5 (five) minutes as needed for chest pain. Take no more than 3 doses in 15 minutes.     • ondansetron (Zofran) 4 MG tablet Take 1 tablet by mouth Every 6 (Six) Hours As Needed for Nausea or Vomiting for up to 10 doses. 10 tablet 1   • predniSONE  (DELTASONE) 10 MG tablet Take 1 tablet by mouth Daily. 15 tablet 1   • prochlorperazine (COMPAZINE) 10 MG tablet Take 1 tablet by mouth Every 6 (Six) Hours As Needed for Nausea or Vomiting. 30 tablet 6   • promethazine (PHENERGAN) 25 MG tablet Take 1 tablet by mouth Every 6 (Six) Hours As Needed for Nausea or Vomiting for up to 60 doses. 60 tablet 1   • sotalol (BETAPACE) 80 MG tablet Take 80 mg by mouth 2 (Two) Times a Day.     • Venetoclax 100 MG tablet Take 1 tablet by mouth Daily. 30 tablet 6   • voriconazole (VFEND) 200 MG tablet Take 1 tablet by mouth 2 (Two) Times a Day. (Patient taking differently: Take 200 mg by mouth 2 (Two) Times a Day. On hold) 60 tablet 3     Facility-Administered Medications Prior to Visit   Medication Dose Route Frequency Provider Last Rate Last Admin   • heparin injection 500 Units  500 Units Intracatheter PRN Susannah Moya MD   500 Units at 08/08/20 1353   • heparin injection 500 Units  500 Units Intracatheter PRN Susannah Moya MD   500 Units at 11/23/20 1047       Patient Active Problem List   Diagnosis   • CLL (chronic lymphocytic leukemia) (CMS/HCC)   • Anemia   • Encounter for hepatitis C screening test for low risk patient    • Thrombocytopenia (CMS/HCC)   • H/O heart artery stent   • Stented coronary artery   • Arteriosclerosis of coronary artery   • Atrial fibrillation (CMS/HCC)   • Paroxysmal atrial fibrillation (CMS/HCC)   • Chest pain, rule out acute myocardial infarction   • Fall   • Fracture, olecranon   • Hyperlipidemia   • Long term (current) use of anticoagulants   • Lower GI bleed   • Olecranon bursitis   • Shoulder pain   • Smoker   • CLL (chronic lymphocytic leukemia) (CMS/HCC)   • Dehydration   • Drug-induced nausea and vomiting   • Encounter for long-term (current) use of other medications   • Neutropenia (CMS/HCC)       Advanced Care Planning:  ACP discussion was held with the patient during this visit. Patient does not have an advance directive,  "information provided.    Review of Systems    Compared to one year ago, the patient feels his physical health is worse.  Compared to one year ago, the patient feels his mental health is the same.    Reviewed chart for potential of high risk medication in the elderly: yes  Reviewed chart for potential of harmful drug interactions in the elderly:yes    Objective         Vitals:    11/30/20 0815   Height: 185.2 cm (72.9\")       Body mass index is 20.33 kg/m².  Discussed the patient's BMI with him. The BMI is in the acceptable range.    Physical Exam          Assessment/Plan   Medicare Risks and Personalized Health Plan  CMS Preventative Services Quick Reference  Advance Directive Discussion  Fall Risk  Immunizations Discussed/Encouraged (specific immunizations; Influenza )    The above risks/problems have been discussed with the patient.  Pertinent information has been shared with the patient in the After Visit Summary.  Follow up plans and orders are seen below in the Assessment/Plan Section.    There are no diagnoses linked to this encounter.  Follow Up:  No follow-ups on file.     An After Visit Summary and PPPS were given to the patient.             "

## 2020-12-07 ENCOUNTER — OFFICE VISIT (OUTPATIENT)
Dept: ONCOLOGY | Facility: CLINIC | Age: 70
End: 2020-12-07

## 2020-12-07 ENCOUNTER — INFUSION (OUTPATIENT)
Dept: ONCOLOGY | Facility: HOSPITAL | Age: 70
End: 2020-12-07

## 2020-12-07 VITALS
HEIGHT: 73 IN | SYSTOLIC BLOOD PRESSURE: 140 MMHG | WEIGHT: 156.3 LBS | HEART RATE: 56 BPM | RESPIRATION RATE: 16 BRPM | TEMPERATURE: 98.9 F | DIASTOLIC BLOOD PRESSURE: 71 MMHG | OXYGEN SATURATION: 99 % | BODY MASS INDEX: 20.72 KG/M2

## 2020-12-07 DIAGNOSIS — C91.10 CLL (CHRONIC LYMPHOCYTIC LEUKEMIA) (HCC): ICD-10-CM

## 2020-12-07 DIAGNOSIS — C91.10 CLL (CHRONIC LYMPHOCYTIC LEUKEMIA) (HCC): Primary | ICD-10-CM

## 2020-12-07 DIAGNOSIS — I48.20 CHRONIC ATRIAL FIBRILLATION (HCC): ICD-10-CM

## 2020-12-07 DIAGNOSIS — D69.6 THROMBOCYTOPENIA (HCC): ICD-10-CM

## 2020-12-07 LAB
ALBUMIN SERPL-MCNC: 4.1 G/DL (ref 3.5–5.2)
ALBUMIN/GLOB SERPL: 2.1 G/DL (ref 1.1–2.4)
ALP SERPL-CCNC: 249 U/L (ref 38–116)
ALT SERPL W P-5'-P-CCNC: 15 U/L (ref 0–41)
ANION GAP SERPL CALCULATED.3IONS-SCNC: 9.6 MMOL/L (ref 5–15)
AST SERPL-CCNC: 22 U/L (ref 0–40)
BASOPHILS # BLD AUTO: 0.01 10*3/MM3 (ref 0–0.2)
BASOPHILS NFR BLD AUTO: 0.3 % (ref 0–1.5)
BILIRUB SERPL-MCNC: 0.5 MG/DL (ref 0.2–1.2)
BUN SERPL-MCNC: 12 MG/DL (ref 6–20)
BUN/CREAT SERPL: 16.7 (ref 7.3–30)
CALCIUM SPEC-SCNC: 9.1 MG/DL (ref 8.5–10.2)
CHLORIDE SERPL-SCNC: 103 MMOL/L (ref 98–107)
CO2 SERPL-SCNC: 24.4 MMOL/L (ref 22–29)
CREAT SERPL-MCNC: 0.72 MG/DL (ref 0.7–1.3)
DEPRECATED RDW RBC AUTO: 51.9 FL (ref 37–54)
EOSINOPHIL # BLD AUTO: 0 10*3/MM3 (ref 0–0.4)
EOSINOPHIL NFR BLD AUTO: 0 % (ref 0.3–6.2)
ERYTHROCYTE [DISTWIDTH] IN BLOOD BY AUTOMATED COUNT: 14.6 % (ref 12.3–15.4)
GFR SERPL CREATININE-BSD FRML MDRD: 108 ML/MIN/1.73
GLOBULIN UR ELPH-MCNC: 2 GM/DL (ref 1.8–3.5)
GLUCOSE SERPL-MCNC: 225 MG/DL (ref 74–124)
HCT VFR BLD AUTO: 41.6 % (ref 37.5–51)
HGB BLD-MCNC: 13.6 G/DL (ref 13–17.7)
IMM GRANULOCYTES # BLD AUTO: 0.03 10*3/MM3 (ref 0–0.05)
IMM GRANULOCYTES NFR BLD AUTO: 1 % (ref 0–0.5)
LYMPHOCYTES # BLD AUTO: 0.61 10*3/MM3 (ref 0.7–3.1)
LYMPHOCYTES NFR BLD AUTO: 20.3 % (ref 19.6–45.3)
MCH RBC QN AUTO: 31.1 PG (ref 26.6–33)
MCHC RBC AUTO-ENTMCNC: 32.7 G/DL (ref 31.5–35.7)
MCV RBC AUTO: 95.2 FL (ref 79–97)
MONOCYTES # BLD AUTO: 0.35 10*3/MM3 (ref 0.1–0.9)
MONOCYTES NFR BLD AUTO: 11.6 % (ref 5–12)
NEUTROPHILS NFR BLD AUTO: 2.01 10*3/MM3 (ref 1.7–7)
NEUTROPHILS NFR BLD AUTO: 66.8 % (ref 42.7–76)
NRBC BLD AUTO-RTO: 0 /100 WBC (ref 0–0.2)
PLATELET # BLD AUTO: 95 10*3/MM3 (ref 140–450)
PMV BLD AUTO: 10.3 FL (ref 6–12)
POTASSIUM SERPL-SCNC: 4 MMOL/L (ref 3.5–4.7)
PROT SERPL-MCNC: 6.1 G/DL (ref 6.3–8)
RBC # BLD AUTO: 4.37 10*6/MM3 (ref 4.14–5.8)
SODIUM SERPL-SCNC: 137 MMOL/L (ref 134–145)
WBC # BLD AUTO: 3.01 10*3/MM3 (ref 3.4–10.8)

## 2020-12-07 PROCEDURE — 25010000002 OBINUTUZUMAB 1000 MG/40ML SOLUTION 40 ML VIAL: Performed by: INTERNAL MEDICINE

## 2020-12-07 PROCEDURE — 99215 OFFICE O/P EST HI 40 MIN: CPT | Performed by: INTERNAL MEDICINE

## 2020-12-07 PROCEDURE — 25010000003 HEPARIN LOCK FLUSH PER 10 UNITS: Performed by: NURSE PRACTITIONER

## 2020-12-07 PROCEDURE — 25010000002 PEGFILGRASTIM 6 MG/0.6ML PREFILLED SYRINGE KIT: Performed by: INTERNAL MEDICINE

## 2020-12-07 PROCEDURE — 80053 COMPREHEN METABOLIC PANEL: CPT

## 2020-12-07 PROCEDURE — 96413 CHEMO IV INFUSION 1 HR: CPT

## 2020-12-07 PROCEDURE — 63710000001 PROCHLORPERAZINE MALEATE PER 10 MG: Performed by: INTERNAL MEDICINE

## 2020-12-07 PROCEDURE — 96361 HYDRATE IV INFUSION ADD-ON: CPT

## 2020-12-07 PROCEDURE — 85025 COMPLETE CBC W/AUTO DIFF WBC: CPT

## 2020-12-07 PROCEDURE — 96377 APPLICATON ON-BODY INJECTOR: CPT

## 2020-12-07 PROCEDURE — 96415 CHEMO IV INFUSION ADDL HR: CPT

## 2020-12-07 RX ORDER — PROCHLORPERAZINE MALEATE 10 MG
10 TABLET ORAL ONCE
Status: CANCELLED
Start: 2020-12-07

## 2020-12-07 RX ORDER — SODIUM CHLORIDE 0.9 % (FLUSH) 0.9 %
10 SYRINGE (ML) INJECTION AS NEEDED
Status: DISCONTINUED | OUTPATIENT
Start: 2020-12-07 | End: 2020-12-07 | Stop reason: HOSPADM

## 2020-12-07 RX ORDER — SODIUM CHLORIDE 9 MG/ML
250 INJECTION, SOLUTION INTRAVENOUS ONCE
Status: CANCELLED | OUTPATIENT
Start: 2020-12-07

## 2020-12-07 RX ORDER — SODIUM CHLORIDE 9 MG/ML
500 INJECTION, SOLUTION INTRAVENOUS ONCE
Status: COMPLETED | OUTPATIENT
Start: 2020-12-07 | End: 2020-12-07

## 2020-12-07 RX ORDER — SODIUM CHLORIDE 0.9 % (FLUSH) 0.9 %
10 SYRINGE (ML) INJECTION AS NEEDED
Status: CANCELLED | OUTPATIENT
Start: 2020-12-07

## 2020-12-07 RX ORDER — HEPARIN SODIUM (PORCINE) LOCK FLUSH IV SOLN 100 UNIT/ML 100 UNIT/ML
500 SOLUTION INTRAVENOUS AS NEEDED
Status: DISCONTINUED | OUTPATIENT
Start: 2020-12-07 | End: 2020-12-07 | Stop reason: HOSPADM

## 2020-12-07 RX ORDER — PROCHLORPERAZINE MALEATE 10 MG
10 TABLET ORAL ONCE
Status: COMPLETED | OUTPATIENT
Start: 2020-12-07 | End: 2020-12-07

## 2020-12-07 RX ORDER — SODIUM CHLORIDE 9 MG/ML
500 INJECTION, SOLUTION INTRAVENOUS ONCE
Status: CANCELLED | OUTPATIENT
Start: 2020-12-07

## 2020-12-07 RX ORDER — ACETAMINOPHEN 325 MG/1
650 TABLET ORAL ONCE
Status: COMPLETED | OUTPATIENT
Start: 2020-12-07 | End: 2020-12-07

## 2020-12-07 RX ORDER — SODIUM CHLORIDE 9 MG/ML
250 INJECTION, SOLUTION INTRAVENOUS ONCE
Status: COMPLETED | OUTPATIENT
Start: 2020-12-07 | End: 2020-12-07

## 2020-12-07 RX ORDER — HEPARIN SODIUM (PORCINE) LOCK FLUSH IV SOLN 100 UNIT/ML 100 UNIT/ML
500 SOLUTION INTRAVENOUS AS NEEDED
Status: CANCELLED | OUTPATIENT
Start: 2020-12-07

## 2020-12-07 RX ORDER — ACETAMINOPHEN 325 MG/1
650 TABLET ORAL ONCE
Status: CANCELLED | OUTPATIENT
Start: 2020-12-07

## 2020-12-07 RX ORDER — DIPHENHYDRAMINE HYDROCHLORIDE 50 MG/ML
50 INJECTION INTRAMUSCULAR; INTRAVENOUS AS NEEDED
Status: CANCELLED | OUTPATIENT
Start: 2020-12-07

## 2020-12-07 RX ORDER — FAMOTIDINE 10 MG/ML
20 INJECTION, SOLUTION INTRAVENOUS AS NEEDED
Status: CANCELLED | OUTPATIENT
Start: 2020-12-07

## 2020-12-07 RX ORDER — MEPERIDINE HYDROCHLORIDE 50 MG/ML
25 INJECTION INTRAMUSCULAR; INTRAVENOUS; SUBCUTANEOUS
Status: CANCELLED | OUTPATIENT
Start: 2020-12-07

## 2020-12-07 RX ADMIN — SODIUM CHLORIDE 500 ML: 9 INJECTION, SOLUTION INTRAVENOUS at 08:54

## 2020-12-07 RX ADMIN — Medication 500 UNITS: at 13:22

## 2020-12-07 RX ADMIN — SODIUM CHLORIDE, PRESERVATIVE FREE 10 ML: 5 INJECTION INTRAVENOUS at 13:22

## 2020-12-07 RX ADMIN — PROCHLORPERAZINE MALEATE 10 MG: 10 TABLET ORAL at 08:53

## 2020-12-07 RX ADMIN — OBINUTUZUMAB 1000 MG: 1000 INJECTION, SOLUTION, CONCENTRATE INTRAVENOUS at 09:42

## 2020-12-07 RX ADMIN — PEGFILGRASTIM 6 MG: KIT SUBCUTANEOUS at 13:25

## 2020-12-07 RX ADMIN — ACETAMINOPHEN 650 MG: 325 TABLET ORAL at 08:52

## 2020-12-07 RX ADMIN — SODIUM CHLORIDE 250 ML: 9 INJECTION, SOLUTION INTRAVENOUS at 08:54

## 2020-12-07 NOTE — PROGRESS NOTES
Subjective       REASON FOR VISIT:    1. Chronic lymphoid leukemia, stage 4, presented initially with significant pancytopenia  · Currently on Gazyva with venetoclax  · Cycle 1 Gazyva given on August 13, 2020    2.  History of angiodysplasia requiring cauterization and history of Hess's esophagus    3.  Bone marrow aspiration biopsy shows hypercellular marrow with 98% involvement with CLL    Patient ID: Macho Stephenson is a 70 y.o. year old male     History of Present Illness Mr. Stephenson is a 70 year old male with the above mentioned history here today for follow-up.  He has been receiving treatment with Gazyva and venetoclax.      Interval history: Patient is here for cycle 5 Gazyva.  He also is on venetoclax and tolerating well.  He had a CT scan November 4, 2020.  I reviewed this personally with him and his wife.  He has decrease in the neck lymphadenopathy periportal lymphadenopathy and decrease in the size of the spleen from 15.5 cm to 12.5 cm.  His hemoglobin is 13.5.  He has not required any transfusions.  His platelets did  drop a week after Gazyva up to 35K.     He is currently doing well without any complaints.  He states his energy level has improved.  However today his white count came down to 95K from 120 K.  He has not had any fever or chills.  He wanted to know when he needs to have a flu injection.  I told him he should take it 3 to 4 days prior to his next treatment of Gazyva.    He also has required Neulasta injection.  He is taking venetoclax 100 mg daily along with voriconazole 200 mg daily and acyclovir 400 mg p.o. twice daily.          PAST MEDICAL HISTORY:   1. Recurrent atrial fibrillation followed by cardiology. He has had drug eluting stent placed x 3.   2. High cholesterol.   3. Hypertension.       HEMATOLOGIC/ONCOLOGIC HISTORY:       The patient is 62-year-old gentleman with the above history currently here for followup. He has no complaints. He denies fever, night sweats or  weight loss. He does not have any lymphadenopathy. He feels good. He had some fatigue but his CBC shows a go od hemoglobin.   The patient is followed by Dr. Kevin Chandra. He had seen Dr. Chandra 7/18/13 for a history of coronary artery disease status drug eluting stent placement to the right coronary artery and left anterior descending artery in February 2011. Histor y of paroxysmal atrial fibrillation and hyperlipidemia. He presented to [facility] on 6/23/13 with palpitations and was found to have atrial fibrillation with rapid ventricular response. He was given IV Cardizem and converted spontaneously to normal sinus rhythm. He was found to have elevated white count at 18.9 and denied any symptoms of infection or urinary complaints. TSH was normal, troponin levels were negative. He was asked to continue aspirin and metoprolol for that. If he contin u ed to have atrial fibrillation, they will consider antiarrhythmic therapy with or without a short term anticoagulation therapy. However, currently the patient is feeling reasonably good. He has no complaints. His CBC 7/22/13 showed WBC 17,000, hemoglob i n 16.4 and platelet count of 174,000. Back 9/28/13 his hemoglobin was 15.1, WBC 13.3 and platelets 197,000. He has had a persistently elevated WBC but he is totally asymptomatic. He does not have any fever, night sweats or lymphadenopathy. He is here to be evaluated for his elevated white count.       Peripheral blood flow cytometry done 08/16/13 shows 22% chronic lymphoid leukemia cells, and they expressed ZAP-70 but not CD38. The total number of cells approximated 60% T cells, 32% B cells and 1% natural k iller cells. The B cells were monoclonal and expressed CD19, CD20, CD22, CD5, CD23, CD11c, ZAP-70 and T cell antigens. There was a normal CD4/CD8 ratio. CT of the chest, abdomen and pelvis does not show evidence of metastasis. Peripheral blood for DILLON -2 was negative. It was negative for T11:14  translocation.       CT scan done on 08/21/2013 shows 5 to 6 mm noncalcified nodule in the left lower lobe which is unchanged from December 2010. Otherwise no evidence of any lymphadenopathy or hepatosplenomegaly.       MRI of the spine shows arthritis and disc bulge and likely hemangiomas, with 1 lesion showing increased signal intensity at T2, which is likely a hemangioma. Bone scan could be done, but patient does not even have much pain there. CT scan of the chest, a bdomen and pelvis was done on 11/23/2014. He has tiny lymph nodes in the neck which are difficult to palpate. Right jugular one is 1.4 x 0.9 and left supraclavicular one is 1.5 x 1.1. He also has a subcarinal lymph node which is 1.7 x 1.2 cm, and he ha s a portocaval lymph node which has increased from 2.5 to 2.8. Patient is totally asymptomatic. He does not have any B symptoms, so will continue followup with observation.           Patient is a 69-year-old gentleman with history of chronic lymphocytic leukemia/SLL who recently got admitted to [facility] because of GI bleed.  He was seen by Dr. Montero.  He underwent EGD colonoscopy.  He was found to have angiodysplasia for which he underwent argon coagulation.  He required 3 units of blood.  Patient had a normal esophagus normal stomach and normal duodenum CLOtest was negative he was asked to take Protonix 40 mg daily.  Colonoscopy showed blood in the entire examined colon but there was a single bleeding colonic angiectasia which was treated with argon plasma coagulation.    He was admitted twice.  Initially he was admitted on July 10 at which time he stayed 3 days and he was evaluated for chest pain.  He underwent a cardiac work-up with stress test and echo.  The echo was normal but the stress test showed medium sized moderate severe severity fixed perfusion defect.  Of the inferior wall consistent with infarction.  However according to patient cardiology did not think he had any cardiac  problems.  I have left a message for patient's cardiologist to call me today.  Patient subsequently got readmitted with GI bleed and required 1/3 unit of transfusion.  He was found to have thrombocytopenia and hematology was consulted.    His hemoglobin had dropped down to as low as 7 and his platelets had gone down to 87.  Today it is 35 and his hemoglobin is 9.4 today.  He denies active bleeding.  He has lost weight recently.  He does not have any fever or night sweats.    He had ultrasound of spleen which showed enlarged spleen centimeters in length    Patient was started on Gazyva with Leukeran but subsequently switched to venetoclax with Gazyva.  His venetoclax dose is 100 mg daily and he receives Gazyva once a month.    CT scan done 2020 shows significant response     MEDICATIONS: The current medication list was reviewed with the patient and updated in the EMR this date per the medical assistant. Medication dosages and frequencies were confirmed to be accurate.       ALLERGIES: PENICILLIN which causes him to swell up. He is allergic to IV CONTRAST DYE.     SOCIAL HISTORY: He is . He smokes one pack per day for 35 years. He consumes three to four alcoholic drinks per week. He has no tattoos and no previous transfusions.       FAMILY HISTORY: Father  at 82 with MI. Mother  at 82 with bone cancer. He has one brother age 65 in good health and two sisters 69 and 57 in good health.   Past Medical History, Social History, and Family History are unchanged from my prior documentation on     Review of Systems   Constitutional: Positive for fatigue (20-Improved). Negative for activity change, appetite change, chills, diaphoresis, fever and unexpected weight change.   HENT: Negative for hearing loss, mouth sores, nosebleeds, sore throat and trouble swallowing.    Respiratory: Negative for cough, chest tightness, shortness of breath and wheezing.    Cardiovascular: Negative  for chest pain, palpitations and leg swelling.   Gastrointestinal: Negative for abdominal distention, abdominal pain, constipation, diarrhea, nausea and vomiting.   Genitourinary: Negative for difficulty urinating, dysuria, frequency, hematuria and urgency.   Musculoskeletal: Negative for joint swelling.        No muscle weakness.   Skin: Negative for rash and wound.   Neurological: Negative for dizziness, seizures, syncope, speech difficulty, weakness, numbness and headaches.   Hematological: Negative for adenopathy. Does not bruise/bleed easily.   Psychiatric/Behavioral: Negative for behavioral problems, confusion, sleep disturbance and suicidal ideas.   All other systems reviewed and are negative.  I have reviewed and confirmed the accuracy of the ROS as documented by the MA/LPN/RN Susannah Moya MD  Fatigue is improved        Patient Active Problem List   Diagnosis   • CLL (chronic lymphocytic leukemia) (CMS/HCC)   • Anemia   • Encounter for hepatitis C screening test for low risk patient    • Thrombocytopenia (CMS/HCC)   • H/O heart artery stent   • Stented coronary artery   • Arteriosclerosis of coronary artery   • Atrial fibrillation (CMS/HCC)   • Paroxysmal atrial fibrillation (CMS/HCC)   • Chest pain, rule out acute myocardial infarction   • Fall   • Fracture, olecranon   • Hyperlipidemia   • Long term (current) use of anticoagulants   • Lower GI bleed   • Olecranon bursitis   • Shoulder pain   • Smoker   • CLL (chronic lymphocytic leukemia) (CMS/HCC)   • Dehydration   • Drug-induced nausea and vomiting   • Encounter for long-term (current) use of other medications   • Neutropenia (CMS/HCC)         MEDICATIONS:  Medication Reconciliation for the patient has been reviewed by me and documented in the patients chart.    ALLERGIES:    Allergies   Allergen Reactions   • Contrast Dye Swelling   • Penicillins Swelling         Vitals:    12/07/20 0749   BP: 140/71   Pulse: 56   Resp: 16   Temp: 98.9 °F (37.2 °C)    SpO2: 99%        Current Status 12/7/2020   ECOG score 0            This patient's ACP documentation is up to date, and there's nothing further left to document.    CONSTITUTIONAL:  Vital signs reviewed.    No distress, looks comfortable.  EYES:  Conjunctiva and lids unremarkable.  Extraocular eye movements intact.  EARS,NOSE,MOUTH,THROAT:  Ears and nose appear unremarkable.  Lips, teeth, gums appear unremarkable.  RESPIRATORY:  Normal respiratory effort.  , no rales  or wheezing, clear   CARDIOVASCULAR:  Regular rate and rhythm, no murmur  No significant lower extremity edema.  SKIN: No wounds.  No rashes.  MUSCULOSKELETAL/EXTREMITIES: No clubbing or cyanosis.  No apparent unilateral weakness.  NEURO: CN 2-12 appear intact. No focal neurological deficits noted.  PSYCHIATRIC:  Normal judgment and insight.  Normal mood and affect.    RECENT LABS:  Results from last 7 days   Lab Units 12/07/20  0728   WBC 10*3/mm3 3.01*   NEUTROS ABS 10*3/mm3 2.01   HEMOGLOBIN g/dL 13.6   HEMATOCRIT % 41.6   PLATELETS 10*3/mm3 95*     Results from last 7 days   Lab Units 12/07/20  0728   SODIUM mmol/L 137   POTASSIUM mmol/L 4.0   CHLORIDE mmol/L 103   CO2 mmol/L 24.4   BUN mg/dL 12   CREATININE mg/dL 0.72   CALCIUM mg/dL 9.1   ALBUMIN g/dL 4.10   BILIRUBIN mg/dL 0.5   ALK PHOS U/L 249*   ALT (SGPT) U/L 15   AST (SGOT) U/L 22   GLUCOSE mg/dL 225*           CT NECK, CHEST, ABDOMEN, AND PELVIS WITHOUT IV CONTRAST 11/04/20    IMPRESSION:  1. Some of the lymphadenopathy has resolved and the remaining  lymphadenopathy is significantly smaller than previously.   2. Splenic size has decreased as well. There is no new abnormality.         STAT NONCONTRAST HEAD CT 08/20/2020  IMPRESSION:  1. No acute abnormality is seen with no significant change when compared  to prior noncontrast head CT from [facility] 03/03/2015.  2. There is mild small vessel disease in the frontal periventricular  white matter and there are calcified  atherosclerotic plaques in the  cavernous internal carotid arteries bilaterally.  3. Patient has had previous paranasal sinus surgery with bilateral  uncinectomies and ethmoidectomies and the paranasal sinuses are  currently clear. The remainder of the head CT is within normal limits,  specifically no acute intracranial hemorrhage is seen. Etiology of  headaches in this patient with history of leukemia and thrombocytopenia  is not established on this exam. Results were communicated to Susannah Moya, the oncologist taking care of the patient by telephone on  08/20/2020 at 9:20 AM.      XR CHEST POST CVA PORT-07/31/20    IMPRESSION:  Chest port placement. No pneumothorax.       CT NECK, CHEST, ABDOMEN, AND PELVIS WITHOUT IV CONTRAST. 7/20/2020   HISTORY: 69-year-old male with CLL/SLL.     TECHNIQUE: Radiation dose reduction techniques were utilized, including  automated exposure control and exposure modulation based on body size. 3  mm images were obtained through the neck, chest, abdomen, and pelvis  without the administration of IV contrast. Compared with CT of the  abdomen and pelvis from 08/05/2019 and previous CTs from 03/19/2018.     FINDINGS:  NECK: There is no significant change in the 1.4 x 1.0 cm left  supraclavicular node or in the smaller supraclavicular nodes  bilaterally. A reference 1.4 x 0.7 cm right jugular chain node is  stable. There are shotty nodes scattered throughout the neck. No new  lymphadenopathy is seen. Noncontrasted parotid, submandibular, and  thyroid glands appear unremarkable.     CHEST: There has been interval decrease in the size of the shotty and  borderline enlarged nodes at the axilla. Reference 1.6 x 0.7 cm right  axillary node previously measured 2.0 x 1.2 cm. There has been a  decrease in the size of all of the shotty mediastinal and hilar nodes.  There are no new pulmonary opacities and there are no pleural or  pericardial effusions.     ABDOMEN/PELVIS: Splenic size has  slightly increased measuring 15.4 cm in  diameter and previously measuring 13.5 cm, both measured on the coronal  reconstruction series. There is no change in the shotty nodes at the  gastrohepatic ligament and shorty hepatis. There are no pathologically  enlarged nodes. There is no acute abnormality involving the  noncontrasted liver. A single gallstone is noted within the gallbladder.  Noncontrasted pancreas, adrenals, and kidneys appear unremarkable. There  is no acute bowel abnormality. There is extensive sigmoid  diverticulosis. No free fluid. There are extensive abdominal aortic  atherosclerotic changes and there is a stable 3.7 cm infrarenal  abdominal aortic aneurysm.     IMPRESSION:  1. There has been slight interval increase in splenomegaly since the CT  of the abdomen from 08/05/2019.  2. Interval decrease in the size of the axillary nodes and the shotty  mediastinal nodes. There is no change in the shotty nodes within the  neck and supraclavicular regions. There is no new lymphadenopathy.  3. Stable 3.7 cm infrarenal abdominal aortic aneurysm.      Assessment/Plan   1. Stage 2 CLL without evidence of any disease progression.  I have reviewed the CT scan from 3/19/2018 there is minimal progression but clinically patient is asymptomatic and we will follow with observation.  Will monitor with labs.   No evidence of any frequent infections, no major worsening of his white count. He has no fever or night sweats or any other symptoms.   · Patient knows that he is prone for infections and he is mainly staying at home during the COVID-19 situation time  · Recent admission to [facility] July 10 2020×2.  Initially admitted with chest pain and underwent stress test and echo.  Echo was negative.  Stress test is abnormal but have left message for patient's cardiologist to call us.  His cardiologist is been sent [place].  · He then got admitted the second time with worsening GI bleed and required total of 3 units  "of blood.  EGD was negative but colonoscopy showed angiectasia for which he underwent argon ablation.  Currently hemoglobin stable and no active bleeding.  · He was also found to have low platelets with platelets dropping down to 27k and hemoglobin was 7.  Ultrasound showed splenomegaly 15 cm.  Concern is whether he has progressed from his CLL point of view and requires treatment.  · CT scan performed 7/20/2020 did show the increased splenomegaly, but interval decrease in size of the axillary nodes, and shotty mediastinal nodes.  No change in the shotty nodes within the neck and supraclavicular regions.  No new lymphadenopathy.  Bone marrow biopsy however showed preliminarily that there is bone marrow involvement.  Remainder of bone marrow biopsy report is pending.    · Bone marrow shows hypercellular marrow with 100% involvement of chronic lymphocytic leukemia/small lymphocytic lymphoma and diffuse pattern with at least 98% of the total marrow cellularity.  Rare foci of erythropoiesis and rare megakaryocytes are noted.     · Negative for BCL-2 and BCL 6.  Chromosomes shows normal karyotype.  I GVH mutation present.  Positive for gain of 1 q., monosomy 13 and loss of IGH.  Negative for deletion T p53, negative for gain of chromosomes 9 and 11, negative for 11, 14 fusion.    · The combination of monosomy 13 and gain of 1 q. is thought to be associated with plasma cell myeloma.  However this patient does not have myeloma.    · Scans were reviewed with the patient today.  Dr. Moya also discussed with the patient and recommended he initiate treatment with chlorambucil and Gazyva.  He would not be a good candidate for Imbruvica due to his history of A. fib\".  He cannot take anticoagulation at this time.  The patient was provided with chemotherapy education today, including  Handouts.  He will need a port placed so that we can initiate treatment  · 8/13/2020: Gazyva day 1. Plan also [id number] mg p.o. on day 1 day " 15.  We can increase the dose once we know he tolerates and his platelets improved.  · Patient developing pancytopenia when reviewed cycle 1 day 15.  Chlorambucil dose modified to 10 mg for day 15 however it is felt that he cannot tolerate this ongoing.   ·  Plan to switch to Venetoclax along with continuing Gazyva.    · Patient due today for cycle 2-day 1 Gazyva.  He will proceed today as scheduled.  Venetoclax will be shipped to his home with plans to begin this next week on 9/14/2020.    · Patient did receive 1 L normal saline and Neulasta on 9/28/2020 secondary to neutropenia with an ANC of 0.04.  · Patient seen on 10/02/2020 continuing on venetoclax, currently on 100 mg dosing.  He would not increase his dose as he will start on voriconazole after being on venetoclax x1 week which affects the metabolism of venetoclax.  He is currently on day 5 of the 100 mg dosing.  Patient states overall he feels the same except for increased fatigue and nausea.  His nauseousness is controlled with ondansetron however.  Patient will be given 1 L normal saline in the office today as planned.  · Patient returns on 10/12/2020 continuing on venetoclax 100 mg daily as well as voriconazole.  · Patient is doing well with these treatments except fatigue.  Next cycle 4 due as of number ninth prior to which will obtain CT scans  · November 9, 2020: White count down to 1.89 but patient started Pepcid.  We will hold off Pepcid.  · 11/17/2020 platelet count dropped to 35,000 patient was instructed to hold venetoclax.  · Returns 11/23/2020 WBC up to 6.38, ANC 5.19, hemoglobin 12.4, platelets up to 121.  I have advised him to resume venetoclax 100 mg daily  · December 7, 2020: Platelets 95K.  White count 3.0 with absolute neutrophil count of 2.01.  Cycle 5 Gazyva given.  Continue venetoclax with voriconazole.        2.  Worsening thrombocytopenia, looking back his platelets were always in the 150s and during this admission he dropped down to  27K. His LDH also was elevated and he was anemic.  · It was dropping on chlorambucil.  Plans to change therapy as detailed above.  Patient did require transfusion and platelets are currently improved to 55,000.  · 11/17/2020 platelets dropped 35,000.  Venetoclax was temporarily held.  · 11/23/2020 platelets back up to 121 venetoclax resumed  · December 4, 2020 platelets 90 5K.    3.  History of angiodysplasia requiring cauterization and Hess's esophagus mild anemia. He is currently asymptomatic.  · No evidence of active bleeding.  Stable  · No active bleeding.    4.  History of cluster headaches for which she has to be treated with steroids in the past and has followed up with Dr. Len Walker.today with significant headaches.  · Patient had CT of the head 8/20/2020 which was negative.  · He was started on prednisone 10 mg daily which was not helpful.  · Patient saw Dr. Bobby, neurology who gave him 2 shots of a new medication Emgality.  This is something that can be given once a month.    · Headaches improved.  Resolved  · Stable    5.  Neutropenia without fever.Cipro 500 mg twice daily was started.  Patient was given Neulasta on 9/28/2020.  Cipro can be held today because his counts have improved.  · Significant drop in his white count to 1.89 but ANC 1.15 as of November 9, 2020  · 11/23/2020 WBC 6.38, ANC 5.19.  We will continue to closely monito  · Currently on Neulasta r.    6.  Restless leg syndrome: Iron studies checked 10/22/2020 ferritin 338.5, iron saturation 16%.  · Hemoglobin improved to 13.5    7.  Episodes of being lightheaded: Patient reports worse when changing position.  He related to possible inner ear problems.  We also discussed that could be related to changes in blood pressure.  We discussed making sure he is drinking plenty of fluids and occasionally monitoring blood pressure at home, as he is on 2 medications that could affect his blood pressure.    · No evidence of lightheadedness    8.   Atrial fibrillation, off anticoagulation given his thrombocytopenia.  · Patient followed by Dr. Robert Rodriguez  · Given pancytopenia, GI bleed secondary to angiodysplasia, intermittent significant thrombocytopenia anticoagulation had been held.      PLAN:  1. Continue venetoclax 100 mg daily.  2. Continue voriconazole.  3. Patient is here for cycle 5 Gazyva with Neulasta support  4. Follow-up weekly with CBC and every 2 weeks with CMP  5. Mild elevation of alkaline phosphatase, will observe as patient not symptomatic but has a 1.7 cm gallstone nonobstructing  6. Follow-up with me in 4 weeks with labs  7. Discussed with patient to get a flu shot 3 to 4 days prior to his next cycle of Gazyva  8. Reviewed CT scan from November 4, 2020 with good response to treatment  9. Discussed with patient that we can consider starting back baby aspirin 81 mg daily starting 2nd-4th week given his history of atrial fibrillation.  We will also discuss with his cardiologist    I spent 40 total minutes, face-to-face, caring for Macho solorio.  > 50%of this time involved counseling and/or coordination of care as documented within this note.      Susannah Moya MD          Cc: Dr. Kevin Gilmore

## 2020-12-08 ENCOUNTER — MEDICATION THERAPY MANAGEMENT (OUTPATIENT)
Dept: PHARMACY | Facility: HOSPITAL | Age: 70
End: 2020-12-08

## 2020-12-08 NOTE — PROGRESS NOTES
MT Lab Review        12/7/2020  Day 1   WBC 3.40 - 10.80 10*3/mm3 3.01 (A)   Neutrophils Absolute 1.70 - 7.00 10*3/mm3 2.01   Hemoglobin 13.0 - 17.7 g/dL 13.6   Hematocrit 37.5 - 51.0 % 41.6   Platelets 140 - 450 10*3/mm3 95 (A)   Creatinine 0.70 - 1.30 mg/dL 0.72   eGFR Non African Am >60 mL/min/1.73 108   BUN 6 - 20 mg/dL 12   Sodium 134 - 145 mmol/L 137   Potassium 3.5 - 4.7 mmol/L 4.0   Glucose 74 - 124 mg/dL 225 (A)   Calcium 8.5 - 10.2 mg/dL 9.1   Albumin 3.50 - 5.20 g/dL 4.10   Total Protein 6.3 - 8.0 g/dL 6.1 (A)   AST (SGOT) 0 - 40 U/L 22   ALT (SGPT) 0 - 41 U/L 15   Alkaline Phosphatase 38 - 116 U/L 249 (A)   Total Bilirubin 0.2 - 1.2 mg/dL 0.5     Per Md dictation 12/7/20, continue venetoclax 100mg daily.

## 2020-12-14 ENCOUNTER — LAB (OUTPATIENT)
Dept: LAB | Facility: HOSPITAL | Age: 70
End: 2020-12-14

## 2020-12-14 ENCOUNTER — INFUSION (OUTPATIENT)
Dept: ONCOLOGY | Facility: HOSPITAL | Age: 70
End: 2020-12-14

## 2020-12-14 DIAGNOSIS — C91.10 CLL (CHRONIC LYMPHOCYTIC LEUKEMIA) (HCC): ICD-10-CM

## 2020-12-14 LAB
ALBUMIN SERPL-MCNC: 4 G/DL (ref 3.5–5.2)
ALBUMIN/GLOB SERPL: 1.7 G/DL (ref 1.1–2.4)
ALP SERPL-CCNC: 292 U/L (ref 38–116)
ALT SERPL W P-5'-P-CCNC: 11 U/L (ref 0–41)
ANION GAP SERPL CALCULATED.3IONS-SCNC: 7.5 MMOL/L (ref 5–15)
AST SERPL-CCNC: 26 U/L (ref 0–40)
BASOPHILS # BLD AUTO: 0.1 10*3/MM3 (ref 0–0.2)
BASOPHILS NFR BLD AUTO: 0.8 % (ref 0–1.5)
BILIRUB SERPL-MCNC: 0.4 MG/DL (ref 0.2–1.2)
BUN SERPL-MCNC: 13 MG/DL (ref 6–20)
BUN/CREAT SERPL: 15.1 (ref 7.3–30)
CALCIUM SPEC-SCNC: 9.4 MG/DL (ref 8.5–10.2)
CHLORIDE SERPL-SCNC: 104 MMOL/L (ref 98–107)
CO2 SERPL-SCNC: 29.5 MMOL/L (ref 22–29)
CREAT SERPL-MCNC: 0.86 MG/DL (ref 0.7–1.3)
DEPRECATED RDW RBC AUTO: 56.4 FL (ref 37–54)
EOSINOPHIL # BLD AUTO: 0 10*3/MM3 (ref 0–0.4)
EOSINOPHIL NFR BLD AUTO: 0 % (ref 0.3–6.2)
ERYTHROCYTE [DISTWIDTH] IN BLOOD BY AUTOMATED COUNT: 15.9 % (ref 12.3–15.4)
GFR SERPL CREATININE-BSD FRML MDRD: 88 ML/MIN/1.73
GLOBULIN UR ELPH-MCNC: 2.3 GM/DL (ref 1.8–3.5)
GLUCOSE SERPL-MCNC: 137 MG/DL (ref 74–124)
HCT VFR BLD AUTO: 41 % (ref 37.5–51)
HGB BLD-MCNC: 13.3 G/DL (ref 13–17.7)
IMM GRANULOCYTES # BLD AUTO: 1.04 10*3/MM3 (ref 0–0.05)
IMM GRANULOCYTES NFR BLD AUTO: 8 % (ref 0–0.5)
LYMPHOCYTES # BLD AUTO: 0.9 10*3/MM3 (ref 0.7–3.1)
LYMPHOCYTES NFR BLD AUTO: 6.9 % (ref 19.6–45.3)
MCH RBC QN AUTO: 31.5 PG (ref 26.6–33)
MCHC RBC AUTO-ENTMCNC: 32.4 G/DL (ref 31.5–35.7)
MCV RBC AUTO: 97.2 FL (ref 79–97)
MONOCYTES # BLD AUTO: 0.92 10*3/MM3 (ref 0.1–0.9)
MONOCYTES NFR BLD AUTO: 7.1 % (ref 5–12)
NEUTROPHILS NFR BLD AUTO: 10.04 10*3/MM3 (ref 1.7–7)
NEUTROPHILS NFR BLD AUTO: 77.2 % (ref 42.7–76)
NRBC BLD AUTO-RTO: 0 /100 WBC (ref 0–0.2)
PLATELET # BLD AUTO: 19 10*3/MM3 (ref 140–450)
POTASSIUM SERPL-SCNC: 4.2 MMOL/L (ref 3.5–4.7)
PROT SERPL-MCNC: 6.3 G/DL (ref 6.3–8)
RBC # BLD AUTO: 4.22 10*6/MM3 (ref 4.14–5.8)
SODIUM SERPL-SCNC: 141 MMOL/L (ref 134–145)
WBC # BLD AUTO: 13 10*3/MM3 (ref 3.4–10.8)

## 2020-12-14 PROCEDURE — 80053 COMPREHEN METABOLIC PANEL: CPT

## 2020-12-14 PROCEDURE — 36415 COLL VENOUS BLD VENIPUNCTURE: CPT

## 2020-12-14 PROCEDURE — 85025 COMPLETE CBC W/AUTO DIFF WBC: CPT

## 2020-12-14 PROCEDURE — G0463 HOSPITAL OUTPT CLINIC VISIT: HCPCS

## 2020-12-14 NOTE — PROGRESS NOTES
Pt to infusion room for labs and RN review  Lab Results   Component Value Date    WBC 13.00 (H) 12/14/2020    HGB 13.3 12/14/2020    HCT 41.0 12/14/2020    MCV 97.2 (H) 12/14/2020    PLT 19 (C) 12/14/2020     Discussed with Dr Garcia; platelet count on 12/07 was 95,000  Pt denies any S/S bleeding or issues  Per Dr Garcia , schedule patient for CBC and RN review on Wednesday and Friday of this week  Instructed patient on same , He V/U.   Message sent to scheduling to set up labs and RN review on wed and fri  Advised patient to call if any S/S of bleeding.   Pt V/U

## 2020-12-16 ENCOUNTER — INFUSION (OUTPATIENT)
Dept: ONCOLOGY | Facility: HOSPITAL | Age: 70
End: 2020-12-16

## 2020-12-16 ENCOUNTER — LAB (OUTPATIENT)
Dept: LAB | Facility: HOSPITAL | Age: 70
End: 2020-12-16

## 2020-12-16 DIAGNOSIS — C91.10 CLL (CHRONIC LYMPHOCYTIC LEUKEMIA) (HCC): ICD-10-CM

## 2020-12-16 LAB
BASOPHILS # BLD AUTO: 0.09 10*3/MM3 (ref 0–0.2)
BASOPHILS NFR BLD AUTO: 0.6 % (ref 0–1.5)
DEPRECATED RDW RBC AUTO: 53.7 FL (ref 37–54)
EOSINOPHIL # BLD AUTO: 0.02 10*3/MM3 (ref 0–0.4)
EOSINOPHIL NFR BLD AUTO: 0.1 % (ref 0.3–6.2)
ERYTHROCYTE [DISTWIDTH] IN BLOOD BY AUTOMATED COUNT: 15.6 % (ref 12.3–15.4)
HCT VFR BLD AUTO: 41.1 % (ref 37.5–51)
HGB BLD-MCNC: 13.8 G/DL (ref 13–17.7)
IMM GRANULOCYTES # BLD AUTO: 0.9 10*3/MM3 (ref 0–0.05)
IMM GRANULOCYTES NFR BLD AUTO: 6.1 % (ref 0–0.5)
LYMPHOCYTES # BLD AUTO: 1.02 10*3/MM3 (ref 0.7–3.1)
LYMPHOCYTES NFR BLD AUTO: 7 % (ref 19.6–45.3)
MCH RBC QN AUTO: 31.7 PG (ref 26.6–33)
MCHC RBC AUTO-ENTMCNC: 33.6 G/DL (ref 31.5–35.7)
MCV RBC AUTO: 94.3 FL (ref 79–97)
MONOCYTES # BLD AUTO: 0.66 10*3/MM3 (ref 0.1–0.9)
MONOCYTES NFR BLD AUTO: 4.5 % (ref 5–12)
NEUTROPHILS NFR BLD AUTO: 11.96 10*3/MM3 (ref 1.7–7)
NEUTROPHILS NFR BLD AUTO: 81.7 % (ref 42.7–76)
NRBC BLD AUTO-RTO: 0 /100 WBC (ref 0–0.2)
PLATELET # BLD AUTO: 37 10*3/MM3 (ref 140–450)
RBC # BLD AUTO: 4.36 10*6/MM3 (ref 4.14–5.8)
WBC # BLD AUTO: 14.65 10*3/MM3 (ref 3.4–10.8)

## 2020-12-16 PROCEDURE — 36415 COLL VENOUS BLD VENIPUNCTURE: CPT

## 2020-12-16 PROCEDURE — G0463 HOSPITAL OUTPT CLINIC VISIT: HCPCS

## 2020-12-16 PROCEDURE — 85025 COMPLETE CBC W/AUTO DIFF WBC: CPT

## 2020-12-16 NOTE — PROGRESS NOTES
Pt here for RN review. Platelets have improved since last visit. Pt reports feeling well. Denies bleeding or bruising. Reviewed labs with Dr. CORTES. No new orders at this time. Provided pt with copy of labs. Instructed to call with any signs of bleeding. Pt v/u.    Lab Results   Component Value Date    WBC 14.65 (H) 12/16/2020    HGB 13.8 12/16/2020    HCT 41.1 12/16/2020    MCV 94.3 12/16/2020    PLT 37 (L) 12/16/2020

## 2020-12-17 ENCOUNTER — MEDICATION THERAPY MANAGEMENT (OUTPATIENT)
Dept: ONCOLOGY | Facility: HOSPITAL | Age: 70
End: 2020-12-17

## 2020-12-17 NOTE — PROGRESS NOTES
[facility] lab review ( Venetoclax+Gazyva)        12/16/2020   WBC 3.40 - 10.80 10*3/mm3 14.65 (A)   Neutrophils Absolute 1.70 - 7.00 10*3/mm3 11.96 (A)   Hemoglobin 13.0 - 17.7 g/dL 13.8   Hematocrit 37.5 - 51.0 % 41.1   Platelets 140 - 450 10*3/mm3 37 (A)     Continues on Venetoclax 100 mg po daily along with monthly Gazyva infusions.

## 2020-12-18 ENCOUNTER — LAB (OUTPATIENT)
Dept: LAB | Facility: HOSPITAL | Age: 70
End: 2020-12-18

## 2020-12-18 ENCOUNTER — INFUSION (OUTPATIENT)
Dept: ONCOLOGY | Facility: HOSPITAL | Age: 70
End: 2020-12-18

## 2020-12-18 DIAGNOSIS — C91.10 CLL (CHRONIC LYMPHOCYTIC LEUKEMIA) (HCC): ICD-10-CM

## 2020-12-18 LAB
BASOPHILS # BLD AUTO: 0.06 10*3/MM3 (ref 0–0.2)
BASOPHILS NFR BLD AUTO: 0.5 % (ref 0–1.5)
DEPRECATED RDW RBC AUTO: 53.2 FL (ref 37–54)
EOSINOPHIL # BLD AUTO: 0 10*3/MM3 (ref 0–0.4)
EOSINOPHIL NFR BLD AUTO: 0 % (ref 0.3–6.2)
ERYTHROCYTE [DISTWIDTH] IN BLOOD BY AUTOMATED COUNT: 15.5 % (ref 12.3–15.4)
HCT VFR BLD AUTO: 41.1 % (ref 37.5–51)
HGB BLD-MCNC: 13.8 G/DL (ref 13–17.7)
IMM GRANULOCYTES # BLD AUTO: 0.52 10*3/MM3 (ref 0–0.05)
IMM GRANULOCYTES NFR BLD AUTO: 4.7 % (ref 0–0.5)
LYMPHOCYTES # BLD AUTO: 1.23 10*3/MM3 (ref 0.7–3.1)
LYMPHOCYTES NFR BLD AUTO: 11 % (ref 19.6–45.3)
MCH RBC QN AUTO: 31.4 PG (ref 26.6–33)
MCHC RBC AUTO-ENTMCNC: 33.6 G/DL (ref 31.5–35.7)
MCV RBC AUTO: 93.4 FL (ref 79–97)
MONOCYTES # BLD AUTO: 0.71 10*3/MM3 (ref 0.1–0.9)
MONOCYTES NFR BLD AUTO: 6.4 % (ref 5–12)
NEUTROPHILS NFR BLD AUTO: 77.4 % (ref 42.7–76)
NEUTROPHILS NFR BLD AUTO: 8.62 10*3/MM3 (ref 1.7–7)
NRBC BLD AUTO-RTO: 0 /100 WBC (ref 0–0.2)
PLATELET # BLD AUTO: 63 10*3/MM3 (ref 140–450)
PMV BLD AUTO: 12.6 FL (ref 6–12)
RBC # BLD AUTO: 4.4 10*6/MM3 (ref 4.14–5.8)
WBC # BLD AUTO: 11.14 10*3/MM3 (ref 3.4–10.8)

## 2020-12-18 PROCEDURE — 85025 COMPLETE CBC W/AUTO DIFF WBC: CPT

## 2020-12-18 PROCEDURE — 36415 COLL VENOUS BLD VENIPUNCTURE: CPT

## 2020-12-18 PROCEDURE — G0463 HOSPITAL OUTPT CLINIC VISIT: HCPCS

## 2020-12-18 NOTE — PROGRESS NOTES
Pt here for RN review. Pt continues taking venetoclax with out any complaints. States he is eating and drinking well, no issues with n/v/d. Blood counts somewhat improved. Per Dr. Moya, pt can restart 81mg aspirin daily when platelets are above 60. If pt drops below 60, hold. Informed pt. Pt will restart baby aspirin. Pt will return Monday for labs.       Lab Results   Component Value Date    WBC 11.14 (H) 12/18/2020    HGB 13.8 12/18/2020    HCT 41.1 12/18/2020    MCV 93.4 12/18/2020    PLT 63 (L) 12/18/2020

## 2020-12-21 ENCOUNTER — LAB (OUTPATIENT)
Dept: LAB | Facility: HOSPITAL | Age: 70
End: 2020-12-21

## 2020-12-21 ENCOUNTER — CLINICAL SUPPORT (OUTPATIENT)
Dept: ONCOLOGY | Facility: HOSPITAL | Age: 70
End: 2020-12-21

## 2020-12-21 ENCOUNTER — MEDICATION THERAPY MANAGEMENT (OUTPATIENT)
Dept: ONCOLOGY | Facility: HOSPITAL | Age: 70
End: 2020-12-21

## 2020-12-21 DIAGNOSIS — C91.10 CLL (CHRONIC LYMPHOCYTIC LEUKEMIA) (HCC): ICD-10-CM

## 2020-12-21 LAB
ALBUMIN SERPL-MCNC: 4.4 G/DL (ref 3.5–5.2)
ALBUMIN/GLOB SERPL: 1.8 G/DL (ref 1.1–2.4)
ALP SERPL-CCNC: 234 U/L (ref 38–116)
ALT SERPL W P-5'-P-CCNC: 12 U/L (ref 0–41)
ANION GAP SERPL CALCULATED.3IONS-SCNC: 9.8 MMOL/L (ref 5–15)
AST SERPL-CCNC: 24 U/L (ref 0–40)
BASOPHILS # BLD AUTO: 0.04 10*3/MM3 (ref 0–0.2)
BASOPHILS NFR BLD AUTO: 0.5 % (ref 0–1.5)
BILIRUB SERPL-MCNC: 0.5 MG/DL (ref 0.2–1.2)
BUN SERPL-MCNC: 18 MG/DL (ref 6–20)
BUN/CREAT SERPL: 20 (ref 7.3–30)
CALCIUM SPEC-SCNC: 9.7 MG/DL (ref 8.5–10.2)
CHLORIDE SERPL-SCNC: 101 MMOL/L (ref 98–107)
CO2 SERPL-SCNC: 28.2 MMOL/L (ref 22–29)
CREAT SERPL-MCNC: 0.9 MG/DL (ref 0.7–1.3)
DEPRECATED RDW RBC AUTO: 54.4 FL (ref 37–54)
EOSINOPHIL # BLD AUTO: 0 10*3/MM3 (ref 0–0.4)
EOSINOPHIL NFR BLD AUTO: 0 % (ref 0.3–6.2)
ERYTHROCYTE [DISTWIDTH] IN BLOOD BY AUTOMATED COUNT: 15.3 % (ref 12.3–15.4)
GFR SERPL CREATININE-BSD FRML MDRD: 83 ML/MIN/1.73
GLOBULIN UR ELPH-MCNC: 2.5 GM/DL (ref 1.8–3.5)
GLUCOSE SERPL-MCNC: 94 MG/DL (ref 74–124)
HCT VFR BLD AUTO: 42.5 % (ref 37.5–51)
HGB BLD-MCNC: 13.8 G/DL (ref 13–17.7)
IMM GRANULOCYTES # BLD AUTO: 0.37 10*3/MM3 (ref 0–0.05)
IMM GRANULOCYTES NFR BLD AUTO: 4.8 % (ref 0–0.5)
LYMPHOCYTES # BLD AUTO: 1.52 10*3/MM3 (ref 0.7–3.1)
LYMPHOCYTES NFR BLD AUTO: 19.7 % (ref 19.6–45.3)
MCH RBC QN AUTO: 31.4 PG (ref 26.6–33)
MCHC RBC AUTO-ENTMCNC: 32.5 G/DL (ref 31.5–35.7)
MCV RBC AUTO: 96.8 FL (ref 79–97)
MONOCYTES # BLD AUTO: 0.56 10*3/MM3 (ref 0.1–0.9)
MONOCYTES NFR BLD AUTO: 7.3 % (ref 5–12)
NEUTROPHILS NFR BLD AUTO: 5.22 10*3/MM3 (ref 1.7–7)
NEUTROPHILS NFR BLD AUTO: 67.7 % (ref 42.7–76)
NRBC BLD AUTO-RTO: 0 /100 WBC (ref 0–0.2)
PLATELET # BLD AUTO: 75 10*3/MM3 (ref 140–450)
PMV BLD AUTO: 10.8 FL (ref 6–12)
POTASSIUM SERPL-SCNC: 4.8 MMOL/L (ref 3.5–4.7)
PROT SERPL-MCNC: 6.9 G/DL (ref 6.3–8)
RBC # BLD AUTO: 4.39 10*6/MM3 (ref 4.14–5.8)
SODIUM SERPL-SCNC: 139 MMOL/L (ref 134–145)
WBC # BLD AUTO: 7.71 10*3/MM3 (ref 3.4–10.8)

## 2020-12-21 PROCEDURE — 36415 COLL VENOUS BLD VENIPUNCTURE: CPT

## 2020-12-21 PROCEDURE — G0463 HOSPITAL OUTPT CLINIC VISIT: HCPCS

## 2020-12-21 PROCEDURE — 80053 COMPREHEN METABOLIC PANEL: CPT

## 2020-12-21 PROCEDURE — 85025 COMPLETE CBC W/AUTO DIFF WBC: CPT

## 2020-12-21 RX ORDER — ASPIRIN 81 MG/1
81 TABLET, CHEWABLE ORAL DAILY
COMMUNITY
End: 2022-04-13 | Stop reason: HOSPADM

## 2020-12-21 NOTE — PROGRESS NOTES
Pt here for RN review. Counts are improving including platelets. Pt reports taking baby aspirin daily. Denies issues with bleeding. Provided copy of labs and schedule. Will call pt once chemistries result.    Lab Results   Component Value Date    WBC 7.71 12/21/2020    HGB 13.8 12/21/2020    HCT 42.5 12/21/2020    MCV 96.8 12/21/2020    PLT 75 (L) 12/21/2020     Called pt to review chemistries. Potassium slightly elevated since last visit. Pt reports eating foods high in potassium. Instructed pt to cut back on foods containing high potassium. Pt v/u.    Lab Results   Component Value Date    GLUCOSE 94 12/21/2020    BUN 18 12/21/2020    CREATININE 0.90 12/21/2020    EGFRIFNONA 83 12/21/2020    BCR 20.0 12/21/2020    K 4.8 (H) 12/21/2020    CO2 28.2 12/21/2020    CALCIUM 9.7 12/21/2020    PROTENTOTREF 5.7 (L) 07/17/2020    ALBUMIN 4.40 12/21/2020    LABIL2 1.6 07/17/2020    AST 24 12/21/2020    ALT 12 12/21/2020

## 2020-12-21 NOTE — PROGRESS NOTES
[facility] lab review ( Venetoclax+Gazyva)        12/18/2020   WBC 3.40 - 10.80 10*3/mm3 11.14 (A)   Neutrophils Absolute 1.70 - 7.00 10*3/mm3 8.62 (A)   Hemoglobin 13.0 - 17.7 g/dL 13.8   Hematocrit 37.5 - 51.0 % 41.1   Platelets 140 - 450 10*3/mm3 63 (A)       Patient continues on Venetoclax 100 mg po daily and monthly Gazyva infusions.  ASA 81 mg po daily has been added back as platelets are above 60.

## 2020-12-28 ENCOUNTER — CLINICAL SUPPORT (OUTPATIENT)
Dept: ONCOLOGY | Facility: HOSPITAL | Age: 70
End: 2020-12-28

## 2020-12-28 ENCOUNTER — SPECIALTY PHARMACY (OUTPATIENT)
Dept: PHARMACY | Facility: HOSPITAL | Age: 70
End: 2020-12-28

## 2020-12-28 ENCOUNTER — LAB (OUTPATIENT)
Dept: LAB | Facility: HOSPITAL | Age: 70
End: 2020-12-28

## 2020-12-28 DIAGNOSIS — C91.10 CLL (CHRONIC LYMPHOCYTIC LEUKEMIA) (HCC): ICD-10-CM

## 2020-12-28 DIAGNOSIS — C91.10 CLL (CHRONIC LYMPHOCYTIC LEUKEMIA) (HCC): Primary | ICD-10-CM

## 2020-12-28 LAB
BASOPHILS # BLD AUTO: 0.03 10*3/MM3 (ref 0–0.2)
BASOPHILS NFR BLD AUTO: 0.6 % (ref 0–1.5)
DEPRECATED RDW RBC AUTO: 51.9 FL (ref 37–54)
EOSINOPHIL # BLD AUTO: 0.01 10*3/MM3 (ref 0–0.4)
EOSINOPHIL NFR BLD AUTO: 0.2 % (ref 0.3–6.2)
ERYTHROCYTE [DISTWIDTH] IN BLOOD BY AUTOMATED COUNT: 15.2 % (ref 12.3–15.4)
HCT VFR BLD AUTO: 40.6 % (ref 37.5–51)
HGB BLD-MCNC: 13.9 G/DL (ref 13–17.7)
IMM GRANULOCYTES # BLD AUTO: 0.23 10*3/MM3 (ref 0–0.05)
IMM GRANULOCYTES NFR BLD AUTO: 4.9 % (ref 0–0.5)
LYMPHOCYTES # BLD AUTO: 1.37 10*3/MM3 (ref 0.7–3.1)
LYMPHOCYTES NFR BLD AUTO: 29.5 % (ref 19.6–45.3)
MCH RBC QN AUTO: 31.9 PG (ref 26.6–33)
MCHC RBC AUTO-ENTMCNC: 34.2 G/DL (ref 31.5–35.7)
MCV RBC AUTO: 93.1 FL (ref 79–97)
MONOCYTES # BLD AUTO: 0.57 10*3/MM3 (ref 0.1–0.9)
MONOCYTES NFR BLD AUTO: 12.3 % (ref 5–12)
NEUTROPHILS NFR BLD AUTO: 2.44 10*3/MM3 (ref 1.7–7)
NEUTROPHILS NFR BLD AUTO: 52.5 % (ref 42.7–76)
NRBC BLD AUTO-RTO: 0 /100 WBC (ref 0–0.2)
PLATELET # BLD AUTO: 105 10*3/MM3 (ref 140–450)
PMV BLD AUTO: 11 FL (ref 6–12)
RBC # BLD AUTO: 4.36 10*6/MM3 (ref 4.14–5.8)
WBC # BLD AUTO: 4.65 10*3/MM3 (ref 3.4–10.8)

## 2020-12-28 PROCEDURE — 36415 COLL VENOUS BLD VENIPUNCTURE: CPT

## 2020-12-28 PROCEDURE — 85025 COMPLETE CBC W/AUTO DIFF WBC: CPT

## 2020-12-28 PROCEDURE — G0463 HOSPITAL OUTPT CLINIC VISIT: HCPCS

## 2020-12-28 RX ORDER — LIDOCAINE AND PRILOCAINE 25; 25 MG/G; MG/G
CREAM TOPICAL AS NEEDED
Qty: 30 G | Refills: 0 | Status: SHIPPED | OUTPATIENT
Start: 2020-12-28 | End: 2021-09-29

## 2020-12-28 NOTE — NURSING NOTE
Pt is here for lab with RN review.  CBC reviewed with pt, counts are stable for this pt at this time. Pt has no complaints. He continues Venetoclax with only minor nausea which is resolved w/ medication. Copy of labs given to pt and f/u appt reviewed. Pt asked if Emla cream could be sent to his pharmacy, script routed to Dr. Moya for approval. Pt is instructed to call the office with any concerns or new symptoms prior to next visit. Pt v/u.    Lab Results   Component Value Date    WBC 4.65 12/28/2020    HGB 13.9 12/28/2020    HCT 40.6 12/28/2020    MCV 93.1 12/28/2020     (L) 12/28/2020

## 2020-12-29 ENCOUNTER — TELEPHONE (OUTPATIENT)
Dept: ONCOLOGY | Facility: CLINIC | Age: 70
End: 2020-12-29

## 2020-12-29 NOTE — TELEPHONE ENCOUNTER
----- Message from Leela Rawls [facility] sent at 12/29/2020  8:43 AM EST -----  Please add him for a quick in person visit with pharmacy ( we can just check on him in the infusion area) while he is at [facility] 1/4/20.

## 2021-01-04 ENCOUNTER — INFUSION (OUTPATIENT)
Dept: ONCOLOGY | Facility: HOSPITAL | Age: 71
End: 2021-01-04

## 2021-01-04 ENCOUNTER — SPECIALTY PHARMACY (OUTPATIENT)
Dept: ONCOLOGY | Facility: CLINIC | Age: 71
End: 2021-01-04

## 2021-01-04 ENCOUNTER — OFFICE VISIT (OUTPATIENT)
Dept: ONCOLOGY | Facility: CLINIC | Age: 71
End: 2021-01-04

## 2021-01-04 VITALS
HEIGHT: 73 IN | WEIGHT: 153.4 LBS | RESPIRATION RATE: 16 BRPM | HEART RATE: 67 BPM | OXYGEN SATURATION: 97 % | SYSTOLIC BLOOD PRESSURE: 145 MMHG | TEMPERATURE: 97.1 F | DIASTOLIC BLOOD PRESSURE: 82 MMHG | BODY MASS INDEX: 20.33 KG/M2

## 2021-01-04 VITALS — SYSTOLIC BLOOD PRESSURE: 146 MMHG | HEART RATE: 54 BPM | DIASTOLIC BLOOD PRESSURE: 71 MMHG

## 2021-01-04 DIAGNOSIS — C91.10 CLL (CHRONIC LYMPHOCYTIC LEUKEMIA) (HCC): Primary | ICD-10-CM

## 2021-01-04 DIAGNOSIS — I48.20 CHRONIC ATRIAL FIBRILLATION (HCC): Primary | ICD-10-CM

## 2021-01-04 DIAGNOSIS — Z79.899 HIGH RISK MEDICATION USE: ICD-10-CM

## 2021-01-04 DIAGNOSIS — C91.10 CLL (CHRONIC LYMPHOCYTIC LEUKEMIA) (HCC): ICD-10-CM

## 2021-01-04 DIAGNOSIS — D69.6 THROMBOCYTOPENIA (HCC): ICD-10-CM

## 2021-01-04 LAB
ALBUMIN SERPL-MCNC: 4.2 G/DL (ref 3.5–5.2)
ALBUMIN/GLOB SERPL: 1.8 G/DL (ref 1.1–2.4)
ALP SERPL-CCNC: 236 U/L (ref 38–116)
ALT SERPL W P-5'-P-CCNC: 12 U/L (ref 0–41)
ANION GAP SERPL CALCULATED.3IONS-SCNC: 9.9 MMOL/L (ref 5–15)
AST SERPL-CCNC: 20 U/L (ref 0–40)
BASOPHILS # BLD AUTO: 0.01 10*3/MM3 (ref 0–0.2)
BASOPHILS NFR BLD AUTO: 0.3 % (ref 0–1.5)
BILIRUB SERPL-MCNC: 0.7 MG/DL (ref 0.2–1.2)
BUN SERPL-MCNC: 15 MG/DL (ref 6–20)
BUN/CREAT SERPL: 17.2 (ref 7.3–30)
CALCIUM SPEC-SCNC: 9.5 MG/DL (ref 8.5–10.2)
CHLORIDE SERPL-SCNC: 102 MMOL/L (ref 98–107)
CO2 SERPL-SCNC: 26.1 MMOL/L (ref 22–29)
CREAT SERPL-MCNC: 0.87 MG/DL (ref 0.7–1.3)
DEPRECATED RDW RBC AUTO: 50.1 FL (ref 37–54)
EOSINOPHIL # BLD AUTO: 0 10*3/MM3 (ref 0–0.4)
EOSINOPHIL NFR BLD AUTO: 0 % (ref 0.3–6.2)
ERYTHROCYTE [DISTWIDTH] IN BLOOD BY AUTOMATED COUNT: 14.6 % (ref 12.3–15.4)
GFR SERPL CREATININE-BSD FRML MDRD: 87 ML/MIN/1.73
GLOBULIN UR ELPH-MCNC: 2.3 GM/DL (ref 1.8–3.5)
GLUCOSE SERPL-MCNC: 147 MG/DL (ref 74–124)
HCT VFR BLD AUTO: 42.6 % (ref 37.5–51)
HGB BLD-MCNC: 14.3 G/DL (ref 13–17.7)
IMM GRANULOCYTES # BLD AUTO: 0.23 10*3/MM3 (ref 0–0.05)
IMM GRANULOCYTES NFR BLD AUTO: 6.7 % (ref 0–0.5)
LYMPHOCYTES # BLD AUTO: 0.82 10*3/MM3 (ref 0.7–3.1)
LYMPHOCYTES NFR BLD AUTO: 23.8 % (ref 19.6–45.3)
MCH RBC QN AUTO: 31.4 PG (ref 26.6–33)
MCHC RBC AUTO-ENTMCNC: 33.6 G/DL (ref 31.5–35.7)
MCV RBC AUTO: 93.6 FL (ref 79–97)
MONOCYTES # BLD AUTO: 0.4 10*3/MM3 (ref 0.1–0.9)
MONOCYTES NFR BLD AUTO: 11.6 % (ref 5–12)
NEUTROPHILS NFR BLD AUTO: 1.98 10*3/MM3 (ref 1.7–7)
NEUTROPHILS NFR BLD AUTO: 57.6 % (ref 42.7–76)
NRBC BLD AUTO-RTO: 0 /100 WBC (ref 0–0.2)
PLATELET # BLD AUTO: 84 10*3/MM3 (ref 140–450)
PMV BLD AUTO: 10.2 FL (ref 6–12)
POTASSIUM SERPL-SCNC: 4.2 MMOL/L (ref 3.5–4.7)
PROT SERPL-MCNC: 6.5 G/DL (ref 6.3–8)
RBC # BLD AUTO: 4.55 10*6/MM3 (ref 4.14–5.8)
SODIUM SERPL-SCNC: 138 MMOL/L (ref 134–145)
WBC # BLD AUTO: 3.44 10*3/MM3 (ref 3.4–10.8)

## 2021-01-04 PROCEDURE — 96366 THER/PROPH/DIAG IV INF ADDON: CPT

## 2021-01-04 PROCEDURE — 96377 APPLICATON ON-BODY INJECTOR: CPT

## 2021-01-04 PROCEDURE — 99214 OFFICE O/P EST MOD 30 MIN: CPT | Performed by: NURSE PRACTITIONER

## 2021-01-04 PROCEDURE — 80053 COMPREHEN METABOLIC PANEL: CPT

## 2021-01-04 PROCEDURE — 96361 HYDRATE IV INFUSION ADD-ON: CPT

## 2021-01-04 PROCEDURE — 25010000002 PEGFILGRASTIM 6 MG/0.6ML PREFILLED SYRINGE KIT: Performed by: NURSE PRACTITIONER

## 2021-01-04 PROCEDURE — 85025 COMPLETE CBC W/AUTO DIFF WBC: CPT

## 2021-01-04 PROCEDURE — 96413 CHEMO IV INFUSION 1 HR: CPT

## 2021-01-04 PROCEDURE — 25010000003 HEPARIN LOCK FLUSH PER 10 UNITS: Performed by: NURSE PRACTITIONER

## 2021-01-04 PROCEDURE — 63710000001 PROCHLORPERAZINE MALEATE PER 10 MG: Performed by: NURSE PRACTITIONER

## 2021-01-04 PROCEDURE — 96415 CHEMO IV INFUSION ADDL HR: CPT

## 2021-01-04 PROCEDURE — 25010000002 OBINUTUZUMAB 1000 MG/40ML SOLUTION 40 ML VIAL: Performed by: NURSE PRACTITIONER

## 2021-01-04 RX ORDER — FAMOTIDINE 10 MG/ML
20 INJECTION, SOLUTION INTRAVENOUS AS NEEDED
Status: CANCELLED | OUTPATIENT
Start: 2021-01-04

## 2021-01-04 RX ORDER — HEPARIN SODIUM (PORCINE) LOCK FLUSH IV SOLN 100 UNIT/ML 100 UNIT/ML
500 SOLUTION INTRAVENOUS AS NEEDED
Status: DISCONTINUED | OUTPATIENT
Start: 2021-01-04 | End: 2021-01-04 | Stop reason: HOSPADM

## 2021-01-04 RX ORDER — DIPHENHYDRAMINE HYDROCHLORIDE 50 MG/ML
50 INJECTION INTRAMUSCULAR; INTRAVENOUS AS NEEDED
Status: CANCELLED | OUTPATIENT
Start: 2021-01-04

## 2021-01-04 RX ORDER — ACETAMINOPHEN 325 MG/1
650 TABLET ORAL ONCE
Status: COMPLETED | OUTPATIENT
Start: 2021-01-04 | End: 2021-01-04

## 2021-01-04 RX ORDER — SODIUM CHLORIDE 0.9 % (FLUSH) 0.9 %
10 SYRINGE (ML) INJECTION AS NEEDED
Status: CANCELLED | OUTPATIENT
Start: 2021-01-04

## 2021-01-04 RX ORDER — SODIUM CHLORIDE 9 MG/ML
500 INJECTION, SOLUTION INTRAVENOUS ONCE
Status: CANCELLED | OUTPATIENT
Start: 2021-01-04

## 2021-01-04 RX ORDER — HEPARIN SODIUM (PORCINE) LOCK FLUSH IV SOLN 100 UNIT/ML 100 UNIT/ML
500 SOLUTION INTRAVENOUS AS NEEDED
Status: CANCELLED | OUTPATIENT
Start: 2021-01-04

## 2021-01-04 RX ORDER — SODIUM CHLORIDE 9 MG/ML
500 INJECTION, SOLUTION INTRAVENOUS ONCE
Status: COMPLETED | OUTPATIENT
Start: 2021-01-04 | End: 2021-01-04

## 2021-01-04 RX ORDER — PROCHLORPERAZINE MALEATE 10 MG
10 TABLET ORAL ONCE
Status: CANCELLED
Start: 2021-01-04

## 2021-01-04 RX ORDER — PROCHLORPERAZINE MALEATE 10 MG
10 TABLET ORAL ONCE
Status: COMPLETED | OUTPATIENT
Start: 2021-01-04 | End: 2021-01-04

## 2021-01-04 RX ORDER — ACETAMINOPHEN 325 MG/1
650 TABLET ORAL ONCE
Status: CANCELLED | OUTPATIENT
Start: 2021-01-04

## 2021-01-04 RX ORDER — SODIUM CHLORIDE 9 MG/ML
250 INJECTION, SOLUTION INTRAVENOUS ONCE
Status: COMPLETED | OUTPATIENT
Start: 2021-01-04 | End: 2021-01-04

## 2021-01-04 RX ORDER — SODIUM CHLORIDE 9 MG/ML
250 INJECTION, SOLUTION INTRAVENOUS ONCE
Status: CANCELLED | OUTPATIENT
Start: 2021-01-04

## 2021-01-04 RX ADMIN — SODIUM CHLORIDE 500 ML: 9 INJECTION, SOLUTION INTRAVENOUS at 09:25

## 2021-01-04 RX ADMIN — SODIUM CHLORIDE 250 ML: 9 INJECTION, SOLUTION INTRAVENOUS at 09:43

## 2021-01-04 RX ADMIN — Medication 500 UNITS: at 13:57

## 2021-01-04 RX ADMIN — OBINUTUZUMAB 1000 MG: 1000 INJECTION, SOLUTION, CONCENTRATE INTRAVENOUS at 10:27

## 2021-01-04 RX ADMIN — ACETAMINOPHEN 650 MG: 325 TABLET ORAL at 09:42

## 2021-01-04 RX ADMIN — PEGFILGRASTIM 6 MG: KIT SUBCUTANEOUS at 13:51

## 2021-01-04 RX ADMIN — PROCHLORPERAZINE MALEATE 10 MG: 10 TABLET ORAL at 09:42

## 2021-01-04 NOTE — PROGRESS NOTES
Subjective       REASON FOR VISIT:    1. Chronic lymphoid leukemia, stage 4, presented initially with significant pancytopenia  · Currently on Gazyva with venetoclax  · Cycle 1 Gazyva given on August 13, 2020    2.  History of angiodysplasia requiring cauterization and history of Hess's esophagus    3.  Bone marrow aspiration biopsy shows hypercellular marrow with 98% involvement with CLL    Patient ID: Macho Stephenson is a 70 y.o. year old male     History of Present Illness Mr. Stephenson is a 70 year old male with the above mentioned history here today for follow-up.  He has been receiving treatment with Gazyva and venetoclax.      Interval history:   Patient is tolerating venetoclax well he reports.  He denies persistent nausea or vomiting.  He denies any recent fevers, shortness of breath, or infections.  He denies bleeding but it does have thrombocytopenia following Gazyva.    He also has required Neulasta injection.  He is taking venetoclax 100 mg daily along with voriconazole 200 mg daily and acyclovir 400 mg p.o. twice daily.          PAST MEDICAL HISTORY:   1. Recurrent atrial fibrillation followed by cardiology. He has had drug eluting stent placed x 3.   2. High cholesterol.   3. Hypertension.       HEMATOLOGIC/ONCOLOGIC HISTORY:       The patient is 62-year-old gentleman with the above history currently here for followup. He has no complaints. He denies fever, night sweats or weight loss. He does not have any lymphadenopathy. He feels good. He had some fatigue but his CBC shows a go od hemoglobin.   The patient is followed by Dr. Kevin Chandra. He had seen Dr. Chandra 7/18/13 for a history of coronary artery disease status drug eluting stent placement to the right coronary artery and left anterior descending artery in February 2011. Histor y of paroxysmal atrial fibrillation and hyperlipidemia. He presented to [facility] on 6/23/13 with palpitations and was found to have atrial  fibrillation with rapid ventricular response. He was given IV Cardizem and converted spontaneously to normal sinus rhythm. He was found to have elevated white count at 18.9 and denied any symptoms of infection or urinary complaints. TSH was normal, troponin levels were negative. He was asked to continue aspirin and metoprolol for that. If he contin u ed to have atrial fibrillation, they will consider antiarrhythmic therapy with or without a short term anticoagulation therapy. However, currently the patient is feeling reasonably good. He has no complaints. His CBC 7/22/13 showed WBC 17,000, hemoglob i n 16.4 and platelet count of 174,000. Back 9/28/13 his hemoglobin was 15.1, WBC 13.3 and platelets 197,000. He has had a persistently elevated WBC but he is totally asymptomatic. He does not have any fever, night sweats or lymphadenopathy. He is here to be evaluated for his elevated white count.       Peripheral blood flow cytometry done 08/16/13 shows 22% chronic lymphoid leukemia cells, and they expressed ZAP-70 but not CD38. The total number of cells approximated 60% T cells, 32% B cells and 1% natural k iller cells. The B cells were monoclonal and expressed CD19, CD20, CD22, CD5, CD23, CD11c, ZAP-70 and T cell antigens. There was a normal CD4/CD8 ratio. CT of the chest, abdomen and pelvis does not show evidence of metastasis. Peripheral blood for DILLON -2 was negative. It was negative for T11:14 translocation.       CT scan done on 08/21/2013 shows 5 to 6 mm noncalcified nodule in the left lower lobe which is unchanged from December 2010. Otherwise no evidence of any lymphadenopathy or hepatosplenomegaly.       MRI of the spine shows arthritis and disc bulge and likely hemangiomas, with 1 lesion showing increased signal intensity at T2, which is likely a hemangioma. Bone scan could be done, but patient does not even have much pain there. CT scan of the chest, a bdomen and pelvis was done on 11/23/2014. He has tiny  lymph nodes in the neck which are difficult to palpate. Right jugular one is 1.4 x 0.9 and left supraclavicular one is 1.5 x 1.1. He also has a subcarinal lymph node which is 1.7 x 1.2 cm, and he ha s a portocaval lymph node which has increased from 2.5 to 2.8. Patient is totally asymptomatic. He does not have any B symptoms, so will continue followup with observation.           Patient is a 69-year-old gentleman with history of chronic lymphocytic leukemia/SLL who recently got admitted to [facility] because of GI bleed.  He was seen by Dr. Montero.  He underwent EGD colonoscopy.  He was found to have angiodysplasia for which he underwent argon coagulation.  He required 3 units of blood.  Patient had a normal esophagus normal stomach and normal duodenum CLOtest was negative he was asked to take Protonix 40 mg daily.  Colonoscopy showed blood in the entire examined colon but there was a single bleeding colonic angiectasia which was treated with argon plasma coagulation.    He was admitted twice.  Initially he was admitted on July 10 at which time he stayed 3 days and he was evaluated for chest pain.  He underwent a cardiac work-up with stress test and echo.  The echo was normal but the stress test showed medium sized moderate severe severity fixed perfusion defect.  Of the inferior wall consistent with infarction.  However according to patient cardiology did not think he had any cardiac problems.  I have left a message for patient's cardiologist to call me today.  Patient subsequently got readmitted with GI bleed and required 1/3 unit of transfusion.  He was found to have thrombocytopenia and hematology was consulted.    His hemoglobin had dropped down to as low as 7 and his platelets had gone down to 87.  Today it is 35 and his hemoglobin is 9.4 today.  He denies active bleeding.  He has lost weight recently.  He does not have any fever or night sweats.    He had ultrasound of spleen which showed enlarged  spleen centimeters in length    Patient was started on Gazyva with Leukeran but subsequently switched to venetoclax with Gazyva.  His venetoclax dose is 100 mg daily and he receives Gazyva once a month.    CT scan done 2020 shows significant response     MEDICATIONS: The current medication list was reviewed with the patient and updated in the EMR this date per the medical assistant. Medication dosages and frequencies were confirmed to be accurate.       ALLERGIES: PENICILLIN which causes him to swell up. He is allergic to IV CONTRAST DYE.     SOCIAL HISTORY: He is . He smokes one pack per day for 35 years. He consumes three to four alcoholic drinks per week. He has no tattoos and no previous transfusions.       FAMILY HISTORY: Father  at 82 with MI. Mother  at 82 with bone cancer. He has one brother age 65 in good health and two sisters 69 and 57 in good health.   Past Medical History, Social History, and Family History are unchanged from my prior documentation on     Review of Systems           Patient Active Problem List   Diagnosis   • CLL (chronic lymphocytic leukemia) (CMS/HCC)   • Anemia   • Encounter for hepatitis C screening test for low risk patient    • Thrombocytopenia (CMS/HCC)   • H/O heart artery stent   • Stented coronary artery   • Arteriosclerosis of coronary artery   • Atrial fibrillation (CMS/HCC)   • Paroxysmal atrial fibrillation (CMS/HCC)   • Chest pain, rule out acute myocardial infarction   • Fall   • Fracture, olecranon   • Hyperlipidemia   • Long term (current) use of anticoagulants   • Lower GI bleed   • Olecranon bursitis   • Shoulder pain   • Smoker   • CLL (chronic lymphocytic leukemia) (CMS/HCC)   • Dehydration   • Drug-induced nausea and vomiting   • Encounter for long-term (current) use of other medications   • Neutropenia (CMS/HCC)         MEDICATIONS:  Medication Reconciliation for the patient has been reviewed by me and documented in the  patients chart.    ALLERGIES:    Allergies   Allergen Reactions   • Contrast Dye Swelling   • Penicillins Swelling         There were no vitals filed for this visit.     Current Status 12/7/2020   ECOG score 0            This patient's ACP documentation is up to date, and there's nothing further left to document.    CONSTITUTIONAL:  Vital signs reviewed.    No distress, looks comfortable.  EYES:  Conjunctiva and lids unremarkable.  Extraocular eye movements intact.  EARS,NOSE,MOUTH,THROAT:  Ears and nose appear unremarkable.  Lips, teeth, gums appear unremarkable.  RESPIRATORY:  Normal respiratory effort,  no rales  or wheezing, clear   CARDIOVASCULAR:  Regular rate and rhythm, no murmur  No significant lower extremity edema.  SKIN: No wounds.  No rashes.  MUSCULOSKELETAL/EXTREMITIES: No clubbing or cyanosis.  No apparent unilateral weakness.  NEURO: CN 2-12 appear intact. No focal neurological deficits noted.  PSYCHIATRIC:  Normal judgment and insight.  Normal mood and affect.    RECENT LABS:  Results from last 7 days   Lab Units 12/28/20  1516   WBC 10*3/mm3 4.65   NEUTROS ABS 10*3/mm3 2.44   HEMOGLOBIN g/dL 13.9   HEMATOCRIT % 40.6   PLATELETS 10*3/mm3 105*               CT NECK, CHEST, ABDOMEN, AND PELVIS WITHOUT IV CONTRAST 11/04/20    IMPRESSION:  1. Some of the lymphadenopathy has resolved and the remaining  lymphadenopathy is significantly smaller than previously.   2. Splenic size has decreased as well. There is no new abnormality.         STAT NONCONTRAST HEAD CT 08/20/2020  IMPRESSION:  1. No acute abnormality is seen with no significant change when compared  to prior noncontrast head CT from [facility] 03/03/2015.  2. There is mild small vessel disease in the frontal periventricular  white matter and there are calcified atherosclerotic plaques in the  cavernous internal carotid arteries bilaterally.  3. Patient has had previous paranasal sinus surgery with bilateral  uncinectomies and  ethmoidectomies and the paranasal sinuses are  currently clear. The remainder of the head CT is within normal limits,  specifically no acute intracranial hemorrhage is seen. Etiology of  headaches in this patient with history of leukemia and thrombocytopenia  is not established on this exam. Results were communicated to Susannah Moya, the oncologist taking care of the patient by telephone on  08/20/2020 at 9:20 AM.      XR CHEST POST CVA PORT-07/31/20    IMPRESSION:  Chest port placement. No pneumothorax.       CT NECK, CHEST, ABDOMEN, AND PELVIS WITHOUT IV CONTRAST. 7/20/2020   HISTORY: 69-year-old male with CLL/SLL.     TECHNIQUE: Radiation dose reduction techniques were utilized, including  automated exposure control and exposure modulation based on body size. 3  mm images were obtained through the neck, chest, abdomen, and pelvis  without the administration of IV contrast. Compared with CT of the  abdomen and pelvis from 08/05/2019 and previous CTs from 03/19/2018.     FINDINGS:  NECK: There is no significant change in the 1.4 x 1.0 cm left  supraclavicular node or in the smaller supraclavicular nodes  bilaterally. A reference 1.4 x 0.7 cm right jugular chain node is  stable. There are shotty nodes scattered throughout the neck. No new  lymphadenopathy is seen. Noncontrasted parotid, submandibular, and  thyroid glands appear unremarkable.     CHEST: There has been interval decrease in the size of the shotty and  borderline enlarged nodes at the axilla. Reference 1.6 x 0.7 cm right  axillary node previously measured 2.0 x 1.2 cm. There has been a  decrease in the size of all of the shotty mediastinal and hilar nodes.  There are no new pulmonary opacities and there are no pleural or  pericardial effusions.     ABDOMEN/PELVIS: Splenic size has slightly increased measuring 15.4 cm in  diameter and previously measuring 13.5 cm, both measured on the coronal  reconstruction series. There is no change in the shotty  nodes at the  gastrohepatic ligament and shorty hepatis. There are no pathologically  enlarged nodes. There is no acute abnormality involving the  noncontrasted liver. A single gallstone is noted within the gallbladder.  Noncontrasted pancreas, adrenals, and kidneys appear unremarkable. There  is no acute bowel abnormality. There is extensive sigmoid  diverticulosis. No free fluid. There are extensive abdominal aortic  atherosclerotic changes and there is a stable 3.7 cm infrarenal  abdominal aortic aneurysm.     IMPRESSION:  1. There has been slight interval increase in splenomegaly since the CT  of the abdomen from 08/05/2019.  2. Interval decrease in the size of the axillary nodes and the shotty  mediastinal nodes. There is no change in the shotty nodes within the  neck and supraclavicular regions. There is no new lymphadenopathy.  3. Stable 3.7 cm infrarenal abdominal aortic aneurysm.      Assessment/Plan   1. Stage 2 CLL without evidence of any disease progression.  I have reviewed the CT scan from 3/19/2018 there is minimal progression but clinically patient is asymptomatic and we will follow with observation.  Will monitor with labs.   No evidence of any frequent infections, no major worsening of his white count. He has no fever or night sweats or any other symptoms.   · Patient knows that he is prone for infections and he is mainly staying at home during the COVID-19 situation time  · Recent admission to [facility] July 10 2020×2.  Initially admitted with chest pain and underwent stress test and echo.  Echo was negative.  Stress test is abnormal but have left message for patient's cardiologist to call us.  His cardiologist is been sent [facility].  · He then got admitted the second time with worsening GI bleed and required total of 3 units of blood.  EGD was negative but colonoscopy showed angiectasia for which he underwent argon ablation.  Currently hemoglobin stable and no active bleeding.  · He was also  "found to have low platelets with platelets dropping down to 27k and hemoglobin was 7.  Ultrasound showed splenomegaly 15 cm.  Concern is whether he has progressed from his CLL point of view and requires treatment.  · CT scan performed 7/20/2020 did show the increased splenomegaly, but interval decrease in size of the axillary nodes, and shotty mediastinal nodes.  No change in the shotty nodes within the neck and supraclavicular regions.  No new lymphadenopathy.  Bone marrow biopsy however showed preliminarily that there is bone marrow involvement.  Remainder of bone marrow biopsy report is pending.    · Bone marrow shows hypercellular marrow with 100% involvement of chronic lymphocytic leukemia/small lymphocytic lymphoma and diffuse pattern with at least 98% of the total marrow cellularity.  Rare foci of erythropoiesis and rare megakaryocytes are noted.     · Negative for BCL-2 and BCL 6.  Chromosomes shows normal karyotype.  I GVH mutation present.  Positive for gain of 1 q., monosomy 13 and loss of IGH.  Negative for deletion T p53, negative for gain of chromosomes 9 and 11, negative for 11, 14 fusion.    · The combination of monosomy 13 and gain of 1 q. is thought to be associated with plasma cell myeloma.  However this patient does not have myeloma.    · Scans were reviewed with the patient today.  Dr. Moya also discussed with the patient and recommended he initiate treatment with chlorambucil and Gazyva.  He would not be a good candidate for Imbruvica due to his history of A. fib\".  He cannot take anticoagulation at this time.  The patient was provided with chemotherapy education today, including  Handouts.  He will need a port placed so that we can initiate treatment  · 8/13/2020: Gazyva day 1. Plan also [id number] mg p.o. on day 1 day 15.  We can increase the dose once we know he tolerates and his platelets improved.  · Patient developing pancytopenia when reviewed cycle 1 day 15.  Chlorambucil dose " modified to 10 mg for day 15 however it is felt that he cannot tolerate this ongoing.   ·  Plan to switch to Venetoclax along with continuing Gazyva.    · Patient due today for cycle 2-day 1 Gazyva.  He will proceed today as scheduled.  Venetoclax will be shipped to his home with plans to begin this next week on 9/14/2020.    · Patient did receive 1 L normal saline and Neulasta on 9/28/2020 secondary to neutropenia with an ANC of 0.04.  · Patient seen on 10/02/2020 continuing on venetoclax, currently on 100 mg dosing.  He would not increase his dose as he will start on voriconazole after being on venetoclax x1 week which affects the metabolism of venetoclax.  He is currently on day 5 of the 100 mg dosing.  Patient states overall he feels the same except for increased fatigue and nausea.  His nauseousness is controlled with ondansetron however.  Patient will be given 1 L normal saline in the office today as planned.  · Patient returns on 10/12/2020 continuing on venetoclax 100 mg daily as well as voriconazole.  · Patient is doing well with these treatments except fatigue.  Next cycle 4 due as of number ninth prior to which will obtain CT scans  · November 9, 2020: White count down to 1.89 but patient started Pepcid.  We will hold off Pepcid.  · 11/17/2020 platelet count dropped to 35,000 patient was instructed to hold venetoclax.  · Returns 11/23/2020 WBC up to 6.38, ANC 5.19, hemoglobin 12.4, platelets up to 121.  I have advised him to resume venetoclax 100 mg daily  · December 7, 2020: Platelets 95K.  White count 3.0 with absolute neutrophil count of 2.01.  Cycle 5 Gazyva given.  Continue venetoclax with voriconazole.   · January 4, 2021: Platelets 84,000, white count 3,440, absolute neutrophil count 1,980. Cycle 6 given today.       2.  Worsening thrombocytopenia, looking back his platelets were always in the 150s and during this admission he dropped down to 27K. His LDH also was elevated and he was anemic.  · It  was dropping on chlorambucil.  Plans to change therapy as detailed above.  Patient did require transfusion and platelets are currently improved to 55,000.  · 11/17/2020 platelets dropped 35,000.  Venetoclax was temporarily held.  · 11/23/2020 platelets back up to 121 venetoclax resumed  · Aspirin 81 mg daily held when platelets are less than 60,000  · 1/4/2021 platelet count 84,000    3.  History of angiodysplasia requiring cauterization and Hess's esophagus mild anemia. He is currently asymptomatic.  · No evidence of active bleeding.  Stable  · No active bleeding.    4.  History of cluster headaches for which she has to be treated with steroids in the past and has followed up with Dr. Len Walker.today with significant headaches.  · Patient had CT of the head 8/20/2020 which was negative.  · He was started on prednisone 10 mg daily which was not helpful.  · Patient saw Dr. Bobby, neurology who gave him 2 shots of a new medication Emgality.  This is something that can be given once a month.    · Headaches improved.  Resolved  · Stable    5.  Neutropenia without fever.Cipro 500 mg twice daily was started.  Patient was given Neulasta on 9/28/2020.  Cipro can be held today because his counts have improved.  · Significant drop in his white count to 1.89 but ANC 1.15 as of November 9, 2020  · 11/23/2020 WBC 6.38, ANC 5.19.  We will continue to closely monito  · Patient continues with Neulasta each cycle.    6.  Restless leg syndrome: Iron studies checked 10/22/2020 ferritin 338.5, iron saturation 16%.  · Hemoglobin improved to 14.3    7.  Episodes of being lightheaded: Patient reports worse when changing position.  He related to possible inner ear problems.  We also discussed that could be related to changes in blood pressure.  We discussed making sure he is drinking plenty of fluids and occasionally monitoring blood pressure at home, as he is on 2 medications that could affect his blood pressure.    · No evidence of  lightheadedness    8.  Atrial fibrillation, off anticoagulation given his thrombocytopenia.  · Patient followed by Dr. Robert Rodriguez  · Given pancytopenia, GI bleed secondary to angiodysplasia, intermittent significant thrombocytopenia anticoagulation had been held.  · Patient continues on aspirin 81 mg daily if platelet count greater than 60,000.    9.  Elevated alkaline phosphatase  · Alkaline phosphatase stable today.  · Patient has a history of a 1.7CM gallstone nonobstructing      PLAN:  1. Continue venetoclax 100 mg daily.  2. Continue voriconazole.  3. We will proceed today with cycle 6 Gazyva with Neulasta support.  4. Follow-up weekly with CBC and every 2 weeks with CMP  5. Follow-up with Dr. Moya in 4 weeks    Patient continues on high risk medication with frequent monitoring.      MARIAH Luna          Cc: Dr. Kevin Gilmore

## 2021-01-04 NOTE — PROGRESS NOTES
MTM in person encounter re adherence and side effects  ( venetoclax)    Mr Stephenson presents to the clinic today and states today is last day for Gazyva infusions.  He continues on Venetoclax 100 mg po daily and his reported adherence seems appropriate.  He states he continues to feel nauseated at times, but uses prochlorperazine to manage successfully.  He continues to adhere to prophylactic Voriconazole and Acyclovir as well.  He is eating well, reports no new medication additions, nor any questions or concerns for the MTM office.

## 2021-01-05 ENCOUNTER — MEDICATION THERAPY MANAGEMENT (OUTPATIENT)
Dept: ONCOLOGY | Facility: HOSPITAL | Age: 71
End: 2021-01-05

## 2021-01-05 NOTE — PROGRESS NOTES
[facility] lab review ( Venetoclax +Gazyva)        1/4/2021  Day 1   WBC 3.40 - 10.80 10*3/mm3 3.44   Neutrophils Absolute 1.70 - 7.00 10*3/mm3 1.98   Hemoglobin 13.0 - 17.7 g/dL 14.3   Hematocrit 37.5 - 51.0 % 42.6   Platelets 140 - 450 10*3/mm3 84 (A)   Creatinine 0.70 - 1.30 mg/dL 0.87   eGFR Non African Am >60 mL/min/1.73 87   BUN 6 - 20 mg/dL 15   Sodium 134 - 145 mmol/L 138   Potassium 3.5 - 4.7 mmol/L 4.2   Glucose 74 - 124 mg/dL 147 (A)   Calcium 8.5 - 10.2 mg/dL 9.5   Albumin 3.50 - 5.20 g/dL 4.20   Total Protein 6.3 - 8.0 g/dL 6.5   AST (SGOT) 0 - 40 U/L 20   ALT (SGPT) 0 - 41 U/L 12   Alkaline Phosphatase 38 - 116 U/L 236 (A)   Total Bilirubin 0.2 - 1.2 mg/dL 0.7     APRN dictation is noted.  Received cycle 6 of Gazyva, continues Venetoclax 100mg po daily.  ASA 81 mg po daily is used as long as platelets above 60.

## 2021-01-12 ENCOUNTER — CLINICAL SUPPORT (OUTPATIENT)
Dept: ONCOLOGY | Facility: HOSPITAL | Age: 71
End: 2021-01-12

## 2021-01-12 ENCOUNTER — TELEPHONE (OUTPATIENT)
Dept: ONCOLOGY | Facility: CLINIC | Age: 71
End: 2021-01-12

## 2021-01-12 ENCOUNTER — LAB (OUTPATIENT)
Dept: LAB | Facility: HOSPITAL | Age: 71
End: 2021-01-12

## 2021-01-12 DIAGNOSIS — C91.10 CLL (CHRONIC LYMPHOCYTIC LEUKEMIA) (HCC): Primary | ICD-10-CM

## 2021-01-12 LAB
BASOPHILS # BLD AUTO: 0.03 10*3/MM3 (ref 0–0.2)
BASOPHILS NFR BLD AUTO: 0.3 % (ref 0–1.5)
DEPRECATED RDW RBC AUTO: 53.3 FL (ref 37–54)
EOSINOPHIL # BLD AUTO: 0 10*3/MM3 (ref 0–0.4)
EOSINOPHIL NFR BLD AUTO: 0 % (ref 0.3–6.2)
ERYTHROCYTE [DISTWIDTH] IN BLOOD BY AUTOMATED COUNT: 15.7 % (ref 12.3–15.4)
HCT VFR BLD AUTO: 40.9 % (ref 37.5–51)
HGB BLD-MCNC: 14 G/DL (ref 13–17.7)
IMM GRANULOCYTES # BLD AUTO: 1.43 10*3/MM3 (ref 0–0.05)
IMM GRANULOCYTES NFR BLD AUTO: 12 % (ref 0–0.5)
LYMPHOCYTES # BLD AUTO: 1.08 10*3/MM3 (ref 0.7–3.1)
LYMPHOCYTES NFR BLD AUTO: 9.1 % (ref 19.6–45.3)
MCH RBC QN AUTO: 32 PG (ref 26.6–33)
MCHC RBC AUTO-ENTMCNC: 34.2 G/DL (ref 31.5–35.7)
MCV RBC AUTO: 93.4 FL (ref 79–97)
MONOCYTES # BLD AUTO: 0.8 10*3/MM3 (ref 0.1–0.9)
MONOCYTES NFR BLD AUTO: 6.7 % (ref 5–12)
NEUTROPHILS NFR BLD AUTO: 71.9 % (ref 42.7–76)
NEUTROPHILS NFR BLD AUTO: 8.58 10*3/MM3 (ref 1.7–7)
NRBC BLD AUTO-RTO: 0 /100 WBC (ref 0–0.2)
PLATELET # BLD AUTO: 29 10*3/MM3 (ref 140–450)
RBC # BLD AUTO: 4.38 10*6/MM3 (ref 4.14–5.8)
WBC # BLD AUTO: 11.92 10*3/MM3 (ref 3.4–10.8)

## 2021-01-12 PROCEDURE — G0463 HOSPITAL OUTPT CLINIC VISIT: HCPCS

## 2021-01-12 PROCEDURE — 36415 COLL VENOUS BLD VENIPUNCTURE: CPT

## 2021-01-12 PROCEDURE — 85025 COMPLETE CBC W/AUTO DIFF WBC: CPT

## 2021-01-12 NOTE — TELEPHONE ENCOUNTER
----- Message from Arlene Magallon RN sent at 1/12/2021  2:56 PM EST -----  Per Dr. Moya, please call pt to schedule lab with CBC and RN review Friday 1/15, pt prefers later appts. Thanks

## 2021-01-12 NOTE — PROGRESS NOTES
CBC results reviewed with pt. Plt count 29k. Pt denies any bleeding. Pt c/o stomach pains that come and go. Pt states this is relieved with compazine. Denies any nausea, vomiting, or diarrhea. Pt voices no other concerns. Reviewed with Dr. Moya. Per Dr. Moya, pt to return Friday 1/15 for CBC and RN review. Pt informed and instructed to call with any c/o bleeding or if has worsening stomach pain or nausea. Pt v/u. Message sent to appt desk to call pt at his request to schedule appt Friday.     Lab Results   Component Value Date    WBC 11.92 (H) 01/12/2021    HGB 14.0 01/12/2021    HCT 40.9 01/12/2021    MCV 93.4 01/12/2021    PLT 29 (L) 01/12/2021

## 2021-01-13 ENCOUNTER — MEDICATION THERAPY MANAGEMENT (OUTPATIENT)
Dept: ONCOLOGY | Facility: HOSPITAL | Age: 71
End: 2021-01-13

## 2021-01-13 NOTE — PROGRESS NOTES
[facility] lab review ( Venetoclax)        1/12/2021   WBC 3.40 - 10.80 10*3/mm3 11.92 (A)   Neutrophils Absolute 1.70 - 7.00 10*3/mm3 8.58 (A)   Hemoglobin 13.0 - 17.7 g/dL 14.0   Hematocrit 37.5 - 51.0 % 40.9   Platelets 140 - 450 10*3/mm3 29 (A)     Continues on Venetoclax 100 mg po daily.  Follow up labs due 1/15/21

## 2021-01-15 ENCOUNTER — CLINICAL SUPPORT (OUTPATIENT)
Dept: ONCOLOGY | Facility: HOSPITAL | Age: 71
End: 2021-01-15

## 2021-01-15 ENCOUNTER — LAB (OUTPATIENT)
Dept: LAB | Facility: HOSPITAL | Age: 71
End: 2021-01-15

## 2021-01-15 DIAGNOSIS — C91.10 CLL (CHRONIC LYMPHOCYTIC LEUKEMIA) (HCC): Primary | ICD-10-CM

## 2021-01-15 LAB
BASOPHILS # BLD AUTO: 0.09 10*3/MM3 (ref 0–0.2)
BASOPHILS NFR BLD AUTO: 0.8 % (ref 0–1.5)
DEPRECATED RDW RBC AUTO: 52.3 FL (ref 37–54)
EOSINOPHIL # BLD AUTO: 0 10*3/MM3 (ref 0–0.4)
EOSINOPHIL NFR BLD AUTO: 0 % (ref 0.3–6.2)
ERYTHROCYTE [DISTWIDTH] IN BLOOD BY AUTOMATED COUNT: 15.2 % (ref 12.3–15.4)
HCT VFR BLD AUTO: 42.4 % (ref 37.5–51)
HGB BLD-MCNC: 14.4 G/DL (ref 13–17.7)
IMM GRANULOCYTES # BLD AUTO: 0.86 10*3/MM3 (ref 0–0.05)
IMM GRANULOCYTES NFR BLD AUTO: 7.4 % (ref 0–0.5)
LYMPHOCYTES # BLD AUTO: 1.06 10*3/MM3 (ref 0.7–3.1)
LYMPHOCYTES NFR BLD AUTO: 9.1 % (ref 19.6–45.3)
MCH RBC QN AUTO: 31.8 PG (ref 26.6–33)
MCHC RBC AUTO-ENTMCNC: 34 G/DL (ref 31.5–35.7)
MCV RBC AUTO: 93.6 FL (ref 79–97)
MONOCYTES # BLD AUTO: 0.72 10*3/MM3 (ref 0.1–0.9)
MONOCYTES NFR BLD AUTO: 6.2 % (ref 5–12)
NEUTROPHILS NFR BLD AUTO: 76.5 % (ref 42.7–76)
NEUTROPHILS NFR BLD AUTO: 8.86 10*3/MM3 (ref 1.7–7)
NRBC BLD AUTO-RTO: 0 /100 WBC (ref 0–0.2)
PLATELET # BLD AUTO: 84 10*3/MM3 (ref 140–450)
PMV BLD AUTO: 11.7 FL (ref 6–12)
RBC # BLD AUTO: 4.53 10*6/MM3 (ref 4.14–5.8)
WBC # BLD AUTO: 11.59 10*3/MM3 (ref 3.4–10.8)

## 2021-01-15 PROCEDURE — G0463 HOSPITAL OUTPT CLINIC VISIT: HCPCS

## 2021-01-15 PROCEDURE — 36415 COLL VENOUS BLD VENIPUNCTURE: CPT

## 2021-01-15 PROCEDURE — 85025 COMPLETE CBC W/AUTO DIFF WBC: CPT

## 2021-01-15 NOTE — NURSING NOTE
Pt is here for lab with RN review.  CBC reviewed with pt, counts are stable for this pt at this time. Pt c/o abdominal pain, decreased appetite and fatigue secondary to Venetoclex. Pt states he takes Compazine at night which helps but makes him drowsy so he doesn't take this during the day and he states zofran doesn't help. R/w Michaelle NP, pt to try adding Prilosec OTC to see if this helps with the discomfort pt is experiencing. Pt is supplementing with Ensure twice a day, pt was encouraged to continue this. Pt also reports registering for his Covid-19 vaccine which he has scheduled for 1/24/21. Pt asking if this is okay. Message sent to Dr. Moya and pt will f/u on this when he returns on Tuesday for RN review. Copy of labs given to pt and f/u appt reviewed. Pt is instructed to call the office with any concerns or new symptoms prior to next visit. Pt v/u.    Lab Results   Component Value Date    WBC 11.59 (H) 01/15/2021    HGB 14.4 01/15/2021    HCT 42.4 01/15/2021    MCV 93.6 01/15/2021    PLT 84 (L) 01/15/2021

## 2021-01-18 ENCOUNTER — TELEPHONE (OUTPATIENT)
Dept: ONCOLOGY | Facility: CLINIC | Age: 71
End: 2021-01-18

## 2021-01-18 ENCOUNTER — MEDICATION THERAPY MANAGEMENT (OUTPATIENT)
Dept: ONCOLOGY | Facility: HOSPITAL | Age: 71
End: 2021-01-18

## 2021-01-18 RX ORDER — OMEPRAZOLE 20 MG/1
20 TABLET, DELAYED RELEASE ORAL DAILY
COMMUNITY
End: 2021-09-29

## 2021-01-18 RX ORDER — ATORVASTATIN CALCIUM 20 MG/1
20 TABLET, FILM COATED ORAL NIGHTLY
Qty: 90 TABLET | Refills: 1 | Status: SHIPPED | OUTPATIENT
Start: 2021-01-18 | End: 2022-07-25

## 2021-01-18 NOTE — PROGRESS NOTES
MT lab review ( Venetoclax)        1/15/2021   WBC 3.40 - 10.80 10*3/mm3 11.59 (A)   Neutrophils Absolute 1.70 - 7.00 10*3/mm3 8.86 (A)   Hemoglobin 13.0 - 17.7 g/dL 14.4   Hematocrit 37.5 - 51.0 % 42.4   Platelets 140 - 450 10*3/mm3 84 (A)       Venetoclax 100 mg po daily continues.  Prilosec added- check for DDI, none found per Up To Date.

## 2021-01-18 NOTE — TELEPHONE ENCOUNTER
----- Message from Gely Cox RN sent at 1/18/2021  9:53 AM EST -----  Please make appointment on 1/22/21 for CBC with RN review,  patient requests appointment to be made in the afternoon.    He needs blood work checked, as he is scheduled for Covid vaccine on 1/24/21.  Please review labs with Dr. Moya.    Thank you,  Gely

## 2021-01-18 NOTE — TELEPHONE ENCOUNTER
Reviewed with Dr. Moya about patient receiving covid vaccine.  Call to patient to let him know Dr. Moya would like his CBC to be checked with RN review on day before he receives vaccine.  He verbalized understanding and stated appointment would need to be make for 1/22/21 as he is scheduled for vaccine on 1/24/21 a Sunday.  Message to scheduling and noted that patient was scheduled for labs with RN review on 1/19/21.  Will reschedule that appointment for 1/22/21 for CBC and CMP with RN review.

## 2021-01-18 NOTE — TELEPHONE ENCOUNTER
CALLED PT WITH HIS APPT DATE AND TIME LVM - ASKED THAT THE PT CALL ME BACK IF THIS WAS NOT GOOD FOR THE PT

## 2021-01-19 ENCOUNTER — APPOINTMENT (OUTPATIENT)
Dept: LAB | Facility: HOSPITAL | Age: 71
End: 2021-01-19

## 2021-01-19 ENCOUNTER — APPOINTMENT (OUTPATIENT)
Dept: ONCOLOGY | Facility: HOSPITAL | Age: 71
End: 2021-01-19

## 2021-01-20 ENCOUNTER — MEDICATION THERAPY MANAGEMENT (OUTPATIENT)
Dept: ONCOLOGY | Facility: HOSPITAL | Age: 71
End: 2021-01-20

## 2021-01-20 NOTE — PROGRESS NOTES
[facility] lab review ( Gazyva and Venetoclax)        1/15/2021   WBC 3.40 - 10.80 10*3/mm3 11.59 (A)   Neutrophils Absolute 1.70 - 7.00 10*3/mm3 8.86 (A)   Hemoglobin 13.0 - 17.7 g/dL 14.4   Hematocrit 37.5 - 51.0 % 42.4   Platelets 140 - 450 10*3/mm3 84 (A)     Continues on Venetoclax 100 mg po daily.

## 2021-01-22 ENCOUNTER — CLINICAL SUPPORT (OUTPATIENT)
Dept: ONCOLOGY | Facility: HOSPITAL | Age: 71
End: 2021-01-22

## 2021-01-22 ENCOUNTER — LAB (OUTPATIENT)
Dept: LAB | Facility: HOSPITAL | Age: 71
End: 2021-01-22

## 2021-01-22 DIAGNOSIS — C91.10 CLL (CHRONIC LYMPHOCYTIC LEUKEMIA) (HCC): Primary | ICD-10-CM

## 2021-01-22 DIAGNOSIS — C91.10 CLL (CHRONIC LYMPHOCYTIC LEUKEMIA) (HCC): ICD-10-CM

## 2021-01-22 LAB
ALBUMIN SERPL-MCNC: 3.9 G/DL (ref 3.5–5.2)
ALBUMIN/GLOB SERPL: 1.6 G/DL (ref 1.1–2.4)
ALP SERPL-CCNC: 209 U/L (ref 38–116)
ALT SERPL W P-5'-P-CCNC: 11 U/L (ref 0–41)
ANION GAP SERPL CALCULATED.3IONS-SCNC: 9.8 MMOL/L (ref 5–15)
AST SERPL-CCNC: 19 U/L (ref 0–40)
BASOPHILS # BLD AUTO: 0.01 10*3/MM3 (ref 0–0.2)
BASOPHILS NFR BLD AUTO: 0.2 % (ref 0–1.5)
BILIRUB SERPL-MCNC: 0.4 MG/DL (ref 0.2–1.2)
BUN SERPL-MCNC: 14 MG/DL (ref 6–20)
BUN/CREAT SERPL: 15.7 (ref 7.3–30)
CALCIUM SPEC-SCNC: 9.8 MG/DL (ref 8.5–10.2)
CHLORIDE SERPL-SCNC: 102 MMOL/L (ref 98–107)
CO2 SERPL-SCNC: 29.2 MMOL/L (ref 22–29)
CREAT SERPL-MCNC: 0.89 MG/DL (ref 0.7–1.3)
DEPRECATED RDW RBC AUTO: 51.9 FL (ref 37–54)
EOSINOPHIL # BLD AUTO: 0 10*3/MM3 (ref 0–0.4)
EOSINOPHIL NFR BLD AUTO: 0 % (ref 0.3–6.2)
ERYTHROCYTE [DISTWIDTH] IN BLOOD BY AUTOMATED COUNT: 14.6 % (ref 12.3–15.4)
GFR SERPL CREATININE-BSD FRML MDRD: 84 ML/MIN/1.73
GLOBULIN UR ELPH-MCNC: 2.5 GM/DL (ref 1.8–3.5)
GLUCOSE SERPL-MCNC: 146 MG/DL (ref 74–124)
HCT VFR BLD AUTO: 38.2 % (ref 37.5–51)
HGB BLD-MCNC: 12.5 G/DL (ref 13–17.7)
IMM GRANULOCYTES # BLD AUTO: 0.38 10*3/MM3 (ref 0–0.05)
IMM GRANULOCYTES NFR BLD AUTO: 9 % (ref 0–0.5)
LYMPHOCYTES # BLD AUTO: 0.78 10*3/MM3 (ref 0.7–3.1)
LYMPHOCYTES NFR BLD AUTO: 18.4 % (ref 19.6–45.3)
MCH RBC QN AUTO: 31.6 PG (ref 26.6–33)
MCHC RBC AUTO-ENTMCNC: 32.7 G/DL (ref 31.5–35.7)
MCV RBC AUTO: 96.5 FL (ref 79–97)
MONOCYTES # BLD AUTO: 0.46 10*3/MM3 (ref 0.1–0.9)
MONOCYTES NFR BLD AUTO: 10.9 % (ref 5–12)
NEUTROPHILS NFR BLD AUTO: 2.6 10*3/MM3 (ref 1.7–7)
NEUTROPHILS NFR BLD AUTO: 61.5 % (ref 42.7–76)
NRBC BLD AUTO-RTO: 0 /100 WBC (ref 0–0.2)
PLATELET # BLD AUTO: 116 10*3/MM3 (ref 140–450)
PMV BLD AUTO: 9.6 FL (ref 6–12)
POTASSIUM SERPL-SCNC: 3.9 MMOL/L (ref 3.5–4.7)
PROT SERPL-MCNC: 6.4 G/DL (ref 6.3–8)
RBC # BLD AUTO: 3.96 10*6/MM3 (ref 4.14–5.8)
SODIUM SERPL-SCNC: 141 MMOL/L (ref 134–145)
WBC # BLD AUTO: 4.23 10*3/MM3 (ref 3.4–10.8)

## 2021-01-22 PROCEDURE — G0463 HOSPITAL OUTPT CLINIC VISIT: HCPCS

## 2021-01-22 PROCEDURE — 80053 COMPREHEN METABOLIC PANEL: CPT

## 2021-01-22 PROCEDURE — 85025 COMPLETE CBC W/AUTO DIFF WBC: CPT

## 2021-01-22 PROCEDURE — 36415 COLL VENOUS BLD VENIPUNCTURE: CPT

## 2021-01-22 RX ORDER — HYDROCODONE BITARTRATE AND ACETAMINOPHEN 5; 325 MG/1; MG/1
1 TABLET ORAL EVERY 8 HOURS PRN
Qty: 20 TABLET | Refills: 0 | Status: SHIPPED | OUTPATIENT
Start: 2021-01-22 | End: 2021-01-25 | Stop reason: SDUPTHER

## 2021-01-22 NOTE — NURSING NOTE
Lab Results   Component Value Date    WBC 4.23 01/22/2021    HGB 12.5 (L) 01/22/2021    HCT 38.2 01/22/2021    MCV 96.5 01/22/2021     (L) 01/22/2021     Pt is here for lab with RN review.  CBC reviewed with pt, counts are stable for this pt at this time. He was to have Covid shot on 1/24, but that has been canceled. Ptno complains of dull ache in stomach all of the time as well as some pain in right side of abdomen. Reviewed with Dr. Moya. Prescription for Norco 1 q 8 hrs routed to Dr. Moya. Per Dr. Moya, it is ok to cancel appointment for CBC/RN review on 1/26. Instructed to call if pain worsens.  Copy of labs given to pt and f/u appt reviewed. Pt is instructed to call the office with any concerns or new symptoms prior to next visit. Pt vu

## 2021-01-25 ENCOUNTER — MEDICATION THERAPY MANAGEMENT (OUTPATIENT)
Dept: ONCOLOGY | Facility: HOSPITAL | Age: 71
End: 2021-01-25

## 2021-01-25 ENCOUNTER — TELEPHONE (OUTPATIENT)
Dept: ONCOLOGY | Facility: CLINIC | Age: 71
End: 2021-01-25

## 2021-01-25 DIAGNOSIS — C91.10 CLL (CHRONIC LYMPHOCYTIC LEUKEMIA) (HCC): ICD-10-CM

## 2021-01-25 RX ORDER — HYDROCODONE BITARTRATE AND ACETAMINOPHEN 5; 325 MG/1; MG/1
1 TABLET ORAL EVERY 8 HOURS PRN
Qty: 20 TABLET | Refills: 0 | Status: SHIPPED | OUTPATIENT
Start: 2021-01-25 | End: 2021-06-08

## 2021-01-25 NOTE — TELEPHONE ENCOUNTER
----- Message from Mar Arnett RN sent at 1/22/2021  5:24 PM EST -----  Please cancel appoint for CBC with RN review on 1/26 per Dr. SAGE Duncan.

## 2021-01-25 NOTE — TELEPHONE ENCOUNTER
Caller: JANIE GARAY    Relationship to patient: WIFE    Best call back number: [phone]    CALLING TO CHECK ON PT'S RX. STATES IT WAS SENT TO [facility], BUT PT NEEDS THE RX TO BE SENT TO [id number] - [facility] [address] [place] [address] - [phone]  - [phone] FX

## 2021-01-25 NOTE — PROGRESS NOTES
[facility] lab review ( Venetoclax)        1/22/2021   WBC 3.40 - 10.80 10*3/mm3 4.23   Neutrophils Absolute 1.70 - 7.00 10*3/mm3 2.60   Hemoglobin 13.0 - 17.7 g/dL 12.5 (A)   Hematocrit 37.5 - 51.0 % 38.2   Platelets 140 - 450 10*3/mm3 116 (A)   Creatinine 0.70 - 1.30 mg/dL 0.89   eGFR Non African Am >60 mL/min/1.73 84   BUN 6 - 20 mg/dL 14   Sodium 134 - 145 mmol/L 141   Potassium 3.5 - 4.7 mmol/L 3.9   Glucose 74 - 124 mg/dL 146 (A)   Calcium 8.5 - 10.2 mg/dL 9.8   Albumin 3.50 - 5.20 g/dL 3.90   Total Protein 6.3 - 8.0 g/dL 6.4   AST (SGOT) 0 - 40 U/L 19   ALT (SGPT) 0 - 41 U/L 11   Alkaline Phosphatase 38 - 116 U/L 209 (A)   Total Bilirubin 0.2 - 1.2 mg/dL 0.4     Continues on Venetoclax 100 mg po daily.

## 2021-01-25 NOTE — TELEPHONE ENCOUNTER
Macho is asking for his HydroCODONE to be sent to [facility] pharmacy. It was sent to [email or web address] in error     [id number] - [facility] [address] AT [place] [address] & [place] [address] - [phone]  - [phone] FX

## 2021-01-26 ENCOUNTER — APPOINTMENT (OUTPATIENT)
Dept: ONCOLOGY | Facility: HOSPITAL | Age: 71
End: 2021-01-26

## 2021-01-26 ENCOUNTER — APPOINTMENT (OUTPATIENT)
Dept: LAB | Facility: HOSPITAL | Age: 71
End: 2021-01-26

## 2021-02-01 ENCOUNTER — SPECIALTY PHARMACY (OUTPATIENT)
Dept: PHARMACY | Facility: HOSPITAL | Age: 71
End: 2021-02-01

## 2021-02-02 ENCOUNTER — INFUSION (OUTPATIENT)
Dept: ONCOLOGY | Facility: HOSPITAL | Age: 71
End: 2021-02-02

## 2021-02-02 ENCOUNTER — OFFICE VISIT (OUTPATIENT)
Dept: ONCOLOGY | Facility: CLINIC | Age: 71
End: 2021-02-02

## 2021-02-02 ENCOUNTER — TELEPHONE (OUTPATIENT)
Dept: ONCOLOGY | Facility: CLINIC | Age: 71
End: 2021-02-02

## 2021-02-02 VITALS
HEIGHT: 73 IN | WEIGHT: 151.5 LBS | OXYGEN SATURATION: 98 % | TEMPERATURE: 98.4 F | DIASTOLIC BLOOD PRESSURE: 78 MMHG | BODY MASS INDEX: 20.08 KG/M2 | SYSTOLIC BLOOD PRESSURE: 130 MMHG | HEART RATE: 64 BPM | RESPIRATION RATE: 16 BRPM

## 2021-02-02 DIAGNOSIS — C91.10 CLL (CHRONIC LYMPHOCYTIC LEUKEMIA) (HCC): Primary | ICD-10-CM

## 2021-02-02 DIAGNOSIS — D69.6 THROMBOCYTOPENIA (HCC): ICD-10-CM

## 2021-02-02 DIAGNOSIS — I48.20 CHRONIC ATRIAL FIBRILLATION (HCC): ICD-10-CM

## 2021-02-02 DIAGNOSIS — C91.10 CLL (CHRONIC LYMPHOCYTIC LEUKEMIA) (HCC): ICD-10-CM

## 2021-02-02 DIAGNOSIS — E86.0 DEHYDRATION: ICD-10-CM

## 2021-02-02 LAB
ALBUMIN SERPL-MCNC: 4.2 G/DL (ref 3.5–5.2)
ALBUMIN/GLOB SERPL: 1.8 G/DL (ref 1.1–2.4)
ALP SERPL-CCNC: 212 U/L (ref 38–116)
ALT SERPL W P-5'-P-CCNC: 15 U/L (ref 0–41)
ANION GAP SERPL CALCULATED.3IONS-SCNC: 8.8 MMOL/L (ref 5–15)
AST SERPL-CCNC: 25 U/L (ref 0–40)
BASOPHILS # BLD AUTO: 0.01 10*3/MM3 (ref 0–0.2)
BASOPHILS NFR BLD AUTO: 0.3 % (ref 0–1.5)
BILIRUB SERPL-MCNC: 0.7 MG/DL (ref 0.2–1.2)
BUN SERPL-MCNC: 17 MG/DL (ref 6–20)
BUN/CREAT SERPL: 19.5 (ref 7.3–30)
CALCIUM SPEC-SCNC: 9.6 MG/DL (ref 8.5–10.2)
CHLORIDE SERPL-SCNC: 104 MMOL/L (ref 98–107)
CO2 SERPL-SCNC: 27.2 MMOL/L (ref 22–29)
CREAT SERPL-MCNC: 0.87 MG/DL (ref 0.7–1.3)
DEPRECATED RDW RBC AUTO: 53.3 FL (ref 37–54)
EOSINOPHIL # BLD AUTO: 0 10*3/MM3 (ref 0–0.4)
EOSINOPHIL NFR BLD AUTO: 0 % (ref 0.3–6.2)
ERYTHROCYTE [DISTWIDTH] IN BLOOD BY AUTOMATED COUNT: 15.1 % (ref 12.3–15.4)
GFR SERPL CREATININE-BSD FRML MDRD: 87 ML/MIN/1.73
GLOBULIN UR ELPH-MCNC: 2.4 GM/DL (ref 1.8–3.5)
GLUCOSE SERPL-MCNC: 153 MG/DL (ref 74–124)
HCT VFR BLD AUTO: 43.3 % (ref 37.5–51)
HGB BLD-MCNC: 14.3 G/DL (ref 13–17.7)
IMM GRANULOCYTES # BLD AUTO: 0.05 10*3/MM3 (ref 0–0.05)
IMM GRANULOCYTES NFR BLD AUTO: 1.4 % (ref 0–0.5)
LYMPHOCYTES # BLD AUTO: 0.9 10*3/MM3 (ref 0.7–3.1)
LYMPHOCYTES NFR BLD AUTO: 26.1 % (ref 19.6–45.3)
MCH RBC QN AUTO: 31.8 PG (ref 26.6–33)
MCHC RBC AUTO-ENTMCNC: 33 G/DL (ref 31.5–35.7)
MCV RBC AUTO: 96.4 FL (ref 79–97)
MONOCYTES # BLD AUTO: 0.37 10*3/MM3 (ref 0.1–0.9)
MONOCYTES NFR BLD AUTO: 10.7 % (ref 5–12)
NEUTROPHILS NFR BLD AUTO: 2.12 10*3/MM3 (ref 1.7–7)
NEUTROPHILS NFR BLD AUTO: 61.5 % (ref 42.7–76)
NRBC BLD AUTO-RTO: 0 /100 WBC (ref 0–0.2)
PLATELET # BLD AUTO: 100 10*3/MM3 (ref 140–450)
PMV BLD AUTO: 9.4 FL (ref 6–12)
POTASSIUM SERPL-SCNC: 4.4 MMOL/L (ref 3.5–4.7)
PROT SERPL-MCNC: 6.6 G/DL (ref 6.3–8)
RBC # BLD AUTO: 4.49 10*6/MM3 (ref 4.14–5.8)
SODIUM SERPL-SCNC: 140 MMOL/L (ref 134–145)
WBC # BLD AUTO: 3.45 10*3/MM3 (ref 3.4–10.8)

## 2021-02-02 PROCEDURE — 36591 DRAW BLOOD OFF VENOUS DEVICE: CPT

## 2021-02-02 PROCEDURE — 99215 OFFICE O/P EST HI 40 MIN: CPT | Performed by: INTERNAL MEDICINE

## 2021-02-02 PROCEDURE — 25010000003 HEPARIN LOCK FLUSH PER 10 UNITS: Performed by: NURSE PRACTITIONER

## 2021-02-02 PROCEDURE — 80053 COMPREHEN METABOLIC PANEL: CPT

## 2021-02-02 PROCEDURE — 85025 COMPLETE CBC W/AUTO DIFF WBC: CPT

## 2021-02-02 PROCEDURE — 96523 IRRIG DRUG DELIVERY DEVICE: CPT

## 2021-02-02 RX ORDER — SODIUM CHLORIDE 0.9 % (FLUSH) 0.9 %
10 SYRINGE (ML) INJECTION AS NEEDED
Status: CANCELLED | OUTPATIENT
Start: 2021-02-02

## 2021-02-02 RX ORDER — HEPARIN SODIUM (PORCINE) LOCK FLUSH IV SOLN 100 UNIT/ML 100 UNIT/ML
500 SOLUTION INTRAVENOUS AS NEEDED
Status: CANCELLED | OUTPATIENT
Start: 2021-02-02

## 2021-02-02 RX ORDER — VORICONAZOLE 200 MG/1
200 TABLET, FILM COATED ORAL 2 TIMES DAILY
Qty: 60 TABLET | Refills: 3 | Status: SHIPPED | OUTPATIENT
Start: 2021-02-02 | End: 2021-02-02 | Stop reason: SDUPTHER

## 2021-02-02 RX ORDER — VORICONAZOLE 200 MG/1
200 TABLET, FILM COATED ORAL 2 TIMES DAILY
Qty: 60 TABLET | Refills: 3 | Status: SHIPPED | OUTPATIENT
Start: 2021-02-02 | End: 2021-06-16

## 2021-02-02 RX ORDER — ACYCLOVIR 400 MG/1
400 TABLET ORAL 2 TIMES DAILY
Qty: 60 TABLET | Refills: 5 | Status: SHIPPED | OUTPATIENT
Start: 2021-02-02 | End: 2021-06-28

## 2021-02-02 RX ORDER — HEPARIN SODIUM (PORCINE) LOCK FLUSH IV SOLN 100 UNIT/ML 100 UNIT/ML
500 SOLUTION INTRAVENOUS AS NEEDED
Status: DISCONTINUED | OUTPATIENT
Start: 2021-02-02 | End: 2021-02-02 | Stop reason: HOSPADM

## 2021-02-02 RX ORDER — SODIUM CHLORIDE 0.9 % (FLUSH) 0.9 %
10 SYRINGE (ML) INJECTION AS NEEDED
Status: DISCONTINUED | OUTPATIENT
Start: 2021-02-02 | End: 2021-02-02 | Stop reason: HOSPADM

## 2021-02-02 RX ADMIN — SODIUM CHLORIDE, PRESERVATIVE FREE 10 ML: 5 INJECTION INTRAVENOUS at 09:16

## 2021-02-02 RX ADMIN — Medication 500 UNITS: at 09:16

## 2021-02-02 NOTE — TELEPHONE ENCOUNTER
Caller: JANIE GARAY    Relationship: WIFE    Best call back number: [phone]    Medication needed: voriconazole (VFEND) 200 MG     When do you need the refill by: 02/03    What details did the patient provide when requesting the medication:    STATE THE RX WAS SENT TO [facility], BUT THE PT NEEDS THIS MEDICATION SENT TO [facility]    Does the patient have less than a 3 day supply:  [x] Yes  [] No    What is the patient's preferred pharmacy: [facility] [address] - [phone] [facility] [phone]

## 2021-02-02 NOTE — PROGRESS NOTES
Subjective       REASON FOR VISIT:    1. Chronic lymphoid leukemia, stage 4, presented initially with significant pancytopenia  · Currently on Gazyva with venetoclax  · Cycle 1 Gazyva given on August 13, 2020    2.  History of angiodysplasia requiring cauterization and history of Hess's esophagus    3.  Bone marrow aspiration biopsy shows hypercellular marrow with 98% involvement with CLL    Patient ID: Macho Stephenson is a 70 y.o. year old male     History of Present Illness Mr. Stephenson is a 70 year old male with the above mentioned history here today for follow-up.  He has been receiving treatment with Gazyva and venetoclax.      Interval history:    He is taking venetoclax 100 mg daily along with voriconazole 200 mg daily and acyclovir 400 mg p.o. twice daily.  Patient completed his 6 months of Gazyva and currently doing well.  His platelets are stable around 116,000 today with a normal white count and hemoglobin.  He is due to take the vaccination with [email or web address] vaccine tomorrow.  He does not feel as fatigued.  He is able to eat well.          PAST MEDICAL HISTORY:   1. Recurrent atrial fibrillation followed by cardiology. He has had drug eluting stent placed x 3.   2. High cholesterol.   3. Hypertension.       HEMATOLOGIC/ONCOLOGIC HISTORY:       The patient is 62-year-old gentleman with the above history currently here for followup. He has no complaints. He denies fever, night sweats or weight loss. He does not have any lymphadenopathy. He feels good. He had some fatigue but his CBC shows a go od hemoglobin.   The patient is followed by Dr. Kevin Chandra. He had seen Dr. Chandra 7/18/13 for a history of coronary artery disease status drug eluting stent placement to the right coronary artery and left anterior descending artery in February 2011. Histor y of paroxysmal atrial fibrillation and hyperlipidemia. He presented to [facility] on 6/23/13 with palpitations and was found to have atrial  fibrillation with rapid ventricular response. He was given IV Cardizem and converted spontaneously to normal sinus rhythm. He was found to have elevated white count at 18.9 and denied any symptoms of infection or urinary complaints. TSH was normal, troponin levels were negative. He was asked to continue aspirin and metoprolol for that. If he contin u ed to have atrial fibrillation, they will consider antiarrhythmic therapy with or without a short term anticoagulation therapy. However, currently the patient is feeling reasonably good. He has no complaints. His CBC 7/22/13 showed WBC 17,000, hemoglob i n 16.4 and platelet count of 174,000. Back 9/28/13 his hemoglobin was 15.1, WBC 13.3 and platelets 197,000. He has had a persistently elevated WBC but he is totally asymptomatic. He does not have any fever, night sweats or lymphadenopathy. He is here to be evaluated for his elevated white count.       Peripheral blood flow cytometry done 08/16/13 shows 22% chronic lymphoid leukemia cells, and they expressed ZAP-70 but not CD38. The total number of cells approximated 60% T cells, 32% B cells and 1% natural k iller cells. The B cells were monoclonal and expressed CD19, CD20, CD22, CD5, CD23, CD11c, ZAP-70 and T cell antigens. There was a normal CD4/CD8 ratio. CT of the chest, abdomen and pelvis does not show evidence of metastasis. Peripheral blood for DILLON -2 was negative. It was negative for T11:14 translocation.       CT scan done on 08/21/2013 shows 5 to 6 mm noncalcified nodule in the left lower lobe which is unchanged from December 2010. Otherwise no evidence of any lymphadenopathy or hepatosplenomegaly.       MRI of the spine shows arthritis and disc bulge and likely hemangiomas, with 1 lesion showing increased signal intensity at T2, which is likely a hemangioma. Bone scan could be done, but patient does not even have much pain there. CT scan of the chest, a bdomen and pelvis was done on 11/23/2014. He has tiny  lymph nodes in the neck which are difficult to palpate. Right jugular one is 1.4 x 0.9 and left supraclavicular one is 1.5 x 1.1. He also has a subcarinal lymph node which is 1.7 x 1.2 cm, and he ha s a portocaval lymph node which has increased from 2.5 to 2.8. Patient is totally asymptomatic. He does not have any B symptoms, so will continue followup with observation.           Patient is a 69-year-old gentleman with history of chronic lymphocytic leukemia/SLL who recently got admitted to [facility] because of GI bleed.  He was seen by Dr. Montero.  He underwent EGD colonoscopy.  He was found to have angiodysplasia for which he underwent argon coagulation.  He required 3 units of blood.  Patient had a normal esophagus normal stomach and normal duodenum CLOtest was negative he was asked to take Protonix 40 mg daily.  Colonoscopy showed blood in the entire examined colon but there was a single bleeding colonic angiectasia which was treated with argon plasma coagulation.    He was admitted twice.  Initially he was admitted on July 10 at which time he stayed 3 days and he was evaluated for chest pain.  He underwent a cardiac work-up with stress test and echo.  The echo was normal but the stress test showed medium sized moderate severe severity fixed perfusion defect.  Of the inferior wall consistent with infarction.  However according to patient cardiology did not think he had any cardiac problems.  I have left a message for patient's cardiologist to call me today.  Patient subsequently got readmitted with GI bleed and required 1/3 unit of transfusion.  He was found to have thrombocytopenia and hematology was consulted.    His hemoglobin had dropped down to as low as 7 and his platelets had gone down to 87.  Today it is 35 and his hemoglobin is 9.4 today.  He denies active bleeding.  He has lost weight recently.  He does not have any fever or night sweats.    He had ultrasound of spleen which showed enlarged  spleen centimeters in length    Patient was started on Gazyva with Leukeran but subsequently switched to venetoclax with Gazyva.  His venetoclax dose is 100 mg daily and he receives Gazyva once a month.    CT scan done 2020 shows significant response     MEDICATIONS: The current medication list was reviewed with the patient and updated in the EMR this date per the medical assistant. Medication dosages and frequencies were confirmed to be accurate.       ALLERGIES: PENICILLIN which causes him to swell up. He is allergic to IV CONTRAST DYE.     SOCIAL HISTORY: He is . He smokes one pack per day for 35 years. He consumes three to four alcoholic drinks per week. He has no tattoos and no previous transfusions.       FAMILY HISTORY: Father  at 82 with MI. Mother  at 82 with bone cancer. He has one brother age 65 in good health and two sisters 69 and 57 in good health.   Past Medical History, Social History, and Family History are unchanged from my prior documentation on     Review of Systems   Constitutional: Negative for appetite change, chills, diaphoresis, fatigue, fever and unexpected weight change.   HENT: Negative for hearing loss, sore throat and trouble swallowing.    Respiratory: Negative for cough, chest tightness, shortness of breath and wheezing.    Cardiovascular: Negative for chest pain, palpitations and leg swelling.   Gastrointestinal: Negative for abdominal distention, abdominal pain, constipation, diarrhea, nausea and vomiting.   Genitourinary: Negative for dysuria, frequency, hematuria and urgency.   Musculoskeletal: Negative for joint swelling.        No muscle weakness.   Skin: Negative for rash and wound.   Neurological: Negative for seizures, syncope, speech difficulty, weakness, numbness and headaches.   Hematological: Negative for adenopathy. Does not bruise/bleed easily.   Psychiatric/Behavioral: Negative for behavioral problems, confusion and suicidal  ideas.   All other systems reviewed and are negative.          Patient Active Problem List   Diagnosis   • CLL (chronic lymphocytic leukemia) (CMS/HCC)   • Anemia   • Encounter for hepatitis C screening test for low risk patient    • Thrombocytopenia (CMS/HCC)   • H/O heart artery stent   • Stented coronary artery   • Arteriosclerosis of coronary artery   • Atrial fibrillation (CMS/HCC)   • Paroxysmal atrial fibrillation (CMS/HCC)   • Chest pain, rule out acute myocardial infarction   • Fall   • Fracture, olecranon   • Hyperlipidemia   • Long term (current) use of anticoagulants   • Lower GI bleed   • Olecranon bursitis   • Shoulder pain   • Smoker   • CLL (chronic lymphocytic leukemia) (CMS/HCC)   • Dehydration   • Drug-induced nausea and vomiting   • Encounter for long-term (current) use of other medications   • Neutropenia (CMS/HCC)         MEDICATIONS:  Medication Reconciliation for the patient has been reviewed by me and documented in the patients chart.    ALLERGIES:    Allergies   Allergen Reactions   • Contrast Dye Swelling   • Penicillins Swelling         Vitals:    02/02/21 0820   BP: 130/78   Pulse: 64   Resp: 16   Temp: 98.4 °F (36.9 °C)   SpO2: 98%        Current Status 2/2/2021   ECOG score 0            This patient's ACP documentation is up to date, and there's nothing further left to document.    CONSTITUTIONAL:  Vital signs reviewed.    No distress, looks comfortable.  RESPIRATORY:  Normal respiratory effort,  no rales  or wheezing, clear   CARDIOVASCULAR:  Regular rate and rhythm, no murmur  No significant lower extremity edema.  SKIN: No wounds.  No rashes.  MUSCULOSKELETAL/EXTREMITIES: No clubbing or cyanosis.  No apparent unilateral weakness.  NEURO: CN 2-12 appear intact. No focal neurological deficits noted.  PSYCHIATRIC:  Normal judgment and insight.  Normal mood and affect.  I have reviewed and confirmed the accuracy of the ROS as documented by the MA/LPN/RN Susannah Moya MD      RECENT  LABS:  Results from last 7 days   Lab Units 02/02/21  0813   WBC 10*3/mm3 3.45   NEUTROS ABS 10*3/mm3 2.12   HEMOGLOBIN g/dL 14.3   HEMATOCRIT % 43.3   PLATELETS 10*3/mm3 100*               CT NECK, CHEST, ABDOMEN, AND PELVIS WITHOUT IV CONTRAST 11/04/20    IMPRESSION:  1. Some of the lymphadenopathy has resolved and the remaining  lymphadenopathy is significantly smaller than previously.   2. Splenic size has decreased as well. There is no new abnormality.         STAT NONCONTRAST HEAD CT 08/20/2020  IMPRESSION:  1. No acute abnormality is seen with no significant change when compared  to prior noncontrast head CT from [facility] 03/03/2015.  2. There is mild small vessel disease in the frontal periventricular  white matter and there are calcified atherosclerotic plaques in the  cavernous internal carotid arteries bilaterally.  3. Patient has had previous paranasal sinus surgery with bilateral  uncinectomies and ethmoidectomies and the paranasal sinuses are  currently clear. The remainder of the head CT is within normal limits,  specifically no acute intracranial hemorrhage is seen. Etiology of  headaches in this patient with history of leukemia and thrombocytopenia  is not established on this exam. Results were communicated to Susannah Moya, the oncologist taking care of the patient by telephone on  08/20/2020 at 9:20 AM.      XR CHEST POST CVA PORT-07/31/20    IMPRESSION:  Chest port placement. No pneumothorax.       CT NECK, CHEST, ABDOMEN, AND PELVIS WITHOUT IV CONTRAST. 7/20/2020   HISTORY: 69-year-old male with CLL/SLL.     TECHNIQUE: Radiation dose reduction techniques were utilized, including  automated exposure control and exposure modulation based on body size. 3  mm images were obtained through the neck, chest, abdomen, and pelvis  without the administration of IV contrast. Compared with CT of the  abdomen and pelvis from 08/05/2019 and previous CTs from 03/19/2018.     FINDINGS:  NECK:  There is no significant change in the 1.4 x 1.0 cm left  supraclavicular node or in the smaller supraclavicular nodes  bilaterally. A reference 1.4 x 0.7 cm right jugular chain node is  stable. There are shotty nodes scattered throughout the neck. No new  lymphadenopathy is seen. Noncontrasted parotid, submandibular, and  thyroid glands appear unremarkable.     CHEST: There has been interval decrease in the size of the shotty and  borderline enlarged nodes at the axilla. Reference 1.6 x 0.7 cm right  axillary node previously measured 2.0 x 1.2 cm. There has been a  decrease in the size of all of the shotty mediastinal and hilar nodes.  There are no new pulmonary opacities and there are no pleural or  pericardial effusions.     ABDOMEN/PELVIS: Splenic size has slightly increased measuring 15.4 cm in  diameter and previously measuring 13.5 cm, both measured on the coronal  reconstruction series. There is no change in the shotty nodes at the  gastrohepatic ligament and shorty hepatis. There are no pathologically  enlarged nodes. There is no acute abnormality involving the  noncontrasted liver. A single gallstone is noted within the gallbladder.  Noncontrasted pancreas, adrenals, and kidneys appear unremarkable. There  is no acute bowel abnormality. There is extensive sigmoid  diverticulosis. No free fluid. There are extensive abdominal aortic  atherosclerotic changes and there is a stable 3.7 cm infrarenal  abdominal aortic aneurysm.     IMPRESSION:  1. There has been slight interval increase in splenomegaly since the CT  of the abdomen from 08/05/2019.  2. Interval decrease in the size of the axillary nodes and the shotty  mediastinal nodes. There is no change in the shotty nodes within the  neck and supraclavicular regions. There is no new lymphadenopathy.  3. Stable 3.7 cm infrarenal abdominal aortic aneurysm.      Assessment/Plan   1. Stage 2 CLL without evidence of any disease progression.  I have reviewed the CT  scan from 3/19/2018 there is minimal progression but clinically patient is asymptomatic and we will follow with observation.  Will monitor with labs.   No evidence of any frequent infections, no major worsening of his white count. He has no fever or night sweats or any other symptoms.   · Patient knows that he is prone for infections and he is mainly staying at home during the COVID-19 situation time  · Recent admission to [facility] July 10 2020×2.  Initially admitted with chest pain and underwent stress test and echo.  Echo was negative.  Stress test is abnormal but have left message for patient's cardiologist to call us.  His cardiologist is been sent [facility].  · He then got admitted the second time with worsening GI bleed and required total of 3 units of blood.  EGD was negative but colonoscopy showed angiectasia for which he underwent argon ablation.  Currently hemoglobin stable and no active bleeding.  · He was also found to have low platelets with platelets dropping down to 27k and hemoglobin was 7.  Ultrasound showed splenomegaly 15 cm.  Concern is whether he has progressed from his CLL point of view and requires treatment.  · CT scan performed 7/20/2020 did show the increased splenomegaly, but interval decrease in size of the axillary nodes, and shotty mediastinal nodes.  No change in the shotty nodes within the neck and supraclavicular regions.  No new lymphadenopathy.  Bone marrow biopsy however showed preliminarily that there is bone marrow involvement.  Remainder of bone marrow biopsy report is pending.    · Bone marrow shows hypercellular marrow with 100% involvement of chronic lymphocytic leukemia/small lymphocytic lymphoma and diffuse pattern with at least 98% of the total marrow cellularity.  Rare foci of erythropoiesis and rare megakaryocytes are noted.     · Negative for BCL-2 and BCL 6.  Chromosomes shows normal karyotype.  I GVH mutation present.  Positive for gain of 1 q., monosomy 13 and  "loss of IGH.  Negative for deletion T p53, negative for gain of chromosomes 9 and 11, negative for 11, 14 fusion.    · The combination of monosomy 13 and gain of 1 q. is thought to be associated with plasma cell myeloma.  However this patient does not have myeloma.    · Scans were reviewed with the patient today.  Dr. Moya also discussed with the patient and recommended he initiate treatment with chlorambucil and Gazyva.  He would not be a good candidate for Imbruvica due to his history of A. fib\".  He cannot take anticoagulation at this time.  The patient was provided with chemotherapy education today, including  Handouts.  He will need a port placed so that we can initiate treatment  · 8/13/2020: Gazyva day 1. Plan also [id number] mg p.o. on day 1 day 15.  We can increase the dose once we know he tolerates and his platelets improved.  · Patient developing pancytopenia when reviewed cycle 1 day 15.  Chlorambucil dose modified to 10 mg for day 15 however it is felt that he cannot tolerate this ongoing.   ·  Plan to switch to Venetoclax along with continuing Gazyva.    · Patient due today for cycle 2-day 1 Gazyva.  He will proceed today as scheduled.  Venetoclax will be shipped to his home with plans to begin this next week on 9/14/2020.    · Patient did receive 1 L normal saline and Neulasta on 9/28/2020 secondary to neutropenia with an ANC of 0.04.  · Patient seen on 10/02/2020 continuing on venetoclax, currently on 100 mg dosing.  He would not increase his dose as he will start on voriconazole after being on venetoclax x1 week which affects the metabolism of venetoclax.  He is currently on day 5 of the 100 mg dosing.  Patient states overall he feels the same except for increased fatigue and nausea.  His nauseousness is controlled with ondansetron however.  Patient will be given 1 L normal saline in the office today as planned.  · Patient returns on 10/12/2020 continuing on venetoclax 100 mg daily as well " as voriconazole.  · Patient is doing well with these treatments except fatigue.  Next cycle 4 due as of number ninth prior to which will obtain CT scans  · November 9, 2020: White count down to 1.89 but patient started Pepcid.  We will hold off Pepcid.  · 11/17/2020 platelet count dropped to 35,000 patient was instructed to hold venetoclax.  · Returns 11/23/2020 WBC up to 6.38, ANC 5.19, hemoglobin 12.4, platelets up to 121.  I have advised him to resume venetoclax 100 mg daily  · December 7, 2020: Platelets 95K.  White count 3.0 with absolute neutrophil count of 2.01.  Cycle 5 Gazyva given.  Continue venetoclax with voriconazole.   · January 4, 2021: Platelets 84,000, white count 3,440, absolute neutrophil count 1,980. Cycle 6 given today.  · February 2, 2021: Platelets are 100, 000, hemoglobin 14.3 and white count of 3.45 with 61% neutrophils       2.  Worsening thrombocytopenia, looking back his platelets were always in the 150s and during this admission he dropped down to 27K. His LDH also was elevated and he was anemic.  · It was dropping on chlorambucil.  Plans to change therapy as detailed above.  Patient did require transfusion and platelets are currently improved to 55,000.  · 11/17/2020 platelets dropped 35,000.  Venetoclax was temporarily held.  · 11/23/2020 platelets back up to 121 venetoclax resumed  · Aspirin 81 mg daily held when platelets are less than 60,000  · 1/4/2021 platelet count 84,000  · Improved to 100 K    3.  History of angiodysplasia requiring cauterization and Hess's esophagus mild anemia. He is currently asymptomatic.  · No evidence of active bleeding.  Stable  · No active bleeding.    4.  History of cluster headaches for which she has to be treated with steroids in the past and has followed up with Dr. Len Walker.today with significant headaches.  · Patient had CT of the head 8/20/2020 which was negative.  · He was started on prednisone 10 mg daily which was not helpful.  · Patient  saw Dr. Bobby, neurology who gave him 2 shots of a new medication Emgality.  This is something that can be given once a month.    · Headaches improved.  Resolved  · Stable    5.  Neutropenia without fever.Cipro 500 mg twice daily was started.  Patient was given Neulasta on 9/28/2020.  Cipro can be held today because his counts have improved.  · Significant drop in his white count to 1.89 but ANC 1.15 as of November 9, 2020  · 11/23/2020 WBC 6.38, ANC 5.19.  We will continue to closely monito  · Patient continues with Neulasta each cycle.    6.  Restless leg syndrome: Iron studies checked 10/22/2020 ferritin 338.5, iron saturation 16%.  · Hemoglobin improved to 14.3    7.  Episodes of being lightheaded: Patient reports worse when changing position.  He related to possible inner ear problems.  We also discussed that could be related to changes in blood pressure.  We discussed making sure he is drinking plenty of fluids and occasionally monitoring blood pressure at home, as he is on 2 medications that could affect his blood pressure.    · No evidence of lightheadedness  · Completely resolved    8.  Atrial fibrillation, off anticoagulation given his thrombocytopenia.  · Patient followed by Dr. Robert Rodriguez  · Given pancytopenia, GI bleed secondary to angiodysplasia, intermittent significant thrombocytopenia anticoagulation had been held.  · Patient continues on aspirin 81 mg daily if platelet count greater than 60,000.    9.  Elevated alkaline phosphatase  · Alkaline phosphatase stable today.  · Patient has a history of a 1.7CM gallstone nonobstructing      PLAN:  1. Continue venetoclax 100 mg daily.  2. Continue voriconazole.  3. Continue acyclovir 400 mg p.o. twice daily  4. S/p Gazyva, completed 6 months with good response  5. Will port flush in 6 weeks  6. Patient to receive vaccination tomorrow for the COVID-19    Patient continues on high risk medication with frequent monitoring.    Susannah Moya,  MD Brittany Kaur MA          Cc: Dr. Kevin Gilmore

## 2021-02-02 NOTE — TELEPHONE ENCOUNTER
PT'S WIFE CALLING TO HAVE RX FOR VFEND SENT TO [facility], IT WAS SENT TO [facility]. CALLED [facility] AND TOLD THEM TO CANCEL THE RX. THEY V/U. RX ROUTED TO DR. CEJA TO SIGN AGAIN W/ CORRECT PHARMACY ATTACHED. [facility] SAID A PA IS NEEDED JUST FYI. MESSAGED CLAUDIA ABOUT PA NEEDED. WIFE V/U.

## 2021-02-03 ENCOUNTER — MEDICATION THERAPY MANAGEMENT (OUTPATIENT)
Dept: ONCOLOGY | Facility: HOSPITAL | Age: 71
End: 2021-02-03

## 2021-02-03 NOTE — PROGRESS NOTES
[facility] lab review ( Venetoclax)        2/2/2021   WBC 3.40 - 10.80 10*3/mm3 3.45   Neutrophils Absolute 1.70 - 7.00 10*3/mm3 2.12   Hemoglobin 13.0 - 17.7 g/dL 14.3   Hematocrit 37.5 - 51.0 % 43.3   Platelets 140 - 450 10*3/mm3 100 (A)   Creatinine 0.70 - 1.30 mg/dL 0.87   eGFR Non African Am >60 mL/min/1.73 87   BUN 6 - 20 mg/dL 17   Sodium 134 - 145 mmol/L 140   Potassium 3.5 - 4.7 mmol/L 4.4   Glucose 74 - 124 mg/dL 153 (A)   Calcium 8.5 - 10.2 mg/dL 9.6   Albumin 3.50 - 5.20 g/dL 4.20   Total Protein 6.3 - 8.0 g/dL 6.6   AST (SGOT) 0 - 40 U/L 25   ALT (SGPT) 0 - 41 U/L 15   Alkaline Phosphatase 38 - 116 U/L 212 (A)   Total Bilirubin 0.2 - 1.2 mg/dL 0.7     Continues Venetoclax 100 mg po daily.  Gazyva infusions are completed.

## 2021-02-05 ENCOUNTER — MEDICATION THERAPY MANAGEMENT (OUTPATIENT)
Dept: ONCOLOGY | Facility: HOSPITAL | Age: 71
End: 2021-02-05

## 2021-02-05 NOTE — PROGRESS NOTES
[facility] telephone encounter re adherence and side effects ( venetoclax)    No answer.   direct line [phone].

## 2021-02-09 ENCOUNTER — CLINICAL SUPPORT (OUTPATIENT)
Dept: ONCOLOGY | Facility: HOSPITAL | Age: 71
End: 2021-02-09

## 2021-02-09 ENCOUNTER — LAB (OUTPATIENT)
Dept: LAB | Facility: HOSPITAL | Age: 71
End: 2021-02-09

## 2021-02-09 DIAGNOSIS — C91.10 CLL (CHRONIC LYMPHOCYTIC LEUKEMIA) (HCC): ICD-10-CM

## 2021-02-09 DIAGNOSIS — C91.10 CLL (CHRONIC LYMPHOCYTIC LEUKEMIA) (HCC): Primary | ICD-10-CM

## 2021-02-09 LAB
BASOPHILS # BLD AUTO: 0.01 10*3/MM3 (ref 0–0.2)
BASOPHILS NFR BLD AUTO: 0.3 % (ref 0–1.5)
DEPRECATED RDW RBC AUTO: 50.7 FL (ref 37–54)
EOSINOPHIL # BLD AUTO: 0 10*3/MM3 (ref 0–0.4)
EOSINOPHIL NFR BLD AUTO: 0 % (ref 0.3–6.2)
ERYTHROCYTE [DISTWIDTH] IN BLOOD BY AUTOMATED COUNT: 14.8 % (ref 12.3–15.4)
HCT VFR BLD AUTO: 39.5 % (ref 37.5–51)
HGB BLD-MCNC: 13.8 G/DL (ref 13–17.7)
IMM GRANULOCYTES # BLD AUTO: 0.07 10*3/MM3 (ref 0–0.05)
IMM GRANULOCYTES NFR BLD AUTO: 2.2 % (ref 0–0.5)
LYMPHOCYTES # BLD AUTO: 1.06 10*3/MM3 (ref 0.7–3.1)
LYMPHOCYTES NFR BLD AUTO: 32.7 % (ref 19.6–45.3)
MCH RBC QN AUTO: 32.9 PG (ref 26.6–33)
MCHC RBC AUTO-ENTMCNC: 34.9 G/DL (ref 31.5–35.7)
MCV RBC AUTO: 94 FL (ref 79–97)
MONOCYTES # BLD AUTO: 0.54 10*3/MM3 (ref 0.1–0.9)
MONOCYTES NFR BLD AUTO: 16.7 % (ref 5–12)
NEUTROPHILS NFR BLD AUTO: 1.56 10*3/MM3 (ref 1.7–7)
NEUTROPHILS NFR BLD AUTO: 48.1 % (ref 42.7–76)
NRBC BLD AUTO-RTO: 0 /100 WBC (ref 0–0.2)
PLATELET # BLD AUTO: 92 10*3/MM3 (ref 140–450)
PMV BLD AUTO: 9.7 FL (ref 6–12)
RBC # BLD AUTO: 4.2 10*6/MM3 (ref 4.14–5.8)
WBC # BLD AUTO: 3.24 10*3/MM3 (ref 3.4–10.8)

## 2021-02-09 PROCEDURE — 85025 COMPLETE CBC W/AUTO DIFF WBC: CPT

## 2021-02-09 PROCEDURE — 36415 COLL VENOUS BLD VENIPUNCTURE: CPT

## 2021-02-09 PROCEDURE — G0463 HOSPITAL OUTPT CLINIC VISIT: HCPCS

## 2021-02-09 NOTE — NURSING NOTE
Pt here for CBC and RN review. CBC reviewed with pt. WBC 3.24, hgb 13.8, plts 92, and ANC 1.56. Pt states he is feeling well, denies any fevers or other signs of infection at this time. Pt confirmed he is taking the venetoclax with no issues. Pt also confirmed taking Vfend and acyclovir. Reviewed labs with Theresa CABEZAS, no changes at this time. Copy of labs and future appts given to pt. Pt v/u to call with any concerns.       Lab Results   Component Value Date    WBC 3.24 (L) 02/09/2021    HGB 13.8 02/09/2021    HCT 39.5 02/09/2021    MCV 94.0 02/09/2021    PLT 92 (L) 02/09/2021

## 2021-02-16 ENCOUNTER — APPOINTMENT (OUTPATIENT)
Dept: ONCOLOGY | Facility: HOSPITAL | Age: 71
End: 2021-02-16

## 2021-02-16 ENCOUNTER — APPOINTMENT (OUTPATIENT)
Dept: LAB | Facility: HOSPITAL | Age: 71
End: 2021-02-16

## 2021-02-22 NOTE — PROGRESS NOTES
Subjective       REASON FOR VISIT:    1. Chronic lymphoid leukemia, stage 4, presented initially with significant pancytopenia  · Currently on Gazyva with venetoclax  · Cycle 1 Gazyva given on August 13, 2020    2.  History of angiodysplasia requiring cauterization and history of Hess's esophagus    3.  Bone marrow aspiration biopsy shows hypercellular marrow with 98% involvement with CLL    Patient ID: Macho Stephenson is a 70 y.o. year old male     History of Present Illness Mr. Stephenson is seen back today in follow-up, continuing on Venetoclax 100 mg daily.  He also continues voriconazole 200 mg daily and acyclovir 400 mg twice daily prophylactically.  The patient has received his first COVID-19 vaccine and will get his second dose tomorrow.  Other than having a sore arm he had no side effects thankfully.    His counts remain relatively stable and these were reviewed with him today.  He states he feels very well and has no new concerns today.    PAST MEDICAL HISTORY:   1. Recurrent atrial fibrillation followed by cardiology. He has had drug eluting stent placed x 3.   2. High cholesterol.   3. Hypertension.       HEMATOLOGIC/ONCOLOGIC HISTORY:       The patient is 62-year-old gentleman with the above history currently here for followup. He has no complaints. He denies fever, night sweats or weight loss. He does not have any lymphadenopathy. He feels good. He had some fatigue but his CBC shows a go od hemoglobin.   The patient is followed by Dr. Kevin Chandra. He had seen Dr. Chandra 7/18/13 for a history of coronary artery disease status drug eluting stent placement to the right coronary artery and left anterior descending artery in February 2011. Histor y of paroxysmal atrial fibrillation and hyperlipidemia. He presented to [facility] on 6/23/13 with palpitations and was found to have atrial fibrillation with rapid ventricular response. He was given IV Cardizem and converted spontaneously to  normal sinus rhythm. He was found to have elevated white count at 18.9 and denied any symptoms of infection or urinary complaints. TSH was normal, troponin levels were negative. He was asked to continue aspirin and metoprolol for that. If he contin u ed to have atrial fibrillation, they will consider antiarrhythmic therapy with or without a short term anticoagulation therapy. However, currently the patient is feeling reasonably good. He has no complaints. His CBC 7/22/13 showed WBC 17,000, hemoglob i n 16.4 and platelet count of 174,000. Back 9/28/13 his hemoglobin was 15.1, WBC 13.3 and platelets 197,000. He has had a persistently elevated WBC but he is totally asymptomatic. He does not have any fever, night sweats or lymphadenopathy. He is here to be evaluated for his elevated white count.       Peripheral blood flow cytometry done 08/16/13 shows 22% chronic lymphoid leukemia cells, and they expressed ZAP-70 but not CD38. The total number of cells approximated 60% T cells, 32% B cells and 1% natural k iller cells. The B cells were monoclonal and expressed CD19, CD20, CD22, CD5, CD23, CD11c, ZAP-70 and T cell antigens. There was a normal CD4/CD8 ratio. CT of the chest, abdomen and pelvis does not show evidence of metastasis. Peripheral blood for DILLON -2 was negative. It was negative for T11:14 translocation.       CT scan done on 08/21/2013 shows 5 to 6 mm noncalcified nodule in the left lower lobe which is unchanged from December 2010. Otherwise no evidence of any lymphadenopathy or hepatosplenomegaly.       MRI of the spine shows arthritis and disc bulge and likely hemangiomas, with 1 lesion showing increased signal intensity at T2, which is likely a hemangioma. Bone scan could be done, but patient does not even have much pain there. CT scan of the chest, a bdomen and pelvis was done on 11/23/2014. He has tiny lymph nodes in the neck which are difficult to palpate. Right jugular one is 1.4 x 0.9 and left  supraclavicular one is 1.5 x 1.1. He also has a subcarinal lymph node which is 1.7 x 1.2 cm, and he ha s a portocaval lymph node which has increased from 2.5 to 2.8. Patient is totally asymptomatic. He does not have any B symptoms, so will continue followup with observation.     Patient is a 69-year-old gentleman with history of chronic lymphocytic leukemia/SLL who recently got admitted to [facility] because of GI bleed.  He was seen by Dr. Montero.  He underwent EGD colonoscopy.  He was found to have angiodysplasia for which he underwent argon coagulation.  He required 3 units of blood.  Patient had a normal esophagus normal stomach and normal duodenum CLOtest was negative he was asked to take Protonix 40 mg daily.  Colonoscopy showed blood in the entire examined colon but there was a single bleeding colonic angiectasia which was treated with argon plasma coagulation.    He was admitted twice.  Initially he was admitted on July 10 at which time he stayed 3 days and he was evaluated for chest pain.  He underwent a cardiac work-up with stress test and echo.  The echo was normal but the stress test showed medium sized moderate severe severity fixed perfusion defect.  Of the inferior wall consistent with infarction.  However according to patient cardiology did not think he had any cardiac problems.  I have left a message for patient's cardiologist to call me today.  Patient subsequently got readmitted with GI bleed and required 1/3 unit of transfusion.  He was found to have thrombocytopenia and hematology was consulted.    His hemoglobin had dropped down to as low as 7 and his platelets had gone down to 87.  Today it is 35 and his hemoglobin is 9.4 today.  He denies active bleeding.  He has lost weight recently.  He does not have any fever or night sweats.    He had ultrasound of spleen which showed enlarged spleen centimeters in length    Patient was started on Gazyva with Leukeran but subsequently switched to  venetoclax with Gazyva.  His venetoclax dose is 100 mg daily and he receives Gazyva once a month.    CT scan done 2020 shows significant response     MEDICATIONS: The current medication list was reviewed with the patient and updated in the EMR this date per the medical assistant. Medication dosages and frequencies were confirmed to be accurate.       ALLERGIES: PENICILLIN which causes him to swell up. He is allergic to IV CONTRAST DYE.     SOCIAL HISTORY: He is . He smokes one pack per day for 35 years. He consumes three to four alcoholic drinks per week. He has no tattoos and no previous transfusions.       FAMILY HISTORY: Father  at 82 with MI. Mother  at 82 with bone cancer. He has one brother age 65 in good health and two sisters 69 and 57 in good health.   Past Medical History, Social History, and Family History are unchanged from my prior documentation on     Review of Systems   Constitutional: Negative for appetite change, chills, diaphoresis, fatigue, fever and unexpected weight change.   HENT: Negative for hearing loss, sore throat and trouble swallowing.    Respiratory: Negative for cough, chest tightness, shortness of breath and wheezing.    Cardiovascular: Negative for chest pain, palpitations and leg swelling.   Gastrointestinal: Negative for abdominal distention, abdominal pain, constipation, diarrhea, nausea and vomiting.   Genitourinary: Negative for dysuria, frequency, hematuria and urgency.   Musculoskeletal: Negative for joint swelling.        No muscle weakness.   Skin: Negative for rash and wound.   Neurological: Negative for seizures, syncope, speech difficulty, weakness, numbness and headaches.   Hematological: Negative for adenopathy. Does not bruise/bleed easily.   Psychiatric/Behavioral: Negative for behavioral problems, confusion and suicidal ideas.   All other systems reviewed and are negative.    ROS 21 unchanged except as  updated        Patient Active Problem List   Diagnosis   • CLL (chronic lymphocytic leukemia) (CMS/HCC)   • Anemia   • Encounter for hepatitis C screening test for low risk patient    • Thrombocytopenia (CMS/HCC)   • H/O heart artery stent   • Stented coronary artery   • Arteriosclerosis of coronary artery   • Atrial fibrillation (CMS/HCC)   • Paroxysmal atrial fibrillation (CMS/HCC)   • Chest pain, rule out acute myocardial infarction   • Fall   • Fracture, olecranon   • Hyperlipidemia   • Long term (current) use of anticoagulants   • Lower GI bleed   • Olecranon bursitis   • Shoulder pain   • Smoker   • CLL (chronic lymphocytic leukemia) (CMS/HCC)   • Dehydration   • Drug-induced nausea and vomiting   • Encounter for long-term (current) use of other medications   • Neutropenia (CMS/HCC)         MEDICATIONS:  Medication Reconciliation for the patient has been reviewed by me and documented in the patients chart.    ALLERGIES:    Allergies   Allergen Reactions   • Contrast Dye Swelling   • Penicillins Swelling         Vitals:    02/23/21 1024   BP: 131/73   Pulse: 60   Resp: 16   Temp: 97.7 °F (36.5 °C)   SpO2: 97%        Current Status 2/23/2021   ECOG score 0        This patient's ACP documentation is up to date, and there's nothing further left to document.    CONSTITUTIONAL:  Vital signs reviewed.    No distress, looks comfortable.  RESPIRATORY:  Normal respiratory effort, lungs clear to auscultation bilaterally.     CARDIOVASCULAR:  Regular rate and rhythm, no murmur  No significant lower extremity edema.  SKIN: No wounds.  No rashes.  MUSCULOSKELETAL/EXTREMITIES: No clubbing or cyanosis.  No apparent unilateral weakness.  NEURO: CN 2-12 appear intact. No focal neurological deficits noted.  PSYCHIATRIC:  Normal judgment and insight.  Normal mood and affect.    I have reviewed and confirmed the accuracy of the ROS as documented by the MA/LPN/RN MARIAH Garcia      RECENT LABS:  Results from last 7  days   Lab Units 02/23/21  0945   WBC 10*3/mm3 3.01*   NEUTROS ABS 10*3/mm3 1.65*   HEMOGLOBIN g/dL 14.6   HEMATOCRIT % 41.5   PLATELETS 10*3/mm3 90*     Results from last 7 days   Lab Units 02/23/21  0945   SODIUM mmol/L 143   POTASSIUM mmol/L 4.0   CHLORIDE mmol/L 105   CO2 mmol/L 28.6   BUN mg/dL 17   CREATININE mg/dL 0.92   CALCIUM mg/dL 9.4   ALBUMIN g/dL 4.20   BILIRUBIN mg/dL 0.5   ALK PHOS U/L 184*   ALT (SGPT) U/L 15   AST (SGOT) U/L 23   GLUCOSE mg/dL 194*           CT NECK, CHEST, ABDOMEN, AND PELVIS WITHOUT IV CONTRAST 11/04/20    IMPRESSION:  1. Some of the lymphadenopathy has resolved and the remaining  lymphadenopathy is significantly smaller than previously.   2. Splenic size has decreased as well. There is no new abnormality.         STAT NONCONTRAST HEAD CT 08/20/2020  IMPRESSION:  1. No acute abnormality is seen with no significant change when compared  to prior noncontrast head CT from [facility] 03/03/2015.  2. There is mild small vessel disease in the frontal periventricular  white matter and there are calcified atherosclerotic plaques in the  cavernous internal carotid arteries bilaterally.  3. Patient has had previous paranasal sinus surgery with bilateral  uncinectomies and ethmoidectomies and the paranasal sinuses are  currently clear. The remainder of the head CT is within normal limits,  specifically no acute intracranial hemorrhage is seen. Etiology of  headaches in this patient with history of leukemia and thrombocytopenia  is not established on this exam. Results were communicated to Susannah Moya, the oncologist taking care of the patient by telephone on  08/20/2020 at 9:20 AM.      XR CHEST POST CVA PORT-07/31/20    IMPRESSION:  Chest port placement. No pneumothorax.       CT NECK, CHEST, ABDOMEN, AND PELVIS WITHOUT IV CONTRAST. 7/20/2020   HISTORY: 69-year-old male with CLL/SLL.     TECHNIQUE: Radiation dose reduction techniques were utilized, including  automated  exposure control and exposure modulation based on body size. 3  mm images were obtained through the neck, chest, abdomen, and pelvis  without the administration of IV contrast. Compared with CT of the  abdomen and pelvis from 08/05/2019 and previous CTs from 03/19/2018.     FINDINGS:  NECK: There is no significant change in the 1.4 x 1.0 cm left  supraclavicular node or in the smaller supraclavicular nodes  bilaterally. A reference 1.4 x 0.7 cm right jugular chain node is  stable. There are shotty nodes scattered throughout the neck. No new  lymphadenopathy is seen. Noncontrasted parotid, submandibular, and  thyroid glands appear unremarkable.     CHEST: There has been interval decrease in the size of the shotty and  borderline enlarged nodes at the axilla. Reference 1.6 x 0.7 cm right  axillary node previously measured 2.0 x 1.2 cm. There has been a  decrease in the size of all of the shotty mediastinal and hilar nodes.  There are no new pulmonary opacities and there are no pleural or  pericardial effusions.     ABDOMEN/PELVIS: Splenic size has slightly increased measuring 15.4 cm in  diameter and previously measuring 13.5 cm, both measured on the coronal  reconstruction series. There is no change in the shotty nodes at the  gastrohepatic ligament and shorty hepatis. There are no pathologically  enlarged nodes. There is no acute abnormality involving the  noncontrasted liver. A single gallstone is noted within the gallbladder.  Noncontrasted pancreas, adrenals, and kidneys appear unremarkable. There  is no acute bowel abnormality. There is extensive sigmoid  diverticulosis. No free fluid. There are extensive abdominal aortic  atherosclerotic changes and there is a stable 3.7 cm infrarenal  abdominal aortic aneurysm.     IMPRESSION:  1. There has been slight interval increase in splenomegaly since the CT  of the abdomen from 08/05/2019.  2. Interval decrease in the size of the axillary nodes and the  shotty  mediastinal nodes. There is no change in the shotty nodes within the  neck and supraclavicular regions. There is no new lymphadenopathy.  3. Stable 3.7 cm infrarenal abdominal aortic aneurysm.      Assessment/Plan   1. Stage 2 CLL without evidence of any disease progression.  I have reviewed the CT scan from 3/19/2018 there is minimal progression but clinically patient is asymptomatic and we will follow with observation.  Will monitor with labs.   No evidence of any frequent infections, no major worsening of his white count. He has no fever or night sweats or any other symptoms.   · Patient knows that he is prone for infections and he is mainly staying at home during the COVID-19 situation time  · Recent admission to [facility] July 10 2020×2.  Initially admitted with chest pain and underwent stress test and echo.  Echo was negative.  Stress test is abnormal but have left message for patient's cardiologist to call us.  His cardiologist is been sent [place].  · He then got admitted the second time with worsening GI bleed and required total of 3 units of blood.  EGD was negative but colonoscopy showed angiectasia for which he underwent argon ablation.  Currently hemoglobin stable and no active bleeding.  · He was also found to have low platelets with platelets dropping down to 27k and hemoglobin was 7.  Ultrasound showed splenomegaly 15 cm.  Concern is whether he has progressed from his CLL point of view and requires treatment.  · CT scan performed 7/20/2020 did show the increased splenomegaly, but interval decrease in size of the axillary nodes, and shotty mediastinal nodes.  No change in the shotty nodes within the neck and supraclavicular regions.  No new lymphadenopathy.  Bone marrow biopsy however showed preliminarily that there is bone marrow involvement.  Remainder of bone marrow biopsy report is pending.  · Bone marrow shows hypercellular marrow with 100% involvement of chronic lymphocytic  "leukemia/small lymphocytic lymphoma and diffuse pattern with at least 98% of the total marrow cellularity.  Rare foci of erythropoiesis and rare megakaryocytes are noted.   · Negative for BCL-2 and BCL 6.  Chromosomes shows normal karyotype.  I GVH mutation present.  Positive for gain of 1 q., monosomy 13 and loss of IGH.  Negative for deletion T p53, negative for gain of chromosomes 9 and 11, negative for 11, 14 fusion.  · The combination of monosomy 13 and gain of 1 q. is thought to be associated with plasma cell myeloma.  However this patient does not have myeloma.  · Scans were reviewed with the patient today.  Dr. Moya also discussed with the patient and recommended he initiate treatment with chlorambucil and Gazyva.  He would not be a good candidate for Imbruvica due to his history of A. fib\".  He cannot take anticoagulation at this time.  The patient was provided with chemotherapy education today, including  Handouts.  He will need a port placed so that we can initiate treatment  · 8/13/2020: Gazyva day 1. Plan also [id number] mg p.o. on day 1 day 15.  We can increase the dose once we know he tolerates and his platelets improved.  · Patient developing pancytopenia when reviewed cycle 1 day 15.  Chlorambucil dose modified to 10 mg for day 15 however it is felt that he cannot tolerate this ongoing.   ·  Plan to switch to Venetoclax along with continuing Gazyva.    · Patient due today for cycle 2-day 1 Gazyva.  He will proceed today as scheduled.  Venetoclax will be shipped to his home with plans to begin this next week on 9/14/2020.    · Patient did receive 1 L normal saline and Neulasta on 9/28/2020 secondary to neutropenia with an ANC of 0.04.  · Patient seen on 10/02/2020 continuing on venetoclax, currently on 100 mg dosing.  He would not increase his dose as he will start on voriconazole after being on venetoclax x1 week which affects the metabolism of venetoclax.  He is currently on day 5 of the " 100 mg dosing.  Patient states overall he feels the same except for increased fatigue and nausea.  His nauseousness is controlled with ondansetron however.  Patient will be given 1 L normal saline in the office today as planned.  · Patient returns on 10/12/2020 continuing on venetoclax 100 mg daily as well as voriconazole.  · Patient is doing well with these treatments except fatigue.  Next cycle 4 due as of number ninth prior to which will obtain CT scans  · November 9, 2020: White count down to 1.89 but patient started Pepcid.  We will hold off Pepcid.  · 11/17/2020 platelet count dropped to 35,000 patient was instructed to hold venetoclax.  · Returns 11/23/2020 WBC up to 6.38, ANC 5.19, hemoglobin 12.4, platelets up to 121.  I have advised him to resume venetoclax 100 mg daily  · December 7, 2020: Platelets 95K.  White count 3.0 with absolute neutrophil count of 2.01.  Cycle 5 Gazyva given.  Continue venetoclax with voriconazole.   · January 4, 2021: Platelets 84,000, white count 3,440, absolute neutrophil count 1,980. Cycle 6 Gazyva given.  · 2/23/2021: WBC 3.0, platelets 90,000, hemoglobin 14.6, ANC 1.65.  Counts overall stable.  Continue Venetoclax 100 mg daily.       2.  Worsening thrombocytopenia, looking back his platelets were always in the 150s and during this admission he dropped down to 27K. His LDH also was elevated and he was anemic.  · It was dropping on chlorambucil.  Plans to change therapy as detailed above.  Patient did require transfusion and platelets are currently improved to 55,000.  · 11/17/2020 platelets dropped 35,000.  Venetoclax was temporarily held.  · 11/23/2020 platelets back up to 121 venetoclax resumed  · Aspirin 81 mg daily held when platelets are less than 60,000  · 1/4/2021 platelet count 84,000  · Improved to 100 K.  · 2/23/2021: Platelets slightly lower at 90,000 though overall stable over the last several weeks.  Continue to monitor closely.    3.  History of angiodysplasia  requiring cauterization and Hess's esophagus mild anemia. He is currently asymptomatic.  · No evidence of active bleeding.  Stable  · No active bleeding.    4.  History of cluster headaches for which she has to be treated with steroids in the past and has followed up with Dr. Len Walker.today with significant headaches.  · Patient had CT of the head 8/20/2020 which was negative.  · He was started on prednisone 10 mg daily which was not helpful.  · Patient saw Dr. Bobby, neurology who gave him 2 shots of a new medication Emgality.  This is something that can be given once a month.    · Headaches improved.  Resolved  · Stable    5.  Atrial fibrillation, off anticoagulation given his thrombocytopenia.  · Patient followed by Dr. Robert Rodriguez  · Given pancytopenia, GI bleed secondary to angiodysplasia, intermittent significant thrombocytopenia anticoagulation had been held.  · Patient continues on aspirin 81 mg daily if platelet count greater than 60,000.    6.  Elevated alkaline phosphatase  · Alkaline phosphatase stable today at 184.  · Patient has a history of a 1.7 cm gallstone nonobstructing      PLAN:  1. Continue venetoclax 100 mg daily.  2. Continue voriconazole.  3. Continue acyclovir 400 mg p.o. twice daily  4. S/p Gazyva, completed 6 months with good response  5. Continue port flush every 6 weeks (due again in 3 weeks)  6. Patient has received his first COVID-19 vaccine and will receive his second dose tomorrow.      This patient is on drug therapy requiring intensive monitoring for toxicity.        MARIAH Garcia      Cc: Dr. Kevin Gilmore

## 2021-02-23 ENCOUNTER — APPOINTMENT (OUTPATIENT)
Dept: ONCOLOGY | Facility: HOSPITAL | Age: 71
End: 2021-02-23

## 2021-02-23 ENCOUNTER — LAB (OUTPATIENT)
Dept: LAB | Facility: HOSPITAL | Age: 71
End: 2021-02-23

## 2021-02-23 ENCOUNTER — OFFICE VISIT (OUTPATIENT)
Dept: ONCOLOGY | Facility: CLINIC | Age: 71
End: 2021-02-23

## 2021-02-23 VITALS
RESPIRATION RATE: 16 BRPM | WEIGHT: 157.6 LBS | SYSTOLIC BLOOD PRESSURE: 131 MMHG | HEIGHT: 73 IN | TEMPERATURE: 97.7 F | HEART RATE: 60 BPM | BODY MASS INDEX: 20.89 KG/M2 | DIASTOLIC BLOOD PRESSURE: 73 MMHG | OXYGEN SATURATION: 97 %

## 2021-02-23 DIAGNOSIS — C91.10 CLL (CHRONIC LYMPHOCYTIC LEUKEMIA) (HCC): ICD-10-CM

## 2021-02-23 DIAGNOSIS — C91.10 CLL (CHRONIC LYMPHOCYTIC LEUKEMIA) (HCC): Primary | ICD-10-CM

## 2021-02-23 DIAGNOSIS — Z79.899 ENCOUNTER FOR LONG-TERM (CURRENT) USE OF OTHER MEDICATIONS: ICD-10-CM

## 2021-02-23 LAB
ALBUMIN SERPL-MCNC: 4.2 G/DL (ref 3.5–5.2)
ALBUMIN/GLOB SERPL: 2.1 G/DL (ref 1.1–2.4)
ALP SERPL-CCNC: 184 U/L (ref 38–116)
ALT SERPL W P-5'-P-CCNC: 15 U/L (ref 0–41)
ANION GAP SERPL CALCULATED.3IONS-SCNC: 9.4 MMOL/L (ref 5–15)
AST SERPL-CCNC: 23 U/L (ref 0–40)
BASOPHILS # BLD AUTO: 0.01 10*3/MM3 (ref 0–0.2)
BASOPHILS NFR BLD AUTO: 0.3 % (ref 0–1.5)
BILIRUB SERPL-MCNC: 0.5 MG/DL (ref 0.2–1.2)
BUN SERPL-MCNC: 17 MG/DL (ref 6–20)
BUN/CREAT SERPL: 18.5 (ref 7.3–30)
CALCIUM SPEC-SCNC: 9.4 MG/DL (ref 8.5–10.2)
CHLORIDE SERPL-SCNC: 105 MMOL/L (ref 98–107)
CO2 SERPL-SCNC: 28.6 MMOL/L (ref 22–29)
CREAT SERPL-MCNC: 0.92 MG/DL (ref 0.7–1.3)
DEPRECATED RDW RBC AUTO: 51.8 FL (ref 37–54)
EOSINOPHIL # BLD AUTO: 0 10*3/MM3 (ref 0–0.4)
EOSINOPHIL NFR BLD AUTO: 0 % (ref 0.3–6.2)
ERYTHROCYTE [DISTWIDTH] IN BLOOD BY AUTOMATED COUNT: 14.8 % (ref 12.3–15.4)
GFR SERPL CREATININE-BSD FRML MDRD: 81 ML/MIN/1.73
GLOBULIN UR ELPH-MCNC: 2 GM/DL (ref 1.8–3.5)
GLUCOSE SERPL-MCNC: 194 MG/DL (ref 74–124)
HCT VFR BLD AUTO: 41.5 % (ref 37.5–51)
HGB BLD-MCNC: 14.6 G/DL (ref 13–17.7)
IMM GRANULOCYTES # BLD AUTO: 0.08 10*3/MM3 (ref 0–0.05)
IMM GRANULOCYTES NFR BLD AUTO: 2.7 % (ref 0–0.5)
LYMPHOCYTES # BLD AUTO: 0.92 10*3/MM3 (ref 0.7–3.1)
LYMPHOCYTES NFR BLD AUTO: 30.6 % (ref 19.6–45.3)
MCH RBC QN AUTO: 34 PG (ref 26.6–33)
MCHC RBC AUTO-ENTMCNC: 35.2 G/DL (ref 31.5–35.7)
MCV RBC AUTO: 96.5 FL (ref 79–97)
MONOCYTES # BLD AUTO: 0.35 10*3/MM3 (ref 0.1–0.9)
MONOCYTES NFR BLD AUTO: 11.6 % (ref 5–12)
NEUTROPHILS NFR BLD AUTO: 1.65 10*3/MM3 (ref 1.7–7)
NEUTROPHILS NFR BLD AUTO: 54.8 % (ref 42.7–76)
NRBC BLD AUTO-RTO: 0 /100 WBC (ref 0–0.2)
PLATELET # BLD AUTO: 90 10*3/MM3 (ref 140–450)
PMV BLD AUTO: 9.3 FL (ref 6–12)
POTASSIUM SERPL-SCNC: 4 MMOL/L (ref 3.5–4.7)
PROT SERPL-MCNC: 6.2 G/DL (ref 6.3–8)
RBC # BLD AUTO: 4.3 10*6/MM3 (ref 4.14–5.8)
SODIUM SERPL-SCNC: 143 MMOL/L (ref 134–145)
WBC # BLD AUTO: 3.01 10*3/MM3 (ref 3.4–10.8)

## 2021-02-23 PROCEDURE — 36415 COLL VENOUS BLD VENIPUNCTURE: CPT

## 2021-02-23 PROCEDURE — 99213 OFFICE O/P EST LOW 20 MIN: CPT | Performed by: NURSE PRACTITIONER

## 2021-02-23 PROCEDURE — 80053 COMPREHEN METABOLIC PANEL: CPT

## 2021-02-23 PROCEDURE — 85025 COMPLETE CBC W/AUTO DIFF WBC: CPT

## 2021-02-24 ENCOUNTER — MEDICATION THERAPY MANAGEMENT (OUTPATIENT)
Dept: PHARMACY | Facility: HOSPITAL | Age: 71
End: 2021-02-24

## 2021-02-24 NOTE — PROGRESS NOTES
[facility] lab review ( Venetoclax)        2/23/2021   WBC 3.40 - 10.80 10*3/mm3 3.01 (A)   Neutrophils Absolute 1.70 - 7.00 10*3/mm3 1.65 (A)   Hemoglobin 13.0 - 17.7 g/dL 14.6   Hematocrit 37.5 - 51.0 % 41.5   Platelets 140 - 450 10*3/mm3 90 (A)   Creatinine 0.70 - 1.30 mg/dL 0.92   eGFR Non African Am >60 mL/min/1.73 81   BUN 6 - 20 mg/dL 17   Sodium 134 - 145 mmol/L 143   Potassium 3.5 - 4.7 mmol/L 4.0   Glucose 74 - 124 mg/dL 194 (A)   Calcium 8.5 - 10.2 mg/dL 9.4   Albumin 3.50 - 5.20 g/dL 4.20   Total Protein 6.3 - 8.0 g/dL 6.2 (A)   AST (SGOT) 0 - 40 U/L 23   ALT (SGPT) 0 - 41 U/L 15   Alkaline Phosphatase 38 - 116 U/L 184 (A)   Total Bilirubin 0.2 - 1.2 mg/dL 0.5     APRN dictation is noted.  Continue Venetoclax 100 mg po daily.

## 2021-02-26 ENCOUNTER — MEDICATION THERAPY MANAGEMENT (OUTPATIENT)
Dept: PHARMACY | Facility: HOSPITAL | Age: 71
End: 2021-02-26

## 2021-02-26 NOTE — PROGRESS NOTES
[facility] telephone encounter re adherence and side effects ( Venetoclax)    Spoke with Mr Sachin's wife ( he was not home today).  She states he continues to do well on Venetoclax 100mg po daily, is adhering well and had no side effects to report.  He received second COVID 19 vaccine this week and had a fever that night and has had no further issues. Pharmacy will continue to follow.

## 2021-03-02 ENCOUNTER — CLINICAL SUPPORT (OUTPATIENT)
Dept: ONCOLOGY | Facility: HOSPITAL | Age: 71
End: 2021-03-02

## 2021-03-02 ENCOUNTER — LAB (OUTPATIENT)
Dept: LAB | Facility: HOSPITAL | Age: 71
End: 2021-03-02

## 2021-03-02 DIAGNOSIS — C91.10 CLL (CHRONIC LYMPHOCYTIC LEUKEMIA) (HCC): Primary | ICD-10-CM

## 2021-03-02 DIAGNOSIS — C91.10 CLL (CHRONIC LYMPHOCYTIC LEUKEMIA) (HCC): ICD-10-CM

## 2021-03-02 LAB
BASOPHILS # BLD AUTO: 0.02 10*3/MM3 (ref 0–0.2)
BASOPHILS NFR BLD AUTO: 0.6 % (ref 0–1.5)
DEPRECATED RDW RBC AUTO: 50.1 FL (ref 37–54)
EOSINOPHIL # BLD AUTO: 0 10*3/MM3 (ref 0–0.4)
EOSINOPHIL NFR BLD AUTO: 0 % (ref 0.3–6.2)
ERYTHROCYTE [DISTWIDTH] IN BLOOD BY AUTOMATED COUNT: 14.7 % (ref 12.3–15.4)
HCT VFR BLD AUTO: 41.6 % (ref 37.5–51)
HGB BLD-MCNC: 14.7 G/DL (ref 13–17.7)
IMM GRANULOCYTES # BLD AUTO: 0.06 10*3/MM3 (ref 0–0.05)
IMM GRANULOCYTES NFR BLD AUTO: 1.7 % (ref 0–0.5)
LYMPHOCYTES # BLD AUTO: 1.57 10*3/MM3 (ref 0.7–3.1)
LYMPHOCYTES NFR BLD AUTO: 44.5 % (ref 19.6–45.3)
MCH RBC QN AUTO: 33.1 PG (ref 26.6–33)
MCHC RBC AUTO-ENTMCNC: 35.3 G/DL (ref 31.5–35.7)
MCV RBC AUTO: 93.7 FL (ref 79–97)
MONOCYTES # BLD AUTO: 0.54 10*3/MM3 (ref 0.1–0.9)
MONOCYTES NFR BLD AUTO: 15.3 % (ref 5–12)
NEUTROPHILS NFR BLD AUTO: 1.34 10*3/MM3 (ref 1.7–7)
NEUTROPHILS NFR BLD AUTO: 37.9 % (ref 42.7–76)
NRBC BLD AUTO-RTO: 0 /100 WBC (ref 0–0.2)
PLATELET # BLD AUTO: 101 10*3/MM3 (ref 140–450)
PMV BLD AUTO: 9.5 FL (ref 6–12)
RBC # BLD AUTO: 4.44 10*6/MM3 (ref 4.14–5.8)
WBC # BLD AUTO: 3.53 10*3/MM3 (ref 3.4–10.8)

## 2021-03-02 PROCEDURE — 85025 COMPLETE CBC W/AUTO DIFF WBC: CPT

## 2021-03-02 PROCEDURE — G0463 HOSPITAL OUTPT CLINIC VISIT: HCPCS

## 2021-03-02 PROCEDURE — 36415 COLL VENOUS BLD VENIPUNCTURE: CPT

## 2021-03-02 NOTE — PROGRESS NOTES
Lab Results   Component Value Date    WBC 3.53 03/02/2021    HGB 14.7 03/02/2021    HCT 41.6 03/02/2021    MCV 93.7 03/02/2021     (L) 03/02/2021     CBC RESULTS R/W PT. COUNTS ARE SATISFACTORY. PT DENIES ANY C/O. PT CONTINUES ON VENETOCLAX W/O DIFFICULTY. PT HAS F/U NEXT 2 WKS. PT GIVEN COPY OF LABS.

## 2021-03-03 ENCOUNTER — MEDICATION THERAPY MANAGEMENT (OUTPATIENT)
Dept: PHARMACY | Facility: HOSPITAL | Age: 71
End: 2021-03-03

## 2021-03-03 NOTE — PROGRESS NOTES
[facility] lab review ( Venetoclax)        3/2/2021   WBC 3.40 - 10.80 10*3/mm3 3.53   Neutrophils Absolute 1.70 - 7.00 10*3/mm3 1.34 (A)   Hemoglobin 13.0 - 17.7 g/dL 14.7   Hematocrit 37.5 - 51.0 % 41.6   Platelets 140 - 450 10*3/mm3 101 (A)     Continues on Venetoclax 100 mg po daily.

## 2021-03-05 ENCOUNTER — TELEPHONE (OUTPATIENT)
Dept: ONCOLOGY | Facility: CLINIC | Age: 71
End: 2021-03-05

## 2021-03-09 ENCOUNTER — APPOINTMENT (OUTPATIENT)
Dept: ONCOLOGY | Facility: HOSPITAL | Age: 71
End: 2021-03-09

## 2021-03-09 ENCOUNTER — APPOINTMENT (OUTPATIENT)
Dept: LAB | Facility: HOSPITAL | Age: 71
End: 2021-03-09

## 2021-03-16 ENCOUNTER — OFFICE VISIT (OUTPATIENT)
Dept: ONCOLOGY | Facility: CLINIC | Age: 71
End: 2021-03-16

## 2021-03-16 ENCOUNTER — INFUSION (OUTPATIENT)
Dept: ONCOLOGY | Facility: HOSPITAL | Age: 71
End: 2021-03-16

## 2021-03-16 VITALS
WEIGHT: 158.1 LBS | RESPIRATION RATE: 16 BRPM | HEART RATE: 63 BPM | HEIGHT: 73 IN | BODY MASS INDEX: 20.95 KG/M2 | TEMPERATURE: 98.4 F | SYSTOLIC BLOOD PRESSURE: 113 MMHG | OXYGEN SATURATION: 98 % | DIASTOLIC BLOOD PRESSURE: 68 MMHG

## 2021-03-16 DIAGNOSIS — I48.20 CHRONIC ATRIAL FIBRILLATION (HCC): ICD-10-CM

## 2021-03-16 DIAGNOSIS — D69.6 THROMBOCYTOPENIA (HCC): ICD-10-CM

## 2021-03-16 DIAGNOSIS — C91.10 CLL (CHRONIC LYMPHOCYTIC LEUKEMIA) (HCC): Primary | ICD-10-CM

## 2021-03-16 LAB
ALBUMIN SERPL-MCNC: 4.6 G/DL (ref 3.5–5.2)
ALBUMIN/GLOB SERPL: 3.1 G/DL (ref 1.1–2.4)
ALP SERPL-CCNC: 211 U/L (ref 38–116)
ALT SERPL W P-5'-P-CCNC: 22 U/L (ref 0–41)
ANION GAP SERPL CALCULATED.3IONS-SCNC: 12.6 MMOL/L (ref 5–15)
AST SERPL-CCNC: 30 U/L (ref 0–40)
BASOPHILS # BLD AUTO: 0.01 10*3/MM3 (ref 0–0.2)
BASOPHILS NFR BLD AUTO: 0.3 % (ref 0–1.5)
BILIRUB SERPL-MCNC: 0.7 MG/DL (ref 0.2–1.2)
BUN SERPL-MCNC: 20 MG/DL (ref 6–20)
BUN/CREAT SERPL: 21.3 (ref 7.3–30)
CALCIUM SPEC-SCNC: 9.5 MG/DL (ref 8.5–10.2)
CHLORIDE SERPL-SCNC: 104 MMOL/L (ref 98–107)
CO2 SERPL-SCNC: 24.4 MMOL/L (ref 22–29)
CREAT SERPL-MCNC: 0.94 MG/DL (ref 0.7–1.3)
DEPRECATED RDW RBC AUTO: 55.6 FL (ref 37–54)
EOSINOPHIL # BLD AUTO: 0 10*3/MM3 (ref 0–0.4)
EOSINOPHIL NFR BLD AUTO: 0 % (ref 0.3–6.2)
ERYTHROCYTE [DISTWIDTH] IN BLOOD BY AUTOMATED COUNT: 15.4 % (ref 12.3–15.4)
GFR SERPL CREATININE-BSD FRML MDRD: 79 ML/MIN/1.73
GLOBULIN UR ELPH-MCNC: 1.5 GM/DL (ref 1.8–3.5)
GLUCOSE SERPL-MCNC: 235 MG/DL (ref 74–124)
HCT VFR BLD AUTO: 41.9 % (ref 37.5–51)
HGB BLD-MCNC: 14.1 G/DL (ref 13–17.7)
IMM GRANULOCYTES # BLD AUTO: 0.2 10*3/MM3 (ref 0–0.05)
IMM GRANULOCYTES NFR BLD AUTO: 6.1 % (ref 0–0.5)
LYMPHOCYTES # BLD AUTO: 1.03 10*3/MM3 (ref 0.7–3.1)
LYMPHOCYTES NFR BLD AUTO: 31.3 % (ref 19.6–45.3)
MCH RBC QN AUTO: 33.5 PG (ref 26.6–33)
MCHC RBC AUTO-ENTMCNC: 33.7 G/DL (ref 31.5–35.7)
MCV RBC AUTO: 99.5 FL (ref 79–97)
MONOCYTES # BLD AUTO: 0.45 10*3/MM3 (ref 0.1–0.9)
MONOCYTES NFR BLD AUTO: 13.7 % (ref 5–12)
NEUTROPHILS NFR BLD AUTO: 1.6 10*3/MM3 (ref 1.7–7)
NEUTROPHILS NFR BLD AUTO: 48.6 % (ref 42.7–76)
NRBC BLD AUTO-RTO: 0 /100 WBC (ref 0–0.2)
PLATELET # BLD AUTO: 112 10*3/MM3 (ref 140–450)
PMV BLD AUTO: 9.2 FL (ref 6–12)
POTASSIUM SERPL-SCNC: 4.3 MMOL/L (ref 3.5–4.7)
PROT SERPL-MCNC: 6.1 G/DL (ref 6.3–8)
RBC # BLD AUTO: 4.21 10*6/MM3 (ref 4.14–5.8)
SODIUM SERPL-SCNC: 141 MMOL/L (ref 134–145)
WBC # BLD AUTO: 3.29 10*3/MM3 (ref 3.4–10.8)

## 2021-03-16 PROCEDURE — 99214 OFFICE O/P EST MOD 30 MIN: CPT | Performed by: INTERNAL MEDICINE

## 2021-03-16 PROCEDURE — 25010000003 HEPARIN LOCK FLUSH PER 10 UNITS: Performed by: NURSE PRACTITIONER

## 2021-03-16 PROCEDURE — 85025 COMPLETE CBC W/AUTO DIFF WBC: CPT

## 2021-03-16 PROCEDURE — 96523 IRRIG DRUG DELIVERY DEVICE: CPT

## 2021-03-16 PROCEDURE — 36415 COLL VENOUS BLD VENIPUNCTURE: CPT

## 2021-03-16 PROCEDURE — 80053 COMPREHEN METABOLIC PANEL: CPT

## 2021-03-16 RX ORDER — HEPARIN SODIUM (PORCINE) LOCK FLUSH IV SOLN 100 UNIT/ML 100 UNIT/ML
500 SOLUTION INTRAVENOUS AS NEEDED
Status: CANCELLED | OUTPATIENT
Start: 2021-03-16

## 2021-03-16 RX ORDER — HEPARIN SODIUM (PORCINE) LOCK FLUSH IV SOLN 100 UNIT/ML 100 UNIT/ML
500 SOLUTION INTRAVENOUS AS NEEDED
Status: DISCONTINUED | OUTPATIENT
Start: 2021-03-16 | End: 2021-03-16 | Stop reason: HOSPADM

## 2021-03-16 RX ORDER — SODIUM CHLORIDE 0.9 % (FLUSH) 0.9 %
10 SYRINGE (ML) INJECTION AS NEEDED
Status: CANCELLED | OUTPATIENT
Start: 2021-03-16

## 2021-03-16 RX ADMIN — Medication 500 UNITS: at 09:31

## 2021-03-16 NOTE — PROGRESS NOTES
Subjective       REASON FOR VISIT:    1. Chronic lymphoid leukemia, stage 4, presented initially with significant pancytopenia  · Currently on Gazyva with venetoclax  · Cycle 1 Gazyva given on August 13, 2020    2.  History of angiodysplasia requiring cauterization and history of Hess's esophagus    3.  Bone marrow aspiration biopsy shows hypercellular marrow with 98% involvement with CLL    Patient ID: Macho Stephenson is a 70 y.o. year old male     History of Present Illness Mr. Stephenson is seen back today in follow-up, continuing on Venetoclax 100 mg daily.  He also continues voriconazole 200 mg daily and acyclovir 400 mg twice daily prophylactically.  The patient has received his first COVID-19 vaccine and will get his second dose tomorrow.  Other than having a sore arm he had no side effects thankfully.    His counts remain relatively stable and these were reviewed with him today.  He states he feels very well and has no new concerns today.    Interval history:    Patient is doing reasonably well.  Patient completed Gazyva and currently is on venetoclax and voriconazole and tolerating very well.  His port is in place and will need to flush it today.  He does not have any fever or night sweats.  His last CT scan had shown decrease in his lymphadenopathy as well as splenomegaly.  He is eating well and has improved and is appetite as well as weight.    PAST MEDICAL HISTORY:   1. Recurrent atrial fibrillation followed by cardiology. He has had drug eluting stent placed x 3.   2. High cholesterol.   3. Hypertension.       HEMATOLOGIC/ONCOLOGIC HISTORY:       The patient is 62-year-old gentleman with the above history currently here for followup. He has no complaints. He denies fever, night sweats or weight loss. He does not have any lymphadenopathy. He feels good. He had some fatigue but his CBC shows a go od hemoglobin.   The patient is followed by Dr. Kevin Chandra. He had seen Dr. Chandra 7/18/13 for a history  of coronary artery disease status drug eluting stent placement to the right coronary artery and left anterior descending artery in February 2011. Histor y of paroxysmal atrial fibrillation and hyperlipidemia. He presented to [facility] on 6/23/13 with palpitations and was found to have atrial fibrillation with rapid ventricular response. He was given IV Cardizem and converted spontaneously to normal sinus rhythm. He was found to have elevated white count at 18.9 and denied any symptoms of infection or urinary complaints. TSH was normal, troponin levels were negative. He was asked to continue aspirin and metoprolol for that. If he contin u ed to have atrial fibrillation, they will consider antiarrhythmic therapy with or without a short term anticoagulation therapy. However, currently the patient is feeling reasonably good. He has no complaints. His CBC 7/22/13 showed WBC 17,000, hemoglob i n 16.4 and platelet count of 174,000. Back 9/28/13 his hemoglobin was 15.1, WBC 13.3 and platelets 197,000. He has had a persistently elevated WBC but he is totally asymptomatic. He does not have any fever, night sweats or lymphadenopathy. He is here to be evaluated for his elevated white count.       Peripheral blood flow cytometry done 08/16/13 shows 22% chronic lymphoid leukemia cells, and they expressed ZAP-70 but not CD38. The total number of cells approximated 60% T cells, 32% B cells and 1% natural k iller cells. The B cells were monoclonal and expressed CD19, CD20, CD22, CD5, CD23, CD11c, ZAP-70 and T cell antigens. There was a normal CD4/CD8 ratio. CT of the chest, abdomen and pelvis does not show evidence of metastasis. Peripheral blood for DILLON -2 was negative. It was negative for T11:14 translocation.       CT scan done on 08/21/2013 shows 5 to 6 mm noncalcified nodule in the left lower lobe which is unchanged from December 2010. Otherwise no evidence of any lymphadenopathy or hepatosplenomegaly.       MRI  of the spine shows arthritis and disc bulge and likely hemangiomas, with 1 lesion showing increased signal intensity at T2, which is likely a hemangioma. Bone scan could be done, but patient does not even have much pain there. CT scan of the chest, a bdomen and pelvis was done on 11/23/2014. He has tiny lymph nodes in the neck which are difficult to palpate. Right jugular one is 1.4 x 0.9 and left supraclavicular one is 1.5 x 1.1. He also has a subcarinal lymph node which is 1.7 x 1.2 cm, and he ha s a portocaval lymph node which has increased from 2.5 to 2.8. Patient is totally asymptomatic. He does not have any B symptoms, so will continue followup with observation.     Patient is a 69-year-old gentleman with history of chronic lymphocytic leukemia/SLL who recently got admitted to [facility] because of GI bleed.  He was seen by Dr. Montero.  He underwent EGD colonoscopy.  He was found to have angiodysplasia for which he underwent argon coagulation.  He required 3 units of blood.  Patient had a normal esophagus normal stomach and normal duodenum CLOtest was negative he was asked to take Protonix 40 mg daily.  Colonoscopy showed blood in the entire examined colon but there was a single bleeding colonic angiectasia which was treated with argon plasma coagulation.    He was admitted twice.  Initially he was admitted on July 10 at which time he stayed 3 days and he was evaluated for chest pain.  He underwent a cardiac work-up with stress test and echo.  The echo was normal but the stress test showed medium sized moderate severe severity fixed perfusion defect.  Of the inferior wall consistent with infarction.  However according to patient cardiology did not think he had any cardiac problems.  I have left a message for patient's cardiologist to call me today.  Patient subsequently got readmitted with GI bleed and required 1/3 unit of transfusion.  He was found to have thrombocytopenia and hematology was  consulted.    His hemoglobin had dropped down to as low as 7 and his platelets had gone down to 87.  Today it is 35 and his hemoglobin is 9.4 today.  He denies active bleeding.  He has lost weight recently.  He does not have any fever or night sweats.    He had ultrasound of spleen which showed enlarged spleen centimeters in length    Patient was started on Gazyva with Leukeran but subsequently switched to venetoclax with Gazyva.  His venetoclax dose is 100 mg daily and he receives Gazyva once a month.    CT scan done 2020 shows significant response     MEDICATIONS: The current medication list was reviewed with the patient and updated in the EMR this date per the medical assistant. Medication dosages and frequencies were confirmed to be accurate.       ALLERGIES: PENICILLIN which causes him to swell up. He is allergic to IV CONTRAST DYE.     SOCIAL HISTORY: He is . He smokes one pack per day for 35 years. He consumes three to four alcoholic drinks per week. He has no tattoos and no previous transfusions.       FAMILY HISTORY: Father  at 82 with MI. Mother  at 82 with bone cancer. He has one brother age 65 in good health and two sisters 69 and 57 in good health.   Past Medical History, Social History, and Family History are unchanged from my prior documentation on     Review of Systems   Constitutional: Negative for appetite change, chills, diaphoresis, fatigue, fever and unexpected weight change.   HENT: Negative for hearing loss, sore throat and trouble swallowing.    Respiratory: Negative for cough, chest tightness, shortness of breath and wheezing.    Cardiovascular: Negative for chest pain, palpitations and leg swelling.   Gastrointestinal: Negative for abdominal distention, abdominal pain, constipation, diarrhea, nausea and vomiting.   Genitourinary: Negative for dysuria, frequency, hematuria and urgency.   Musculoskeletal: Negative for joint swelling.        No muscle  weakness.   Skin: Negative for rash and wound.   Neurological: Negative for seizures, syncope, speech difficulty, weakness, numbness and headaches.   Hematological: Negative for adenopathy. Does not bruise/bleed easily.   Psychiatric/Behavioral: Negative for behavioral problems, confusion and suicidal ideas.   All other systems reviewed and are negative.  I have reviewed and confirmed the accuracy of the ROS as documented by the MA/LPN/RN Susananh Moya MD            Patient Active Problem List   Diagnosis   • CLL (chronic lymphocytic leukemia) (CMS/HCC)   • Anemia   • Encounter for hepatitis C screening test for low risk patient    • Thrombocytopenia (CMS/HCC)   • H/O heart artery stent   • Stented coronary artery   • Arteriosclerosis of coronary artery   • Atrial fibrillation (CMS/HCC)   • Paroxysmal atrial fibrillation (CMS/HCC)   • Chest pain, rule out acute myocardial infarction   • Fall   • Fracture, olecranon   • Hyperlipidemia   • Long term (current) use of anticoagulants   • Lower GI bleed   • Olecranon bursitis   • Shoulder pain   • Smoker   • CLL (chronic lymphocytic leukemia) (CMS/HCC)   • Dehydration   • Drug-induced nausea and vomiting   • Encounter for long-term (current) use of other medications   • Neutropenia (CMS/HCC)         MEDICATIONS:  Medication Reconciliation for the patient has been reviewed by me and documented in the patients chart.    ALLERGIES:    Allergies   Allergen Reactions   • Contrast Dye Swelling   • Penicillins Swelling         Vitals:    03/16/21 0941   BP: 113/68   Pulse: 63   Resp: 16   Temp: 98.4 °F (36.9 °C)   SpO2: 98%        Current Status 3/16/2021   ECOG score 0        This patient's ACP documentation is up to date, and there's nothing further left to document.    Physical exam  Patient screened positive for depression based on a PHQ-9 score of 4 on 11/30/2020. Follow-up recommendations include: PCP managing depression.    Patient is not depressed today and he is not  suicidal or homicidal.      This patient's ACP documentation is up to date, and there's nothing further left to document.    CONSTITUTIONAL:  Vital signs reviewed.    No distress, looks comfortable.  EYES:  Conjunctiva and lids unremarkable.  Extraocular eye movements intact.  EARS,NOSE,MOUTH,THROAT:  Ears and nose appear unremarkable.  Lips, teeth, gums appear unremarkable.  RESPIRATORY:  Normal respiratory effort.  , no rales  or wheezing, clear   CARDIOVASCULAR:  Regular rate and rhythm, no murmur  No significant lower extremity edema.  SKIN: No wounds.  No rashes.  MUSCULOSKELETAL/EXTREMITIES: No clubbing or cyanosis.  No apparent unilateral weakness.  NEURO: CN 2-12 appear intact. No focal neurological deficits noted.  PSYCHIATRIC:  Normal judgment and insight.  Normal mood and affect.    RECENT LABS:  Results from last 7 days   Lab Units 03/16/21  0926   WBC 10*3/mm3 3.29*   NEUTROS ABS 10*3/mm3 1.60*   HEMOGLOBIN g/dL 14.1   HEMATOCRIT % 41.9   PLATELETS 10*3/mm3 112*               CT NECK, CHEST, ABDOMEN, AND PELVIS WITHOUT IV CONTRAST 11/04/20    IMPRESSION:  1. Some of the lymphadenopathy has resolved and the remaining  lymphadenopathy is significantly smaller than previously.   2. Splenic size has decreased as well. There is no new abnormality.         STAT NONCONTRAST HEAD CT 08/20/2020  IMPRESSION:  1. No acute abnormality is seen with no significant change when compared  to prior noncontrast head CT from [facility] 03/03/2015.  2. There is mild small vessel disease in the frontal periventricular  white matter and there are calcified atherosclerotic plaques in the  cavernous internal carotid arteries bilaterally.  3. Patient has had previous paranasal sinus surgery with bilateral  uncinectomies and ethmoidectomies and the paranasal sinuses are  currently clear. The remainder of the head CT is within normal limits,  specifically no acute intracranial hemorrhage is seen. Etiology  of  headaches in this patient with history of leukemia and thrombocytopenia  is not established on this exam. Results were communicated to Susannah Moya, the oncologist taking care of the patient by telephone on  08/20/2020 at 9:20 AM.      XR CHEST POST CVA PORT-07/31/20    IMPRESSION:  Chest port placement. No pneumothorax.       CT NECK, CHEST, ABDOMEN, AND PELVIS WITHOUT IV CONTRAST. 7/20/2020   HISTORY: 69-year-old male with CLL/SLL.     TECHNIQUE: Radiation dose reduction techniques were utilized, including  automated exposure control and exposure modulation based on body size. 3  mm images were obtained through the neck, chest, abdomen, and pelvis  without the administration of IV contrast. Compared with CT of the  abdomen and pelvis from 08/05/2019 and previous CTs from 03/19/2018.     FINDINGS:  NECK: There is no significant change in the 1.4 x 1.0 cm left  supraclavicular node or in the smaller supraclavicular nodes  bilaterally. A reference 1.4 x 0.7 cm right jugular chain node is  stable. There are shotty nodes scattered throughout the neck. No new  lymphadenopathy is seen. Noncontrasted parotid, submandibular, and  thyroid glands appear unremarkable.     CHEST: There has been interval decrease in the size of the shotty and  borderline enlarged nodes at the axilla. Reference 1.6 x 0.7 cm right  axillary node previously measured 2.0 x 1.2 cm. There has been a  decrease in the size of all of the shotty mediastinal and hilar nodes.  There are no new pulmonary opacities and there are no pleural or  pericardial effusions.     ABDOMEN/PELVIS: Splenic size has slightly increased measuring 15.4 cm in  diameter and previously measuring 13.5 cm, both measured on the coronal  reconstruction series. There is no change in the shotty nodes at the  gastrohepatic ligament and shorty hepatis. There are no pathologically  enlarged nodes. There is no acute abnormality involving the  noncontrasted liver. A single gallstone  is noted within the gallbladder.  Noncontrasted pancreas, adrenals, and kidneys appear unremarkable. There  is no acute bowel abnormality. There is extensive sigmoid  diverticulosis. No free fluid. There are extensive abdominal aortic  atherosclerotic changes and there is a stable 3.7 cm infrarenal  abdominal aortic aneurysm.     IMPRESSION:  1. There has been slight interval increase in splenomegaly since the CT  of the abdomen from 08/05/2019.  2. Interval decrease in the size of the axillary nodes and the shotty  mediastinal nodes. There is no change in the shotty nodes within the  neck and supraclavicular regions. There is no new lymphadenopathy.  3. Stable 3.7 cm infrarenal abdominal aortic aneurysm.      Assessment/Plan   1. Stage 2 CLL without evidence of any disease progression.  I have reviewed the CT scan from 3/19/2018 there is minimal progression but clinically patient is asymptomatic and we will follow with observation.  Will monitor with labs.   No evidence of any frequent infections, no major worsening of his white count. He has no fever or night sweats or any other symptoms.   · Patient knows that he is prone for infections and he is mainly staying at home during the COVID-19 situation time  · Recent admission to [facility] July 10 2020×2.  Initially admitted with chest pain and underwent stress test and echo.  Echo was negative.  Stress test is abnormal but have left message for patient's cardiologist to call us.  His cardiologist is been sent [facility].  · He then got admitted the second time with worsening GI bleed and required total of 3 units of blood.  EGD was negative but colonoscopy showed angiectasia for which he underwent argon ablation.  Currently hemoglobin stable and no active bleeding.  · He was also found to have low platelets with platelets dropping down to 27k and hemoglobin was 7.  Ultrasound showed splenomegaly 15 cm.  Concern is whether he has progressed from his CLL point of  "view and requires treatment.  · CT scan performed 7/20/2020 did show the increased splenomegaly, but interval decrease in size of the axillary nodes, and shotty mediastinal nodes.  No change in the shotty nodes within the neck and supraclavicular regions.  No new lymphadenopathy.  Bone marrow biopsy however showed preliminarily that there is bone marrow involvement.  Remainder of bone marrow biopsy report is pending.  · Bone marrow shows hypercellular marrow with 100% involvement of chronic lymphocytic leukemia/small lymphocytic lymphoma and diffuse pattern with at least 98% of the total marrow cellularity.  Rare foci of erythropoiesis and rare megakaryocytes are noted.   · Negative for BCL-2 and BCL 6.  Chromosomes shows normal karyotype.  I GVH mutation present.  Positive for gain of 1 q., monosomy 13 and loss of IGH.  Negative for deletion T p53, negative for gain of chromosomes 9 and 11, negative for 11, 14 fusion.  · The combination of monosomy 13 and gain of 1 q. is thought to be associated with plasma cell myeloma.  However this patient does not have myeloma.  · Scans were reviewed with the patient today.  Dr. Moya also discussed with the patient and recommended he initiate treatment with chlorambucil and Gazyva.  He would not be a good candidate for Imbruvica due to his history of A. fib\".  He cannot take anticoagulation at this time.  The patient was provided with chemotherapy education today, including  Handouts.  He will need a port placed so that we can initiate treatment  · 8/13/2020: Gazyva day 1. Plan also [id number] mg p.o. on day 1 day 15.  We can increase the dose once we know he tolerates and his platelets improved.  · Patient developing pancytopenia when reviewed cycle 1 day 15.  Chlorambucil dose modified to 10 mg for day 15 however it is felt that he cannot tolerate this ongoing.   ·  Plan to switch to Venetoclax along with continuing Gazyva.    · Patient due today for cycle 2-day 1 " Gazyva.  He will proceed today as scheduled.  Venetoclax will be shipped to his home with plans to begin this next week on 9/14/2020.    · Patient did receive 1 L normal saline and Neulasta on 9/28/2020 secondary to neutropenia with an ANC of 0.04.  · Patient seen on 10/02/2020 continuing on venetoclax, currently on 100 mg dosing.  He would not increase his dose as he will start on voriconazole after being on venetoclax x1 week which affects the metabolism of venetoclax.  He is currently on day 5 of the 100 mg dosing.  Patient states overall he feels the same except for increased fatigue and nausea.  His nauseousness is controlled with ondansetron however.  Patient will be given 1 L normal saline in the office today as planned.  · Patient returns on 10/12/2020 continuing on venetoclax 100 mg daily as well as voriconazole.  · Patient is doing well with these treatments except fatigue.  Next cycle 4 due as of number ninth prior to which will obtain CT scans  · November 9, 2020: White count down to 1.89 but patient started Pepcid.  We will hold off Pepcid.  · 11/17/2020 platelet count dropped to 35,000 patient was instructed to hold venetoclax.  · Returns 11/23/2020 WBC up to 6.38, ANC 5.19, hemoglobin 12.4, platelets up to 121.  I have advised him to resume venetoclax 100 mg daily  · December 7, 2020: Platelets 95K.  White count 3.0 with absolute neutrophil count of 2.01.  Cycle 5 Gazyva given.  Continue venetoclax with voriconazole.   · January 4, 2021: Platelets 84,000, white count 3,440, absolute neutrophil count 1,980. Cycle 6 Gazyva given.  · 2/23/2021: WBC 3.0, platelets 90,000, hemoglobin 14.6, ANC 1.65.  Counts overall stable.  Continue Venetoclax 100 mg daily.  · January 4, 2021: Last dose of Gazyva.  · March 16, 2021: Patient is on venetoclax along with voriconazole and tolerating well with plans to complete total of 1 year.       2.  Worsening thrombocytopenia, looking back his platelets were always in the  150s and during this admission he dropped down to 27K. His LDH also was elevated and he was anemic.  · It was dropping on chlorambucil.  Plans to change therapy as detailed above.  Patient did require transfusion and platelets are currently improved to 55,000.  · 11/17/2020 platelets dropped 35,000.  Venetoclax was temporarily held.  · 11/23/2020 platelets back up to 121 venetoclax resumed  · Aspirin 81 mg daily held when platelets are less than 60,000  · 1/4/2021 platelet count 84,000  · Improved to 100 K.  · 2/23/2021: Platelets slightly lower at 90,000 though overall stable over the last several weeks.  Continue to monitor closely.  · March 16, 2021: Platelets are 112 K.  With hemoglobin of 14.1 and a white count of 3.97    3.  History of angiodysplasia requiring cauterization and Hess's esophagus mild anemia. He is currently asymptomatic.  · No evidence of active bleeding.  Stable  · No active bleeding.    4.  History of cluster headaches for which she has to be treated with steroids in the past and has followed up with Dr. Len Péreztoday with significant headaches.  · Patient had CT of the head 8/20/2020 which was negative.  · He was started on prednisone 10 mg daily which was not helpful.  · Patient saw Dr. Bobby, neurology who gave him 2 shots of a new medication Emgality.  This is something that can be given once a month.    · Headaches improved.  Resolved  · Stable.  Resolved    5.  Atrial fibrillation, off anticoagulation given his thrombocytopenia.  · Patient followed by Dr. Robert Rodriguez  · Given pancytopenia, GI bleed secondary to angiodysplasia, intermittent significant thrombocytopenia anticoagulation had been held.  · Patient continues on aspirin 81 mg daily if platelet count greater than 60,000.    6.  Elevated alkaline phosphatase  · Alkaline phosphatase stable today at 184.  · Patient has a history of a 1.7 cm gallstone nonobstructing      PLAN:  1. Continue venetoclax 100 mg  daily.  2. Continue voriconazole.  3. Continue acyclovir 400 mg p.o. twice daily  4. S/p Gazyva, completed 6 months with good response  5. S/p COVID-19 vaccine, last dose last week.  Patient had fever and fatigue for a day which resolved  6. Port flush today  7. Patient to have port flush every 6 weeks with nurse practitioner in 6 weeks and follow-up with me in 3 months with CT scan    This patient is on drug therapy requiring intensive monitoring for toxicity.        Susannah Moya MD      Cc: Dr. Kevin Gilmore

## 2021-03-18 ENCOUNTER — MEDICATION THERAPY MANAGEMENT (OUTPATIENT)
Dept: PHARMACY | Facility: HOSPITAL | Age: 71
End: 2021-03-18

## 2021-03-18 NOTE — PROGRESS NOTES
[facility] lab review ( venetoclax)        3/16/2021   WBC 3.40 - 10.80 10*3/mm3 3.29 (A)   Neutrophils Absolute 1.70 - 7.00 10*3/mm3 1.60 (A)   Hemoglobin 13.0 - 17.7 g/dL 14.1   Hematocrit 37.5 - 51.0 % 41.9   Platelets 140 - 450 10*3/mm3 112 (A)   Creatinine 0.70 - 1.30 mg/dL 0.94   eGFR Non African Am >60 mL/min/1.73 79   BUN 6 - 20 mg/dL 20   Sodium 134 - 145 mmol/L 141   Potassium 3.5 - 4.7 mmol/L 4.3   Glucose 74 - 124 mg/dL 235 (A)   Calcium 8.5 - 10.2 mg/dL 9.5   Albumin 3.50 - 5.20 g/dL 4.60   Total Protein 6.3 - 8.0 g/dL 6.1 (A)   AST (SGOT) 0 - 40 U/L 30   ALT (SGPT) 0 - 41 U/L 22   Alkaline Phosphatase 38 - 116 U/L 211 (A)   Total Bilirubin 0.2 - 1.2 mg/dL 0.7     Continues on Venetoclax 100 mg po daily.

## 2021-04-27 ENCOUNTER — OFFICE VISIT (OUTPATIENT)
Dept: ONCOLOGY | Facility: CLINIC | Age: 71
End: 2021-04-27

## 2021-04-27 ENCOUNTER — INFUSION (OUTPATIENT)
Dept: ONCOLOGY | Facility: HOSPITAL | Age: 71
End: 2021-04-27

## 2021-04-27 VITALS
HEIGHT: 73 IN | TEMPERATURE: 97.1 F | RESPIRATION RATE: 16 BRPM | BODY MASS INDEX: 20.9 KG/M2 | SYSTOLIC BLOOD PRESSURE: 134 MMHG | HEART RATE: 60 BPM | DIASTOLIC BLOOD PRESSURE: 78 MMHG | WEIGHT: 157.7 LBS | OXYGEN SATURATION: 97 %

## 2021-04-27 DIAGNOSIS — C91.10 CLL (CHRONIC LYMPHOCYTIC LEUKEMIA) (HCC): Primary | ICD-10-CM

## 2021-04-27 DIAGNOSIS — D69.6 THROMBOCYTOPENIA (HCC): ICD-10-CM

## 2021-04-27 LAB
ALBUMIN SERPL-MCNC: 4.1 G/DL (ref 3.5–5.2)
ALBUMIN/GLOB SERPL: 2 G/DL (ref 1.1–2.4)
ALP SERPL-CCNC: 157 U/L (ref 38–116)
ALT SERPL W P-5'-P-CCNC: 17 U/L (ref 0–41)
ANION GAP SERPL CALCULATED.3IONS-SCNC: 9.9 MMOL/L (ref 5–15)
AST SERPL-CCNC: 25 U/L (ref 0–40)
BASOPHILS # BLD AUTO: 0.02 10*3/MM3 (ref 0–0.2)
BASOPHILS NFR BLD AUTO: 0.6 % (ref 0–1.5)
BILIRUB SERPL-MCNC: 0.7 MG/DL (ref 0.2–1.2)
BUN SERPL-MCNC: 14 MG/DL (ref 6–20)
BUN/CREAT SERPL: 17.1 (ref 7.3–30)
CALCIUM SPEC-SCNC: 9.4 MG/DL (ref 8.5–10.2)
CHLORIDE SERPL-SCNC: 101 MMOL/L (ref 98–107)
CO2 SERPL-SCNC: 28.1 MMOL/L (ref 22–29)
CREAT SERPL-MCNC: 0.82 MG/DL (ref 0.7–1.3)
DEPRECATED RDW RBC AUTO: 52.2 FL (ref 37–54)
EOSINOPHIL # BLD AUTO: 0 10*3/MM3 (ref 0–0.4)
EOSINOPHIL NFR BLD AUTO: 0 % (ref 0.3–6.2)
ERYTHROCYTE [DISTWIDTH] IN BLOOD BY AUTOMATED COUNT: 14.3 % (ref 12.3–15.4)
GFR SERPL CREATININE-BSD FRML MDRD: 93 ML/MIN/1.73
GLOBULIN UR ELPH-MCNC: 2.1 GM/DL (ref 1.8–3.5)
GLUCOSE SERPL-MCNC: 226 MG/DL (ref 74–124)
HCT VFR BLD AUTO: 40.3 % (ref 37.5–51)
HGB BLD-MCNC: 13.7 G/DL (ref 13–17.7)
IMM GRANULOCYTES # BLD AUTO: 0.15 10*3/MM3 (ref 0–0.05)
IMM GRANULOCYTES NFR BLD AUTO: 4.1 % (ref 0–0.5)
LYMPHOCYTES # BLD AUTO: 0.97 10*3/MM3 (ref 0.7–3.1)
LYMPHOCYTES NFR BLD AUTO: 26.7 % (ref 19.6–45.3)
MCH RBC QN AUTO: 34.3 PG (ref 26.6–33)
MCHC RBC AUTO-ENTMCNC: 34 G/DL (ref 31.5–35.7)
MCV RBC AUTO: 100.8 FL (ref 79–97)
MONOCYTES # BLD AUTO: 0.45 10*3/MM3 (ref 0.1–0.9)
MONOCYTES NFR BLD AUTO: 12.4 % (ref 5–12)
NEUTROPHILS NFR BLD AUTO: 2.04 10*3/MM3 (ref 1.7–7)
NEUTROPHILS NFR BLD AUTO: 56.2 % (ref 42.7–76)
NRBC BLD AUTO-RTO: 0 /100 WBC (ref 0–0.2)
PLATELET # BLD AUTO: 102 10*3/MM3 (ref 140–450)
PMV BLD AUTO: 9.2 FL (ref 6–12)
POTASSIUM SERPL-SCNC: 3.8 MMOL/L (ref 3.5–4.7)
PROT SERPL-MCNC: 6.2 G/DL (ref 6.3–8)
RBC # BLD AUTO: 4 10*6/MM3 (ref 4.14–5.8)
SODIUM SERPL-SCNC: 139 MMOL/L (ref 134–145)
WBC # BLD AUTO: 3.63 10*3/MM3 (ref 3.4–10.8)

## 2021-04-27 PROCEDURE — 80053 COMPREHEN METABOLIC PANEL: CPT

## 2021-04-27 PROCEDURE — 99213 OFFICE O/P EST LOW 20 MIN: CPT | Performed by: NURSE PRACTITIONER

## 2021-04-27 PROCEDURE — 25010000002 HEPARIN LOCK FLUSH PER 10 UNITS: Performed by: NURSE PRACTITIONER

## 2021-04-27 PROCEDURE — 85025 COMPLETE CBC W/AUTO DIFF WBC: CPT

## 2021-04-27 PROCEDURE — 36591 DRAW BLOOD OFF VENOUS DEVICE: CPT

## 2021-04-27 RX ORDER — HEPARIN SODIUM (PORCINE) LOCK FLUSH IV SOLN 100 UNIT/ML 100 UNIT/ML
500 SOLUTION INTRAVENOUS AS NEEDED
Status: DISCONTINUED | OUTPATIENT
Start: 2021-04-27 | End: 2021-04-27 | Stop reason: HOSPADM

## 2021-04-27 RX ORDER — HEPARIN SODIUM (PORCINE) LOCK FLUSH IV SOLN 100 UNIT/ML 100 UNIT/ML
500 SOLUTION INTRAVENOUS AS NEEDED
Status: CANCELLED | OUTPATIENT
Start: 2021-04-27

## 2021-04-27 RX ORDER — SODIUM CHLORIDE 0.9 % (FLUSH) 0.9 %
10 SYRINGE (ML) INJECTION AS NEEDED
Status: CANCELLED | OUTPATIENT
Start: 2021-04-27

## 2021-04-27 RX ADMIN — Medication 500 UNITS: at 10:05

## 2021-04-27 NOTE — PROGRESS NOTES
Subjective     REASON FOR VISIT:    1. Chronic lymphoid leukemia, stage 4, presented initially with significant pancytopenia  · Currently on Gazyva with venetoclax  · Cycle 1 Gazyva given on August 13, 2020    2.  History of angiodysplasia requiring cauterization and history of Hess's esophagus    3.  Bone marrow aspiration biopsy shows hypercellular marrow with 98% involvement with CLL    Patient ID: Macho Stephenson is a 70 y.o. year old male     History of Present Illness The patient is a 70 y.o. male with the above-mentioned history presents today for follow-up.  He continues on venetoclax 100 mg daily.  Also continues on his prophylactic medications including voriconazole and acyclovir.  He is feeling exceptionally well.  He has started rehab twice weekly to increase his strength.  His appetite is good and he denies any recent weight loss.  He denies fever, chills, night sweats.  He denies any new lymphadenopathy.      PAST MEDICAL HISTORY:   1. Recurrent atrial fibrillation followed by cardiology. He has had drug eluting stent placed x 3.   2. High cholesterol.   3. Hypertension.       HEMATOLOGIC/ONCOLOGIC HISTORY:       The patient is 62-year-old gentleman with the above history currently here for followup. He has no complaints. He denies fever, night sweats or weight loss. He does not have any lymphadenopathy. He feels good. He had some fatigue but his CBC shows a go od hemoglobin.   The patient is followed by Dr. Kevin Chandra. He had seen Dr. Chandra 7/18/13 for a history of coronary artery disease status drug eluting stent placement to the right coronary artery and left anterior descending artery in February 2011. Histor y of paroxysmal atrial fibrillation and hyperlipidemia. He presented to [facility] on 6/23/13 with palpitations and was found to have atrial fibrillation with rapid ventricular response. He was given IV Cardizem and converted spontaneously to normal sinus rhythm. He was  found to have elevated white count at 18.9 and denied any symptoms of infection or urinary complaints. TSH was normal, troponin levels were negative. He was asked to continue aspirin and metoprolol for that. If he contin u ed to have atrial fibrillation, they will consider antiarrhythmic therapy with or without a short term anticoagulation therapy. However, currently the patient is feeling reasonably good. He has no complaints. His CBC 7/22/13 showed WBC 17,000, hemoglob i n 16.4 and platelet count of 174,000. Back 9/28/13 his hemoglobin was 15.1, WBC 13.3 and platelets 197,000. He has had a persistently elevated WBC but he is totally asymptomatic. He does not have any fever, night sweats or lymphadenopathy. He is here to be evaluated for his elevated white count.       Peripheral blood flow cytometry done 08/16/13 shows 22% chronic lymphoid leukemia cells, and they expressed ZAP-70 but not CD38. The total number of cells approximated 60% T cells, 32% B cells and 1% natural k iller cells. The B cells were monoclonal and expressed CD19, CD20, CD22, CD5, CD23, CD11c, ZAP-70 and T cell antigens. There was a normal CD4/CD8 ratio. CT of the chest, abdomen and pelvis does not show evidence of metastasis. Peripheral blood for DILLON -2 was negative. It was negative for T11:14 translocation.       CT scan done on 08/21/2013 shows 5 to 6 mm noncalcified nodule in the left lower lobe which is unchanged from December 2010. Otherwise no evidence of any lymphadenopathy or hepatosplenomegaly.       MRI of the spine shows arthritis and disc bulge and likely hemangiomas, with 1 lesion showing increased signal intensity at T2, which is likely a hemangioma. Bone scan could be done, but patient does not even have much pain there. CT scan of the chest, a bdomen and pelvis was done on 11/23/2014. He has tiny lymph nodes in the neck which are difficult to palpate. Right jugular one is 1.4 x 0.9 and left supraclavicular one is 1.5 x 1.1. He  also has a subcarinal lymph node which is 1.7 x 1.2 cm, and he ha s a portocaval lymph node which has increased from 2.5 to 2.8. Patient is totally asymptomatic. He does not have any B symptoms, so will continue followup with observation.     Patient is a 69-year-old gentleman with history of chronic lymphocytic leukemia/SLL who recently got admitted to [facility] because of GI bleed.  He was seen by Dr. Montero.  He underwent EGD colonoscopy.  He was found to have angiodysplasia for which he underwent argon coagulation.  He required 3 units of blood.  Patient had a normal esophagus normal stomach and normal duodenum CLOtest was negative he was asked to take Protonix 40 mg daily.  Colonoscopy showed blood in the entire examined colon but there was a single bleeding colonic angiectasia which was treated with argon plasma coagulation.    He was admitted twice.  Initially he was admitted on July 10 at which time he stayed 3 days and he was evaluated for chest pain.  He underwent a cardiac work-up with stress test and echo.  The echo was normal but the stress test showed medium sized moderate severe severity fixed perfusion defect.  Of the inferior wall consistent with infarction.  However according to patient cardiology did not think he had any cardiac problems.  I have left a message for patient's cardiologist to call me today.  Patient subsequently got readmitted with GI bleed and required 1/3 unit of transfusion.  He was found to have thrombocytopenia and hematology was consulted.    His hemoglobin had dropped down to as low as 7 and his platelets had gone down to 87.  Today it is 35 and his hemoglobin is 9.4 today.  He denies active bleeding.  He has lost weight recently.  He does not have any fever or night sweats.    He had ultrasound of spleen which showed enlarged spleen centimeters in length    Patient was started on Gazyva with Leukeran but subsequently switched to venetoclax with Gazyva.  His venetoclax  dose is 100 mg daily and he receives Gazyva once a month.    CT scan done 2020 shows significant response     MEDICATIONS: The current medication list was reviewed with the patient and updated in the EMR this date per the medical assistant. Medication dosages and frequencies were confirmed to be accurate.       ALLERGIES: PENICILLIN which causes him to swell up. He is allergic to IV CONTRAST DYE.     SOCIAL HISTORY: He is . He smokes one pack per day for 35 years. He consumes three to four alcoholic drinks per week. He has no tattoos and no previous transfusions.       FAMILY HISTORY: Father  at 82 with MI. Mother  at 82 with bone cancer. He has one brother age 65 in good health and two sisters 69 and 57 in good health.   Past Medical History, Social History, and Family History are unchanged from my prior documentation on     Review of Systems   Constitutional: Negative for appetite change, chills, diaphoresis, fatigue, fever and unexpected weight change.   Respiratory: Negative for cough and shortness of breath.    Cardiovascular: Negative for leg swelling.   Gastrointestinal: Negative for abdominal distention, abdominal pain, constipation, diarrhea, nausea and vomiting.   Genitourinary: Negative for hematuria.   Musculoskeletal: Negative for joint swelling.        No muscle weakness.   Skin: Negative for rash and wound.   Neurological: Negative for weakness, numbness and headaches.   Hematological: Negative for adenopathy. Does not bruise/bleed easily.   Psychiatric/Behavioral: Negative for behavioral problems.   All other systems reviewed and are negative.  I have reviewed and confirmed the accuracy of the ROS as documented by the MA/LPN/RN MARIAH Pimentel    Patient Active Problem List   Diagnosis   • CLL (chronic lymphocytic leukemia) (CMS/HCC)   • Anemia   • Encounter for hepatitis C screening test for low risk patient    • Thrombocytopenia (CMS/HCC)   • H/O heart  artery stent   • Stented coronary artery   • Arteriosclerosis of coronary artery   • Atrial fibrillation (CMS/HCC)   • Paroxysmal atrial fibrillation (CMS/HCC)   • Chest pain, rule out acute myocardial infarction   • Fall   • Fracture, olecranon   • Hyperlipidemia   • Long term (current) use of anticoagulants   • Lower GI bleed   • Olecranon bursitis   • Shoulder pain   • Smoker   • CLL (chronic lymphocytic leukemia) (CMS/HCC)   • Dehydration   • Drug-induced nausea and vomiting   • Encounter for long-term (current) use of other medications   • Neutropenia (CMS/HCC)       MEDICATIONS:  Medication Reconciliation for the patient has been reviewed by me and documented in the patients chart.    ALLERGIES:    Allergies   Allergen Reactions   • Contrast Dye Swelling   • Penicillins Swelling         Vitals:    04/27/21 1012   BP: 134/78   Pulse: 60   Resp: 16   Temp: 97.1 °F (36.2 °C)   SpO2: 97%        Current Status 4/27/2021   ECOG score 1      This patient's ACP documentation is up to date, and there's nothing further left to document.    Physical exam  Patient screened positive for depression based on a PHQ-9 score of 4 on 11/30/2020. Follow-up recommendations include: PCP managing depression.    Patient is not depressed today and he is not suicidal or homicidal.    CONSTITUTIONAL:  Vital signs reviewed.    No distress, looks comfortable.  EYES:  Conjunctiva and lids unremarkable.  Extraocular eye movements intact.  EARS,NOSE,MOUTH,THROAT:  Ears and appear unremarkable.  Mask in place.  RESPIRATORY:  Normal respiratory effort.  , no rales  or wheezing, clear   CARDIOVASCULAR:  Regular rate and rhythm, no murmur  No significant lower extremity edema.  SKIN: No wounds.  No rashes.  MUSCULOSKELETAL/EXTREMITIES: No clubbing or cyanosis.  No apparent unilateral weakness.  NEURO: CN 2-12 appear intact. No focal neurological deficits noted.  PSYCHIATRIC:  Normal judgment and insight.  Normal mood and affect.  Review of  systems reviewed and updated on 04/27/2021    RECENT LABS:  Results from last 7 days   Lab Units 04/27/21  1000   WBC 10*3/mm3 3.63   NEUTROS ABS 10*3/mm3 2.04   HEMOGLOBIN g/dL 13.7   HEMATOCRIT % 40.3   PLATELETS 10*3/mm3 102*     Results from last 7 days   Lab Units 04/27/21  1000   SODIUM mmol/L 139   POTASSIUM mmol/L 3.8   CHLORIDE mmol/L 101   CO2 mmol/L 28.1   BUN mg/dL 14   CREATININE mg/dL 0.82   CALCIUM mg/dL 9.4   ALBUMIN g/dL 4.10   BILIRUBIN mg/dL 0.7   ALK PHOS U/L 157*   ALT (SGPT) U/L 17   AST (SGOT) U/L 25   GLUCOSE mg/dL 226*           CT NECK, CHEST, ABDOMEN, AND PELVIS WITHOUT IV CONTRAST 11/04/20    IMPRESSION:  1. Some of the lymphadenopathy has resolved and the remaining  lymphadenopathy is significantly smaller than previously.   2. Splenic size has decreased as well. There is no new abnormality.    STAT NONCONTRAST HEAD CT 08/20/2020  IMPRESSION:  1. No acute abnormality is seen with no significant change when compared  to prior noncontrast head CT from [facility] 03/03/2015.  2. There is mild small vessel disease in the frontal periventricular  white matter and there are calcified atherosclerotic plaques in the  cavernous internal carotid arteries bilaterally.  3. Patient has had previous paranasal sinus surgery with bilateral  uncinectomies and ethmoidectomies and the paranasal sinuses are  currently clear. The remainder of the head CT is within normal limits,  specifically no acute intracranial hemorrhage is seen. Etiology of  headaches in this patient with history of leukemia and thrombocytopenia  is not established on this exam. Results were communicated to Susannah Moya, the oncologist taking care of the patient by telephone on  08/20/2020 at 9:20 AM.      XR CHEST POST CVA PORT-07/31/20    IMPRESSION:  Chest port placement. No pneumothorax.       CT NECK, CHEST, ABDOMEN, AND PELVIS WITHOUT IV CONTRAST. 7/20/2020   HISTORY: 69-year-old male with CLL/SLL.     TECHNIQUE:  Radiation dose reduction techniques were utilized, including  automated exposure control and exposure modulation based on body size. 3  mm images were obtained through the neck, chest, abdomen, and pelvis  without the administration of IV contrast. Compared with CT of the  abdomen and pelvis from 08/05/2019 and previous CTs from 03/19/2018.     FINDINGS:  NECK: There is no significant change in the 1.4 x 1.0 cm left  supraclavicular node or in the smaller supraclavicular nodes  bilaterally. A reference 1.4 x 0.7 cm right jugular chain node is  stable. There are shotty nodes scattered throughout the neck. No new  lymphadenopathy is seen. Noncontrasted parotid, submandibular, and  thyroid glands appear unremarkable.     CHEST: There has been interval decrease in the size of the shotty and  borderline enlarged nodes at the axilla. Reference 1.6 x 0.7 cm right  axillary node previously measured 2.0 x 1.2 cm. There has been a  decrease in the size of all of the shotty mediastinal and hilar nodes.  There are no new pulmonary opacities and there are no pleural or  pericardial effusions.     ABDOMEN/PELVIS: Splenic size has slightly increased measuring 15.4 cm in  diameter and previously measuring 13.5 cm, both measured on the coronal  reconstruction series. There is no change in the shotty nodes at the  gastrohepatic ligament and shorty hepatis. There are no pathologically  enlarged nodes. There is no acute abnormality involving the  noncontrasted liver. A single gallstone is noted within the gallbladder.  Noncontrasted pancreas, adrenals, and kidneys appear unremarkable. There  is no acute bowel abnormality. There is extensive sigmoid  diverticulosis. No free fluid. There are extensive abdominal aortic  atherosclerotic changes and there is a stable 3.7 cm infrarenal  abdominal aortic aneurysm.     IMPRESSION:  1. There has been slight interval increase in splenomegaly since the CT  of the abdomen from 08/05/2019.  2.  Interval decrease in the size of the axillary nodes and the shotty  mediastinal nodes. There is no change in the shotty nodes within the  neck and supraclavicular regions. There is no new lymphadenopathy.  3. Stable 3.7 cm infrarenal abdominal aortic aneurysm.      Assessment/Plan   1. Stage 2 CLL without evidence of any disease progression.  I have reviewed the CT scan from 3/19/2018 there is minimal progression but clinically patient is asymptomatic and we will follow with observation.  Will monitor with labs.   No evidence of any frequent infections, no major worsening of his white count. He has no fever or night sweats or any other symptoms.   · Patient knows that he is prone for infections and he is mainly staying at home during the COVID-19 situation time  · Recent admission to [facility] July 10 2020×2.  Initially admitted with chest pain and underwent stress test and echo.  Echo was negative.  Stress test is abnormal but have left message for patient's cardiologist to call us.  His cardiologist is been sent [facility].  · He then got admitted the second time with worsening GI bleed and required total of 3 units of blood.  EGD was negative but colonoscopy showed angiectasia for which he underwent argon ablation.  Currently hemoglobin stable and no active bleeding.  · He was also found to have low platelets with platelets dropping down to 27k and hemoglobin was 7.  Ultrasound showed splenomegaly 15 cm.  Concern is whether he has progressed from his CLL point of view and requires treatment.  · CT scan performed 7/20/2020 did show the increased splenomegaly, but interval decrease in size of the axillary nodes, and shotty mediastinal nodes.  No change in the shotty nodes within the neck and supraclavicular regions.  No new lymphadenopathy.  Bone marrow biopsy however showed preliminarily that there is bone marrow involvement.  Remainder of bone marrow biopsy report is pending.  · Bone marrow shows  "hypercellular marrow with 100% involvement of chronic lymphocytic leukemia/small lymphocytic lymphoma and diffuse pattern with at least 98% of the total marrow cellularity.  Rare foci of erythropoiesis and rare megakaryocytes are noted.   · Negative for BCL-2 and BCL 6.  Chromosomes shows normal karyotype.  I GVH mutation present.  Positive for gain of 1 q., monosomy 13 and loss of IGH.  Negative for deletion T p53, negative for gain of chromosomes 9 and 11, negative for 11, 14 fusion.  · The combination of monosomy 13 and gain of 1 q. is thought to be associated with plasma cell myeloma.  However this patient does not have myeloma.  · Scans were reviewed with the patient today.  Dr. Moya also discussed with the patient and recommended he initiate treatment with chlorambucil and Gazyva.  He would not be a good candidate for Imbruvica due to his history of A. fib\".  He cannot take anticoagulation at this time.  The patient was provided with chemotherapy education today, including  Handouts.  He will need a port placed so that we can initiate treatment  · 8/13/2020: Gazyva day 1. Plan also [id number] mg p.o. on day 1 day 15.  We can increase the dose once we know he tolerates and his platelets improved.  · Patient developing pancytopenia when reviewed cycle 1 day 15.  Chlorambucil dose modified to 10 mg for day 15 however it is felt that he cannot tolerate this ongoing.   ·  Plan to switch to Venetoclax along with continuing Gazyva.    · Patient due today for cycle 2-day 1 Gazyva.  He will proceed today as scheduled.  Venetoclax will be shipped to his home with plans to begin this next week on 9/14/2020.    · Patient did receive 1 L normal saline and Neulasta on 9/28/2020 secondary to neutropenia with an ANC of 0.04.  · Patient seen on 10/02/2020 continuing on venetoclax, currently on 100 mg dosing.  He would not increase his dose as he will start on voriconazole after being on venetoclax x1 week which affects " the metabolism of venetoclax.  He is currently on day 5 of the 100 mg dosing.  Patient states overall he feels the same except for increased fatigue and nausea.  His nauseousness is controlled with ondansetron however.  Patient will be given 1 L normal saline in the office today as planned.  · Patient returns on 10/12/2020 continuing on venetoclax 100 mg daily as well as voriconazole.  · Patient is doing well with these treatments except fatigue.  Next cycle 4 due as of number ninth prior to which will obtain CT scans  · November 9, 2020: White count down to 1.89 but patient started Pepcid.  We will hold off Pepcid.  · 11/17/2020 platelet count dropped to 35,000 patient was instructed to hold venetoclax.  · Returns 11/23/2020 WBC up to 6.38, ANC 5.19, hemoglobin 12.4, platelets up to 121.  I have advised him to resume venetoclax 100 mg daily  · December 7, 2020: Platelets 95K.  White count 3.0 with absolute neutrophil count of 2.01.  Cycle 5 Gazyva given.  Continue venetoclax with voriconazole.   · January 4, 2021: Platelets 84,000, white count 3,440, absolute neutrophil count 1,980. Cycle 6 Gazyva given.  · 2/23/2021: WBC 3.0, platelets 90,000, hemoglobin 14.6, ANC 1.65.  Counts overall stable.  Continue Venetoclax 100 mg daily.  · January 4, 2021: Last dose of Gazyva.  · March 16, 2021: Patient is on venetoclax along with voriconazole and tolerating well with plans to complete total of 1 year.  · 4/27/2021: Patient continues on venetoclax 100 mg daily with voriconazole and acyclovir for prophylaxis.  He is tolerating this well.  He is scheduled for scans in 5 weeks.       2.  Worsening thrombocytopenia, looking back his platelets were always in the 150s and during this admission he dropped down to 27K. His LDH also was elevated and he was anemic.  · It was dropping on chlorambucil.  Plans to change therapy as detailed above.  Patient did require transfusion and platelets are currently improved to  55,000.  · 11/17/2020 platelets dropped 35,000.  Venetoclax was temporarily held.  · 11/23/2020 platelets back up to 121 venetoclax resumed  · Aspirin 81 mg daily held when platelets are less than 60,000  · 1/4/2021 platelet count 84,000  · Improved to 100 K.  · 2/23/2021: Platelets slightly lower at 90,000 though overall stable over the last several weeks.  Continue to monitor closely.  · March 16, 2021: Platelets are 112 K.  With hemoglobin of 14.1 and a white count of 3.97  · 4/27/2021 platelets today are 102,000.  No Signs or symptoms of bleeding.    3.  History of angiodysplasia requiring cauterization and Hess's esophagus mild anemia. He is currently asymptomatic.  · No evidence of active bleeding.  Stable  · No active bleeding.    4.  History of cluster headaches for which she has to be treated with steroids in the past and has followed up with Dr. Len Péreztoday with significant headaches.  · Patient had CT of the head 8/20/2020 which was negative.  · He was started on prednisone 10 mg daily which was not helpful.  · Patient saw Dr. Bobby, neurology who gave him 2 shots of a new medication Emgality.  This is something that can be given once a month.    · Headaches improved.  Resolved  · Stable.  Resolved    5.  Atrial fibrillation, off anticoagulation given his thrombocytopenia.  · Patient followed by Dr. Robert Rodriguez  · Given pancytopenia, GI bleed secondary to angiodysplasia, intermittent significant thrombocytopenia anticoagulation had been held.  · Patient continues on aspirin 81 mg daily if platelet count greater than 60,000.    6.  Elevated alkaline phosphatase  · Alkaline phosphatase stable today at 157.  · Patient has a history of a 1.7 cm gallstone nonobstructing      PLAN:  1. Continue venetoclax 100 mg daily.  2. Continue voriconazole.  3. Continue acyclovir 400 mg p.o. twice daily  4. Port flush today  5. CT abdomen and pelvis in 5 weeks  6. Follow up in 6 weeks with Dr. Moya for scan  review and determination of plan, patient will also have port flushed.    MARIAH Pimentel  04/27/21    I reviewed documentation performed by MARIAH Pimentel and I am in agreement with the above.  I have also spoken with the patient face-to-face and his concerns have been addressed in full.     Cc: Dr. Kevin Gilmore

## 2021-04-28 ENCOUNTER — MEDICATION THERAPY MANAGEMENT (OUTPATIENT)
Dept: PHARMACY | Facility: HOSPITAL | Age: 71
End: 2021-04-28

## 2021-04-28 NOTE — PROGRESS NOTES
[facility] lab review ( Venetoclax)          4/27/2021   WBC 3.40 - 10.80 10*3/mm3 3.63   Neutrophils Absolute 1.70 - 7.00 10*3/mm3 2.04   Hemoglobin 13.0 - 17.7 g/dL 13.7   Hematocrit 37.5 - 51.0 % 40.3   Platelets 140 - 450 10*3/mm3 102 (A)   Creatinine 0.70 - 1.30 mg/dL 0.82   eGFR Non African Am >60 mL/min/1.73 93   BUN 6 - 20 mg/dL 14   Sodium 134 - 145 mmol/L 139   Potassium 3.5 - 4.7 mmol/L 3.8   Glucose 74 - 124 mg/dL 226 (A)   Calcium 8.5 - 10.2 mg/dL 9.4   Albumin 3.50 - 5.20 g/dL 4.10   Total Protein 6.3 - 8.0 g/dL 6.2 (A)   AST (SGOT) 0 - 40 U/L 25   ALT (SGPT) 0 - 41 U/L 17   Alkaline Phosphatase 38 - 116 U/L 157 (A)   Total Bilirubin 0.2 - 1.2 mg/dL 0.7     APRN dictation is noted.  Continues on Venetoclax 100 mg po daily.  Prophylaxis medications: acyclovir and voriconazole continue.

## 2021-04-28 NOTE — TELEPHONE ENCOUNTER
Venclexta refill request rec electronically from JACQUELINE VICTOR. Per last office note-pt is to continue 100 mg daily.    Venclexta rx routed to Dr Moya for signature. Once signed it will be escribed to [facility]

## 2021-05-11 DIAGNOSIS — C91.10 CLL (CHRONIC LYMPHOCYTIC LEUKEMIA) (HCC): ICD-10-CM

## 2021-05-11 RX ORDER — ALLOPURINOL 300 MG/1
300 TABLET ORAL DAILY
Qty: 90 TABLET | Refills: 1 | Status: SHIPPED | OUTPATIENT
Start: 2021-05-11 | End: 2021-06-08 | Stop reason: SDUPTHER

## 2021-05-25 ENCOUNTER — MEDICATION THERAPY MANAGEMENT (OUTPATIENT)
Dept: PHARMACY | Facility: HOSPITAL | Age: 71
End: 2021-05-25

## 2021-05-25 NOTE — PROGRESS NOTES
[facility] telephone encounter re adherence and side effects ( venetoclax)    No answer.   direct line [phone].

## 2021-05-26 ENCOUNTER — MEDICATION THERAPY MANAGEMENT (OUTPATIENT)
Dept: PHARMACY | Facility: HOSPITAL | Age: 71
End: 2021-05-26

## 2021-06-02 ENCOUNTER — HOSPITAL ENCOUNTER (OUTPATIENT)
Dept: PET IMAGING | Facility: HOSPITAL | Age: 71
Discharge: HOME | End: 2021-06-02
Admitting: INTERNAL MEDICINE

## 2021-06-02 DIAGNOSIS — C91.10 CLL (CHRONIC LYMPHOCYTIC LEUKEMIA) (HCC): ICD-10-CM

## 2021-06-02 PROCEDURE — 71250 CT THORAX DX C-: CPT

## 2021-06-02 PROCEDURE — 70490 CT SOFT TISSUE NECK W/O DYE: CPT

## 2021-06-02 PROCEDURE — 74176 CT ABD & PELVIS W/O CONTRAST: CPT

## 2021-06-08 ENCOUNTER — OFFICE VISIT (OUTPATIENT)
Dept: ONCOLOGY | Facility: CLINIC | Age: 71
End: 2021-06-08

## 2021-06-08 ENCOUNTER — INFUSION (OUTPATIENT)
Dept: ONCOLOGY | Facility: HOSPITAL | Age: 71
End: 2021-06-08

## 2021-06-08 VITALS
SYSTOLIC BLOOD PRESSURE: 128 MMHG | RESPIRATION RATE: 16 BRPM | OXYGEN SATURATION: 97 % | BODY MASS INDEX: 20.75 KG/M2 | WEIGHT: 156.6 LBS | DIASTOLIC BLOOD PRESSURE: 69 MMHG | HEART RATE: 57 BPM | TEMPERATURE: 98.2 F | HEIGHT: 73 IN

## 2021-06-08 DIAGNOSIS — C91.10 CLL (CHRONIC LYMPHOCYTIC LEUKEMIA) (HCC): Primary | ICD-10-CM

## 2021-06-08 DIAGNOSIS — C91.10 CLL (CHRONIC LYMPHOCYTIC LEUKEMIA) (HCC): ICD-10-CM

## 2021-06-08 DIAGNOSIS — I48.20 CHRONIC ATRIAL FIBRILLATION (HCC): Primary | ICD-10-CM

## 2021-06-08 LAB
ALBUMIN SERPL-MCNC: 4.3 G/DL (ref 3.5–5.2)
ALBUMIN/GLOB SERPL: 2 G/DL (ref 1.1–2.4)
ALP SERPL-CCNC: 166 U/L (ref 38–116)
ALT SERPL W P-5'-P-CCNC: 20 U/L (ref 0–41)
ANION GAP SERPL CALCULATED.3IONS-SCNC: 10.2 MMOL/L (ref 5–15)
AST SERPL-CCNC: 26 U/L (ref 0–40)
BASOPHILS # BLD AUTO: 0.01 10*3/MM3 (ref 0–0.2)
BASOPHILS NFR BLD AUTO: 0.3 % (ref 0–1.5)
BILIRUB SERPL-MCNC: 0.4 MG/DL (ref 0.2–1.2)
BUN SERPL-MCNC: 13 MG/DL (ref 6–20)
BUN/CREAT SERPL: 16.5 (ref 7.3–30)
CALCIUM SPEC-SCNC: 9.1 MG/DL (ref 8.5–10.2)
CHLORIDE SERPL-SCNC: 103 MMOL/L (ref 98–107)
CO2 SERPL-SCNC: 25.8 MMOL/L (ref 22–29)
CREAT SERPL-MCNC: 0.79 MG/DL (ref 0.7–1.3)
DEPRECATED RDW RBC AUTO: 48.2 FL (ref 37–54)
EOSINOPHIL # BLD AUTO: 0 10*3/MM3 (ref 0–0.4)
EOSINOPHIL NFR BLD AUTO: 0 % (ref 0.3–6.2)
ERYTHROCYTE [DISTWIDTH] IN BLOOD BY AUTOMATED COUNT: 13.2 % (ref 12.3–15.4)
GFR SERPL CREATININE-BSD FRML MDRD: 97 ML/MIN/1.73
GLOBULIN UR ELPH-MCNC: 2.1 GM/DL (ref 1.8–3.5)
GLUCOSE SERPL-MCNC: 129 MG/DL (ref 74–124)
HCT VFR BLD AUTO: 39.7 % (ref 37.5–51)
HGB BLD-MCNC: 14 G/DL (ref 13–17.7)
IMM GRANULOCYTES # BLD AUTO: 0.09 10*3/MM3 (ref 0–0.05)
IMM GRANULOCYTES NFR BLD AUTO: 3 % (ref 0–0.5)
LDH SERPL-CCNC: 231 U/L (ref 99–259)
LYMPHOCYTES # BLD AUTO: 0.92 10*3/MM3 (ref 0.7–3.1)
LYMPHOCYTES NFR BLD AUTO: 30.2 % (ref 19.6–45.3)
MCH RBC QN AUTO: 35.1 PG (ref 26.6–33)
MCHC RBC AUTO-ENTMCNC: 35.3 G/DL (ref 31.5–35.7)
MCV RBC AUTO: 99.5 FL (ref 79–97)
MONOCYTES # BLD AUTO: 0.51 10*3/MM3 (ref 0.1–0.9)
MONOCYTES NFR BLD AUTO: 16.7 % (ref 5–12)
NEUTROPHILS NFR BLD AUTO: 1.52 10*3/MM3 (ref 1.7–7)
NEUTROPHILS NFR BLD AUTO: 49.8 % (ref 42.7–76)
NRBC BLD AUTO-RTO: 0 /100 WBC (ref 0–0.2)
PLATELET # BLD AUTO: 116 10*3/MM3 (ref 140–450)
PMV BLD AUTO: 9 FL (ref 6–12)
POTASSIUM SERPL-SCNC: 3.7 MMOL/L (ref 3.5–4.7)
PROT SERPL-MCNC: 6.4 G/DL (ref 6.3–8)
RBC # BLD AUTO: 3.99 10*6/MM3 (ref 4.14–5.8)
SODIUM SERPL-SCNC: 139 MMOL/L (ref 134–145)
WBC # BLD AUTO: 3.05 10*3/MM3 (ref 3.4–10.8)

## 2021-06-08 PROCEDURE — 83615 LACTATE (LD) (LDH) ENZYME: CPT

## 2021-06-08 PROCEDURE — 80053 COMPREHEN METABOLIC PANEL: CPT

## 2021-06-08 PROCEDURE — 25010000002 HEPARIN LOCK FLUSH PER 10 UNITS: Performed by: INTERNAL MEDICINE

## 2021-06-08 PROCEDURE — 99214 OFFICE O/P EST MOD 30 MIN: CPT | Performed by: INTERNAL MEDICINE

## 2021-06-08 PROCEDURE — 85025 COMPLETE CBC W/AUTO DIFF WBC: CPT

## 2021-06-08 PROCEDURE — 36591 DRAW BLOOD OFF VENOUS DEVICE: CPT

## 2021-06-08 RX ORDER — SODIUM CHLORIDE 0.9 % (FLUSH) 0.9 %
10 SYRINGE (ML) INJECTION AS NEEDED
Status: CANCELLED | OUTPATIENT
Start: 2021-06-08

## 2021-06-08 RX ORDER — ALLOPURINOL 300 MG/1
300 TABLET ORAL DAILY
Qty: 90 TABLET | Refills: 1 | Status: SHIPPED | OUTPATIENT
Start: 2021-06-08 | End: 2022-02-03 | Stop reason: SDUPTHER

## 2021-06-08 RX ORDER — HEPARIN SODIUM (PORCINE) LOCK FLUSH IV SOLN 100 UNIT/ML 100 UNIT/ML
500 SOLUTION INTRAVENOUS AS NEEDED
Status: DISCONTINUED | OUTPATIENT
Start: 2021-06-08 | End: 2021-06-08 | Stop reason: HOSPADM

## 2021-06-08 RX ORDER — SODIUM CHLORIDE 0.9 % (FLUSH) 0.9 %
10 SYRINGE (ML) INJECTION AS NEEDED
Status: DISCONTINUED | OUTPATIENT
Start: 2021-06-08 | End: 2021-06-08 | Stop reason: HOSPADM

## 2021-06-08 RX ORDER — HEPARIN SODIUM (PORCINE) LOCK FLUSH IV SOLN 100 UNIT/ML 100 UNIT/ML
500 SOLUTION INTRAVENOUS AS NEEDED
Status: CANCELLED | OUTPATIENT
Start: 2021-06-08

## 2021-06-08 RX ADMIN — SODIUM CHLORIDE, PRESERVATIVE FREE 500 UNITS: 5 INJECTION INTRAVENOUS at 13:44

## 2021-06-08 NOTE — PROGRESS NOTES
Subjective     REASON FOR VISIT:    1. Chronic lymphoid leukemia, stage 4, presented initially with significant pancytopenia  · Currently on Gazyva with venetoclax  · Cycle 1 Gazyva given on August 13, 2020    2.  History of angiodysplasia requiring cauterization and history of Hess's esophagus    3.  Bone marrow aspiration biopsy shows hypercellular marrow with 98% involvement with CLL    Patient ID: Macho Stephensno is a 70 y.o. year old male     History of Present Illness The patient is a 70 y.o. male with the above-mentioned history presents today for follow-up.  He continues on venetoclax 100 mg daily.  Also continues on his prophylactic medications including voriconazole and acyclovir.  He is feeling exceptionally well.  He has started rehab twice weekly to increase his strength.  His appetite is good and he denies any recent weight loss.  He denies fever, chills, night sweats.  He denies any new lymphadenopathy.      Interval history:    Patient is currently continuing venatoclax.  His got 2 more months to go.  The treatment protocol is total of 1 year which he completed 6 months of Gazyva and venetoclax followed by venetoclax single agent.  He is on prophylaxis.  He is tolerating it very well.  His had a CT scan and I have reviewed the results with the patient in length.  There is a decrease in size of his spleen from 12.5 cm to 11.4 cm.  He has a left lower lobe lung nodule 6 mm and is a smoker and we will need to keep a watch on that.  He has not lost weight and he has got a good appetite.    He has had history of atrial fibrillation and because of GI bleed he was placed on aspirin.  He also had significant thrombocytopenia secondary to CLL which is now improved.  His platelet count today is 116 K.  White count is 3.05.  Hemoglobin is 14.0.  He feels good.  He has a port in place and prefers to keep it for present.  He is getting it flushed every 6 weeks.          PAST MEDICAL HISTORY:   1. Recurrent  atrial fibrillation followed by cardiology. He has had drug eluting stent placed x 3.   2. High cholesterol.   3. Hypertension.       HEMATOLOGIC/ONCOLOGIC HISTORY:       The patient is 62-year-old gentleman with the above history currently here for followup. He has no complaints. He denies fever, night sweats or weight loss. He does not have any lymphadenopathy. He feels good. He had some fatigue but his CBC shows a go od hemoglobin.   The patient is followed by Dr. Kevin Chandra. He had seen Dr. Chandra 7/18/13 for a history of coronary artery disease status drug eluting stent placement to the right coronary artery and left anterior descending artery in February 2011. Histor y of paroxysmal atrial fibrillation and hyperlipidemia. He presented to [facility] on 6/23/13 with palpitations and was found to have atrial fibrillation with rapid ventricular response. He was given IV Cardizem and converted spontaneously to normal sinus rhythm. He was found to have elevated white count at 18.9 and denied any symptoms of infection or urinary complaints. TSH was normal, troponin levels were negative. He was asked to continue aspirin and metoprolol for that. If he contin u ed to have atrial fibrillation, they will consider antiarrhythmic therapy with or without a short term anticoagulation therapy. However, currently the patient is feeling reasonably good. He has no complaints. His CBC 7/22/13 showed WBC 17,000, hemoglob i n 16.4 and platelet count of 174,000. Back 9/28/13 his hemoglobin was 15.1, WBC 13.3 and platelets 197,000. He has had a persistently elevated WBC but he is totally asymptomatic. He does not have any fever, night sweats or lymphadenopathy. He is here to be evaluated for his elevated white count.       Peripheral blood flow cytometry done 08/16/13 shows 22% chronic lymphoid leukemia cells, and they expressed ZAP-70 but not CD38. The total number of cells approximated 60% T cells, 32% B cells and 1%  natural k iller cells. The B cells were monoclonal and expressed CD19, CD20, CD22, CD5, CD23, CD11c, ZAP-70 and T cell antigens. There was a normal CD4/CD8 ratio. CT of the chest, abdomen and pelvis does not show evidence of metastasis. Peripheral blood for DILLON -2 was negative. It was negative for T11:14 translocation.       CT scan done on 08/21/2013 shows 5 to 6 mm noncalcified nodule in the left lower lobe which is unchanged from December 2010. Otherwise no evidence of any lymphadenopathy or hepatosplenomegaly.       MRI of the spine shows arthritis and disc bulge and likely hemangiomas, with 1 lesion showing increased signal intensity at T2, which is likely a hemangioma. Bone scan could be done, but patient does not even have much pain there. CT scan of the chest, a bdomen and pelvis was done on 11/23/2014. He has tiny lymph nodes in the neck which are difficult to palpate. Right jugular one is 1.4 x 0.9 and left supraclavicular one is 1.5 x 1.1. He also has a subcarinal lymph node which is 1.7 x 1.2 cm, and he ha s a portocaval lymph node which has increased from 2.5 to 2.8. Patient is totally asymptomatic. He does not have any B symptoms, so will continue followup with observation.     Patient is a 69-year-old gentleman with history of chronic lymphocytic leukemia/SLL who recently got admitted to [facility] because of GI bleed.  He was seen by Dr. Montero.  He underwent EGD colonoscopy.  He was found to have angiodysplasia for which he underwent argon coagulation.  He required 3 units of blood.  Patient had a normal esophagus normal stomach and normal duodenum CLOtest was negative he was asked to take Protonix 40 mg daily.  Colonoscopy showed blood in the entire examined colon but there was a single bleeding colonic angiectasia which was treated with argon plasma coagulation.    He was admitted twice.  Initially he was admitted on July 10 at which time he stayed 3 days and he was evaluated for chest pain.   He underwent a cardiac work-up with stress test and echo.  The echo was normal but the stress test showed medium sized moderate severe severity fixed perfusion defect.  Of the inferior wall consistent with infarction.  However according to patient cardiology did not think he had any cardiac problems.  I have left a message for patient's cardiologist to call me today.  Patient subsequently got readmitted with GI bleed and required 1/3 unit of transfusion.  He was found to have thrombocytopenia and hematology was consulted.    His hemoglobin had dropped down to as low as 7 and his platelets had gone down to 87.  Today it is 35 and his hemoglobin is 9.4 today.  He denies active bleeding.  He has lost weight recently.  He does not have any fever or night sweats.    He had ultrasound of spleen which showed enlarged spleen centimeters in length    Patient was started on Gazyva with Leukeran but subsequently switched to venetoclax with Gazyva.  His venetoclax dose is 100 mg daily and he receives Gazyva once a month.    CT scan done 2020 shows significant response     MEDICATIONS: The current medication list was reviewed with the patient and updated in the EMR this date per the medical assistant. Medication dosages and frequencies were confirmed to be accurate.       ALLERGIES: PENICILLIN which causes him to swell up. He is allergic to IV CONTRAST DYE.     SOCIAL HISTORY: He is . He smokes one pack per day for 35 years. He consumes three to four alcoholic drinks per week. He has no tattoos and no previous transfusions.       FAMILY HISTORY: Father  at 82 with MI. Mother  at 82 with bone cancer. He has one brother age 65 in good health and two sisters 69 and 57 in good health.   Past Medical History, Social History, and Family History are unchanged from my prior documentation on     Review of Systems   Constitutional: Negative for appetite change, chills, diaphoresis, fatigue, fever  and unexpected weight change.   HENT: Negative for hearing loss, sore throat and trouble swallowing.    Respiratory: Negative for cough, chest tightness, shortness of breath and wheezing.    Cardiovascular: Negative for chest pain, palpitations and leg swelling.   Gastrointestinal: Negative for abdominal distention, abdominal pain, constipation, diarrhea, nausea and vomiting.   Genitourinary: Negative for dysuria, frequency, hematuria and urgency.   Musculoskeletal: Negative for joint swelling.        No muscle weakness.   Skin: Negative for rash and wound.   Neurological: Negative for seizures, syncope, speech difficulty, weakness, numbness and headaches.   Hematological: Negative for adenopathy. Does not bruise/bleed easily.   Psychiatric/Behavioral: Negative for behavioral problems, confusion and suicidal ideas.   All other systems reviewed and are negative.  I have reviewed and confirmed the accuracy of the ROS as documented by the MA/LPN/RN Susannah Moya MD    Patient Active Problem List   Diagnosis   • CLL (chronic lymphocytic leukemia) (CMS/HCC)   • Anemia   • Encounter for hepatitis C screening test for low risk patient    • Thrombocytopenia (CMS/HCC)   • H/O heart artery stent   • Stented coronary artery   • Arteriosclerosis of coronary artery   • Atrial fibrillation (CMS/HCC)   • Paroxysmal atrial fibrillation (CMS/HCC)   • Chest pain, rule out acute myocardial infarction   • Fall   • Fracture, olecranon   • Hyperlipidemia   • Long term (current) use of anticoagulants   • Lower GI bleed   • Olecranon bursitis   • Shoulder pain   • Smoker   • CLL (chronic lymphocytic leukemia) (CMS/HCC)   • Dehydration   • Drug-induced nausea and vomiting   • Encounter for long-term (current) use of other medications   • Neutropenia (CMS/HCC)       MEDICATIONS:  Medication Reconciliation for the patient has been reviewed by me and documented in the patients chart.    ALLERGIES:    Allergies   Allergen Reactions   •  Contrast Dye Swelling   • Penicillins Swelling         Vitals:    06/08/21 1414   BP: 128/69   Pulse: 57   Resp: 16   Temp: 98.2 °F (36.8 °C)   SpO2: 97%        Current Status 6/8/2021   ECOG score 0      This patient's ACP documentation is up to date, and there's nothing further left to document.    Physical exam    This patient's ACP documentation is up to date, and there's nothing further left to document.    CONSTITUTIONAL:  Vital signs reviewed.  Alert and oriented x3  No distress, looks comfortable.  EYES:  Conjunctiva and lids unremarkable.  Extraocular eye movements intact.  HEENT: No evidence of lymphadenopathy, no thyromegaly  RESPIRATORY:  Normal respiratory effort.  , no rales  or wheezing, clear   CARDIOVASCULAR:  Regular rate and rhythm, no murmur  No significant lower extremity edema.  Abdomen: Soft nontender positive bowel sounds no hepatosplenomegaly  SKIN: No wounds.  No rashes.  MUSCULOSKELETAL/EXTREMITIES: No clubbing or cyanosis.  No apparent unilateral weakness.  NEURO: CN 2-12 appear intact. No focal neurological deficits noted.  PSYCHIATRIC:  Normal judgment and insight.  Normal mood and affect.      RECENT LABS:  Results from last 7 days   Lab Units 06/08/21  1337   WBC 10*3/mm3 3.05*   NEUTROS ABS 10*3/mm3 1.52*   HEMOGLOBIN g/dL 14.0   HEMATOCRIT % 39.7   PLATELETS 10*3/mm3 116*     Results from last 7 days   Lab Units 06/08/21  1337   SODIUM mmol/L 139   POTASSIUM mmol/L 3.7   CHLORIDE mmol/L 103   CO2 mmol/L 25.8   BUN mg/dL 13   CREATININE mg/dL 0.79   CALCIUM mg/dL 9.1   ALBUMIN g/dL 4.30   BILIRUBIN mg/dL 0.4   ALK PHOS U/L 166*   ALT (SGPT) U/L 20   AST (SGOT) U/L 26   GLUCOSE mg/dL 129*         Assessment/Plan   1. Stage 2 CLL without evidence of any disease progression.  I have reviewed the CT scan from 3/19/2018 there is minimal progression but clinically patient is asymptomatic and we will follow with observation.  Will monitor with labs.   No evidence of any frequent infections,  no major worsening of his white count. He has no fever or night sweats or any other symptoms.   · Patient knows that he is prone for infections and he is mainly staying at home during the COVID-19 situation time  · Recent admission to [facility] July 10 2020×2.  Initially admitted with chest pain and underwent stress test and echo.  Echo was negative.  Stress test is abnormal but have left message for patient's cardiologist to call us.  His cardiologist is been sent [facility].  · He then got admitted the second time with worsening GI bleed and required total of 3 units of blood.  EGD was negative but colonoscopy showed angiectasia for which he underwent argon ablation.  Currently hemoglobin stable and no active bleeding.  · He was also found to have low platelets with platelets dropping down to 27k and hemoglobin was 7.  Ultrasound showed splenomegaly 15 cm.  Concern is whether he has progressed from his CLL point of view and requires treatment.  · CT scan performed 7/20/2020 did show the increased splenomegaly, but interval decrease in size of the axillary nodes, and shotty mediastinal nodes.  No change in the shotty nodes within the neck and supraclavicular regions.  No new lymphadenopathy.  Bone marrow biopsy however showed preliminarily that there is bone marrow involvement.  Remainder of bone marrow biopsy report is pending.  · Bone marrow shows hypercellular marrow with 100% involvement of chronic lymphocytic leukemia/small lymphocytic lymphoma and diffuse pattern with at least 98% of the total marrow cellularity.  Rare foci of erythropoiesis and rare megakaryocytes are noted.   · Negative for BCL-2 and BCL 6.  Chromosomes shows normal karyotype.  I GVH mutation present.  Positive for gain of 1 q., monosomy 13 and loss of IGH.  Negative for deletion T p53, negative for gain of chromosomes 9 and 11, negative for 11, 14 fusion.  · The combination of monosomy 13 and gain of 1 q. is thought to be associated  "with plasma cell myeloma.  However this patient does not have myeloma.  · Scans were reviewed with the patient today.  Dr. Moya also discussed with the patient and recommended he initiate treatment with chlorambucil and Gazyva.  He would not be a good candidate for Imbruvica due to his history of A. fib\".  He cannot take anticoagulation at this time.  The patient was provided with chemotherapy education today, including  Handouts.  He will need a port placed so that we can initiate treatment  · 8/13/2020: Gazyva day 1. Plan also [id number] mg p.o. on day 1 day 15.  We can increase the dose once we know he tolerates and his platelets improved.  · Patient developing pancytopenia when reviewed cycle 1 day 15.  Chlorambucil dose modified to 10 mg for day 15 however it is felt that he cannot tolerate this ongoing.   ·  Plan to switch to Venetoclax along with continuing Gazyva.    · Patient due today for cycle 2-day 1 Gazyva.  He will proceed today as scheduled.  Venetoclax will be shipped to his home with plans to begin this next week on 9/14/2020.    · Patient did receive 1 L normal saline and Neulasta on 9/28/2020 secondary to neutropenia with an ANC of 0.04.  · Patient seen on 10/02/2020 continuing on venetoclax, currently on 100 mg dosing.  He would not increase his dose as he will start on voriconazole after being on venetoclax x1 week which affects the metabolism of venetoclax.  He is currently on day 5 of the 100 mg dosing.  Patient states overall he feels the same except for increased fatigue and nausea.  His nauseousness is controlled with ondansetron however.  Patient will be given 1 L normal saline in the office today as planned.  · Patient returns on 10/12/2020 continuing on venetoclax 100 mg daily as well as voriconazole.  · Patient is doing well with these treatments except fatigue.  Next cycle 4 due as of number ninth prior to which will obtain CT scans  · November 9, 2020: White count down to 1.89 " but patient started Pepcid.  We will hold off Pepcid.  · 11/17/2020 platelet count dropped to 35,000 patient was instructed to hold venetoclax.  · Returns 11/23/2020 WBC up to 6.38, ANC 5.19, hemoglobin 12.4, platelets up to 121.  I have advised him to resume venetoclax 100 mg daily  · December 7, 2020: Platelets 95K.  White count 3.0 with absolute neutrophil count of 2.01.  Cycle 5 Gazyva given.  Continue venetoclax with voriconazole.   · January 4, 2021: Platelets 84,000, white count 3,440, absolute neutrophil count 1,980. Cycle 6 Gazyva given.  · 2/23/2021: WBC 3.0, platelets 90,000, hemoglobin 14.6, ANC 1.65.  Counts overall stable.  Continue Venetoclax 100 mg daily.  · January 4, 2021: Last dose of Gazyva.  · March 16, 2021: Patient is on venetoclax along with voriconazole and tolerating well with plans to complete total of 1 year.  · 4/27/2021: Patient continues on venetoclax 100 mg daily with voriconazole and acyclovir for prophylaxis.  He is tolerating this well.  He is scheduled for scans in 5 weeks.  · June 8, 2021: Patient is to continue venetoclax 100 mg daily with voriconazole and acyclovir prophylaxis.  He is tolerating it very well.  The plan is to continue 8 more weeks and then he will complete the protocol and will be followed with observation.  · I have reviewed the CT scan done on June 2, 2021 and there is no evidence of any lymphadenopathy and the spleen size is decreased in size from 12.5-11.3.  He has mild thrombocytopenia and leukopenia but his hemoglobin is normal.       2.  Worsening thrombocytopenia, looking back his platelets were always in the 150s and during this admission he dropped down to 27K. His LDH also was elevated and he was anemic.  · It was dropping on chlorambucil.  Plans to change therapy as detailed above.  Patient did require transfusion and platelets are currently improved to 55,000.  · 11/17/2020 platelets dropped 35,000.  Venetoclax was temporarily held.  · 11/23/2020  platelets back up to 121 venetoclax resumed  · Aspirin 81 mg daily held when platelets are less than 60,000  · 1/4/2021 platelet count 84,000  · Improved to 100 K.  · 2/23/2021: Platelets slightly lower at 90,000 though overall stable over the last several weeks.  Continue to monitor closely.  · March 16, 2021: Platelets are 112 K.  With hemoglobin of 14.1 and a white count of 3.97  · 4/27/2021 platelets today are 102,000.  No Signs or symptoms of bleeding.  · June 8, 2021: Platelet count of 116K.  No signs of bleeding    3.  History of angiodysplasia requiring cauterization and Hess's esophagus mild anemia. He is currently asymptomatic.  · No evidence of active bleeding.  Stable  · No active bleeding.  · Will be to follow-up with GI    4.  History of cluster headaches for which she has to be treated with steroids in the past and has followed up with Dr. Len Walker.today with significant headaches.  · Patient had CT of the head 8/20/2020 which was negative.  · He was started on prednisone 10 mg daily which was not helpful.  · Patient saw Dr. Bobby, neurology who gave him 2 shots of a new medication Emgality.  This is something that can be given once a month.    · Headaches improved.  Resolved  · Stable.  Resolved    5.  Atrial fibrillation, off anticoagulation given his thrombocytopenia.  · Patient followed by Dr. Robert Rodriguez  · Given pancytopenia, GI bleed secondary to angiodysplasia, intermittent significant thrombocytopenia anticoagulation had been held.  · Patient continues on aspirin 81 mg daily if platelet count greater than 60,000.  · Patient sees Dr. Christy at [facility].  Has follow-up appointment with him.  Need to see if Dr. Christy thinks he needs to go back on warfarin or not.    6.  Elevated alkaline phosphatase  · Alkaline phosphatase stable today at 157.  · Patient has a history of a 1.7 cm gallstone nonobstructing      PLAN:  1. Continue venetoclax 100 mg daily.  2. Continue  voriconazole.  3. Continue acyclovir 400 mg p.o. twice daily  4. Port flush today  5. I have reviewed his CT scan personally and discussed with patient in length and shows no evidence of lymphadenopathy and the splenomegaly is normalized.  6. Follow-up with me in first week of September at which time he would have completed his venetoclax and we can follow with observation  7. Port flush in 6 weeks  8. Will refer to cardiology as patient has not been evaluated by them since the pancytopenia is improved and is not actively bleeding whether we need to leave him on aspirin or potentially consider anticoagulation like before      Monitoring high risk medication  Susannah Moya MD  06/08/21      Cc: Dr. Kevin Gilmore

## 2021-06-09 ENCOUNTER — TELEPHONE (OUTPATIENT)
Dept: ONCOLOGY | Facility: CLINIC | Age: 71
End: 2021-06-09

## 2021-06-17 RX ORDER — VORICONAZOLE 200 MG/1
TABLET, FILM COATED ORAL
Qty: 180 TABLET | Refills: 2 | Status: SHIPPED | OUTPATIENT
Start: 2021-06-17 | End: 2021-09-09

## 2021-06-29 RX ORDER — ACYCLOVIR 400 MG/1
TABLET ORAL
Qty: 180 TABLET | Refills: 1 | Status: SHIPPED | OUTPATIENT
Start: 2021-06-29 | End: 2021-08-06

## 2021-07-20 ENCOUNTER — INFUSION (OUTPATIENT)
Dept: ONCOLOGY | Facility: HOSPITAL | Age: 71
End: 2021-07-20

## 2021-07-20 DIAGNOSIS — C91.10 CLL (CHRONIC LYMPHOCYTIC LEUKEMIA) (HCC): Primary | ICD-10-CM

## 2021-07-20 PROCEDURE — 96523 IRRIG DRUG DELIVERY DEVICE: CPT

## 2021-07-20 PROCEDURE — 25010000002 HEPARIN LOCK FLUSH PER 10 UNITS: Performed by: INTERNAL MEDICINE

## 2021-07-20 RX ORDER — SODIUM CHLORIDE 0.9 % (FLUSH) 0.9 %
10 SYRINGE (ML) INJECTION AS NEEDED
Status: DISCONTINUED | OUTPATIENT
Start: 2021-07-20 | End: 2021-07-20 | Stop reason: HOSPADM

## 2021-07-20 RX ORDER — HEPARIN SODIUM (PORCINE) LOCK FLUSH IV SOLN 100 UNIT/ML 100 UNIT/ML
500 SOLUTION INTRAVENOUS AS NEEDED
Status: DISCONTINUED | OUTPATIENT
Start: 2021-07-20 | End: 2021-07-20 | Stop reason: HOSPADM

## 2021-07-20 RX ORDER — SODIUM CHLORIDE 0.9 % (FLUSH) 0.9 %
10 SYRINGE (ML) INJECTION AS NEEDED
Status: CANCELLED | OUTPATIENT
Start: 2021-07-20

## 2021-07-20 RX ORDER — HEPARIN SODIUM (PORCINE) LOCK FLUSH IV SOLN 100 UNIT/ML 100 UNIT/ML
500 SOLUTION INTRAVENOUS AS NEEDED
Status: CANCELLED | OUTPATIENT
Start: 2021-07-20

## 2021-07-20 RX ADMIN — SODIUM CHLORIDE, PRESERVATIVE FREE 10 ML: 5 INJECTION INTRAVENOUS at 14:34

## 2021-07-20 RX ADMIN — SODIUM CHLORIDE, PRESERVATIVE FREE 500 UNITS: 5 INJECTION INTRAVENOUS at 14:34

## 2021-08-03 ENCOUNTER — DOCUMENTATION (OUTPATIENT)
Dept: ONCOLOGY | Facility: CLINIC | Age: 71
End: 2021-08-03

## 2021-08-03 NOTE — PROGRESS NOTES
Fax rec from [facility] stating pts Anoop will  on 2021 with a balance of $2700.    I have renewed this anoop and it will be come effective on 9/3/2021.  [facility]AG notified of new anoop starting 9/3/2021.    Member ID: [id number]  Group ID: [id number]  RxBin ID: [id number]  PCN: [id number]  Eligibility Start Date: 2021  Eligibility End Date: 2022  Assistance Amount: $8,700.00

## 2021-08-06 RX ORDER — ACYCLOVIR 400 MG/1
TABLET ORAL
Qty: 60 TABLET | Refills: 6 | Status: SHIPPED | OUTPATIENT
Start: 2021-08-06 | End: 2021-09-29

## 2021-08-06 NOTE — TELEPHONE ENCOUNTER
Acyclovir refill request rec electronically from [facility]. Per last office note from Dr Moya-pt is to continue 400 mg BID.    1. Continue acyclovir 400 mg p.o. twice daily    I have routed the rx to Dr Moya for signature. Once signed it will be escribed to [facility].    Confirmation rec that Dr Moya has signed the Acyclovir refill rx. This was sent to [facility].

## 2021-08-31 ENCOUNTER — SPECIALTY PHARMACY (OUTPATIENT)
Dept: ONCOLOGY | Facility: HOSPITAL | Age: 71
End: 2021-08-31

## 2021-08-31 ENCOUNTER — INFUSION (OUTPATIENT)
Dept: ONCOLOGY | Facility: HOSPITAL | Age: 71
End: 2021-08-31

## 2021-08-31 DIAGNOSIS — C91.10 CLL (CHRONIC LYMPHOCYTIC LEUKEMIA) (HCC): Primary | ICD-10-CM

## 2021-08-31 PROCEDURE — 25010000002 HEPARIN LOCK FLUSH PER 10 UNITS: Performed by: INTERNAL MEDICINE

## 2021-08-31 PROCEDURE — 96523 IRRIG DRUG DELIVERY DEVICE: CPT

## 2021-08-31 RX ORDER — SODIUM CHLORIDE 0.9 % (FLUSH) 0.9 %
10 SYRINGE (ML) INJECTION AS NEEDED
Status: DISCONTINUED | OUTPATIENT
Start: 2021-08-31 | End: 2021-08-31 | Stop reason: HOSPADM

## 2021-08-31 RX ORDER — HEPARIN SODIUM (PORCINE) LOCK FLUSH IV SOLN 100 UNIT/ML 100 UNIT/ML
500 SOLUTION INTRAVENOUS AS NEEDED
Status: CANCELLED | OUTPATIENT
Start: 2021-08-31

## 2021-08-31 RX ORDER — SODIUM CHLORIDE 0.9 % (FLUSH) 0.9 %
10 SYRINGE (ML) INJECTION AS NEEDED
Status: CANCELLED | OUTPATIENT
Start: 2021-08-31

## 2021-08-31 RX ORDER — HEPARIN SODIUM (PORCINE) LOCK FLUSH IV SOLN 100 UNIT/ML 100 UNIT/ML
500 SOLUTION INTRAVENOUS AS NEEDED
Status: DISCONTINUED | OUTPATIENT
Start: 2021-08-31 | End: 2021-08-31 | Stop reason: HOSPADM

## 2021-08-31 RX ADMIN — Medication 10 ML: at 13:50

## 2021-08-31 RX ADMIN — Medication 500 UNITS: at 13:50

## 2021-08-31 NOTE — PROGRESS NOTES
MTM in person encounter- Venetoclax    Mr. Stephenson is doing well today. He complains of some minor skin irritations and we discussed using moisturizer to help with this. Medication administration and adherence seem appropriate; he reports missing about one dose per month. Mr. Stephenson denies any recent medication changes. He has no additional questions or concerns for me today.     Thanks,   Rosalia Vazquez, EvyD

## 2021-09-07 ENCOUNTER — OFFICE VISIT (OUTPATIENT)
Dept: ONCOLOGY | Facility: CLINIC | Age: 71
End: 2021-09-07

## 2021-09-07 ENCOUNTER — INFUSION (OUTPATIENT)
Dept: ONCOLOGY | Facility: HOSPITAL | Age: 71
End: 2021-09-07

## 2021-09-07 VITALS
WEIGHT: 149.4 LBS | TEMPERATURE: 97.3 F | SYSTOLIC BLOOD PRESSURE: 97 MMHG | HEART RATE: 66 BPM | DIASTOLIC BLOOD PRESSURE: 64 MMHG | OXYGEN SATURATION: 99 % | RESPIRATION RATE: 16 BRPM | BODY MASS INDEX: 19.8 KG/M2 | HEIGHT: 73 IN

## 2021-09-07 DIAGNOSIS — C91.10 CLL (CHRONIC LYMPHOCYTIC LEUKEMIA) (HCC): ICD-10-CM

## 2021-09-07 DIAGNOSIS — I48.20 CHRONIC ATRIAL FIBRILLATION (HCC): Primary | ICD-10-CM

## 2021-09-07 DIAGNOSIS — C91.10 CLL (CHRONIC LYMPHOCYTIC LEUKEMIA) (HCC): Primary | ICD-10-CM

## 2021-09-07 DIAGNOSIS — K80.10 CALCULUS OF GALLBLADDER WITH CHOLECYSTITIS WITHOUT BILIARY OBSTRUCTION, UNSPECIFIED CHOLECYSTITIS ACUITY: ICD-10-CM

## 2021-09-07 DIAGNOSIS — R79.89 ELEVATED SERUM CREATININE: ICD-10-CM

## 2021-09-07 LAB
ALBUMIN SERPL-MCNC: 4.4 G/DL (ref 3.5–5.2)
ALBUMIN/GLOB SERPL: 2.1 G/DL (ref 1.1–2.4)
ALP SERPL-CCNC: 152 U/L (ref 38–116)
ALT SERPL W P-5'-P-CCNC: 45 U/L (ref 0–41)
ANION GAP SERPL CALCULATED.3IONS-SCNC: 12.9 MMOL/L (ref 5–15)
AST SERPL-CCNC: 41 U/L (ref 0–40)
BASOPHILS # BLD AUTO: 0.05 10*3/MM3 (ref 0–0.2)
BASOPHILS NFR BLD AUTO: 0.4 % (ref 0–1.5)
BILIRUB SERPL-MCNC: 0.7 MG/DL (ref 0.2–1.2)
BUN SERPL-MCNC: 30 MG/DL (ref 6–20)
BUN/CREAT SERPL: 21.7 (ref 7.3–30)
CALCIUM SPEC-SCNC: 9.7 MG/DL (ref 8.5–10.2)
CHLORIDE SERPL-SCNC: 97 MMOL/L (ref 98–107)
CO2 SERPL-SCNC: 26.1 MMOL/L (ref 22–29)
CREAT SERPL-MCNC: 1.38 MG/DL (ref 0.7–1.3)
DEPRECATED RDW RBC AUTO: 53.8 FL (ref 37–54)
EOSINOPHIL # BLD AUTO: 0 10*3/MM3 (ref 0–0.4)
EOSINOPHIL NFR BLD AUTO: 0 % (ref 0.3–6.2)
ERYTHROCYTE [DISTWIDTH] IN BLOOD BY AUTOMATED COUNT: 14.6 % (ref 12.3–15.4)
GFR SERPL CREATININE-BSD FRML MDRD: 51 ML/MIN/1.73
GLOBULIN UR ELPH-MCNC: 2.1 GM/DL (ref 1.8–3.5)
GLUCOSE SERPL-MCNC: 186 MG/DL (ref 74–124)
HCT VFR BLD AUTO: 46.5 % (ref 37.5–51)
HGB BLD-MCNC: 15.9 G/DL (ref 13–17.7)
IMM GRANULOCYTES # BLD AUTO: 0.3 10*3/MM3 (ref 0–0.05)
IMM GRANULOCYTES NFR BLD AUTO: 2.5 % (ref 0–0.5)
LYMPHOCYTES # BLD AUTO: 0.91 10*3/MM3 (ref 0.7–3.1)
LYMPHOCYTES NFR BLD AUTO: 7.5 % (ref 19.6–45.3)
MCH RBC QN AUTO: 34.6 PG (ref 26.6–33)
MCHC RBC AUTO-ENTMCNC: 34.2 G/DL (ref 31.5–35.7)
MCV RBC AUTO: 101.3 FL (ref 79–97)
MONOCYTES # BLD AUTO: 1.21 10*3/MM3 (ref 0.1–0.9)
MONOCYTES NFR BLD AUTO: 9.9 % (ref 5–12)
NEUTROPHILS NFR BLD AUTO: 79.7 % (ref 42.7–76)
NEUTROPHILS NFR BLD AUTO: 9.7 10*3/MM3 (ref 1.7–7)
NRBC BLD AUTO-RTO: 0 /100 WBC (ref 0–0.2)
PLATELET # BLD AUTO: 164 10*3/MM3 (ref 140–450)
PMV BLD AUTO: 9.5 FL (ref 6–12)
POTASSIUM SERPL-SCNC: 4.9 MMOL/L (ref 3.5–4.7)
PROT SERPL-MCNC: 6.5 G/DL (ref 6.3–8)
RBC # BLD AUTO: 4.59 10*6/MM3 (ref 4.14–5.8)
SODIUM SERPL-SCNC: 136 MMOL/L (ref 134–145)
WBC # BLD AUTO: 12.17 10*3/MM3 (ref 3.4–10.8)

## 2021-09-07 PROCEDURE — 85025 COMPLETE CBC W/AUTO DIFF WBC: CPT

## 2021-09-07 PROCEDURE — 80053 COMPREHEN METABOLIC PANEL: CPT

## 2021-09-07 PROCEDURE — 99214 OFFICE O/P EST MOD 30 MIN: CPT | Performed by: INTERNAL MEDICINE

## 2021-09-07 PROCEDURE — 25010000002 HEPARIN LOCK FLUSH PER 10 UNITS: Performed by: INTERNAL MEDICINE

## 2021-09-07 PROCEDURE — 36591 DRAW BLOOD OFF VENOUS DEVICE: CPT

## 2021-09-07 RX ORDER — SODIUM CHLORIDE 0.9 % (FLUSH) 0.9 %
10 SYRINGE (ML) INJECTION AS NEEDED
Status: DISCONTINUED | OUTPATIENT
Start: 2021-09-07 | End: 2021-09-07 | Stop reason: HOSPADM

## 2021-09-07 RX ORDER — WARFARIN SODIUM 5 MG/1
7.5 TABLET ORAL NIGHTLY
COMMUNITY
Start: 2021-08-09 | End: 2022-04-13 | Stop reason: HOSPADM

## 2021-09-07 RX ORDER — HEPARIN SODIUM (PORCINE) LOCK FLUSH IV SOLN 100 UNIT/ML 100 UNIT/ML
500 SOLUTION INTRAVENOUS AS NEEDED
Status: CANCELLED | OUTPATIENT
Start: 2021-09-07

## 2021-09-07 RX ORDER — HEPARIN SODIUM (PORCINE) LOCK FLUSH IV SOLN 100 UNIT/ML 100 UNIT/ML
500 SOLUTION INTRAVENOUS AS NEEDED
Status: DISCONTINUED | OUTPATIENT
Start: 2021-09-07 | End: 2021-09-07 | Stop reason: HOSPADM

## 2021-09-07 RX ORDER — SODIUM CHLORIDE 0.9 % (FLUSH) 0.9 %
10 SYRINGE (ML) INJECTION AS NEEDED
Status: CANCELLED | OUTPATIENT
Start: 2021-09-07

## 2021-09-07 RX ORDER — TRAMADOL HYDROCHLORIDE 50 MG/1
TABLET ORAL
COMMUNITY
Start: 2021-08-11 | End: 2021-09-29

## 2021-09-07 RX ADMIN — Medication 500 UNITS: at 14:16

## 2021-09-07 RX ADMIN — SODIUM CHLORIDE, PRESERVATIVE FREE 10 ML: 5 INJECTION INTRAVENOUS at 14:16

## 2021-09-07 NOTE — PROGRESS NOTES
Subjective     REASON FOR VISIT:    1. Chronic lymphoid leukemia, stage 4, presented initially with significant pancytopenia  · Currently on Gazyva with venetoclax  · Cycle 1 Gazyva given on August 13, 2020    2.  History of angiodysplasia requiring cauterization and history of Hess's esophagus    3.  Bone marrow aspiration biopsy shows hypercellular marrow with 98% involvement with CLL        History of Present Illness The patient is a 71 y.o. male with the above-mentioned history with history of chronic lymphocytic leukemia who presented with pancytopenia and was treated with Gazyva along with venetoclax/voriconazole.  He has completed 1 year as of August 2021.  Currently he is without any complaints.  His creatinine did bump up to 1.38 and his liver function test mildly elevated.  He has been taking NSAIDs    PAST MEDICAL HISTORY:   1. Recurrent atrial fibrillation followed by cardiology. He has had drug eluting stent placed x 3.   2. High cholesterol.   3. Hypertension.       HEMATOLOGIC/ONCOLOGIC HISTORY:       The patient is 62-year-old gentleman with the above history currently here for followup. He has no complaints. He denies fever, night sweats or weight loss. He does not have any lymphadenopathy. He feels good. He had some fatigue but his CBC shows a go od hemoglobin.   The patient is followed by Dr. Kevin Chandra. He had seen Dr. Chandra 7/18/13 for a history of coronary artery disease status drug eluting stent placement to the right coronary artery and left anterior descending artery in February 2011. Histor y of paroxysmal atrial fibrillation and hyperlipidemia. He presented to [facility] on 6/23/13 with palpitations and was found to have atrial fibrillation with rapid ventricular response. He was given IV Cardizem and converted spontaneously to normal sinus rhythm. He was found to have elevated white count at 18.9 and denied any symptoms of infection or urinary complaints. TSH was  normal, troponin levels were negative. He was asked to continue aspirin and metoprolol for that. If he contin u ed to have atrial fibrillation, they will consider antiarrhythmic therapy with or without a short term anticoagulation therapy. However, currently the patient is feeling reasonably good. He has no complaints. His CBC 7/22/13 showed WBC 17,000, hemoglob i n 16.4 and platelet count of 174,000. Back 9/28/13 his hemoglobin was 15.1, WBC 13.3 and platelets 197,000. He has had a persistently elevated WBC but he is totally asymptomatic. He does not have any fever, night sweats or lymphadenopathy. He is here to be evaluated for his elevated white count.       Peripheral blood flow cytometry done 08/16/13 shows 22% chronic lymphoid leukemia cells, and they expressed ZAP-70 but not CD38. The total number of cells approximated 60% T cells, 32% B cells and 1% natural k iller cells. The B cells were monoclonal and expressed CD19, CD20, CD22, CD5, CD23, CD11c, ZAP-70 and T cell antigens. There was a normal CD4/CD8 ratio. CT of the chest, abdomen and pelvis does not show evidence of metastasis. Peripheral blood for DILLON -2 was negative. It was negative for T11:14 translocation.       CT scan done on 08/21/2013 shows 5 to 6 mm noncalcified nodule in the left lower lobe which is unchanged from December 2010. Otherwise no evidence of any lymphadenopathy or hepatosplenomegaly.       MRI of the spine shows arthritis and disc bulge and likely hemangiomas, with 1 lesion showing increased signal intensity at T2, which is likely a hemangioma. Bone scan could be done, but patient does not even have much pain there. CT scan of the chest, a bdomen and pelvis was done on 11/23/2014. He has tiny lymph nodes in the neck which are difficult to palpate. Right jugular one is 1.4 x 0.9 and left supraclavicular one is 1.5 x 1.1. He also has a subcarinal lymph node which is 1.7 x 1.2 cm, and he ha s a portocaval lymph node which has increased  from 2.5 to 2.8. Patient is totally asymptomatic. He does not have any B symptoms, so will continue followup with observation.     Patient is a 69-year-old gentleman with history of chronic lymphocytic leukemia/SLL who recently got admitted to [facility] because of GI bleed.  He was seen by Dr. Montero.  He underwent EGD colonoscopy.  He was found to have angiodysplasia for which he underwent argon coagulation.  He required 3 units of blood.  Patient had a normal esophagus normal stomach and normal duodenum CLOtest was negative he was asked to take Protonix 40 mg daily.  Colonoscopy showed blood in the entire examined colon but there was a single bleeding colonic angiectasia which was treated with argon plasma coagulation.    He was admitted twice.  Initially he was admitted on July 10 at which time he stayed 3 days and he was evaluated for chest pain.  He underwent a cardiac work-up with stress test and echo.  The echo was normal but the stress test showed medium sized moderate severe severity fixed perfusion defect.  Of the inferior wall consistent with infarction.  However according to patient cardiology did not think he had any cardiac problems.  I have left a message for patient's cardiologist to call me today.  Patient subsequently got readmitted with GI bleed and required 1/3 unit of transfusion.  He was found to have thrombocytopenia and hematology was consulted.    His hemoglobin had dropped down to as low as 7 and his platelets had gone down to 87.  Today it is 35 and his hemoglobin is 9.4 today.  He denies active bleeding.  He has lost weight recently.  He does not have any fever or night sweats.    He had ultrasound of spleen which showed enlarged spleen centimeters in length    Patient was started on Gazyva with Leukeran but subsequently switched to venetoclax with Gazyva.  His venetoclax dose is 100 mg daily and he receives Gazyva once a month.    CT scan done November 4, 2020 shows significant  response     MEDICATIONS: The current medication list was reviewed with the patient and updated in the EMR this date per the medical assistant. Medication dosages and frequencies were confirmed to be accurate.       ALLERGIES: PENICILLIN which causes him to swell up. He is allergic to IV CONTRAST DYE.     SOCIAL HISTORY: He is . He smokes one pack per day for 35 years. He consumes three to four alcoholic drinks per week. He has no tattoos and no previous transfusions.       FAMILY HISTORY: Father  at 82 with MI. Mother  at 82 with bone cancer. He has one brother age 65 in good health and two sisters 69 and 57 in good health.   Past Medical History, Social History, and Family History are unchanged from my prior documentation on     Review of Systems   Constitutional: Negative for appetite change, chills, diaphoresis, fatigue, fever and unexpected weight change.   HENT: Negative for hearing loss, sore throat and trouble swallowing.    Respiratory: Negative for cough, chest tightness, shortness of breath and wheezing.    Cardiovascular: Negative for chest pain, palpitations and leg swelling.   Gastrointestinal: Negative for abdominal distention, abdominal pain, constipation, diarrhea, nausea and vomiting.   Genitourinary: Negative for dysuria, frequency, hematuria and urgency.   Musculoskeletal: Negative for joint swelling.        No muscle weakness.   Skin: Negative for rash and wound.   Neurological: Negative for seizures, syncope, speech difficulty, weakness, numbness and headaches.   Hematological: Negative for adenopathy. Does not bruise/bleed easily.   Psychiatric/Behavioral: Negative for behavioral problems, confusion and suicidal ideas.   All other systems reviewed and are negative.  I have reviewed and confirmed the accuracy of the ROS as documented by the MA/LPN/RN Susannah Moya MD    Patient Active Problem List   Diagnosis   • CLL (chronic lymphocytic leukemia) (CMS/[facility])   •  Anemia   • Encounter for hepatitis C screening test for low risk patient    • Thrombocytopenia (CMS/HCC)   • H/O heart artery stent   • Stented coronary artery   • Arteriosclerosis of coronary artery   • Atrial fibrillation (CMS/HCC)   • Paroxysmal atrial fibrillation (CMS/HCC)   • Chest pain, rule out acute myocardial infarction   • Fall   • Fracture, olecranon   • Hyperlipidemia   • Long term (current) use of anticoagulants   • Lower GI bleed   • Olecranon bursitis   • Shoulder pain   • Smoker   • CLL (chronic lymphocytic leukemia) (CMS/HCC)   • Dehydration   • Drug-induced nausea and vomiting   • Encounter for long-term (current) use of other medications   • Neutropenia (CMS/HCC)       MEDICATIONS:  Medication Reconciliation for the patient has been reviewed by me and documented in the patients chart.    ALLERGIES:    Allergies   Allergen Reactions   • Contrast Dye Swelling   • Penicillins Swelling         Vitals:    09/07/21 1350   BP: 97/64   Pulse: 66   Resp: 16   Temp: 97.3 °F (36.3 °C)   SpO2: 99%        Current Status 9/7/2021   ECOG score 0      This patient's ACP documentation is up to date, and there's nothing further left to document.    Physical exam    This patient's ACP documentation is up to date, and there's nothing further left to document.      CONSTITUTIONAL:  Vital signs reviewed.  No distress, looks comfortable.  EYES:  Conjunctivae and lids unremarkable.  PERRLA  EARS,NOSE,MOUTH,THROAT:  Ears and nose appear unremarkable.  Lips, teeth, gums appear unremarkable.  RESPIRATORY:  Normal respiratory effort.  Lungs clear to auscultation bilaterally.  CARDIOVASCULAR:  Normal S1, S2.  No murmurs rubs or gallops.  No significant lower extremity edema.  GASTROINTESTINAL: Abdomen appears unremarkable.  Nontender.  No hepatomegaly.  No splenomegaly.  LYMPHATIC:  No cervical, supraclavicular, axillary lymphadenopathy.  SKIN:  Warm.  No rashes.  PSYCHIATRIC:  Normal judgment and insight.  Normal mood and  affect.    RECENT LABS:  Results from last 7 days   Lab Units 09/07/21  1409   WBC 10*3/mm3 12.17*   NEUTROS ABS 10*3/mm3 9.70*   HEMOGLOBIN g/dL 15.9   HEMATOCRIT % 46.5   PLATELETS 10*3/mm3 164             Assessment/Plan   1. Stage 2 CLL without evidence of any disease progression.  I have reviewed the CT scan from 3/19/2018 there is minimal progression but clinically patient is asymptomatic and we will follow with observation.  Will monitor with labs.   No evidence of any frequent infections, no major worsening of his white count. He has no fever or night sweats or any other symptoms.   · Patient knows that he is prone for infections and he is mainly staying at home during the COVID-19 situation time  · Recent admission to [facility] July 10 2020×2.  Initially admitted with chest pain and underwent stress test and echo.  Echo was negative.  Stress test is abnormal but have left message for patient's cardiologist to call us.  His cardiologist is been sent [facility].  · He then got admitted the second time with worsening GI bleed and required total of 3 units of blood.  EGD was negative but colonoscopy showed angiectasia for which he underwent argon ablation.  Currently hemoglobin stable and no active bleeding.  · He was also found to have low platelets with platelets dropping down to 27k and hemoglobin was 7.  Ultrasound showed splenomegaly 15 cm.  Concern is whether he has progressed from his CLL point of view and requires treatment.  · CT scan performed 7/20/2020 did show the increased splenomegaly, but interval decrease in size of the axillary nodes, and shotty mediastinal nodes.  No change in the shotty nodes within the neck and supraclavicular regions.  No new lymphadenopathy.  Bone marrow biopsy however showed preliminarily that there is bone marrow involvement.  Remainder of bone marrow biopsy report is pending.  · Bone marrow shows hypercellular marrow with 100% involvement of chronic lymphocytic  "leukemia/small lymphocytic lymphoma and diffuse pattern with at least 98% of the total marrow cellularity.  Rare foci of erythropoiesis and rare megakaryocytes are noted.   · Negative for BCL-2 and BCL 6.  Chromosomes shows normal karyotype.  I GVH mutation present.  Positive for gain of 1 q., monosomy 13 and loss of IGH.  Negative for deletion T p53, negative for gain of chromosomes 9 and 11, negative for 11, 14 fusion.  · The combination of monosomy 13 and gain of 1 q. is thought to be associated with plasma cell myeloma.  However this patient does not have myeloma.  · Scans were reviewed with the patient today.  Dr. Moya also discussed with the patient and recommended he initiate treatment with chlorambucil and Gazyva.  He would not be a good candidate for Imbruvica due to his history of A. fib\".  He cannot take anticoagulation at this time.  The patient was provided with chemotherapy education today, including  Handouts.  He will need a port placed so that we can initiate treatment  · 8/13/2020: Gazyva day 1. Plan also [id number] mg p.o. on day 1 day 15.  We can increase the dose once we know he tolerates and his platelets improved.  · Patient developing pancytopenia when reviewed cycle 1 day 15.  Chlorambucil dose modified to 10 mg for day 15 however it is felt that he cannot tolerate this ongoing.   ·  Plan to switch to Venetoclax along with continuing Gazyva.    · Patient due today for cycle 2-day 1 Gazyva.  He will proceed today as scheduled.  Venetoclax will be shipped to his home with plans to begin this next week on 9/14/2020.    · Patient did receive 1 L normal saline and Neulasta on 9/28/2020 secondary to neutropenia with an ANC of 0.04.  · Patient seen on 10/02/2020 continuing on venetoclax, currently on 100 mg dosing.  He would not increase his dose as he will start on voriconazole after being on venetoclax x1 week which affects the metabolism of venetoclax.  He is currently on day 5 of the " 100 mg dosing.  Patient states overall he feels the same except for increased fatigue and nausea.  His nauseousness is controlled with ondansetron however.  Patient will be given 1 L normal saline in the office today as planned.  · Patient returns on 10/12/2020 continuing on venetoclax 100 mg daily as well as voriconazole.  · Patient is doing well with these treatments except fatigue.  Next cycle 4 due as of number ninth prior to which will obtain CT scans  · November 9, 2020: White count down to 1.89 but patient started Pepcid.  We will hold off Pepcid.  · 11/17/2020 platelet count dropped to 35,000 patient was instructed to hold venetoclax.  · Returns 11/23/2020 WBC up to 6.38, ANC 5.19, hemoglobin 12.4, platelets up to 121.  I have advised him to resume venetoclax 100 mg daily  · December 7, 2020: Platelets 95K.  White count 3.0 with absolute neutrophil count of 2.01.  Cycle 5 Gazyva given.  Continue venetoclax with voriconazole.   · January 4, 2021: Platelets 84,000, white count 3,440, absolute neutrophil count 1,980. Cycle 6 Gazyva given.  · 2/23/2021: WBC 3.0, platelets 90,000, hemoglobin 14.6, ANC 1.65.  Counts overall stable.  Continue Venetoclax 100 mg daily.  · January 4, 2021: Last dose of Gazyva.  · March 16, 2021: Patient is on venetoclax along with voriconazole and tolerating well with plans to complete total of 1 year.  · 4/27/2021: Patient continues on venetoclax 100 mg daily with voriconazole and acyclovir for prophylaxis.  He is tolerating this well.  He is scheduled for scans in 5 weeks.  · June 8, 2021: Patient is to continue venetoclax 100 mg daily with voriconazole and acyclovir prophylaxis.  He is tolerating it very well.  The plan is to continue 8 more weeks and then he will complete the protocol and will be followed with observation.  · I have reviewed the CT scan done on June 2, 2021 and there is no evidence of any lymphadenopathy and the spleen size is decreased in size from 12.5-11.3.  He  has mild thrombocytopenia and leukopenia but his hemoglobin is normal.  · September 7, 2021: Patient completed 1 year worth of venetoclax with voriconazole and 6 months of Gazyva.  He has had an excellent response for his CLL.  His spleen had actually decreased in size.  Currently asymptomatic.  We will discontinue venetoclax since he completed 1 year       2.  Worsening thrombocytopenia, looking back his platelets were always in the 150s and during this admission he dropped down to 27K. His LDH also was elevated and he was anemic.  · It was dropping on chlorambucil.  Plans to change therapy as detailed above.  Patient did require transfusion and platelets are currently improved to 55,000.  · 11/17/2020 platelets dropped 35,000.  Venetoclax was temporarily held.  · 11/23/2020 platelets back up to 121 venetoclax resumed  · Aspirin 81 mg daily held when platelets are less than 60,000  · 1/4/2021 platelet count 84,000  · Improved to 100 K.  · 2/23/2021: Platelets slightly lower at 90,000 though overall stable over the last several weeks.  Continue to monitor closely.  · March 16, 2021: Platelets are 112 K.  With hemoglobin of 14.1 and a white count of 3.97  · 4/27/2021 platelets today are 102,000.  No Signs or symptoms of bleeding.  · June 8, 2021: Platelet count of 116K.  No signs of bleeding    3.  History of angiodysplasia requiring cauterization and Hess's esophagus mild anemia. He is currently asymptomatic.  · No evidence of active bleeding.  Stable  · No active bleeding.  · Will be to follow-up with GI    4.  History of cluster headaches for which she has to be treated with steroids in the past and has followed up with Dr. Len Péreztoday with significant headaches.  · Patient had CT of the head 8/20/2020 which was negative.  · He was started on prednisone 10 mg daily which was not helpful.  · Patient saw Dr. Bobby, neurology who gave him 2 shots of a new medication Emgality.  This is something that can be  given once a month.    · Headaches improved.  Resolved  · Stable.  Resolved    5.  Atrial fibrillation, off anticoagulation given his thrombocytopenia.  · Patient followed by Dr. Robert Rodriguez  · Given pancytopenia, GI bleed secondary to angiodysplasia, intermittent significant thrombocytopenia anticoagulation had been held.  · Patient continues on aspirin 81 mg daily if platelet count greater than 60,000.  · Patient sees [facility] Christy at [facility].  Has follow-up appointment with him.  Need to see if Dr. Christy thinks he needs to go back on warfarin or not.  · Seen by cardiology Dr. CAZARES who follows his INR    6.  Elevated alkaline phosphatase  · Alkaline phosphatase stable today at 157.  · Patient has a history of a 1.7 cm gallstone nonobstructing  · Patient's wife complains that he is complaining of abdominal pain and also will need to refer to surgery to see if it is secondary to gallstone and whether he needs surgery on gallbladder    7.  Abdominal pain with CT scan showing gallstones 1.7 cm in size, will refer to surgery      PLAN:  · Discontinue venetoclax as he has completed 1 year of treatment  · Discontinue voriconazole  · S/p Gazyva which he completed 6 months ago  · Currently without evidence of disease  · We will continue acyclovir 400 p.o. twice daily  · Patient is s/p 3 doses of the Pfizer vaccine including the booster injection  · Patient also needs to have shingles injection and he is going to possibly get that  · Will refer to surgery for evaluation of epigastric discomfort and  · Follow-up with me    Addendum  Patient's creatinine bumped up to 1.38.  Also his liver function tests were elevated.  He has been taking intermittent Advil.  I have asked him to stop taking that and will recheck labs next week.    Monitoring high risk medication  Susannah Moya MD  09/07/21      Cc: Dr. Kevin Gilmore

## 2021-09-08 ENCOUNTER — TELEPHONE (OUTPATIENT)
Dept: ONCOLOGY | Facility: CLINIC | Age: 71
End: 2021-09-08

## 2021-09-08 NOTE — TELEPHONE ENCOUNTER
----- Message from Gely Cox RN sent at 9/8/2021  8:01 AM EDT -----  Please schedule patient for CMP with RN review next week per Dr. Moya.    Thank you,  Gely  ----- Message -----  From: Susannah Moya MD  Sent: 9/7/2021   5:22 PM EDT  To: Gely Cox RN    Patient's creatinine has increased.  I called him and please make sure he gets CMP with nurse review next week.  I also asked him to take increased fluids and stop Advil.  He will need to know the appointment for his labs  Susannah Moya MD

## 2021-09-08 NOTE — ADDENDUM NOTE
Addended by: PABLO POLLACK on: 9/8/2021 08:01 AM     Modules accepted: Orders    
Pt with impaired ROM, strength, balance, pain impacting pt's ability to complete ADLs, IADLs, functional mobility.

## 2021-09-09 ENCOUNTER — MEDICATION THERAPY MANAGEMENT (OUTPATIENT)
Dept: PHARMACY | Facility: HOSPITAL | Age: 71
End: 2021-09-09

## 2021-09-09 NOTE — PROGRESS NOTES
[facility] Chart Review- Venclexta         9/7/2021   WBC 3.40 - 10.80 10*3/mm3 12.17 (A)   Neutrophils Absolute 1.70 - 7.00 10*3/mm3 9.70 (A)   Hemoglobin 13.0 - 17.7 g/dL 15.9   Hematocrit 37.5 - 51.0 % 46.5   Platelets 140 - 450 10*3/mm3 164   Creatinine 0.70 - 1.30 mg/dL 1.38 (A)   eGFR Non African Am >60 mL/min/1.73 51 (A)   BUN 6 - 20 mg/dL 30 (A)   Sodium 134 - 145 mmol/L 136   Potassium 3.5 - 4.7 mmol/L 4.9 (A)   Glucose 74 - 124 mg/dL 186 (A)   Calcium 8.5 - 10.2 mg/dL 9.7   Albumin 3.50 - 5.20 g/dL 4.40   Total Protein 6.3 - 8.0 g/dL 6.5   AST (SGOT) 0 - 40 U/L 41 (A)   ALT (SGPT) 0 - 41 U/L 45 (A)   Alkaline Phosphatase 38 - 116 U/L 152 (A)   Total Bilirubin 0.2 - 1.2 mg/dL 0.7       MD dictation noted. Will notify [facility] pharmacy that pt will be discontinuing Venclexta & voriconazole. Patient returning to clinic next week for labs (elevated Scr).      Thanks,   Aster Chairez, PharmD  9/9/2021  15:40 EDT

## 2021-09-16 ENCOUNTER — TELEPHONE (OUTPATIENT)
Dept: ONCOLOGY | Facility: CLINIC | Age: 71
End: 2021-09-16

## 2021-09-16 ENCOUNTER — LAB (OUTPATIENT)
Dept: LAB | Facility: HOSPITAL | Age: 71
End: 2021-09-16

## 2021-09-16 ENCOUNTER — DOCUMENTATION (OUTPATIENT)
Dept: ONCOLOGY | Facility: HOSPITAL | Age: 71
End: 2021-09-16

## 2021-09-16 ENCOUNTER — CLINICAL SUPPORT (OUTPATIENT)
Dept: ONCOLOGY | Facility: HOSPITAL | Age: 71
End: 2021-09-16

## 2021-09-16 ENCOUNTER — APPOINTMENT (OUTPATIENT)
Dept: ONCOLOGY | Facility: HOSPITAL | Age: 71
End: 2021-09-16

## 2021-09-16 VITALS — HEART RATE: 61 BPM | SYSTOLIC BLOOD PRESSURE: 123 MMHG | DIASTOLIC BLOOD PRESSURE: 63 MMHG

## 2021-09-16 DIAGNOSIS — K80.10 CALCULUS OF GALLBLADDER WITH CHOLECYSTITIS WITHOUT BILIARY OBSTRUCTION, UNSPECIFIED CHOLECYSTITIS ACUITY: ICD-10-CM

## 2021-09-16 DIAGNOSIS — C91.10 CLL (CHRONIC LYMPHOCYTIC LEUKEMIA) (HCC): ICD-10-CM

## 2021-09-16 DIAGNOSIS — R73.9 ELEVATED BLOOD SUGAR: ICD-10-CM

## 2021-09-16 DIAGNOSIS — R73.9 ELEVATED BLOOD SUGAR: Primary | ICD-10-CM

## 2021-09-16 DIAGNOSIS — R79.89 ELEVATED SERUM CREATININE: ICD-10-CM

## 2021-09-16 LAB
ALBUMIN SERPL-MCNC: 3.9 G/DL (ref 3.5–5.2)
ALBUMIN/GLOB SERPL: 2.1 G/DL (ref 1.1–2.4)
ALP SERPL-CCNC: 124 U/L (ref 38–116)
ALT SERPL W P-5'-P-CCNC: 45 U/L (ref 0–41)
ANION GAP SERPL CALCULATED.3IONS-SCNC: 11.5 MMOL/L (ref 5–15)
AST SERPL-CCNC: 39 U/L (ref 0–40)
BASOPHILS # BLD AUTO: 0.05 10*3/MM3 (ref 0–0.2)
BASOPHILS NFR BLD AUTO: 1.1 % (ref 0–1.5)
BILIRUB SERPL-MCNC: 0.7 MG/DL (ref 0.2–1.2)
BUN SERPL-MCNC: 13 MG/DL (ref 6–20)
BUN/CREAT SERPL: 16.3 (ref 7.3–30)
CALCIUM SPEC-SCNC: 9 MG/DL (ref 8.5–10.2)
CHLORIDE SERPL-SCNC: 97 MMOL/L (ref 98–107)
CO2 SERPL-SCNC: 26.5 MMOL/L (ref 22–29)
CREAT SERPL-MCNC: 0.8 MG/DL (ref 0.7–1.3)
DEPRECATED RDW RBC AUTO: 50.9 FL (ref 37–54)
EOSINOPHIL # BLD AUTO: 0.05 10*3/MM3 (ref 0–0.4)
EOSINOPHIL NFR BLD AUTO: 1.1 % (ref 0.3–6.2)
ERYTHROCYTE [DISTWIDTH] IN BLOOD BY AUTOMATED COUNT: 14 % (ref 12.3–15.4)
GFR SERPL CREATININE-BSD FRML MDRD: 95 ML/MIN/1.73
GLOBULIN UR ELPH-MCNC: 1.9 GM/DL (ref 1.8–3.5)
GLUCOSE SERPL-MCNC: 350 MG/DL (ref 74–124)
HCT VFR BLD AUTO: 37.5 % (ref 37.5–51)
HGB BLD-MCNC: 13.2 G/DL (ref 13–17.7)
IMM GRANULOCYTES # BLD AUTO: 0.36 10*3/MM3 (ref 0–0.05)
IMM GRANULOCYTES NFR BLD AUTO: 7.7 % (ref 0–0.5)
LYMPHOCYTES # BLD AUTO: 1.14 10*3/MM3 (ref 0.7–3.1)
LYMPHOCYTES NFR BLD AUTO: 24.3 % (ref 19.6–45.3)
MCH RBC QN AUTO: 34.7 PG (ref 26.6–33)
MCHC RBC AUTO-ENTMCNC: 35.2 G/DL (ref 31.5–35.7)
MCV RBC AUTO: 98.7 FL (ref 79–97)
MONOCYTES # BLD AUTO: 0.47 10*3/MM3 (ref 0.1–0.9)
MONOCYTES NFR BLD AUTO: 10 % (ref 5–12)
NEUTROPHILS NFR BLD AUTO: 2.63 10*3/MM3 (ref 1.7–7)
NEUTROPHILS NFR BLD AUTO: 55.8 % (ref 42.7–76)
NRBC BLD AUTO-RTO: 0.4 /100 WBC (ref 0–0.2)
PLATELET # BLD AUTO: 119 10*3/MM3 (ref 140–450)
PMV BLD AUTO: 10 FL (ref 6–12)
POTASSIUM SERPL-SCNC: 4 MMOL/L (ref 3.5–4.7)
PROT SERPL-MCNC: 5.8 G/DL (ref 6.3–8)
RBC # BLD AUTO: 3.8 10*6/MM3 (ref 4.14–5.8)
SODIUM SERPL-SCNC: 135 MMOL/L (ref 134–145)
WBC # BLD AUTO: 4.7 10*3/MM3 (ref 3.4–10.8)

## 2021-09-16 PROCEDURE — 80053 COMPREHEN METABOLIC PANEL: CPT

## 2021-09-16 PROCEDURE — 85025 COMPLETE CBC W/AUTO DIFF WBC: CPT

## 2021-09-16 PROCEDURE — G0463 HOSPITAL OUTPT CLINIC VISIT: HCPCS

## 2021-09-16 PROCEDURE — 36415 COLL VENOUS BLD VENIPUNCTURE: CPT

## 2021-09-16 NOTE — NURSING NOTE
Lab Results   Component Value Date    WBC 4.70 09/16/2021    HGB 13.2 09/16/2021    HCT 37.5 09/16/2021    MCV 98.7 (H) 09/16/2021     (L) 09/16/2021     PT HERE TODAY FOR STAT CMP TO REVIEW CREAT. LAST CHECK CREAT WAS ELV. PT WAS TO DEC IBU USE AND DRINK EX FLUIDS. PT REPORTS THAT HE HAS BEEN ABLE TO DO THAT. VENETOCLAX DC'D LAST VISIT BEC PT HAD COMPLETED A YR ON THE MED. PT REPORTS THAT HE STILL FEELS VERY FATIGUED. WE TALKED ABOUT CONTINUING TO DRINK EX FLUIDS AND REST OFTEN WHEN NEEDED. GOING TO TAKE MORE TIME TO RECOVER FROM YR WORTH OF MED. PT ALSO C/O OCCASIONAL NOSE BLD WHEN BLOWING HIS NOSE. PT IS ON COUMADIN AND HIS CARDIOLOGIST MANAGES. HIS LAST INR WAS CHECKED LAST WK AND HIS INR WAS 2.5. ADDED CBC ON TODAY AND PLTS REVIEWED AND CAME BACK AT 119K. WE TALKED ABOUT BLOWING NOSE GENTLY, USING SALINE NASAL SPRAY AND HUMIDIFIER. WILL REVIEW CMP RESULTS AND CALL PT IF NEEDED. WENT OVER RTN APPT W/ PT AND GAVE HIM COPY OF RESULTS. PT V/U TO ALL.    STAT CMP RESULTS REVIEWED PER RN. CREAT HAS RTN'D TO NORMAL. NOW GLUCOSE IS VERY ELV - 350. D/W WP NP. ORDERS REC'D TO CALL PT'S PCP DR. HARVEY AND MAKE THEM AWARE AND GET PT AN APPT MADE. ALSO IF THEY AGREE TO ADD A HGB AC1 ON TO TO LABS WE ALREADY DID TODAY. CALLED PT'S WIFE (PT HAD REQUESTED THIS) W/ THE RESULTS. SHE SAID THAT PT'S FAMILY ALL HAVE DIABETES. CALLED DR. HARVEY OFFICE AND THEY SAID THEY CAN'T SEE PT TODAY BUT THEY WOULD MAKE AN URGENT APPT FOR HIM TO BE SEEN NEXT WK. THEY APPROVED ADDING ON HGB AC1 TODAY. LAB ORDER PLACED. THEY WILL CALL PT TO SCHEDULE. PT'S WIFE AWARE AND SHE V/U. THEY WILL INSTITUTE A DIABETIC DIET TODAY.     MESSAGED DL RN TO MAKE HER AWARE OF SITUATION.    Orthopedic

## 2021-09-16 NOTE — NURSING NOTE
WE DIDN'T GET ENOUGH BLOOD TO RUN A1C. ORDER IS THERE AND PT CAN HAVE DRAWN ANYTIME. CALLED PT'S WIFE TO MAKE HER AWARE THAT TEST WOULD STILL NEED TO BE DONE. SHE V/U.

## 2021-09-21 ENCOUNTER — OFFICE VISIT (OUTPATIENT)
Dept: SURGERY | Facility: CLINIC | Age: 71
End: 2021-09-21

## 2021-09-21 VITALS — HEIGHT: 73 IN | BODY MASS INDEX: 20.94 KG/M2 | WEIGHT: 158 LBS

## 2021-09-21 DIAGNOSIS — K80.20 CALCULUS OF GALLBLADDER WITHOUT CHOLECYSTITIS WITHOUT OBSTRUCTION: Primary | ICD-10-CM

## 2021-09-21 PROCEDURE — 99214 OFFICE O/P EST MOD 30 MIN: CPT | Performed by: PHYSICIAN ASSISTANT

## 2021-09-21 NOTE — PROGRESS NOTES
"CC:    Cholelithiasis    HPI:    This is a 71-year-old gentleman presenting to the office today at the request of Dr. Susannah Moya after last having seen Dr. Lui in 2020 for a PowerPort placement.  He has a history significant for CLL and has been undergoing therapy for this.  He recently has completed his chemotherapy and is in remission.  He underwent a CT scan of the abdomen pelvis for monitoring purposes as well as due to underlying upper abdominal discomfort.  This demonstrated a 1.7 cm gallstone without signs of acute cholecystitis.  Initially he states that he was having no complaints but over the last several months he has begun to notice an increased amount of epigastric abdominal discomfort with nausea as well as indigestion and frequent diarrhea that has been worsening.  He denies increased belching, bloating, dark tarry stools or bright red blood with his bowel movements.    PMH:    Reviewed and reconciled in epic; significant for arteriosclerosis of the coronary artery that is stented, atrial fibrillation, thrombocytopenia and CLL    PSH:    Reviewed and reconciled in epic    SH:  Former smoker quit July 2020, does consume alcohol    FMH:  No colorectal cancer or gallbladder family history    ALLERGIES:   Contrast dye and penicillin    MEDICATIONS:  Reviewed and reconciled in Epic.  Of note he is on warfarin and an 81 mg aspirin.    ROS:    Positive for fatigue, unexpected weight loss, sweating, congestion, drooling, ear discharge, ear pain, hearing loss, nosebleeds, postnasal drip, runny nose, immunocompromise, chest tightness, cough, wheezing, abdominal pain, nausea, vomiting, cold intolerance, back pain, balance problem, easy bruising or bleeding, nervousness/anxiousness and sleep disturbance.  All other systems reviewed and negative other than presenting complaints.    PE:  Vitals: Weight 158 height 73\" BMI 20.8  Constitutional: Well-nourished, well-developed male no acute distress  HEENT: " Normocephalic, atraumatic, sclera anicteric, normal conjunctiva  Pulmonary: Wheezes bilaterally, normal respiratory effort, no rhonchi or rales noted  Cardiac: Regular rate and rhythm, no murmurs, gallops or rubs noted  Abdomen: Soft, nontender, nondistended, normal bowel sounds present  Skin: Warm, dry, intact, no rashes noted  Musculoskeletal: Normal gait, no joint asymmetries noted  Psych: Alert and orient x3, normal affect, normal judgment    CLINICAL SUMMARY (A/P):    This is a 71-year-old gentleman presenting with symptomatic cholelithiasis.  I discussed options with him and he understands the options and wishes to proceed with laparoscopic cholecystectomy.  They understand nature of the procedure and the risks including but not limited to bleeding, infection, conversion to open procedure, postoperative bile leak, and the bowel function changes which can accompany cholecystectomy.  He does understand that his immunocompromise state does put him at increased risk of infection and the precautions he will need to take as well as well we will do doing surgery to minimize as well.  We will also obtain clearance from Dr. Robert Rodriguez for him to come off of his warfarin for 5 days prior to the procedure.  All questions were answered at the time of this visit and they were willing to proceed with all recommendations.      Vivek Kendall PA-C

## 2021-09-22 ENCOUNTER — OFFICE VISIT (OUTPATIENT)
Dept: FAMILY MEDICINE CLINIC | Facility: CLINIC | Age: 71
End: 2021-09-22

## 2021-09-22 VITALS — WEIGHT: 159 LBS | TEMPERATURE: 97.1 F | RESPIRATION RATE: 18 BRPM | BODY MASS INDEX: 21.07 KG/M2 | HEIGHT: 73 IN

## 2021-09-22 DIAGNOSIS — R79.9 ABNORMAL FINDING OF BLOOD CHEMISTRY, UNSPECIFIED: ICD-10-CM

## 2021-09-22 DIAGNOSIS — E11.9 TYPE 2 DIABETES MELLITUS WITHOUT COMPLICATION, WITHOUT LONG-TERM CURRENT USE OF INSULIN (HCC): ICD-10-CM

## 2021-09-22 DIAGNOSIS — R73.09 ELEVATED GLUCOSE LEVEL: Primary | ICD-10-CM

## 2021-09-22 LAB — GLUCOSE BLDC GLUCOMTR-MCNC: 307 MG/DL (ref 70–130)

## 2021-09-22 PROCEDURE — 99213 OFFICE O/P EST LOW 20 MIN: CPT | Performed by: FAMILY MEDICINE

## 2021-09-22 NOTE — PROGRESS NOTES
"SUBJECTIVE:  The patient is a 71-year-old male.  He was recently found to have elevated blood sugar at his oncology office.  He has CLL.  A nonfasting blood sugar today is 307.  He recently had a Coke.  He has received steroids recently and he is getting an operative procedure tomorrow on his spine.    PAST MEDICAL HISTORY:  Reviewed.    REVIEW OF SYSTEMS:  Please see above.  All others reviewed and are negative.      OBJECTIVE:   Temp 97.1 °F (36.2 °C) (Infrared)   Resp 18   Ht 185.2 cm (72.9\")   Wt 72.1 kg (159 lb)   BMI 21.04 kg/m²    Vitals signs are reviewed and are stable.    General:  Well-nourished.  Alert and oriented x3 in no acute distress.  HEENT: PERRLA.   Neck:  Supple.   Lungs:  Clear.    Heart:  Regular rate and rhythm.   Abdomen:   Soft, nontender.   Extremities:  No cyanosis, clubbing or edema.   Neurological:  Grossly intact without motor or sensory deficits.     ASSESSMENT:      Diagnoses and all orders for this visit:    1. Elevated glucose level (Primary)  -     POC Glucose Fingerstick  -     Comprehensive Metabolic Panel  -     Lipid Panel  -     MicroAlbumin, Urine, Random - Urine, Clean Catch  -     Lipid Panel    2. Type 2 diabetes mellitus without complication, without long-term current use of insulin (HCC)  -     Lipid Panel    3. Abnormal finding of blood chemistry, unspecified   -     Lipid Panel         PLAN: He is going to return tomorrow for fasting labs and A1c.  He will need to start medication but I wanted to see what his fasting numbers look like.  I will be here tomorrow but I am going to probably start Januvia 100 mg.  His labs will be reviewed before starting medication.  I will provide samples.  He is advised to get it eye exam..  He will follow-up tomorrow on his results.  He will call or go to emergency room if any problems.  He is given diabetic instructions.  He is provided a 2000-calorie diet.  He will closely monitor his blood sugars.  He will follow-up in a couple " weeks and give me an update.        Dictated utilizing Dragon dictation.

## 2021-09-23 ENCOUNTER — TELEPHONE (OUTPATIENT)
Dept: FAMILY MEDICINE CLINIC | Facility: CLINIC | Age: 71
End: 2021-09-23

## 2021-09-23 ENCOUNTER — LAB (OUTPATIENT)
Dept: FAMILY MEDICINE CLINIC | Facility: CLINIC | Age: 71
End: 2021-09-23

## 2021-09-23 DIAGNOSIS — R94.5 ABNORMAL RESULTS OF LIVER FUNCTION STUDIES: ICD-10-CM

## 2021-09-23 DIAGNOSIS — R73.9 ELEVATED BLOOD SUGAR: Primary | ICD-10-CM

## 2021-09-23 DIAGNOSIS — R79.89 ELEVATED LFTS: ICD-10-CM

## 2021-09-23 LAB
ALBUMIN SERPL-MCNC: 4.4 G/DL (ref 3.5–5.2)
ALBUMIN UR-MCNC: 20 MG/L (ref 0–20)
ALBUMIN/GLOB SERPL: 2.3 G/DL
ALP SERPL-CCNC: 128 U/L (ref 39–117)
ALT SERPL W P-5'-P-CCNC: 46 U/L (ref 1–41)
ANION GAP SERPL CALCULATED.3IONS-SCNC: 10.4 MMOL/L (ref 5–15)
AST SERPL-CCNC: 41 U/L (ref 1–40)
BILIRUB SERPL-MCNC: 0.6 MG/DL (ref 0–1.2)
BUN SERPL-MCNC: 14 MG/DL (ref 8–23)
BUN/CREAT SERPL: 15.7 (ref 7–25)
CALCIUM SPEC-SCNC: 9.7 MG/DL (ref 8.6–10.5)
CHLORIDE SERPL-SCNC: 100 MMOL/L (ref 98–107)
CHOLEST SERPL-MCNC: 141 MG/DL (ref 0–200)
CO2 SERPL-SCNC: 26.6 MMOL/L (ref 22–29)
CREAT SERPL-MCNC: 0.89 MG/DL (ref 0.76–1.27)
GFR SERPL CREATININE-BSD FRML MDRD: 84 ML/MIN/1.73
GLOBULIN UR ELPH-MCNC: 1.9 GM/DL
GLUCOSE SERPL-MCNC: 290 MG/DL (ref 65–99)
HBA1C MFR BLD: 7.56 % (ref 4.8–5.6)
HDLC SERPL-MCNC: 32 MG/DL (ref 40–60)
LDLC SERPL CALC-MCNC: 72 MG/DL (ref 0–100)
LDLC/HDLC SERPL: 2.03 {RATIO}
POTASSIUM SERPL-SCNC: 4.7 MMOL/L (ref 3.5–5.2)
PROT SERPL-MCNC: 6.3 G/DL (ref 6–8.5)
SODIUM SERPL-SCNC: 137 MMOL/L (ref 136–145)
TRIGL SERPL-MCNC: 220 MG/DL (ref 0–150)
VLDLC SERPL-MCNC: 37 MG/DL (ref 5–40)

## 2021-09-23 PROCEDURE — 83036 HEMOGLOBIN GLYCOSYLATED A1C: CPT | Performed by: FAMILY MEDICINE

## 2021-09-23 PROCEDURE — 80053 COMPREHEN METABOLIC PANEL: CPT | Performed by: FAMILY MEDICINE

## 2021-09-23 PROCEDURE — 36415 COLL VENOUS BLD VENIPUNCTURE: CPT | Performed by: FAMILY MEDICINE

## 2021-09-23 PROCEDURE — 80061 LIPID PANEL: CPT | Performed by: FAMILY MEDICINE

## 2021-09-23 PROCEDURE — 82043 UR ALBUMIN QUANTITATIVE: CPT | Performed by: FAMILY MEDICINE

## 2021-09-23 NOTE — TELEPHONE ENCOUNTER
Caller: Nohemi Stephenson    Relationship to patient: Emergency Contact    Best call back number: [phone]    Patient is needing: LAB RESULTS-RETURNING CALL FROM JENNIFER

## 2021-09-24 DIAGNOSIS — R79.89 ELEVATED LFTS: Primary | ICD-10-CM

## 2021-09-27 ENCOUNTER — TELEPHONE (OUTPATIENT)
Dept: FAMILY MEDICINE CLINIC | Facility: CLINIC | Age: 71
End: 2021-09-27

## 2021-09-27 PROBLEM — K80.20 CALCULUS OF GALLBLADDER WITHOUT CHOLECYSTITIS WITHOUT OBSTRUCTION: Status: ACTIVE | Noted: 2021-09-27

## 2021-09-27 NOTE — TELEPHONE ENCOUNTER
NP on call 9/25/21, call from patient's wife at 11:15am, informed patient ill with nausea, vomiting, noticed BS at home in 330s. Sees oncology and getting treatments, also plans to have cholecystectomy, advised to call his surgeon d/t possible gallbladder symptoms, and advised to monitor BS at home, if BS >350-400 advised ER.

## 2021-09-29 ENCOUNTER — PRE-ADMISSION TESTING (OUTPATIENT)
Dept: PREADMISSION TESTING | Facility: HOSPITAL | Age: 71
End: 2021-09-29

## 2021-09-29 VITALS
WEIGHT: 158.1 LBS | HEART RATE: 62 BPM | OXYGEN SATURATION: 100 % | HEIGHT: 71 IN | TEMPERATURE: 98.1 F | DIASTOLIC BLOOD PRESSURE: 80 MMHG | BODY MASS INDEX: 22.13 KG/M2 | SYSTOLIC BLOOD PRESSURE: 130 MMHG | RESPIRATION RATE: 16 BRPM

## 2021-09-29 LAB
QT INTERVAL: 500 MS
SARS-COV-2 ORF1AB RESP QL NAA+PROBE: NOT DETECTED

## 2021-09-29 PROCEDURE — 93005 ELECTROCARDIOGRAM TRACING: CPT

## 2021-09-29 PROCEDURE — U0004 COV-19 TEST NON-CDC HGH THRU: HCPCS | Performed by: NURSE PRACTITIONER

## 2021-09-29 PROCEDURE — 93010 ELECTROCARDIOGRAM REPORT: CPT | Performed by: INTERNAL MEDICINE

## 2021-09-29 PROCEDURE — U0005 INFEC AGEN DETEC AMPLI PROBE: HCPCS | Performed by: NURSE PRACTITIONER

## 2021-09-29 PROCEDURE — C9803 HOPD COVID-19 SPEC COLLECT: HCPCS | Performed by: NURSE PRACTITIONER

## 2021-10-01 ENCOUNTER — ANESTHESIA EVENT (OUTPATIENT)
Dept: PERIOP | Facility: HOSPITAL | Age: 71
End: 2021-10-01

## 2021-10-01 ENCOUNTER — HOSPITAL ENCOUNTER (OUTPATIENT)
Facility: HOSPITAL | Age: 71
Setting detail: HOSPITAL OUTPATIENT SURGERY
Discharge: HOME | End: 2021-10-01
Attending: SURGERY | Admitting: SURGERY

## 2021-10-01 ENCOUNTER — APPOINTMENT (OUTPATIENT)
Dept: GENERAL RADIOLOGY | Facility: HOSPITAL | Age: 71
End: 2021-10-01

## 2021-10-01 ENCOUNTER — ANESTHESIA (OUTPATIENT)
Dept: PERIOP | Facility: HOSPITAL | Age: 71
End: 2021-10-01

## 2021-10-01 VITALS
DIASTOLIC BLOOD PRESSURE: 91 MMHG | HEART RATE: 69 BPM | OXYGEN SATURATION: 96 % | TEMPERATURE: 97.5 F | RESPIRATION RATE: 16 BRPM | SYSTOLIC BLOOD PRESSURE: 175 MMHG

## 2021-10-01 DIAGNOSIS — K80.20 CALCULUS OF GALLBLADDER WITHOUT CHOLECYSTITIS WITHOUT OBSTRUCTION: ICD-10-CM

## 2021-10-01 LAB
GLUCOSE BLDC GLUCOMTR-MCNC: 179 MG/DL (ref 70–130)
INR PPP: 1.09 (ref 0.9–1.1)
PROTHROMBIN TIME: 13.9 SECONDS (ref 11.7–14.2)

## 2021-10-01 PROCEDURE — 0 IOTHALAMATE 60 % SOLUTION: Performed by: SURGERY

## 2021-10-01 PROCEDURE — 25010000002 DEXAMETHASONE PER 1 MG: Performed by: NURSE ANESTHETIST, CERTIFIED REGISTERED

## 2021-10-01 PROCEDURE — 25010000002 PROPOFOL 10 MG/ML EMULSION: Performed by: NURSE ANESTHETIST, CERTIFIED REGISTERED

## 2021-10-01 PROCEDURE — 85610 PROTHROMBIN TIME: CPT | Performed by: SURGERY

## 2021-10-01 PROCEDURE — 25010000002 FENTANYL CITRATE (PF) 50 MCG/ML SOLUTION: Performed by: NURSE ANESTHETIST, CERTIFIED REGISTERED

## 2021-10-01 PROCEDURE — 47563 LAPARO CHOLECYSTECTOMY/GRAPH: CPT | Performed by: SURGERY

## 2021-10-01 PROCEDURE — 82962 GLUCOSE BLOOD TEST: CPT

## 2021-10-01 PROCEDURE — 25010000002 FENTANYL CITRATE (PF) 50 MCG/ML SOLUTION: Performed by: ANESTHESIOLOGY

## 2021-10-01 PROCEDURE — 88304 TISSUE EXAM BY PATHOLOGIST: CPT | Performed by: SURGERY

## 2021-10-01 PROCEDURE — C1889 IMPLANT/INSERT DEVICE, NOC: HCPCS | Performed by: SURGERY

## 2021-10-01 PROCEDURE — 74300 X-RAY BILE DUCTS/PANCREAS: CPT

## 2021-10-01 PROCEDURE — 25010000002 ONDANSETRON PER 1 MG: Performed by: NURSE ANESTHETIST, CERTIFIED REGISTERED

## 2021-10-01 DEVICE — HORIZON TI ML 6 CLIPS/CART
Type: IMPLANTABLE DEVICE | Site: ABDOMEN | Status: FUNCTIONAL
Brand: WECK

## 2021-10-01 RX ORDER — DEXAMETHASONE SODIUM PHOSPHATE 10 MG/ML
INJECTION INTRAMUSCULAR; INTRAVENOUS AS NEEDED
Status: DISCONTINUED | OUTPATIENT
Start: 2021-10-01 | End: 2021-10-01 | Stop reason: SURG

## 2021-10-01 RX ORDER — FENTANYL CITRATE 50 UG/ML
100 INJECTION, SOLUTION INTRAMUSCULAR; INTRAVENOUS
Status: DISCONTINUED | OUTPATIENT
Start: 2021-10-01 | End: 2021-10-01 | Stop reason: HOSPADM

## 2021-10-01 RX ORDER — SODIUM CHLORIDE 9 MG/ML
INJECTION, SOLUTION INTRAVENOUS AS NEEDED
Status: DISCONTINUED | OUTPATIENT
Start: 2021-10-01 | End: 2021-10-01 | Stop reason: HOSPADM

## 2021-10-01 RX ORDER — ROCURONIUM BROMIDE 10 MG/ML
INJECTION, SOLUTION INTRAVENOUS AS NEEDED
Status: DISCONTINUED | OUTPATIENT
Start: 2021-10-01 | End: 2021-10-01 | Stop reason: SURG

## 2021-10-01 RX ORDER — LIDOCAINE HYDROCHLORIDE 10 MG/ML
0.5 INJECTION, SOLUTION EPIDURAL; INFILTRATION; INTRACAUDAL; PERINEURAL ONCE AS NEEDED
Status: DISCONTINUED | OUTPATIENT
Start: 2021-10-01 | End: 2021-10-01 | Stop reason: HOSPADM

## 2021-10-01 RX ORDER — LIDOCAINE HYDROCHLORIDE 20 MG/ML
INJECTION, SOLUTION INFILTRATION; PERINEURAL AS NEEDED
Status: DISCONTINUED | OUTPATIENT
Start: 2021-10-01 | End: 2021-10-01 | Stop reason: SURG

## 2021-10-01 RX ORDER — ONDANSETRON 2 MG/ML
4 INJECTION INTRAMUSCULAR; INTRAVENOUS ONCE AS NEEDED
Status: DISCONTINUED | OUTPATIENT
Start: 2021-10-01 | End: 2021-10-01 | Stop reason: HOSPADM

## 2021-10-01 RX ORDER — SODIUM CHLORIDE, SODIUM LACTATE, POTASSIUM CHLORIDE, CALCIUM CHLORIDE 600; 310; 30; 20 MG/100ML; MG/100ML; MG/100ML; MG/100ML
9 INJECTION, SOLUTION INTRAVENOUS CONTINUOUS
Status: DISCONTINUED | OUTPATIENT
Start: 2021-10-01 | End: 2021-10-01 | Stop reason: HOSPADM

## 2021-10-01 RX ORDER — MIDAZOLAM HYDROCHLORIDE 1 MG/ML
0.5 INJECTION INTRAMUSCULAR; INTRAVENOUS
Status: DISCONTINUED | OUTPATIENT
Start: 2021-10-01 | End: 2021-10-01 | Stop reason: HOSPADM

## 2021-10-01 RX ORDER — HYDROCODONE BITARTRATE AND ACETAMINOPHEN 5; 325 MG/1; MG/1
1 TABLET ORAL ONCE AS NEEDED
Status: DISCONTINUED | OUTPATIENT
Start: 2021-10-01 | End: 2021-10-01 | Stop reason: HOSPADM

## 2021-10-01 RX ORDER — FENTANYL CITRATE 50 UG/ML
INJECTION, SOLUTION INTRAMUSCULAR; INTRAVENOUS AS NEEDED
Status: DISCONTINUED | OUTPATIENT
Start: 2021-10-01 | End: 2021-10-01 | Stop reason: SURG

## 2021-10-01 RX ORDER — PROPOFOL 10 MG/ML
VIAL (ML) INTRAVENOUS AS NEEDED
Status: DISCONTINUED | OUTPATIENT
Start: 2021-10-01 | End: 2021-10-01 | Stop reason: SURG

## 2021-10-01 RX ORDER — ONDANSETRON 4 MG/1
4 TABLET, FILM COATED ORAL EVERY 6 HOURS PRN
Qty: 10 TABLET | Refills: 1 | Status: SHIPPED | OUTPATIENT
Start: 2021-10-01 | End: 2021-10-07

## 2021-10-01 RX ORDER — FLUMAZENIL 0.1 MG/ML
0.2 INJECTION INTRAVENOUS AS NEEDED
Status: DISCONTINUED | OUTPATIENT
Start: 2021-10-01 | End: 2021-10-01 | Stop reason: HOSPADM

## 2021-10-01 RX ORDER — BUPIVACAINE HYDROCHLORIDE AND EPINEPHRINE 5; 5 MG/ML; UG/ML
INJECTION, SOLUTION EPIDURAL; INTRACAUDAL; PERINEURAL AS NEEDED
Status: DISCONTINUED | OUTPATIENT
Start: 2021-10-01 | End: 2021-10-01 | Stop reason: HOSPADM

## 2021-10-01 RX ORDER — EPHEDRINE SULFATE 50 MG/ML
5 INJECTION, SOLUTION INTRAVENOUS ONCE AS NEEDED
Status: DISCONTINUED | OUTPATIENT
Start: 2021-10-01 | End: 2021-10-01 | Stop reason: HOSPADM

## 2021-10-01 RX ORDER — HYDROMORPHONE HYDROCHLORIDE 1 MG/ML
0.5 INJECTION, SOLUTION INTRAMUSCULAR; INTRAVENOUS; SUBCUTANEOUS
Status: DISCONTINUED | OUTPATIENT
Start: 2021-10-01 | End: 2021-10-01 | Stop reason: HOSPADM

## 2021-10-01 RX ORDER — MAGNESIUM HYDROXIDE 1200 MG/15ML
LIQUID ORAL AS NEEDED
Status: DISCONTINUED | OUTPATIENT
Start: 2021-10-01 | End: 2021-10-01 | Stop reason: HOSPADM

## 2021-10-01 RX ORDER — MIDAZOLAM HYDROCHLORIDE 1 MG/ML
2 INJECTION INTRAMUSCULAR; INTRAVENOUS ONCE
Status: DISCONTINUED | OUTPATIENT
Start: 2021-10-01 | End: 2021-10-01 | Stop reason: HOSPADM

## 2021-10-01 RX ORDER — ONDANSETRON 2 MG/ML
INJECTION INTRAMUSCULAR; INTRAVENOUS AS NEEDED
Status: DISCONTINUED | OUTPATIENT
Start: 2021-10-01 | End: 2021-10-01 | Stop reason: SURG

## 2021-10-01 RX ORDER — FENTANYL CITRATE 50 UG/ML
50 INJECTION, SOLUTION INTRAMUSCULAR; INTRAVENOUS ONCE
Status: DISCONTINUED | OUTPATIENT
Start: 2021-10-01 | End: 2021-10-01 | Stop reason: HOSPADM

## 2021-10-01 RX ORDER — SODIUM CHLORIDE 0.9 % (FLUSH) 0.9 %
3 SYRINGE (ML) INJECTION EVERY 12 HOURS SCHEDULED
Status: DISCONTINUED | OUTPATIENT
Start: 2021-10-01 | End: 2021-10-01 | Stop reason: HOSPADM

## 2021-10-01 RX ORDER — SODIUM CHLORIDE 0.9 % (FLUSH) 0.9 %
3-10 SYRINGE (ML) INJECTION AS NEEDED
Status: DISCONTINUED | OUTPATIENT
Start: 2021-10-01 | End: 2021-10-01 | Stop reason: HOSPADM

## 2021-10-01 RX ORDER — HYDROCODONE BITARTRATE AND ACETAMINOPHEN 5; 325 MG/1; MG/1
TABLET ORAL
Qty: 24 TABLET | Refills: 0 | Status: SHIPPED | OUTPATIENT
Start: 2021-10-01 | End: 2021-10-07

## 2021-10-01 RX ORDER — HYDROCODONE BITARTRATE AND ACETAMINOPHEN 7.5; 325 MG/1; MG/1
1 TABLET ORAL EVERY 4 HOURS PRN
Status: DISCONTINUED | OUTPATIENT
Start: 2021-10-01 | End: 2021-10-01 | Stop reason: HOSPADM

## 2021-10-01 RX ORDER — FENTANYL CITRATE 50 UG/ML
50 INJECTION, SOLUTION INTRAMUSCULAR; INTRAVENOUS
Status: DISCONTINUED | OUTPATIENT
Start: 2021-10-01 | End: 2021-10-01 | Stop reason: HOSPADM

## 2021-10-01 RX ADMIN — SUGAMMADEX 200 MG: 100 INJECTION, SOLUTION INTRAVENOUS at 08:05

## 2021-10-01 RX ADMIN — FENTANYL CITRATE 50 MCG: 50 INJECTION INTRAMUSCULAR; INTRAVENOUS at 07:37

## 2021-10-01 RX ADMIN — HYDROCODONE BITARTRATE AND ACETAMINOPHEN 1 TABLET: 7.5; 325 TABLET ORAL at 08:54

## 2021-10-01 RX ADMIN — FENTANYL CITRATE 50 MCG: 50 INJECTION INTRAMUSCULAR; INTRAVENOUS at 08:41

## 2021-10-01 RX ADMIN — ONDANSETRON 4 MG: 2 INJECTION INTRAMUSCULAR; INTRAVENOUS at 07:41

## 2021-10-01 RX ADMIN — FENTANYL CITRATE 50 MCG: 50 INJECTION INTRAMUSCULAR; INTRAVENOUS at 07:45

## 2021-10-01 RX ADMIN — SODIUM CHLORIDE, POTASSIUM CHLORIDE, SODIUM LACTATE AND CALCIUM CHLORIDE: 600; 310; 30; 20 INJECTION, SOLUTION INTRAVENOUS at 07:33

## 2021-10-01 RX ADMIN — PROPOFOL 200 MG: 10 INJECTION, EMULSION INTRAVENOUS at 07:37

## 2021-10-01 RX ADMIN — DEXAMETHASONE SODIUM PHOSPHATE 4 MG: 10 INJECTION INTRAMUSCULAR; INTRAVENOUS at 07:41

## 2021-10-01 RX ADMIN — ROCURONIUM BROMIDE 30 MG: 50 INJECTION INTRAVENOUS at 07:37

## 2021-10-01 RX ADMIN — LIDOCAINE HYDROCHLORIDE 100 MG: 20 INJECTION, SOLUTION INFILTRATION; PERINEURAL at 07:37

## 2021-10-01 NOTE — ANESTHESIA POSTPROCEDURE EVALUATION
Patient: Macho Stephenson    Procedure Summary     Date: 10/01/21 Room / Location:  [facility]  /  [facility]    Anesthesia Start: 0733 Anesthesia Stop: 0818    Procedure: CHOLECYSTECTOMY LAPAROSCOPIC INTRAOPERATIVE CHOLANGIOGRAM (N/A Abdomen) Diagnosis:       Calculus of gallbladder without cholecystitis without obstruction      (Calculus of gallbladder without cholecystitis without obstruction [K80.20])    Surgeons: Darius Lui MD Provider: Han Cowan MD    Anesthesia Type: general ASA Status: 3          Anesthesia Type: general    Vitals  Vitals Value Taken Time   /86 10/01/21 0902   Temp 36.4 °C (97.5 °F) 10/01/21 0900   Pulse 70 10/01/21 0902   Resp 16 10/01/21 0900   SpO2 96 % 10/01/21 0902   Vitals shown include unvalidated device data.        Post Anesthesia Care and Evaluation    Patient location during evaluation: bedside  Patient participation: complete - patient participated  Level of consciousness: awake  Pain management: adequate  Airway patency: patent  Anesthetic complications: No anesthetic complications  PONV Status: controlled  Cardiovascular status: acceptable  Respiratory status: acceptable  Hydration status: acceptable    Comments: ---------------------------               10/01/21                      0900         ---------------------------   BP:          175/91         Pulse:         70           Resp:          16           Temp:   36.4 °C (97.5 °F)   SpO2:          97%         ---------------------------

## 2021-10-01 NOTE — OP NOTE
PREOPERATIVE DIAGNOSIS:  Symptomatic cholelithiasis    POSTOPERATIVE DIAGNOSIS (FINDINGS):  Same    PROCEDURE:  Laparoscopic cholecystectomy with cholangiogram    SURGEON:  Darius Lui MD    ASSISTANT:  Vivek Kendall, was responsible for performing the following activities: suction, irrigation, suturing, closing, retraction, camera holding, and placing dressing, and their skilled assistance was necessary for the success of this case.    ANESTHESIA:  General    EBL:  Minimal    SPECIMEN(S):  Gallbladder    DESCRIPTION:  Supine position. General anesthesia. Prepped and draped, usual sterile manner.    1/2 % marcaine with epinephrine infiltrated in all incision sites. Small periumbilical incision made, Veress needle inserted with upward traction on abdominal wall. Abdomen insufflated to 15 mm Hg pressure and 5 mm opti-view trocar inserted followed by 10 mm subxiphoid and 5 mm right subcostal trocars.    Gallbladder grasped and elevated. Cystic duct dissected and clipped once distally. Cholangiogram catheter inserted and cholangiogram demonstrated prompt filling of duodenum, no filling defects. Cystic duct clipped twice proximally and divided. Cystic artery clipped twice proximally, once distally and divided. Gallbladder removed from the liver bed with electrocautery and removed through subxiphoid trocar site. Liver bed copiously irrigated and aspirated with good hemostasis noted. CO2 released, fascia of subxiphoid trocar site closed with 0-vicryl, skin edges 5-0 vicryl subcuticular suture.    Tolerated well.  Stable to PACU.    Darius Liu M.D.

## 2021-10-01 NOTE — DISCHARGE INSTRUCTIONS
Dr. Darius Lui  [address]  [place], [place] [address] [phone]    Discharge Instructions for Gall Bladder Surgery    1. Go home, rest and take it easy today; however, you should get up and move about several times today to reduce the risk of developing a clot in your legs.      2. You may experience some dizziness or memory loss from the anesthesia.  This may last for the next 24 hours.  Someone should plan on staying with you for the first 24 hours for your safety.    3. Do not make any important legal decisions or sign any legal papers for the next 24 hours.      4. Eat and drink lightly today.  Start off with liquids, jello, soup, crackers or other bland foods at first. You may advance your diet tomorrow as tolerated as long as you do not experience any nausea or vomiting.     5. If skin glue (Dermabond) was used, your incisions are protected and covered.  The invisible glue will dissolve on its own as your incision heals. If you have dressings, you may remove your outer dressings in 3 days.  The white tapes called steri-strips should stay in place.  They will fall off on their own in 1-2 weeks.  Do not worry if they come off sooner.      6. If you have dressings, you may notice some bleeding/drainage on your outer dressings. A little bloody drainage is normal. If the bleeding/drainage is such that the bandage cannot absorb it, remove the dressing, apply clean gauze and apply firm pressure for a full 15 minutes.  If the bleeding continues, please call me.    7. You may shower tomorrow allowing water to run over the incisions; however, do not scrub the incisions.   No tub baths until your incisions are completely healed.         8. You have received a prescription for a narcotic pain medicine, as you will have some pain following surgery.   You will not be totally pain free, but your pain medicine should make the pain tolerable.  Please take your pain medicine as prescribed and always take  your pills with food to prevent nausea. If you are having severe pain that cannot be controlled by the pain medicine, please contact me.      9. You have also received a prescription for an anti-nausea medicine.  Please take this as prescribed for any nausea or vomiting.  Nausea could be a result of the anesthesia or a result of the narcotic pain medicine.  If you experience severe nausea and vomiting that cannot be controlled by the nausea medicine, please call me.      10. It is not unusual to experience pain/discomfort in your shoulders or under your ribs after surgery.  It is from the gas used during the laparoscopic procedure and usually lasts 1-3 days.  The prescription pain medicine is used to treat the surgical pain and does not typically alleviate this “gassy” pain.     11. No driving for 24 hours and for as long as you are taking your prescription pain medicine.      12. You will need to call the office at [phone] to schedule a follow-up appointment in 6-10 days.     13. Remember to contact me for any of the following:    • Fever > 101 degrees  • Severe pain that cannot be controlled by taking your pain pills  • Severe nausea or vomiting that cannot be controlled by taking your nausea pills  • Significant bleeding of your incisions  • Drainage that has a bad smell or is yellow or green in appearance  • Any other questions or concerns

## 2021-10-01 NOTE — ANESTHESIA PREPROCEDURE EVALUATION
Anesthesia Evaluation     Patient summary reviewed and Nursing notes reviewed   no history of anesthetic complications:  NPO Solid Status: > 8 hours             Airway   Mallampati: II  TM distance: >3 FB  Neck ROM: full  No difficulty expected  Dental    (+) edentulous, lower dentures and upper dentures    Pulmonary - normal exam   (+) a smoker Former,   Cardiovascular   Exercise tolerance: good (4-7 METS)    (+) hypertension, valvular problems/murmurs murmur, CAD, cardiac stents Drug eluting stent more than 12 months ago dysrhythmias Atrial Fib, POLLARD, hyperlipidemia,   (-) angina      Neuro/Psych  GI/Hepatic/Renal/Endo    (+)  GERD, GI bleeding (lower, 3 weeks ago, received transfusion of 3 prbcs) ,     Musculoskeletal     Abdominal  - normal exam   Substance History      OB/GYN          Other   blood dyscrasia (CLL) anemia thrombocytopenia,   history of cancer    ROS/Med Hx Other: CLL                    Anesthesia Plan    ASA 3     general   (  )  intravenous induction     Anesthetic plan, all risks, benefits, and alternatives have been provided, discussed and informed consent has been obtained with: patient.

## 2021-10-01 NOTE — ANESTHESIA PROCEDURE NOTES
Airway  Urgency: elective    Date/Time: 10/1/2021 7:39 AM  Airway not difficult    General Information and Staff    Patient location during procedure: OR  CRNA: Charito Nazario CRNA    Indications and Patient Condition  Indications for airway management: airway protection    Preoxygenated: yes  Mask difficulty assessment: 1 - vent by mask    Final Airway Details  Final airway type: endotracheal airway      Successful airway: ETT  Cuffed: yes   Successful intubation technique: direct laryngoscopy  Endotracheal tube insertion site: oral  Blade: Fanny  Blade size: 4  ETT size (mm): 7.5  Cormack-Lehane Classification: grade I - full view of glottis  Placement verified by: chest auscultation and capnometry   Cuff volume (mL): 8  Measured from: lips  ETT/EBT  to lips (cm): 21  Number of attempts at approach: 1    Additional Comments  Atraumatic placement of ETT, dentition unchanged from preop.

## 2021-10-04 LAB
LAB AP CASE REPORT: NORMAL
PATH REPORT.FINAL DX SPEC: NORMAL
PATH REPORT.GROSS SPEC: NORMAL

## 2021-10-07 ENCOUNTER — OFFICE VISIT (OUTPATIENT)
Dept: SURGERY | Facility: CLINIC | Age: 71
End: 2021-10-07

## 2021-10-07 VITALS — BODY MASS INDEX: 21.28 KG/M2 | HEIGHT: 71 IN | WEIGHT: 152 LBS

## 2021-10-07 DIAGNOSIS — Z09 FOLLOW UP: Primary | ICD-10-CM

## 2021-10-07 PROCEDURE — 99024 POSTOP FOLLOW-UP VISIT: CPT | Performed by: SURGERY

## 2021-10-07 NOTE — PROGRESS NOTES
Postoperative visit    Laparoscopic cholecystectomy with cholangiogram 10/1/2021    Pathology: Chronic cholecystitis with cholelithiasis    Cholangiogram: Normal    Office visit: Incisions are healing well and is doing well, reporting no problems from the surgery.  No further follow-up for this required.  He will call to schedule port removal when cleared by oncology.

## 2021-10-14 ENCOUNTER — INFUSION (OUTPATIENT)
Dept: ONCOLOGY | Facility: HOSPITAL | Age: 71
End: 2021-10-14

## 2021-10-14 ENCOUNTER — OFFICE VISIT (OUTPATIENT)
Dept: ONCOLOGY | Facility: CLINIC | Age: 71
End: 2021-10-14

## 2021-10-14 VITALS
SYSTOLIC BLOOD PRESSURE: 125 MMHG | OXYGEN SATURATION: 98 % | HEART RATE: 63 BPM | RESPIRATION RATE: 16 BRPM | WEIGHT: 156.4 LBS | DIASTOLIC BLOOD PRESSURE: 72 MMHG | BODY MASS INDEX: 21.9 KG/M2 | HEIGHT: 71 IN | TEMPERATURE: 97.3 F

## 2021-10-14 DIAGNOSIS — C91.10 CLL (CHRONIC LYMPHOCYTIC LEUKEMIA) (HCC): Primary | ICD-10-CM

## 2021-10-14 DIAGNOSIS — K81.9 CHOLECYSTITIS: ICD-10-CM

## 2021-10-14 LAB
ALBUMIN SERPL-MCNC: 4.2 G/DL (ref 3.5–5.2)
ALBUMIN/GLOB SERPL: 2.5 G/DL (ref 1.1–2.4)
ALP SERPL-CCNC: 139 U/L (ref 38–116)
ALT SERPL W P-5'-P-CCNC: 25 U/L (ref 0–41)
ANION GAP SERPL CALCULATED.3IONS-SCNC: 9 MMOL/L (ref 5–15)
AST SERPL-CCNC: 25 U/L (ref 0–40)
BASOPHILS # BLD AUTO: 0.04 10*3/MM3 (ref 0–0.2)
BASOPHILS NFR BLD AUTO: 0.7 % (ref 0–1.5)
BILIRUB SERPL-MCNC: 0.5 MG/DL (ref 0.2–1.2)
BUN SERPL-MCNC: 14 MG/DL (ref 6–20)
BUN/CREAT SERPL: 16.3 (ref 7.3–30)
CALCIUM SPEC-SCNC: 9.3 MG/DL (ref 8.5–10.2)
CHLORIDE SERPL-SCNC: 104 MMOL/L (ref 98–107)
CO2 SERPL-SCNC: 25 MMOL/L (ref 22–29)
CREAT SERPL-MCNC: 0.86 MG/DL (ref 0.7–1.3)
DEPRECATED RDW RBC AUTO: 59.5 FL (ref 37–54)
EOSINOPHIL # BLD AUTO: 0.11 10*3/MM3 (ref 0–0.4)
EOSINOPHIL NFR BLD AUTO: 2 % (ref 0.3–6.2)
ERYTHROCYTE [DISTWIDTH] IN BLOOD BY AUTOMATED COUNT: 15.5 % (ref 12.3–15.4)
FERRITIN SERPL-MCNC: 63.3 NG/ML (ref 30–400)
GFR SERPL CREATININE-BSD FRML MDRD: 87 ML/MIN/1.73
GLOBULIN UR ELPH-MCNC: 1.7 GM/DL (ref 1.8–3.5)
GLUCOSE SERPL-MCNC: 133 MG/DL (ref 74–124)
HCT VFR BLD AUTO: 34.8 % (ref 37.5–51)
HGB BLD-MCNC: 11.7 G/DL (ref 13–17.7)
IMM GRANULOCYTES # BLD AUTO: 0.06 10*3/MM3 (ref 0–0.05)
IMM GRANULOCYTES NFR BLD AUTO: 1.1 % (ref 0–0.5)
IRON 24H UR-MRATE: 103 MCG/DL (ref 59–158)
IRON SATN MFR SERPL: 28 % (ref 14–48)
LYMPHOCYTES # BLD AUTO: 0.99 10*3/MM3 (ref 0.7–3.1)
LYMPHOCYTES NFR BLD AUTO: 18 % (ref 19.6–45.3)
MCH RBC QN AUTO: 35.5 PG (ref 26.6–33)
MCHC RBC AUTO-ENTMCNC: 33.6 G/DL (ref 31.5–35.7)
MCV RBC AUTO: 105.5 FL (ref 79–97)
MONOCYTES # BLD AUTO: 0.49 10*3/MM3 (ref 0.1–0.9)
MONOCYTES NFR BLD AUTO: 8.9 % (ref 5–12)
NEUTROPHILS NFR BLD AUTO: 3.8 10*3/MM3 (ref 1.7–7)
NEUTROPHILS NFR BLD AUTO: 69.3 % (ref 42.7–76)
NRBC BLD AUTO-RTO: 0 /100 WBC (ref 0–0.2)
PLATELET # BLD AUTO: 126 10*3/MM3 (ref 140–450)
PMV BLD AUTO: 9.9 FL (ref 6–12)
POTASSIUM SERPL-SCNC: 4 MMOL/L (ref 3.5–4.7)
PROT SERPL-MCNC: 5.9 G/DL (ref 6.3–8)
RBC # BLD AUTO: 3.3 10*6/MM3 (ref 4.14–5.8)
SODIUM SERPL-SCNC: 138 MMOL/L (ref 134–145)
TIBC SERPL-MCNC: 371 MCG/DL (ref 249–505)
TRANSFERRIN SERPL-MCNC: 265 MG/DL (ref 200–360)
WBC # BLD AUTO: 5.49 10*3/MM3 (ref 3.4–10.8)

## 2021-10-14 PROCEDURE — 88184 FLOWCYTOMETRY/ TC 1 MARKER: CPT

## 2021-10-14 PROCEDURE — 82728 ASSAY OF FERRITIN: CPT | Performed by: INTERNAL MEDICINE

## 2021-10-14 PROCEDURE — 83540 ASSAY OF IRON: CPT | Performed by: INTERNAL MEDICINE

## 2021-10-14 PROCEDURE — 84466 ASSAY OF TRANSFERRIN: CPT | Performed by: INTERNAL MEDICINE

## 2021-10-14 PROCEDURE — 25010000002 HEPARIN LOCK FLUSH PER 10 UNITS: Performed by: INTERNAL MEDICINE

## 2021-10-14 PROCEDURE — 88185 FLOWCYTOMETRY/TC ADD-ON: CPT

## 2021-10-14 PROCEDURE — 36591 DRAW BLOOD OFF VENOUS DEVICE: CPT

## 2021-10-14 PROCEDURE — 80053 COMPREHEN METABOLIC PANEL: CPT

## 2021-10-14 PROCEDURE — 88182 CELL MARKER STUDY: CPT

## 2021-10-14 PROCEDURE — 85025 COMPLETE CBC W/AUTO DIFF WBC: CPT

## 2021-10-14 PROCEDURE — 36415 COLL VENOUS BLD VENIPUNCTURE: CPT

## 2021-10-14 PROCEDURE — 99214 OFFICE O/P EST MOD 30 MIN: CPT | Performed by: INTERNAL MEDICINE

## 2021-10-14 RX ORDER — HEPARIN SODIUM (PORCINE) LOCK FLUSH IV SOLN 100 UNIT/ML 100 UNIT/ML
500 SOLUTION INTRAVENOUS AS NEEDED
Status: DISCONTINUED | OUTPATIENT
Start: 2021-10-14 | End: 2021-10-14 | Stop reason: HOSPADM

## 2021-10-14 RX ORDER — SODIUM CHLORIDE 0.9 % (FLUSH) 0.9 %
10 SYRINGE (ML) INJECTION AS NEEDED
Status: DISCONTINUED | OUTPATIENT
Start: 2021-10-14 | End: 2021-10-14 | Stop reason: HOSPADM

## 2021-10-14 RX ORDER — HEPARIN SODIUM (PORCINE) LOCK FLUSH IV SOLN 100 UNIT/ML 100 UNIT/ML
500 SOLUTION INTRAVENOUS AS NEEDED
Status: CANCELLED | OUTPATIENT
Start: 2021-10-14

## 2021-10-14 RX ORDER — SODIUM CHLORIDE 0.9 % (FLUSH) 0.9 %
10 SYRINGE (ML) INJECTION AS NEEDED
Status: CANCELLED | OUTPATIENT
Start: 2021-10-14

## 2021-10-14 RX ADMIN — Medication 500 UNITS: at 14:56

## 2021-10-14 RX ADMIN — Medication 10 ML: at 14:55

## 2021-10-14 NOTE — PROGRESS NOTES
Subjective     REASON FOR VISIT:    1. Chronic lymphoid leukemia, stage 4, presented initially with significant pancytopenia  · Currently on Gazyva with venetoclax  · Cycle 1 Gazyva given on August 13, 2020    2.  History of angiodysplasia requiring cauterization and history of Hess's esophagus    3.  Bone marrow aspiration biopsy shows hypercellular marrow with 98% involvement with CLL        History of Present Illness The patient is a 71 y.o. male with the above-mentioned history with history of chronic lymphocytic leukemia who presented with pancytopenia and was treated with Gazyva along with venetoclax/voriconazole.  He has completed 1 year as of August 2021.     Patient recently had to be operated on by Dr. Lui for cholecystitis. Since his surgery he is feeling much better. He still continues to have the port. There is no erythema around it and is functioning well.    His hemoglobin is 11.7 with a white count of 5.49 and a platelet of 120 6K. He likely dropped his hemoglobin because of his recent cholecystitis. He feels good. There is no fever or night sweats        PAST MEDICAL HISTORY:   1. Recurrent atrial fibrillation followed by cardiology. He has had drug eluting stent placed x 3.   2. High cholesterol.   3. Hypertension.       HEMATOLOGIC/ONCOLOGIC HISTORY:       The patient is 62-year-old gentleman with the above history currently here for followup. He has no complaints. He denies fever, night sweats or weight loss. He does not have any lymphadenopathy. He feels good. He had some fatigue but his CBC shows a go od hemoglobin.   The patient is followed by Dr. Kevin Chandra. He had seen Dr. Chandra 7/18/13 for a history of coronary artery disease status drug eluting stent placement to the right coronary artery and left anterior descending artery in February 2011. Histor y of paroxysmal atrial fibrillation and hyperlipidemia. He presented to [facility] on 6/23/13 with palpitations and was  found to have atrial fibrillation with rapid ventricular response. He was given IV Cardizem and converted spontaneously to normal sinus rhythm. He was found to have elevated white count at 18.9 and denied any symptoms of infection or urinary complaints. TSH was normal, troponin levels were negative. He was asked to continue aspirin and metoprolol for that. If he contin u ed to have atrial fibrillation, they will consider antiarrhythmic therapy with or without a short term anticoagulation therapy. However, currently the patient is feeling reasonably good. He has no complaints. His CBC 7/22/13 showed WBC 17,000, hemoglob i n 16.4 and platelet count of 174,000. Back 9/28/13 his hemoglobin was 15.1, WBC 13.3 and platelets 197,000. He has had a persistently elevated WBC but he is totally asymptomatic. He does not have any fever, night sweats or lymphadenopathy. He is here to be evaluated for his elevated white count.       Peripheral blood flow cytometry done 08/16/13 shows 22% chronic lymphoid leukemia cells, and they expressed ZAP-70 but not CD38. The total number of cells approximated 60% T cells, 32% B cells and 1% natural k iller cells. The B cells were monoclonal and expressed CD19, CD20, CD22, CD5, CD23, CD11c, ZAP-70 and T cell antigens. There was a normal CD4/CD8 ratio. CT of the chest, abdomen and pelvis does not show evidence of metastasis. Peripheral blood for DILLON -2 was negative. It was negative for T11:14 translocation.       CT scan done on 08/21/2013 shows 5 to 6 mm noncalcified nodule in the left lower lobe which is unchanged from December 2010. Otherwise no evidence of any lymphadenopathy or hepatosplenomegaly.       MRI of the spine shows arthritis and disc bulge and likely hemangiomas, with 1 lesion showing increased signal intensity at T2, which is likely a hemangioma. Bone scan could be done, but patient does not even have much pain there. CT scan of the chest, a bdomen and pelvis was done on  11/23/2014. He has tiny lymph nodes in the neck which are difficult to palpate. Right jugular one is 1.4 x 0.9 and left supraclavicular one is 1.5 x 1.1. He also has a subcarinal lymph node which is 1.7 x 1.2 cm, and he ha s a portocaval lymph node which has increased from 2.5 to 2.8. Patient is totally asymptomatic. He does not have any B symptoms, so will continue followup with observation.     Patient is a 69-year-old gentleman with history of chronic lymphocytic leukemia/SLL who recently got admitted to [facility] because of GI bleed.  He was seen by Dr. Montero.  He underwent EGD colonoscopy.  He was found to have angiodysplasia for which he underwent argon coagulation.  He required 3 units of blood.  Patient had a normal esophagus normal stomach and normal duodenum CLOtest was negative he was asked to take Protonix 40 mg daily.  Colonoscopy showed blood in the entire examined colon but there was a single bleeding colonic angiectasia which was treated with argon plasma coagulation.    He was admitted twice.  Initially he was admitted on July 10 at which time he stayed 3 days and he was evaluated for chest pain.  He underwent a cardiac work-up with stress test and echo.  The echo was normal but the stress test showed medium sized moderate severe severity fixed perfusion defect.  Of the inferior wall consistent with infarction.  However according to patient cardiology did not think he had any cardiac problems.  I have left a message for patient's cardiologist to call me today.  Patient subsequently got readmitted with GI bleed and required 1/3 unit of transfusion.  He was found to have thrombocytopenia and hematology was consulted.    His hemoglobin had dropped down to as low as 7 and his platelets had gone down to 87.  Today it is 35 and his hemoglobin is 9.4 today.  He denies active bleeding.  He has lost weight recently.  He does not have any fever or night sweats.    He had ultrasound of spleen which  showed enlarged spleen centimeters in length    Patient was started on Gazyva with Leukeran but subsequently switched to venetoclax with Gazyva.  His venetoclax dose is 100 mg daily and he receives Gazyva once a month.    CT scan done 2020 shows significant response     MEDICATIONS: The current medication list was reviewed with the patient and updated in the EMR this date per the medical assistant. Medication dosages and frequencies were confirmed to be accurate.       ALLERGIES: PENICILLIN which causes him to swell up. He is allergic to IV CONTRAST DYE.     SOCIAL HISTORY: He is . He smokes one pack per day for 35 years. He consumes three to four alcoholic drinks per week. He has no tattoos and no previous transfusions.       FAMILY HISTORY: Father  at 82 with MI. Mother  at 82 with bone cancer. He has one brother age 65 in good health and two sisters 69 and 57 in good health.   Past Medical History, Social History, and Family History are unchanged from my prior documentation on     Review of Systems   Constitutional: Negative for appetite change, chills, diaphoresis, fatigue, fever and unexpected weight change.   HENT: Negative for hearing loss, sore throat and trouble swallowing.    Respiratory: Negative for cough, chest tightness, shortness of breath and wheezing.    Cardiovascular: Negative for chest pain, palpitations and leg swelling.   Gastrointestinal: Negative for abdominal distention, abdominal pain, constipation, diarrhea, nausea and vomiting.   Genitourinary: Negative for dysuria, frequency, hematuria and urgency.   Musculoskeletal: Negative for joint swelling.        No muscle weakness.   Skin: Negative for rash and wound.   Neurological: Negative for seizures, syncope, speech difficulty, weakness, numbness and headaches.   Hematological: Negative for adenopathy. Does not bruise/bleed easily.   Psychiatric/Behavioral: Negative for behavioral problems, confusion  and suicidal ideas.   All other systems reviewed and are negative.  I have reviewed and confirmed the accuracy of the ROS as documented by the MA/LPN/RN Susannah Moya MD    Patient Active Problem List   Diagnosis   • CLL (chronic lymphocytic leukemia) (HCC)   • Anemia   • Encounter for hepatitis C screening test for low risk patient    • Thrombocytopenia (HCC)   • H/O heart artery stent   • Stented coronary artery   • Arteriosclerosis of coronary artery   • Atrial fibrillation (HCC)   • Paroxysmal atrial fibrillation (HCC)   • Chest pain, rule out acute myocardial infarction   • Fall   • Fracture, olecranon   • Hyperlipidemia   • Long term (current) use of anticoagulants   • Lower GI bleed   • Olecranon bursitis   • Shoulder pain   • Smoker   • CLL (chronic lymphocytic leukemia) (HCC)   • Dehydration   • Drug-induced nausea and vomiting   • Encounter for long-term (current) use of other medications   • Neutropenia (HCC)   • Calculus of gallbladder without cholecystitis without obstruction       MEDICATIONS:  Medication Reconciliation for the patient has been reviewed by me and documented in the patients chart.    ALLERGIES:    Allergies   Allergen Reactions   • Penicillins Swelling         Vitals:    10/14/21 1428   BP: 125/72   Pulse: 63   Resp: 16   Temp: 97.3 °F (36.3 °C)   SpO2: 98%        Current Status 10/14/2021   ECOG score 0      This patient's ACP documentation is up to date, and there's nothing further left to document.    Physical exam    This patient's ACP documentation is up to date, and there's nothing further left to document.      CONSTITUTIONAL:  Vital signs reviewed.  No distress, looks comfortable.  EYES:  Conjunctivae and lids unremarkable.  PERRLA  EARS,NOSE,MOUTH,THROAT:  Ears and nose appear unremarkable.  Lips, teeth, gums appear unremarkable.  RESPIRATORY:  Normal respiratory effort.  Lungs clear to auscultation bilaterally.  CARDIOVASCULAR:  Normal S1, S2.  No murmurs rubs or gallops.   No significant lower extremity edema.  GASTROINTESTINAL: Abdomen appears unremarkable.  Nontender.  No hepatomegaly.  No splenomegaly.  LYMPHATIC:  No cervical, supraclavicular, axillary lymphadenopathy.  SKIN:  Warm.  No rashes.  PSYCHIATRIC:  Normal judgment and insight.  Normal mood and affect.    RECENT LABS:  Results from last 7 days   Lab Units 10/14/21  1446   WBC 10*3/mm3 5.49   NEUTROS ABS 10*3/mm3 3.80   HEMOGLOBIN g/dL 11.7*   HEMATOCRIT % 34.8*   PLATELETS 10*3/mm3 126*     Results from last 7 days   Lab Units 10/14/21  1446   SODIUM mmol/L 138   POTASSIUM mmol/L 4.0   CHLORIDE mmol/L 104   CO2 mmol/L 25.0   BUN mg/dL 14   CREATININE mg/dL 0.86   CALCIUM mg/dL 9.3   ALBUMIN g/dL 4.20   BILIRUBIN mg/dL 0.5   ALK PHOS U/L 139*   ALT (SGPT) U/L 25   AST (SGOT) U/L 25   GLUCOSE mg/dL 133*   FERRITIN ng/mL 63.30   IRON mcg/dL 103   TIBC mcg/dL 371         Assessment/Plan   1. Stage 2 CLL without evidence of any disease progression.  I have reviewed the CT scan from 3/19/2018 there is minimal progression but clinically patient is asymptomatic and we will follow with observation.  Will monitor with labs.   No evidence of any frequent infections, no major worsening of his white count. He has no fever or night sweats or any other symptoms.   · Patient knows that he is prone for infections and he is mainly staying at home during the COVID-19 situation time  · Recent admission to [facility] July 10 2020×2.  Initially admitted with chest pain and underwent stress test and echo.  Echo was negative.  Stress test is abnormal but have left message for patient's cardiologist to call us.  His cardiologist is been sent [facility].  · He then got admitted the second time with worsening GI bleed and required total of 3 units of blood.  EGD was negative but colonoscopy showed angiectasia for which he underwent argon ablation.  Currently hemoglobin stable and no active bleeding.  · He was also found to have low platelets  "with platelets dropping down to 27k and hemoglobin was 7.  Ultrasound showed splenomegaly 15 cm.  Concern is whether he has progressed from his CLL point of view and requires treatment.  · CT scan performed 7/20/2020 did show the increased splenomegaly, but interval decrease in size of the axillary nodes, and shotty mediastinal nodes.  No change in the shotty nodes within the neck and supraclavicular regions.  No new lymphadenopathy.  Bone marrow biopsy however showed preliminarily that there is bone marrow involvement.  Remainder of bone marrow biopsy report is pending.  · Bone marrow shows hypercellular marrow with 100% involvement of chronic lymphocytic leukemia/small lymphocytic lymphoma and diffuse pattern with at least 98% of the total marrow cellularity.  Rare foci of erythropoiesis and rare megakaryocytes are noted.   · Negative for BCL-2 and BCL 6.  Chromosomes shows normal karyotype.  I GVH mutation present.  Positive for gain of 1 q., monosomy 13 and loss of IGH.  Negative for deletion T p53, negative for gain of chromosomes 9 and 11, negative for 11, 14 fusion.  · The combination of monosomy 13 and gain of 1 q. is thought to be associated with plasma cell myeloma.  However this patient does not have myeloma.  · Scans were reviewed with the patient today.  Dr. Moya also discussed with the patient and recommended he initiate treatment with chlorambucil and Gazyva.  He would not be a good candidate for Imbruvica due to his history of A. fib\".  He cannot take anticoagulation at this time.  The patient was provided with chemotherapy education today, including  Handouts.  He will need a port placed so that we can initiate treatment  · 8/13/2020: Gazyva day 1. Plan also [id number] mg p.o. on day 1 day 15.  We can increase the dose once we know he tolerates and his platelets improved.  · Patient developing pancytopenia when reviewed cycle 1 day 15.  Chlorambucil dose modified to 10 mg for day 15 however " it is felt that he cannot tolerate this ongoing.   ·  Plan to switch to Venetoclax along with continuing Gazyva.    · Patient due today for cycle 2-day 1 Gazyva.  He will proceed today as scheduled.  Venetoclax will be shipped to his home with plans to begin this next week on 9/14/2020.    · Patient did receive 1 L normal saline and Neulasta on 9/28/2020 secondary to neutropenia with an ANC of 0.04.  · Patient seen on 10/02/2020 continuing on venetoclax, currently on 100 mg dosing.  He would not increase his dose as he will start on voriconazole after being on venetoclax x1 week which affects the metabolism of venetoclax.  He is currently on day 5 of the 100 mg dosing.  Patient states overall he feels the same except for increased fatigue and nausea.  His nauseousness is controlled with ondansetron however.  Patient will be given 1 L normal saline in the office today as planned.  · Patient returns on 10/12/2020 continuing on venetoclax 100 mg daily as well as voriconazole.  · Patient is doing well with these treatments except fatigue.  Next cycle 4 due as of number ninth prior to which will obtain CT scans  · November 9, 2020: White count down to 1.89 but patient started Pepcid.  We will hold off Pepcid.  · 11/17/2020 platelet count dropped to 35,000 patient was instructed to hold venetoclax.  · Returns 11/23/2020 WBC up to 6.38, ANC 5.19, hemoglobin 12.4, platelets up to 121.  I have advised him to resume venetoclax 100 mg daily  · December 7, 2020: Platelets 95K.  White count 3.0 with absolute neutrophil count of 2.01.  Cycle 5 Gazyva given.  Continue venetoclax with voriconazole.   · January 4, 2021: Platelets 84,000, white count 3,440, absolute neutrophil count 1,980. Cycle 6 Gazyva given.  · 2/23/2021: WBC 3.0, platelets 90,000, hemoglobin 14.6, ANC 1.65.  Counts overall stable.  Continue Venetoclax 100 mg daily.  · January 4, 2021: Last dose of Gazyva.  · March 16, 2021: Patient is on venetoclax along with  voriconazole and tolerating well with plans to complete total of 1 year.  · 4/27/2021: Patient continues on venetoclax 100 mg daily with voriconazole and acyclovir for prophylaxis.  He is tolerating this well.  He is scheduled for scans in 5 weeks.  · June 8, 2021: Patient is to continue venetoclax 100 mg daily with voriconazole and acyclovir prophylaxis.  He is tolerating it very well.  The plan is to continue 8 more weeks and then he will complete the protocol and will be followed with observation.  · I have reviewed the CT scan done on June 2, 2021 and there is no evidence of any lymphadenopathy and the spleen size is decreased in size from 12.5-11.3.  He has mild thrombocytopenia and leukopenia but his hemoglobin is normal.  · September 7, 2021: Patient completed 1 year worth of venetoclax with voriconazole and 6 months of Gazyva.  He has had an excellent response for his CLL.  His spleen had actually decreased in size.  Currently asymptomatic.  We will discontinue venetoclax since he completed 1 year       2.  Worsening thrombocytopenia, looking back his platelets were always in the 150s and during this admission he dropped down to 27K. His LDH also was elevated and he was anemic.  · It was dropping on chlorambucil.  Plans to change therapy as detailed above.  Patient did require transfusion and platelets are currently improved to 55,000.  · 11/17/2020 platelets dropped 35,000.  Venetoclax was temporarily held.  · 11/23/2020 platelets back up to 121 venetoclax resumed  · Aspirin 81 mg daily held when platelets are less than 60,000  · 1/4/2021 platelet count 84,000  · Improved to 100 K.  · 2/23/2021: Platelets slightly lower at 90,000 though overall stable over the last several weeks.  Continue to monitor closely.  · March 16, 2021: Platelets are 112 K.  With hemoglobin of 14.1 and a white count of 3.97  · 4/27/2021 platelets today are 102,000.  No Signs or symptoms of bleeding.  · June 8, 2021: Platelet count  of 116K.  No signs of bleeding  · Platelets today 120 6K    3.  History of angiodysplasia requiring cauterization and Hess's esophagus mild anemia. He is currently asymptomatic.  · No evidence of active bleeding.  Stable  · No active bleeding.  · Will be to follow-up with GI    4.  History of cluster headaches for which she has to be treated with steroids in the past and has followed up with Dr. Len Walker.today with significant headaches.  · Patient had CT of the head 8/20/2020 which was negative.  · He was started on prednisone 10 mg daily which was not helpful.  · Patient saw Dr. Bobby, neurology who gave him 2 shots of a new medication Emgality.  This is something that can be given once a month.    · Headaches improved.  Resolved  · Stable.  Resolved    5.  Atrial fibrillation, off anticoagulation given his thrombocytopenia.  · Patient followed by Dr. Robert Rodriguez  · Given pancytopenia, GI bleed secondary to angiodysplasia, intermittent significant thrombocytopenia anticoagulation had been held.  · Patient continues on aspirin 81 mg daily if platelet count greater than 60,000.  · Patient sees Dr. Christy at [facility].  Has follow-up appointment with him.  Need to see if Dr. Christy thinks he needs to go back on warfarin or not.  · Seen by cardiology Dr. CAZARES who follows his INR    6.  Elevated alkaline phosphatase  · Alkaline phosphatase stable today at 157.  · Patient has a history of a 1.7 cm gallstone nonobstructing  · Patient's wife complains that he is complaining of abdominal pain and also will need to refer to surgery to see if it is secondary to gallstone and whether he needs surgery on gallbladder    7.  Abdominal pain with CT scan showing gallstones 1.7 cm in size, will refer to surgery    8. S/p cholecystitis      PLAN:  · Patient is s/p Gazyva/voriconazole/venetoclax and currently on observation  · Currently without evidence of disease  · Patient is s/p 3 doses of the Pfizer vaccine  including the booster injection  · Patient also needs to have shingles injection and he is going to possibly get that  · S/p cholecystectomy. Reviewed pathology results and gallbladder is normal  · Mild anemia likely secondary to recent cholecystitis.  · Check iron levels and ferritin is normal at 60  · Port flush in 6 weeks and see me in 3 months port flush    Monitoring high risk medication  Susannah Moya MD  10/14/21      Cc: Dr. Kevin Gilmore

## 2021-10-18 LAB — REF LAB TEST METHOD: NORMAL

## 2021-10-19 ENCOUNTER — TELEPHONE (OUTPATIENT)
Dept: FAMILY MEDICINE CLINIC | Facility: CLINIC | Age: 71
End: 2021-10-19

## 2021-10-19 NOTE — TELEPHONE ENCOUNTER
PATIENT IS OUT OF HIS JANUVIA SAMPLES. HE STATES THAT ITS NOT COVERED BY HIS INSURANCE AND HE IS ALMOST OUT. PATIENT IS REQUESTING ADDITIONAL SAMPLES OR AN ALTERNATE MEDICATION BE CALLED IN. HE IS ALMOST OUT.    Caller: Macho Stephenson    Relationship: Self    Best call back number: [phone]    What medication are you requesting: ALTERNATE TO JANUVIA     If a prescription is needed, what is your preferred pharmacy and phone number: [id number] [place] [id number] [facility] [address] [facility] [address] [place] [address] & [place] [id number] - [phone] [facility] [phone] FX     Additional notes:

## 2021-10-26 NOTE — TELEPHONE ENCOUNTER
PT'S WIFE CALLING TO LET HEBERT OR DR. HARVEY KNOW WHAT HIS INSURANCE WILL COVER INSTEAD OF JANUVIA OR JARDIANCE; ANYTHING IN THE GLIPIZIDE OR METFORMIN FAMILY

## 2021-11-05 ENCOUNTER — TELEPHONE (OUTPATIENT)
Dept: FAMILY MEDICINE CLINIC | Facility: CLINIC | Age: 71
End: 2021-11-05

## 2021-11-05 NOTE — TELEPHONE ENCOUNTER
I have never seen patient, what is his blood sugar running at home? How long will he be in [place]? Do we have any januvia samples?

## 2021-11-05 NOTE — TELEPHONE ENCOUNTER
PATIENT'S WIFE IS CALLING TO GET AN UPDATE ON THE DIABETIC MEDICATION FOR THE PATIENT. SHE SAID THE SAMPLES WONT LAST HIM WHILE HES OUT OF TOWN, THEY ARE LEAVING FOR [facility].  PATIENT'S WIFE SAID GLIPIZIDE AND METFORMIN WILL COST $8.00. THE LAST THING SHE HEARD WAS THE MA WAITING TO HEAR BACK FROM DR HAREVY.       CALL: [phone]

## 2021-11-24 ENCOUNTER — APPOINTMENT (OUTPATIENT)
Dept: ONCOLOGY | Facility: HOSPITAL | Age: 71
End: 2021-11-24

## 2021-11-29 NOTE — TELEPHONE ENCOUNTER
Caller: Macho Stephenson    Relationship: Self    Best call back number:     Requested Prescriptions:   Requested Prescriptions      No prescriptions requested or ordered in this encounter        Pharmacy where request should be sent: [facility]  [address] [place] [address]  [phone]     Additional details provided by patient:     Does the patient have less than a 3 day supply:  [x] Yes  [] No    Jayant Ramos Rep   11/29/21 15:41 EST

## 2021-12-15 ENCOUNTER — OFFICE VISIT (OUTPATIENT)
Dept: FAMILY MEDICINE CLINIC | Facility: CLINIC | Age: 71
End: 2021-12-15

## 2021-12-15 VITALS
HEART RATE: 59 BPM | OXYGEN SATURATION: 99 % | SYSTOLIC BLOOD PRESSURE: 110 MMHG | BODY MASS INDEX: 23.04 KG/M2 | WEIGHT: 164.6 LBS | HEIGHT: 71 IN | DIASTOLIC BLOOD PRESSURE: 60 MMHG | TEMPERATURE: 97.5 F

## 2021-12-15 DIAGNOSIS — I25.10 ARTERIOSCLEROSIS OF CORONARY ARTERY: ICD-10-CM

## 2021-12-15 DIAGNOSIS — Z95.5 STENTED CORONARY ARTERY: ICD-10-CM

## 2021-12-15 DIAGNOSIS — E11.9 TYPE 2 DIABETES MELLITUS WITHOUT COMPLICATION, WITHOUT LONG-TERM CURRENT USE OF INSULIN (HCC): Primary | ICD-10-CM

## 2021-12-15 DIAGNOSIS — D69.6 THROMBOCYTOPENIA (HCC): ICD-10-CM

## 2021-12-15 DIAGNOSIS — I48.20 CHRONIC ATRIAL FIBRILLATION (HCC): ICD-10-CM

## 2021-12-15 DIAGNOSIS — E78.5 HYPERLIPIDEMIA, UNSPECIFIED HYPERLIPIDEMIA TYPE: ICD-10-CM

## 2021-12-15 PROCEDURE — 99213 OFFICE O/P EST LOW 20 MIN: CPT | Performed by: FAMILY MEDICINE

## 2021-12-15 NOTE — PROGRESS NOTES
"SUBJECTIVE:  The patient is a 71-year-old male.  He has multiple medical problems including diabetes.  He is doing much better.  His leukemia is in remission and he is having good results controlling his blood sugar with Metformin.    PAST MEDICAL HISTORY:  Reviewed.    REVIEW OF SYSTEMS:  Please see above.  All others reviewed and are negative.      OBJECTIVE:   /60 (BP Location: Left arm, Patient Position: Sitting, Cuff Size: Adult)   Pulse 59   Temp 97.5 °F (36.4 °C) (Infrared)   Ht 180.3 cm (70.98\")   Wt 74.7 kg (164 lb 9.6 oz)   SpO2 99%   BMI 22.97 kg/m²    Vitals signs are reviewed and are stable.    General:  Well-nourished.  Alert and oriented x3 in no acute distress.  HEENT: PERRLA.   Neck:  Supple.   Lungs:  Clear.    Heart:  Regular rate and rhythm.   Abdomen:   Soft, nontender.   Extremities:  No cyanosis, clubbing or edema.   Neurological:  Grossly intact without motor or sensory deficits.     ASSESSMENT:      Diagnoses and all orders for this visit:    1. Type 2 diabetes mellitus without complication, without long-term current use of insulin (HCC) (Primary)  -     Hemoglobin A1c    2. Hyperlipidemia, unspecified hyperlipidemia type  -     CBC & Differential  -     Comprehensive Metabolic Panel  -     Lipid Panel  -     Hemoglobin A1c    3. Chronic atrial fibrillation (HCC)  -     CBC & Differential  -     Comprehensive Metabolic Panel  -     Lipid Panel  -     Hemoglobin A1c    4. Arteriosclerosis of coronary artery  -     CBC & Differential  -     Comprehensive Metabolic Panel  -     Lipid Panel  -     Hemoglobin A1c    5. Stented coronary artery  -     CBC & Differential  -     Comprehensive Metabolic Panel  -     Lipid Panel  -     Hemoglobin A1c    6. Thrombocytopenia (HCC)  -     CBC & Differential  -     Comprehensive Metabolic Panel  -     Lipid Panel  -     Hemoglobin A1c    Other orders  -     metFORMIN (GLUCOPHAGE) 500 MG tablet; Take 1 tablet by mouth Daily With Breakfast.  " Dispense: 90 tablet; Refill: 3         PLAN: See above orders.  Healthy lifestyle discussed.  He will follow-up on labs.  He will call if problems.    Dictated utilizing Dragon dictation.

## 2022-01-03 ENCOUNTER — DOCUMENTATION (OUTPATIENT)
Dept: PHARMACY | Facility: HOSPITAL | Age: 72
End: 2022-01-03

## 2022-01-03 NOTE — PROGRESS NOTES
I received notice from the [facility] that the patient's CLL fund enrollment has been terminated. I have scanned the notice into the patient's chart.     Ban Barger - Care Coordinator   1/3/2022  13:29 EST

## 2022-01-11 ENCOUNTER — OFFICE VISIT (OUTPATIENT)
Dept: ONCOLOGY | Facility: CLINIC | Age: 72
End: 2022-01-11

## 2022-01-11 ENCOUNTER — INFUSION (OUTPATIENT)
Dept: ONCOLOGY | Facility: HOSPITAL | Age: 72
End: 2022-01-11

## 2022-01-11 VITALS
HEART RATE: 60 BPM | WEIGHT: 163.4 LBS | HEIGHT: 71 IN | SYSTOLIC BLOOD PRESSURE: 119 MMHG | TEMPERATURE: 97.5 F | RESPIRATION RATE: 18 BRPM | DIASTOLIC BLOOD PRESSURE: 71 MMHG | OXYGEN SATURATION: 98 % | BODY MASS INDEX: 22.88 KG/M2

## 2022-01-11 DIAGNOSIS — C91.10 CLL (CHRONIC LYMPHOCYTIC LEUKEMIA): Primary | ICD-10-CM

## 2022-01-11 DIAGNOSIS — E61.1 IRON DEFICIENCY: ICD-10-CM

## 2022-01-11 DIAGNOSIS — D64.9 ANEMIA, UNSPECIFIED TYPE: ICD-10-CM

## 2022-01-11 LAB
ALBUMIN SERPL-MCNC: 4.1 G/DL (ref 3.5–5.2)
ALBUMIN/GLOB SERPL: 2.4 G/DL (ref 1.1–2.4)
ALP SERPL-CCNC: 146 U/L (ref 38–116)
ALT SERPL W P-5'-P-CCNC: 21 U/L (ref 0–41)
ANION GAP SERPL CALCULATED.3IONS-SCNC: 10.6 MMOL/L (ref 5–15)
AST SERPL-CCNC: 20 U/L (ref 0–40)
BASOPHILS # BLD AUTO: 0.03 10*3/MM3 (ref 0–0.2)
BASOPHILS NFR BLD AUTO: 0.5 % (ref 0–1.5)
BILIRUB SERPL-MCNC: 0.2 MG/DL (ref 0.2–1.2)
BUN SERPL-MCNC: 16 MG/DL (ref 6–20)
BUN/CREAT SERPL: 17.6 (ref 7.3–30)
CALCIUM SPEC-SCNC: 9 MG/DL (ref 8.5–10.2)
CHLORIDE SERPL-SCNC: 106 MMOL/L (ref 98–107)
CO2 SERPL-SCNC: 25.4 MMOL/L (ref 22–29)
CREAT SERPL-MCNC: 0.91 MG/DL (ref 0.7–1.3)
DEPRECATED RDW RBC AUTO: 51.6 FL (ref 37–54)
EOSINOPHIL # BLD AUTO: 0.14 10*3/MM3 (ref 0–0.4)
EOSINOPHIL NFR BLD AUTO: 2.5 % (ref 0.3–6.2)
ERYTHROCYTE [DISTWIDTH] IN BLOOD BY AUTOMATED COUNT: 15.2 % (ref 12.3–15.4)
GFR SERPL CREATININE-BSD FRML MDRD: 82 ML/MIN/1.73
GLOBULIN UR ELPH-MCNC: 1.7 GM/DL (ref 1.8–3.5)
GLUCOSE SERPL-MCNC: 128 MG/DL (ref 74–124)
HCT VFR BLD AUTO: 27.4 % (ref 37.5–51)
HGB BLD-MCNC: 8.5 G/DL (ref 13–17.7)
HGB RETIC QN AUTO: 22.6 PG (ref 29.8–36.1)
IMM GRANULOCYTES # BLD AUTO: 0.03 10*3/MM3 (ref 0–0.05)
IMM GRANULOCYTES NFR BLD AUTO: 0.5 % (ref 0–0.5)
IMM RETICS NFR: 25.2 % (ref 3–15.8)
LYMPHOCYTES # BLD AUTO: 0.91 10*3/MM3 (ref 0.7–3.1)
LYMPHOCYTES NFR BLD AUTO: 16.4 % (ref 19.6–45.3)
MCH RBC QN AUTO: 28.7 PG (ref 26.6–33)
MCHC RBC AUTO-ENTMCNC: 31 G/DL (ref 31.5–35.7)
MCV RBC AUTO: 92.6 FL (ref 79–97)
MONOCYTES # BLD AUTO: 0.52 10*3/MM3 (ref 0.1–0.9)
MONOCYTES NFR BLD AUTO: 9.4 % (ref 5–12)
NEUTROPHILS NFR BLD AUTO: 3.91 10*3/MM3 (ref 1.7–7)
NEUTROPHILS NFR BLD AUTO: 70.7 % (ref 42.7–76)
NRBC BLD AUTO-RTO: 0 /100 WBC (ref 0–0.2)
PLATELET # BLD AUTO: 147 10*3/MM3 (ref 140–450)
PMV BLD AUTO: 9.6 FL (ref 6–12)
POTASSIUM SERPL-SCNC: 4 MMOL/L (ref 3.5–4.7)
PROT SERPL-MCNC: 5.8 G/DL (ref 6.3–8)
RBC # BLD AUTO: 2.96 10*6/MM3 (ref 4.14–5.8)
RETICS # AUTO: 0.07 10*6/MM3 (ref 0.02–0.13)
RETICS/RBC NFR AUTO: 2.47 % (ref 0.7–1.9)
SODIUM SERPL-SCNC: 142 MMOL/L (ref 134–145)
WBC NRBC COR # BLD: 5.54 10*3/MM3 (ref 3.4–10.8)

## 2022-01-11 PROCEDURE — 36591 DRAW BLOOD OFF VENOUS DEVICE: CPT

## 2022-01-11 PROCEDURE — 85046 RETICYTE/HGB CONCENTRATE: CPT | Performed by: INTERNAL MEDICINE

## 2022-01-11 PROCEDURE — 25010000002 HEPARIN LOCK FLUSH PER 10 UNITS: Performed by: INTERNAL MEDICINE

## 2022-01-11 PROCEDURE — 80053 COMPREHEN METABOLIC PANEL: CPT

## 2022-01-11 PROCEDURE — 99214 OFFICE O/P EST MOD 30 MIN: CPT | Performed by: INTERNAL MEDICINE

## 2022-01-11 PROCEDURE — 85025 COMPLETE CBC W/AUTO DIFF WBC: CPT

## 2022-01-11 PROCEDURE — 36415 COLL VENOUS BLD VENIPUNCTURE: CPT | Performed by: INTERNAL MEDICINE

## 2022-01-11 RX ORDER — HEPARIN SODIUM (PORCINE) LOCK FLUSH IV SOLN 100 UNIT/ML 100 UNIT/ML
500 SOLUTION INTRAVENOUS AS NEEDED
Status: CANCELLED | OUTPATIENT
Start: 2022-01-11

## 2022-01-11 RX ORDER — SODIUM CHLORIDE 0.9 % (FLUSH) 0.9 %
10 SYRINGE (ML) INJECTION AS NEEDED
Status: DISCONTINUED | OUTPATIENT
Start: 2022-01-11 | End: 2022-01-11 | Stop reason: HOSPADM

## 2022-01-11 RX ORDER — HEPARIN SODIUM (PORCINE) LOCK FLUSH IV SOLN 100 UNIT/ML 100 UNIT/ML
500 SOLUTION INTRAVENOUS AS NEEDED
Status: DISCONTINUED | OUTPATIENT
Start: 2022-01-11 | End: 2022-01-11 | Stop reason: HOSPADM

## 2022-01-11 RX ORDER — SODIUM CHLORIDE 0.9 % (FLUSH) 0.9 %
10 SYRINGE (ML) INJECTION AS NEEDED
Status: CANCELLED | OUTPATIENT
Start: 2022-01-11

## 2022-01-11 RX ADMIN — Medication 500 UNITS: at 15:25

## 2022-01-11 RX ADMIN — Medication 10 ML: at 15:25

## 2022-01-11 NOTE — PROGRESS NOTES
Subjective     REASON FOR VISIT:    1. Chronic lymphoid leukemia, stage 4, presented initially with significant pancytopenia  · Currently on Gazyva with venetoclax  · Cycle 1 Gazyva given on August 13, 2020    2.  History of angiodysplasia requiring cauterization and history of Hess's esophagus    3.  Bone marrow aspiration biopsy shows hypercellular marrow with 98% involvement with CLL        History of Present Illness The patient is a 71 y.o. male with the above-mentioned history with history of chronic lymphocytic leukemia who presented with pancytopenia and was treated with Gazyva along with venetoclax/voriconazole.  He has completed 1 year as of August 2021.     Patient recently had to be operated on by Dr. Lui for cholecystitis. Since his surgery he is feeling much better. He still continues to have the port. There is no erythema around it and is functioning well.    His hemoglobin is 11.7 with a white count of 5.49 and a platelet of 120 6K. He likely dropped his hemoglobin because of his recent cholecystitis. He feels good. There is no fever or night sweats    Interval history: Patient's hemoglobin is dropped to 8.5. He feels good though. He denies any active GI bleeding. We discussed about consideration of trying to check if he is underlying hemohemolysis given that he has history of CLL and we will check his Brennan test. We will also check complete anemia work-up. He says he has gained weight and he feels good.    PAST MEDICAL HISTORY:   1. Recurrent atrial fibrillation followed by cardiology. He has had drug eluting stent placed x 3.   2. High cholesterol.   3. Hypertension.       HEMATOLOGIC/ONCOLOGIC HISTORY:       The patient is 62-year-old gentleman with the above history currently here for followup. He has no complaints. He denies fever, night sweats or weight loss. He does not have any lymphadenopathy. He feels good. He had some fatigue but his CBC shows a go od hemoglobin.   The patient is  followed by Dr. Kevin Chandra. He had seen Dr. Chandra 7/18/13 for a history of coronary artery disease status drug eluting stent placement to the right coronary artery and left anterior descending artery in February 2011. Histor y of paroxysmal atrial fibrillation and hyperlipidemia. He presented to [facility] on [facility]23/13 with palpitations and was found to have atrial fibrillation with rapid ventricular response. He was given IV Cardizem and converted spontaneously to normal sinus rhythm. He was found to have elevated white count at 18.9 and denied any symptoms of infection or urinary complaints. TSH was normal, troponin levels were negative. He was asked to continue aspirin and metoprolol for that. If he contin u ed to have atrial fibrillation, they will consider antiarrhythmic therapy with or without a short term anticoagulation therapy. However, currently the patient is feeling reasonably good. He has no complaints. His CBC 7/22/13 showed WBC 17,000, hemoglob i n 16.4 and platelet count of 174,000. Back 9/28/13 his hemoglobin was 15.1, WBC 13.3 and platelets 197,000. He has had a persistently elevated WBC but he is totally asymptomatic. He does not have any fever, night sweats or lymphadenopathy. He is here to be evaluated for his elevated white count.       Peripheral blood flow cytometry done 08/16/13 shows 22% chronic lymphoid leukemia cells, and they expressed ZAP-70 but not CD38. The total number of cells approximated 60% T cells, 32% B cells and 1% natural k iller cells. The B cells were monoclonal and expressed CD19, CD20, CD22, CD5, CD23, CD11c, ZAP-70 and T cell antigens. There was a normal CD4/CD8 ratio. CT of the chest, abdomen and pelvis does not show evidence of metastasis. Peripheral blood for DILLON -2 was negative. It was negative for T11:14 translocation.       CT scan done on 08/21/2013 shows 5 to 6 mm noncalcified nodule in the left lower lobe which is unchanged from December 2010.  Otherwise no evidence of any lymphadenopathy or hepatosplenomegaly.       MRI of the spine shows arthritis and disc bulge and likely hemangiomas, with 1 lesion showing increased signal intensity at T2, which is likely a hemangioma. Bone scan could be done, but patient does not even have much pain there. CT scan of the chest, a bdomen and pelvis was done on 11/23/2014. He has tiny lymph nodes in the neck which are difficult to palpate. Right jugular one is 1.4 x 0.9 and left supraclavicular one is 1.5 x 1.1. He also has a subcarinal lymph node which is 1.7 x 1.2 cm, and he ha s a portocaval lymph node which has increased from 2.5 to 2.8. Patient is totally asymptomatic. He does not have any B symptoms, so will continue followup with observation.     Patient is a 69-year-old gentleman with history of chronic lymphocytic leukemia/SLL who recently got admitted to [facility] because of GI bleed.  He was seen by Dr. Montero.  He underwent EGD colonoscopy.  He was found to have angiodysplasia for which he underwent argon coagulation.  He required 3 units of blood.  Patient had a normal esophagus normal stomach and normal duodenum CLOtest was negative he was asked to take Protonix 40 mg daily.  Colonoscopy showed blood in the entire examined colon but there was a single bleeding colonic angiectasia which was treated with argon plasma coagulation.    He was admitted twice.  Initially he was admitted on July 10 at which time he stayed 3 days and he was evaluated for chest pain.  He underwent a cardiac work-up with stress test and echo.  The echo was normal but the stress test showed medium sized moderate severe severity fixed perfusion defect.  Of the inferior wall consistent with infarction.  However according to patient cardiology did not think he had any cardiac problems.  I have left a message for patient's cardiologist to call me today.  Patient subsequently got readmitted with GI bleed and required 1/3 unit of  transfusion.  He was found to have thrombocytopenia and hematology was consulted.    His hemoglobin had dropped down to as low as 7 and his platelets had gone down to 87.  Today it is 35 and his hemoglobin is 9.4 today.  He denies active bleeding.  He has lost weight recently.  He does not have any fever or night sweats.    He had ultrasound of spleen which showed enlarged spleen centimeters in length    Patient was started on Gazyva with Leukeran but subsequently switched to venetoclax with Gazyva.  His venetoclax dose is 100 mg daily and he receives Gazyva once a month.    CT scan done 2020 shows significant response     MEDICATIONS: The current medication list was reviewed with the patient and updated in the EMR this date per the medical assistant. Medication dosages and frequencies were confirmed to be accurate.       ALLERGIES: PENICILLIN which causes him to swell up. He is allergic to IV CONTRAST DYE.     SOCIAL HISTORY: He is . He smokes one pack per day for 35 years. He consumes three to four alcoholic drinks per week. He has no tattoos and no previous transfusions.       FAMILY HISTORY: Father  at 82 with MI. Mother  at 82 with bone cancer. He has one brother age 65 in good health and two sisters 69 and 57 in good health.   Past Medical History, Social History, and Family History are unchanged from my prior documentation on     Review of Systems   Constitutional: Negative for appetite change, chills, diaphoresis, fatigue, fever and unexpected weight change.   HENT: Negative for hearing loss, sore throat and trouble swallowing.    Respiratory: Negative for cough, chest tightness, shortness of breath and wheezing.    Cardiovascular: Negative for chest pain, palpitations and leg swelling.   Gastrointestinal: Negative for abdominal distention, abdominal pain, constipation, diarrhea, nausea and vomiting.   Genitourinary: Negative for dysuria, frequency, hematuria and  urgency.   Musculoskeletal: Negative for joint swelling.        No muscle weakness.   Skin: Negative for rash and wound.   Neurological: Negative for seizures, syncope, speech difficulty, weakness, numbness and headaches.   Hematological: Negative for adenopathy. Does not bruise/bleed easily.   Psychiatric/Behavioral: Negative for behavioral problems, confusion and suicidal ideas.   All other systems reviewed and are negative.  I have reviewed and confirmed the accuracy of the ROS as documented by the MA/LPN/RN Susannah Moya MD    Patient Active Problem List   Diagnosis   • CLL (chronic lymphocytic leukemia) (HCC)   • Anemia   • Encounter for hepatitis C screening test for low risk patient    • Thrombocytopenia (HCC)   • H/O heart artery stent   • Stented coronary artery   • Arteriosclerosis of coronary artery   • Atrial fibrillation (HCC)   • Paroxysmal atrial fibrillation (HCC)   • Chest pain, rule out acute myocardial infarction   • Fall   • Fracture, olecranon   • Hyperlipidemia   • Long term (current) use of anticoagulants   • Lower GI bleed   • Olecranon bursitis   • Shoulder pain   • Smoker   • CLL (chronic lymphocytic leukemia) (HCC)   • Dehydration   • Drug-induced nausea and vomiting   • Encounter for long-term (current) use of other medications   • Neutropenia (HCC)   • Calculus of gallbladder without cholecystitis without obstruction   • Cholecystitis       MEDICATIONS:  Medication Reconciliation for the patient has been reviewed by me and documented in the patients chart.    ALLERGIES:    Allergies   Allergen Reactions   • Penicillins Swelling         Vitals:    01/11/22 1608   BP: 119/71   Pulse: 60   Resp: 18   Temp: 97.5 °F (36.4 °C)   SpO2: 98%        Current Status 1/11/2022   ECOG score 0      Physical exam    This patient's ACP documentation is up to date, and there's nothing further left to document.      CONSTITUTIONAL:  Vital signs reviewed.  No distress, looks comfortable.  EYES:   Conjunctivae and lids unremarkable.  PERRLA  EARS,NOSE,MOUTH,THROAT:  Ears and nose appear unremarkable.  Lips, teeth, gums appear unremarkable.  RESPIRATORY:  Normal respiratory effort.  Lungs clear to auscultation bilaterally.  CARDIOVASCULAR:  Normal S1, S2.  No murmurs rubs or gallops.  No significant lower extremity edema.  GASTROINTESTINAL: Abdomen appears unremarkable.  Nontender.  No hepatomegaly.  No splenomegaly.  LYMPHATIC:  No cervical, supraclavicular, axillary lymphadenopathy.  SKIN:  Warm.  No rashes.  PSYCHIATRIC:  Normal judgment and insight.  Normal mood and affect.      RECENT LABS:  Results from last 7 days   Lab Units 01/11/22  1459   WBC 10*3/mm3 5.54   NEUTROS ABS 10*3/mm3 3.91   HEMOGLOBIN g/dL 8.5*   HEMATOCRIT % 27.4*   PLATELETS 10*3/mm3 147     Results from last 7 days   Lab Units 01/11/22  1459   SODIUM mmol/L 142   POTASSIUM mmol/L 4.0   CHLORIDE mmol/L 106   CO2 mmol/L 25.4   BUN mg/dL 16   CREATININE mg/dL 0.91   CALCIUM mg/dL 9.0   ALBUMIN g/dL 4.10   BILIRUBIN mg/dL 0.2   ALK PHOS U/L 146*   ALT (SGPT) U/L 21   AST (SGOT) U/L 20   GLUCOSE mg/dL 128*         Assessment/Plan   1. Stage 2 CLL without evidence of any disease progression.  I have reviewed the CT scan from 3/19/2018 there is minimal progression but clinically patient is asymptomatic and we will follow with observation.  Will monitor with labs.   No evidence of any frequent infections, no major worsening of his white count. He has no fever or night sweats or any other symptoms.   · Patient knows that he is prone for infections and he is mainly staying at home during the COVID-19 situation time  · Recent admission to [facility] July 10 2020×2.  Initially admitted with chest pain and underwent stress test and echo.  Echo was negative.  Stress test is abnormal but have left message for patient's cardiologist to call us.  His cardiologist is been sent [facility].  · He then got admitted the second time with worsening GI  "bleed and required total of 3 units of blood.  EGD was negative but colonoscopy showed angiectasia for which he underwent argon ablation.  Currently hemoglobin stable and no active bleeding.  · He was also found to have low platelets with platelets dropping down to 27k and hemoglobin was 7.  Ultrasound showed splenomegaly 15 cm.  Concern is whether he has progressed from his CLL point of view and requires treatment.  · CT scan performed 7/20/2020 did show the increased splenomegaly, but interval decrease in size of the axillary nodes, and shotty mediastinal nodes.  No change in the shotty nodes within the neck and supraclavicular regions.  No new lymphadenopathy.  Bone marrow biopsy however showed preliminarily that there is bone marrow involvement.  Remainder of bone marrow biopsy report is pending.  · Bone marrow shows hypercellular marrow with 100% involvement of chronic lymphocytic leukemia/small lymphocytic lymphoma and diffuse pattern with at least 98% of the total marrow cellularity.  Rare foci of erythropoiesis and rare megakaryocytes are noted.   · Negative for BCL-2 and BCL 6.  Chromosomes shows normal karyotype.  I GVH mutation present.  Positive for gain of 1 q., monosomy 13 and loss of IGH.  Negative for deletion T p53, negative for gain of chromosomes 9 and 11, negative for 11, 14 fusion.  · The combination of monosomy 13 and gain of 1 q. is thought to be associated with plasma cell myeloma.  However this patient does not have myeloma.  · Scans were reviewed with the patient today.  Dr. Moya also discussed with the patient and recommended he initiate treatment with chlorambucil and Gazyva.  He would not be a good candidate for Imbruvica due to his history of A. fib\".  He cannot take anticoagulation at this time.  The patient was provided with chemotherapy education today, including  Handouts.  He will need a port placed so that we can initiate treatment  · 8/13/2020: Gazyva day 1. Plan also " [id number] mg p.o. on day 1 day 15.  We can increase the dose once we know he tolerates and his platelets improved.  · Patient developing pancytopenia when reviewed cycle 1 day 15.  Chlorambucil dose modified to 10 mg for day 15 however it is felt that he cannot tolerate this ongoing.   ·  Plan to switch to Venetoclax along with continuing Gazyva.    · Patient due today for cycle 2-day 1 Gazyva.  He will proceed today as scheduled.  Venetoclax will be shipped to his home with plans to begin this next week on 9/14/2020.    · Patient did receive 1 L normal saline and Neulasta on 9/28/2020 secondary to neutropenia with an ANC of 0.04.  · Patient seen on 10/02/2020 continuing on venetoclax, currently on 100 mg dosing.  He would not increase his dose as he will start on voriconazole after being on venetoclax x1 week which affects the metabolism of venetoclax.  He is currently on day 5 of the 100 mg dosing.  Patient states overall he feels the same except for increased fatigue and nausea.  His nauseousness is controlled with ondansetron however.  Patient will be given 1 L normal saline in the office today as planned.  · Patient returns on 10/12/2020 continuing on venetoclax 100 mg daily as well as voriconazole.  · Patient is doing well with these treatments except fatigue.  Next cycle 4 due as of number ninth prior to which will obtain CT scans  · November 9, 2020: White count down to 1.89 but patient started Pepcid.  We will hold off Pepcid.  · 11/17/2020 platelet count dropped to 35,000 patient was instructed to hold venetoclax.  · Returns 11/23/2020 WBC up to 6.38, ANC 5.19, hemoglobin 12.4, platelets up to 121.  I have advised him to resume venetoclax 100 mg daily  · December 7, 2020: Platelets 95K.  White count 3.0 with absolute neutrophil count of 2.01.  Cycle 5 Gazyva given.  Continue venetoclax with voriconazole.   · January 4, 2021: Platelets 84,000, white count 3,440, absolute neutrophil count 1,980. Cycle 6  Gazyva given.  · 2/23/2021: WBC 3.0, platelets 90,000, hemoglobin 14.6, ANC 1.65.  Counts overall stable.  Continue Venetoclax 100 mg daily.  · January 4, 2021: Last dose of Gazyva.  · March 16, 2021: Patient is on venetoclax along with voriconazole and tolerating well with plans to complete total of 1 year.  · 4/27/2021: Patient continues on venetoclax 100 mg daily with voriconazole and acyclovir for prophylaxis.  He is tolerating this well.  He is scheduled for scans in 5 weeks.  · June 8, 2021: Patient is to continue venetoclax 100 mg daily with voriconazole and acyclovir prophylaxis.  He is tolerating it very well.  The plan is to continue 8 more weeks and then he will complete the protocol and will be followed with observation.  · I have reviewed the CT scan done on June 2, 2021 and there is no evidence of any lymphadenopathy and the spleen size is decreased in size from 12.5-11.3.  He has mild thrombocytopenia and leukopenia but his hemoglobin is normal.  · September 7, 2021: Patient completed 1 year worth of venetoclax with voriconazole and 6 months of Gazyva.  He has had an excellent response for his CLL.  His spleen had actually decreased in size.  Currently asymptomatic.  We will discontinue venetoclax since he completed 1 year  · And every 11/2022: Patient's hemoglobin is dropped to 8.5 which is very unusual,       2.  Worsening thrombocytopenia, looking back his platelets were always in the 150s and during this admission he dropped down to 27K. His LDH also was elevated and he was anemic.  · It was dropping on chlorambucil.  Plans to change therapy as detailed above.  Patient did require transfusion and platelets are currently improved to 55,000.  · 11/17/2020 platelets dropped 35,000.  Venetoclax was temporarily held.  · 11/23/2020 platelets back up to 121 venetoclax resumed  · Aspirin 81 mg daily held when platelets are less than 60,000  · 1/4/2021 platelet count 84,000  · Improved to 100  K.  · 2/23/2021: Platelets slightly lower at 90,000 though overall stable over the last several weeks.  Continue to monitor closely.  · March 16, 2021: Platelets are 112 K.  With hemoglobin of 14.1 and a white count of 3.97  · 4/27/2021 platelets today are 102,000.  No Signs or symptoms of bleeding.  · June 8, 2021: Platelet count of 116K.  No signs of bleeding  · Platelets today 120 6K    3.  History of angiodysplasia requiring cauterization and Hess's esophagus mild anemia. He is currently asymptomatic.  · No evidence of active bleeding.  Stable  · No active bleeding.  · Will be to follow-up with GI    4.  History of cluster headaches for which she has to be treated with steroids in the past and has followed up with Dr. Len Péreztoday with significant headaches.  · Patient had CT of the head 8/20/2020 which was negative.  · He was started on prednisone 10 mg daily which was not helpful.  · Patient saw Dr. Bobby, neurology who gave him 2 shots of a new medication Emgality.  This is something that can be given once a month.    · Headaches improved.  Resolved  · Stable.  Resolved  · Patient has no more headaches    5.  Atrial fibrillation, off anticoagulation given his thrombocytopenia.  · Patient followed by Dr. Robert Rodriguez  · Given pancytopenia, GI bleed secondary to angiodysplasia, intermittent significant thrombocytopenia anticoagulation had been held.  · Patient continues on aspirin 81 mg daily if platelet count greater than 60,000.  · Patient sees Dr. Christy at [facility].  Has follow-up appointment with him.  Need to see if Dr. Christy thinks he needs to go back on warfarin or not.  · Seen by cardiology Dr. CAZARES who follows his INR  · Stable followed by cardiology    6.  Elevated alkaline phosphatase  · Alkaline phosphatase stable today at 157.  · Patient has a history of a 1.7 cm gallstone nonobstructing  · Patient's wife complains that he is complaining of abdominal pain and also will need to  refer to surgery to see if it is secondary to gallstone and whether he needs surgery on gallbladder    7.  Abdominal pain with CT scan showing gallstones 1.7 cm in size, will refer to surgery    8. S/p cholecystitis      PLAN:     · Currently doing well except anemia  · Will do complete anemia work-up including iron studies B12 and RBC folate  · Follow-up in 1 week with nurse practitioner and labs and then once a week labs with nurse review.  · Follow-up with me in 4 weeks with labs      Patient cannot tolerate oral iron and is iron deficient and will need IV Injectafer.  The oral iron causes him abdominal cramping nausea and he cannot tolerate it.    Monitoring high risk medication  Susannah Moya MD  01/11/22      Cc: Dr. Kevin Gilmore

## 2022-01-12 DIAGNOSIS — E61.1 IRON DEFICIENCY: Primary | ICD-10-CM

## 2022-01-12 DIAGNOSIS — C91.10 CLL (CHRONIC LYMPHOCYTIC LEUKEMIA): ICD-10-CM

## 2022-01-12 PROBLEM — T45.4X5A ADVERSE EFFECT OF IRON: Status: ACTIVE | Noted: 2022-01-12

## 2022-01-12 LAB
DAT POLY-SP REAG RBC QL: NEGATIVE
ERYTHROCYTE [SEDIMENTATION RATE] IN BLOOD BY WESTERGREN METHOD: 5 MM/HR (ref 0–30)
FERRITIN SERPL-MCNC: 15 NG/ML (ref 30–400)
HAPTOGLOB SERPL-MCNC: 157 MG/DL (ref 34–355)
IGA SERPL-MCNC: 23 MG/DL (ref 61–437)
IGG SERPL-MCNC: 219 MG/DL (ref 603–1613)
IGM SERPL-MCNC: <5 MG/DL (ref 15–143)
IRON SATN MFR SERPL: 7 % (ref 15–55)
IRON SERPL-MCNC: 26 UG/DL (ref 38–169)
LDH SERPL-CCNC: 156 IU/L (ref 121–224)
TIBC SERPL-MCNC: 377 UG/DL (ref 250–450)
UIBC SERPL-MCNC: 351 UG/DL (ref 111–343)
VIT B12 SERPL-MCNC: 393 PG/ML (ref 232–1245)

## 2022-01-12 NOTE — PROGRESS NOTES
Orders entered for referral to Dr. Bernabe Harvey, gastroenterologist, urgently for colonoscopy. Supportive plan entered for IV Injectafer 750 mg IV x2, CBC on 1/14/22, to be done early, and referral to [facility] for possible transfusion of 2 units PRBCs on that day if hemoglobin low.  V.O. Dr. Moya

## 2022-01-13 DIAGNOSIS — E61.1 IRON DEFICIENCY: Primary | ICD-10-CM

## 2022-01-13 DIAGNOSIS — D64.9 ANEMIA, UNSPECIFIED TYPE: ICD-10-CM

## 2022-01-13 LAB
ALBUMIN SERPL ELPH-MCNC: 3.8 G/DL (ref 2.9–4.4)
ALBUMIN/GLOB SERPL: 1.9 {RATIO} (ref 0.7–1.7)
ALPHA1 GLOB SERPL ELPH-MCNC: 0.2 G/DL (ref 0–0.4)
ALPHA2 GLOB SERPL ELPH-MCNC: 0.8 G/DL (ref 0.4–1)
B-GLOBULIN SERPL ELPH-MCNC: 0.9 G/DL (ref 0.7–1.3)
GAMMA GLOB SERPL ELPH-MCNC: 0.2 G/DL (ref 0.4–1.8)
GLOBULIN SER-MCNC: 2.1 G/DL (ref 2.2–3.9)
IGA SERPL-MCNC: 25 MG/DL (ref 61–437)
IGG SERPL-MCNC: 243 MG/DL (ref 603–1613)
IGM SERPL-MCNC: <5 MG/DL (ref 15–143)
INTERPRETATION SERPL IEP-IMP: ABNORMAL
KAPPA LC FREE SER-MCNC: 8.6 MG/L (ref 3.3–19.4)
KAPPA LC FREE/LAMBDA FREE SER: 1.15 {RATIO} (ref 0.26–1.65)
LABORATORY COMMENT REPORT: ABNORMAL
LAMBDA LC FREE SERPL-MCNC: 7.5 MG/L (ref 5.7–26.3)
M PROTEIN SERPL ELPH-MCNC: ABNORMAL G/DL
PROT SERPL-MCNC: 5.9 G/DL (ref 6–8.5)

## 2022-01-13 RX ORDER — SODIUM CHLORIDE 9 MG/ML
250 INJECTION, SOLUTION INTRAVENOUS AS NEEDED
Status: CANCELLED | OUTPATIENT
Start: 2022-01-13

## 2022-01-13 RX ORDER — DIPHENHYDRAMINE HCL 25 MG
25 CAPSULE ORAL ONCE
Status: CANCELLED | OUTPATIENT
Start: 2022-01-13 | End: 2022-01-13

## 2022-01-13 RX ORDER — ACETAMINOPHEN 325 MG/1
650 TABLET ORAL ONCE
Status: CANCELLED | OUTPATIENT
Start: 2022-01-13 | End: 2022-01-13

## 2022-01-14 ENCOUNTER — APPOINTMENT (OUTPATIENT)
Dept: LAB | Facility: HOSPITAL | Age: 72
End: 2022-01-14

## 2022-01-14 ENCOUNTER — APPOINTMENT (OUTPATIENT)
Dept: ONCOLOGY | Facility: HOSPITAL | Age: 72
End: 2022-01-14

## 2022-01-14 LAB
FOLATE BLD-MCNC: 369 NG/ML
FOLATE RBC-MCNC: 1272 NG/ML
HCT VFR BLD AUTO: 29 % (ref 37.5–51)

## 2022-01-15 ENCOUNTER — INFUSION (OUTPATIENT)
Dept: ONCOLOGY | Facility: HOSPITAL | Age: 72
End: 2022-01-15

## 2022-01-15 VITALS
HEART RATE: 56 BPM | TEMPERATURE: 97.8 F | DIASTOLIC BLOOD PRESSURE: 63 MMHG | RESPIRATION RATE: 18 BRPM | SYSTOLIC BLOOD PRESSURE: 143 MMHG

## 2022-01-15 DIAGNOSIS — D64.9 ANEMIA, UNSPECIFIED TYPE: ICD-10-CM

## 2022-01-15 DIAGNOSIS — E61.1 IRON DEFICIENCY: Primary | ICD-10-CM

## 2022-01-15 DIAGNOSIS — C91.10 CLL (CHRONIC LYMPHOCYTIC LEUKEMIA): ICD-10-CM

## 2022-01-15 PROCEDURE — 86900 BLOOD TYPING SEROLOGIC ABO: CPT

## 2022-01-15 PROCEDURE — 86923 COMPATIBILITY TEST ELECTRIC: CPT

## 2022-01-15 PROCEDURE — 25010000002 HEPARIN LOCK FLUSH PER 10 UNITS: Performed by: INTERNAL MEDICINE

## 2022-01-15 PROCEDURE — 86850 RBC ANTIBODY SCREEN: CPT

## 2022-01-15 PROCEDURE — 86901 BLOOD TYPING SEROLOGIC RH(D): CPT

## 2022-01-15 PROCEDURE — P9016 RBC LEUKOCYTES REDUCED: HCPCS

## 2022-01-15 PROCEDURE — 36430 TRANSFUSION BLD/BLD COMPNT: CPT

## 2022-01-15 PROCEDURE — 63710000001 DIPHENHYDRAMINE PER 50 MG: Performed by: INTERNAL MEDICINE

## 2022-01-15 RX ORDER — SODIUM CHLORIDE 9 MG/ML
250 INJECTION, SOLUTION INTRAVENOUS AS NEEDED
Status: DISCONTINUED | OUTPATIENT
Start: 2022-01-15 | End: 2022-01-15 | Stop reason: HOSPADM

## 2022-01-15 RX ORDER — ACETAMINOPHEN 325 MG/1
650 TABLET ORAL ONCE
Status: COMPLETED | OUTPATIENT
Start: 2022-01-15 | End: 2022-01-15

## 2022-01-15 RX ORDER — HEPARIN SODIUM (PORCINE) LOCK FLUSH IV SOLN 100 UNIT/ML 100 UNIT/ML
500 SOLUTION INTRAVENOUS AS NEEDED
Status: CANCELLED | OUTPATIENT
Start: 2022-01-15

## 2022-01-15 RX ORDER — SODIUM CHLORIDE 0.9 % (FLUSH) 0.9 %
10 SYRINGE (ML) INJECTION AS NEEDED
Status: CANCELLED | OUTPATIENT
Start: 2022-01-15

## 2022-01-15 RX ORDER — DIPHENHYDRAMINE HCL 25 MG
25 CAPSULE ORAL ONCE
Status: COMPLETED | OUTPATIENT
Start: 2022-01-15 | End: 2022-01-15

## 2022-01-15 RX ORDER — HEPARIN SODIUM (PORCINE) LOCK FLUSH IV SOLN 100 UNIT/ML 100 UNIT/ML
500 SOLUTION INTRAVENOUS AS NEEDED
Status: DISCONTINUED | OUTPATIENT
Start: 2022-01-15 | End: 2022-01-15 | Stop reason: HOSPADM

## 2022-01-15 RX ORDER — SODIUM CHLORIDE 0.9 % (FLUSH) 0.9 %
10 SYRINGE (ML) INJECTION AS NEEDED
Status: DISCONTINUED | OUTPATIENT
Start: 2022-01-15 | End: 2022-01-15 | Stop reason: HOSPADM

## 2022-01-15 RX ADMIN — Medication 500 UNITS: at 11:00

## 2022-01-15 RX ADMIN — SODIUM CHLORIDE, PRESERVATIVE FREE 10 ML: 5 INJECTION INTRAVENOUS at 11:00

## 2022-01-15 RX ADMIN — ACETAMINOPHEN 650 MG: 325 TABLET, FILM COATED ORAL at 08:42

## 2022-01-15 RX ADMIN — DIPHENHYDRAMINE HYDROCHLORIDE 25 MG: 25 CAPSULE ORAL at 08:43

## 2022-01-16 LAB
BH BB BLOOD EXPIRATION DATE: NORMAL
BH BB BLOOD TYPE BARCODE: 5100
BH BB DISPENSE STATUS: NORMAL
BH BB PRODUCT CODE: NORMAL
BH BB UNIT NUMBER: NORMAL
CROSSMATCH INTERPRETATION: NORMAL
UNIT  ABO: NORMAL
UNIT  RH: NORMAL

## 2022-01-18 ENCOUNTER — INFUSION (OUTPATIENT)
Dept: ONCOLOGY | Facility: HOSPITAL | Age: 72
End: 2022-01-18

## 2022-01-18 ENCOUNTER — OFFICE VISIT (OUTPATIENT)
Dept: ONCOLOGY | Facility: CLINIC | Age: 72
End: 2022-01-18

## 2022-01-18 ENCOUNTER — LAB (OUTPATIENT)
Dept: LAB | Facility: HOSPITAL | Age: 72
End: 2022-01-18

## 2022-01-18 VITALS
BODY MASS INDEX: 23.15 KG/M2 | DIASTOLIC BLOOD PRESSURE: 65 MMHG | HEIGHT: 71 IN | SYSTOLIC BLOOD PRESSURE: 106 MMHG | OXYGEN SATURATION: 100 % | RESPIRATION RATE: 16 BRPM | WEIGHT: 165.4 LBS | HEART RATE: 64 BPM | TEMPERATURE: 97.3 F

## 2022-01-18 DIAGNOSIS — E61.1 IRON DEFICIENCY: ICD-10-CM

## 2022-01-18 DIAGNOSIS — C91.10 CLL (CHRONIC LYMPHOCYTIC LEUKEMIA): ICD-10-CM

## 2022-01-18 DIAGNOSIS — T45.4X5A ADVERSE EFFECT OF IRON, INITIAL ENCOUNTER: Primary | ICD-10-CM

## 2022-01-18 LAB
BASOPHILS # BLD AUTO: 0.02 10*3/MM3 (ref 0–0.2)
BASOPHILS NFR BLD AUTO: 0.3 % (ref 0–1.5)
DEPRECATED RDW RBC AUTO: 49.1 FL (ref 37–54)
EOSINOPHIL # BLD AUTO: 0.19 10*3/MM3 (ref 0–0.4)
EOSINOPHIL NFR BLD AUTO: 2.6 % (ref 0.3–6.2)
ERYTHROCYTE [DISTWIDTH] IN BLOOD BY AUTOMATED COUNT: 15.2 % (ref 12.3–15.4)
HCT VFR BLD AUTO: 32.9 % (ref 37.5–51)
HGB BLD-MCNC: 10.5 G/DL (ref 13–17.7)
IMM GRANULOCYTES # BLD AUTO: 0.15 10*3/MM3 (ref 0–0.05)
IMM GRANULOCYTES NFR BLD AUTO: 2 % (ref 0–0.5)
LYMPHOCYTES # BLD AUTO: 1.01 10*3/MM3 (ref 0.7–3.1)
LYMPHOCYTES NFR BLD AUTO: 13.6 % (ref 19.6–45.3)
MCH RBC QN AUTO: 28 PG (ref 26.6–33)
MCHC RBC AUTO-ENTMCNC: 31.9 G/DL (ref 31.5–35.7)
MCV RBC AUTO: 87.7 FL (ref 79–97)
MONOCYTES # BLD AUTO: 0.6 10*3/MM3 (ref 0.1–0.9)
MONOCYTES NFR BLD AUTO: 8.1 % (ref 5–12)
NEUTROPHILS NFR BLD AUTO: 5.47 10*3/MM3 (ref 1.7–7)
NEUTROPHILS NFR BLD AUTO: 73.4 % (ref 42.7–76)
NRBC BLD AUTO-RTO: 0 /100 WBC (ref 0–0.2)
PLATELET # BLD AUTO: 161 10*3/MM3 (ref 140–450)
PMV BLD AUTO: 11.2 FL (ref 6–12)
RBC # BLD AUTO: 3.75 10*6/MM3 (ref 4.14–5.8)
WBC NRBC COR # BLD: 7.44 10*3/MM3 (ref 3.4–10.8)

## 2022-01-18 PROCEDURE — 96374 THER/PROPH/DIAG INJ IV PUSH: CPT

## 2022-01-18 PROCEDURE — 63710000001 PROCHLORPERAZINE MALEATE PER 10 MG: Performed by: NURSE PRACTITIONER

## 2022-01-18 PROCEDURE — 85025 COMPLETE CBC W/AUTO DIFF WBC: CPT

## 2022-01-18 PROCEDURE — 25010000002 FERRIC CARBOXYMALTOSE 750 MG/15ML SOLUTION 15 ML VIAL: Performed by: NURSE PRACTITIONER

## 2022-01-18 PROCEDURE — 99214 OFFICE O/P EST MOD 30 MIN: CPT | Performed by: NURSE PRACTITIONER

## 2022-01-18 PROCEDURE — 36415 COLL VENOUS BLD VENIPUNCTURE: CPT

## 2022-01-18 RX ORDER — SODIUM CHLORIDE 9 MG/ML
250 INJECTION, SOLUTION INTRAVENOUS ONCE
Status: COMPLETED | OUTPATIENT
Start: 2022-01-18 | End: 2022-01-18

## 2022-01-18 RX ORDER — PROCHLORPERAZINE MALEATE 10 MG
10 TABLET ORAL ONCE
Status: CANCELLED | OUTPATIENT
Start: 2022-01-18 | End: 2022-01-18

## 2022-01-18 RX ORDER — SODIUM CHLORIDE 9 MG/ML
250 INJECTION, SOLUTION INTRAVENOUS ONCE
Status: CANCELLED | OUTPATIENT
Start: 2022-01-26

## 2022-01-18 RX ORDER — PROCHLORPERAZINE MALEATE 10 MG
10 TABLET ORAL ONCE
Status: CANCELLED | OUTPATIENT
Start: 2022-01-26 | End: 2022-01-25

## 2022-01-18 RX ORDER — SODIUM CHLORIDE 9 MG/ML
250 INJECTION, SOLUTION INTRAVENOUS ONCE
Status: CANCELLED | OUTPATIENT
Start: 2022-01-18

## 2022-01-18 RX ORDER — PROCHLORPERAZINE MALEATE 10 MG
10 TABLET ORAL ONCE
Status: COMPLETED | OUTPATIENT
Start: 2022-01-18 | End: 2022-01-18

## 2022-01-18 RX ADMIN — FERRIC CARBOXYMALTOSE INJECTION 750 MG: 50 INJECTION, SOLUTION INTRAVENOUS at 10:06

## 2022-01-18 RX ADMIN — SODIUM CHLORIDE 250 ML: 9 INJECTION, SOLUTION INTRAVENOUS at 10:01

## 2022-01-18 RX ADMIN — PROCHLORPERAZINE MALEATE 10 MG: 10 TABLET ORAL at 10:02

## 2022-01-18 NOTE — PROGRESS NOTES
Subjective     REASON FOR VISIT:    1. Chronic lymphoid leukemia, stage 4, presented initially with significant pancytopenia  · Currently on Gazyva with venetoclax  · Cycle 1 Gazyva given on August 13, 2020    2.  History of angiodysplasia requiring cauterization and history of Hess's esophagus    3.  Bone marrow aspiration biopsy shows hypercellular marrow with 98% involvement with CLL    4. Iron deficiency anemia in the setting of GI bleeding      History of Present Illness The patient is a 71 y.o. male with the above-mentioned history who returns today for follow-up.  Patient developed worsening abdominal discomfort with nausea and indigestion and frequent diarrhea and was found per CT scan to have gallstones but no evidence of cholecystitis.  However symptoms persisted and he ended up undergoing cholecystectomy per Dr. Lui on 10/1/2021.  Following this procedure patient had a drop in his hemoglobin from 11.7 down to 8.5.  We repeated iron studies when he was here 1/11/2022 showing that he is indeed iron deficient with iron saturation of 7%, ferritin of 15.  Patient cannot tolerate oral iron and therefore was scheduled for IV Injectafer, returning today to begin this.    As detailed above patient has history of angiodysplasia and Hess's esophagus and continues to have ongoing diarrhea despite cholecystectomy.  He has been referred to GI and will see Dr. Hinds 2/9/2022 for further evaluation.    As he is reviewed today patient did receive 1 unit of PRBCs and hemoglobin improved nicely to 10.5.  We discussed that we expect this to further improve following IV iron in the next 2 weeks.    He denies other questions or concerns at this time.    PAST MEDICAL HISTORY:   1. Recurrent atrial fibrillation followed by cardiology. He has had drug eluting stent placed x 3.   2. High cholesterol.   3. Hypertension.       HEMATOLOGIC/ONCOLOGIC HISTORY:       The patient is 62-year-old gentleman with the above  history currently here for followup. He has no complaints. He denies fever, night sweats or weight loss. He does not have any lymphadenopathy. He feels good. He had some fatigue but his CBC shows a go od hemoglobin.   The patient is followed by Dr. Kevin Chandra. He had seen Dr. Chandra 7/18/13 for a history of coronary artery disease status drug eluting stent placement to the right coronary artery and left anterior descending artery in February 2011. Histor y of paroxysmal atrial fibrillation and hyperlipidemia. He presented to [facility] on 6/23/13 with palpitations and was found to have atrial fibrillation with rapid ventricular response. He was given IV Cardizem and converted spontaneously to normal sinus rhythm. He was found to have elevated white count at 18.9 and denied any symptoms of infection or urinary complaints. TSH was normal, troponin levels were negative. He was asked to continue aspirin and metoprolol for that. If he contin u ed to have atrial fibrillation, they will consider antiarrhythmic therapy with or without a short term anticoagulation therapy. However, currently the patient is feeling reasonably good. He has no complaints. His CBC 7/22/13 showed WBC 17,000, hemoglob i n 16.4 and platelet count of 174,000. Back 9/28/13 his hemoglobin was 15.1, WBC 13.3 and platelets 197,000. He has had a persistently elevated WBC but he is totally asymptomatic. He does not have any fever, night sweats or lymphadenopathy. He is here to be evaluated for his elevated white count.       Peripheral blood flow cytometry done 08/16/13 shows 22% chronic lymphoid leukemia cells, and they expressed ZAP-70 but not CD38. The total number of cells approximated 60% T cells, 32% B cells and 1% natural k iller cells. The B cells were monoclonal and expressed CD19, CD20, CD22, CD5, CD23, CD11c, ZAP-70 and T cell antigens. There was a normal CD4/CD8 ratio. CT of the chest, abdomen and pelvis does not show evidence of  metastasis. Peripheral blood for DILLON -2 was negative. It was negative for T11:14 translocation.       CT scan done on 08/21/2013 shows 5 to 6 mm noncalcified nodule in the left lower lobe which is unchanged from December 2010. Otherwise no evidence of any lymphadenopathy or hepatosplenomegaly.       MRI of the spine shows arthritis and disc bulge and likely hemangiomas, with 1 lesion showing increased signal intensity at T2, which is likely a hemangioma. Bone scan could be done, but patient does not even have much pain there. CT scan of the chest, a bdomen and pelvis was done on 11/23/2014. He has tiny lymph nodes in the neck which are difficult to palpate. Right jugular one is 1.4 x 0.9 and left supraclavicular one is 1.5 x 1.1. He also has a subcarinal lymph node which is 1.7 x 1.2 cm, and he ha s a portocaval lymph node which has increased from 2.5 to 2.8. Patient is totally asymptomatic. He does not have any B symptoms, so will continue followup with observation.     Patient is a 69-year-old gentleman with history of chronic lymphocytic leukemia/SLL who recently got admitted to [facility] because of GI bleed.  He was seen by Dr. Montero.  He underwent EGD colonoscopy.  He was found to have angiodysplasia for which he underwent argon coagulation.  He required 3 units of blood.  Patient had a normal esophagus normal stomach and normal duodenum CLOtest was negative he was asked to take Protonix 40 mg daily.  Colonoscopy showed blood in the entire examined colon but there was a single bleeding colonic angiectasia which was treated with argon plasma coagulation.    He was admitted twice.  Initially he was admitted on July 10 at which time he stayed 3 days and he was evaluated for chest pain.  He underwent a cardiac work-up with stress test and echo.  The echo was normal but the stress test showed medium sized moderate severe severity fixed perfusion defect.  Of the inferior wall consistent with infarction.   However according to patient cardiology did not think he had any cardiac problems.  I have left a message for patient's cardiologist to call me today.  Patient subsequently got readmitted with GI bleed and required 1/3 unit of transfusion.  He was found to have thrombocytopenia and hematology was consulted.    His hemoglobin had dropped down to as low as 7 and his platelets had gone down to 87.  Today it is 35 and his hemoglobin is 9.4 today.  He denies active bleeding.  He has lost weight recently.  He does not have any fever or night sweats.    He had ultrasound of spleen which showed enlarged spleen centimeters in length    Patient was started on Gazyva with Leukeran but subsequently switched to venetoclax with Gazyva.  His venetoclax dose is 100 mg daily and he receives Gazyva once a month.    CT scan done 2020 shows significant response     MEDICATIONS: The current medication list was reviewed with the patient and updated in the EMR this date per the medical assistant. Medication dosages and frequencies were confirmed to be accurate.       ALLERGIES: PENICILLIN which causes him to swell up. He is allergic to IV CONTRAST DYE.     SOCIAL HISTORY: He is . He smokes one pack per day for 35 years. He consumes three to four alcoholic drinks per week. He has no tattoos and no previous transfusions.       FAMILY HISTORY: Father  at 82 with MI. Mother  at 82 with bone cancer. He has one brother age 65 in good health and two sisters 69 and 57 in good health.   Past Medical History, Social History, and Family History are unchanged from my prior documentation on     Review of Systems   Constitutional: Negative for appetite change, chills, diaphoresis, fatigue, fever and unexpected weight change.   HENT: Negative for hearing loss, sore throat and trouble swallowing.    Respiratory: Negative for cough, chest tightness, shortness of breath and wheezing.    Cardiovascular: Negative for  chest pain, palpitations and leg swelling.   Gastrointestinal: Positive for diarrhea (see HPI). Negative for abdominal distention, abdominal pain, constipation, nausea and vomiting.   Genitourinary: Negative for dysuria, frequency, hematuria and urgency.   Musculoskeletal: Negative for joint swelling.        No muscle weakness.   Skin: Negative for rash and wound.   Neurological: Negative for seizures, syncope, speech difficulty, weakness, numbness and headaches.   Hematological: Negative for adenopathy. Does not bruise/bleed easily.   Psychiatric/Behavioral: Negative for behavioral problems, confusion and suicidal ideas.   All other systems reviewed and are negative.  Reviewed and updated 01/18/22      Patient Active Problem List   Diagnosis   • CLL (chronic lymphocytic leukemia) (HCC)   • Anemia   • Encounter for hepatitis C screening test for low risk patient    • Thrombocytopenia (HCC)   • H/O heart artery stent   • Stented coronary artery   • Arteriosclerosis of coronary artery   • Atrial fibrillation (HCC)   • Paroxysmal atrial fibrillation (HCC)   • Chest pain, rule out acute myocardial infarction   • Fall   • Fracture, olecranon   • Hyperlipidemia   • Long term (current) use of anticoagulants   • Lower GI bleed   • Olecranon bursitis   • Shoulder pain   • Smoker   • CLL (chronic lymphocytic leukemia) (HCC)   • Dehydration   • Drug-induced nausea and vomiting   • Encounter for long-term (current) use of other medications   • Neutropenia (HCC)   • Calculus of gallbladder without cholecystitis without obstruction   • Cholecystitis   • Iron deficiency   • Adverse effect of iron       MEDICATIONS:  Medication Reconciliation for the patient has been reviewed by me and documented in the patients chart.    ALLERGIES:    Allergies   Allergen Reactions   • Penicillins Swelling         Vitals:    01/18/22 0930   BP: 106/65   Pulse: 64   Resp: 16   Temp: 97.3 °F (36.3 °C)   SpO2: 100%        Current Status 1/18/2022    ECOG score 0      Physical exam:  This patient's ACP documentation is up to date, and there's nothing further left to document.    CONSTITUTIONAL:  Vital signs reviewed.  No distress, looks comfortable.  EYES:  Conjunctivae and lids unremarkable.  PERRLA  EARS,NOSE,MOUTH,THROAT:  Ears and nose appear unremarkable.  Lips, teeth, gums appear unremarkable.  RESPIRATORY:  Normal respiratory effort.  Lungs clear to auscultation bilaterally.  CARDIOVASCULAR:  Normal S1, S2.  No murmurs rubs or gallops.  No significant lower extremity edema.  GASTROINTESTINAL: Abdomen appears unremarkable.  Nontender.  No hepatomegaly.  No splenomegaly.  LYMPHATIC:  No cervical, supraclavicular, axillary lymphadenopathy.  SKIN:  Warm.  No rashes.  PSYCHIATRIC:  Normal judgment and insight.  Normal mood and affect.      RECENT LABS:  Results from last 7 days   Lab Units 01/18/22  0914 01/11/22  1703 01/11/22  1459   WBC 10*3/mm3 7.44  --  5.54   NEUTROS ABS 10*3/mm3 5.47  --  3.91   HEMOGLOBIN g/dL 10.5*  --  8.5*   HEMATOCRIT % 32.9* 29.0* 27.4*   PLATELETS 10*3/mm3 161  --  147     Results from last 7 days   Lab Units 01/11/22  1703 01/11/22  1657 01/11/22  1459   SODIUM mmol/L  --   --  142   POTASSIUM mmol/L  --   --  4.0   CHLORIDE mmol/L  --   --  106   CO2 mmol/L  --   --  25.4   BUN mg/dL  --   --  16   CREATININE mg/dL  --   --  0.91   CALCIUM mg/dL  --   --  9.0   ALBUMIN g/dL 3.8  --  4.10   BILIRUBIN mg/dL  --   --  0.2   ALK PHOS U/L  --   --  146*   ALT (SGPT) U/L  --   --  21   AST (SGOT) U/L  --   --  20   GLUCOSE mg/dL  --   --  128*   FERRITIN ng/mL  --  15*  --    TIBC ug/dL  --  377  --          Assessment/Plan   1. Stage 2 CLL without evidence of any disease progression.  I have reviewed the CT scan from 3/19/2018 there is minimal progression but clinically patient is asymptomatic and we will follow with observation.  Will monitor with labs.   No evidence of any frequent infections, no major worsening of his white  count. He has no fever or night sweats or any other symptoms.   · Patient knows that he is prone for infections and he is mainly staying at home during the COVID-19 situation time  · Recent admission to [facility] July 10 2020×2.  Initially admitted with chest pain and underwent stress test and echo.  Echo was negative.  Stress test is abnormal but have left message for patient's cardiologist to call us.  His cardiologist is been sent [facility].  · He then got admitted the second time with worsening GI bleed and required total of 3 units of blood.  EGD was negative but colonoscopy showed angiectasia for which he underwent argon ablation.  Currently hemoglobin stable and no active bleeding.  · He was also found to have low platelets with platelets dropping down to 27k and hemoglobin was 7.  Ultrasound showed splenomegaly 15 cm.  Concern is whether he has progressed from his CLL point of view and requires treatment.  · CT scan performed 7/20/2020 did show the increased splenomegaly, but interval decrease in size of the axillary nodes, and shotty mediastinal nodes.  No change in the shotty nodes within the neck and supraclavicular regions.  No new lymphadenopathy.  Bone marrow biopsy however showed preliminarily that there is bone marrow involvement.  Remainder of bone marrow biopsy report is pending.  · Bone marrow shows hypercellular marrow with 100% involvement of chronic lymphocytic leukemia/small lymphocytic lymphoma and diffuse pattern with at least 98% of the total marrow cellularity.  Rare foci of erythropoiesis and rare megakaryocytes are noted.   · Negative for BCL-2 and BCL 6.  Chromosomes shows normal karyotype.  I GVH mutation present.  Positive for gain of 1 q., monosomy 13 and loss of IGH.  Negative for deletion T p53, negative for gain of chromosomes 9 and 11, negative for 11, 14 fusion.  · The combination of monosomy 13 and gain of 1 q. is thought to be associated with plasma cell myeloma.  However  "this patient does not have myeloma.  · Scans were reviewed with the patient today.  Dr. Moya also discussed with the patient and recommended he initiate treatment with chlorambucil and Gazyva.  He would not be a good candidate for Imbruvica due to his history of A. fib\".  He cannot take anticoagulation at this time.  The patient was provided with chemotherapy education today, including  Handouts.  He will need a port placed so that we can initiate treatment  · 8/13/2020: Gazyva day 1. Plan also [id number] mg p.o. on day 1 day 15.  We can increase the dose once we know he tolerates and his platelets improved.  · Patient developing pancytopenia when reviewed cycle 1 day 15.  Chlorambucil dose modified to 10 mg for day 15 however it is felt that he cannot tolerate this ongoing.   ·  Plan to switch to Venetoclax along with continuing Gazyva.    · Patient due today for cycle 2-day 1 Gazyva.  He will proceed today as scheduled.  Venetoclax will be shipped to his home with plans to begin this next week on 9/14/2020.    · Patient did receive 1 L normal saline and Neulasta on 9/28/2020 secondary to neutropenia with an ANC of 0.04.  · Patient seen on 10/02/2020 continuing on venetoclax, currently on 100 mg dosing.  He would not increase his dose as he will start on voriconazole after being on venetoclax x1 week which affects the metabolism of venetoclax.  He is currently on day 5 of the 100 mg dosing.  Patient states overall he feels the same except for increased fatigue and nausea.  His nauseousness is controlled with ondansetron however.  Patient will be given 1 L normal saline in the office today as planned.  · Patient returns on 10/12/2020 continuing on venetoclax 100 mg daily as well as voriconazole.  · Patient is doing well with these treatments except fatigue.  Next cycle 4 due as of number ninth prior to which will obtain CT scans  · November 9, 2020: White count down to 1.89 but patient started Pepcid.  We " will hold off Pepcid.  · 11/17/2020 platelet count dropped to 35,000 patient was instructed to hold venetoclax.  · Returns 11/23/2020 WBC up to 6.38, ANC 5.19, hemoglobin 12.4, platelets up to 121.  I have advised him to resume venetoclax 100 mg daily  · December 7, 2020: Platelets 95K.  White count 3.0 with absolute neutrophil count of 2.01.  Cycle 5 Gazyva given.  Continue venetoclax with voriconazole.   · January 4, 2021: Platelets 84,000, white count 3,440, absolute neutrophil count 1,980. Cycle 6 Gazyva given.  · 2/23/2021: WBC 3.0, platelets 90,000, hemoglobin 14.6, ANC 1.65.  Counts overall stable.  Continue Venetoclax 100 mg daily.  · January 4, 2021: Last dose of Gazyva.  · March 16, 2021: Patient is on venetoclax along with voriconazole and tolerating well with plans to complete total of 1 year.  · 4/27/2021: Patient continues on venetoclax 100 mg daily with voriconazole and acyclovir for prophylaxis.  He is tolerating this well.  He is scheduled for scans in 5 weeks.  · June 8, 2021: Patient is to continue venetoclax 100 mg daily with voriconazole and acyclovir prophylaxis.  He is tolerating it very well.  The plan is to continue 8 more weeks and then he will complete the protocol and will be followed with observation.  · I have reviewed the CT scan done on June 2, 2021 and there is no evidence of any lymphadenopathy and the spleen size is decreased in size from 12.5-11.3.  He has mild thrombocytopenia and leukopenia but his hemoglobin is normal.  · 9/7/2021: Patient completed 1 year worth of venetoclax with voriconazole and 6 months of Gazyva.  He has had an excellent response for his CLL.  Spleen decreased in size.  Currently asymptomatic.  Discontinue venetoclax since he completed 1 year  · 1/2022 patient stable in regards to his history of CLL.  No evidence of recurrent disease.     2. History of intermittent thrombocytopenia  · Historically platelets in the 150s.  · Count did drop when patient was  treated for CLL on chlorambucil.    · Platelets also dropping on treatment with Venetoclax in the past.    · CLL in remission.  Platelet count currently in the normal range.    3.  History of angiodysplasia requiring cauterization and Hess's esophagus mild anemia. He is currently asymptomatic.  · No evidence of active bleeding.    · 1/2022: Patient with recurrent iron deficiency as further detailed below.  Patient referred to GI for further evaluation.  Will see Dr. Hinds 2/9/2022.    4.  History of cluster headaches for which she has to be treated with steroids in the past and has followed up with Dr. Len Péreztoday with significant headaches.  · Patient had CT of the head 8/20/2020 which was negative.  · He was started on prednisone 10 mg daily which was not helpful.  · Patient saw Dr. Bobby, neurology who gave him 2 shots of a new medication Emgality.  This is something that can be given once a month.    · Resolved.    5.  Atrial fibrillation, off anticoagulation given his thrombocytopenia.  · Patient continues on aspirin 81 mg daily if platelet count greater than 60,000.  · Patient sees Dr. Christy at [facility] who follows his INR    6.  Chronic mild elevation of alkaline phosphatase.  Stable.    7.  Worsening abdominal pain and GI symptoms in the fall 2021.  · CT showing gallstones but no cholecystitis.  · Symptoms persisting with patient undergoing cholecystectomy with Dr. Lui 10/1/2021.  · 1/2022 patient continues with diarrhea and abdominal cramping.  We will see GI, Dr. Hinds, 2/9/2022.      8.  Iron deficiency anemia in the setting of angiodysplasia with recurrent GI bleeding.  · Patient intolerant to oral iron  · 1/11/2022 repeat iron studies show ferritin of 15, iron saturation 7%.  · 1/18/2022 proceed with IV Injectafer x2      PLAN:   · Discussed with the patient IV iron and what to expect with infusion today.  · Proceed with IV Injectafer x2 beginning today.  · Patient will see Dr. Hinds,  GI, 2/9/2022  · Patient otherwise will follow-up with Dr. Moya 2/8/2022.    This patient is receiving high risk drug therapy requiring intensive monitoring for toxicity.        Valarie Mclean, APRN  01/18/22      Cc: Dr. Kevin Gilmore

## 2022-01-19 ENCOUNTER — APPOINTMENT (OUTPATIENT)
Dept: ONCOLOGY | Facility: HOSPITAL | Age: 72
End: 2022-01-19

## 2022-01-26 ENCOUNTER — LAB (OUTPATIENT)
Dept: LAB | Facility: HOSPITAL | Age: 72
End: 2022-01-26

## 2022-01-26 ENCOUNTER — INFUSION (OUTPATIENT)
Dept: ONCOLOGY | Facility: HOSPITAL | Age: 72
End: 2022-01-26

## 2022-01-26 VITALS
RESPIRATION RATE: 16 BRPM | HEART RATE: 68 BPM | WEIGHT: 162 LBS | TEMPERATURE: 97.7 F | BODY MASS INDEX: 22.6 KG/M2 | DIASTOLIC BLOOD PRESSURE: 67 MMHG | OXYGEN SATURATION: 98 % | SYSTOLIC BLOOD PRESSURE: 101 MMHG

## 2022-01-26 DIAGNOSIS — T45.4X5A ADVERSE EFFECT OF IRON, INITIAL ENCOUNTER: Primary | ICD-10-CM

## 2022-01-26 DIAGNOSIS — E61.1 IRON DEFICIENCY: ICD-10-CM

## 2022-01-26 DIAGNOSIS — C91.10 CLL (CHRONIC LYMPHOCYTIC LEUKEMIA): ICD-10-CM

## 2022-01-26 LAB
BASOPHILS # BLD AUTO: 0.05 10*3/MM3 (ref 0–0.2)
BASOPHILS NFR BLD AUTO: 0.6 % (ref 0–1.5)
DEPRECATED RDW RBC AUTO: 62.5 FL (ref 37–54)
EOSINOPHIL # BLD AUTO: 0.19 10*3/MM3 (ref 0–0.4)
EOSINOPHIL NFR BLD AUTO: 2.1 % (ref 0.3–6.2)
ERYTHROCYTE [DISTWIDTH] IN BLOOD BY AUTOMATED COUNT: 19.7 % (ref 12.3–15.4)
HCT VFR BLD AUTO: 38.7 % (ref 37.5–51)
HGB BLD-MCNC: 12 G/DL (ref 13–17.7)
IMM GRANULOCYTES # BLD AUTO: 0.15 10*3/MM3 (ref 0–0.05)
IMM GRANULOCYTES NFR BLD AUTO: 1.7 % (ref 0–0.5)
LYMPHOCYTES # BLD AUTO: 1.13 10*3/MM3 (ref 0.7–3.1)
LYMPHOCYTES NFR BLD AUTO: 12.5 % (ref 19.6–45.3)
MCH RBC QN AUTO: 28.8 PG (ref 26.6–33)
MCHC RBC AUTO-ENTMCNC: 31 G/DL (ref 31.5–35.7)
MCV RBC AUTO: 93 FL (ref 79–97)
MONOCYTES # BLD AUTO: 0.62 10*3/MM3 (ref 0.1–0.9)
MONOCYTES NFR BLD AUTO: 6.9 % (ref 5–12)
NEUTROPHILS NFR BLD AUTO: 6.87 10*3/MM3 (ref 1.7–7)
NEUTROPHILS NFR BLD AUTO: 76.2 % (ref 42.7–76)
NRBC BLD AUTO-RTO: 0 /100 WBC (ref 0–0.2)
PLATELET # BLD AUTO: 167 10*3/MM3 (ref 140–450)
PMV BLD AUTO: 10.5 FL (ref 6–12)
RBC # BLD AUTO: 4.16 10*6/MM3 (ref 4.14–5.8)
WBC NRBC COR # BLD: 9.01 10*3/MM3 (ref 3.4–10.8)

## 2022-01-26 PROCEDURE — 85025 COMPLETE CBC W/AUTO DIFF WBC: CPT

## 2022-01-26 PROCEDURE — 63710000001 PROCHLORPERAZINE MALEATE PER 10 MG: Performed by: NURSE PRACTITIONER

## 2022-01-26 PROCEDURE — 25010000002 HEPARIN LOCK FLUSH PER 10 UNITS: Performed by: INTERNAL MEDICINE

## 2022-01-26 PROCEDURE — 36415 COLL VENOUS BLD VENIPUNCTURE: CPT

## 2022-01-26 PROCEDURE — 96374 THER/PROPH/DIAG INJ IV PUSH: CPT

## 2022-01-26 PROCEDURE — 25010000002 FERRIC CARBOXYMALTOSE 750 MG/15ML SOLUTION 15 ML VIAL: Performed by: NURSE PRACTITIONER

## 2022-01-26 RX ORDER — SODIUM CHLORIDE 0.9 % (FLUSH) 0.9 %
10 SYRINGE (ML) INJECTION AS NEEDED
Status: CANCELLED | OUTPATIENT
Start: 2022-01-26

## 2022-01-26 RX ORDER — PROCHLORPERAZINE MALEATE 10 MG
10 TABLET ORAL ONCE
Status: COMPLETED | OUTPATIENT
Start: 2022-01-26 | End: 2022-01-26

## 2022-01-26 RX ORDER — HEPARIN SODIUM (PORCINE) LOCK FLUSH IV SOLN 100 UNIT/ML 100 UNIT/ML
500 SOLUTION INTRAVENOUS AS NEEDED
Status: DISCONTINUED | OUTPATIENT
Start: 2022-01-26 | End: 2022-01-26 | Stop reason: HOSPADM

## 2022-01-26 RX ORDER — HEPARIN SODIUM (PORCINE) LOCK FLUSH IV SOLN 100 UNIT/ML 100 UNIT/ML
500 SOLUTION INTRAVENOUS AS NEEDED
Status: CANCELLED | OUTPATIENT
Start: 2022-01-26

## 2022-01-26 RX ORDER — SODIUM CHLORIDE 9 MG/ML
250 INJECTION, SOLUTION INTRAVENOUS ONCE
Status: COMPLETED | OUTPATIENT
Start: 2022-01-26 | End: 2022-01-26

## 2022-01-26 RX ORDER — SODIUM CHLORIDE 0.9 % (FLUSH) 0.9 %
10 SYRINGE (ML) INJECTION AS NEEDED
Status: DISCONTINUED | OUTPATIENT
Start: 2022-01-26 | End: 2022-01-26 | Stop reason: HOSPADM

## 2022-01-26 RX ADMIN — PROCHLORPERAZINE MALEATE 10 MG: 10 TABLET ORAL at 09:46

## 2022-01-26 RX ADMIN — SODIUM CHLORIDE 250 ML: 9 INJECTION, SOLUTION INTRAVENOUS at 09:46

## 2022-01-26 RX ADMIN — FERRIC CARBOXYMALTOSE INJECTION 750 MG: 50 INJECTION, SOLUTION INTRAVENOUS at 10:01

## 2022-01-26 RX ADMIN — Medication 500 UNITS: at 10:49

## 2022-01-31 ENCOUNTER — TELEPHONE (OUTPATIENT)
Dept: FAMILY MEDICINE CLINIC | Facility: CLINIC | Age: 72
End: 2022-01-31

## 2022-02-03 DIAGNOSIS — C91.10 CLL (CHRONIC LYMPHOCYTIC LEUKEMIA): ICD-10-CM

## 2022-02-03 RX ORDER — ALLOPURINOL 300 MG/1
300 TABLET ORAL NIGHTLY
Qty: 90 TABLET | Refills: 1 | Status: SHIPPED | OUTPATIENT
Start: 2022-02-03 | End: 2022-05-26

## 2022-02-08 ENCOUNTER — OFFICE VISIT (OUTPATIENT)
Dept: ONCOLOGY | Facility: CLINIC | Age: 72
End: 2022-02-08

## 2022-02-08 ENCOUNTER — LAB (OUTPATIENT)
Dept: LAB | Facility: HOSPITAL | Age: 72
End: 2022-02-08

## 2022-02-08 VITALS
SYSTOLIC BLOOD PRESSURE: 121 MMHG | RESPIRATION RATE: 16 BRPM | WEIGHT: 164.2 LBS | HEART RATE: 59 BPM | BODY MASS INDEX: 22.99 KG/M2 | TEMPERATURE: 98 F | HEIGHT: 71 IN | OXYGEN SATURATION: 98 % | DIASTOLIC BLOOD PRESSURE: 56 MMHG

## 2022-02-08 DIAGNOSIS — C91.10 CLL (CHRONIC LYMPHOCYTIC LEUKEMIA): ICD-10-CM

## 2022-02-08 DIAGNOSIS — E61.1 IRON DEFICIENCY: ICD-10-CM

## 2022-02-08 DIAGNOSIS — E61.1 IRON DEFICIENCY: Primary | ICD-10-CM

## 2022-02-08 LAB
ALBUMIN SERPL-MCNC: 4.2 G/DL (ref 3.5–5.2)
ALBUMIN/GLOB SERPL: 2.2 G/DL (ref 1.1–2.4)
ALP SERPL-CCNC: 150 U/L (ref 38–116)
ALT SERPL W P-5'-P-CCNC: 33 U/L (ref 0–41)
ANION GAP SERPL CALCULATED.3IONS-SCNC: 9.3 MMOL/L (ref 5–15)
AST SERPL-CCNC: 28 U/L (ref 0–40)
BASOPHILS # BLD AUTO: 0.05 10*3/MM3 (ref 0–0.2)
BASOPHILS NFR BLD AUTO: 0.7 % (ref 0–1.5)
BILIRUB SERPL-MCNC: 0.4 MG/DL (ref 0.2–1.2)
BUN SERPL-MCNC: 13 MG/DL (ref 6–20)
BUN/CREAT SERPL: 14.3 (ref 7.3–30)
CALCIUM SPEC-SCNC: 8.8 MG/DL (ref 8.5–10.2)
CHLORIDE SERPL-SCNC: 101 MMOL/L (ref 98–107)
CHROMATIN AB SERPL-ACNC: <10 IU/ML (ref 0–14)
CO2 SERPL-SCNC: 24.7 MMOL/L (ref 22–29)
CREAT SERPL-MCNC: 0.91 MG/DL (ref 0.7–1.3)
CRP SERPL-MCNC: <0.3 MG/DL (ref 0–0.5)
DEPRECATED RDW RBC AUTO: 75 FL (ref 37–54)
EOSINOPHIL # BLD AUTO: 0.13 10*3/MM3 (ref 0–0.4)
EOSINOPHIL NFR BLD AUTO: 1.9 % (ref 0.3–6.2)
ERYTHROCYTE [DISTWIDTH] IN BLOOD BY AUTOMATED COUNT: 21.4 % (ref 12.3–15.4)
FERRITIN SERPL-MCNC: 343.9 NG/ML (ref 30–400)
GFR SERPL CREATININE-BSD FRML MDRD: 82 ML/MIN/1.73
GLOBULIN UR ELPH-MCNC: 1.9 GM/DL (ref 1.8–3.5)
GLUCOSE SERPL-MCNC: 251 MG/DL (ref 74–124)
HCT VFR BLD AUTO: 40.4 % (ref 37.5–51)
HGB BLD-MCNC: 12.7 G/DL (ref 13–17.7)
IMM GRANULOCYTES # BLD AUTO: 0.06 10*3/MM3 (ref 0–0.05)
IMM GRANULOCYTES NFR BLD AUTO: 0.9 % (ref 0–0.5)
IRON 24H UR-MRATE: 106 MCG/DL (ref 59–158)
IRON SATN MFR SERPL: 30 % (ref 14–48)
LYMPHOCYTES # BLD AUTO: 0.96 10*3/MM3 (ref 0.7–3.1)
LYMPHOCYTES NFR BLD AUTO: 13.7 % (ref 19.6–45.3)
MCH RBC QN AUTO: 30.7 PG (ref 26.6–33)
MCHC RBC AUTO-ENTMCNC: 31.4 G/DL (ref 31.5–35.7)
MCV RBC AUTO: 97.6 FL (ref 79–97)
MONOCYTES # BLD AUTO: 0.41 10*3/MM3 (ref 0.1–0.9)
MONOCYTES NFR BLD AUTO: 5.9 % (ref 5–12)
NEUTROPHILS NFR BLD AUTO: 5.39 10*3/MM3 (ref 1.7–7)
NEUTROPHILS NFR BLD AUTO: 76.9 % (ref 42.7–76)
NRBC BLD AUTO-RTO: 0 /100 WBC (ref 0–0.2)
PLATELET # BLD AUTO: 140 10*3/MM3 (ref 140–450)
PMV BLD AUTO: 10.1 FL (ref 6–12)
POTASSIUM SERPL-SCNC: 4.5 MMOL/L (ref 3.5–4.7)
PROT SERPL-MCNC: 6.1 G/DL (ref 6.3–8)
RBC # BLD AUTO: 4.14 10*6/MM3 (ref 4.14–5.8)
SODIUM SERPL-SCNC: 135 MMOL/L (ref 134–145)
TIBC SERPL-MCNC: 351 MCG/DL (ref 249–505)
TRANSFERRIN SERPL-MCNC: 251 MG/DL (ref 200–360)
WBC NRBC COR # BLD: 7 10*3/MM3 (ref 3.4–10.8)

## 2022-02-08 PROCEDURE — 82728 ASSAY OF FERRITIN: CPT

## 2022-02-08 PROCEDURE — 85025 COMPLETE CBC W/AUTO DIFF WBC: CPT

## 2022-02-08 PROCEDURE — 99214 OFFICE O/P EST MOD 30 MIN: CPT | Performed by: INTERNAL MEDICINE

## 2022-02-08 PROCEDURE — 83540 ASSAY OF IRON: CPT

## 2022-02-08 PROCEDURE — 80053 COMPREHEN METABOLIC PANEL: CPT

## 2022-02-08 PROCEDURE — 86140 C-REACTIVE PROTEIN: CPT | Performed by: INTERNAL MEDICINE

## 2022-02-08 PROCEDURE — 86431 RHEUMATOID FACTOR QUANT: CPT | Performed by: INTERNAL MEDICINE

## 2022-02-08 PROCEDURE — 84466 ASSAY OF TRANSFERRIN: CPT

## 2022-02-08 PROCEDURE — 36415 COLL VENOUS BLD VENIPUNCTURE: CPT

## 2022-02-08 NOTE — PROGRESS NOTES
Subjective     REASON FOR VISIT:    1. Chronic lymphoid leukemia, stage 4, presented initially with significant pancytopenia  · Currently on Gazyva with venetoclax  · Cycle 1 Gazyva given on August 13, 2020    2.  History of angiodysplasia requiring cauterization and history of Hess's esophagus    3.  Bone marrow aspiration biopsy shows hypercellular marrow with 98% involvement with CLL    4. Iron deficiency anemia in the setting of GI bleeding      History of Present Illness The patient is a 71 y.o. male with the above-mentioned history who returns today for follow-up.  Patient developed worsening abdominal discomfort with nausea and indigestion and frequent diarrhea and was found per CT scan to have gallstones but no evidence of cholecystitis.  However symptoms persisted and he ended up undergoing cholecystectomy per Dr. Lui on 10/1/2021.  Following this procedure patient had a drop in his hemoglobin from 11.7 down to 8.5.  We repeated iron studies when he was here 1/11/2022 showing that he is indeed iron deficient with iron saturation of 7%, ferritin of 15.  Patient cannot tolerate oral iron and therefore was scheduled for IV Injectafer, returning today to begin this.    As detailed above patient has history of angiodysplasia and Hess's esophagus and continues to have ongoing diarrhea despite cholecystectomy.  He has been referred to GI and will see Dr. Hinds 2/9/2022 for further evaluation.    As he is reviewed today patient did receive 1 unit of PRBCs and hemoglobin improved nicely to 10.5.  We discussed that we expect this to further improve following IV iron in the next 2 weeks.    Interval history: Patient is s/p iron infusions x2.  His hemoglobin is improved to 12.7.  He says he has intermittent dark stools and is not taking ferrous sulfate.  He has an appointment with Dr. Gonzalez next week.        PAST MEDICAL HISTORY:   1. Recurrent atrial fibrillation followed by cardiology. He has had drug  eluting stent placed x 3.   2. High cholesterol.   3. Hypertension.       HEMATOLOGIC/ONCOLOGIC HISTORY:       The patient is 62-year-old gentleman with the above history currently here for followup. He has no complaints. He denies fever, night sweats or weight loss. He does not have any lymphadenopathy. He feels good. He had some fatigue but his CBC shows a go od hemoglobin.   The patient is followed by Dr. Kevin Chandra. He had seen Dr. Chandra 7/18/13 for a history of coronary artery disease status drug eluting stent placement to the right coronary artery and left anterior descending artery in February 2011. Histor y of paroxysmal atrial fibrillation and hyperlipidemia. He presented to [facility] on 6/23/13 with palpitations and was found to have atrial fibrillation with rapid ventricular response. He was given IV Cardizem and converted spontaneously to normal sinus rhythm. He was found to have elevated white count at 18.9 and denied any symptoms of infection or urinary complaints. TSH was normal, troponin levels were negative. He was asked to continue aspirin and metoprolol for that. If he contin u ed to have atrial fibrillation, they will consider antiarrhythmic therapy with or without a short term anticoagulation therapy. However, currently the patient is feeling reasonably good. He has no complaints. His CBC 7/22/13 showed WBC 17,000, hemoglob i n 16.4 and platelet count of 174,000. Back 9/28/13 his hemoglobin was 15.1, WBC 13.3 and platelets 197,000. He has had a persistently elevated WBC but he is totally asymptomatic. He does not have any fever, night sweats or lymphadenopathy. He is here to be evaluated for his elevated white count.       Peripheral blood flow cytometry done 08/16/13 shows 22% chronic lymphoid leukemia cells, and they expressed ZAP-70 but not CD38. The total number of cells approximated 60% T cells, 32% B cells and 1% natural k iller cells. The B cells were monoclonal and  expressed CD19, CD20, CD22, CD5, CD23, CD11c, ZAP-70 and T cell antigens. There was a normal CD4/CD8 ratio. CT of the chest, abdomen and pelvis does not show evidence of metastasis. Peripheral blood for DILLON -2 was negative. It was negative for T11:14 translocation.       CT scan done on 08/21/2013 shows 5 to 6 mm noncalcified nodule in the left lower lobe which is unchanged from December 2010. Otherwise no evidence of any lymphadenopathy or hepatosplenomegaly.       MRI of the spine shows arthritis and disc bulge and likely hemangiomas, with 1 lesion showing increased signal intensity at T2, which is likely a hemangioma. Bone scan could be done, but patient does not even have much pain there. CT scan of the chest, a bdomen and pelvis was done on 11/23/2014. He has tiny lymph nodes in the neck which are difficult to palpate. Right jugular one is 1.4 x 0.9 and left supraclavicular one is 1.5 x 1.1. He also has a subcarinal lymph node which is 1.7 x 1.2 cm, and he ha s a portocaval lymph node which has increased from 2.5 to 2.8. Patient is totally asymptomatic. He does not have any B symptoms, so will continue followup with observation.     Patient is a 69-year-old gentleman with history of chronic lymphocytic leukemia/SLL who recently got admitted to [facility] because of GI bleed.  He was seen by Dr. Montero.  He underwent EGD colonoscopy.  He was found to have angiodysplasia for which he underwent argon coagulation.  He required 3 units of blood.  Patient had a normal esophagus normal stomach and normal duodenum CLOtest was negative he was asked to take Protonix 40 mg daily.  Colonoscopy showed blood in the entire examined colon but there was a single bleeding colonic angiectasia which was treated with argon plasma coagulation.    He was admitted twice.  Initially he was admitted on July 10 at which time he stayed 3 days and he was evaluated for chest pain.  He underwent a cardiac work-up with stress test and  echo.  The echo was normal but the stress test showed medium sized moderate severe severity fixed perfusion defect.  Of the inferior wall consistent with infarction.  However according to patient cardiology did not think he had any cardiac problems.  I have left a message for patient's cardiologist to call me today.  Patient subsequently got readmitted with GI bleed and required 1/3 unit of transfusion.  He was found to have thrombocytopenia and hematology was consulted.    His hemoglobin had dropped down to as low as 7 and his platelets had gone down to 87.  Today it is 35 and his hemoglobin is 9.4 today.  He denies active bleeding.  He has lost weight recently.  He does not have any fever or night sweats.    He had ultrasound of spleen which showed enlarged spleen centimeters in length    Patient was started on Gazyva with Leukeran but subsequently switched to venetoclax with Gazyva.  His venetoclax dose is 100 mg daily and he receives Gazyva once a month.    CT scan done 2020 shows significant response     MEDICATIONS: The current medication list was reviewed with the patient and updated in the EMR this date per the medical assistant. Medication dosages and frequencies were confirmed to be accurate.       ALLERGIES: PENICILLIN which causes him to swell up. He is allergic to IV CONTRAST DYE.     SOCIAL HISTORY: He is . He smokes one pack per day for 35 years. He consumes three to four alcoholic drinks per week. He has no tattoos and no previous transfusions.       FAMILY HISTORY: Father  at 82 with MI. Mother  at 82 with bone cancer. He has one brother age 65 in good health and two sisters 69 and 57 in good health.   Past Medical History, Social History, and Family History are unchanged from my prior documentation on     Review of Systems   Constitutional: Negative for appetite change, chills, diaphoresis, fatigue, fever and unexpected weight change.   HENT: Negative for  hearing loss, sore throat and trouble swallowing.    Respiratory: Negative for cough, chest tightness, shortness of breath and wheezing.    Cardiovascular: Negative for chest pain, palpitations and leg swelling.   Gastrointestinal: Positive for diarrhea (see HPI). Negative for abdominal distention, abdominal pain, constipation, nausea and vomiting.   Genitourinary: Negative for dysuria, frequency, hematuria and urgency.   Musculoskeletal: Negative for joint swelling.        No muscle weakness.   Skin: Negative for rash and wound.   Neurological: Negative for seizures, syncope, speech difficulty, weakness, numbness and headaches.   Hematological: Negative for adenopathy. Does not bruise/bleed easily.   Psychiatric/Behavioral: Negative for behavioral problems, confusion and suicidal ideas.   All other systems reviewed and are negative.  Reviewed and updated 02/08/22      Patient Active Problem List   Diagnosis   • CLL (chronic lymphocytic leukemia) (HCC)   • Anemia   • Encounter for hepatitis C screening test for low risk patient    • Thrombocytopenia (HCC)   • H/O heart artery stent   • Stented coronary artery   • Arteriosclerosis of coronary artery   • Atrial fibrillation (HCC)   • Paroxysmal atrial fibrillation (HCC)   • Chest pain, rule out acute myocardial infarction   • Fall   • Fracture, olecranon   • Hyperlipidemia   • Long term (current) use of anticoagulants   • Lower GI bleed   • Olecranon bursitis   • Shoulder pain   • Smoker   • CLL (chronic lymphocytic leukemia) (HCC)   • Dehydration   • Drug-induced nausea and vomiting   • Encounter for long-term (current) use of other medications   • Neutropenia (HCC)   • Calculus of gallbladder without cholecystitis without obstruction   • Cholecystitis   • Iron deficiency   • Adverse effect of iron       MEDICATIONS:  Medication Reconciliation for the patient has been reviewed by me and documented in the patients chart.    ALLERGIES:    Allergies   Allergen Reactions    • Penicillins Swelling         Vitals:    02/08/22 0941   BP: 121/56   Pulse: 59   Resp: 16   Temp: 98 °F (36.7 °C)   SpO2: 98%        Current Status 2/8/2022   ECOG score 0      Physical exam:    This patient's ACP documentation is up to date, and there's nothing further left to document.      CONSTITUTIONAL:  Vital signs reviewed.  No distress, looks comfortable.  EYES:  Conjunctivae and lids unremarkable.  PERRLA  EARS,NOSE,MOUTH,THROAT:  Ears and nose appear unremarkable.  Lips, teeth, gums appear unremarkable.  RESPIRATORY:  Normal respiratory effort.  Lungs clear to auscultation bilaterally.  CARDIOVASCULAR:  Normal S1, S2.  No murmurs rubs or gallops.  No significant lower extremity edema.  GASTROINTESTINAL: Abdomen appears unremarkable.  Nontender.  No hepatomegaly.  No splenomegaly.  LYMPHATIC:  No cervical, supraclavicular, axillary lymphadenopathy.  SKIN:  Warm.  No rashes.  PSYCHIATRIC:  Normal judgment and insight.  Normal mood and affect.    RECENT LABS:  Results from last 7 days   Lab Units 02/08/22  0941   WBC 10*3/mm3 7.00   NEUTROS ABS 10*3/mm3 5.39   HEMOGLOBIN g/dL 12.7*   HEMATOCRIT % 40.4   PLATELETS 10*3/mm3 140             Assessment/Plan   1. Stage 2 CLL without evidence of any disease progression.  I have reviewed the CT scan from 3/19/2018 there is minimal progression but clinically patient is asymptomatic and we will follow with observation.  Will monitor with labs.   No evidence of any frequent infections, no major worsening of his white count. He has no fever or night sweats or any other symptoms.   · Patient knows that he is prone for infections and he is mainly staying at home during the COVID-19 situation time  · Recent admission to [facility] July 10 2020×2.  Initially admitted with chest pain and underwent stress test and echo.  Echo was negative.  Stress test is abnormal but have left message for patient's cardiologist to call us.  His cardiologist is been sent  "[facility].  · He then got admitted the second time with worsening GI bleed and required total of 3 units of blood.  EGD was negative but colonoscopy showed angiectasia for which he underwent argon ablation.  Currently hemoglobin stable and no active bleeding.  · He was also found to have low platelets with platelets dropping down to 27k and hemoglobin was 7.  Ultrasound showed splenomegaly 15 cm.  Concern is whether he has progressed from his CLL point of view and requires treatment.  · CT scan performed 7/20/2020 did show the increased splenomegaly, but interval decrease in size of the axillary nodes, and shotty mediastinal nodes.  No change in the shotty nodes within the neck and supraclavicular regions.  No new lymphadenopathy.  Bone marrow biopsy however showed preliminarily that there is bone marrow involvement.  Remainder of bone marrow biopsy report is pending.  · Bone marrow shows hypercellular marrow with 100% involvement of chronic lymphocytic leukemia/small lymphocytic lymphoma and diffuse pattern with at least 98% of the total marrow cellularity.  Rare foci of erythropoiesis and rare megakaryocytes are noted.   · Negative for BCL-2 and BCL 6.  Chromosomes shows normal karyotype.  I GVH mutation present.  Positive for gain of 1 q., monosomy 13 and loss of IGH.  Negative for deletion T p53, negative for gain of chromosomes 9 and 11, negative for 11, 14 fusion.  · The combination of monosomy 13 and gain of 1 q. is thought to be associated with plasma cell myeloma.  However this patient does not have myeloma.  · Scans were reviewed with the patient today.  Dr. Moya also discussed with the patient and recommended he initiate treatment with chlorambucil and Gazyva.  He would not be a good candidate for Imbruvica due to his history of A. fib\".  He cannot take anticoagulation at this time.  The patient was provided with chemotherapy education today, including  Handouts.  He will need a port placed so that we " can initiate treatment  · 8/13/2020: Gazyva day 1. Plan also [id number] mg p.o. on day 1 day 15.  We can increase the dose once we know he tolerates and his platelets improved.  · Patient developing pancytopenia when reviewed cycle 1 day 15.  Chlorambucil dose modified to 10 mg for day 15 however it is felt that he cannot tolerate this ongoing.   ·  Plan to switch to Venetoclax along with continuing Gazyva.    · Patient due today for cycle 2-day 1 Gazyva.  He will proceed today as scheduled.  Venetoclax will be shipped to his home with plans to begin this next week on 9/14/2020.    · Patient did receive 1 L normal saline and Neulasta on 9/28/2020 secondary to neutropenia with an ANC of 0.04.  · Patient seen on 10/02/2020 continuing on venetoclax, currently on 100 mg dosing.  He would not increase his dose as he will start on voriconazole after being on venetoclax x1 week which affects the metabolism of venetoclax.  He is currently on day 5 of the 100 mg dosing.  Patient states overall he feels the same except for increased fatigue and nausea.  His nauseousness is controlled with ondansetron however.  Patient will be given 1 L normal saline in the office today as planned.  · Patient returns on 10/12/2020 continuing on venetoclax 100 mg daily as well as voriconazole.  · Patient is doing well with these treatments except fatigue.  Next cycle 4 due as of number ninth prior to which will obtain CT scans  · November 9, 2020: White count down to 1.89 but patient started Pepcid.  We will hold off Pepcid.  · 11/17/2020 platelet count dropped to 35,000 patient was instructed to hold venetoclax.  · Returns 11/23/2020 WBC up to 6.38, ANC 5.19, hemoglobin 12.4, platelets up to 121.  I have advised him to resume venetoclax 100 mg daily  · December 7, 2020: Platelets 95K.  White count 3.0 with absolute neutrophil count of 2.01.  Cycle 5 Gazyva given.  Continue venetoclax with voriconazole.   · January 4, 2021: Platelets  84,000, white count 3,440, absolute neutrophil count 1,980. Cycle 6 Gazyva given.  · 2/23/2021: WBC 3.0, platelets 90,000, hemoglobin 14.6, ANC 1.65.  Counts overall stable.  Continue Venetoclax 100 mg daily.  · January 4, 2021: Last dose of Gazyva.  · March 16, 2021: Patient is on venetoclax along with voriconazole and tolerating well with plans to complete total of 1 year.  · 4/27/2021: Patient continues on venetoclax 100 mg daily with voriconazole and acyclovir for prophylaxis.  He is tolerating this well.  He is scheduled for scans in 5 weeks.  · June 8, 2021: Patient is to continue venetoclax 100 mg daily with voriconazole and acyclovir prophylaxis.  He is tolerating it very well.  The plan is to continue 8 more weeks and then he will complete the protocol and will be followed with observation.  · I have reviewed the CT scan done on June 2, 2021 and there is no evidence of any lymphadenopathy and the spleen size is decreased in size from 12.5-11.3.  He has mild thrombocytopenia and leukopenia but his hemoglobin is normal.  · 9/7/2021: Patient completed 1 year worth of venetoclax with voriconazole and 6 months of Gazyva.  He has had an excellent response for his CLL.  Spleen decreased in size.  Currently asymptomatic.  Discontinue venetoclax since he completed 1 year  · 1/2022 patient stable in regards to his history of CLL.  No evidence of recurrent disease.  · February 8, 2022: Hemoglobin improved to 12.7, no evidence of lymphadenopathy on clinical exam and no B symptoms     2. History of intermittent thrombocytopenia  · Historically platelets in the 150s.  · Count did drop when patient was treated for CLL on chlorambucil.    · Platelets also dropping on treatment with Venetoclax in the past.    · CLL in remission.  Platelet count currently in the normal range.    3.  History of angiodysplasia requiring cauterization and Hess's esophagus mild anemia. He is currently asymptomatic.  · No evidence of active  bleeding.    · 1/2022: Patient with recurrent iron deficiency as further detailed below.  Patient referred to GI for further evaluation.  Will see Dr. Hinds 2/9/2022.  · Patient is s/p IV iron and his hemoglobin is improved still has dark stools.  Has appointment with Dr. Santa next week    4.  History of cluster headaches for which she has to be treated with steroids in the past and has followed up with Dr. Len Walker.today with significant headaches.  · Patient had CT of the head 8/20/2020 which was negative.  · He was started on prednisone 10 mg daily which was not helpful.  · Patient saw Dr. Bobby, neurology who gave him 2 shots of a new medication Emgality.  This is something that can be given once a month.    · Resolved.    5.  Atrial fibrillation, off anticoagulation given his thrombocytopenia.  · Patient continues on aspirin 81 mg daily if platelet count greater than 60,000.  · Patient sees Dr. Christy at [facility] who follows his INR    6.  Chronic mild elevation of alkaline phosphatase.  Stable.    7.  Worsening abdominal pain and GI symptoms in the fall 2021.  · CT showing gallstones but no cholecystitis.  · Symptoms persisting with patient undergoing cholecystectomy with Dr. Lui 10/1/2021.  · 1/2022 patient continues with diarrhea and abdominal cramping.  We will see GI, Dr. Hinds, 2/9/2022.      8.  Iron deficiency anemia in the setting of angiodysplasia with recurrent GI bleeding.  · Patient intolerant to oral iron  · 1/11/2022 repeat iron studies show ferritin of 15, iron saturation 7%.  · 1/18/2022 proceed with IV Injectafer x2  · Hemoglobin improved to 12.7      PLAN:   · S/p IV Injectafer x2  · Ferritin pending today but patient still complains of some intermittent dark stools  · Patient has follow-up with Dr. Hinds next week  · Hopefully he will have EGD colonoscopy to evaluate the cause of his iron deficiency.  As soon as possible  · Follow-up with me in 6 weeks with labs    This patient  is receiving high risk drug therapy requiring intensive monitoring for toxicity.          I spent 40 total minutes, face-to-face, caring for Macho solorio.  Greater than 50% of this time involved counseling and/or coordination of care as documented within this note.    Susannah Moya MD  02/08/22      Cc: Dr. Kevin Gilmore

## 2022-02-09 LAB
CENTROMERE B AB SER-ACNC: <0.2 AI (ref 0–0.9)
CHROMATIN AB SERPL-ACNC: <0.2 AI (ref 0–0.9)
DSDNA AB SER-ACNC: <1 IU/ML (ref 0–9)
ENA JO1 AB SER-ACNC: <0.2 AI (ref 0–0.9)
ENA RNP AB SER-ACNC: <0.2 AI (ref 0–0.9)
ENA SCL70 AB SER-ACNC: <0.2 AI (ref 0–0.9)
ENA SM AB SER-ACNC: <0.2 AI (ref 0–0.9)
ENA SS-A AB SER-ACNC: <0.2 AI (ref 0–0.9)
ENA SS-B AB SER-ACNC: <0.2 AI (ref 0–0.9)
Lab: NORMAL

## 2022-02-15 ENCOUNTER — PREP FOR SURGERY (OUTPATIENT)
Dept: SURGERY | Facility: SURGERY CENTER | Age: 72
End: 2022-02-15

## 2022-02-15 ENCOUNTER — OFFICE VISIT (OUTPATIENT)
Dept: GASTROENTEROLOGY | Facility: CLINIC | Age: 72
End: 2022-02-15

## 2022-02-15 ENCOUNTER — TELEPHONE (OUTPATIENT)
Dept: ONCOLOGY | Facility: CLINIC | Age: 72
End: 2022-02-15

## 2022-02-15 VITALS
HEIGHT: 73 IN | OXYGEN SATURATION: 98 % | HEART RATE: 66 BPM | DIASTOLIC BLOOD PRESSURE: 72 MMHG | WEIGHT: 165 LBS | TEMPERATURE: 97.7 F | BODY MASS INDEX: 21.87 KG/M2 | SYSTOLIC BLOOD PRESSURE: 132 MMHG

## 2022-02-15 DIAGNOSIS — R19.4 CHANGE IN BOWEL HABITS: ICD-10-CM

## 2022-02-15 DIAGNOSIS — D50.0 IRON DEFICIENCY ANEMIA DUE TO CHRONIC BLOOD LOSS: Primary | ICD-10-CM

## 2022-02-15 DIAGNOSIS — K92.1 MELENA: ICD-10-CM

## 2022-02-15 PROCEDURE — 99214 OFFICE O/P EST MOD 30 MIN: CPT | Performed by: NURSE PRACTITIONER

## 2022-02-15 RX ORDER — SODIUM CHLORIDE 0.9 % (FLUSH) 0.9 %
10 SYRINGE (ML) INJECTION AS NEEDED
Status: CANCELLED | OUTPATIENT
Start: 2022-02-15

## 2022-02-15 RX ORDER — SODIUM CHLORIDE 0.9 % (FLUSH) 0.9 %
3 SYRINGE (ML) INJECTION EVERY 12 HOURS SCHEDULED
Status: CANCELLED | OUTPATIENT
Start: 2022-02-15

## 2022-02-15 RX ORDER — SODIUM CHLORIDE, SODIUM LACTATE, POTASSIUM CHLORIDE, CALCIUM CHLORIDE 600; 310; 30; 20 MG/100ML; MG/100ML; MG/100ML; MG/100ML
30 INJECTION, SOLUTION INTRAVENOUS CONTINUOUS PRN
Status: CANCELLED | OUTPATIENT
Start: 2022-02-15

## 2022-02-15 NOTE — PROGRESS NOTES
"Chief Complaint   Patient presents with   • Abnormal Lab         History of Present Illness  71-year-old male presents today for consult regarding iron deficiency anemia.  He has a history lower GI bleed secondary to colonic angiectasia, last EGD and colonoscopy 7/12/2020.  He is followed by Dr. Roland for CLL stage IV and iron deficiency anemia, last visit 2/9/2022.    He is status post cholecystectomy 10/1/2021 for abdominal pain, nausea, indigestion and diarrhea.  Since his cholecystectomy he continues to experience abdominal pain that comes and goes.  This is located in his lower mid abdomen.  This is a pressure and cramping sensation, improves after bowel movement.    He was having diarrhea prior to cholecystectomy.  This improved after cholecystectomy and has returned over the past month.  He will have 3-4 loose diarrheal bowel movements on days when he is having diarrhea.  He can also skip a day and not have a bowel movement.  He eats a high-fiber cereal on the weekend but has not noticed that this helps regulate bowel movements.    He has noticed black stool at times over the past couple of weeks, no bright red blood per rectum.  1 month ago his hemoglobin was 8.5, he was transfused 1 unit PRBC and received IV iron infusions.  Repeat hemoglobin 2/8/2022 was 12.7.    He has occasional mid upper stomach pain that comes and goes.  He thinks this may be related to acid reflux, he does not take acid medication.  He has used Tums in the past.  No difficulty with swallowing.    His weight is stable.    He is on Coumadin.    Result Review :       ENDOSCOPY, INT (07/12/2020)   SCANNED - COLONOSCOPY (07/12/2020)   Tissue Pathology Exam (10/01/2021 07:53)   CBC & Differential (02/08/2022 09:41)     Vital Signs:   /72   Pulse 66   Temp 97.7 °F (36.5 °C)   Ht 185.4 cm (73\")   Wt 74.8 kg (165 lb)   SpO2 98%   BMI 21.77 kg/m²     Body mass index is 21.77 kg/m².     Physical Exam  Vitals reviewed. "   Constitutional:       General: He is not in acute distress.     Appearance: Normal appearance. He is not ill-appearing.   Pulmonary:      Effort: Pulmonary effort is normal. No respiratory distress.   Abdominal:      General: Bowel sounds are normal. There is no distension.      Palpations: Abdomen is soft. Abdomen is not rigid. There is no mass or pulsatile mass.      Tenderness: There is no abdominal tenderness. There is no guarding or rebound.   Neurological:      Mental Status: He is alert.           Assessment and Plan    Diagnoses and all orders for this visit:    1. Iron deficiency anemia due to chronic blood loss (Primary)    2. Change in bowel habits    3. Melena       Patient with iron deficiency anemia secondary to chronic blood loss with history of Single bleeding colonic angiectasia in the proximal ascending: Treated with APC July 2020.  He has recently been transfused 1 unit PRBC and treated with IV iron placement, he is followed closely by Dr Roland.     Orders placed for EGD and colonoscopy.    He had gastritis on previous EGD, he denies acid reflux but can experience mid upper abdominal discomfort at times.  Wonder if this is gastritis and based on EGD findings, to consider the addition of PPI therapy.    To help regulate bowel movements recommend the addition of fiber supplement.  This can be purchased over-the-counter.  Would like to see if bowel movements are more regular and complete, would this decrease frequency, improve consistency and help with abdominal discomfort.    Additional recommendations will be made based on results of EGD and colonoscopy.  To consider small bowel capsule endoscopy if no cause for iron deficiency anemia with acute blood loss noted.      Patient Instructions   Schedule EGD and colonoscopy, orders placed.    Additional recommendations will be made based on results of EGD and colonoscopy findings.    Recommend starting a daily fiber supplement such as Citrucel,  Metamucil, FiberCon or Benefiber.   These can be purchased over-the-counter, generic brand is fine.  Fiber supplements can take 12 to 72 hours to start working.  Start fiber supplement at a low dose and gradually increase based on your response.    Drink 6 to 12 ounces of water or noncarbonated beverage with fiber supplement.    Follow-up visit after procedures to discuss results and make any additional recommendations.            EMR Dragon/Transcription Disclaimer:  This document has been Dictated utilizing Dragon dictation.

## 2022-02-15 NOTE — PATIENT INSTRUCTIONS
Schedule EGD and colonoscopy, orders placed.    Additional recommendations will be made based on results of EGD and colonoscopy findings.    Recommend starting a daily fiber supplement such as Citrucel, Metamucil, FiberCon or Benefiber.   These can be purchased over-the-counter, generic brand is fine.  Fiber supplements can take 12 to 72 hours to start working.  Start fiber supplement at a low dose and gradually increase based on your response.    Drink 6 to 12 ounces of water or noncarbonated beverage with fiber supplement.    Follow-up visit after procedures to discuss results and make any additional recommendations.

## 2022-02-15 NOTE — TELEPHONE ENCOUNTER
Caller: Macho Stephenson    Relationship to patient: Self    Best call back number: [phone]    Chief complaint: PATIENT IS CURRENTLY SCHEDULED FOR A FU ON 3/22/22.  HOWEVER, HIS COLONOSCOPY HAS BEEN SCHEDULED FOR THIS DAY AND PATIENT WOULD LIKE TO RESCHEDULE HIS FU UNTIL AFTER THE PROCEDURE     Type of visit: FU    Requested date: ANY OTHER DAY OK    Additional notes: PLEASE CALL PATIENT TO ADVISE OF NEW APPT DATE/TIME

## 2022-03-17 NOTE — SIGNIFICANT NOTE
Education provided the Patient on the following:    - Nothing to Eat or Drink after MN the night before the procedure  -Your required COVID Test is Scheduled on 3/19 Between the Hours of 7618-5642  -You will only be notified if your COVID test Result is POSITIVE  -The importance of reducing your number of contacts by self quarantining after you COVID test until the date of your Colonoscopy and EGD    - Avoid red/purple fluids while completing their bowel prep as ordered by physician  -Contact Gastrointerologist office for any questions about specific details regarding colon prep    -You will need to have someone drive you home after your colonoscopy and remain with you for 24 hours after the procedure  - The date of your procedure, your are welcome to have one visitor at bedside or remain within 10-15 minutes of [facility]  -Please wear warm socks when you arrive for your procedure  -Remove all jewelry and leave any valuables before arriving the day of your procedure (all will have to be removed before leaving preop)  -You will need to arrive at 11on 3/22 procedure    -Feel free to contact us at: [phone] with any additional questions/concerns

## 2022-03-19 ENCOUNTER — LAB (OUTPATIENT)
Dept: LAB | Facility: SURGERY CENTER | Age: 72
End: 2022-03-19

## 2022-03-19 DIAGNOSIS — R19.4 CHANGE IN BOWEL HABITS: ICD-10-CM

## 2022-03-19 DIAGNOSIS — K92.1 MELENA: ICD-10-CM

## 2022-03-19 DIAGNOSIS — D50.0 IRON DEFICIENCY ANEMIA DUE TO CHRONIC BLOOD LOSS: ICD-10-CM

## 2022-03-19 LAB — SARS-COV-2 ORF1AB RESP QL NAA+PROBE: NOT DETECTED

## 2022-03-19 PROCEDURE — U0004 COV-19 TEST NON-CDC HGH THRU: HCPCS

## 2022-03-19 PROCEDURE — C9803 HOPD COVID-19 SPEC COLLECT: HCPCS

## 2022-03-19 PROCEDURE — U0005 INFEC AGEN DETEC AMPLI PROBE: HCPCS

## 2022-03-22 ENCOUNTER — ANESTHESIA EVENT (OUTPATIENT)
Dept: SURGERY | Facility: SURGERY CENTER | Age: 72
End: 2022-03-22

## 2022-03-22 ENCOUNTER — HOSPITAL ENCOUNTER (OUTPATIENT)
Facility: SURGERY CENTER | Age: 72
Setting detail: HOSPITAL OUTPATIENT SURGERY
Discharge: HOME | End: 2022-03-22
Attending: INTERNAL MEDICINE | Admitting: INTERNAL MEDICINE

## 2022-03-22 ENCOUNTER — ANESTHESIA (OUTPATIENT)
Dept: SURGERY | Facility: SURGERY CENTER | Age: 72
End: 2022-03-22

## 2022-03-22 VITALS
SYSTOLIC BLOOD PRESSURE: 112 MMHG | TEMPERATURE: 97.1 F | HEART RATE: 59 BPM | BODY MASS INDEX: 22.53 KG/M2 | WEIGHT: 170 LBS | HEIGHT: 73 IN | RESPIRATION RATE: 16 BRPM | DIASTOLIC BLOOD PRESSURE: 75 MMHG | OXYGEN SATURATION: 97 %

## 2022-03-22 DIAGNOSIS — R19.4 CHANGE IN BOWEL HABITS: ICD-10-CM

## 2022-03-22 DIAGNOSIS — D50.0 IRON DEFICIENCY ANEMIA DUE TO CHRONIC BLOOD LOSS: ICD-10-CM

## 2022-03-22 DIAGNOSIS — K92.1 MELENA: ICD-10-CM

## 2022-03-22 LAB — GLUCOSE BLDC GLUCOMTR-MCNC: 138 MG/DL (ref 70–130)

## 2022-03-22 PROCEDURE — 43239 EGD BIOPSY SINGLE/MULTIPLE: CPT | Performed by: INTERNAL MEDICINE

## 2022-03-22 PROCEDURE — 43255 EGD CONTROL BLEEDING ANY: CPT | Performed by: INTERNAL MEDICINE

## 2022-03-22 PROCEDURE — 45385 COLONOSCOPY W/LESION REMOVAL: CPT | Performed by: INTERNAL MEDICINE

## 2022-03-22 PROCEDURE — 88305 TISSUE EXAM BY PATHOLOGIST: CPT | Performed by: INTERNAL MEDICINE

## 2022-03-22 PROCEDURE — 45388 COLONOSCOPY W/ABLATION: CPT | Performed by: INTERNAL MEDICINE

## 2022-03-22 PROCEDURE — 25010000002 PROPOFOL 10 MG/ML EMULSION: Performed by: ANESTHESIOLOGY

## 2022-03-22 PROCEDURE — 45380 COLONOSCOPY AND BIOPSY: CPT | Performed by: INTERNAL MEDICINE

## 2022-03-22 RX ORDER — SODIUM CHLORIDE 0.9 % (FLUSH) 0.9 %
10 SYRINGE (ML) INJECTION AS NEEDED
Status: DISCONTINUED | OUTPATIENT
Start: 2022-03-22 | End: 2022-03-22 | Stop reason: HOSPADM

## 2022-03-22 RX ORDER — SODIUM CHLORIDE, SODIUM LACTATE, POTASSIUM CHLORIDE, CALCIUM CHLORIDE 600; 310; 30; 20 MG/100ML; MG/100ML; MG/100ML; MG/100ML
30 INJECTION, SOLUTION INTRAVENOUS CONTINUOUS PRN
Status: DISCONTINUED | OUTPATIENT
Start: 2022-03-22 | End: 2022-03-22 | Stop reason: HOSPADM

## 2022-03-22 RX ORDER — LIDOCAINE HYDROCHLORIDE 20 MG/ML
INJECTION, SOLUTION INFILTRATION; PERINEURAL AS NEEDED
Status: DISCONTINUED | OUTPATIENT
Start: 2022-03-22 | End: 2022-03-22 | Stop reason: SURG

## 2022-03-22 RX ORDER — SODIUM CHLORIDE 0.9 % (FLUSH) 0.9 %
3 SYRINGE (ML) INJECTION EVERY 12 HOURS SCHEDULED
Status: DISCONTINUED | OUTPATIENT
Start: 2022-03-22 | End: 2022-03-22 | Stop reason: HOSPADM

## 2022-03-22 RX ORDER — PROPOFOL 10 MG/ML
VIAL (ML) INTRAVENOUS AS NEEDED
Status: DISCONTINUED | OUTPATIENT
Start: 2022-03-22 | End: 2022-03-22 | Stop reason: SURG

## 2022-03-22 RX ADMIN — PROPOFOL INJECTABLE EMULSION 140 MCG/KG/MIN: 10 INJECTION, EMULSION INTRAVENOUS at 13:00

## 2022-03-22 RX ADMIN — PROPOFOL 80 MG: 10 INJECTION, EMULSION INTRAVENOUS at 13:00

## 2022-03-22 RX ADMIN — SODIUM CHLORIDE, SODIUM LACTATE, POTASSIUM CHLORIDE, AND CALCIUM CHLORIDE: .6; .31; .03; .02 INJECTION, SOLUTION INTRAVENOUS at 12:57

## 2022-03-22 RX ADMIN — LIDOCAINE HYDROCHLORIDE 100 MG: 20 INJECTION, SOLUTION INFILTRATION; PERINEURAL at 13:00

## 2022-03-22 NOTE — DISCHARGE INSTRUCTIONS
ENDOSCOPY - EGD/COLONOSCOPY       ADULT CARE DISCHARGE  INSTRUCTIONS     Symptoms you may temporarily experience:      Sore Throat     Hoarseness     Bloating/Cramping     Dizziness     IV Irritation/tenderness     Gas or Belching     Slight fever     Small amount of blood in vomit or stool       Call Your Doctor for the following Problems: ______________________     ________________     Fever of 101 degrees or higher       Sharp abdominal  pain     Red streak up the arm from the IV site     Severe cramping        Large amount of blood in stool or vomit       Instructions for the next 24 hours after your Procedure:     Adult supervision     Do NOT drink any alcohol      Do not work today     NO important decisions     DO NOT sign any legal documents     You may shower/ bathe       DO NOT  DRIVE or operate machinery     Resume normal activity tomorrow       Discharge  Diet:     Avoid spicy/ greasy foods     Avoid any food that will cause more gas or bloating       *** Seek IMMEDIATE medical attention and call 911 if you develop symptoms such as:     Chest pain     Shortness of breath     Severe bleeding  hia

## 2022-03-22 NOTE — ANESTHESIA PREPROCEDURE EVALUATION
" Anesthesia Evaluation     Patient summary reviewed and Nursing notes reviewed   no history of anesthetic complications:  NPO Solid Status: > 8 hours  NPO Liquid Status: > 2 hours           Airway   Mallampati: II  TM distance: >3 FB  Neck ROM: full  No difficulty expected  Dental    (+) edentulous, lower dentures and upper dentures    Pulmonary - normal exam   (+) a smoker Former,   Cardiovascular   Exercise tolerance: good (4-7 METS)    ECG reviewed  Patient on routine beta blocker and Beta blocker given within 24 hours of surgery    (+) hypertension, valvular problems/murmurs murmur, CAD, cardiac stents Drug eluting stent more than 12 months ago dysrhythmias Atrial Fib, POLLARD, murmur, hyperlipidemia,   (-) angina      Neuro/Psych  GI/Hepatic/Renal/Endo    (+)  GERD, GI bleeding (history) , diabetes mellitus type 2,     Musculoskeletal     Abdominal  - normal exam   Substance History      OB/GYN          Other   blood dyscrasia (CLL) anemia thrombocytopenia,   history of cancer                      Anesthesia Plan    ASA 3     MAC   (I have reviewed the patient's history with the patient and the chart, including all pertinent laboratory results and imaging. I have explained the risks of anesthesia including but not limited to dental damage, corneal abrasion, nerve injury, MI, stroke, and death.    /97 (BP Location: Left arm, Patient Position: Lying)   Pulse 66   Temp 36.4 °C (97.5 °F) (Tympanic)   Resp 16   Ht 185.4 cm (73\")   Wt 77.1 kg (170 lb)   SpO2 97%   BMI 22.43 kg/m²   )  intravenous induction     Anesthetic plan, all risks, benefits, and alternatives have been provided, discussed and informed consent has been obtained with: patient.      "

## 2022-03-22 NOTE — H&P
No chief complaint on file.      HPI  Patient today for an EGD and colonoscopy for iron deficiency anemia.         Problem List:    Patient Active Problem List   Diagnosis   • CLL (chronic lymphocytic leukemia) (HCC)   • Anemia   • Encounter for hepatitis C screening test for low risk patient    • Thrombocytopenia (HCC)   • H/O heart artery stent   • Stented coronary artery   • Arteriosclerosis of coronary artery   • Atrial fibrillation (HCC)   • Paroxysmal atrial fibrillation (HCC)   • Chest pain, rule out acute myocardial infarction   • Fall   • Fracture, olecranon   • Hyperlipidemia   • Long term (current) use of anticoagulants   • Lower GI bleed   • Olecranon bursitis   • Shoulder pain   • Smoker   • CLL (chronic lymphocytic leukemia) (HCC)   • Dehydration   • Drug-induced nausea and vomiting   • Encounter for long-term (current) use of other medications   • Neutropenia (HCC)   • Calculus of gallbladder without cholecystitis without obstruction   • Cholecystitis   • Iron deficiency   • Adverse effect of iron   • Melena   • Change in bowel habits       Medical History:    Past Medical History:   Diagnosis Date   • A-fib (HCC)     Paroxysmal atrial fibrillation   • Hess's esophagus    • CAD (coronary artery disease)     has stents coronary artery   • Chronic lymphatic leukemia (HCC)    • CLL (chronic lymphocytic leukemia) (HCC)    • Colon polyps 05/07/2007    Rectum: hyperplastic polyp   • Colon polyps 07/16/2019    Rectum: hyperplastic polyps x2   • Diabetes mellitus (HCC)    • Gall stones    • GERD (gastroesophageal reflux disease)    • Gout    • Heart disease    • Heart murmur    • History of transfusion     no reaction   • Hyperlipidemia    • Hypertension    • Low back pain    • RLS (restless legs syndrome)         Social History:    Social History     Socioeconomic History   • Marital status:      Spouse name: Nohemi   Tobacco Use   • Smoking status: Current Every Day Smoker     Packs/day: 0.50      Years: 40.00     Pack years: 20.00     Types: Cigarettes   • Smokeless tobacco: Never Used   • Tobacco comment: 1/2 PPD   Vaping Use   • Vaping Use: Never used   Substance and Sexual Activity   • Alcohol use: Yes     Alcohol/week: 2.0 standard drinks     Types: 2 Cans of beer per week   • Drug use: Never   • Sexual activity: Defer       Family History:   Family History   Problem Relation Age of Onset   • Bone cancer Mother    • Heart attack Father    • Diabetes Father    • Diabetes Sister    • Malig Hyperthermia Neg Hx    • Colon cancer Neg Hx    • Colon polyps Neg Hx    • Crohn's disease Neg Hx    • Irritable bowel syndrome Neg Hx    • Ulcerative colitis Neg Hx        Surgical History:   Past Surgical History:   Procedure Laterality Date   • ANKLE FUSION Right     Fusion hardware   • BONE MARROW BIOPSY Left 10/28/2020    CT guided left iliac crest biopsy and FNA-Dr. Darius Reynolds, [facility]   • CARDIAC CATHETERIZATION  02/2011    [facility]   • CARDIOVERSION  04/22/2016    [facility] Mart[facility]   • CHOLECYSTECTOMY WITH INTRAOPERATIVE CHOLANGIOGRAM N/A 10/1/2021    Procedure: CHOLECYSTECTOMY LAPAROSCOPIC INTRAOPERATIVE CHOLANGIOGRAM;  Surgeon: Darius Lui MD;  Location: [facility] OR [facility];  Service: General;  Laterality: N/A;   • COLONOSCOPY N/A 2012   • COLONOSCOPY N/A 07/12/2020    Ray Noe   • COLONOSCOPY W/ POLYPECTOMY N/A 05/07/2007    Dr. Vivek Siddiqui, [facility]   • CORONARY STENT PLACEMENT  02/2011    Jennifer, Promus CARLOS x 2 proximal to mid RCA, LAD x 1   • ENDOSCOPY N/A 07/15/2019   • ENDOSCOPY AND COLONOSCOPY N/A 07/15/2019    Dr. Ulloa   • SHOULDER SURGERY Left 2007   • TONSILLECTOMY Bilateral    • VENOUS ACCESS DEVICE (PORT) INSERTION N/A 7/31/2020    Procedure: INSERTION VENOUS ACCESS DEVICE;  Surgeon: Darius Lui MD;  Location: [facility] OR [facility];  Service: General;  Laterality: N/A;         Current Facility-Administered Medications:   •  lactated ringers infusion, 30 mL/hr, Intravenous,  Continuous PRN, Arleen Villareal, APRN  •  sodium chloride 0.9 % flush 10 mL, 10 mL, Intravenous, PRN, Arleen Villareal, APRN  •  sodium chloride 0.9 % flush 3 mL, 3 mL, Intravenous, Q12H, Arleen Villareal, APRN    Allergies:   Allergies   Allergen Reactions   • Penicillins Swelling        The following portions of the patient's history were reviewed by me and updated as appropriate: review of systems, allergies, current medications, past family history, past medical history, past social history, past surgical history and problem list.    There were no vitals filed for this visit.    PHYSICAL EXAM:    CONSTITUTIONAL:  today's vital signs reviewed by me  GASTROINTESTINAL: abdomen is soft nontender nondistended with normal active bowel sounds, no masses are appreciated    Assessment/ Plan  We will proceed today with EGD and colonoscopy.    Risks and benefits as well as alternatives to endoscopic evaluation were explained to the patient and they voiced understanding and wish to proceed.  These risks include but are not limited to the risk of bleeding, perforation, adverse reaction to sedation, and missed lesions.  The patient was given the opportunity to ask questions prior to the endoscopic procedure.

## 2022-03-22 NOTE — ANESTHESIA POSTPROCEDURE EVALUATION
Patient: Macho Stephenson    Procedure Summary     Date: 03/22/22 Room / Location: [facility]    Anesthesia Start: 1257 Anesthesia Stop: 1342    Procedures:       ESOPHAGOGASTRODUODENOSCOPY (N/A )      COLONOSCOPY FOR SCREENING (N/A ) Diagnosis:       Iron deficiency anemia due to chronic blood loss      Melena      Change in bowel habits      (Iron deficiency anemia due to chronic blood loss [D50.0])      (Melena [K92.1])      (Change in bowel habits [R19.4])    Surgeons: Velasquez Hinds MD Provider: Carolee Sebastian MD    Anesthesia Type: MAC ASA Status: 3          Anesthesia Type: MAC    Vitals  Vitals Value Taken Time   BP     Temp 36.2 °C (97.1 °F) 03/22/22 1345   Pulse 64 03/22/22 1345   Resp 18 03/22/22 1345   SpO2 97 % 03/22/22 1345           Post Anesthesia Care and Evaluation    Patient location during evaluation: bedside  Patient participation: complete - patient participated  Level of consciousness: awake and alert  Pain management: adequate  Airway patency: patent  Anesthetic complications: No anesthetic complications  PONV Status: controlled  Cardiovascular status: acceptable  Respiratory status: acceptable  Hydration status: acceptable

## 2022-03-24 LAB
LAB AP CASE REPORT: NORMAL
LAB AP CLINICAL INFORMATION: NORMAL
LAB AP DIAGNOSIS COMMENT: NORMAL
PATH REPORT.FINAL DX SPEC: NORMAL
PATH REPORT.GROSS SPEC: NORMAL

## 2022-03-28 DIAGNOSIS — D50.0 IRON DEFICIENCY ANEMIA DUE TO CHRONIC BLOOD LOSS: Primary | ICD-10-CM

## 2022-03-29 ENCOUNTER — LAB (OUTPATIENT)
Dept: ONCOLOGY | Facility: HOSPITAL | Age: 72
End: 2022-03-29

## 2022-03-29 ENCOUNTER — OFFICE VISIT (OUTPATIENT)
Dept: ONCOLOGY | Facility: CLINIC | Age: 72
End: 2022-03-29

## 2022-03-29 VITALS
SYSTOLIC BLOOD PRESSURE: 146 MMHG | DIASTOLIC BLOOD PRESSURE: 75 MMHG | WEIGHT: 162.9 LBS | RESPIRATION RATE: 16 BRPM | TEMPERATURE: 96.8 F | BODY MASS INDEX: 21.59 KG/M2 | HEART RATE: 66 BPM | HEIGHT: 73 IN | OXYGEN SATURATION: 97 %

## 2022-03-29 DIAGNOSIS — C91.10 CLL (CHRONIC LYMPHOCYTIC LEUKEMIA): Primary | ICD-10-CM

## 2022-03-29 DIAGNOSIS — E61.1 IRON DEFICIENCY: Primary | ICD-10-CM

## 2022-03-29 DIAGNOSIS — C91.10 CLL (CHRONIC LYMPHOCYTIC LEUKEMIA): ICD-10-CM

## 2022-03-29 DIAGNOSIS — K22.719 BARRETT'S ESOPHAGUS WITH DYSPLASIA: Primary | ICD-10-CM

## 2022-03-29 LAB
ALBUMIN SERPL-MCNC: 4.1 G/DL (ref 3.5–5.2)
ALBUMIN/GLOB SERPL: 2.2 G/DL (ref 1.1–2.4)
ALP SERPL-CCNC: 136 U/L (ref 38–116)
ALT SERPL W P-5'-P-CCNC: 25 U/L (ref 0–41)
ANION GAP SERPL CALCULATED.3IONS-SCNC: 9.5 MMOL/L (ref 5–15)
AST SERPL-CCNC: 24 U/L (ref 0–40)
BASOPHILS # BLD AUTO: 0.04 10*3/MM3 (ref 0–0.2)
BASOPHILS NFR BLD AUTO: 0.7 % (ref 0–1.5)
BILIRUB SERPL-MCNC: 0.5 MG/DL (ref 0.2–1.2)
BUN SERPL-MCNC: 14 MG/DL (ref 6–20)
BUN/CREAT SERPL: 14.7 (ref 7.3–30)
CALCIUM SPEC-SCNC: 9.2 MG/DL (ref 8.5–10.2)
CHLORIDE SERPL-SCNC: 102 MMOL/L (ref 98–107)
CO2 SERPL-SCNC: 26.5 MMOL/L (ref 22–29)
CREAT SERPL-MCNC: 0.95 MG/DL (ref 0.7–1.3)
DEPRECATED RDW RBC AUTO: 58.6 FL (ref 37–54)
EGFRCR SERPLBLD CKD-EPI 2021: 85 ML/MIN/1.73
EOSINOPHIL # BLD AUTO: 0.12 10*3/MM3 (ref 0–0.4)
EOSINOPHIL NFR BLD AUTO: 2.2 % (ref 0.3–6.2)
ERYTHROCYTE [DISTWIDTH] IN BLOOD BY AUTOMATED COUNT: 15.7 % (ref 12.3–15.4)
GLOBULIN UR ELPH-MCNC: 1.9 GM/DL (ref 1.8–3.5)
GLUCOSE SERPL-MCNC: 270 MG/DL (ref 74–124)
HCT VFR BLD AUTO: 40 % (ref 37.5–51)
HGB BLD-MCNC: 13.2 G/DL (ref 13–17.7)
IMM GRANULOCYTES # BLD AUTO: 0.04 10*3/MM3 (ref 0–0.05)
IMM GRANULOCYTES NFR BLD AUTO: 0.7 % (ref 0–0.5)
LYMPHOCYTES # BLD AUTO: 0.79 10*3/MM3 (ref 0.7–3.1)
LYMPHOCYTES NFR BLD AUTO: 14.3 % (ref 19.6–45.3)
MCH RBC QN AUTO: 33 PG (ref 26.6–33)
MCHC RBC AUTO-ENTMCNC: 33 G/DL (ref 31.5–35.7)
MCV RBC AUTO: 100 FL (ref 79–97)
MONOCYTES # BLD AUTO: 0.32 10*3/MM3 (ref 0.1–0.9)
MONOCYTES NFR BLD AUTO: 5.8 % (ref 5–12)
NEUTROPHILS NFR BLD AUTO: 4.21 10*3/MM3 (ref 1.7–7)
NEUTROPHILS NFR BLD AUTO: 76.3 % (ref 42.7–76)
NRBC BLD AUTO-RTO: 0 /100 WBC (ref 0–0.2)
PLATELET # BLD AUTO: 132 10*3/MM3 (ref 140–450)
PMV BLD AUTO: 10 FL (ref 6–12)
POTASSIUM SERPL-SCNC: 4 MMOL/L (ref 3.5–4.7)
PROT SERPL-MCNC: 6 G/DL (ref 6.3–8)
RBC # BLD AUTO: 4 10*6/MM3 (ref 4.14–5.8)
SODIUM SERPL-SCNC: 138 MMOL/L (ref 134–145)
WBC NRBC COR # BLD: 5.52 10*3/MM3 (ref 3.4–10.8)

## 2022-03-29 PROCEDURE — 36415 COLL VENOUS BLD VENIPUNCTURE: CPT

## 2022-03-29 PROCEDURE — 36591 DRAW BLOOD OFF VENOUS DEVICE: CPT

## 2022-03-29 PROCEDURE — 80053 COMPREHEN METABOLIC PANEL: CPT

## 2022-03-29 PROCEDURE — 25010000002 HEPARIN LOCK FLUSH PER 10 UNITS: Performed by: INTERNAL MEDICINE

## 2022-03-29 PROCEDURE — 99213 OFFICE O/P EST LOW 20 MIN: CPT | Performed by: INTERNAL MEDICINE

## 2022-03-29 PROCEDURE — 85025 COMPLETE CBC W/AUTO DIFF WBC: CPT

## 2022-03-29 RX ORDER — PANTOPRAZOLE SODIUM 40 MG/1
40 TABLET, DELAYED RELEASE ORAL DAILY
Qty: 90 TABLET | Refills: 3 | Status: SHIPPED | OUTPATIENT
Start: 2022-03-29

## 2022-03-29 RX ORDER — HEPARIN SODIUM (PORCINE) LOCK FLUSH IV SOLN 100 UNIT/ML 100 UNIT/ML
500 SOLUTION INTRAVENOUS AS NEEDED
Status: DISCONTINUED | OUTPATIENT
Start: 2022-03-29 | End: 2022-03-29 | Stop reason: HOSPADM

## 2022-03-29 RX ORDER — HEPARIN SODIUM (PORCINE) LOCK FLUSH IV SOLN 100 UNIT/ML 100 UNIT/ML
500 SOLUTION INTRAVENOUS AS NEEDED
Status: CANCELLED | OUTPATIENT
Start: 2022-03-29

## 2022-03-29 RX ORDER — SODIUM CHLORIDE 0.9 % (FLUSH) 0.9 %
10 SYRINGE (ML) INJECTION AS NEEDED
Status: DISCONTINUED | OUTPATIENT
Start: 2022-03-29 | End: 2022-03-29 | Stop reason: HOSPADM

## 2022-03-29 RX ORDER — SODIUM CHLORIDE 0.9 % (FLUSH) 0.9 %
10 SYRINGE (ML) INJECTION AS NEEDED
Status: CANCELLED | OUTPATIENT
Start: 2022-03-29

## 2022-03-29 RX ADMIN — Medication 10 ML: at 10:28

## 2022-03-29 RX ADMIN — Medication 500 UNITS: at 10:28

## 2022-03-29 NOTE — PROGRESS NOTES
Subjective     REASON FOR VISIT:    1. Chronic lymphoid leukemia, stage 4, presented initially with significant pancytopenia  · Currently on Gazyva with venetoclax  · Cycle 1 Gazyva given on August 13, 2020    2.  History of angiodysplasia requiring cauterization and history of Hess's esophagus    3.  Bone marrow aspiration biopsy shows hypercellular marrow with 98% involvement with CLL    4. Iron deficiency anemia in the setting of GI bleeding      History of Present Illness The patient is a 71 y.o. male with the above-mentioned history who returns today for follow-up.  Patient developed worsening abdominal discomfort with nausea and indigestion and frequent diarrhea and was found per CT scan to have gallstones but no evidence of cholecystitis.  However symptoms persisted and he ended up undergoing cholecystectomy per Dr. Lui on 10/1/2021.  Following this procedure patient had a drop in his hemoglobin from 11.7 down to 8.5.  We repeated iron studies when he was here 1/11/2022 showing that he is indeed iron deficient with iron saturation of 7%, ferritin of 15.  Patient cannot tolerate oral iron and therefore was scheduled for IV Injectafer, returning today to begin this.    As detailed above patient has history of angiodysplasia and Hess's esophagus and continues to have ongoing diarrhea despite cholecystectomy.  He has been referred to GI and will see Dr. Hinds 2/9/2022 for further evaluation.    As he is reviewed today patient did receive 1 unit of PRBCs and hemoglobin improved nicely to 10.5.  We discussed that we expect this to further improve following IV iron in the next 2 weeks.    Interval history: Patient is s/p iron infusions x2.  His hemoglobin is improved to 12.7.  He says he has intermittent dark stools and is not taking ferrous sulfate.  He has an appointment with Dr. Gonzalez next week.    Interval history:     Colonoscopy done March 22, 2022 shows multiple polyps with a 5 mm polyp in the  rectum a 3 mm polyp in the rectum, 6 mm polyp in the sigmoid colon, 3 mm polyp in the ascending colon, 8 mm polyp in the ascending colon and two 7 to 8 mm polyp in the cecum which were all resected.  A single colonic angiodysplastic lesion was seen which was treated with argon plasma coagulation.    EGD showed 3 cm hiatal hernia.  There was reflux esophagitis which was biopsied.  Normal stomach.  2 bleeding angiectasia's in the duodenum which underwent argon plasma coagulation.    Allergies showed squamous and glandular mucosa at the GE junction with focal active mild chronic inflammation and rare eosinophil noted suggesting chronic reflux.  Intestinal metaplasia is present by protein staining consistent with Hess's esophagus.  All of the polyps are benign and no evidence of malignancy or high-grade dysplasia.    Since then his hemoglobin has normalized with a hemoglobin of 13.2 with a white count of 5.52 and a platelet count of 132.  He does not have any B symptoms.    Patient's last CT scan has been a year ago and we will go ahead and obtain CT scan in 3 months.  If all the CT scans are negative then we can consider getting the port removed    PAST MEDICAL HISTORY:   1. Recurrent atrial fibrillation followed by cardiology. He has had drug eluting stent placed x 3.   2. High cholesterol.   3. Hypertension.       HEMATOLOGIC/ONCOLOGIC HISTORY:       The patient is 62-year-old gentleman with the above history currently here for followup. He has no complaints. He denies fever, night sweats or weight loss. He does not have any lymphadenopathy. He feels good. He had some fatigue but his CBC shows a go od hemoglobin.   The patient is followed by Dr. Kevin Chandra. He had seen Dr. Chandra 7/18/13 for a history of coronary artery disease status drug eluting stent placement to the right coronary artery and left anterior descending artery in February 2011. Histor y of paroxysmal atrial fibrillation and hyperlipidemia. He  presented to [facility] on 6/23/13 with palpitations and was found to have atrial fibrillation with rapid ventricular response. He was given IV Cardizem and converted spontaneously to normal sinus rhythm. He was found to have elevated white count at 18.9 and denied any symptoms of infection or urinary complaints. TSH was normal, troponin levels were negative. He was asked to continue aspirin and metoprolol for that. If he contin u ed to have atrial fibrillation, they will consider antiarrhythmic therapy with or without a short term anticoagulation therapy. However, currently the patient is feeling reasonably good. He has no complaints. His CBC 7/22/13 showed WBC 17,000, hemoglob i n 16.4 and platelet count of 174,000. Back 9/28/13 his hemoglobin was 15.1, WBC 13.3 and platelets 197,000. He has had a persistently elevated WBC but he is totally asymptomatic. He does not have any fever, night sweats or lymphadenopathy. He is here to be evaluated for his elevated white count.       Peripheral blood flow cytometry done 08/16/13 shows 22% chronic lymphoid leukemia cells, and they expressed ZAP-70 but not CD38. The total number of cells approximated 60% T cells, 32% B cells and 1% natural k iller cells. The B cells were monoclonal and expressed CD19, CD20, CD22, CD5, CD23, CD11c, ZAP-70 and T cell antigens. There was a normal CD4/CD8 ratio. CT of the chest, abdomen and pelvis does not show evidence of metastasis. Peripheral blood for DILLON -2 was negative. It was negative for T11:14 translocation.       CT scan done on 08/21/2013 shows 5 to 6 mm noncalcified nodule in the left lower lobe which is unchanged from December 2010. Otherwise no evidence of any lymphadenopathy or hepatosplenomegaly.       MRI of the spine shows arthritis and disc bulge and likely hemangiomas, with 1 lesion showing increased signal intensity at T2, which is likely a hemangioma. Bone scan could be done, but patient does not even have  much pain there. CT scan of the chest, a bdomen and pelvis was done on 11/23/2014. He has tiny lymph nodes in the neck which are difficult to palpate. Right jugular one is 1.4 x 0.9 and left supraclavicular one is 1.5 x 1.1. He also has a subcarinal lymph node which is 1.7 x 1.2 cm, and he ha s a portocaval lymph node which has increased from 2.5 to 2.8. Patient is totally asymptomatic. He does not have any B symptoms, so will continue followup with observation.     Patient is a 69-year-old gentleman with history of chronic lymphocytic leukemia/SLL who recently got admitted to [facility] because of GI bleed.  He was seen by Dr. Montero.  He underwent EGD colonoscopy.  He was found to have angiodysplasia for which he underwent argon coagulation.  He required 3 units of blood.  Patient had a normal esophagus normal stomach and normal duodenum CLOtest was negative he was asked to take Protonix 40 mg daily.  Colonoscopy showed blood in the entire examined colon but there was a single bleeding colonic angiectasia which was treated with argon plasma coagulation.    He was admitted twice.  Initially he was admitted on July 10 at which time he stayed 3 days and he was evaluated for chest pain.  He underwent a cardiac work-up with stress test and echo.  The echo was normal but the stress test showed medium sized moderate severe severity fixed perfusion defect.  Of the inferior wall consistent with infarction.  However according to patient cardiology did not think he had any cardiac problems.  I have left a message for patient's cardiologist to call me today.  Patient subsequently got readmitted with GI bleed and required 1/3 unit of transfusion.  He was found to have thrombocytopenia and hematology was consulted.    His hemoglobin had dropped down to as low as 7 and his platelets had gone down to 87.  Today it is 35 and his hemoglobin is 9.4 today.  He denies active bleeding.  He has lost weight recently.  He does not  have any fever or night sweats.    He had ultrasound of spleen which showed enlarged spleen centimeters in length    Patient was started on Gazyva with Leukeran but subsequently switched to venetoclax with Gazyva.  His venetoclax dose is 100 mg daily and he receives Gazyva once a month.    CT scan done 2020 shows significant response     MEDICATIONS: The current medication list was reviewed with the patient and updated in the EMR this date per the medical assistant. Medication dosages and frequencies were confirmed to be accurate.       ALLERGIES: PENICILLIN which causes him to swell up. He is allergic to IV CONTRAST DYE.     SOCIAL HISTORY: He is . He smokes one pack per day for 35 years. He consumes three to four alcoholic drinks per week. He has no tattoos and no previous transfusions.       FAMILY HISTORY: Father  at 82 with MI. Mother  at 82 with bone cancer. He has one brother age 65 in good health and two sisters 69 and 57 in good health.   Past Medical History, Social History, and Family History are unchanged from my prior documentation on     Review of Systems   Constitutional: Negative for appetite change, chills, diaphoresis, fatigue, fever and unexpected weight change.   HENT: Negative for hearing loss, sore throat and trouble swallowing.    Respiratory: Negative for cough, chest tightness, shortness of breath and wheezing.    Cardiovascular: Negative for chest pain, palpitations and leg swelling.   Gastrointestinal: Positive for diarrhea (see HPI). Negative for abdominal distention, abdominal pain, constipation, nausea and vomiting.   Genitourinary: Negative for dysuria, frequency, hematuria and urgency.   Musculoskeletal: Negative for joint swelling.        No muscle weakness.   Skin: Negative for rash and wound.   Neurological: Negative for seizures, syncope, speech difficulty, weakness, numbness and headaches.   Hematological: Negative for adenopathy. Does  not bruise/bleed easily.   Psychiatric/Behavioral: Negative for behavioral problems, confusion and suicidal ideas.   All other systems reviewed and are negative.  Reviewed and updated 04/01/22      Patient Active Problem List   Diagnosis   • CLL (chronic lymphocytic leukemia) (HCC)   • Anemia   • Encounter for hepatitis C screening test for low risk patient    • Thrombocytopenia (HCC)   • H/O heart artery stent   • Stented coronary artery   • Arteriosclerosis of coronary artery   • Atrial fibrillation (HCC)   • Paroxysmal atrial fibrillation (HCC)   • Chest pain, rule out acute myocardial infarction   • Fall   • Fracture, olecranon   • Hyperlipidemia   • Long term (current) use of anticoagulants   • Lower GI bleed   • Olecranon bursitis   • Shoulder pain   • Smoker   • CLL (chronic lymphocytic leukemia) (HCC)   • Dehydration   • Drug-induced nausea and vomiting   • Encounter for long-term (current) use of other medications   • Neutropenia (HCC)   • Calculus of gallbladder without cholecystitis without obstruction   • Cholecystitis   • Iron deficiency   • Adverse effect of iron   • Melena   • Change in bowel habits       MEDICATIONS:  Medication Reconciliation for the patient has been reviewed by me and documented in the patients chart.    ALLERGIES:    Allergies   Allergen Reactions   • Penicillins Swelling         Vitals:    03/29/22 1032   BP: 146/75   Pulse: 66   Resp: 16   Temp: 96.8 °F (36 °C)   SpO2: 97%        Current Status 3/29/2022   ECOG score 0      Physical exam:      This patient's ACP documentation is up to date, and there's nothing further left to document.      CONSTITUTIONAL:  Vital signs reviewed.  No distress, looks comfortable.  RESPIRATORY:  Normal respiratory effort.  Lungs clear to auscultation bilaterally.  CARDIOVASCULAR:  Normal S1, S2.  No murmurs rubs or gallops.  No significant lower extremity edema.  GASTROINTESTINAL: Abdomen appears unremarkable.  Nontender.  No hepatomegaly.  No  splenomegaly.  LYMPHATIC:  No cervical, supraclavicular, axillary lymphadenopathy.  SKIN:  Warm.  No rashes.  PSYCHIATRIC:  Normal judgment and insight.  Normal mood and affect.    RECENT LABS:  Results from last 7 days   Lab Units 03/29/22  1018   WBC 10*3/mm3 5.52   NEUTROS ABS 10*3/mm3 4.21   HEMOGLOBIN g/dL 13.2   HEMATOCRIT % 40.0   PLATELETS 10*3/mm3 132*     Results from last 7 days   Lab Units 03/29/22  1018   SODIUM mmol/L 138   POTASSIUM mmol/L 4.0   CHLORIDE mmol/L 102   CO2 mmol/L 26.5   BUN mg/dL 14   CREATININE mg/dL 0.95   CALCIUM mg/dL 9.2   ALBUMIN g/dL 4.10   BILIRUBIN mg/dL 0.5   ALK PHOS U/L 136*   ALT (SGPT) U/L 25   AST (SGOT) U/L 24   GLUCOSE mg/dL 270*         Assessment/Plan   1. Stage 2 CLL without evidence of any disease progression.  I have reviewed the CT scan from 3/19/2018 there is minimal progression but clinically patient is asymptomatic and we will follow with observation.  Will monitor with labs.   No evidence of any frequent infections, no major worsening of his white count. He has no fever or night sweats or any other symptoms.   · Patient knows that he is prone for infections and he is mainly staying at home during the COVID-19 situation time  · Recent admission to [facility] July 10 2020×2.  Initially admitted with chest pain and underwent stress test and echo.  Echo was negative.  Stress test is abnormal but have left message for patient's cardiologist to call us.  His cardiologist is been sent [facility].  · He then got admitted the second time with worsening GI bleed and required total of 3 units of blood.  EGD was negative but colonoscopy showed angiectasia for which he underwent argon ablation.  Currently hemoglobin stable and no active bleeding.  · He was also found to have low platelets with platelets dropping down to 27k and hemoglobin was 7.  Ultrasound showed splenomegaly 15 cm.  Concern is whether he has progressed from his CLL point of view and requires  "treatment.  · CT scan performed 7/20/2020 did show the increased splenomegaly, but interval decrease in size of the axillary nodes, and shotty mediastinal nodes.  No change in the shotty nodes within the neck and supraclavicular regions.  No new lymphadenopathy.  Bone marrow biopsy however showed preliminarily that there is bone marrow involvement.  Remainder of bone marrow biopsy report is pending.  · Bone marrow shows hypercellular marrow with 100% involvement of chronic lymphocytic leukemia/small lymphocytic lymphoma and diffuse pattern with at least 98% of the total marrow cellularity.  Rare foci of erythropoiesis and rare megakaryocytes are noted.   · Negative for BCL-2 and BCL 6.  Chromosomes shows normal karyotype.  I GVH mutation present.  Positive for gain of 1 q., monosomy 13 and loss of IGH.  Negative for deletion T p53, negative for gain of chromosomes 9 and 11, negative for 11, 14 fusion.  · The combination of monosomy 13 and gain of 1 q. is thought to be associated with plasma cell myeloma.  However this patient does not have myeloma.  · Scans were reviewed with the patient today.  Dr. Moya also discussed with the patient and recommended he initiate treatment with chlorambucil and Gazyva.  He would not be a good candidate for Imbruvica due to his history of A. fib\".  He cannot take anticoagulation at this time.  The patient was provided with chemotherapy education today, including  Handouts.  He will need a port placed so that we can initiate treatment  · 8/13/2020: Gazyva day 1. Plan also [id number] mg p.o. on day 1 day 15.  We can increase the dose once we know he tolerates and his platelets improved.  · Patient developing pancytopenia when reviewed cycle 1 day 15.  Chlorambucil dose modified to 10 mg for day 15 however it is felt that he cannot tolerate this ongoing.   ·  Plan to switch to Venetoclax along with continuing Gazyva.    · Patient due today for cycle 2-day 1 Gazyva.  He will " proceed today as scheduled.  Venetoclax will be shipped to his home with plans to begin this next week on 9/14/2020.    · Patient did receive 1 L normal saline and Neulasta on 9/28/2020 secondary to neutropenia with an ANC of 0.04.  · Patient seen on 10/02/2020 continuing on venetoclax, currently on 100 mg dosing.  He would not increase his dose as he will start on voriconazole after being on venetoclax x1 week which affects the metabolism of venetoclax.  He is currently on day 5 of the 100 mg dosing.  Patient states overall he feels the same except for increased fatigue and nausea.  His nauseousness is controlled with ondansetron however.  Patient will be given 1 L normal saline in the office today as planned.  · Patient returns on 10/12/2020 continuing on venetoclax 100 mg daily as well as voriconazole.  · Patient is doing well with these treatments except fatigue.  Next cycle 4 due as of number ninth prior to which will obtain CT scans  · November 9, 2020: White count down to 1.89 but patient started Pepcid.  We will hold off Pepcid.  · 11/17/2020 platelet count dropped to 35,000 patient was instructed to hold venetoclax.  · Returns 11/23/2020 WBC up to 6.38, ANC 5.19, hemoglobin 12.4, platelets up to 121.  I have advised him to resume venetoclax 100 mg daily  · December 7, 2020: Platelets 95K.  White count 3.0 with absolute neutrophil count of 2.01.  Cycle 5 Gazyva given.  Continue venetoclax with voriconazole.   · January 4, 2021: Platelets 84,000, white count 3,440, absolute neutrophil count 1,980. Cycle 6 Gazyva given.  · 2/23/2021: WBC 3.0, platelets 90,000, hemoglobin 14.6, ANC 1.65.  Counts overall stable.  Continue Venetoclax 100 mg daily.  · January 4, 2021: Last dose of Gazyva.  · March 16, 2021: Patient is on venetoclax along with voriconazole and tolerating well with plans to complete total of 1 year.  · 4/27/2021: Patient continues on venetoclax 100 mg daily with voriconazole and acyclovir for  prophylaxis.  He is tolerating this well.  He is scheduled for scans in 5 weeks.  · June 8, 2021: Patient is to continue venetoclax 100 mg daily with voriconazole and acyclovir prophylaxis.  He is tolerating it very well.  The plan is to continue 8 more weeks and then he will complete the protocol and will be followed with observation.  · I have reviewed the CT scan done on June 2, 2021 and there is no evidence of any lymphadenopathy and the spleen size is decreased in size from 12.5-11.3.  He has mild thrombocytopenia and leukopenia but his hemoglobin is normal.  · 9/7/2021: Patient completed 1 year worth of venetoclax with voriconazole and 6 months of Gazyva.  He has had an excellent response for his CLL.  Spleen decreased in size.  Currently asymptomatic.  Discontinue venetoclax since he completed 1 year  · March 28, 2022: Patient's hemoglobin back to normal at 13.1.  EGD showed Hess's esophagus and angiodysplasia for which she underwent argon plasma coagulation.  Also colonoscopy showed multiple polyps all of them benign and had 1 angiodysplasia for which he underwent argon coagulation by Dr. Hinds.  Currently asymptomatic     2. History of intermittent thrombocytopenia  · Historically platelets in the 150s.  · Count did drop when patient was treated for CLL on chlorambucil.    · Platelets also dropping on treatment with Venetoclax in the past.    · CLL in remission.  Platelet count currently in the normal range.  · Platelets today show 136 which is stable    3.  History of angiodysplasia requiring cauterization and Hess's esophagus mild anemia. He is currently asymptomatic.  · No evidence of active bleeding.    · 1/2022: Patient with recurrent iron deficiency as further detailed below.  Patient referred to GI for further evaluation.  Will see Dr. Hinds 2/9/2022.  · Patient is s/p IV iron and his hemoglobin is improved still has dark stools.  Has appointment with Dr. Santa next week  · Reviewed the  results of EGD and colonoscopy which shows Hess's esophagus and angiodysplasia s/p APC    4.  History of cluster headaches for which she has to be treated with steroids in the past and has followed up with Dr. Len Walker.today with significant headaches.  · Patient had CT of the head 8/20/2020 which was negative.  · He was started on prednisone 10 mg daily which was not helpful.  · Patient saw Dr. Bobby, neurology who gave him 2 shots of a new medication Emgality.  This is something that can be given once a month.    · Resolved.    5.  Atrial fibrillation, off anticoagulation given his thrombocytopenia.  · Patient continues on aspirin 81 mg daily if platelet count greater than 60,000.  · Patient sees Dr. Christy at [facility] who follows his INR    6.  Chronic mild elevation of alkaline phosphatase.  Stable.    7.  Worsening abdominal pain and GI symptoms in the fall 2021.  · CT showing gallstones but no cholecystitis.  · Symptoms persisting with patient undergoing cholecystectomy with Dr. Lui 10/1/2021.  · 1/2022 patient continues with diarrhea and abdominal cramping.  We will see GI, Dr. Hinds, 2/9/2022.   · Symptoms improved no further cramping or diarrhea    8.  Iron deficiency anemia in the setting of angiodysplasia with recurrent GI bleeding.  · Patient intolerant to oral iron  · 1/11/2022 repeat iron studies show ferritin of 15, iron saturation 7%.  · 1/18/2022 proceed with IV Injectafer x2  · Hemoglobin improved to 12.7  · Hemoglobin improved to 13.2      PLAN:   · S/p IV Injectafer x2  · Reviewed the results of EGD and colonoscopy which shows Hess's esophagus and angiodysplasia s/p APC treatments  · Reviewed the pathology on the multiple colon polyps which were all negative for malignancy and no high-grade dysplasia  · We will see him back in 3 months with CT scan 1 week prior to MD appointment  · He will come for port flush in 6 weeks in 3 months.  At 3 months time we can see what the CT scan  shows and subsequently remove the port    I spent 30 total minutes, face-to-face, caring for Macho today.  Greater than 50% of this time involved counseling and/or coordination of care as documented within this note.      Susannah Moya MD  04/01/22      Cc: Dr. Kevin Gilmore

## 2022-04-10 ENCOUNTER — HOSPITAL ENCOUNTER (INPATIENT)
Facility: HOSPITAL | Age: 72
LOS: 3 days | Discharge: HOME | End: 2022-04-13
Attending: EMERGENCY MEDICINE | Admitting: INTERNAL MEDICINE

## 2022-04-10 DIAGNOSIS — D62 ACUTE BLOOD LOSS ANEMIA (ABLA): ICD-10-CM

## 2022-04-10 DIAGNOSIS — K92.2 ACUTE UPPER GI BLEED: Primary | ICD-10-CM

## 2022-04-10 DIAGNOSIS — K92.1 MELENA: ICD-10-CM

## 2022-04-10 DIAGNOSIS — Z79.01 ANTICOAGULATED WITH WARFARIN: ICD-10-CM

## 2022-04-10 DIAGNOSIS — D62 ACUTE BLOOD LOSS ANEMIA: ICD-10-CM

## 2022-04-10 LAB
ABO GROUP BLD: NORMAL
ALBUMIN SERPL-MCNC: 4.1 G/DL (ref 3.5–5.2)
ALBUMIN/GLOB SERPL: 3.2 G/DL
ALP SERPL-CCNC: 126 U/L (ref 39–117)
ALT SERPL W P-5'-P-CCNC: 26 U/L (ref 1–41)
ANION GAP SERPL CALCULATED.3IONS-SCNC: 8 MMOL/L (ref 5–15)
APTT PPP: 41.4 SECONDS (ref 22.7–35.4)
AST SERPL-CCNC: 21 U/L (ref 1–40)
BASOPHILS # BLD AUTO: 0.04 10*3/MM3 (ref 0–0.2)
BASOPHILS NFR BLD AUTO: 0.7 % (ref 0–1.5)
BILIRUB SERPL-MCNC: 0.4 MG/DL (ref 0–1.2)
BLD GP AB SCN SERPL QL: NEGATIVE
BUN SERPL-MCNC: 18 MG/DL (ref 8–23)
BUN/CREAT SERPL: 19.6 (ref 7–25)
CALCIUM SPEC-SCNC: 8.7 MG/DL (ref 8.6–10.5)
CHLORIDE SERPL-SCNC: 102 MMOL/L (ref 98–107)
CO2 SERPL-SCNC: 24 MMOL/L (ref 22–29)
CREAT SERPL-MCNC: 0.92 MG/DL (ref 0.76–1.27)
D-LACTATE SERPL-SCNC: 1.2 MMOL/L (ref 0.5–2)
DEPRECATED RDW RBC AUTO: 52.8 FL (ref 37–54)
EGFRCR SERPLBLD CKD-EPI 2021: 88.4 ML/MIN/1.73
EOSINOPHIL # BLD AUTO: 0.12 10*3/MM3 (ref 0–0.4)
EOSINOPHIL NFR BLD AUTO: 2.2 % (ref 0.3–6.2)
ERYTHROCYTE [DISTWIDTH] IN BLOOD BY AUTOMATED COUNT: 15 % (ref 12.3–15.4)
EXPIRATION DATE: ABNORMAL
FECAL OCCULT BLOOD SCREEN, POC: POSITIVE
FERRITIN SERPL-MCNC: 65.8 NG/ML (ref 30–400)
GLOBULIN UR ELPH-MCNC: 1.3 GM/DL
GLUCOSE BLDC GLUCOMTR-MCNC: 261 MG/DL (ref 70–130)
GLUCOSE SERPL-MCNC: 201 MG/DL (ref 65–99)
HCT VFR BLD AUTO: 30.4 % (ref 37.5–51)
HGB BLD-MCNC: 10.5 G/DL (ref 13–17.7)
IMM GRANULOCYTES # BLD AUTO: 0.07 10*3/MM3 (ref 0–0.05)
IMM GRANULOCYTES NFR BLD AUTO: 1.3 % (ref 0–0.5)
INR PPP: 3.4 (ref 0.9–1.1)
IRON 24H UR-MRATE: 84 MCG/DL (ref 59–158)
IRON SATN MFR SERPL: 23 % (ref 20–50)
LYMPHOCYTES # BLD AUTO: 0.76 10*3/MM3 (ref 0.7–3.1)
LYMPHOCYTES NFR BLD AUTO: 14 % (ref 19.6–45.3)
Lab: 190
MCH RBC QN AUTO: 33.8 PG (ref 26.6–33)
MCHC RBC AUTO-ENTMCNC: 34.5 G/DL (ref 31.5–35.7)
MCV RBC AUTO: 97.7 FL (ref 79–97)
MONOCYTES # BLD AUTO: 0.38 10*3/MM3 (ref 0.1–0.9)
MONOCYTES NFR BLD AUTO: 7 % (ref 5–12)
NEGATIVE CONTROL: NEGATIVE
NEUTROPHILS NFR BLD AUTO: 4.05 10*3/MM3 (ref 1.7–7)
NEUTROPHILS NFR BLD AUTO: 74.8 % (ref 42.7–76)
NRBC BLD AUTO-RTO: 0 /100 WBC (ref 0–0.2)
PLATELET # BLD AUTO: 137 10*3/MM3 (ref 140–450)
PMV BLD AUTO: 10.2 FL (ref 6–12)
POSITIVE CONTROL: POSITIVE
POTASSIUM SERPL-SCNC: 3.7 MMOL/L (ref 3.5–5.2)
PROT SERPL-MCNC: 5.4 G/DL (ref 6–8.5)
PROTHROMBIN TIME: 34.6 SECONDS (ref 11.7–14.2)
RBC # BLD AUTO: 3.11 10*6/MM3 (ref 4.14–5.8)
RH BLD: POSITIVE
SARS-COV-2 RNA RESP QL NAA+PROBE: NOT DETECTED
SODIUM SERPL-SCNC: 134 MMOL/L (ref 136–145)
T&S EXPIRATION DATE: NORMAL
TIBC SERPL-MCNC: 359 MCG/DL (ref 298–536)
TRANSFERRIN SERPL-MCNC: 241 MG/DL (ref 200–360)
WBC NRBC COR # BLD: 5.42 10*3/MM3 (ref 3.4–10.8)

## 2022-04-10 PROCEDURE — 84466 ASSAY OF TRANSFERRIN: CPT | Performed by: INTERNAL MEDICINE

## 2022-04-10 PROCEDURE — 82270 OCCULT BLOOD FECES: CPT | Performed by: EMERGENCY MEDICINE

## 2022-04-10 PROCEDURE — 86850 RBC ANTIBODY SCREEN: CPT | Performed by: EMERGENCY MEDICINE

## 2022-04-10 PROCEDURE — 85730 THROMBOPLASTIN TIME PARTIAL: CPT | Performed by: EMERGENCY MEDICINE

## 2022-04-10 PROCEDURE — U0003 INFECTIOUS AGENT DETECTION BY NUCLEIC ACID (DNA OR RNA); SEVERE ACUTE RESPIRATORY SYNDROME CORONAVIRUS 2 (SARS-COV-2) (CORONAVIRUS DISEASE [COVID-19]), AMPLIFIED PROBE TECHNIQUE, MAKING USE OF HIGH THROUGHPUT TECHNOLOGIES AS DESCRIBED BY CMS-2020-01-R: HCPCS | Performed by: EMERGENCY MEDICINE

## 2022-04-10 PROCEDURE — 85014 HEMATOCRIT: CPT | Performed by: INTERNAL MEDICINE

## 2022-04-10 PROCEDURE — 86901 BLOOD TYPING SEROLOGIC RH(D): CPT | Performed by: EMERGENCY MEDICINE

## 2022-04-10 PROCEDURE — 83540 ASSAY OF IRON: CPT | Performed by: INTERNAL MEDICINE

## 2022-04-10 PROCEDURE — 82728 ASSAY OF FERRITIN: CPT | Performed by: INTERNAL MEDICINE

## 2022-04-10 PROCEDURE — 86900 BLOOD TYPING SEROLOGIC ABO: CPT | Performed by: EMERGENCY MEDICINE

## 2022-04-10 PROCEDURE — U0005 INFEC AGEN DETEC AMPLI PROBE: HCPCS | Performed by: EMERGENCY MEDICINE

## 2022-04-10 PROCEDURE — 83605 ASSAY OF LACTIC ACID: CPT | Performed by: EMERGENCY MEDICINE

## 2022-04-10 PROCEDURE — 99284 EMERGENCY DEPT VISIT MOD MDM: CPT

## 2022-04-10 PROCEDURE — 80053 COMPREHEN METABOLIC PANEL: CPT | Performed by: EMERGENCY MEDICINE

## 2022-04-10 PROCEDURE — 85610 PROTHROMBIN TIME: CPT | Performed by: EMERGENCY MEDICINE

## 2022-04-10 PROCEDURE — 85025 COMPLETE CBC W/AUTO DIFF WBC: CPT | Performed by: EMERGENCY MEDICINE

## 2022-04-10 PROCEDURE — 85018 HEMOGLOBIN: CPT | Performed by: INTERNAL MEDICINE

## 2022-04-10 PROCEDURE — 82962 GLUCOSE BLOOD TEST: CPT

## 2022-04-10 RX ORDER — NICOTINE POLACRILEX 4 MG
15 LOZENGE BUCCAL
Status: DISCONTINUED | OUTPATIENT
Start: 2022-04-10 | End: 2022-04-13 | Stop reason: HOSPADM

## 2022-04-10 RX ORDER — LISINOPRIL 10 MG/1
10 TABLET ORAL NIGHTLY
Status: DISCONTINUED | OUTPATIENT
Start: 2022-04-10 | End: 2022-04-13 | Stop reason: HOSPADM

## 2022-04-10 RX ORDER — ACETAMINOPHEN 325 MG/1
650 TABLET ORAL EVERY 4 HOURS PRN
Status: DISCONTINUED | OUTPATIENT
Start: 2022-04-10 | End: 2022-04-13 | Stop reason: HOSPADM

## 2022-04-10 RX ORDER — SODIUM CHLORIDE 0.9 % (FLUSH) 0.9 %
10 SYRINGE (ML) INJECTION AS NEEDED
Status: DISCONTINUED | OUTPATIENT
Start: 2022-04-10 | End: 2022-04-13 | Stop reason: HOSPADM

## 2022-04-10 RX ORDER — UREA 10 %
3 LOTION (ML) TOPICAL NIGHTLY PRN
Status: DISCONTINUED | OUTPATIENT
Start: 2022-04-10 | End: 2022-04-13 | Stop reason: HOSPADM

## 2022-04-10 RX ORDER — NITROGLYCERIN 0.4 MG/1
0.4 TABLET SUBLINGUAL
Status: DISCONTINUED | OUTPATIENT
Start: 2022-04-10 | End: 2022-04-13 | Stop reason: HOSPADM

## 2022-04-10 RX ORDER — PANTOPRAZOLE SODIUM 40 MG/10ML
80 INJECTION, POWDER, LYOPHILIZED, FOR SOLUTION INTRAVENOUS ONCE
Status: COMPLETED | OUTPATIENT
Start: 2022-04-10 | End: 2022-04-10

## 2022-04-10 RX ORDER — ONDANSETRON 4 MG/1
4 TABLET, FILM COATED ORAL EVERY 6 HOURS PRN
Status: DISCONTINUED | OUTPATIENT
Start: 2022-04-10 | End: 2022-04-13 | Stop reason: HOSPADM

## 2022-04-10 RX ORDER — PANTOPRAZOLE SODIUM 40 MG/10ML
40 INJECTION, POWDER, LYOPHILIZED, FOR SOLUTION INTRAVENOUS EVERY 12 HOURS SCHEDULED
Status: DISCONTINUED | OUTPATIENT
Start: 2022-04-10 | End: 2022-04-13 | Stop reason: HOSPADM

## 2022-04-10 RX ORDER — INSULIN LISPRO 100 [IU]/ML
0-9 INJECTION, SOLUTION INTRAVENOUS; SUBCUTANEOUS
Status: DISCONTINUED | OUTPATIENT
Start: 2022-04-10 | End: 2022-04-13 | Stop reason: HOSPADM

## 2022-04-10 RX ORDER — SODIUM CHLORIDE 9 MG/ML
125 INJECTION, SOLUTION INTRAVENOUS CONTINUOUS
Status: DISCONTINUED | OUTPATIENT
Start: 2022-04-10 | End: 2022-04-10

## 2022-04-10 RX ORDER — SODIUM CHLORIDE 9 MG/ML
100 INJECTION, SOLUTION INTRAVENOUS CONTINUOUS
Status: DISCONTINUED | OUTPATIENT
Start: 2022-04-10 | End: 2022-04-13

## 2022-04-10 RX ORDER — ALLOPURINOL 300 MG/1
300 TABLET ORAL NIGHTLY
Status: DISCONTINUED | OUTPATIENT
Start: 2022-04-10 | End: 2022-04-13 | Stop reason: HOSPADM

## 2022-04-10 RX ORDER — DEXTROSE MONOHYDRATE 25 G/50ML
25 INJECTION, SOLUTION INTRAVENOUS
Status: DISCONTINUED | OUTPATIENT
Start: 2022-04-10 | End: 2022-04-13 | Stop reason: HOSPADM

## 2022-04-10 RX ORDER — ATORVASTATIN CALCIUM 20 MG/1
20 TABLET, FILM COATED ORAL NIGHTLY
Status: DISCONTINUED | OUTPATIENT
Start: 2022-04-10 | End: 2022-04-13 | Stop reason: HOSPADM

## 2022-04-10 RX ORDER — SOTALOL HYDROCHLORIDE 80 MG/1
80 TABLET ORAL 2 TIMES DAILY
Status: DISCONTINUED | OUTPATIENT
Start: 2022-04-10 | End: 2022-04-13 | Stop reason: HOSPADM

## 2022-04-10 RX ORDER — ONDANSETRON 2 MG/ML
4 INJECTION INTRAMUSCULAR; INTRAVENOUS EVERY 6 HOURS PRN
Status: DISCONTINUED | OUTPATIENT
Start: 2022-04-10 | End: 2022-04-13 | Stop reason: HOSPADM

## 2022-04-10 RX ADMIN — LISINOPRIL 10 MG: 10 TABLET ORAL at 20:37

## 2022-04-10 RX ADMIN — SODIUM CHLORIDE 150 ML/HR: 9 INJECTION, SOLUTION INTRAVENOUS at 18:19

## 2022-04-10 RX ADMIN — SODIUM CHLORIDE 125 ML/HR: 9 INJECTION, SOLUTION INTRAVENOUS at 14:53

## 2022-04-10 RX ADMIN — ALLOPURINOL 300 MG: 300 TABLET ORAL at 20:37

## 2022-04-10 RX ADMIN — SODIUM CHLORIDE 500 ML: 9 INJECTION, SOLUTION INTRAVENOUS at 14:10

## 2022-04-10 RX ADMIN — LINAGLIPTIN 5 MG: 5 TABLET, FILM COATED ORAL at 20:37

## 2022-04-10 RX ADMIN — ATORVASTATIN CALCIUM 20 MG: 20 TABLET, FILM COATED ORAL at 20:37

## 2022-04-10 RX ADMIN — PANTOPRAZOLE SODIUM 40 MG: 40 INJECTION, POWDER, FOR SOLUTION INTRAVENOUS at 20:37

## 2022-04-10 RX ADMIN — PANTOPRAZOLE SODIUM 80 MG: 40 INJECTION, POWDER, FOR SOLUTION INTRAVENOUS at 15:52

## 2022-04-10 RX ADMIN — SOTALOL HYDROCHLORIDE 80 MG: 80 TABLET ORAL at 20:37

## 2022-04-10 NOTE — ED TRIAGE NOTES
"Pt has had tarry black stool x 2 days - the toilet paper has a \"reddish tint\" to it    Patient was placed in face mask during first look triage.  Patient was wearing a face mask throughout encounter.  I wore personal protective equipment throughout the encounter.  Hand hygiene was performed before and after patient encounter.     "

## 2022-04-10 NOTE — ED PROVIDER NOTES
EMERGENCY DEPARTMENT ENCOUNTER    CHIEF COMPLAINT  Chief Complaint: Black/bloody stools  History given by: Patient  History limited by: None  Room Number: 11/11  PMD: Kevin Chandra MD      HPI:  Pt is a 71 y.o. male who presents complaining of black/tarry stools that has been present for the past 2 days.  The patient states that he has had similar issues before and endoscopy and colonoscopy both showed a bleed in his esophagus as well as his lower colon that were cauterized last month.  The patient reports that he is on warfarin secondary to atrial fibrillation.  He denies abdominal pain, chest pain, fever/chills, recent antibiotics, or nausea and vomiting.    Duration: Ongoing for the past 2 days  Onset: Gradual  Location: Generalized  Radiation: None  Quality: Upper GI bleed  Intensity/Severity: Moderate to severe   Progression: Worsening  Associated Symptoms: None  Aggravating Factors: None  Alleviating Factors: None  Previous Episodes: Yes, history of an esophageal bleed  Treatment before arrival: None    PAST MEDICAL HISTORY  Active Ambulatory Problems     Diagnosis Date Noted   • CLL (chronic lymphocytic leukemia) ([facility]) 03/19/2017   • Anemia 10/10/2019   • Encounter for hepatitis C screening test for low risk patient  07/17/2020   • Thrombocytopenia ([facility]) 07/17/2020   • H/O heart artery stent 02/24/2011   • Stented coronary artery 07/24/2020   • Arteriosclerosis of coronary artery 04/21/2016   • Atrial fibrillation ([facility]) 07/24/2020   • Paroxysmal atrial fibrillation ([facility]) 10/29/2014   • Chest pain, rule out acute myocardial infarction 04/21/2016   • Fall 04/11/2020   • Fracture, olecranon 04/11/2020   • Hyperlipidemia 07/24/2020   • Long term (current) use of anticoagulants 10/25/2018   • Lower GI bleed 07/11/2020   • Olecranon bursitis 04/11/2020   • Shoulder pain 04/11/2020   • Smoker 12/05/2019   • CLL (chronic lymphocytic leukemia) ([facility]) 12/01/2017   • Dehydration 08/20/2020   • Drug-induced nausea and  vomiting 10/12/2020   • Encounter for long-term (current) use of other medications 11/09/2020   • Neutropenia (HCC) 11/09/2020   • Calculus of gallbladder without cholecystitis without obstruction 09/27/2021   • Cholecystitis 10/14/2021   • Iron deficiency 01/11/2022   • Adverse effect of iron 01/12/2022   • Melena 02/15/2022   • Change in bowel habits 02/15/2022     Resolved Ambulatory Problems     Diagnosis Date Noted   • Leukocytosis 07/11/2016     Past Medical History:   Diagnosis Date   • A-fib (HCC)    • Hess's esophagus    • CAD (coronary artery disease)    • Chronic lymphatic leukemia (HCC)    • Colon polyps 05/07/2007   • Colon polyps 07/16/2019   • Diabetes mellitus (HCC)    • Gall stones    • GERD (gastroesophageal reflux disease)    • Gout    • Heart disease    • Heart murmur    • History of transfusion    • Hypertension    • Low back pain    • RLS (restless legs syndrome)        PAST SURGICAL HISTORY  Past Surgical History:   Procedure Laterality Date   • ANKLE FUSION Right     Fusion hardware   • BONE MARROW BIOPSY Left 10/28/2020    CT guided left iliac crest biopsy and FNA-Dr. Darius Reynolds, [facility]   • CARDIAC CATHETERIZATION  02/2011    Prudenceearchas   • CARDIOVERSION  04/22/2016    Ray Marte   • CHOLECYSTECTOMY     • CHOLECYSTECTOMY WITH INTRAOPERATIVE CHOLANGIOGRAM N/A 10/01/2021    Procedure: CHOLECYSTECTOMY LAPAROSCOPIC INTRAOPERATIVE CHOLANGIOGRAM;  Surgeon: Darius Lui MD;  Location: [facility];  Service: General;  Laterality: N/A;   • COLONOSCOPY N/A 2012   • COLONOSCOPY N/A 07/12/2020    Ray Noe   • COLONOSCOPY N/A 03/22/2022    Procedure: COLONOSCOPY FOR SCREENING;  Surgeon: Velasquez Hinds MD;  Location: [facility] OR;  Service: Gastroenterology;  Laterality: N/A;  Diverticulosis, polyps,   APC of angiodysplasia right colon   • COLONOSCOPY W/ POLYPECTOMY N/A 05/07/2007    Dr. Vivek Siddiqui, [facility]   • CORONARY STENT PLACEMENT  02/2011    Jennifer,  Promus CARLOS x 2 proximal to mid RCA, LAD x 1   • ENDOSCOPY N/A 07/15/2019   • ENDOSCOPY N/A 03/22/2022    Procedure: ESOPHAGOGASTRODUODENOSCOPY;  Surgeon: Velasquez Hinds MD;  Location: [facility] OR;  Service: Gastroenterology;  Laterality: N/A;  APC-duodenal bulb, esophagitis, hiatal hernia, Angioectasis of duodenum   • ENDOSCOPY AND COLONOSCOPY N/A 07/15/2019    Dr. Ulloa   • SHOULDER SURGERY Left 2007   • TONSILLECTOMY Bilateral    • VENOUS ACCESS DEVICE (PORT) INSERTION N/A 07/31/2020    Procedure: INSERTION VENOUS ACCESS DEVICE;  Surgeon: Darius Lui MD;  [facility];  Service: General;  Laterality: N/A;       FAMILY HISTORY  Family History   Problem Relation Age of Onset   • Bone cancer Mother    • Heart attack Father    • Diabetes Father    • Diabetes Sister    • Malig Hyperthermia Neg Hx    • Colon cancer Neg Hx    • Colon polyps Neg Hx    • Crohn's disease Neg Hx    • Irritable bowel syndrome Neg Hx    • Ulcerative colitis Neg Hx        SOCIAL HISTORY  Social History     Socioeconomic History   • Marital status:      Spouse name: Nohemi   Tobacco Use   • Smoking status: Current Every Day Smoker     Packs/day: 0.50     Years: 40.00     Pack years: 20.00     Types: Cigarettes   • Smokeless tobacco: Never Used   • Tobacco comment: 1/2 PPD   Vaping Use   • Vaping Use: Never used   Substance and Sexual Activity   • Alcohol use: Yes     Alcohol/week: 2.0 standard drinks     Types: 2 Cans of beer per week   • Drug use: Never   • Sexual activity: Defer       ALLERGIES  Penicillins    REVIEW OF SYSTEMS  Review of Systems   Constitutional: Negative for activity change, appetite change and fever.   HENT: Negative for congestion and sore throat.    Eyes: Negative.    Respiratory: Negative for cough and shortness of breath.    Cardiovascular: Negative for chest pain and leg swelling.   Gastrointestinal: Positive for blood in stool. Negative for abdominal pain, diarrhea and vomiting.    Endocrine: Negative.    Genitourinary: Negative for decreased urine volume and dysuria.   Musculoskeletal: Negative for neck pain.   Skin: Negative for rash and wound.   Allergic/Immunologic: Negative.    Neurological: Negative for weakness, numbness and headaches.   Hematological: Negative.    Psychiatric/Behavioral: Negative.    All other systems reviewed and are negative.      PHYSICAL EXAM  ED Triage Vitals   Temp Heart Rate Resp BP SpO2   04/10/22 1243 04/10/22 1243 04/10/22 1243 04/10/22 1409 04/10/22 1243   98.1 °F (36.7 °C) 67 16 124/65 98 %      Temp src Heart Rate Source Patient Position BP Location FiO2 (%)   04/10/22 1243 04/10/22 1243 -- -- --   Tympanic Monitor          Physical Exam  Vitals and nursing note reviewed.   Constitutional:       General: He is not in acute distress.  HENT:      Head: Normocephalic and atraumatic.   Eyes:      Pupils: Pupils are equal, round, and reactive to light.   Cardiovascular:      Rate and Rhythm: Normal rate and regular rhythm.      Heart sounds: Normal heart sounds.   Pulmonary:      Effort: Pulmonary effort is normal. No respiratory distress.      Breath sounds: Normal breath sounds.   Abdominal:      Palpations: Abdomen is soft.      Tenderness: There is no abdominal tenderness. There is no guarding or rebound.   Genitourinary:     Rectum: Guaiac result positive.   Musculoskeletal:         General: Normal range of motion.      Cervical back: Normal range of motion and neck supple.   Skin:     General: Skin is warm and dry.   Neurological:      Mental Status: He is alert and oriented to person, place, and time.      Sensory: Sensation is intact.   Psychiatric:         Mood and Affect: Mood and affect normal.         LAB RESULTS  Lab Results (last 24 hours)     Procedure Component Value Units Date/Time    CBC & Differential [[id number]]  (Abnormal) Collected: 04/10/22 1411    Specimen: Blood Updated: 04/10/22 1430    Narrative:      The following orders were  created for panel order CBC & Differential.  Procedure                               Abnormality         Status                     ---------                               -----------         ------                     CBC Auto Differential[[id number]]        Abnormal            Final result                 Please view results for these tests on the individual orders.    Comprehensive Metabolic Panel [[id number]]  (Abnormal) Collected: 04/10/22 1411    Specimen: Blood Updated: 04/10/22 1449     Glucose 201 mg/dL      BUN 18 mg/dL      Creatinine 0.92 mg/dL      Sodium 134 mmol/L      Potassium 3.7 mmol/L      Chloride 102 mmol/L      CO2 24.0 mmol/L      Calcium 8.7 mg/dL      Total Protein 5.4 g/dL      Albumin 4.10 g/dL      ALT (SGPT) 26 U/L      AST (SGOT) 21 U/L      Alkaline Phosphatase 126 U/L      Total Bilirubin 0.4 mg/dL      Globulin 1.3 gm/dL      A/G Ratio 3.2 g/dL      BUN/Creatinine Ratio 19.6     Anion Gap 8.0 mmol/L      eGFR 88.4 mL/min/1.73      Comment: National Kidney Foundation and American Society of Nephrology (ASN) Task Force recommended calculation based on the Chronic Kidney Disease Epidemiology Collaboration (CKD-EPI) equation refit without adjustment for race.       Narrative:      GFR Normal >60  Chronic Kidney Disease <60  Kidney Failure <15      Protime-INR [[id number]]  (Abnormal) Collected: 04/10/22 1411    Specimen: Blood Updated: 04/10/22 1452     Protime 34.6 Seconds      INR 3.40    aPTT [[id number]]  (Abnormal) Collected: 04/10/22 1411    Specimen: Blood Updated: 04/10/22 1452     PTT 41.4 seconds     Lactic Acid, Plasma [[id number]]  (Normal) Collected: 04/10/22 1411    Specimen: Blood Updated: 04/10/22 1445     Lactate 1.2 mmol/L     CBC Auto Differential [[id number]]  (Abnormal) Collected: 04/10/22 1411    Specimen: Blood Updated: 04/10/22 1430     WBC 5.42 10*3/mm3      RBC 3.11 10*6/mm3      Hemoglobin 10.5 g/dL      Hematocrit 30.4 %      MCV 97.7 fL      MCH 33.8 pg      MCHC  34.5 g/dL      RDW 15.0 %      RDW-SD 52.8 fl      MPV 10.2 fL      Platelets 137 10*3/mm3      Neutrophil % 74.8 %      Lymphocyte % 14.0 %      Monocyte % 7.0 %      Eosinophil % 2.2 %      Basophil % 0.7 %      Immature Grans % 1.3 %      Neutrophils, Absolute 4.05 10*3/mm3      Lymphocytes, Absolute 0.76 10*3/mm3      Monocytes, Absolute 0.38 10*3/mm3      Eosinophils, Absolute 0.12 10*3/mm3      Basophils, Absolute 0.04 10*3/mm3      Immature Grans, Absolute 0.07 10*3/mm3      nRBC 0.0 /100 WBC     POCT Occult Blood Stool [[id number]]  (Abnormal) Collected: 04/10/22 1525    Specimen: Stool from Per Rectum Updated: 04/10/22 1526     Fecal Occult Blood Positive     Lot Number [id number]     Expiration Date 12/31/22     Positive Control Positive     Negative Control Negative    COVID PRE-OP / PRE-PROCEDURE SCREENING ORDER (NO ISOLATION) - Swab, Nasopharynx [[id number]] Collected: 04/10/22 1549    Specimen: Swab from Nasopharynx Updated: 04/10/22 1558    Narrative:      The following orders were created for panel order COVID PRE-OP / PRE-PROCEDURE SCREENING ORDER (NO ISOLATION) - Swab, Nasopharynx.  Procedure                               Abnormality         Status                     ---------                               -----------         ------                     COVID-19,[facility] IN-HOUSE...[[id number]]                      In process                   Please view results for these tests on the individual orders.    COVID-19,[facility] IN-HOU[facility] CEPHEID/ANGELA NP SWAB IN TRANSPORT MEDIA 8-12 HR TAT - Swab, Nasopharynx [[id number]] Collected: 04/10/22 1549    Specimen: Swab from Nasopharynx Updated: 04/10/22 3178          I ordered the above labs and reviewed the results    RADIOLOGY  No orders to display        I ordered the above noted radiological studies. Interpreted by radiologist.  Reviewed by me in PACS.       PROCEDURES  Procedures      PROGRESS AND CONSULTS     The patient was wearing a facemask upon entrance into the  room and remained in such throughout their visit.  I was wearing PPE including a facemask, eye protection, as well as gloves at any point entering the room and throughout the visit    1520  The patient is currently stable on reevaluation.  I informed him that his hemoglobin has dropped from over 13 to 10.5 today.  His INR is over 3 as well which certainly raises concern because of his active upper GI bleed.  I informed the patient that we would admit him to the hospital and ask his gastroenterologist to assess him for further treatment and management of his upper GI bleed.  The patient is in agreement with the disposition plan and all questions have been answered.    1550  Case discussed with TORY Rivera, who agrees to admit the patient to the hospital for further management and treatment.      MEDICAL DECISION MAKING  Results were reviewed/discussed with the patient and they were also made aware of online access. Pt also made aware that some labs, such as cultures, will not be resulted during ER visit and follow up with PMD is necessary.     MDM  Number of Diagnoses or Management Options     Amount and/or Complexity of Data Reviewed  Clinical lab tests: reviewed and ordered  Tests in the medicine section of CPT®: ordered and reviewed  Review and summarize past medical records: yes (Upon medical records review, the patient was last seen and evaluated on 3/22/2022 by gastroenterology for melena/iron deficiency anemia)  Discuss the patient with other providers: yes (TORY Rivera, who will admit the patient to the hospital)           DIAGNOSIS  Final diagnoses:   Acute upper GI bleed   Acute blood loss anemia (ABLA)   Anticoagulated with warfarin       DISPOSITION  ADMISSION    Discussed treatment plan and reason for admission with pt/family and admitting physician.  Pt/family voiced understanding of the plan for admission for further testing/treatment as needed.           Latest Documented Vital Signs:  As of  16:00 EDT  BP- 126/73 HR- 54 Temp- 98.1 °F (36.7 °C) (Tympanic) O2 sat- 97%         Anuj Lewis MD  04/10/22 1600

## 2022-04-10 NOTE — H&P
HISTORY AND PHYSICAL   [facility]        Date of Admission: 4/10/2022  Patient Identification:  Name: Macho Stephenson  Age: 71 y.o.  Sex: male  :  1950  MRN: [id number]                     Primary Care Physician: Kevin Chandra MD    Chief Complaint:  71 year old gentleman with a history of gi bleeding who presents with black, tarry stools which started two days ago; he had an egd and colonoscopy last month and had lesions cauterized; he denies pain; he has a history of a fib and takes coumadin; he denies dizziness or weakness; no bright red blood per rectum; no nausea or vomiting    History of Present Illness:   As above    Past Medical History:  Past Medical History:   Diagnosis Date   • A-fib (HCC)     Paroxysmal atrial fibrillation   • Hess's esophagus    • CAD (coronary artery disease)     has stents coronary artery   • Chronic lymphatic leukemia (HCC)    • CLL (chronic lymphocytic leukemia) (HCC)    • Colon polyps 2007    Rectum: hyperplastic polyp   • Colon polyps 2019    Rectum: hyperplastic polyps x2   • Diabetes mellitus (HCC)    • Gall stones    • GERD (gastroesophageal reflux disease)    • Gout    • Heart disease    • Heart murmur    • History of transfusion     no reaction   • Hyperlipidemia    • Hypertension    • Low back pain    • RLS (restless legs syndrome)      Past Surgical History:  Past Surgical History:   Procedure Laterality Date   • ANKLE FUSION Right     Fusion hardware   • BONE MARROW BIOPSY Left 10/28/2020    CT guided left iliac crest biopsy and FNA-Dr. Darius Reynolds, [facility]   • CARDIAC CATHETERIZATION  2011    [facility]   • CARDIOVERSION  2016    Dr. Ryan Velazquez [place]   • CHOLECYSTECTOMY     • CHOLECYSTECTOMY WITH INTRAOPERATIVE CHOLANGIOGRAM N/A 10/01/2021    Procedure: CHOLECYSTECTOMY LAPAROSCOPIC INTRAOPERATIVE CHOLANGIOGRAM;  Surgeon: Darius Lui MD;  [place] [facility] [place] [facility] [place]  Service: General;  Laterality: N/A;   • COLONOSCOPY  N/A 2012   • COLONOSCOPY N/A 07/12/2020    [facility]   • COLONOSCOPY N/A 03/22/2022    Procedure: COLONOSCOPY FOR SCREENING;  Surgeon: Velasquez Hinds MD;  [place] [facility] [place];  Service: Gastroenterology;  Laterality: N/A;  Diverticulosis, polyps,   APC of angiodysplasia right colon   • COLONOSCOPY W/ POLYPECTOMY N/A 05/07/2007    Dr. Vivek Siddiqui, [facility]   • CORONARY STENT PLACEMENT  02/2011    [facility], Promus CARLOS x 2 proximal to mid RCA, LAD x 1   • ENDOSCOPY N/A 07/15/2019   • ENDOSCOPY N/A 03/22/2022    Procedure: ESOPHAGOGASTRODUODENOSCOPY;  Surgeon: Velasquez Hinds MD;  [place] [facility] [place];  Service: Gastroenterology;  Laterality: N/A;  APC-duodenal bulb, esophagitis, hiatal hernia, Angioectasis of duodenum   • ENDOSCOPY AND COLONOSCOPY N/A 07/15/2019    Dr. Ulloa   • SHOULDER SURGERY Left 2007   • TONSILLECTOMY Bilateral    • VENOUS ACCESS DEVICE (PORT) INSERTION N/A 07/31/2020    Procedure: INSERTION VENOUS ACCESS DEVICE;  Surgeon: Darius Lui MD;  Location: [facility];  Service: General;  Laterality: N/A;      Home Meds:  Medications Prior to Admission   Medication Sig Dispense Refill Last Dose   • aspirin 81 MG chewable tablet Chew 81 mg Daily.   4/9/2022 at Unknown time   • atorvastatin (LIPITOR) 20 MG tablet Take 1 tablet by mouth Every Night. 90 tablet 1 4/9/2022 at Unknown time   • lisinopril (PRINIVIL,ZESTRIL) 10 MG tablet Take 10 mg by mouth Every Night.  0 4/9/2022 at Unknown time   • metFORMIN (GLUCOPHAGE) 500 MG tablet Take 1 tablet by mouth Daily With Breakfast. 90 tablet 0 Past Month at Unknown time   • pantoprazole (PROTONIX) 40 MG EC tablet Take 1 tablet by mouth Daily. 90 tablet 3 4/10/2022 at Unknown time   • sotalol (BETAPACE) 80 MG tablet Take 80 mg by mouth 2 (Two) Times a Day.   4/10/2022 at Unknown time   • warfarin (COUMADIN) 5 MG tablet Take 7.5 mg by mouth Every Night.   4/9/2022 at Unknown time   • allopurinol (ZYLOPRIM) 300 MG tablet Take  1 tablet by mouth Every Night. 90 tablet 1 More than a month at Unknown time   • nitroglycerin (NITROSTAT) 0.4 MG SL tablet Place 0.4 mg under the tongue Every 5 (Five) Minutes As Needed for Chest Pain. Take no more than 3 doses in 15 minutes.   More than a month at Unknown time   • SITagliptin (Januvia) 100 MG tablet Take 1 tablet by mouth Daily. 30 tablet 3 More than a month at Unknown time       Allergies:  Allergies   Allergen Reactions   • Penicillins Swelling     Immunizations:  Immunization History   Administered Date(s) Administered   • COVID-19 (MODERNA) 1st, 2nd, 3rd Dose Only 08/18/2021   • COVID-19 (PFIZER) PURPLE CAP 02/24/2021   • Fluzone High Dose =>65 Years ([employer] ONLY) 09/19/2018, 10/29/2019   • Influenza, Unspecified 09/09/2015   • Pneumococcal Polysaccharide (PPSV23) 07/22/2019   • Shingrix 11/19/2021     Social History:   Social History     Social History Narrative   • Not on file     Social History     Socioeconomic History   • Marital status:      Spouse name: Nohemi   Tobacco Use   • Smoking status: Current Every Day Smoker     Packs/day: 0.50     Years: 40.00     Pack years: 20.00     Types: Cigarettes   • Smokeless tobacco: Never Used   • Tobacco comment: 1/2 PPD   Vaping Use   • Vaping Use: Never used   Substance and Sexual Activity   • Alcohol use: Yes     Alcohol/week: 2.0 standard drinks     Types: 2 Cans of beer per week   • Drug use: Never   • Sexual activity: Defer       Family History:  Family History   Problem Relation Age of Onset   • Bone cancer Mother    • Heart attack Father    • Diabetes Father    • Diabetes Sister    • Malig Hyperthermia Neg Hx    • Colon cancer Neg Hx    • Colon polyps Neg Hx    • Crohn's disease Neg Hx    • Irritable bowel syndrome Neg Hx    • Ulcerative colitis Neg Hx         Review of Systems  See history of present illness and past medical history.  Patient denies headache, dizziness, syncope, falls, trauma, change in vision, change in hearing,  "change in taste, changes in weight, changes in appetite, focal weakness, numbness, or paresthesia.  Patient denies chest pain, palpitations, dyspnea, orthopnea, PND, cough, sinus pressure, rhinorrhea, epistaxis, hemoptysis, nausea, vomiting,hematemesis, diarrhea, constipation or hematchezia.  Denies cold or heat intolerance, polydipsia, polyuria, polyphagia. Denies hematuria, pyuria, dysuria, hesitancy, frequency or urgency. Denies consumption of raw and under cooked meats foods or change in water source.  Denies fever, chills, sweats, night sweats.  Denies missing any routine medications. Remainder of ROS is negative.    Objective:  T Max 24 hrs: Temp (24hrs), Av.2 °F (36.8 °C), Min:98.1 °F (36.7 °C), Max:98.2 °F (36.8 °C)    Vitals Ranges:   Temp:  [98.1 °F (36.7 °C)-98.2 °F (36.8 °C)] 98.2 °F (36.8 °C)  Heart Rate:  [54-67] 61  Resp:  [16] 16  BP: (124-143)/(65-73) 143/66      Exam:  /66 (BP Location: Left arm, Patient Position: Lying)   Pulse 61   Temp 98.2 °F (36.8 °C) (Oral)   Resp 16   Ht 182.9 cm (72\")   Wt 75.3 kg (166 lb)   SpO2 99%   BMI 22.51 kg/m²     General Appearance:    Alert, cooperative, no distress, appears stated age   Head:    Normocephalic, without obvious abnormality, atraumatic   Eyes:    PERRL, conjunctivae/corneas clear, EOM's intact, both eyes   Ears:    Normal external ear canals, both ears   Nose:   Nares normal, septum midline, mucosa normal, no drainage    or sinus tenderness   Throat:   Lips, mucosa, and tongue normal   Neck:   Supple, symmetrical, trachea midline, no adenopathy;     thyroid:  no enlargement/tenderness/nodules; no carotid    bruit or JVD   Back:     Symmetric, no curvature, ROM normal, no CVA tenderness   Lungs:     Decreased breath sounds bilaterally, respirations unlabored   Chest Wall:    No tenderness or deformity    Heart:    Regular rate and rhythm, S1 and S2 normal, no murmur, rub   or gallop   Abdomen:     Soft, nontender, bowel sounds active " all four quadrants,     no masses, no hepatomegaly, no splenomegaly   Extremities:   Extremities normal, atraumatic, no cyanosis or edema   Pulses:   2+ and symmetric all extremities   Skin:   Skin color, texture, turgor normal, no rashes or lesions   Lymph nodes:   Cervical, supraclavicular, and axillary nodes normal   Neurologic:   CNII-XII intact, normal strength, sensation intact throughout      .    Data Review:  Labs in chart were reviewed.  WBC   Date Value Ref Range Status   04/10/2022 5.42 3.40 - 10.80 10*3/mm3 Final     Hemoglobin   Date Value Ref Range Status   04/10/2022 10.5 (L) 13.0 - 17.7 g/dL Final     Hematocrit   Date Value Ref Range Status   04/10/2022 30.4 (L) 37.5 - 51.0 % Final     Platelets   Date Value Ref Range Status   04/10/2022 137 (L) 140 - 450 10*3/mm3 Final     Sodium   Date Value Ref Range Status   04/10/2022 134 (L) 136 - 145 mmol/L Final     Potassium   Date Value Ref Range Status   04/10/2022 3.7 3.5 - 5.2 mmol/L Final     Chloride   Date Value Ref Range Status   04/10/2022 102 98 - 107 mmol/L Final     CO2   Date Value Ref Range Status   04/10/2022 24.0 22.0 - 29.0 mmol/L Final     BUN   Date Value Ref Range Status   04/10/2022 18 8 - 23 mg/dL Final     Creatinine   Date Value Ref Range Status   04/10/2022 0.92 0.76 - 1.27 mg/dL Final     Glucose   Date Value Ref Range Status   04/10/2022 201 (H) 65 - 99 mg/dL Final     Calcium   Date Value Ref Range Status   04/10/2022 8.7 8.6 - 10.5 mg/dL Final     AST (SGOT)   Date Value Ref Range Status   04/10/2022 21 1 - 40 U/L Final     ALT (SGPT)   Date Value Ref Range Status   04/10/2022 26 1 - 41 U/L Final     Alkaline Phosphatase   Date Value Ref Range Status   04/10/2022 126 (H) 39 - 117 U/L Final                Imaging Results (All)     None            Assessment:  Active Hospital Problems    Diagnosis  POA   • Acute upper GI bleed [K92.2]  Yes      Resolved Hospital Problems   No resolved problems to display.    anemia  Hypertension  Diabetes  Cad  Atrial fibrillation  cll    Plan:  Will hold coumadin  Ask gi to see him  Clear liquid diet  ppi  Monitor on telemetry  Valerio patient and ED provider    Enma Mccauley MD  4/10/2022  18:35 EDT

## 2022-04-11 PROBLEM — E11.9 TYPE 2 DIABETES MELLITUS, WITHOUT LONG-TERM CURRENT USE OF INSULIN (HCC): Status: ACTIVE | Noted: 2022-04-11

## 2022-04-11 PROBLEM — D62 ACUTE BLOOD LOSS ANEMIA: Status: ACTIVE | Noted: 2022-04-11

## 2022-04-11 LAB
ANION GAP SERPL CALCULATED.3IONS-SCNC: 6 MMOL/L (ref 5–15)
BUN SERPL-MCNC: 13 MG/DL (ref 8–23)
BUN/CREAT SERPL: 15.3 (ref 7–25)
CALCIUM SPEC-SCNC: 8.2 MG/DL (ref 8.6–10.5)
CHLORIDE SERPL-SCNC: 108 MMOL/L (ref 98–107)
CO2 SERPL-SCNC: 25 MMOL/L (ref 22–29)
CREAT SERPL-MCNC: 0.85 MG/DL (ref 0.76–1.27)
DEPRECATED RDW RBC AUTO: 53.6 FL (ref 37–54)
EGFRCR SERPLBLD CKD-EPI 2021: 92.3 ML/MIN/1.73
ERYTHROCYTE [DISTWIDTH] IN BLOOD BY AUTOMATED COUNT: 14.7 % (ref 12.3–15.4)
GLUCOSE BLDC GLUCOMTR-MCNC: 131 MG/DL (ref 70–130)
GLUCOSE BLDC GLUCOMTR-MCNC: 183 MG/DL (ref 70–130)
GLUCOSE BLDC GLUCOMTR-MCNC: 213 MG/DL (ref 70–130)
GLUCOSE SERPL-MCNC: 156 MG/DL (ref 65–99)
HBA1C MFR BLD: 5.8 % (ref 4.8–5.6)
HCT VFR BLD AUTO: 26.3 % (ref 37.5–51)
HCT VFR BLD AUTO: 27.1 % (ref 37.5–51)
HCT VFR BLD AUTO: 28.3 % (ref 37.5–51)
HCT VFR BLD AUTO: 29.4 % (ref 37.5–51)
HGB BLD-MCNC: 9 G/DL (ref 13–17.7)
HGB BLD-MCNC: 9.1 G/DL (ref 13–17.7)
HGB BLD-MCNC: 9.4 G/DL (ref 13–17.7)
HGB BLD-MCNC: 9.7 G/DL (ref 13–17.7)
INR PPP: 2.85 (ref 0.9–1.1)
MCH RBC QN AUTO: 33.6 PG (ref 26.6–33)
MCHC RBC AUTO-ENTMCNC: 33.2 G/DL (ref 31.5–35.7)
MCV RBC AUTO: 101.1 FL (ref 79–97)
PLATELET # BLD AUTO: 120 10*3/MM3 (ref 140–450)
PMV BLD AUTO: 10.6 FL (ref 6–12)
POTASSIUM SERPL-SCNC: 3.9 MMOL/L (ref 3.5–5.2)
PROTHROMBIN TIME: 30.1 SECONDS (ref 11.7–14.2)
RBC # BLD AUTO: 2.8 10*6/MM3 (ref 4.14–5.8)
SODIUM SERPL-SCNC: 139 MMOL/L (ref 136–145)
WBC NRBC COR # BLD: 4.38 10*3/MM3 (ref 3.4–10.8)

## 2022-04-11 PROCEDURE — 82962 GLUCOSE BLOOD TEST: CPT

## 2022-04-11 PROCEDURE — 83036 HEMOGLOBIN GLYCOSYLATED A1C: CPT | Performed by: INTERNAL MEDICINE

## 2022-04-11 PROCEDURE — 85027 COMPLETE CBC AUTOMATED: CPT | Performed by: INTERNAL MEDICINE

## 2022-04-11 PROCEDURE — 80048 BASIC METABOLIC PNL TOTAL CA: CPT | Performed by: INTERNAL MEDICINE

## 2022-04-11 PROCEDURE — 85014 HEMATOCRIT: CPT | Performed by: INTERNAL MEDICINE

## 2022-04-11 PROCEDURE — 85018 HEMOGLOBIN: CPT | Performed by: INTERNAL MEDICINE

## 2022-04-11 PROCEDURE — 99222 1ST HOSP IP/OBS MODERATE 55: CPT | Performed by: INTERNAL MEDICINE

## 2022-04-11 PROCEDURE — 63710000001 INSULIN LISPRO (HUMAN) PER 5 UNITS: Performed by: INTERNAL MEDICINE

## 2022-04-11 PROCEDURE — 85610 PROTHROMBIN TIME: CPT | Performed by: HOSPITALIST

## 2022-04-11 RX ORDER — PHYTONADIONE 5 MG/1
5 TABLET ORAL ONCE
Status: COMPLETED | OUTPATIENT
Start: 2022-04-11 | End: 2022-04-11

## 2022-04-11 RX ADMIN — ATORVASTATIN CALCIUM 20 MG: 20 TABLET, FILM COATED ORAL at 20:30

## 2022-04-11 RX ADMIN — PANTOPRAZOLE SODIUM 40 MG: 40 INJECTION, POWDER, FOR SOLUTION INTRAVENOUS at 08:25

## 2022-04-11 RX ADMIN — INSULIN LISPRO 4 UNITS: 100 INJECTION, SOLUTION INTRAVENOUS; SUBCUTANEOUS at 11:55

## 2022-04-11 RX ADMIN — SODIUM CHLORIDE 100 ML/HR: 9 INJECTION, SOLUTION INTRAVENOUS at 13:08

## 2022-04-11 RX ADMIN — SODIUM CHLORIDE 100 ML/HR: 9 INJECTION, SOLUTION INTRAVENOUS at 23:48

## 2022-04-11 RX ADMIN — PHYTONADIONE 5 MG: 5 TABLET ORAL at 19:21

## 2022-04-11 RX ADMIN — LINAGLIPTIN 5 MG: 5 TABLET, FILM COATED ORAL at 10:00

## 2022-04-11 RX ADMIN — LISINOPRIL 10 MG: 10 TABLET ORAL at 20:30

## 2022-04-11 RX ADMIN — SODIUM CHLORIDE 100 ML/HR: 9 INJECTION, SOLUTION INTRAVENOUS at 03:40

## 2022-04-11 RX ADMIN — ALLOPURINOL 300 MG: 300 TABLET ORAL at 20:30

## 2022-04-11 RX ADMIN — SOTALOL HYDROCHLORIDE 80 MG: 80 TABLET ORAL at 10:00

## 2022-04-11 RX ADMIN — PANTOPRAZOLE SODIUM 40 MG: 40 INJECTION, POWDER, FOR SOLUTION INTRAVENOUS at 20:30

## 2022-04-11 NOTE — CONSULTS
[facility]  Initial Inpatient Consult Note    Referring Provider: Dr. Sheppard    Reason for Consultation: Black stool    Subjective     History of present illness:    71 y.o. male followed by [facility], they saw him in consultation in the office in February for iron deficiency anemia.  He had a history of colonic angiectasia treated in 2020.  He has a history of CLL followed by [facility].  Previous cholecystectomy 2021.  Chief complaint at the office visit was intermittent black stools with anemia.  He had received packed red blood cell transfusion in February 2022 along with IV iron infusion.  He underwent EGD and colonoscopy by Dr. Joseph Santa on March 22, 2022 showing grade a esophagitis and small AVMs in the duodenal bulb that were treated with argon plasma coagulation.  On the same day multiple polyps were removed all less than 1 cm with cold snare.  No cautery was used at polyp sites.  AVM was also found in the right colon, treated with argon plasma coagulation  He had done well until the last couple days with worsening of black stool, he does take warfarin for A. fib and his INR on admission was greater than 3.0    Past Medical History:  Past Medical History:   Diagnosis Date   • A-fib (HCC)     Paroxysmal atrial fibrillation   • Hess's esophagus    • CAD (coronary artery disease)     has stents coronary artery   • Chronic lymphatic leukemia (HCC)    • CLL (chronic lymphocytic leukemia) (HCC)    • Colon polyps 05/07/2007    Rectum: hyperplastic polyp   • Colon polyps 07/16/2019    Rectum: hyperplastic polyps x2   • Diabetes mellitus (HCC)    • Gall stones    • GERD (gastroesophageal reflux disease)    • Gout    • Heart disease    • Heart murmur    • History of transfusion     no reaction   • Hyperlipidemia    • Hypertension    • Low back pain    • RLS (restless legs syndrome)      Past Surgical History:  Past Surgical History:   Procedure Laterality Date   • ANKLE FUSION  Right     Fusion hardware   • BONE MARROW BIOPSY Left 10/28/2020    CT guided left iliac crest biopsy and FNA-Dr. Darius Reynolds, [facility]   • CARDIAC CATHETERIZATION  02/2011    [facility]   • CARDIOVERSION  04/22/2016    [facility]   • CHOLECYSTECTOMY     • CHOLECYSTECTOMY WITH INTRAOPERATIVE CHOLANGIOGRAM N/A 10/01/2021    Procedure: CHOLECYSTECTOMY LAPAROSCOPIC INTRAOPERATIVE CHOLANGIOGRAM;  Surgeon: Darius Lui MD;  [facility] [place];  Service: General;  Laterality: N/A;   • COLONOSCOPY N/A 2012   • COLONOSCOPY N/A 07/12/2020    Ray Noe   • COLONOSCOPY N/A 03/22/2022    Procedure: COLONOSCOPY FOR SCREENING;  Surgeon: Velasquez Hinds MD;  [place] [facility] [place];  Service: Gastroenterology;  Laterality: N/A;  Diverticulosis, polyps,   APC of angiodysplasia right colon   • COLONOSCOPY W/ POLYPECTOMY N/A 05/07/2007    Dr. Vivek Siddiqui, [facility]   • CORONARY STENT PLACEMENT  02/2011    [facility], Promus CARLOS x 2 proximal to mid RCA, LAD x 1   • ENDOSCOPY N/A 07/15/2019   • ENDOSCOPY N/A 03/22/2022    Procedure: ESOPHAGOGASTRODUODENOSCOPY;  Surgeon: Velasquez Hinds MD;  [place] [facility] [place];  Service: Gastroenterology;  Laterality: N/A;  APC-duodenal bulb, esophagitis, hiatal hernia, Angioectasis of duodenum   • ENDOSCOPY AND COLONOSCOPY N/A 07/15/2019    Dr. Ulloa   • SHOULDER SURGERY Left 2007   • TONSILLECTOMY Bilateral    • VENOUS ACCESS DEVICE (PORT) INSERTION N/A 07/31/2020    Procedure: INSERTION VENOUS ACCESS DEVICE;  Surgeon: Darius Lui MD;  [place]  [facility] [place];  Service: General;  Laterality: N/A;      Social History:   Social History     Tobacco Use   • Smoking status: Current Every Day Smoker     Packs/day: 0.50     Years: 40.00     Pack years: 20.00     Types: Cigarettes   • Smokeless tobacco: Never Used   • Tobacco comment: 1/2 PPD   Substance Use Topics   • Alcohol use: Yes     Alcohol/week: 2.0 standard drinks     Types: 2 Cans of beer per week       Family History:  Family History   Problem Relation Age of Onset   • Bone cancer Mother    • Heart attack Father    • Diabetes Father    • Diabetes Sister    • Malig Hyperthermia Neg Hx    • Colon cancer Neg Hx    • Colon polyps Neg Hx    • Crohn's disease Neg Hx    • Irritable bowel syndrome Neg Hx    • Ulcerative colitis Neg Hx        Home Meds:  Medications Prior to Admission   Medication Sig Dispense Refill Last Dose   • aspirin 81 MG chewable tablet Chew 81 mg Daily.   4/9/2022 at Unknown time   • atorvastatin (LIPITOR) 20 MG tablet Take 1 tablet by mouth Every Night. 90 tablet 1 4/9/2022 at Unknown time   • lisinopril (PRINIVIL,ZESTRIL) 10 MG tablet Take 10 mg by mouth Every Night.  0 4/9/2022 at Unknown time   • metFORMIN (GLUCOPHAGE) 500 MG tablet Take 1 tablet by mouth Daily With Breakfast. 90 tablet 0 Past Month at Unknown time   • pantoprazole (PROTONIX) 40 MG EC tablet Take 1 tablet by mouth Daily. 90 tablet 3 4/10/2022 at Unknown time   • sotalol (BETAPACE) 80 MG tablet Take 80 mg by mouth 2 (Two) Times a Day.   4/10/2022 at Unknown time   • warfarin (COUMADIN) 5 MG tablet Take 7.5 mg by mouth Every Night.   4/9/2022 at Unknown time   • allopurinol (ZYLOPRIM) 300 MG tablet Take 1 tablet by mouth Every Night. 90 tablet 1 More than a month at Unknown time   • nitroglycerin (NITROSTAT) 0.4 MG SL tablet Place 0.4 mg under the tongue Every 5 (Five) Minutes As Needed for Chest Pain. Take no more than 3 doses in 15 minutes.   More than a month at Unknown time   • SITagliptin (Januvia) 100 MG tablet Take 1 tablet by mouth Daily. 30 tablet 3 More than a month at Unknown time     Current Meds:   allopurinol, 300 mg, Oral, Nightly  atorvastatin, 20 mg, Oral, Nightly  insulin lispro, 0-9 Units, Subcutaneous, TID With Meals  linagliptin, 5 mg, Oral, Daily  lisinopril, 10 mg, Oral, Nightly  pantoprazole, 40 mg, Intravenous, Q12H  sotalol, 80 mg, Oral, BID      Allergies:  Allergies   Allergen Reactions   •  Penicillins Swelling     Review of Systems  There is weakness of fatigue all other systems reviewed and negative     Objective     Vital Signs  Temp:  [97.7 °F (36.5 °C)-98.2 °F (36.8 °C)] 97.9 °F (36.6 °C)  Heart Rate:  [54-67] 59  Resp:  [16-18] 18  BP: (124-143)/(65-74) 132/65  Physical Exam:  General Appearance:    Alert, cooperative, in no acute distress   Head:    Normocephalic, without obvious abnormality, atraumatic   Eyes:          conjunctivae and sclerae normal, no   icterus   Throat:   no thrush, oral mucosa moist   Neck:   Supple, no adenopathy   Lungs:     Clear to auscultation bilaterally    Heart:    Regular rhythm and normal rate    Chest Wall:    No abnormalities observed   Abdomen:     Soft, nondistended, nontender; normal bowel sounds   Extremities:   no edema, no redness   Skin:   No bruising or rash   Psychiatric:  normal mood and insight     Results Review:   I reviewed the patient's new clinical results.    Results from last 7 days   Lab Units 04/11/22  0820 04/11/22  0333 04/10/22  2358 04/10/22  1411   WBC 10*3/mm3  --  4.38  --  5.42   HEMOGLOBIN g/dL 9.1* 9.4* 9.0* 10.5*   HEMATOCRIT % 27.1* 28.3* 26.3* 30.4*   PLATELETS 10*3/mm3  --  120*  --  137*     Results from last 7 days   Lab Units 04/11/22  0333 04/10/22  1411   SODIUM mmol/L 139 134*   POTASSIUM mmol/L 3.9 3.7   CHLORIDE mmol/L 108* 102   CO2 mmol/L 25.0 24.0   BUN mg/dL 13 18   CREATININE mg/dL 0.85 0.92   CALCIUM mg/dL 8.2* 8.7   BILIRUBIN mg/dL  --  0.4   ALK PHOS U/L  --  126*   ALT (SGPT) U/L  --  26   AST (SGOT) U/L  --  21   GLUCOSE mg/dL 156* 201*     Results from last 7 days   Lab Units 04/10/22  1411   INR  3.40*     Lab Results   Lab Value Date/Time    LIPASE 106 07/11/2020 1342    LIPASE 49 02/06/2014 2302       Radiology:  No orders to display       Assessment/Plan   Patient Active Problem List   Diagnosis   • CLL (chronic lymphocytic leukemia) (HCC)   • Anemia   • Encounter for hepatitis C screening test for low  risk patient    • Thrombocytopenia (HCC)   • H/O heart artery stent   • Stented coronary artery   • Arteriosclerosis of coronary artery   • Atrial fibrillation (HCC)   • Paroxysmal atrial fibrillation (HCC)   • Chest pain, rule out acute myocardial infarction   • Fall   • Fracture, olecranon   • Hyperlipidemia   • Long term (current) use of anticoagulants   • Lower GI bleed   • Olecranon bursitis   • Shoulder pain   • Smoker   • CLL (chronic lymphocytic leukemia) (HCC)   • Dehydration   • Drug-induced nausea and vomiting   • Encounter for long-term (current) use of other medications   • Neutropenia (HCC)   • Calculus of gallbladder without cholecystitis without obstruction   • Cholecystitis   • Iron deficiency   • Adverse effect of iron   • Melena   • Change in bowel habits   • Acute upper GI bleed       Assessment:  1. Anemia, 4 g drop in hemoglobin since March 29, 2 weeks  2. Melena  3. Warfarin usage, INR greater than 3.0    Plan:  · Follow hemoglobin  · Transfusion per primary team  · Hold warfarin  · When INR is less than 1.8 we will plan on small bowel enteroscopy to look for AVMs in the proximal jejunum for cautery, n.p.o. after midnight possibly for tomorrow  · If SBE is within normal limits consider outpatient wireless capsule endoscopy  · Protonix IV every 12 is fine as there is no evidence of ulceration in the stomach or duodenum on previous EGD      I discussed the patients findings and my recommendations with patient and nursing staff.    Luis Daniel Vincent MD

## 2022-04-11 NOTE — PLAN OF CARE
Problem: Adult Inpatient Plan of Care  Goal: Plan of Care Review  Outcome: Ongoing, Progressing  Flowsheets (Taken 4/11/2022 0531)  Plan of Care Reviewed With: patient  Outcome Evaluation: vss. no c/o pain. ivfs. npo after mn for possible EGD. resting well throughout shift.  Goal: Patient-Specific Goal (Individualized)  Outcome: Ongoing, Progressing  Goal: Absence of Hospital-Acquired Illness or Injury  Outcome: Ongoing, Progressing  Intervention: Identify and Manage Fall Risk  Recent Flowsheet Documentation  Taken 4/11/2022 0420 by Lulú Montes, RN  Safety Promotion/Fall Prevention: safety round/check completed  Taken 4/11/2022 0225 by Lulú Montes, RN  Safety Promotion/Fall Prevention: safety round/check completed  Taken 4/11/2022 0048 by Lulú Montes, RN  Safety Promotion/Fall Prevention: safety round/check completed  Taken 4/10/2022 2236 by Lulú Montes, RN  Safety Promotion/Fall Prevention: safety round/check completed  Taken 4/10/2022 2037 by Lulú Montes, RN  Safety Promotion/Fall Prevention: safety round/check completed  Intervention: Prevent and Manage VTE (Venous Thromboembolism) Risk  Recent Flowsheet Documentation  Taken 4/10/2022 2037 by Lulú Montes, RN  VTE Prevention/Management:   bilateral   dorsiflexion/plantar flexion performed  Goal: Optimal Comfort and Wellbeing  Outcome: Ongoing, Progressing  Intervention: Provide Person-Centered Care  Recent Flowsheet Documentation  Taken 4/10/2022 2037 by Lulú Montes, RN  Trust Relationship/Rapport:   care explained   choices provided   emotional support provided   empathic listening provided  Goal: Readiness for Transition of Care  Outcome: Ongoing, Progressing     Problem: Diabetes Comorbidity  Goal: Blood Glucose Level Within Targeted Range  Outcome: Ongoing, Progressing     Problem: Hypertension Comorbidity  Goal: Blood Pressure in Desired Range  Outcome: Ongoing, Progressing  Intervention: Maintain Blood Pressure  Management  Recent Flowsheet Documentation  Taken 4/10/2022 2037 by Lulú Montes, RN  Medication Review/Management: medications reviewed     Problem: Adjustment to Illness (Gastrointestinal Bleeding)  Goal: Optimal Coping with Acute Illness  Outcome: Ongoing, Progressing     Problem: Bleeding (Gastrointestinal Bleeding)  Goal: Hemostasis  Outcome: Ongoing, Progressing   Goal Outcome Evaluation:  Plan of Care Reviewed With: patient           Outcome Evaluation: vss. no c/o pain. ivfs. npo after mn for possible EGD. resting well throughout shift.

## 2022-04-11 NOTE — PROGRESS NOTES
Name: Macho Stephenson ADMIT: 4/10/2022   : 1950  PCP: Kevin Chandra MD    MRN: [id number] LOS: 1 days   AGE/SEX: 71 y.o. male  ROOM: [facility]     Subjective   Subjective   Denies any BM today.     Review of Systems     Objective   Objective   Vital Signs  Temp:  [97.7 °F (36.5 °C)-97.9 °F (36.6 °C)] 97.8 °F (36.6 °C)  Heart Rate:  [56-68] 68  Resp:  [16-18] 18  BP: ()/(65-74) 97/65  SpO2:  [96 %-97 %] 97 %  on   ;   Device (Oxygen Therapy): room air  Body mass index is 22.51 kg/m².  Physical Exam  Vitals and nursing note reviewed.   Constitutional:       Appearance: Normal appearance. He is not ill-appearing.   HENT:      Head: Normocephalic and atraumatic.   Cardiovascular:      Rate and Rhythm: Normal rate and regular rhythm.   Pulmonary:      Effort: Pulmonary effort is normal. No respiratory distress.      Breath sounds: No wheezing.   Abdominal:      General: Bowel sounds are normal.      Palpations: Abdomen is soft.      Tenderness: There is no abdominal tenderness.   Musculoskeletal:      Right lower leg: No edema.      Left lower leg: No edema.   Skin:     General: Skin is warm and dry.   Neurological:      General: No focal deficit present.      Mental Status: He is alert.   Psychiatric:         Mood and Affect: Mood normal.         Results Review     I reviewed the patient's new clinical results.  Results from last 7 days   Lab Units 22  1639 22  0820 22  0333 04/10/22  2358 04/10/22  1411   WBC 10*3/mm3  --   --  4.38  --  5.42   HEMOGLOBIN g/dL 9.7* 9.1* 9.4* 9.0* 10.5*   PLATELETS 10*3/mm3  --   --  120*  --  137*     Results from last 7 days   Lab Units 22  0333 04/10/22  1411   SODIUM mmol/L 139 134*   POTASSIUM mmol/L 3.9 3.7   CHLORIDE mmol/L 108* 102   CO2 mmol/L 25.0 24.0   BUN mg/dL 13 18   CREATININE mg/dL 0.85 0.92   GLUCOSE mg/dL 156* 201*   EGFR mL/min/1.73 92.3 88.4     Results from last 7 days   Lab Units 04/10/22  1411   ALBUMIN g/dL 4.10    BILIRUBIN mg/dL 0.4   ALK PHOS U/L 126*   AST (SGOT) U/L 21   ALT (SGPT) U/L 26     Results from last 7 days   Lab Units 04/11/22  0333 04/10/22  1411   CALCIUM mg/dL 8.2* 8.7   ALBUMIN g/dL  --  4.10     Results from last 7 days   Lab Units 04/10/22  1411   LACTATE mmol/L 1.2     Hemoglobin A1C   Date/Time Value Ref Range Status   04/11/2022 0333 5.80 (H) 4.80 - 5.60 % Final     Glucose   Date/Time Value Ref Range Status   04/11/2022 1657 131 (H) 70 - 130 mg/dL Final     Comment:     Meter: [id number] : [id number] Arsenio Jimenez RN   04/11/2022 1110 213 (H) 70 - 130 mg/dL Final     Comment:     Meter: [id number] : [id number] Kayla GARCIA   04/11/2022 0621 183 (H) 70 - 130 mg/dL Final     Comment:     Meter: [id number] : [id number] Sue GARCIA   04/10/2022 2043 261 (H) 70 - 130 mg/dL Final     Comment:     Meter: [id number] : [id number] Sue GARCIA         Scheduled Medications  allopurinol, 300 mg, Oral, Nightly  atorvastatin, 20 mg, Oral, Nightly  insulin lispro, 0-9 Units, Subcutaneous, TID With Meals  linagliptin, 5 mg, Oral, Daily  lisinopril, 10 mg, Oral, Nightly  pantoprazole, 40 mg, Intravenous, Q12H  sotalol, 80 mg, Oral, BID    Infusions  sodium chloride, 100 mL/hr, Last Rate: 100 mL/hr (04/11/22 1308)    Diet  NPO Diet  Diet Regular; Consistent Carbohydrate, Cardiac       Assessment/Plan     Active Hospital Problems    Diagnosis  POA   • **Acute upper GI bleed [K92.2]  Yes   • Acute blood loss anemia [D62]  Yes   • Type 2 diabetes mellitus, without long-term current use of insulin (HCC) [E11.9]  Unknown   • CLL (chronic lymphocytic leukemia) (HCC) [C91.10]  Yes   • Arteriosclerosis of coronary artery [I25.10]  Yes   • Paroxysmal atrial fibrillation (HCC) [I48.0]  Yes      Resolved Hospital Problems   No resolved problems to display.       71 y.o. male with history of A. fib on chronic anticoagulation along with recent endoscopy showing AVMs in the duodenum 3/22, admitted with  melena and presumed upper GI bleed.    Upper GI bleed-likely due to AVMs  -Continue Protonix twice daily  -EGD planned for the morning if INR cooperates    Supratherapeutic INR-we will give vitamin K x1.  Holding Coumadin    Acute blood loss anemia: Appears to be stable today although much worse than it was a few weeks ago (13.2) so he did lose a pretty significant amount of blood    DM2: Continue Tradjenta and sliding scale as needed      · SCDs for DVT prophylaxis.  · Dispo: TBD depending on EGD results and stability of H&H      Dean Edwards MD  [facility]  04/11/22  17:18 EDT

## 2022-04-11 NOTE — PROGRESS NOTES
Discharge Planning Assessment  [facility]     Patient Name: Macho Stephenson  MRN: [id number]  Today's Date: 4/11/2022    Admit Date: 4/10/2022     Discharge Needs Assessment     Row Name 04/11/22 1652       Living Environment    People in Home spouse    Name(s) of People in Home Nohemi, spouse    Current Living Arrangements home    Primary Care Provided by self    Provides Primary Care For no one    Family Caregiver if Needed spouse    Quality of Family Relationships helpful;involved;supportive    Able to Return to Prior Arrangements yes       Resource/Environmental Concerns    Resource/Environmental Concerns none       Transition Planning    Patient/Family Anticipates Transition to home with family    Patient/Family Anticipated Services at Transition none    Transportation Anticipated family or friend will provide       Discharge Needs Assessment    Readmission Within the Last 30 Days no previous admission in last 30 days    Equipment Currently Used at Home none    Concerns to be Addressed no discharge needs identified;denies needs/concerns at this time    Anticipated Changes Related to Illness none    Equipment Needed After Discharge none    Provided Post Acute Provider List? N/A    Provided Post Acute Provider Quality & Resource List? N/A               Discharge Plan     Row Name 04/11/22 7930       Plan    Plan Home    Patient/Family in Agreement with Plan yes    Plan Comments Met with patient at the bedside, facesheet verified. Patient lives with his spouse. He is IADL, he does not use any DME. PCP is Dr. Kevin Chandra and preferred pharmacy is [facility]/Isaac Jonhson. Patient plans to return home. No dc needs at this time, CCP will follow progress.              Continued Care and Services - Admitted Since 4/10/2022    Coordination has not been started for this encounter.       Expected Discharge Date and Time     Expected Discharge Date Expected Discharge Time    Apr 13, 2022          Demographic  Summary    No documentation.                Functional Status     Row Name 04/11/22 1653       Functional Status    Usual Activity Tolerance good       Functional Status, IADL    Medications independent    Meal Preparation independent    Housekeeping independent    Laundry independent    Shopping independent       Mental Status    General Appearance WDL WDL       Mental Status Summary    Recent Changes in Mental Status/Cognitive Functioning no changes               Psychosocial    No documentation.                Abuse/Neglect    No documentation.                Legal    No documentation.                Substance Abuse    No documentation.                Patient Forms    No documentation.                   Kyra Forrester RN

## 2022-04-12 LAB
ALBUMIN SERPL-MCNC: 3.5 G/DL (ref 3.5–5.2)
ALBUMIN/GLOB SERPL: 2.2 G/DL
ALP SERPL-CCNC: 106 U/L (ref 39–117)
ALT SERPL W P-5'-P-CCNC: 24 U/L (ref 1–41)
ANION GAP SERPL CALCULATED.3IONS-SCNC: 7.8 MMOL/L (ref 5–15)
AST SERPL-CCNC: 22 U/L (ref 1–40)
BASOPHILS # BLD AUTO: 0.02 10*3/MM3 (ref 0–0.2)
BASOPHILS NFR BLD AUTO: 0.5 % (ref 0–1.5)
BILIRUB SERPL-MCNC: 0.4 MG/DL (ref 0–1.2)
BUN SERPL-MCNC: 14 MG/DL (ref 8–23)
BUN/CREAT SERPL: 13.9 (ref 7–25)
CALCIUM SPEC-SCNC: 8.4 MG/DL (ref 8.6–10.5)
CHLORIDE SERPL-SCNC: 106 MMOL/L (ref 98–107)
CO2 SERPL-SCNC: 25.2 MMOL/L (ref 22–29)
CREAT SERPL-MCNC: 1.01 MG/DL (ref 0.76–1.27)
DEPRECATED RDW RBC AUTO: 51.9 FL (ref 37–54)
EGFRCR SERPLBLD CKD-EPI 2021: 79 ML/MIN/1.73
EOSINOPHIL # BLD AUTO: 0.09 10*3/MM3 (ref 0–0.4)
EOSINOPHIL NFR BLD AUTO: 2.1 % (ref 0.3–6.2)
ERYTHROCYTE [DISTWIDTH] IN BLOOD BY AUTOMATED COUNT: 14.8 % (ref 12.3–15.4)
GLOBULIN UR ELPH-MCNC: 1.6 GM/DL
GLUCOSE BLDC GLUCOMTR-MCNC: 123 MG/DL (ref 70–130)
GLUCOSE BLDC GLUCOMTR-MCNC: 139 MG/DL (ref 70–130)
GLUCOSE BLDC GLUCOMTR-MCNC: 175 MG/DL (ref 70–130)
GLUCOSE BLDC GLUCOMTR-MCNC: 241 MG/DL (ref 70–130)
GLUCOSE SERPL-MCNC: 159 MG/DL (ref 65–99)
HCT VFR BLD AUTO: 26.2 % (ref 37.5–51)
HCT VFR BLD AUTO: 27.1 % (ref 37.5–51)
HCT VFR BLD AUTO: 27.1 % (ref 37.5–51)
HGB BLD-MCNC: 9.2 G/DL (ref 13–17.7)
IMM GRANULOCYTES # BLD AUTO: 0.04 10*3/MM3 (ref 0–0.05)
IMM GRANULOCYTES NFR BLD AUTO: 0.9 % (ref 0–0.5)
INR PPP: 1.91 (ref 0.9–1.1)
LYMPHOCYTES # BLD AUTO: 0.57 10*3/MM3 (ref 0.7–3.1)
LYMPHOCYTES NFR BLD AUTO: 13.3 % (ref 19.6–45.3)
MCH RBC QN AUTO: 33.2 PG (ref 26.6–33)
MCHC RBC AUTO-ENTMCNC: 33.9 G/DL (ref 31.5–35.7)
MCV RBC AUTO: 97.8 FL (ref 79–97)
MONOCYTES # BLD AUTO: 0.27 10*3/MM3 (ref 0.1–0.9)
MONOCYTES NFR BLD AUTO: 6.3 % (ref 5–12)
NEUTROPHILS NFR BLD AUTO: 3.28 10*3/MM3 (ref 1.7–7)
NEUTROPHILS NFR BLD AUTO: 76.9 % (ref 42.7–76)
NRBC BLD AUTO-RTO: 0 /100 WBC (ref 0–0.2)
PLATELET # BLD AUTO: 119 10*3/MM3 (ref 140–450)
PMV BLD AUTO: 9.6 FL (ref 6–12)
POTASSIUM SERPL-SCNC: 3.9 MMOL/L (ref 3.5–5.2)
PROT SERPL-MCNC: 5.1 G/DL (ref 6–8.5)
PROTHROMBIN TIME: 21.9 SECONDS (ref 11.7–14.2)
RBC # BLD AUTO: 2.77 10*6/MM3 (ref 4.14–5.8)
SODIUM SERPL-SCNC: 139 MMOL/L (ref 136–145)
WBC NRBC COR # BLD: 4.27 10*3/MM3 (ref 3.4–10.8)

## 2022-04-12 PROCEDURE — 99232 SBSQ HOSP IP/OBS MODERATE 35: CPT | Performed by: PHYSICIAN ASSISTANT

## 2022-04-12 PROCEDURE — 85025 COMPLETE CBC W/AUTO DIFF WBC: CPT | Performed by: INTERNAL MEDICINE

## 2022-04-12 PROCEDURE — 85610 PROTHROMBIN TIME: CPT | Performed by: INTERNAL MEDICINE

## 2022-04-12 PROCEDURE — 85014 HEMATOCRIT: CPT | Performed by: INTERNAL MEDICINE

## 2022-04-12 PROCEDURE — 85018 HEMOGLOBIN: CPT | Performed by: INTERNAL MEDICINE

## 2022-04-12 PROCEDURE — 82962 GLUCOSE BLOOD TEST: CPT

## 2022-04-12 PROCEDURE — 80053 COMPREHEN METABOLIC PANEL: CPT | Performed by: INTERNAL MEDICINE

## 2022-04-12 PROCEDURE — 63710000001 INSULIN LISPRO (HUMAN) PER 5 UNITS: Performed by: INTERNAL MEDICINE

## 2022-04-12 RX ADMIN — SODIUM CHLORIDE 100 ML/HR: 9 INJECTION, SOLUTION INTRAVENOUS at 20:00

## 2022-04-12 RX ADMIN — SOTALOL HYDROCHLORIDE 80 MG: 80 TABLET ORAL at 08:10

## 2022-04-12 RX ADMIN — ATORVASTATIN CALCIUM 20 MG: 20 TABLET, FILM COATED ORAL at 20:00

## 2022-04-12 RX ADMIN — Medication 10 ML: at 08:10

## 2022-04-12 RX ADMIN — INSULIN LISPRO 2 UNITS: 100 INJECTION, SOLUTION INTRAVENOUS; SUBCUTANEOUS at 08:09

## 2022-04-12 RX ADMIN — LISINOPRIL 10 MG: 10 TABLET ORAL at 20:00

## 2022-04-12 RX ADMIN — INSULIN LISPRO 4 UNITS: 100 INJECTION, SOLUTION INTRAVENOUS; SUBCUTANEOUS at 17:04

## 2022-04-12 RX ADMIN — PANTOPRAZOLE SODIUM 40 MG: 40 INJECTION, POWDER, FOR SOLUTION INTRAVENOUS at 20:00

## 2022-04-12 RX ADMIN — PANTOPRAZOLE SODIUM 40 MG: 40 INJECTION, POWDER, FOR SOLUTION INTRAVENOUS at 08:11

## 2022-04-12 RX ADMIN — LINAGLIPTIN 5 MG: 5 TABLET, FILM COATED ORAL at 08:10

## 2022-04-12 RX ADMIN — ALLOPURINOL 300 MG: 300 TABLET ORAL at 20:00

## 2022-04-12 NOTE — PROGRESS NOTES
[facility]  Inpatient Progress Note    Reason for Follow-up:  Black stool    Subjective     Interval History:   No bowel movement today.  Tolerated diet.  No abdominal pain, nausea, vomiting.  Hemoglobin relatively stable at 9.2 g/dL.  INR 1.91.      Current Facility-Administered Medications:   •  acetaminophen (TYLENOL) tablet 650 mg, 650 mg, Oral, Q4H PRN, Enma Mccauley MD  •  allopurinol (ZYLOPRIM) tablet 300 mg, 300 mg, Oral, Nightly, Enma Mccauley MD, 300 mg at 04/11/22 2030  •  atorvastatin (LIPITOR) tablet 20 mg, 20 mg, Oral, Nightly, Enma Mccauley MD, 20 mg at 04/11/22 2030  •  dextrose (D50W) (25 g/50 mL) IV injection 25 g, 25 g, Intravenous, Q15 Min PRN, Enma Mccauley MD  •  dextrose (GLUTOSE) oral gel 15 g, 15 g, Oral, Q15 Min PRN, Enma Mccauley MD  •  glucagon (human recombinant) (GLUCAGEN DIAGNOSTIC) injection 1 mg, 1 mg, Intramuscular, Q15 Min PRN, Enma Mccauley MD  •  insulin lispro (ADMELOG) injection 0-9 Units, 0-9 Units, Subcutaneous, TID With Meals, Enma Mccauley MD, 2 Units at 04/12/22 0809  •  linagliptin (TRADJENTA) tablet 5 mg, 5 mg, Oral, Daily, Enma Mccauley MD, 5 mg at 04/12/22 0810  •  lisinopril (PRINIVIL,ZESTRIL) tablet 10 mg, 10 mg, Oral, Nightly, Enma Mccauley MD, 10 mg at 04/11/22 2030  •  melatonin tablet 3 mg, 3 mg, Oral, Nightly PRN, Enma Mccauley MD  •  nitroglycerin (NITROSTAT) SL tablet 0.4 mg, 0.4 mg, Sublingual, Q5 Min PRN, Enma Mccauley MD  •  ondansetron (ZOFRAN) tablet 4 mg, 4 mg, Oral, Q6H PRN **OR** ondansetron (ZOFRAN) injection 4 mg, 4 mg, Intravenous, Q6H PRN, Enma Mccauley MD  •  pantoprazole (PROTONIX) injection 40 mg, 40 mg, Intravenous, Q12H, Enma Mccauley MD, 40 mg at 04/12/22 0811  •  [COMPLETED] Insert peripheral IV, , , Once **AND** sodium chloride 0.9 % flush 10 mL, 10 mL, Intravenous, PRN, David Collier MD, 10 mL at  04/12/22 0810  •  sodium chloride 0.9 % infusion, 100 mL/hr, Intravenous, Continuous, Enma Mccauley MD, Last Rate: 100 mL/hr at 04/11/22 2348, 100 mL/hr at 04/11/22 2348  •  sotalol (BETAPACE) tablet 80 mg, 80 mg, Oral, BID, Enma Mccauley MD, 80 mg at 04/12/22 0810  Review of Systems:    Constitutional: No fevers, chills, sweats   Respiratory: No shortness of breath, cough   Cardiovascular: No Chest pain, palpitations   Gastrointestinal: No nausea, vomiting, diarrhea   Genitourinary: No hematuria, dysuria    Objective     Vital Signs  Temp:  [97.8 °F (36.6 °C)-98.4 °F (36.9 °C)] 97.8 °F (36.6 °C)  Heart Rate:  [61-63] 61  Resp:  [18] 18  BP: (100-117)/(55-89) 117/67  Body mass index is 22.51 kg/m².    Intake/Output Summary (Last 24 hours) at 4/12/2022 1529  Last data filed at 4/12/2022 1400  Gross per 24 hour   Intake 360 ml   Output --   Net 360 ml     I/O this shift:  In: 360 [P.O.:360]  Out: -      Physical Exam:   General: Awake, alert and conversive. No acute distress.   Eyes: Normal lids and lashes, no scleral icterus.   Neck: Supple and symmetric. Trachea midline.    Skin: Warm and dry, not jaundiced.    Cardiovascular: Regular rate and rhythm. No murmur.  Left upper anterior chest port catheter in situ   Pulm: Quiet, even, nonlabored breathing. Clear to auscultation bilaterally.    Abdomen: Soft, nondistended, nontender. No rebound or guarding. Bowel sounds present in all 4 quadrants.   Extremities: No rashes or edema.   Psychiatric: Appropriate mood and affect. Cooperative.     Results Review:     I reviewed the patient's new clinical results.    Results from last 7 days   Lab Units 04/12/22  0759 04/11/22  2357 04/11/22  1639 04/11/22  0820 04/11/22  0333 04/10/22  2358 04/10/22  1411   WBC 10*3/mm3 4.27  --   --   --  4.38  --  5.42   HEMOGLOBIN g/dL 9.2*  9.2* 9.2* 9.7*   < > 9.4*   < > 10.5*   HEMATOCRIT % 27.1*  27.1* 26.2* 29.4*   < > 28.3*   < > 30.4*   PLATELETS 10*3/mm3 119*  --    --   --  120*  --  137*    < > = values in this interval not displayed.     Results from last 7 days   Lab Units 04/12/22  0759 04/11/22  0333 04/10/22  1411   SODIUM mmol/L 139 139 134*   POTASSIUM mmol/L 3.9 3.9 3.7   CHLORIDE mmol/L 106 108* 102   CO2 mmol/L 25.2 25.0 24.0   BUN mg/dL 14 13 18   CREATININE mg/dL 1.01 0.85 0.92   CALCIUM mg/dL 8.4* 8.2* 8.7   BILIRUBIN mg/dL 0.4  --  0.4   ALK PHOS U/L 106  --  126*   ALT (SGPT) U/L 24  --  26   AST (SGOT) U/L 22  --  21   GLUCOSE mg/dL 159* 156* 201*     Results from last 7 days   Lab Units 04/12/22  0759 04/11/22  1639 04/10/22  1411   INR  1.91* 2.85* 3.40*     Lab Results   Lab Value Date/Time    LIPASE 106 07/11/2020 1342    LIPASE 49 02/06/2014 2302       Radiology:  No orders to display       Assessment/Plan     Patient Active Problem List   Diagnosis   • CLL (chronic lymphocytic leukemia) (HCC)   • Anemia   • Encounter for hepatitis C screening test for low risk patient    • Thrombocytopenia (HCC)   • H/O heart artery stent   • Stented coronary artery   • Arteriosclerosis of coronary artery   • Atrial fibrillation (HCC)   • Paroxysmal atrial fibrillation (HCC)   • Chest pain, rule out acute myocardial infarction   • Fall   • Fracture, olecranon   • Hyperlipidemia   • Long term (current) use of anticoagulants   • Lower GI bleed   • Olecranon bursitis   • Shoulder pain   • Smoker   • CLL (chronic lymphocytic leukemia) (HCC)   • Dehydration   • Drug-induced nausea and vomiting   • Encounter for long-term (current) use of other medications   • Neutropenia (HCC)   • Calculus of gallbladder without cholecystitis without obstruction   • Cholecystitis   • Iron deficiency   • Adverse effect of iron   • Melena   • Change in bowel habits   • Acute upper GI bleed   • Acute blood loss anemia   • Type 2 diabetes mellitus, without long-term current use of insulin (HCC)       Assessment:  1. Anemia  2. Melena  3. Coronary artery disease status post stent  placement  4. Chronic lymphocytic leukemia  5. Atrial fibrillation, on warfarin      Plan:  · Monitor H&H and stool output, transfuse as necessary.  · Continue to hold Coumadin.  · N.p.o. after midnight.  Plan for small bowel enteroscopy tomorrow as long as INR is less than 1.8.  · Pantoprazole 40mg IV every 12hrs.  · Continue supportive care.    I discussed the patient's findings and my recommendations with patient and Dr. Walls.    Dragon dictation used throughout this note.            MONIKA White  [facility]  [address] [place] [address]  Office: [phone]

## 2022-04-12 NOTE — PLAN OF CARE
Goal Outcome Evaluation:  Plan of Care Reviewed With: patient        Progress: improving  Outcome Evaluation: VSS, on RA. NS @ 100. INR 1.9. plan to do scope tomorrow if INR <1.8. Hgb stable at 9.2. will continue to monitor

## 2022-04-12 NOTE — PLAN OF CARE
Problem: Adult Inpatient Plan of Care  Goal: Plan of Care Review  Outcome: Ongoing, Progressing  Flowsheets (Taken 4/12/2022 0507)  Progress: improving  Plan of Care Reviewed With: patient  Outcome Evaluation: vss. no c/o pain. ivfs. npo after midnight for possible egd pending inr level. resting well throughout shift.  Goal: Patient-Specific Goal (Individualized)  Outcome: Ongoing, Progressing  Goal: Absence of Hospital-Acquired Illness or Injury  Outcome: Ongoing, Progressing  Intervention: Identify and Manage Fall Risk  Recent Flowsheet Documentation  Taken 4/12/2022 0228 by Lulú Montes, RN  Safety Promotion/Fall Prevention: safety round/check completed  Taken 4/12/2022 0015 by Lulú Montes, RN  Safety Promotion/Fall Prevention: safety round/check completed  Taken 4/11/2022 2231 by Lulú Montes, RN  Safety Promotion/Fall Prevention: safety round/check completed  Taken 4/11/2022 2030 by Lulú Montes, RN  Safety Promotion/Fall Prevention: safety round/check completed  Goal: Optimal Comfort and Wellbeing  Outcome: Ongoing, Progressing  Intervention: Provide Person-Centered Care  Recent Flowsheet Documentation  Taken 4/11/2022 2030 by Lulú Montes, RN  Trust Relationship/Rapport:   choices provided   care explained   emotional support provided   empathic listening provided  Goal: Readiness for Transition of Care  Outcome: Ongoing, Progressing     Problem: Diabetes Comorbidity  Goal: Blood Glucose Level Within Targeted Range  Outcome: Ongoing, Progressing     Problem: Hypertension Comorbidity  Goal: Blood Pressure in Desired Range  Outcome: Ongoing, Progressing  Intervention: Maintain Blood Pressure Management  Recent Flowsheet Documentation  Taken 4/11/2022 2030 by Lulú Montes, RN  Medication Review/Management: medications reviewed     Problem: Adjustment to Illness (Gastrointestinal Bleeding)  Goal: Optimal Coping with Acute Illness  Outcome: Ongoing, Progressing     Problem:  Bleeding (Gastrointestinal Bleeding)  Goal: Hemostasis  Outcome: Ongoing, Progressing   Goal Outcome Evaluation:  Plan of Care Reviewed With: patient        Progress: improving  Outcome Evaluation: vss. no c/o pain. ivfs. npo after midnight for possible egd pending inr level. resting well throughout shift.

## 2022-04-13 ENCOUNTER — ANESTHESIA (OUTPATIENT)
Dept: GASTROENTEROLOGY | Facility: HOSPITAL | Age: 72
End: 2022-04-13

## 2022-04-13 ENCOUNTER — ANESTHESIA EVENT (OUTPATIENT)
Dept: GASTROENTEROLOGY | Facility: HOSPITAL | Age: 72
End: 2022-04-13

## 2022-04-13 ENCOUNTER — READMISSION MANAGEMENT (OUTPATIENT)
Dept: CALL CENTER | Facility: HOSPITAL | Age: 72
End: 2022-04-13

## 2022-04-13 VITALS
HEART RATE: 56 BPM | OXYGEN SATURATION: 98 % | WEIGHT: 166 LBS | DIASTOLIC BLOOD PRESSURE: 63 MMHG | TEMPERATURE: 97.6 F | HEIGHT: 72 IN | BODY MASS INDEX: 22.48 KG/M2 | RESPIRATION RATE: 18 BRPM | SYSTOLIC BLOOD PRESSURE: 131 MMHG

## 2022-04-13 LAB
DEPRECATED RDW RBC AUTO: 55 FL (ref 37–54)
ERYTHROCYTE [DISTWIDTH] IN BLOOD BY AUTOMATED COUNT: 15 % (ref 12.3–15.4)
GLUCOSE BLDC GLUCOMTR-MCNC: 105 MG/DL (ref 70–130)
GLUCOSE BLDC GLUCOMTR-MCNC: 157 MG/DL (ref 70–130)
HCT VFR BLD AUTO: 24.6 % (ref 37.5–51)
HCT VFR BLD AUTO: 25.5 % (ref 37.5–51)
HGB BLD-MCNC: 8.1 G/DL (ref 13–17.7)
HGB BLD-MCNC: 8.8 G/DL (ref 13–17.7)
INR PPP: 1.43 (ref 0.9–1.1)
MCH RBC QN AUTO: 33.1 PG (ref 26.6–33)
MCHC RBC AUTO-ENTMCNC: 32.9 G/DL (ref 31.5–35.7)
MCV RBC AUTO: 100.4 FL (ref 79–97)
PLATELET # BLD AUTO: 116 10*3/MM3 (ref 140–450)
PMV BLD AUTO: 9.9 FL (ref 6–12)
PROTHROMBIN TIME: 17.4 SECONDS (ref 11.7–14.2)
RBC # BLD AUTO: 2.45 10*6/MM3 (ref 4.14–5.8)
WBC NRBC COR # BLD: 4.45 10*3/MM3 (ref 3.4–10.8)

## 2022-04-13 PROCEDURE — 85027 COMPLETE CBC AUTOMATED: CPT | Performed by: HOSPITALIST

## 2022-04-13 PROCEDURE — 25010000002 PROPOFOL 10 MG/ML EMULSION: Performed by: ANESTHESIOLOGY

## 2022-04-13 PROCEDURE — 85018 HEMOGLOBIN: CPT | Performed by: HOSPITALIST

## 2022-04-13 PROCEDURE — 44360 SMALL BOWEL ENDOSCOPY: CPT | Performed by: INTERNAL MEDICINE

## 2022-04-13 PROCEDURE — 63710000001 INSULIN LISPRO (HUMAN) PER 5 UNITS: Performed by: INTERNAL MEDICINE

## 2022-04-13 PROCEDURE — 82962 GLUCOSE BLOOD TEST: CPT

## 2022-04-13 PROCEDURE — 0DJ08ZZ INSPECTION OF UPPER INTESTINAL TRACT, VIA NATURAL OR ARTIFICIAL OPENING ENDOSCOPIC: ICD-10-PCS | Performed by: INTERNAL MEDICINE

## 2022-04-13 PROCEDURE — 85014 HEMATOCRIT: CPT | Performed by: HOSPITALIST

## 2022-04-13 PROCEDURE — 85610 PROTHROMBIN TIME: CPT | Performed by: HOSPITALIST

## 2022-04-13 RX ORDER — SODIUM CHLORIDE 9 MG/ML
30 INJECTION, SOLUTION INTRAVENOUS CONTINUOUS PRN
Status: DISCONTINUED | OUTPATIENT
Start: 2022-04-13 | End: 2022-04-13 | Stop reason: HOSPADM

## 2022-04-13 RX ORDER — LIDOCAINE HYDROCHLORIDE 20 MG/ML
INJECTION, SOLUTION INFILTRATION; PERINEURAL AS NEEDED
Status: DISCONTINUED | OUTPATIENT
Start: 2022-04-13 | End: 2022-04-13 | Stop reason: SURG

## 2022-04-13 RX ORDER — SODIUM CHLORIDE, SODIUM LACTATE, POTASSIUM CHLORIDE, CALCIUM CHLORIDE 600; 310; 30; 20 MG/100ML; MG/100ML; MG/100ML; MG/100ML
INJECTION, SOLUTION INTRAVENOUS CONTINUOUS PRN
Status: DISCONTINUED | OUTPATIENT
Start: 2022-04-13 | End: 2022-04-13 | Stop reason: SURG

## 2022-04-13 RX ORDER — PROPOFOL 10 MG/ML
VIAL (ML) INTRAVENOUS AS NEEDED
Status: DISCONTINUED | OUTPATIENT
Start: 2022-04-13 | End: 2022-04-13 | Stop reason: SURG

## 2022-04-13 RX ADMIN — PROPOFOL 30 MG: 10 INJECTION, EMULSION INTRAVENOUS at 11:10

## 2022-04-13 RX ADMIN — LINAGLIPTIN 5 MG: 5 TABLET, FILM COATED ORAL at 08:19

## 2022-04-13 RX ADMIN — PROPOFOL 30 MG: 10 INJECTION, EMULSION INTRAVENOUS at 11:12

## 2022-04-13 RX ADMIN — Medication 10 ML: at 08:19

## 2022-04-13 RX ADMIN — INSULIN LISPRO 2 UNITS: 100 INJECTION, SOLUTION INTRAVENOUS; SUBCUTANEOUS at 08:19

## 2022-04-13 RX ADMIN — PROPOFOL 30 MG: 10 INJECTION, EMULSION INTRAVENOUS at 11:14

## 2022-04-13 RX ADMIN — SOTALOL HYDROCHLORIDE 80 MG: 80 TABLET ORAL at 08:19

## 2022-04-13 RX ADMIN — PROPOFOL 100 MG: 10 INJECTION, EMULSION INTRAVENOUS at 11:08

## 2022-04-13 RX ADMIN — SODIUM CHLORIDE 100 ML/HR: 9 INJECTION, SOLUTION INTRAVENOUS at 06:05

## 2022-04-13 RX ADMIN — PANTOPRAZOLE SODIUM 40 MG: 40 INJECTION, POWDER, FOR SOLUTION INTRAVENOUS at 08:19

## 2022-04-13 RX ADMIN — SODIUM CHLORIDE, POTASSIUM CHLORIDE, SODIUM LACTATE AND CALCIUM CHLORIDE: 600; 310; 30; 20 INJECTION, SOLUTION INTRAVENOUS at 11:05

## 2022-04-13 RX ADMIN — SODIUM CHLORIDE 30 ML/HR: 9 INJECTION, SOLUTION INTRAVENOUS at 10:46

## 2022-04-13 RX ADMIN — LIDOCAINE HYDROCHLORIDE 60 MG: 20 INJECTION, SOLUTION INFILTRATION; PERINEURAL at 11:08

## 2022-04-13 NOTE — ANESTHESIA PREPROCEDURE EVALUATION
Anesthesia Evaluation     Patient summary reviewed and Nursing notes reviewed   NPO Solid Status: > 8 hours  NPO Liquid Status: > 8 hours           Airway   Mallampati: I  TM distance: >3 FB  Neck ROM: full  No difficulty expected  Dental - normal exam   (+) edentulous    Pulmonary - normal exam   (+) a smoker Current,   Cardiovascular - normal exam  Exercise tolerance: good (4-7 METS)    ECG reviewed  PT is on anticoagulation therapy  Patient on routine beta blocker and Beta blocker given within 24 hours of surgery    (+) hypertension, valvular problems/murmurs, CAD, cardiac stents more than 12 months ago dysrhythmias Atrial Fib, hyperlipidemia,       Neuro/Psych- negative ROS  GI/Hepatic/Renal/Endo    (+)  GERD, GI bleeding , diabetes mellitus type 2,     Musculoskeletal (-) negative ROS    Abdominal    Substance History - negative use     OB/GYN          Other   blood dyscrasia,   history of cancer        Phys Exam Other: Lower denture implanted studs                Anesthesia Plan    ASA 3     MAC     intravenous induction     Anesthetic plan, all risks, benefits, and alternatives have been provided, discussed and informed consent has been obtained with: patient and spouse/significant other.        CODE STATUS:    Code Status (Patient has no pulse and is not breathing): CPR (Attempt to Resuscitate)  Medical Interventions (Patient has pulse or is breathing): Full

## 2022-04-13 NOTE — PLAN OF CARE
Goal Outcome Evaluation:  Plan of Care Reviewed With: patient        Progress: improving  Outcome Evaluation: VSS, on RA. SBE negative for any active bleeding. Plan for capsule study outpatient. Hgb stable. plan to d/c home. will continue to monitor

## 2022-04-13 NOTE — ANESTHESIA POSTPROCEDURE EVALUATION
"Patient: Macho Stephenson    Procedure Summary     Date: 04/13/22 Room / Location:  [facility]    Anesthesia Start: 1105 Anesthesia Stop: 1121    Procedure: SMALL BOWEL ENTEROSCOPY (N/A Esophagus) Diagnosis:       Acute blood loss anemia      Melena      (Acute blood loss anemia [D62])      (Melena [K92.1])    Surgeons: Luis Daniel Walls MD Provider: eDan Russell MD    Anesthesia Type: MAC ASA Status: 3          Anesthesia Type: MAC    Vitals  No vitals data found for the desired time range.          Post Anesthesia Care and Evaluation    Patient location during evaluation: bedside  Patient participation: complete - patient participated  Level of consciousness: responsive to verbal stimuli and awake  Pain management: adequate  Airway patency: patent  Anesthetic complications: No anesthetic complications  PONV Status: none  Cardiovascular status: acceptable and hemodynamically stable  Respiratory status: acceptable and spontaneous ventilation  Hydration status: acceptable    Comments: /59 (BP Location: Left arm, Patient Position: Lying)   Pulse 58   Temp 36.6 °C (97.9 °F) (Oral)   Resp 16   Ht 182.9 cm (72\")   Wt 75.3 kg (166 lb)   SpO2 97%   BMI 22.51 kg/m²         "

## 2022-04-13 NOTE — OUTREACH NOTE
Prep Survey    Flowsheet Row Responses   [facility] patient discharged from? [place]   Is LACE score < 7 ? No   Emergency Room discharge w/ pulse ox? No   Eligibility [facility]   Date of Admission 04/10/22   Date of Discharge 04/13/22   Discharge Disposition Home or Self Care   Discharge diagnosis Acute upper GI bleed,    Acute blood loss anemia    Does the patient have one of the following disease processes/diagnoses(primary or secondary)? Other   Does the patient have Home health ordered? No   Is there a DME ordered? No   Prep survey completed? Yes          LUZ JUSTICE - Registered Nurse

## 2022-04-13 NOTE — DISCHARGE SUMMARY
Patient Name: Macho Stephenson  : 1950  MRN: [id number]    Date of Admission: 4/10/2022  Date of Discharge:  2022  Primary Care Physician: Kevin Chandra MD      Chief Complaint:   Black or Bloody Stool      Discharge Diagnoses     Active Hospital Problems    Diagnosis  POA   • **Acute upper GI bleed [K92.2]  Yes   • Acute blood loss anemia [D62]  Yes   • Type 2 diabetes mellitus, without long-term current use of insulin (HCC) [E11.9]  Yes   • Melena [K92.1]  Yes   • CLL (chronic lymphocytic leukemia) (HCC) [C91.10]  Yes   • Arteriosclerosis of coronary artery [I25.10]  Yes   • Paroxysmal atrial fibrillation (HCC) [I48.0]  Yes      Resolved Hospital Problems   No resolved problems to display.        Hospital Course     Mr. Stephenson is a 71 y.o. 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  Please see the admitting history and physical for further details.  He was found to have acute blood loss anemia.  His INR was slightly supratherapeutic so Coumadin was held obviously.  Hemoglobin was trended and it did drop but did not get to the point of needing a transfusion.  He did receive the vitamin K and once his INR was less than 1.8 he was taken for endoscopy this morning without any obvious new lesions found.  He did not have any obvious bleeding.  His hemoglobin has been stable around 8.5-9.5.  Plan is for outpatient capsule endoscopy for the next 7 work-up.  I told him if he started to bleed briskly he should come back to the hospital in short order since he is already a bit anemic.  He is aware of the symptoms of anemia since he has dealt with it for quite a while related to his leukemia and had received periodic transfusions for a while.  He is holding his warfarin and will discuss with his regular cardiologist Dr. Rosa upon d/c.  He did not have any episodes of  YULIANA madrigal on telemetry while here      Day of Discharge     Subjective:  No complaints    Physical Exam:  Temp:  [97.6 °F (36.4 °C)-98.5 °F (36.9 °C)] 97.6 °F (36.4 °C)  Heart Rate:  [54-61] 56  Resp:  [16-18] 18  BP: (102-148)/(51-64) 131/63  Body mass index is 22.51 kg/m².  Physical Exam  Vitals and nursing note reviewed.   Constitutional:       Appearance: Normal appearance. He is not ill-appearing.   HENT:      Head: Normocephalic and atraumatic.   Cardiovascular:      Rate and Rhythm: Normal rate and regular rhythm.   Pulmonary:      Effort: Pulmonary effort is normal. No respiratory distress.      Breath sounds: No wheezing.   Abdominal:      General: Bowel sounds are normal.      Palpations: Abdomen is soft.      Tenderness: There is no abdominal tenderness.   Musculoskeletal:      Right lower leg: No edema.      Left lower leg: No edema.   Skin:     General: Skin is warm and dry.   Neurological:      General: No focal deficit present.      Mental Status: He is alert.   Psychiatric:         Mood and Affect: Mood normal.         Consultants     Consult Orders (all) (From admission, onward)     Start     Ordered    04/10/22 1728  Inpatient Gastroenterology Consult  Once        Specialty:  Gastroenterology  Provider:  Velasquez Hinds MD    04/10/22 1728    04/10/22 1525  [facility] (on-call MD unless specified) Details  Once,   Status:  Canceled        Specialty:  Hospitalist  Provider:  (Not yet assigned)    04/10/22 1524              Procedures     SMALL BOWEL ENTEROSCOPY      Imaging Results (All)     None            Pertinent Labs     Results from last 7 days   Lab Units 04/13/22  1304 04/13/22  0305 04/12/22  0759 04/11/22  2357 04/11/22  0820 04/11/22  0333 04/10/22  2358 04/10/22  1411   WBC 10*3/mm3  --  4.45 4.27  --   --  4.38  --  5.42   HEMOGLOBIN g/dL 8.8* 8.1* 9.2*  9.2* 9.2*   < > 9.4*   < > 10.5*   PLATELETS 10*3/mm3  --  116* 119*  --   --  120*  --  137*    < > = values in this interval not  displayed.     Results from last 7 days   Lab Units 04/12/22  0759 04/11/22  0333 04/10/22  1411   SODIUM mmol/L 139 139 134*   POTASSIUM mmol/L 3.9 3.9 3.7   CHLORIDE mmol/L 106 108* 102   CO2 mmol/L 25.2 25.0 24.0   BUN mg/dL 14 13 18   CREATININE mg/dL 1.01 0.85 0.92   GLUCOSE mg/dL 159* 156* 201*   EGFR mL/min/1.73 79.0 92.3 88.4     Results from last 7 days   Lab Units 04/12/22  0759 04/10/22  1411   ALBUMIN g/dL 3.50 4.10   BILIRUBIN mg/dL 0.4 0.4   ALK PHOS U/L 106 126*   AST (SGOT) U/L 22 21   ALT (SGPT) U/L 24 26     Results from last 7 days   Lab Units 04/12/22  0759 04/11/22  0333 04/10/22  1411   CALCIUM mg/dL 8.4* 8.2* 8.7   ALBUMIN g/dL 3.50  --  4.10               Invalid input(s): LDLCALC      Results from last 7 days   Lab Units 04/10/22  1549   COVID19  Not Detected       Test Results Pending at Discharge       Discharge Details        Discharge Medications      Continue These Medications      Instructions Start Date   allopurinol 300 MG tablet  Commonly known as: ZYLOPRIM   300 mg, Oral, Nightly      atorvastatin 20 MG tablet  Commonly known as: LIPITOR   20 mg, Oral, Nightly      lisinopril 10 MG tablet  Commonly known as: PRINIVIL,ZESTRIL   10 mg, Oral, Nightly      metFORMIN 500 MG tablet  Commonly known as: GLUCOPHAGE   500 mg, Oral, Daily With Breakfast      nitroglycerin 0.4 MG SL tablet  Commonly known as: NITROSTAT   0.4 mg, Sublingual, Every 5 Minutes PRN, Take no more than 3 doses in 15 minutes.       pantoprazole 40 MG EC tablet  Commonly known as: PROTONIX   40 mg, Oral, Daily      sotalol 80 MG tablet  Commonly known as: BETAPACE   80 mg, Oral, 2 Times Daily         Stop These Medications    aspirin 81 MG chewable tablet     SITagliptin 100 MG tablet  Commonly known as: Januvia     warfarin 5 MG tablet  Commonly known as: COUMADIN            Allergies   Allergen Reactions   • Penicillins Swelling       Discharge Disposition:  Home or Self Care      Discharge Diet:  No active diet  order      Discharge Activity:   Activity Instructions    Advance as tolerated           CODE STATUS:    Code Status and Medical Interventions:   Ordered at: 04/10/22 1728     Code Status (Patient has no pulse and is not breathing):    CPR (Attempt to Resuscitate)     Medical Interventions (Patient has pulse or is breathing):    Full       Future Appointments   Date Time Provider Department Center   5/10/2022  1:30 PM INFU CBC [facility] PORT CHAIR BH INFUS [facility]   6/14/2022 12:15 PM INFU CBC [facility]RE PORT CHAIR BH INFUS [facility]   6/14/2022  1:00 PM BRITTANY PET CT BH [facility]   6/21/2022 10:20 AM VITALS ONLY CBC [facility]RE BH LAB [facility]   6/21/2022 10:40 AM Susannah Moya MD [facility] CBC [facility]     Additional Instructions for the Follow-ups that You Need to Schedule     Discharge Follow-up with PCP   As directed       Currently Documented PCP:    Kevin Chandra MD    PCP Phone Number:    [phone]     Follow Up Details: 1-2 weeks         Discharge Follow-up with Specialty: GI per their recs for capsule   As directed      Specialty: GI per their recs for capsule            Follow-up Information     Kevin Chandra MD .    Specialty: Family Medicine  Why: 1-2 weeks  Contact information:  [address] [facility] [id number] [phone]             Luis Daniel Walls MD Follow up in 1 week(s).    Specialties: Gastroenterology, Internal Medicine  Why: F/u about outpatient capsule study  Contact information:  [address] [facility] [id number] [phone]                         Additional Instructions for the Follow-ups that You Need to Schedule     Discharge Follow-up with PCP   As directed       Currently Documented PCP:    Kevin Chandra MD    PCP Phone Number:    [phone]     Follow Up Details: 1-2 weeks         Discharge Follow-up with Specialty: GI per their recs for capsule   As directed      Specialty: GI per their recs for capsule           Time Spent on Discharge:  Greater than 30  minutes      Dean Edwards MD  [facility]  04/13/22  16:58 EDT

## 2022-04-13 NOTE — PLAN OF CARE
Goal Outcome Evaluation:  Plan of Care Reviewed With: patient        Progress: no change  Outcome Evaluation: VSS on room air. Continue IVF. Been NPO since midnight for egd today. No c/o pain. Rested well throughout shift. Will continue to monitor.

## 2022-04-14 ENCOUNTER — TRANSITIONAL CARE MANAGEMENT TELEPHONE ENCOUNTER (OUTPATIENT)
Dept: CALL CENTER | Facility: HOSPITAL | Age: 72
End: 2022-04-14

## 2022-04-14 NOTE — OUTREACH NOTE
Call Center TCM Note    Flowsheet Row Responses   [facility] patient discharged from? [place]   Does the patient have one of the following disease processes/diagnoses(primary or secondary)? Other   TCM attempt successful? Yes  [no verbal release]   Call start time 0939   Call end time 0944   Discharge diagnosis Acute upper GI bleed,    Acute blood loss anemia    Medication alerts for this patient WARFARIN---patient continues to hold per AVS instructions   Meds reviewed with patient/caregiver? Yes   Is the patient taking all medications as directed (includes completed medication regime)? Yes   Comments regarding appointments Patient to f/u with GI---see AVS   Does the patient have a primary care provider?  Yes   Does the patient have an appointment with their PCP within 7 days of discharge? No   Comments regarding PCP Declines PCP f/u at this time--will be following up with specialists   What is preventing the patient from scheduling follow up appointments within 7 days of discharge? --  [Patient preference]   Has the patient kept scheduled appointments due by today? N/A   Did the patient receive a copy of their discharge instructions? Yes   Nursing interventions Reviewed instructions with patient   What is the patient's perception of their health status since discharge? Same  [Patient continues to have melena, BM is black in color.  Patient is going to contact GI to inform of melena,  reports plans were discussed for GI capsule study post discharge.  Patient aware to return to ED for increased bleeding. ]   Is the patient/caregiver able to teach back signs and symptoms related to disease process for when to call 911? Yes   Is the patient/caregiver able to teach back the hierarchy of who to call/visit for symptoms/problems? PCP, Specialist, Home health nurse, Urgent Care, ED, 911 Yes   Additional teach back comments Patient attempting contact with his cardiologist regarding warfarin and medical necessity,   he has not had any episodes of Afib for sometime now.    TCM call completed? Yes          Savannah Benavides RN    4/14/2022, 09:51 EDT

## 2022-04-20 ENCOUNTER — OFFICE VISIT (OUTPATIENT)
Dept: GASTROENTEROLOGY | Facility: CLINIC | Age: 72
End: 2022-04-20

## 2022-04-20 VITALS
OXYGEN SATURATION: 99 % | BODY MASS INDEX: 22.59 KG/M2 | WEIGHT: 166.8 LBS | HEIGHT: 72 IN | SYSTOLIC BLOOD PRESSURE: 120 MMHG | HEART RATE: 65 BPM | TEMPERATURE: 98.2 F | DIASTOLIC BLOOD PRESSURE: 64 MMHG

## 2022-04-20 DIAGNOSIS — K22.70 BARRETT'S ESOPHAGUS WITHOUT DYSPLASIA: ICD-10-CM

## 2022-04-20 DIAGNOSIS — D50.0 IRON DEFICIENCY ANEMIA DUE TO CHRONIC BLOOD LOSS: Primary | ICD-10-CM

## 2022-04-20 DIAGNOSIS — Z87.19 HISTORY OF MELENA: ICD-10-CM

## 2022-04-20 PROCEDURE — 99214 OFFICE O/P EST MOD 30 MIN: CPT | Performed by: NURSE PRACTITIONER

## 2022-04-20 NOTE — PROGRESS NOTES
Chief Complaint   Patient presents with   • Follow-up     recent hospitalization for melena, not taking Coumadin         History of Present Illness  71-year-old male presents today for follow-up.  He has a history of iron deficiency anemia.  He is followed by Dr. Roland for CLL stage IV and iron deficiency anemia.  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    He is status post cholecystectomy October 1, 2021.  Since his cholecystectomy he continues to experience abdominal pain that comes and goes located in his mid lower abdomen.  Will experience abdominal pressure and cramping that improves after a bowel movement.    He was having diarrhea prior to cholecystectomy.  He had a return of diarrhea January 2022 with 3 or 4 bowel movements on days when he is having diarrhea.  He also could skip a day and not have a bowel movement.  He was started on fiber supplement at his last office visit.    Patient was admitted to the hospital April 10 discharged April 13, 2022 due to melena diagnosed with acute blood loss anemia.  Enteroscopy was performed with no obvious lesions or cause for bleeding noted.  Presents today to discuss small bowel capsule endoscopy.  Since discharge from hospital he has not been taking his Coumadin.  He is no longer having black stools.  He has not had his blood level checked since discharge.    His cardiologist has transitioned to hospital base work and is no longer seeing patients in the office.  He was told that our office needs to contact his cardiology office regarding his Coumadin.    He continues on pantoprazole for acid reflux.  He denies pain or trouble with swallowing.    He did have chest pain yesterday evening with increased physical activity.  After he sat down and was sedentary chest pain resolved.  This has happened in the past when he has  "been anemic and his hematologist Dr. Roland has arranged for blood transfusions in the past.  He has not had recent blood work.    Review of Systems   Respiratory: Positive for shortness of breath.    Gastrointestinal: Negative for blood in stool.         Result Review :       Tissue Pathology Exam (03/22/2022 13:09)   UPPER GI ENDOSCOPY (03/22/2022 13:10)   COLONOSCOPY (03/22/2022 12:50)   SMALL BOWEL ENTEROSCOPY (04/13/2022 10:57)   Hemoglobin & Hematocrit, Blood (04/13/2022 13:04)     Vital Signs:   /64   Pulse 65   Temp 98.2 °F (36.8 °C)   Ht 182.9 cm (72\")   Wt 75.7 kg (166 lb 12.8 oz)   SpO2 99%   BMI 22.62 kg/m²     Body mass index is 22.62 kg/m².     Physical Exam  Vitals reviewed.   Constitutional:       General: He is not in acute distress.     Appearance: Normal appearance. He is well-developed and normal weight. He is not ill-appearing.   Pulmonary:      Effort: Pulmonary effort is normal. No respiratory distress.   Skin:     Coloration: Skin is not pale.   Neurological:      Mental Status: He is alert.       Assessment and Plan    Diagnoses and all orders for this visit:    1. Iron deficiency anemia due to chronic blood loss (Primary)  -     Hemoglobin & Hematocrit, Blood    2. Hess's esophagus without dysplasia    3. History of melena    Iron deficiency anemia secondary to blood loss via GI tract.  Patient has been on Coumadin for history of cardiac stents.  Since discharge from hospital he has not restarted Coumadin.  He has contacted his cardiology office but was told his cardiologist is now working primarily in the hospital and is not available for outpatient visits.  He was told that we need to contact cardiology office regarding his Coumadin.    We will contact cardiology office, patient needs follow-up with nurse practitioner or another cardiologist in his previous cardiologist practice to discuss anticoagulation therapy, patient is not interested in restarting but this is not a " decision that I can make and he needs to discuss risks and benefits with cardiology provider.    For new diagnosis of Hess's esophagus, recommend daily PPI therapy.    Recommend repeat EGD in 1 year for monitoring of Hess's esophagus.    Will check CBC at today's visit.  Given chest pain with exertion secondary to anemia, will contact patient's hematologist Dr. Roland after blood work is available as patient has said that he has had transfusion in the past when chest pain is present secondary to anemia.    Recommend strict antireflux precautions and daily PPI.    Given numerous colon polyps recommend repeat colonoscopy in 3 years for surveillance.    Patient with numerous angioectasias in the duodenum and 1 in the colon with previous endoscopic evaluation, these were treated with APC.  Since discharge patient is not on anticoagulation therapy melena has resolved, if evidence of recurrent bleeding, proceed with small bowel capsule endoscopy, no orders currently for small bowel capsule endoscopy but this can be considered in the future as we discussed..    ADDENDUM:   4/25/2022 155pm: Spoke with patient's cardiology office, patient scheduled for follow-up visit at 3 PM tomorrow.  Patient notified via telephone and confirms this appointment time works for him. Dennis    [employer] Dragon/Transcription Disclaimer:  This document has been Dictated utilizing Dragon dictation.

## 2022-04-21 ENCOUNTER — TELEPHONE (OUTPATIENT)
Dept: ONCOLOGY | Facility: CLINIC | Age: 72
End: 2022-04-21

## 2022-04-21 DIAGNOSIS — D64.9 ANEMIA, UNSPECIFIED TYPE: ICD-10-CM

## 2022-04-21 DIAGNOSIS — C91.10 CLL (CHRONIC LYMPHOCYTIC LEUKEMIA): Primary | ICD-10-CM

## 2022-04-21 LAB
HCT VFR BLD AUTO: 27.9 % (ref 37.5–51)
HGB BLD-MCNC: 8.9 G/DL (ref 13–17.7)

## 2022-04-21 NOTE — TELEPHONE ENCOUNTER
Call to Mr. Stephenson to let him know we had received a call from the GI office, a nurse there, to let us know his hemoglobin has dropped more and they recommend patient being seen in this office.  Patient states that he feels tired and has chest pain with exertion, but no shortness of breath.  He states his Coumadin has been discontinued and they are working with his cardiologist to get him on a different blood thinner and he is waiting to hear back about that.  Let him know scheduling will call to set him up with NP and lab work.  Patient verbalized understanding.

## 2022-04-22 ENCOUNTER — LAB (OUTPATIENT)
Dept: LAB | Facility: HOSPITAL | Age: 72
End: 2022-04-22

## 2022-04-22 ENCOUNTER — OFFICE VISIT (OUTPATIENT)
Dept: ONCOLOGY | Facility: CLINIC | Age: 72
End: 2022-04-22

## 2022-04-22 ENCOUNTER — READMISSION MANAGEMENT (OUTPATIENT)
Dept: CALL CENTER | Facility: HOSPITAL | Age: 72
End: 2022-04-22

## 2022-04-22 VITALS
RESPIRATION RATE: 18 BRPM | BODY MASS INDEX: 22.74 KG/M2 | OXYGEN SATURATION: 99 % | DIASTOLIC BLOOD PRESSURE: 76 MMHG | WEIGHT: 167.9 LBS | SYSTOLIC BLOOD PRESSURE: 135 MMHG | TEMPERATURE: 97.2 F | HEIGHT: 72 IN | HEART RATE: 58 BPM

## 2022-04-22 DIAGNOSIS — D69.6 THROMBOCYTOPENIA: ICD-10-CM

## 2022-04-22 DIAGNOSIS — C91.10 CLL (CHRONIC LYMPHOCYTIC LEUKEMIA): ICD-10-CM

## 2022-04-22 DIAGNOSIS — D64.9 ANEMIA, UNSPECIFIED TYPE: ICD-10-CM

## 2022-04-22 DIAGNOSIS — D50.0 IRON DEFICIENCY ANEMIA DUE TO CHRONIC BLOOD LOSS: Primary | ICD-10-CM

## 2022-04-22 LAB
ALBUMIN SERPL-MCNC: 4 G/DL (ref 3.5–5.2)
ALBUMIN/GLOB SERPL: 2.1 G/DL (ref 1.1–2.4)
ALP SERPL-CCNC: 143 U/L (ref 38–116)
ALT SERPL W P-5'-P-CCNC: 18 U/L (ref 0–41)
ANION GAP SERPL CALCULATED.3IONS-SCNC: 7.7 MMOL/L (ref 5–15)
AST SERPL-CCNC: 17 U/L (ref 0–40)
BASOPHILS # BLD AUTO: 0.03 10*3/MM3 (ref 0–0.2)
BASOPHILS NFR BLD AUTO: 0.7 % (ref 0–1.5)
BILIRUB SERPL-MCNC: 0.4 MG/DL (ref 0.2–1.2)
BUN SERPL-MCNC: 12 MG/DL (ref 6–20)
BUN/CREAT SERPL: 12.8 (ref 7.3–30)
CALCIUM SPEC-SCNC: 9.2 MG/DL (ref 8.5–10.2)
CHLORIDE SERPL-SCNC: 105 MMOL/L (ref 98–107)
CO2 SERPL-SCNC: 26.3 MMOL/L (ref 22–29)
CREAT SERPL-MCNC: 0.94 MG/DL (ref 0.7–1.3)
DEPRECATED RDW RBC AUTO: 54.5 FL (ref 37–54)
EGFRCR SERPLBLD CKD-EPI 2021: 86.1 ML/MIN/1.73
EOSINOPHIL # BLD AUTO: 0.13 10*3/MM3 (ref 0–0.4)
EOSINOPHIL NFR BLD AUTO: 2.9 % (ref 0.3–6.2)
ERYTHROCYTE [DISTWIDTH] IN BLOOD BY AUTOMATED COUNT: 14.9 % (ref 12.3–15.4)
FERRITIN SERPL-MCNC: 25.1 NG/ML (ref 30–400)
GLOBULIN UR ELPH-MCNC: 1.9 GM/DL (ref 1.8–3.5)
GLUCOSE SERPL-MCNC: 176 MG/DL (ref 74–124)
HCT VFR BLD AUTO: 27.8 % (ref 37.5–51)
HGB BLD-MCNC: 9 G/DL (ref 13–17.7)
IMM GRANULOCYTES # BLD AUTO: 0.03 10*3/MM3 (ref 0–0.05)
IMM GRANULOCYTES NFR BLD AUTO: 0.7 % (ref 0–0.5)
IRON 24H UR-MRATE: 32 MCG/DL (ref 59–158)
IRON SATN MFR SERPL: 8 % (ref 14–48)
LYMPHOCYTES # BLD AUTO: 0.7 10*3/MM3 (ref 0.7–3.1)
LYMPHOCYTES NFR BLD AUTO: 15.4 % (ref 19.6–45.3)
MCH RBC QN AUTO: 32.5 PG (ref 26.6–33)
MCHC RBC AUTO-ENTMCNC: 32.4 G/DL (ref 31.5–35.7)
MCV RBC AUTO: 100.4 FL (ref 79–97)
MONOCYTES # BLD AUTO: 0.33 10*3/MM3 (ref 0.1–0.9)
MONOCYTES NFR BLD AUTO: 7.3 % (ref 5–12)
NEUTROPHILS NFR BLD AUTO: 3.32 10*3/MM3 (ref 1.7–7)
NEUTROPHILS NFR BLD AUTO: 73 % (ref 42.7–76)
NRBC BLD AUTO-RTO: 0 /100 WBC (ref 0–0.2)
PLATELET # BLD AUTO: 134 10*3/MM3 (ref 140–450)
PMV BLD AUTO: 10 FL (ref 6–12)
POTASSIUM SERPL-SCNC: 4.3 MMOL/L (ref 3.5–4.7)
PROT SERPL-MCNC: 5.9 G/DL (ref 6.3–8)
RBC # BLD AUTO: 2.77 10*6/MM3 (ref 4.14–5.8)
SODIUM SERPL-SCNC: 139 MMOL/L (ref 134–145)
TIBC SERPL-MCNC: 403 MCG/DL (ref 249–505)
TRANSFERRIN SERPL-MCNC: 288 MG/DL (ref 200–360)
WBC NRBC COR # BLD: 4.54 10*3/MM3 (ref 3.4–10.8)

## 2022-04-22 PROCEDURE — 82728 ASSAY OF FERRITIN: CPT

## 2022-04-22 PROCEDURE — 84466 ASSAY OF TRANSFERRIN: CPT

## 2022-04-22 PROCEDURE — 36415 COLL VENOUS BLD VENIPUNCTURE: CPT

## 2022-04-22 PROCEDURE — 83540 ASSAY OF IRON: CPT

## 2022-04-22 PROCEDURE — 85025 COMPLETE CBC W/AUTO DIFF WBC: CPT

## 2022-04-22 PROCEDURE — 80053 COMPREHEN METABOLIC PANEL: CPT

## 2022-04-22 PROCEDURE — 99214 OFFICE O/P EST MOD 30 MIN: CPT | Performed by: NURSE PRACTITIONER

## 2022-04-22 NOTE — OUTREACH NOTE
Medical Week 2 Survey    Flowsheet Row Responses   [facility] patient discharged from? [place]   Does the patient have one of the following disease processes/diagnoses(primary or secondary)? Other   Week 2 attempt successful? Yes   Call start time 1315   Discharge diagnosis Acute upper GI bleed,    Acute blood loss anemia    Call end time 1316   Is patient permission given to speak with other caregiver? Yes   Person spoke with today (if not patient) and relationship wife Nohemi   Meds reviewed with patient/caregiver? Yes   Is the patient having any side effects they believe may be caused by any medication additions or changes? No   Does the patient have all medications ordered at discharge? Yes   Is the patient taking all medications as directed (includes completed medication regime)? Yes   Does the patient have a primary care provider?  Yes   Does the patient have an appointment with their PCP within 7 days of discharge? Yes   Has the patient kept scheduled appointments due by today? Yes   Psychosocial issues? No   Did the patient receive a copy of their discharge instructions? Yes   Nursing interventions Reviewed instructions with patient   What is the patient's perception of their health status since discharge? Improving   Is the patient/caregiver able to teach back signs and symptoms related to disease process for when to call PCP? Yes   Is the patient/caregiver able to teach back signs and symptoms related to disease process for when to call 911? Yes   Is the patient/caregiver able to teach back the hierarchy of who to call/visit for symptoms/problems? PCP, Specialist, Home health nurse, Urgent Care, ED, 911 Yes   Week 2 Call Completed? Yes   Wrap up additional comments Wife reports he is doing well.           DANIKA TORRES - Registered Nurse

## 2022-04-22 NOTE — PROGRESS NOTES
Subjective     REASON FOR VISIT:    1. Chronic lymphoid leukemia, stage 4, presented initially with significant pancytopenia  · Currently on Gazyva with venetoclax  · Cycle 1 Gazyva given on August 13, 2020    2.  History of angiodysplasia requiring cauterization and history of Hess's esophagus    3.  Bone marrow aspiration biopsy shows hypercellular marrow with 98% involvement with CLL    4. Iron deficiency anemia in the setting of GI bleeding    History of Present Illness The patient is a 71 y.o. male with the above-mentioned history who returns today for follow-up.  Patient developed worsening abdominal discomfort with nausea and indigestion and frequent diarrhea and was found per CT scan to have gallstones but no evidence of cholecystitis.  However symptoms persisted and he ended up undergoing cholecystectomy per Dr. Lui on 10/1/2021.  Following this procedure patient had a drop in his hemoglobin from 11.7 down to 8.5.  We repeated iron studies when he was here 1/11/2022 showing that he is indeed iron deficient with iron saturation of 7%, ferritin of 15.  Patient cannot tolerate oral iron and therefore was scheduled for IV Injectafer, returning today to begin this.    As detailed above patient has history of angiodysplasia and Hess's esophagus and continues to have ongoing diarrhea despite cholecystectomy.  He has been referred to GI and will see Dr. Hinds 2/9/2022 for further evaluation.    As he is reviewed today patient did receive 1 unit of PRBCs and hemoglobin improved nicely to 10.5.  We discussed that we expect this to further improve following IV iron in the next 2 weeks.    Interval history: Patient is s/p iron infusions x2.  His hemoglobin is improved to 12.7.  He says he has intermittent dark stools and is not taking ferrous sulfate.  He has an appointment with Dr. Gonzalez next week.    Interval history:   Patient is seen today for triage visit.  He was seen by gastroenterology yesterday,  and noted to have a declining hemoglobin of 8.9, so they recommended he follow-up with our office for further evaluation.  He was admitted to the hospital 4/10/2022 - 4/13/2022 for acute GI bleed.  He had an EGD performed, showing Hess's esophagus.  He was seen by gastroenterology who recommended repeat EGD in 1 year for close follow-up.  While hospitalized, decision was made to hold Coumadin.  Coumadin previously managed by cardiology.  Patient reports he has not restarted Coumadin, and is awaiting appointment for cardiology for further discussion as he would like to discontinue Coumadin permanently.  Since hospitalization, he has noted feeling more fatigued and having pressure in his chest with exertion.  He is still able to perform all daily activities, just having to take occasional breaks.  He currently denies any signs or symptoms of bleeding.    We also followed patient for CLL.  He continues to remain asymptomatic, denying any fevers, night sweats, weight loss, or new lymphadenopathy.    PAST MEDICAL HISTORY:   1. Recurrent atrial fibrillation followed by cardiology. He has had drug eluting stent placed x 3.   2. High cholesterol.   3. Hypertension.       HEMATOLOGIC/ONCOLOGIC HISTORY:       The patient is 62-year-old gentleman with the above history currently here for followup. He has no complaints. He denies fever, night sweats or weight loss. He does not have any lymphadenopathy. He feels good. He had some fatigue but his CBC shows a go od hemoglobin.   The patient is followed by Dr. Kevin Chandra. He had seen Dr. Chandra 7/18/13 for a history of coronary artery disease status drug eluting stent placement to the right coronary artery and left anterior descending artery in February 2011. Histor y of paroxysmal atrial fibrillation and hyperlipidemia. He presented to [facility] on 6/23/13 with palpitations and was found to have atrial fibrillation with rapid ventricular response. He was given IV  Cardizem and converted spontaneously to normal sinus rhythm. He was found to have elevated white count at 18.9 and denied any symptoms of infection or urinary complaints. TSH was normal, troponin levels were negative. He was asked to continue aspirin and metoprolol for that. If he contin u ed to have atrial fibrillation, they will consider antiarrhythmic therapy with or without a short term anticoagulation therapy. However, currently the patient is feeling reasonably good. He has no complaints. His CBC 7/22/13 showed WBC 17,000, hemoglob i n 16.4 and platelet count of 174,000. Back 9/28/13 his hemoglobin was 15.1, WBC 13.3 and platelets 197,000. He has had a persistently elevated WBC but he is totally asymptomatic. He does not have any fever, night sweats or lymphadenopathy. He is here to be evaluated for his elevated white count.       Peripheral blood flow cytometry done 08/16/13 shows 22% chronic lymphoid leukemia cells, and they expressed ZAP-70 but not CD38. The total number of cells approximated 60% T cells, 32% B cells and 1% natural k iller cells. The B cells were monoclonal and expressed CD19, CD20, CD22, CD5, CD23, CD11c, ZAP-70 and T cell antigens. There was a normal CD4/CD8 ratio. CT of the chest, abdomen and pelvis does not show evidence of metastasis. Peripheral blood for DILLON -2 was negative. It was negative for T11:14 translocation.       CT scan done on 08/21/2013 shows 5 to 6 mm noncalcified nodule in the left lower lobe which is unchanged from December 2010. Otherwise no evidence of any lymphadenopathy or hepatosplenomegaly.       MRI of the spine shows arthritis and disc bulge and likely hemangiomas, with 1 lesion showing increased signal intensity at T2, which is likely a hemangioma. Bone scan could be done, but patient does not even have much pain there. CT scan of the chest, a bdomen and pelvis was done on 11/23/2014. He has tiny lymph nodes in the neck which are difficult to palpate. Right  jugular one is 1.4 x 0.9 and left supraclavicular one is 1.5 x 1.1. He also has a subcarinal lymph node which is 1.7 x 1.2 cm, and he ha s a portocaval lymph node which has increased from 2.5 to 2.8. Patient is totally asymptomatic. He does not have any B symptoms, so will continue followup with observation.     Patient is a 69-year-old gentleman with history of chronic lymphocytic leukemia/SLL who recently got admitted to [facility] because of GI bleed.  He was seen by Dr. Montero.  He underwent EGD colonoscopy.  He was found to have angiodysplasia for which he underwent argon coagulation.  He required 3 units of blood.  Patient had a normal esophagus normal stomach and normal duodenum CLOtest was negative he was asked to take Protonix 40 mg daily.  Colonoscopy showed blood in the entire examined colon but there was a single bleeding colonic angiectasia which was treated with argon plasma coagulation.    He was admitted twice.  Initially he was admitted on July 10 at which time he stayed 3 days and he was evaluated for chest pain.  He underwent a cardiac work-up with stress test and echo.  The echo was normal but the stress test showed medium sized moderate severe severity fixed perfusion defect.  Of the inferior wall consistent with infarction.  However according to patient cardiology did not think he had any cardiac problems.  I have left a message for patient's cardiologist to call me today.  Patient subsequently got readmitted with GI bleed and required 1/3 unit of transfusion.  He was found to have thrombocytopenia and hematology was consulted.    His hemoglobin had dropped down to as low as 7 and his platelets had gone down to 87.  Today it is 35 and his hemoglobin is 9.4 today.  He denies active bleeding.  He has lost weight recently.  He does not have any fever or night sweats.    He had ultrasound of spleen which showed enlarged spleen centimeters in length    Patient was started on Gazyva with  Leukeran but subsequently switched to venetoclax with Gazyva.  His venetoclax dose is 100 mg daily and he receives Gazyva once a month.    CT scan done 2020 shows significant response     MEDICATIONS: The current medication list was reviewed with the patient and updated in the EMR this date per the medical assistant. Medication dosages and frequencies were confirmed to be accurate.       ALLERGIES: PENICILLIN which causes him to swell up. He is allergic to IV CONTRAST DYE.     SOCIAL HISTORY: He is . He smokes one pack per day for 35 years. He consumes three to four alcoholic drinks per week. He has no tattoos and no previous transfusions.       FAMILY HISTORY: Father  at 82 with MI. Mother  at 82 with bone cancer. He has one brother age 65 in good health and two sisters 69 and 57 in good health.   Past Medical History, Social History, and Family History are unchanged from my prior documentation on     Review of Systems   Constitutional: Positive for fatigue. Negative for appetite change, chills, diaphoresis, fever and unexpected weight change.   HENT: Negative for hearing loss, sore throat and trouble swallowing.    Respiratory: Negative for cough, chest tightness, shortness of breath and wheezing.         Chest pressure with exertion   Cardiovascular: Negative for chest pain, palpitations and leg swelling.   Gastrointestinal: Positive for diarrhea (stable). Negative for abdominal distention, abdominal pain, constipation, nausea and vomiting.   Genitourinary: Negative for dysuria, frequency, hematuria and urgency.   Musculoskeletal: Negative for joint swelling.        No muscle weakness.   Skin: Negative for rash and wound.   Neurological: Negative for seizures, syncope, speech difficulty, weakness, numbness and headaches.   Hematological: Negative for adenopathy. Does not bruise/bleed easily.   Psychiatric/Behavioral: Negative for behavioral problems, confusion and  suicidal ideas.   All other systems reviewed and are negative.  Reviewed and updated 04/22/22      Patient Active Problem List   Diagnosis   • CLL (chronic lymphocytic leukemia) (HCC)   • Anemia   • Encounter for hepatitis C screening test for low risk patient    • Thrombocytopenia (HCC)   • H/O heart artery stent   • Stented coronary artery   • Arteriosclerosis of coronary artery   • Atrial fibrillation (HCC)   • Paroxysmal atrial fibrillation (HCC)   • Chest pain, rule out acute myocardial infarction   • Fall   • Fracture, olecranon   • Hyperlipidemia   • Long term (current) use of anticoagulants   • Lower GI bleed   • Olecranon bursitis   • Shoulder pain   • Smoker   • CLL (chronic lymphocytic leukemia) (HCC)   • Dehydration   • Drug-induced nausea and vomiting   • Encounter for long-term (current) use of other medications   • Neutropenia (HCC)   • Calculus of gallbladder without cholecystitis without obstruction   • Cholecystitis   • Iron deficiency   • Adverse effect of iron   • Melena   • Change in bowel habits   • Acute upper GI bleed   • Acute blood loss anemia   • Type 2 diabetes mellitus, without long-term current use of insulin (HCC)       MEDICATIONS:  Medication Reconciliation for the patient has been reviewed by me and documented in the patients chart.    ALLERGIES:    Allergies   Allergen Reactions   • Penicillins Swelling         Vitals:    04/22/22 0804   BP: 135/76   Pulse: 58   Resp: 18   Temp: 97.2 °F (36.2 °C)   SpO2: 99%        Current Status 4/22/2022   ECOG score 0      Physical exam:    This patient's ACP documentation is up to date, and there's nothing further left to document.    CONSTITUTIONAL:  Vital signs reviewed.  No distress, looks comfortable.  RESPIRATORY:  Normal respiratory effort.  Lungs clear to auscultation bilaterally.  CARDIOVASCULAR:  Normal S1, S2.  No murmurs rubs or gallops.  No significant lower extremity edema.  GASTROINTESTINAL: Abdomen appears unremarkable.   Nontender.  No hepatomegaly.  No splenomegaly.  LYMPHATIC:  No cervical, supraclavicular, axillary lymphadenopathy.  SKIN:  Warm.  No rashes.  PSYCHIATRIC:  Normal judgment and insight.  Normal mood and affect.    RECENT LABS:  Results from last 7 days   Lab Units 04/22/22  0754 04/20/22  1048   WBC 10*3/mm3 4.54  --    NEUTROS ABS 10*3/mm3 3.32  --    HEMOGLOBIN g/dL 9.0* 8.9*   HEMATOCRIT % 27.8* 27.9*   PLATELETS 10*3/mm3 134*  --          Assessment/Plan   1. Stage 2 CLL without evidence of any disease progression.  I have reviewed the CT scan from 3/19/2018 there is minimal progression but clinically patient is asymptomatic and we will follow with observation.  Will monitor with labs.   No evidence of any frequent infections, no major worsening of his white count. He has no fever or night sweats or any other symptoms.   · Patient knows that he is prone for infections and he is mainly staying at home during the COVID-19 situation time  · Recent admission to [facility] July 10 2020×2.  Initially admitted with chest pain and underwent stress test and echo.  Echo was negative.  Stress test is abnormal but have left message for patient's cardiologist to call us.  His cardiologist is been sent [facility].  · He then got admitted the second time with worsening GI bleed and required total of 3 units of blood.  EGD was negative but colonoscopy showed angiectasia for which he underwent argon ablation.  Currently hemoglobin stable and no active bleeding.  · He was also found to have low platelets with platelets dropping down to 27k and hemoglobin was 7.  Ultrasound showed splenomegaly 15 cm.  Concern is whether he has progressed from his CLL point of view and requires treatment.  · CT scan performed 7/20/2020 did show the increased splenomegaly, but interval decrease in size of the axillary nodes, and shotty mediastinal nodes.  No change in the shotty nodes within the neck and supraclavicular regions.  No new  "lymphadenopathy.  Bone marrow biopsy however showed preliminarily that there is bone marrow involvement.  Remainder of bone marrow biopsy report is pending.  · Bone marrow shows hypercellular marrow with 100% involvement of chronic lymphocytic leukemia/small lymphocytic lymphoma and diffuse pattern with at least 98% of the total marrow cellularity.  Rare foci of erythropoiesis and rare megakaryocytes are noted.   · Negative for BCL-2 and BCL 6.  Chromosomes shows normal karyotype.  I GVH mutation present.  Positive for gain of 1 q., monosomy 13 and loss of IGH.  Negative for deletion T p53, negative for gain of chromosomes 9 and 11, negative for 11, 14 fusion.  · The combination of monosomy 13 and gain of 1 q. is thought to be associated with plasma cell myeloma.  However this patient does not have myeloma.  · Scans were reviewed with the patient today.  Dr. Moya also discussed with the patient and recommended he initiate treatment with chlorambucil and Gazyva.  He would not be a good candidate for Imbruvica due to his history of A. fib\".  He cannot take anticoagulation at this time.  The patient was provided with chemotherapy education today, including  Handouts.  He will need a port placed so that we can initiate treatment  · 8/13/2020: Gazyva day 1. Plan also [id number] mg p.o. on day 1 day 15.  We can increase the dose once we know he tolerates and his platelets improved.  · Patient developing pancytopenia when reviewed cycle 1 day 15.  Chlorambucil dose modified to 10 mg for day 15 however it is felt that he cannot tolerate this ongoing.   ·  Plan to switch to Venetoclax along with continuing Gazyva.    · Patient due today for cycle 2-day 1 Gazyva.  He will proceed today as scheduled.  Venetoclax will be shipped to his home with plans to begin this next week on 9/14/2020.    · Patient did receive 1 L normal saline and Neulasta on 9/28/2020 secondary to neutropenia with an ANC of 0.04.  · Patient seen on " 10/02/2020 continuing on venetoclax, currently on 100 mg dosing.  He would not increase his dose as he will start on voriconazole after being on venetoclax x1 week which affects the metabolism of venetoclax.  He is currently on day 5 of the 100 mg dosing.  Patient states overall he feels the same except for increased fatigue and nausea.  His nauseousness is controlled with ondansetron however.  Patient will be given 1 L normal saline in the office today as planned.  · Patient returns on 10/12/2020 continuing on venetoclax 100 mg daily as well as voriconazole.  · Patient is doing well with these treatments except fatigue.  Next cycle 4 due as of number ninth prior to which will obtain CT scans  · November 9, 2020: White count down to 1.89 but patient started Pepcid.  We will hold off Pepcid.  · 11/17/2020 platelet count dropped to 35,000 patient was instructed to hold venetoclax.  · Returns 11/23/2020 WBC up to 6.38, ANC 5.19, hemoglobin 12.4, platelets up to 121.  I have advised him to resume venetoclax 100 mg daily  · December 7, 2020: Platelets 95K.  White count 3.0 with absolute neutrophil count of 2.01.  Cycle 5 Gazyva given.  Continue venetoclax with voriconazole.   · January 4, 2021: Platelets 84,000, white count 3,440, absolute neutrophil count 1,980. Cycle 6 Gazyva given.  · 2/23/2021: WBC 3.0, platelets 90,000, hemoglobin 14.6, ANC 1.65.  Counts overall stable.  Continue Venetoclax 100 mg daily.  · January 4, 2021: Last dose of Gazyva.  · March 16, 2021: Patient is on venetoclax along with voriconazole and tolerating well with plans to complete total of 1 year.  · 4/27/2021: Patient continues on venetoclax 100 mg daily with voriconazole and acyclovir for prophylaxis.  He is tolerating this well.  He is scheduled for scans in 5 weeks.  · June 8, 2021: Patient is to continue venetoclax 100 mg daily with voriconazole and acyclovir prophylaxis.  He is tolerating it very well.  The plan is to continue 8 more  weeks and then he will complete the protocol and will be followed with observation.  · I have reviewed the CT scan done on June 2, 2021 and there is no evidence of any lymphadenopathy and the spleen size is decreased in size from 12.5-11.3.  He has mild thrombocytopenia and leukopenia but his hemoglobin is normal.  · 9/7/2021: Patient completed 1 year worth of venetoclax with voriconazole and 6 months of Gazyva.  He has had an excellent response for his CLL.  Spleen decreased in size.  Currently asymptomatic.  Discontinue venetoclax since he completed 1 year  · March 28, 2022: Patient's hemoglobin back to normal at 13.1.  EGD showed Hess's esophagus and angiodysplasia for which she underwent argon plasma coagulation.  Also colonoscopy showed multiple polyps all of them benign and had 1 angiodysplasia for which he underwent argon coagulation by Dr. Hinds.  Currently asymptomatic  · 4/22/2022, patient denies any B symptoms.  Scheduled for CT scan 6/14/2022.    2. History of intermittent thrombocytopenia  · Historically platelets in the 150s.  · Count did drop when patient was treated for CLL on chlorambucil.    · Platelets also dropping on treatment with Venetoclax in the past.    · CLL in remission.  Platelet count currently in the normal range.  · Platelets today stable at 134,000.    3.  History of angiodysplasia requiring cauterization and Hess's esophagus mild anemia. He is currently asymptomatic.  · No evidence of active bleeding.    · 1/2022: Patient with recurrent iron deficiency as further detailed below.  Patient referred to GI for further evaluation.  Will see Dr. Hinds 2/9/2022.  · Patient is s/p IV iron and his hemoglobin is improved still has dark stools.  Has appointment with Dr. Santa next week  · Reviewed the results of EGD and colonoscopy which shows Hess's esophagus and angiodysplasia s/p APC.  · Patient follows with gastroenterology, recommended annual EGD for close follow-up.    4.   History of cluster headaches for which she has to be treated with steroids in the past and has followed up with Dr. Len Walker.today with significant headaches.  · Patient had CT of the head 8/20/2020 which was negative.  · He was started on prednisone 10 mg daily which was not helpful.  · Patient saw Dr. Bobby, neurology who gave him 2 shots of a new medication Emgality.  This is something that can be given once a month.    · Resolved.    5.  Atrial fibrillation, off anticoagulation given his thrombocytopenia.  · Patient continues on aspirin 81 mg daily if platelet count greater than 60,000.  · Patient sees Dr[facility] Christy at [facility] who follows his INR  · Coumadin held during patient's recent hospitalization April/2022.  Patient wishes to discontinue Coumadin permanently.  He is awaiting appointment with cardiology to discuss further.    6.  Chronic mild elevation of alkaline phosphatase.  Stable.    7.  Worsening abdominal pain and GI symptoms in the fall 2021.  · CT showing gallstones but no cholecystitis.  · Symptoms persisting with patient undergoing cholecystectomy with Dr. Lui 10/1/2021.  · 1/2022 patient continues with diarrhea and abdominal cramping.  We will see GI, Dr. Hinds, 2/9/2022.   · Symptoms improved no further cramping or diarrhea    8.  Iron deficiency anemia in the setting of angiodysplasia with recurrent GI bleeding.  · Patient intolerant to oral iron  · 1/11/2022 repeat iron studies show ferritin of 15, iron saturation 7%.  · 1/18/2022 proceed with IV Injectafer x2  · Patient hospitalized 4/10 - 4/13/2022 for acute GI bleed.  EGD was performed and patient was diagnosed with Hess's esophagus.  Evaluated by gastroenterology who recommended EGD in 1 year for close follow-up.  During hospitalization, Coumadin was held.  Patient has not yet restarted Coumadin, would like to consult with cardiology first because he wishes to discontinue this permanently.  He is awaiting an appointment with  cardiology to discuss further.  · 4/22/2022, patient seen for triage visit today.  He was evaluated by gastroenterology yesterday, noted to have a declining hemoglobin so they advised him to be seen by our office.  Patient's hemoglobin today is 9.0.  Since his hospitalization 2 weeks ago, he has been experiencing worsening fatigue, and chest pressure with exertion.  He is still able to perform all daily activities, however needing to take more frequent breaks.  Iron saturation 8%, TIBC 403, ferritin 25.1.  We will schedule the patient for IV iron replacement.    PLAN:   · Schedule patient for IV iron, pending insurance approval.  Plan for Injectafer x2.  We will call patient to schedule once insurance approval is obtained.  · CT scan scheduled 6/14/2022.  · Return 6/21/2022 for MD follow-up to review scans.  · Follow-up with cardiology to discuss Coumadin.  · Continue follow-up with gastroenterology for Hess's esophagus.  · Return every 6 weeks for port flush.    I spent 45 minutes caring for Macho on this date of service. This time includes time spent by me in the following activities: preparing for the visit, reviewing tests, obtaining and/or reviewing a separately obtained history, performing a medically appropriate examination and/or evaluation, counseling and educating the patient/family/caregiver, ordering medications, tests, or procedures, documenting information in the medical record and care coordination.  Reviewed records from hospitalization.      MARIAH Pimentel  04/22/22      Cc: Dr. Kevin Gilmore

## 2022-04-29 ENCOUNTER — INFUSION (OUTPATIENT)
Dept: ONCOLOGY | Facility: HOSPITAL | Age: 72
End: 2022-04-29

## 2022-04-29 VITALS
BODY MASS INDEX: 22.56 KG/M2 | DIASTOLIC BLOOD PRESSURE: 67 MMHG | TEMPERATURE: 97.5 F | HEART RATE: 66 BPM | RESPIRATION RATE: 16 BRPM | OXYGEN SATURATION: 98 % | WEIGHT: 166.4 LBS | SYSTOLIC BLOOD PRESSURE: 112 MMHG

## 2022-04-29 DIAGNOSIS — E61.1 IRON DEFICIENCY: ICD-10-CM

## 2022-04-29 DIAGNOSIS — T45.4X5A ADVERSE EFFECT OF IRON, INITIAL ENCOUNTER: Primary | ICD-10-CM

## 2022-04-29 PROCEDURE — 96374 THER/PROPH/DIAG INJ IV PUSH: CPT

## 2022-04-29 PROCEDURE — 25010000002 FERRIC CARBOXYMALTOSE 750 MG/15ML SOLUTION 15 ML VIAL: Performed by: INTERNAL MEDICINE

## 2022-04-29 PROCEDURE — 63710000001 PROCHLORPERAZINE MALEATE PER 10 MG: Performed by: INTERNAL MEDICINE

## 2022-04-29 RX ORDER — PROCHLORPERAZINE MALEATE 10 MG
10 TABLET ORAL ONCE
Status: COMPLETED | OUTPATIENT
Start: 2022-04-29 | End: 2022-04-29

## 2022-04-29 RX ORDER — SODIUM CHLORIDE 9 MG/ML
250 INJECTION, SOLUTION INTRAVENOUS ONCE
Status: COMPLETED | OUTPATIENT
Start: 2022-04-29 | End: 2022-04-29

## 2022-04-29 RX ADMIN — PROCHLORPERAZINE MALEATE 10 MG: 10 TABLET ORAL at 14:17

## 2022-04-29 RX ADMIN — SODIUM CHLORIDE 250 ML: 9 INJECTION, SOLUTION INTRAVENOUS at 14:17

## 2022-04-29 RX ADMIN — FERRIC CARBOXYMALTOSE INJECTION 750 MG: 50 INJECTION, SOLUTION INTRAVENOUS at 14:23

## 2022-05-03 ENCOUNTER — READMISSION MANAGEMENT (OUTPATIENT)
Dept: CALL CENTER | Facility: HOSPITAL | Age: 72
End: 2022-05-03

## 2022-05-03 NOTE — OUTREACH NOTE
Medical Week 3 Survey    Flowsheet Row Responses   [facility] patient discharged from? [place]   Does the patient have one of the following disease processes/diagnoses(primary or secondary)? Other   Week 3 attempt successful? Yes   Call start time 1130   Call end time 1132   Discharge diagnosis Acute upper GI bleed,    Acute blood loss anemia    Is patient permission given to speak with other caregiver? Yes   Person spoke with today (if not patient) and relationship wife Nohemi   Meds reviewed with patient/caregiver? Yes   Is the patient taking all medications as directed (includes completed medication regime)? Yes   Medication comments Coumadin still on hold   Has the patient kept scheduled appointments due by today? Yes   Psychosocial issues? No   What is the patient's perception of their health status since discharge? Improving   Additional teach back comments Wife states he is doing well, no melena noted.    Week 3 Call Completed? Yes   Graduated Yes   Is the patient interested in additional calls from an ambulatory ?  NOTE:  applies to high risk patients requiring additional follow-up. No   Did the patient feel the follow up calls were helpful during their recovery period? Yes   Was the number of calls appropriate? Yes   Wrap up additional comments Wife reports he is doing well.           DAQUAN CERNA - Registered Nurse

## 2022-05-06 ENCOUNTER — INFUSION (OUTPATIENT)
Dept: ONCOLOGY | Facility: HOSPITAL | Age: 72
End: 2022-05-06

## 2022-05-06 VITALS
WEIGHT: 164.6 LBS | DIASTOLIC BLOOD PRESSURE: 69 MMHG | OXYGEN SATURATION: 98 % | HEART RATE: 60 BPM | SYSTOLIC BLOOD PRESSURE: 132 MMHG | TEMPERATURE: 98.2 F | BODY MASS INDEX: 22.32 KG/M2 | RESPIRATION RATE: 16 BRPM

## 2022-05-06 DIAGNOSIS — E61.1 IRON DEFICIENCY: ICD-10-CM

## 2022-05-06 DIAGNOSIS — C91.10 CLL (CHRONIC LYMPHOCYTIC LEUKEMIA): ICD-10-CM

## 2022-05-06 DIAGNOSIS — T45.4X5A ADVERSE EFFECT OF IRON, INITIAL ENCOUNTER: Primary | ICD-10-CM

## 2022-05-06 PROCEDURE — 63710000001 PROCHLORPERAZINE MALEATE PER 10 MG: Performed by: INTERNAL MEDICINE

## 2022-05-06 PROCEDURE — 96374 THER/PROPH/DIAG INJ IV PUSH: CPT

## 2022-05-06 PROCEDURE — 25010000002 HEPARIN LOCK FLUSH PER 10 UNITS: Performed by: INTERNAL MEDICINE

## 2022-05-06 PROCEDURE — 25010000002 FERRIC CARBOXYMALTOSE 750 MG/15ML SOLUTION 15 ML VIAL: Performed by: INTERNAL MEDICINE

## 2022-05-06 RX ORDER — HEPARIN SODIUM (PORCINE) LOCK FLUSH IV SOLN 100 UNIT/ML 100 UNIT/ML
500 SOLUTION INTRAVENOUS AS NEEDED
Status: DISCONTINUED | OUTPATIENT
Start: 2022-05-06 | End: 2022-05-06 | Stop reason: HOSPADM

## 2022-05-06 RX ORDER — PROCHLORPERAZINE MALEATE 10 MG
10 TABLET ORAL ONCE
Status: COMPLETED | OUTPATIENT
Start: 2022-05-06 | End: 2022-05-06

## 2022-05-06 RX ORDER — SODIUM CHLORIDE 9 MG/ML
250 INJECTION, SOLUTION INTRAVENOUS ONCE
Status: COMPLETED | OUTPATIENT
Start: 2022-05-06 | End: 2022-05-06

## 2022-05-06 RX ORDER — HEPARIN SODIUM (PORCINE) LOCK FLUSH IV SOLN 100 UNIT/ML 100 UNIT/ML
500 SOLUTION INTRAVENOUS AS NEEDED
Status: CANCELLED | OUTPATIENT
Start: 2022-05-06

## 2022-05-06 RX ORDER — SODIUM CHLORIDE 0.9 % (FLUSH) 0.9 %
10 SYRINGE (ML) INJECTION AS NEEDED
Status: CANCELLED | OUTPATIENT
Start: 2022-05-06

## 2022-05-06 RX ORDER — SODIUM CHLORIDE 0.9 % (FLUSH) 0.9 %
10 SYRINGE (ML) INJECTION AS NEEDED
Status: DISCONTINUED | OUTPATIENT
Start: 2022-05-06 | End: 2022-05-06 | Stop reason: HOSPADM

## 2022-05-06 RX ADMIN — SODIUM CHLORIDE 250 ML: 9 INJECTION, SOLUTION INTRAVENOUS at 14:41

## 2022-05-06 RX ADMIN — PROCHLORPERAZINE MALEATE 10 MG: 10 TABLET ORAL at 14:43

## 2022-05-06 RX ADMIN — Medication 500 UNITS: at 15:35

## 2022-05-06 RX ADMIN — Medication 10 ML: at 15:35

## 2022-05-06 RX ADMIN — FERRIC CARBOXYMALTOSE INJECTION 750 MG: 50 INJECTION, SOLUTION INTRAVENOUS at 14:48

## 2022-05-10 ENCOUNTER — TELEPHONE (OUTPATIENT)
Dept: ONCOLOGY | Facility: CLINIC | Age: 72
End: 2022-05-10

## 2022-05-10 ENCOUNTER — APPOINTMENT (OUTPATIENT)
Dept: ONCOLOGY | Facility: HOSPITAL | Age: 72
End: 2022-05-10

## 2022-05-10 NOTE — TELEPHONE ENCOUNTER
Caller: Macho Stephenson    Relationship to patient: Self    Best call back number: [phone]    Chief complaint: PATIENT CALLED TO RESCHEDULE PORT FLUSH SCHEDULED FOR TODAY, HE HAS COVID, WARM TRANSFERRED TO [facility] TO RESCHEDULE.

## 2022-05-25 ENCOUNTER — TELEPHONE (OUTPATIENT)
Dept: ONCOLOGY | Facility: CLINIC | Age: 72
End: 2022-05-25

## 2022-05-25 NOTE — TELEPHONE ENCOUNTER
"Returned call to wife who is reporting that the patient has been having diarrhea the past 2 days, she cannot tell me how many stools he is having and he has not taken anything for the diarrhea.  He remains tired even after Iron infusions, he has a decreased appetite, weight loss and pain in his neck region.  She thinks his \"cancer\" could be back.  They have an appointment the end of June to see Dr. Moya, with scans before.  His wife wants to know if these appointments can be moved up some.  Please advise.  "

## 2022-05-25 NOTE — PROGRESS NOTES
Subjective     REASON FOR VISIT:    1. Chronic lymphoid leukemia, stage 4, presented initially with significant pancytopenia.  · Currently on Gazyva with venetoclax  · Cycle 1 Gazyva given on August 13, 2020    2.  History of angiodysplasia requiring cauterization and history of Hess's esophagus    3.  Bone marrow aspiration biopsy shows hypercellular marrow with 98% involvement with CLL    4. Iron deficiency anemia in the setting of GI bleeding    History of Present Illness The patient is a 71 y.o. male with the above-mentioned history who is seen back today as a triage visit.  Patient called reporting ongoing diarrhea and subsequent weakness.  As he is reviewed in the office patient states he has had diarrhea ongoing for almost 2 weeks now.  No obvious causative factor, denying any new medications.  No recent antibiotics.  No change in diet.  Per review of his blood work WBC is acutely down to 2.9, ANC 2.0. Platelets 98.  Per discussion with Dr. Moya patient is felt to likely have some type of GI infection and this not be related to his CLL.  Discussed with the patient obtaining stool for culture and C. difficile if possible and also starting Flagyl.  We will give him IV fluids in the office today.  Patient agreeable to this plan.    Of note, patient is also followed in our office for iron deficiency, recently requiring IV Injectafer x2 doses given 4/29 and 5/6/2022.  At that time the patient was feeling well.  Hemoglobin has responded nicely from being down at 8.8 to now 10.7.  Repeat iron studies show ferritin up to 267, iron sat good at 17%.    PAST MEDICAL HISTORY:   1. Recurrent atrial fibrillation followed by cardiology. He has had drug eluting stent placed x 3.   2. High cholesterol.   3. Hypertension.       HEMATOLOGIC/ONCOLOGIC HISTORY:       The patient is 62-year-old gentleman with the above history currently here for followup. He has no complaints. He denies fever, night sweats or weight loss.  He does not have any lymphadenopathy. He feels good. He had some fatigue but his CBC shows a go od hemoglobin.   The patient is followed by Dr. Kevin Chandra. He had seen Dr. Chandra 7/18/13 for a history of coronary artery disease status drug eluting stent placement to the right coronary artery and left anterior descending artery in February 2011. Histor y of paroxysmal atrial fibrillation and hyperlipidemia. He presented to [facility] on 6/23/13 with palpitations and was found to have atrial fibrillation with rapid ventricular response. He was given IV Cardizem and converted spontaneously to normal sinus rhythm. He was found to have elevated white count at 18.9 and denied any symptoms of infection or urinary complaints. TSH was normal, troponin levels were negative. He was asked to continue aspirin and metoprolol for that. If he contin u ed to have atrial fibrillation, they will consider antiarrhythmic therapy with or without a short term anticoagulation therapy. However, currently the patient is feeling reasonably good. He has no complaints. His CBC 7/22/13 showed WBC 17,000, hemoglob i n 16.4 and platelet count of 174,000. Back 9/28/13 his hemoglobin was 15.1, WBC 13.3 and platelets 197,000. He has had a persistently elevated WBC but he is totally asymptomatic. He does not have any fever, night sweats or lymphadenopathy. He is here to be evaluated for his elevated white count.       Peripheral blood flow cytometry done 08/16/13 shows 22% chronic lymphoid leukemia cells, and they expressed ZAP-70 but not CD38. The total number of cells approximated 60% T cells, 32% B cells and 1% natural k iller cells. The B cells were monoclonal and expressed CD19, CD20, CD22, CD5, CD23, CD11c, ZAP-70 and T cell antigens. There was a normal CD4/CD8 ratio. CT of the chest, abdomen and pelvis does not show evidence of metastasis. Peripheral blood for DILLON -2 was negative. It was negative for T11:14 translocation.       CT  scan done on 08/21/2013 shows 5 to 6 mm noncalcified nodule in the left lower lobe which is unchanged from December 2010. Otherwise no evidence of any lymphadenopathy or hepatosplenomegaly.       MRI of the spine shows arthritis and disc bulge and likely hemangiomas, with 1 lesion showing increased signal intensity at T2, which is likely a hemangioma. Bone scan could be done, but patient does not even have much pain there. CT scan of the chest, a bdomen and pelvis was done on 11/23/2014. He has tiny lymph nodes in the neck which are difficult to palpate. Right jugular one is 1.4 x 0.9 and left supraclavicular one is 1.5 x 1.1. He also has a subcarinal lymph node which is 1.7 x 1.2 cm, and he ha s a portocaval lymph node which has increased from 2.5 to 2.8. Patient is totally asymptomatic. He does not have any B symptoms, so will continue followup with observation.     Patient is a 69-year-old gentleman with history of chronic lymphocytic leukemia/SLL who recently got admitted to [facility] because of GI bleed.  He was seen by Dr. Montero.  He underwent EGD colonoscopy.  He was found to have angiodysplasia for which he underwent argon coagulation.  He required 3 units of blood.  Patient had a normal esophagus normal stomach and normal duodenum CLOtest was negative he was asked to take Protonix 40 mg daily.  Colonoscopy showed blood in the entire examined colon but there was a single bleeding colonic angiectasia which was treated with argon plasma coagulation.    He was admitted twice.  Initially he was admitted on July 10 at which time he stayed 3 days and he was evaluated for chest pain.  He underwent a cardiac work-up with stress test and echo.  The echo was normal but the stress test showed medium sized moderate severe severity fixed perfusion defect.  Of the inferior wall consistent with infarction.  However according to patient cardiology did not think he had any cardiac problems.  I have left a message for  patient's cardiologist to call me today.  Patient subsequently got readmitted with GI bleed and required 1/3 unit of transfusion.  He was found to have thrombocytopenia and hematology was consulted.    His hemoglobin had dropped down to as low as 7 and his platelets had gone down to 87.  Today it is 35 and his hemoglobin is 9.4 today.  He denies active bleeding.  He has lost weight recently.  He does not have any fever or night sweats.    He had ultrasound of spleen which showed enlarged spleen centimeters in length    Patient was started on Gazyva with Leukeran but subsequently switched to venetoclax with Gazyva.  His venetoclax dose is 100 mg daily and he receives Gazyva once a month.    CT scan done 2020 shows significant response     MEDICATIONS: The current medication list was reviewed with the patient and updated in the EMR this date per the medical assistant. Medication dosages and frequencies were confirmed to be accurate.       ALLERGIES: PENICILLIN which causes him to swell up. He is allergic to IV CONTRAST DYE.     SOCIAL HISTORY: He is . He smokes one pack per day for 35 years. He consumes three to four alcoholic drinks per week. He has no tattoos and no previous transfusions.       FAMILY HISTORY: Father  at 82 with MI. Mother  at 82 with bone cancer. He has one brother age 65 in good health and two sisters 69 and 57 in good health.   Past Medical History, Social History, and Family History are unchanged from my prior documentation on     Review of Systems   Constitutional: Positive for fatigue. Negative for appetite change, chills, diaphoresis, fever and unexpected weight change.   HENT: Negative for hearing loss, sore throat and trouble swallowing.    Respiratory: Negative for cough, chest tightness, shortness of breath and wheezing.         Chest pressure with exertion   Cardiovascular: Negative for chest pain, palpitations and leg swelling.    Gastrointestinal: Positive for diarrhea (see HPI). Negative for abdominal distention, abdominal pain, constipation, nausea and vomiting.   Genitourinary: Negative for dysuria, frequency, hematuria and urgency.   Musculoskeletal: Negative for joint swelling.        No muscle weakness.   Skin: Negative for rash and wound.   Neurological: Negative for seizures, syncope, speech difficulty, weakness, numbness and headaches.   Hematological: Negative for adenopathy. Does not bruise/bleed easily.   Psychiatric/Behavioral: Negative for behavioral problems, confusion and suicidal ideas.   All other systems reviewed and are negative.    Reviewed and updated 05/26/22      Patient Active Problem List   Diagnosis   • CLL (chronic lymphocytic leukemia) (HCC)   • Anemia   • Encounter for hepatitis C screening test for low risk patient    • Thrombocytopenia (HCC)   • H/O heart artery stent   • Stented coronary artery   • Arteriosclerosis of coronary artery   • Atrial fibrillation (HCC)   • Paroxysmal atrial fibrillation (HCC)   • Chest pain, rule out acute myocardial infarction   • Fall   • Fracture, olecranon   • Hyperlipidemia   • Long term (current) use of anticoagulants   • Lower GI bleed   • Olecranon bursitis   • Shoulder pain   • Smoker   • CLL (chronic lymphocytic leukemia) (HCC)   • Dehydration   • Drug-induced nausea and vomiting   • Encounter for long-term (current) use of other medications   • Neutropenia (HCC)   • Calculus of gallbladder without cholecystitis without obstruction   • Cholecystitis   • Iron deficiency   • Adverse effect of iron   • Melena   • Change in bowel habits   • Acute upper GI bleed   • Acute blood loss anemia   • Type 2 diabetes mellitus, without long-term current use of insulin ([facility])       MEDICATIONS:  Medication Reconciliation for the patient has been reviewed by me and documented in the patients chart.    ALLERGIES:    Allergies   Allergen Reactions   • Penicillins Swelling         Vitals:     05/26/22 1317   BP: 147/62   Pulse: 62   Resp: 14   Temp: 97.3 °F (36.3 °C)   SpO2: 98%        Current Status 5/26/2022   ECOG score 0      Physical exam:    This patient's ACP documentation is up to date, and there's nothing further left to document.    CONSTITUTIONAL:  Vital signs reviewed.  No distress, looks comfortable but weak-appearing.  NECK: mild fullness left supraclavicular region (? Mediport canula related)  RESPIRATORY:  Normal respiratory effort.  Lungs clear to auscultation bilaterally.  CARDIOVASCULAR:  Normal S1, S2.  No murmurs rubs or gallops.  No significant lower extremity edema.  GASTROINTESTINAL: Abdomen appears unremarkable.  Nontender.  No hepatomegaly.  No splenomegaly.  LYMPHATIC:  No cervical, supraclavicular, axillary lymphadenopathy.  SKIN:  Warm.  No rashes.  PSYCHIATRIC:  Normal judgment and insight.  Normal mood and affect.    RECENT LABS:  Results from last 7 days   Lab Units 05/26/22  1257   WBC 10*3/mm3 2.96*   NEUTROS ABS 10*3/mm3 2.09   HEMOGLOBIN g/dL 10.7*   HEMATOCRIT % 32.3*   PLATELETS 10*3/mm3 98*     Results from last 7 days   Lab Units 05/26/22  1257   SODIUM mmol/L 138   POTASSIUM mmol/L 3.6   CHLORIDE mmol/L 104   CO2 mmol/L 25.1   BUN mg/dL 14   CREATININE mg/dL 0.78   CALCIUM mg/dL 8.6   ALBUMIN g/dL 3.80   BILIRUBIN mg/dL 0.6   ALK PHOS U/L 137*   ALT (SGPT) U/L 19   AST (SGOT) U/L 21   GLUCOSE mg/dL 160*   FERRITIN ng/mL 267.30   IRON mcg/dL 43*   TIBC mcg/dL 256     Assessment & Plan   1. Stage 2 CLL without evidence of any disease progression.  I have reviewed the CT scan from 3/19/2018 there is minimal progression but clinically patient is asymptomatic and we will follow with observation.  Will monitor with labs.   No evidence of any frequent infections, no major worsening of his white count. He has no fever or night sweats or any other symptoms.   · Patient knows that he is prone for infections and he is mainly staying at home during the COVID-19 situation  time  · Recent admission to [facility] July 10 2020×2.  Initially admitted with chest pain and underwent stress test and echo.  Echo was negative.  Stress test is abnormal but have left message for patient's cardiologist to call us.  His cardiologist is been sent [facility].  · He then got admitted the second time with worsening GI bleed and required total of 3 units of blood.  EGD was negative but colonoscopy showed angiectasia for which he underwent argon ablation.  Currently hemoglobin stable and no active bleeding.  · He was also found to have low platelets with platelets dropping down to 27k and hemoglobin was 7.  Ultrasound showed splenomegaly 15 cm.  Concern is whether he has progressed from his CLL point of view and requires treatment.  · CT scan performed 7/20/2020 did show the increased splenomegaly, but interval decrease in size of the axillary nodes, and shotty mediastinal nodes.  No change in the shotty nodes within the neck and supraclavicular regions.  No new lymphadenopathy.  Bone marrow biopsy however showed preliminarily that there is bone marrow involvement.  Remainder of bone marrow biopsy report is pending.  · Bone marrow shows hypercellular marrow with 100% involvement of chronic lymphocytic leukemia/small lymphocytic lymphoma and diffuse pattern with at least 98% of the total marrow cellularity.  Rare foci of erythropoiesis and rare megakaryocytes are noted.   · Negative for BCL-2 and BCL 6.  Chromosomes shows normal karyotype.  I GVH mutation present.  Positive for gain of 1 q., monosomy 13 and loss of IGH.  Negative for deletion T p53, negative for gain of chromosomes 9 and 11, negative for 11, 14 fusion.  · The combination of monosomy 13 and gain of 1 q. is thought to be associated with plasma cell myeloma.  However this patient does not have myeloma.  · Scans were reviewed with the patient today.  Dr. Moya also discussed with the patient and recommended he initiate treatment with  "chlorambucil and Gazyva.  He would not be a good candidate for Imbruvica due to his history of A. fib\".  He cannot take anticoagulation at this time.  The patient was provided with chemotherapy education today, including  Handouts.  He will need a port placed so that we can initiate treatment  · 8/13/2020: Gazyva day 1. Plan also [id number] mg p.o. on day 1 day 15.  We can increase the dose once we know he tolerates and his platelets improved.  · Patient developing pancytopenia when reviewed cycle 1 day 15.  Chlorambucil dose modified to 10 mg for day 15 however it is felt that he cannot tolerate this ongoing.   ·  Plan to switch to Venetoclax along with continuing Gazyva.    · Patient due today for cycle 2-day 1 Gazyva.  He will proceed today as scheduled.  Venetoclax will be shipped to his home with plans to begin this next week on 9/14/2020.    · Patient did receive 1 L normal saline and Neulasta on 9/28/2020 secondary to neutropenia with an ANC of 0.04.  · Patient seen on 10/02/2020 continuing on venetoclax, currently on 100 mg dosing.  He would not increase his dose as he will start on voriconazole after being on venetoclax x1 week which affects the metabolism of venetoclax.  He is currently on day 5 of the 100 mg dosing.  Patient states overall he feels the same except for increased fatigue and nausea.  His nauseousness is controlled with ondansetron however.  Patient will be given 1 L normal saline in the office today as planned.  · Patient returns on 10/12/2020 continuing on venetoclax 100 mg daily as well as voriconazole.  · Patient is doing well with these treatments except fatigue.  Next cycle 4 due as of number ninth prior to which will obtain CT scans  · November 9, 2020: White count down to 1.89 but patient started Pepcid.  We will hold off Pepcid.  · 11/17/2020 platelet count dropped to 35,000 patient was instructed to hold venetoclax.  · Returns 11/23/2020 WBC up to 6.38, ANC 5.19, hemoglobin " 12.4, platelets up to 121.  I have advised him to resume venetoclax 100 mg daily  · December 7, 2020: Platelets 95K.  White count 3.0 with absolute neutrophil count of 2.01.  Cycle 5 Gazyva given.  Continue venetoclax with voriconazole.   · January 4, 2021: Platelets 84,000, white count 3,440, absolute neutrophil count 1,980. Cycle 6 Gazyva given.  · 2/23/2021: WBC 3.0, platelets 90,000, hemoglobin 14.6, ANC 1.65.  Counts overall stable.  Continue Venetoclax 100 mg daily.  · January 4, 2021: Last dose of Gazyva.  · March 16, 2021: Patient is on venetoclax along with voriconazole and tolerating well with plans to complete total of 1 year.  · 4/27/2021: Patient continues on venetoclax 100 mg daily with voriconazole and acyclovir for prophylaxis.  He is tolerating this well.  He is scheduled for scans in 5 weeks.  · June 8, 2021: Patient is to continue venetoclax 100 mg daily with voriconazole and acyclovir prophylaxis.  He is tolerating it very well.  The plan is to continue 8 more weeks and then he will complete the protocol and will be followed with observation.  · I have reviewed the CT scan done on June 2, 2021 and there is no evidence of any lymphadenopathy and the spleen size is decreased in size from 12.5-11.3.  He has mild thrombocytopenia and leukopenia but his hemoglobin is normal.  · 9/7/2021: Patient completed 1 year worth of venetoclax with voriconazole and 6 months of Gazyva.  He has had an excellent response for his CLL.  Spleen decreased in size.  Currently asymptomatic.  Discontinue venetoclax since he completed 1 year  · March 28, 2022: Patient's hemoglobin back to normal at 13.1.  EGD showed Hess's esophagus and angiodysplasia for which she underwent argon plasma coagulation.  Also colonoscopy showed multiple polyps all of them benign and had 1 angiodysplasia for which he underwent argon coagulation by Dr. Hinds.  Currently asymptomatic  · 5/26/2022 patient without B symptoms or adenopathy.   Acute drop in counts felt to be related to infection (see further discussion below).  Scheduled for repeat CT scans 6/14/2022.    2. History of intermittent thrombocytopenia  · Historically platelets in the 150s.  · Count did drop when patient was treated for CLL on chlorambucil.    · Platelets also dropping on treatment with Venetoclax in the past.    · CLL in remission.  Platelet count currently in the normal range.  · Platelets today lower at 98,000,?  Related to infection.    3.  History of angiodysplasia requiring cauterization and Hess's esophagus with mild anemia.   · No evidence of active bleeding.    · 1/2022: Patient with recurrent iron deficiency as further detailed below.  Patient referred to GI for further evaluation.  Will see Dr. Hinds 2/9/2022.  · Reviewed the results of EGD and colonoscopy which shows Hess's esophagus and angiodysplasia s/p APC.  · Patient follows with gastroenterology, recommended annual EGD for close follow-up.  · Repeat EGD 3/22/2022 with 3 cm hiatal hernia, esophagitis, and 2 areas of angiectasia cauterized.    4.  History of cluster headaches for which she has to be treated with steroids in the past and has followed up with Dr. Len Péreztoday with significant headaches.  · Patient had CT of the head 8/20/2020 which was negative.  · He was started on prednisone 10 mg daily which was not helpful.  · Patient saw Dr. Bobby, neurology who gave him 2 shots of a new medication Emgality.  This is something that can be given once a month.    · Resolved.    5.  Atrial fibrillation, off anticoagulation given his thrombocytopenia.  · Patient continues on aspirin 81 mg daily if platelet count greater than 60,000.  · Patient sees Dr. Christy at [facility] who follows his INR  · Coumadin held during patient's recent hospitalization April/2022.  Patient wishes to discontinue Coumadin permanently.  He is awaiting appointment with cardiology to discuss further.    6.  Chronic mild  elevation of alkaline phosphatase.  Stable.    7.  Worsening abdominal pain and GI symptoms in the fall 2021.  · CT showing gallstones but no cholecystitis.  · Symptoms persisting with patient undergoing cholecystectomy with Dr. Lui 10/1/2021.  · Followed by KRISTY Waite.      8.  Iron deficiency anemia in the setting of angiodysplasia with recurrent GI bleeding.  · Patient intolerant to oral iron  · 1/11/2022 repeat iron studies show ferritin of 15, iron saturation 7%.  · 1/18/2022 proceed with IV Injectafer x2  · Patient hospitalized 4/10 - 4/13/2022 for acute GI bleed.  EGD was performed and patient was diagnosed with Hess's esophagus.  Evaluated by gastroenterology who recommended EGD in 1 year for close follow-up.  During hospitalization, Coumadin was held.  Patient has not yet restarted Coumadin, would like to consult with cardiology first because he wishes to discontinue this permanently.  He is awaiting an appointment with cardiology to discuss further.  · 4/22/2022 evaluated by gastroenterology, noted to have a declining hemoglobin so they advised him to be seen by our office.  Patient's Hgb dropping to 8.8, Iron saturation 8%, TIBC 403, ferritin 25.1.  Patient receiving IV Injectafer x2, 4/29 and 5/6/2022.    PLAN:   · Proceed with 1 L NS   · If able, patient to supply stool sample for C. Diff and stool culture  · Begin prophylactic Flagyl 500 mg TID x7 days.  · Patient educated on how to use Imodium to help with diarrhea.  · Educated to stick with BRAT/bland diet.  · Patient will return in 1 week for NP follow-up and general review to assess for resolution of symptoms.  · Patient is otherwise scheduled in 3 weeks for repeat CT scans and then will see Dr. Moya in 4 weeks for review of imaging.    I spent 58 minutes caring for Macho on this date of service. This time includes time spent by me in the following activities: preparing for the visit, reviewing tests, obtaining and/or reviewing a  separately obtained history, performing a medically appropriate examination and/or evaluation, counseling and educating the patient/family/caregiver, ordering medications, tests, or procedures, referring and communicating with other health care professionals, documenting information in the medical record and care coordination      Valarie Mclean, APRN  05/26/22      Cc: Dr. Kevin Gilmore

## 2022-05-25 NOTE — TELEPHONE ENCOUNTER
Call to both numbers to try to reach patient's wife in response to message she had left with triage.  Reviewed with Dr. Moya, and will set up appointment for patient to see NP, labs and IVF on Thursday or Friday this week. Left message on both numbers to let patient/wife know the plan.

## 2022-05-26 ENCOUNTER — INFUSION (OUTPATIENT)
Dept: ONCOLOGY | Facility: HOSPITAL | Age: 72
End: 2022-05-26

## 2022-05-26 ENCOUNTER — OFFICE VISIT (OUTPATIENT)
Dept: ONCOLOGY | Facility: CLINIC | Age: 72
End: 2022-05-26

## 2022-05-26 VITALS
HEIGHT: 72 IN | TEMPERATURE: 97.3 F | RESPIRATION RATE: 14 BRPM | BODY MASS INDEX: 22.12 KG/M2 | HEART RATE: 62 BPM | WEIGHT: 163.3 LBS | DIASTOLIC BLOOD PRESSURE: 62 MMHG | OXYGEN SATURATION: 98 % | SYSTOLIC BLOOD PRESSURE: 147 MMHG

## 2022-05-26 DIAGNOSIS — C91.10 CLL (CHRONIC LYMPHOCYTIC LEUKEMIA): Primary | ICD-10-CM

## 2022-05-26 DIAGNOSIS — E86.0 DEHYDRATION: ICD-10-CM

## 2022-05-26 DIAGNOSIS — C91.10 CLL (CHRONIC LYMPHOCYTIC LEUKEMIA): ICD-10-CM

## 2022-05-26 DIAGNOSIS — D50.0 IRON DEFICIENCY ANEMIA DUE TO CHRONIC BLOOD LOSS: ICD-10-CM

## 2022-05-26 DIAGNOSIS — Z51.11 ENCOUNTER FOR ANTINEOPLASTIC CHEMOTHERAPY: ICD-10-CM

## 2022-05-26 DIAGNOSIS — R19.7 DIARRHEA OF PRESUMED INFECTIOUS ORIGIN: ICD-10-CM

## 2022-05-26 LAB
ALBUMIN SERPL-MCNC: 3.8 G/DL (ref 3.5–5.2)
ALBUMIN/GLOB SERPL: 2.2 G/DL (ref 1.1–2.4)
ALP SERPL-CCNC: 137 U/L (ref 38–116)
ALT SERPL W P-5'-P-CCNC: 19 U/L (ref 0–41)
ANION GAP SERPL CALCULATED.3IONS-SCNC: 8.9 MMOL/L (ref 5–15)
AST SERPL-CCNC: 21 U/L (ref 0–40)
BASOPHILS # BLD AUTO: 0.01 10*3/MM3 (ref 0–0.2)
BASOPHILS NFR BLD AUTO: 0.3 % (ref 0–1.5)
BILIRUB SERPL-MCNC: 0.6 MG/DL (ref 0.2–1.2)
BUN SERPL-MCNC: 14 MG/DL (ref 6–20)
BUN/CREAT SERPL: 17.9 (ref 7.3–30)
CALCIUM SPEC-SCNC: 8.6 MG/DL (ref 8.5–10.2)
CHLORIDE SERPL-SCNC: 104 MMOL/L (ref 98–107)
CO2 SERPL-SCNC: 25.1 MMOL/L (ref 22–29)
CREAT SERPL-MCNC: 0.78 MG/DL (ref 0.7–1.3)
DEPRECATED RDW RBC AUTO: 59.8 FL (ref 37–54)
EGFRCR SERPLBLD CKD-EPI 2021: 94.8 ML/MIN/1.73
EOSINOPHIL # BLD AUTO: 0.09 10*3/MM3 (ref 0–0.4)
EOSINOPHIL NFR BLD AUTO: 3 % (ref 0.3–6.2)
ERYTHROCYTE [DISTWIDTH] IN BLOOD BY AUTOMATED COUNT: 17 % (ref 12.3–15.4)
FERRITIN SERPL-MCNC: 267.3 NG/ML (ref 30–400)
GLOBULIN UR ELPH-MCNC: 1.7 GM/DL (ref 1.8–3.5)
GLUCOSE SERPL-MCNC: 160 MG/DL (ref 74–124)
HCT VFR BLD AUTO: 32.3 % (ref 37.5–51)
HGB BLD-MCNC: 10.7 G/DL (ref 13–17.7)
IMM GRANULOCYTES # BLD AUTO: 0.01 10*3/MM3 (ref 0–0.05)
IMM GRANULOCYTES NFR BLD AUTO: 0.3 % (ref 0–0.5)
IRON 24H UR-MRATE: 43 MCG/DL (ref 59–158)
IRON SATN MFR SERPL: 17 % (ref 14–48)
LYMPHOCYTES # BLD AUTO: 0.48 10*3/MM3 (ref 0.7–3.1)
LYMPHOCYTES NFR BLD AUTO: 16.2 % (ref 19.6–45.3)
MCH RBC QN AUTO: 32.2 PG (ref 26.6–33)
MCHC RBC AUTO-ENTMCNC: 33.1 G/DL (ref 31.5–35.7)
MCV RBC AUTO: 97.3 FL (ref 79–97)
MONOCYTES # BLD AUTO: 0.28 10*3/MM3 (ref 0.1–0.9)
MONOCYTES NFR BLD AUTO: 9.5 % (ref 5–12)
NEUTROPHILS NFR BLD AUTO: 2.09 10*3/MM3 (ref 1.7–7)
NEUTROPHILS NFR BLD AUTO: 70.7 % (ref 42.7–76)
NRBC BLD AUTO-RTO: 0 /100 WBC (ref 0–0.2)
PLATELET # BLD AUTO: 98 10*3/MM3 (ref 140–450)
PMV BLD AUTO: 9.4 FL (ref 6–12)
POTASSIUM SERPL-SCNC: 3.6 MMOL/L (ref 3.5–4.7)
PROT SERPL-MCNC: 5.5 G/DL (ref 6.3–8)
RBC # BLD AUTO: 3.32 10*6/MM3 (ref 4.14–5.8)
SODIUM SERPL-SCNC: 138 MMOL/L (ref 134–145)
TIBC SERPL-MCNC: 256 MCG/DL (ref 249–505)
TRANSFERRIN SERPL-MCNC: 183 MG/DL (ref 200–360)
WBC NRBC COR # BLD: 2.96 10*3/MM3 (ref 3.4–10.8)

## 2022-05-26 PROCEDURE — 88182 CELL MARKER STUDY: CPT

## 2022-05-26 PROCEDURE — 82728 ASSAY OF FERRITIN: CPT

## 2022-05-26 PROCEDURE — 88185 FLOWCYTOMETRY/TC ADD-ON: CPT

## 2022-05-26 PROCEDURE — 96360 HYDRATION IV INFUSION INIT: CPT

## 2022-05-26 PROCEDURE — 83540 ASSAY OF IRON: CPT

## 2022-05-26 PROCEDURE — 99215 OFFICE O/P EST HI 40 MIN: CPT | Performed by: NURSE PRACTITIONER

## 2022-05-26 PROCEDURE — 84466 ASSAY OF TRANSFERRIN: CPT

## 2022-05-26 PROCEDURE — 88184 FLOWCYTOMETRY/ TC 1 MARKER: CPT

## 2022-05-26 PROCEDURE — 85025 COMPLETE CBC W/AUTO DIFF WBC: CPT

## 2022-05-26 PROCEDURE — 80053 COMPREHEN METABOLIC PANEL: CPT

## 2022-05-26 RX ORDER — METRONIDAZOLE 500 MG/1
500 TABLET ORAL 3 TIMES DAILY
Qty: 21 TABLET | Refills: 0 | Status: SHIPPED | OUTPATIENT
Start: 2022-05-26 | End: 2022-06-09

## 2022-05-26 RX ORDER — SODIUM CHLORIDE 9 MG/ML
1000 INJECTION, SOLUTION INTRAVENOUS CONTINUOUS
Status: DISCONTINUED | OUTPATIENT
Start: 2022-05-26 | End: 2022-05-26 | Stop reason: HOSPADM

## 2022-05-26 RX ADMIN — SODIUM CHLORIDE 1000 ML: 9 INJECTION, SOLUTION INTRAVENOUS at 13:43

## 2022-05-26 NOTE — PATIENT INSTRUCTIONS
Start Flagyl as prescribed.  Take Imodium 2 tabs after first loose stool, and 1 tab after each additional loose stool. Can take up to 8 per day.  Push fluids.  Stick to bland (BRAT) diet: bananas, rice, applesauce, toast  Call with worsening symptoms

## 2022-05-27 LAB — REF LAB TEST METHOD: NORMAL

## 2022-05-31 ENCOUNTER — TELEPHONE (OUTPATIENT)
Dept: ONCOLOGY | Facility: CLINIC | Age: 72
End: 2022-05-31

## 2022-05-31 DIAGNOSIS — C91.10 CLL (CHRONIC LYMPHOCYTIC LEUKEMIA): Primary | ICD-10-CM

## 2022-05-31 RX ORDER — ONDANSETRON HYDROCHLORIDE 8 MG/1
8 TABLET, FILM COATED ORAL EVERY 8 HOURS PRN
Qty: 45 TABLET | Refills: 2 | Status: SHIPPED | OUTPATIENT
Start: 2022-05-31 | End: 2022-12-20

## 2022-05-31 NOTE — TELEPHONE ENCOUNTER
Informed patient of new order for Zofran and encouraged GI reach per MARIAH Mac's, instructions. Patient's wife feels this is not GI related and patient just had a colonoscopy in February. Encouraged her to keep us updated on patient's progress. They will be seen in office on Thursday.

## 2022-05-31 NOTE — TELEPHONE ENCOUNTER
MARIAH Mac, asked me to check on patient's status regarding diarrhea. Patient's wife reports diarrhea has improved, but he is still feeling ill. Reports significant nausea, no vomiting, no appetite, weight loss, lingering cough (maybe r/t covid 1 month ago). Patient may benefit from an antiemetic. I have forwarded this information onto MARIAH Mac. Awaiting recommendations.

## 2022-06-01 ENCOUNTER — TELEPHONE (OUTPATIENT)
Dept: ONCOLOGY | Facility: CLINIC | Age: 72
End: 2022-06-01

## 2022-06-01 NOTE — TELEPHONE ENCOUNTER
Caller: JOHN    Relationship to patient: Self    Best call back number: [phone]    Chief complaint: CANC. APPTS. FOR TOMORROW AS THEY FOUND OUT IT WAS NOT CANCER SO NO NEED FOR THIS APPT.    Type of visit: PORT FLUSH/FOLLOW UP    Requested date: WILL SEE US ON 6/14 & 6/21    When is the original appointment: 6/2/2022

## 2022-06-02 ENCOUNTER — APPOINTMENT (OUTPATIENT)
Dept: ONCOLOGY | Facility: HOSPITAL | Age: 72
End: 2022-06-02

## 2022-06-08 ENCOUNTER — OFFICE VISIT (OUTPATIENT)
Dept: GASTROENTEROLOGY | Facility: CLINIC | Age: 72
End: 2022-06-08

## 2022-06-08 VITALS
WEIGHT: 152.7 LBS | DIASTOLIC BLOOD PRESSURE: 70 MMHG | HEIGHT: 72 IN | HEART RATE: 58 BPM | BODY MASS INDEX: 20.68 KG/M2 | OXYGEN SATURATION: 98 % | TEMPERATURE: 97.9 F | SYSTOLIC BLOOD PRESSURE: 130 MMHG

## 2022-06-08 DIAGNOSIS — R19.4 CHANGE IN BOWEL HABITS: Primary | ICD-10-CM

## 2022-06-08 DIAGNOSIS — Z86.16 HISTORY OF COVID-19: ICD-10-CM

## 2022-06-08 DIAGNOSIS — R63.4 WEIGHT LOSS: ICD-10-CM

## 2022-06-08 DIAGNOSIS — K22.70 BARRETT'S ESOPHAGUS WITHOUT DYSPLASIA: ICD-10-CM

## 2022-06-08 PROCEDURE — 99214 OFFICE O/P EST MOD 30 MIN: CPT | Performed by: NURSE PRACTITIONER

## 2022-06-08 NOTE — PATIENT INSTRUCTIONS
Suspect post viral infection symptoms due to COVID 19 infection    Continue pantoprazole    Start Imodium for diarrhea and loose stools, adjust dose as needed. OK to take regulalry    Sof bland diet and INCREaSE caloric intake to maintain weight     Diarrhea may worsen with increased caloric intake, this is normal but should continue to slowly improve, If bowel movemnts worse or do not continue to imoprve, please contact office via MyChart and we can check stool speciemn as discuussed

## 2022-06-08 NOTE — PROGRESS NOTES
"Chief Complaint   Patient presents with   • Follow-up     Change in bowel habits         History of Present Illness  71-year-old male presents today for follow-up.  He has a history of iron deficiency anemia.  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    Patient was positive May 2022 COVID and developed diarrhea and weakness.  Symptoms were present for approximately 2 weeks.  He was given IV fluids and treated with metronidazole by his hematology office.    Bowel movements are still loose but less frequent and less severe diarrhea. He has been avoiding eating as this worsens diarrhea and he does not have an appetite. He is is still having occasional nocturnal bowel movements.     He was hospitalized April 10 discharged April 13, 2022 for melena with acute blood loss anemia.  Enteroscopy was performed with no obvious lesions or cause for bleeding noted.  Small bowel capsule endoscopy was to be considered and was discussed at his last office visit April 20, 2022.    Following hospitalization April 2022 patient discontinued his Coumadin.    He continues on pantoprazole for acid reflux.    He is going in May 16, 2022 for watchman cardiology procedure.     Review of Systems      Result Review :       CBC & Differential (05/26/2022 12:57)   Office Visit with Arleen Villareal APRN (04/20/2022)   Ferritin (05/26/2022 12:57)   Iron Profile (05/26/2022 12:57)     Vital Signs:   /70   Pulse 58   Temp 97.9 °F (36.6 °C)   Ht 182 cm (71.65\")   Wt 69.3 kg (152 lb 11.2 oz)   SpO2 98%   BMI 20.91 kg/m²     Body mass index is 20.91 kg/m².     Physical Exam  Vitals reviewed.   Constitutional:       General: He is not in acute distress.     Appearance: Normal appearance. He is not ill-appearing, toxic-appearing or diaphoretic.   Abdominal:      General: Abdomen is flat. Bowel " sounds are normal. There is no distension.      Palpations: Abdomen is soft. Abdomen is not rigid. There is no mass or pulsatile mass.      Tenderness: There is no abdominal tenderness. There is no guarding or rebound.   Neurological:      Mental Status: He is alert.         Assessment and Plan    Diagnoses and all orders for this visit:    1. Change in bowel habits (Primary)    2. History of COVID-19    3. Weight loss    4. Hess's esophagus without dysplasia       Suspect post viral infection symptoms due to COVID 19 infection  Continue pantoprazole    Start Imodium for diarrhea and loose stools, adjust dose as needed. OK to take regulalry    Sof bland diet and INCREaSE caloric intake to maintain weight     Diarrhea may worsen with increased caloric intake, this is normal but should continue to slowly improve, If bowel movemnts worse or do not continue to imoprve, please contact office via MyChart and we can check stool speciemn as discuussed          Patient Instructions   Suspect post viral infection symptoms due to COVID 19 infection    Continue pantoprazole    Start Imodium for diarrhea and loose stools, adjust dose as needed. OK to take regulalry    Sof bland diet and INCREaSE caloric intake to maintain weight     Diarrhea may worsen with increased caloric intake, this is normal but should continue to slowly improve, If bowel movemnts worse or do not continue to imoprve, please contact office via [facility] and we can check stool speciemn as discuussed          EMR Dragon/Transcription Disclaimer:  This document has been Dictated utilizing Dragon dictation.

## 2022-06-14 ENCOUNTER — HOSPITAL ENCOUNTER (OUTPATIENT)
Dept: PET IMAGING | Facility: HOSPITAL | Age: 72
Discharge: HOME | End: 2022-06-14
Admitting: INTERNAL MEDICINE

## 2022-06-14 ENCOUNTER — INFUSION (OUTPATIENT)
Dept: ONCOLOGY | Facility: HOSPITAL | Age: 72
End: 2022-06-14

## 2022-06-14 ENCOUNTER — DOCUMENTATION (OUTPATIENT)
Dept: ONCOLOGY | Facility: CLINIC | Age: 72
End: 2022-06-14

## 2022-06-14 DIAGNOSIS — C91.10 CLL (CHRONIC LYMPHOCYTIC LEUKEMIA): ICD-10-CM

## 2022-06-14 DIAGNOSIS — D50.0 IRON DEFICIENCY ANEMIA DUE TO CHRONIC BLOOD LOSS: Primary | ICD-10-CM

## 2022-06-14 LAB
ALBUMIN SERPL-MCNC: 3.5 G/DL (ref 3.5–5.2)
ALBUMIN/GLOB SERPL: 1.9 G/DL (ref 1.1–2.4)
ALP SERPL-CCNC: 111 U/L (ref 38–116)
ALT SERPL W P-5'-P-CCNC: 25 U/L (ref 0–41)
ANION GAP SERPL CALCULATED.3IONS-SCNC: 11.2 MMOL/L (ref 5–15)
AST SERPL-CCNC: 20 U/L (ref 0–40)
BASOPHILS # BLD AUTO: 0.02 10*3/MM3 (ref 0–0.2)
BASOPHILS NFR BLD AUTO: 0.5 % (ref 0–1.5)
BILIRUB SERPL-MCNC: 0.5 MG/DL (ref 0.2–1.2)
BUN SERPL-MCNC: 15 MG/DL (ref 6–20)
BUN/CREAT SERPL: 16.5 (ref 7.3–30)
CALCIUM SPEC-SCNC: 8.6 MG/DL (ref 8.5–10.2)
CHLORIDE SERPL-SCNC: 106 MMOL/L (ref 98–107)
CO2 SERPL-SCNC: 21.8 MMOL/L (ref 22–29)
CREAT SERPL-MCNC: 0.91 MG/DL (ref 0.7–1.3)
DEPRECATED RDW RBC AUTO: 55.9 FL (ref 37–54)
EGFRCR SERPLBLD CKD-EPI 2021: 89.5 ML/MIN/1.73
EOSINOPHIL # BLD AUTO: 0.11 10*3/MM3 (ref 0–0.4)
EOSINOPHIL NFR BLD AUTO: 2.9 % (ref 0.3–6.2)
ERYTHROCYTE [DISTWIDTH] IN BLOOD BY AUTOMATED COUNT: 15.6 % (ref 12.3–15.4)
FERRITIN SERPL-MCNC: 267.7 NG/ML (ref 30–400)
GLOBULIN UR ELPH-MCNC: 1.8 GM/DL (ref 1.8–3.5)
GLUCOSE SERPL-MCNC: 144 MG/DL (ref 74–124)
HCT VFR BLD AUTO: 31.1 % (ref 37.5–51)
HGB BLD-MCNC: 9.9 G/DL (ref 13–17.7)
IMM GRANULOCYTES # BLD AUTO: 0.03 10*3/MM3 (ref 0–0.05)
IMM GRANULOCYTES NFR BLD AUTO: 0.8 % (ref 0–0.5)
IRON 24H UR-MRATE: 48 MCG/DL (ref 59–158)
IRON SATN MFR SERPL: 23 % (ref 14–48)
LYMPHOCYTES # BLD AUTO: 0.77 10*3/MM3 (ref 0.7–3.1)
LYMPHOCYTES NFR BLD AUTO: 20 % (ref 19.6–45.3)
MCH RBC QN AUTO: 31 PG (ref 26.6–33)
MCHC RBC AUTO-ENTMCNC: 31.8 G/DL (ref 31.5–35.7)
MCV RBC AUTO: 97.5 FL (ref 79–97)
MONOCYTES # BLD AUTO: 0.28 10*3/MM3 (ref 0.1–0.9)
MONOCYTES NFR BLD AUTO: 7.3 % (ref 5–12)
NEUTROPHILS NFR BLD AUTO: 2.64 10*3/MM3 (ref 1.7–7)
NEUTROPHILS NFR BLD AUTO: 68.5 % (ref 42.7–76)
NRBC BLD AUTO-RTO: 0 /100 WBC (ref 0–0.2)
PLATELET # BLD AUTO: 142 10*3/MM3 (ref 140–450)
PMV BLD AUTO: 10.2 FL (ref 6–12)
POTASSIUM SERPL-SCNC: 3.8 MMOL/L (ref 3.5–4.7)
PROT SERPL-MCNC: 5.3 G/DL (ref 6.3–8)
RBC # BLD AUTO: 3.19 10*6/MM3 (ref 4.14–5.8)
SODIUM SERPL-SCNC: 139 MMOL/L (ref 134–145)
TIBC SERPL-MCNC: 206 MCG/DL (ref 249–505)
TRANSFERRIN SERPL-MCNC: 147 MG/DL (ref 200–360)
WBC NRBC COR # BLD: 3.85 10*3/MM3 (ref 3.4–10.8)

## 2022-06-14 PROCEDURE — 80053 COMPREHEN METABOLIC PANEL: CPT

## 2022-06-14 PROCEDURE — 71250 CT THORAX DX C-: CPT

## 2022-06-14 PROCEDURE — 85025 COMPLETE CBC W/AUTO DIFF WBC: CPT

## 2022-06-14 PROCEDURE — 36591 DRAW BLOOD OFF VENOUS DEVICE: CPT

## 2022-06-14 PROCEDURE — 82728 ASSAY OF FERRITIN: CPT

## 2022-06-14 PROCEDURE — 74176 CT ABD & PELVIS W/O CONTRAST: CPT

## 2022-06-14 PROCEDURE — 84466 ASSAY OF TRANSFERRIN: CPT

## 2022-06-14 PROCEDURE — 25010000002 HEPARIN LOCK FLUSH PER 10 UNITS: Performed by: INTERNAL MEDICINE

## 2022-06-14 PROCEDURE — 70490 CT SOFT TISSUE NECK W/O DYE: CPT

## 2022-06-14 PROCEDURE — 83540 ASSAY OF IRON: CPT

## 2022-06-14 PROCEDURE — 0 DIATRIZOATE MEGLUMINE & SODIUM PER 1 ML: Performed by: INTERNAL MEDICINE

## 2022-06-14 RX ORDER — HEPARIN SODIUM (PORCINE) LOCK FLUSH IV SOLN 100 UNIT/ML 100 UNIT/ML
500 SOLUTION INTRAVENOUS AS NEEDED
Status: DISCONTINUED | OUTPATIENT
Start: 2022-06-14 | End: 2022-06-14 | Stop reason: HOSPADM

## 2022-06-14 RX ORDER — SODIUM CHLORIDE 0.9 % (FLUSH) 0.9 %
10 SYRINGE (ML) INJECTION AS NEEDED
Status: CANCELLED | OUTPATIENT
Start: 2022-06-14

## 2022-06-14 RX ORDER — SODIUM CHLORIDE 0.9 % (FLUSH) 0.9 %
10 SYRINGE (ML) INJECTION AS NEEDED
Status: DISCONTINUED | OUTPATIENT
Start: 2022-06-14 | End: 2022-06-14 | Stop reason: HOSPADM

## 2022-06-14 RX ORDER — HEPARIN SODIUM (PORCINE) LOCK FLUSH IV SOLN 100 UNIT/ML 100 UNIT/ML
500 SOLUTION INTRAVENOUS AS NEEDED
Status: CANCELLED | OUTPATIENT
Start: 2022-06-14

## 2022-06-14 RX ADMIN — Medication 10 ML: at 12:03

## 2022-06-14 RX ADMIN — DIATRIZOATE MEGLUMINE AND DIATRIZOATE SODIUM 30 ML: 660; 100 LIQUID ORAL; RECTAL at 12:22

## 2022-06-14 RX ADMIN — Medication 500 UNITS: at 12:03

## 2022-06-14 NOTE — PROGRESS NOTES
Patient has CT scan showing bilateral pneumonia according to Dr. Chaney radiologist.  I called him and he denied having had any fever and he is coughing up some white sputum but not yellow sputum and he does not have fever or chills but he has not been feeling too good.  I told him to go to the urgent care and get tested for COVID-19.  Since he is immunocompromised with history of CLL is likely best that he get evaluated and questionable consideration of paxlovid COVID-positive.  Will let urgent care make that decision.  Have communicated this with the patient.  He likely may require azithromycin but he will be evaluated at urgent care as he is heading down there.    Susannah Moya MD

## 2022-06-21 ENCOUNTER — APPOINTMENT (OUTPATIENT)
Dept: LAB | Facility: HOSPITAL | Age: 72
End: 2022-06-21

## 2022-06-21 ENCOUNTER — OFFICE VISIT (OUTPATIENT)
Dept: ONCOLOGY | Facility: CLINIC | Age: 72
End: 2022-06-21

## 2022-06-21 VITALS
OXYGEN SATURATION: 97 % | RESPIRATION RATE: 16 BRPM | HEART RATE: 59 BPM | DIASTOLIC BLOOD PRESSURE: 75 MMHG | TEMPERATURE: 97.7 F | SYSTOLIC BLOOD PRESSURE: 124 MMHG | WEIGHT: 153.7 LBS | HEIGHT: 72 IN | BODY MASS INDEX: 20.82 KG/M2

## 2022-06-21 DIAGNOSIS — C91.10 CLL (CHRONIC LYMPHOCYTIC LEUKEMIA): Primary | ICD-10-CM

## 2022-06-21 PROCEDURE — 99214 OFFICE O/P EST MOD 30 MIN: CPT | Performed by: INTERNAL MEDICINE

## 2022-06-21 RX ORDER — AZITHROMYCIN 250 MG/1
TABLET, FILM COATED ORAL
COMMUNITY
Start: 2022-06-18 | End: 2022-06-22

## 2022-06-21 NOTE — PROGRESS NOTES
Subjective     REASON FOR VISIT:    1. Chronic lymphoid leukemia, stage 4, presented initially with significant pancytopenia.  · Currently on Gazyva with venetoclax  · Cycle 1 Gazyva given on August 13, 2020    2.  History of angiodysplasia requiring cauterization and history of Hess's esophagus    3.  Bone marrow aspiration biopsy shows hypercellular marrow with 98% involvement with CLL    4. Iron deficiency anemia in the setting of GI bleeding    History of Present Illness   Patient states he had COVID several weeks ago in May 2022.  He had a cough.  He did not have too many symptoms.  He has had vaccinations.  He did not need to be admitted to the hospital.  He did not have fever but he did have a cough and sore throat.  His wife also tested positive.    Is currently here for follow-up and his primary care physician placed him on Z-Arturo for cough.  His cough is 70% reduced and he is improving.  His platelets and hemoglobin are normal.  His lymphocytes are not significant.        PAST MEDICAL HISTORY:   1. Recurrent atrial fibrillation followed by cardiology. He has had drug eluting stent placed x 3.   2. High cholesterol.   3. Hypertension.       HEMATOLOGIC/ONCOLOGIC HISTORY:       The patient is 62-year-old gentleman with the above history currently here for followup. He has no complaints. He denies fever, night sweats or weight loss. He does not have any lymphadenopathy. He feels good. He had some fatigue but his CBC shows a go od hemoglobin.   The patient is followed by Dr. Kevin Chandra. He had seen Dr. Chandra 7/18/13 for a history of coronary artery disease status drug eluting stent placement to the right coronary artery and left anterior descending artery in February 2011. Histor y of paroxysmal atrial fibrillation and hyperlipidemia. He presented to [facility] on 6/23/13 with palpitations and was found to have atrial fibrillation with rapid ventricular response. He was given IV Cardizem and  converted spontaneously to normal sinus rhythm. He was found to have elevated white count at 18.9 and denied any symptoms of infection or urinary complaints. TSH was normal, troponin levels were negative. He was asked to continue aspirin and metoprolol for that. If he contin u ed to have atrial fibrillation, they will consider antiarrhythmic therapy with or without a short term anticoagulation therapy. However, currently the patient is feeling reasonably good. He has no complaints. His CBC 7/22/13 showed WBC 17,000, hemoglob i n 16.4 and platelet count of 174,000. Back 9/28/13 his hemoglobin was 15.1, WBC 13.3 and platelets 197,000. He has had a persistently elevated WBC but he is totally asymptomatic. He does not have any fever, night sweats or lymphadenopathy. He is here to be evaluated for his elevated white count.       Peripheral blood flow cytometry done 08/16/13 shows 22% chronic lymphoid leukemia cells, and they expressed ZAP-70 but not CD38. The total number of cells approximated 60% T cells, 32% B cells and 1% natural k iller cells. The B cells were monoclonal and expressed CD19, CD20, CD22, CD5, CD23, CD11c, ZAP-70 and T cell antigens. There was a normal CD4/CD8 ratio. CT of the chest, abdomen and pelvis does not show evidence of metastasis. Peripheral blood for DILLON -2 was negative. It was negative for T11:14 translocation.       CT scan done on 08/21/2013 shows 5 to 6 mm noncalcified nodule in the left lower lobe which is unchanged from December 2010. Otherwise no evidence of any lymphadenopathy or hepatosplenomegaly.       MRI of the spine shows arthritis and disc bulge and likely hemangiomas, with 1 lesion showing increased signal intensity at T2, which is likely a hemangioma. Bone scan could be done, but patient does not even have much pain there. CT scan of the chest, a bdomen and pelvis was done on 11/23/2014. He has tiny lymph nodes in the neck which are difficult to palpate. Right jugular one is  1.4 x 0.9 and left supraclavicular one is 1.5 x 1.1. He also has a subcarinal lymph node which is 1.7 x 1.2 cm, and he ha s a portocaval lymph node which has increased from 2.5 to 2.8. Patient is totally asymptomatic. He does not have any B symptoms, so will continue followup with observation.     Patient is a 69-year-old gentleman with history of chronic lymphocytic leukemia/SLL who recently got admitted to [facility] because of GI bleed.  He was seen by Dr. Montero.  He underwent EGD colonoscopy.  He was found to have angiodysplasia for which he underwent argon coagulation.  He required 3 units of blood.  Patient had a normal esophagus normal stomach and normal duodenum CLOtest was negative he was asked to take Protonix 40 mg daily.  Colonoscopy showed blood in the entire examined colon but there was a single bleeding colonic angiectasia which was treated with argon plasma coagulation.    He was admitted twice.  Initially he was admitted on July 10 at which time he stayed 3 days and he was evaluated for chest pain.  He underwent a cardiac work-up with stress test and echo.  The echo was normal but the stress test showed medium sized moderate severe severity fixed perfusion defect.  Of the inferior wall consistent with infarction.  However according to patient cardiology did not think he had any cardiac problems.  I have left a message for patient's cardiologist to call me today.  Patient subsequently got readmitted with GI bleed and required 1/3 unit of transfusion.  He was found to have thrombocytopenia and hematology was consulted.    His hemoglobin had dropped down to as low as 7 and his platelets had gone down to 87.  Today it is 35 and his hemoglobin is 9.4 today.  He denies active bleeding.  He has lost weight recently.  He does not have any fever or night sweats.    He had ultrasound of spleen which showed enlarged spleen centimeters in length    Patient was started on Gazyva with Leukeran but  subsequently switched to venetoclax with Gazyva.  His venetoclax dose is 100 mg daily and he receives Gazyva once a month.    CT scan done 2020 shows significant response     MEDICATIONS: The current medication list was reviewed with the patient and updated in the EMR this date per the medical assistant. Medication dosages and frequencies were confirmed to be accurate.       ALLERGIES: PENICILLIN which causes him to swell up. He is allergic to IV CONTRAST DYE.     SOCIAL HISTORY: He is . He smokes one pack per day for 35 years. He consumes three to four alcoholic drinks per week. He has no tattoos and no previous transfusions.       FAMILY HISTORY: Father  at 82 with MI. Mother  at 82 with bone cancer. He has one brother age 65 in good health and two sisters 69 and 57 in good health.   Past Medical History, Social History, and Family History are unchanged from my prior documentation on     Review of Systems   Constitutional: Positive for fatigue. Negative for appetite change, chills, diaphoresis, fever and unexpected weight change.   HENT: Negative for hearing loss, sore throat and trouble swallowing.    Respiratory: Negative for cough, chest tightness, shortness of breath and wheezing.         Chest pressure with exertion   Cardiovascular: Negative for chest pain, palpitations and leg swelling.   Gastrointestinal: Negative for abdominal distention, abdominal pain, constipation, nausea and vomiting.   Genitourinary: Negative for dysuria, frequency, hematuria and urgency.   Musculoskeletal: Negative for joint swelling.        No muscle weakness.   Skin: Negative for rash and wound.   Neurological: Negative for seizures, syncope, speech difficulty, weakness, numbness and headaches.   Hematological: Negative for adenopathy. Does not bruise/bleed easily.   Psychiatric/Behavioral: Negative for behavioral problems, confusion and suicidal ideas.   All other systems reviewed and are  negative.    Reviewed and updated 06/21/22      Patient Active Problem List   Diagnosis   • CLL (chronic lymphocytic leukemia) (HCC)   • Anemia   • Encounter for hepatitis C screening test for low risk patient    • Thrombocytopenia (HCC)   • H/O heart artery stent   • Stented coronary artery   • Arteriosclerosis of coronary artery   • Atrial fibrillation (HCC)   • Paroxysmal atrial fibrillation (HCC)   • Chest pain, rule out acute myocardial infarction   • Fall   • Fracture, olecranon   • Hyperlipidemia   • Long term (current) use of anticoagulants   • Lower GI bleed   • Olecranon bursitis   • Shoulder pain   • Smoker   • CLL (chronic lymphocytic leukemia) (HCC)   • Dehydration   • Drug-induced nausea and vomiting   • Encounter for long-term (current) use of other medications   • Neutropenia (HCC)   • Calculus of gallbladder without cholecystitis without obstruction   • Cholecystitis   • Iron deficiency   • Adverse effect of iron   • Melena   • Change in bowel habits   • Acute upper GI bleed   • Acute blood loss anemia   • Type 2 diabetes mellitus, without long-term current use of insulin (HCC)       MEDICATIONS:  Medication Reconciliation for the patient has been reviewed by me and documented in the patients chart.    ALLERGIES:    Allergies   Allergen Reactions   • Penicillins Swelling         Vitals:    06/21/22 1002   BP: 124/75   Pulse: 59   Resp: 16   Temp: 97.7 °F (36.5 °C)   SpO2: 97%        Current Status 6/21/2022   ECOG score 0      Physical exam:      This patient's ACP documentation is up to date, and there's nothing further left to document.      CONSTITUTIONAL:  Vital signs reviewed.  No distress, looks comfortable.  RESPIRATORY:  Normal respiratory effort.  Lungs clear to auscultation bilaterally.  CARDIOVASCULAR:  Normal S1, S2.  No murmurs rubs or gallops.  No significant lower extremity edema.  GASTROINTESTINAL: Abdomen appears unremarkable.  Nontender.  No hepatomegaly.  No  splenomegaly.  LYMPHATIC:  No cervical, supraclavicular, axillary lymphadenopathy.  SKIN:  Warm.  No rashes.  PSYCHIATRIC:  Normal judgment and insight.  Normal mood and affect..    RECENT LABS:          Assessment & Plan   1. Stage 2 CLL without evidence of any disease progression.  I have reviewed the CT scan from 3/19/2018 there is minimal progression but clinically patient is asymptomatic and we will follow with observation.  Will monitor with labs.   No evidence of any frequent infections, no major worsening of his white count. He has no fever or night sweats or any other symptoms.   · Patient knows that he is prone for infections and he is mainly staying at home during the COVID-19 situation time  · Recent admission to [facility] July 10 2020×2.  Initially admitted with chest pain and underwent stress test and echo.  Echo was negative.  Stress test is abnormal but have left message for patient's cardiologist to call us.  His cardiologist is been sent [facility].  · He then got admitted the second time with worsening GI bleed and required total of 3 units of blood.  EGD was negative but colonoscopy showed angiectasia for which he underwent argon ablation.  Currently hemoglobin stable and no active bleeding.  · He was also found to have low platelets with platelets dropping down to 27k and hemoglobin was 7.  Ultrasound showed splenomegaly 15 cm.  Concern is whether he has progressed from his CLL point of view and requires treatment.  · CT scan performed 7/20/2020 did show the increased splenomegaly, but interval decrease in size of the axillary nodes, and shotty mediastinal nodes.  No change in the shotty nodes within the neck and supraclavicular regions.  No new lymphadenopathy.  Bone marrow biopsy however showed preliminarily that there is bone marrow involvement.  Remainder of bone marrow biopsy report is pending.  · Bone marrow shows hypercellular marrow with 100% involvement of chronic lymphocytic  "leukemia/small lymphocytic lymphoma and diffuse pattern with at least 98% of the total marrow cellularity.  Rare foci of erythropoiesis and rare megakaryocytes are noted.   · Negative for BCL-2 and BCL 6.  Chromosomes shows normal karyotype.  I GVH mutation present.  Positive for gain of 1 q., monosomy 13 and loss of IGH.  Negative for deletion T p53, negative for gain of chromosomes 9 and 11, negative for 11, 14 fusion.  · The combination of monosomy 13 and gain of 1 q. is thought to be associated with plasma cell myeloma.  However this patient does not have myeloma.  · Scans were reviewed with the patient today.  Dr. Moya also discussed with the patient and recommended he initiate treatment with chlorambucil and Gazyva.  He would not be a good candidate for Imbruvica due to his history of A. fib\".  He cannot take anticoagulation at this time.  The patient was provided with chemotherapy education today, including  Handouts.  He will need a port placed so that we can initiate treatment  · 8/13/2020: Gazyva day 1. Plan also [id number] mg p.o. on day 1 day 15.  We can increase the dose once we know he tolerates and his platelets improved.  · Patient developing pancytopenia when reviewed cycle 1 day 15.  Chlorambucil dose modified to 10 mg for day 15 however it is felt that he cannot tolerate this ongoing.   ·  Plan to switch to Venetoclax along with continuing Gazyva.    · Patient due today for cycle 2-day 1 Gazyva.  He will proceed today as scheduled.  Venetoclax will be shipped to his home with plans to begin this next week on 9/14/2020.    · Patient did receive 1 L normal saline and Neulasta on 9/28/2020 secondary to neutropenia with an ANC of 0.04.  · Patient seen on 10/02/2020 continuing on venetoclax, currently on 100 mg dosing.  He would not increase his dose as he will start on voriconazole after being on venetoclax x1 week which affects the metabolism of venetoclax.  He is currently on day 5 of the " 100 mg dosing.  Patient states overall he feels the same except for increased fatigue and nausea.  His nauseousness is controlled with ondansetron however.  Patient will be given 1 L normal saline in the office today as planned.  · Patient returns on 10/12/2020 continuing on venetoclax 100 mg daily as well as voriconazole.  · Patient is doing well with these treatments except fatigue.  Next cycle 4 due as of number ninth prior to which will obtain CT scans  · November 9, 2020: White count down to 1.89 but patient started Pepcid.  We will hold off Pepcid.  · 11/17/2020 platelet count dropped to 35,000 patient was instructed to hold venetoclax.  · Returns 11/23/2020 WBC up to 6.38, ANC 5.19, hemoglobin 12.4, platelets up to 121.  I have advised him to resume venetoclax 100 mg daily  · December 7, 2020: Platelets 95K.  White count 3.0 with absolute neutrophil count of 2.01.  Cycle 5 Gazyva given.  Continue venetoclax with voriconazole.   · January 4, 2021: Platelets 84,000, white count 3,440, absolute neutrophil count 1,980. Cycle 6 Gazyva given.  · 2/23/2021: WBC 3.0, platelets 90,000, hemoglobin 14.6, ANC 1.65.  Counts overall stable.  Continue Venetoclax 100 mg daily.  · January 4, 2021: Last dose of Gazyva.  · March 16, 2021: Patient is on venetoclax along with voriconazole and tolerating well with plans to complete total of 1 year.  · 4/27/2021: Patient continues on venetoclax 100 mg daily with voriconazole and acyclovir for prophylaxis.  He is tolerating this well.  He is scheduled for scans in 5 weeks.  · June 8, 2021: Patient is to continue venetoclax 100 mg daily with voriconazole and acyclovir prophylaxis.  He is tolerating it very well.  The plan is to continue 8 more weeks and then he will complete the protocol and will be followed with observation.  · I have reviewed the CT scan done on June 2, 2021 and there is no evidence of any lymphadenopathy and the spleen size is decreased in size from 12.5-11.3.  He  has mild thrombocytopenia and leukopenia but his hemoglobin is normal.  · 9/7/2021: Patient completed 1 year worth of venetoclax with voriconazole and 6 months of Gazyva.  He has had an excellent response for his CLL.  Spleen decreased in size.  Currently asymptomatic.  Discontinue venetoclax since he completed 1 year  · March 28, 2022: Patient's hemoglobin back to normal at 13.1.  EGD showed Hess's esophagus and angiodysplasia for which she underwent argon plasma coagulation.  Also colonoscopy showed multiple polyps all of them benign and had 1 angiodysplasia for which he underwent argon coagulation by Dr. Hinds.  Currently asymptomatic  · 5/26/2022 patient without B symptoms or adenopathy.  Acute drop in counts felt to be related to infection (see further discussion below).  Scheduled for repeat CT scans 6/14/2022.  · June 21, 2022: Patient underwent CT scan Bibiana 15, 2022 which showed multifocal pneumonia bilaterally likely secondary to COVID-19 infection in the past.  There admitted they recommended repeat CT in 6 to 8 weeks.  There is a 0.8 cm pulmonary nodule in the left lower lobe is minimally increased in size but similar to that in March 19, 2018.  Patient is improving  · Platelets are normalized to 142 and hemoglobin is normal.  Ferritin today is 267.    2. History of intermittent thrombocytopenia  · Historically platelets in the 150s.  · Count did drop when patient was treated for CLL on chlorambucil.    · Platelets also dropping on treatment with Venetoclax in the past.    · CLL in remission.  Platelet count currently in the normal range.  · Platelets today lower at 98,000,?  Related to infection.    3.  History of angiodysplasia requiring cauterization and Hess's esophagus with mild anemia.   · No evidence of active bleeding.    · 1/2022: Patient with recurrent iron deficiency as further detailed below.  Patient referred to GI for further evaluation.  Will see Dr. Hinds 2/9/2022.  · Reviewed the  results of EGD and colonoscopy which shows Hess's esophagus and angiodysplasia s/p APC.  · Patient follows with gastroenterology, recommended annual EGD for close follow-up.  · Repeat EGD 3/22/2022 with 3 cm hiatal hernia, esophagitis, and 2 areas of angiectasia cauterized.    4.  History of cluster headaches for which she has to be treated with steroids in the past and has followed up with Dr. Len Péreztoday with significant headaches.  · Patient had CT of the head 8/20/2020 which was negative.  · He was started on prednisone 10 mg daily which was not helpful.  · Patient saw Dr. Bobby, neurology who gave him 2 shots of a new medication Emgality.  This is something that can be given once a month.    · Resolved.    5.  Atrial fibrillation, off anticoagulation given his thrombocytopenia.  · Patient continues on aspirin 81 mg daily if platelet count greater than 60,000.  · Patient sees [facility] Christy at [facility] who follows his INR  · Coumadin held during patient's recent hospitalization April/2022.  Patient wishes to discontinue Coumadin permanently.  He is awaiting appointment with cardiology to discuss further.    6.  Chronic mild elevation of alkaline phosphatase.  Stable.    7.  Worsening abdominal pain and GI symptoms in the fall 2021.  · CT showing gallstones but no cholecystitis.  · Symptoms persisting with patient undergoing cholecystectomy with Dr. Lui 10/1/2021.  · Followed by Dr. Hinds, GI.      8.  Iron deficiency anemia in the setting of angiodysplasia with recurrent GI bleeding.  · Patient intolerant to oral iron  · 1/11/2022 repeat iron studies show ferritin of 15, iron saturation 7%.  · 1/18/2022 proceed with IV Injectafer x2  · Patient hospitalized 4/10 - 4/13/2022 for acute GI bleed.  EGD was performed and patient was diagnosed with Hess's esophagus.  Evaluated by gastroenterology who recommended EGD in 1 year for close follow-up.  During hospitalization, Coumadin was held.  Patient has not  yet restarted Coumadin, would like to consult with cardiology first because he wishes to discontinue this permanently.  He is awaiting an appointment with cardiology to discuss further.  · 4/22/2022 evaluated by gastroenterology, noted to have a declining hemoglobin so they advised him to be seen by our office.  Patient's Hgb dropping to 8.8, Iron saturation 8%, TIBC 403, ferritin 25.1.  Patient receiving IV Injectafer x2, 4/29 and 5/6/2022.    PLAN:   · Reviewed results of CT scan which is negative except multifocal pneumonia which is probably residual of the COVID-19 infection may  · Currently patient is completing a course of Z-Arturo and improving by 70%  · Currently on Z-Arturo with improvement  · No signs of progression of CLL  · Continue to follow with observation      Susannah Moya MD  06/21/22      Cc: Dr. Kevin Gilmore

## 2022-07-11 ENCOUNTER — TELEPHONE (OUTPATIENT)
Dept: ONCOLOGY | Facility: CLINIC | Age: 72
End: 2022-07-11

## 2022-07-11 DIAGNOSIS — C91.10 CLL (CHRONIC LYMPHOCYTIC LEUKEMIA): Primary | ICD-10-CM

## 2022-07-11 DIAGNOSIS — R05.3 CHRONIC COUGH: ICD-10-CM

## 2022-07-11 DIAGNOSIS — I71.9 AORTIC ANEURYSM WITHOUT RUPTURE, UNSPECIFIED PORTION OF AORTA: ICD-10-CM

## 2022-07-11 NOTE — TELEPHONE ENCOUNTER
Call to Mr. Stephenson's wife, in response to the message she had sent.  Let her know that Dr. Moya is referring Mr. Stephenson to pulmonary Dr. John Pastrana or Dr. Fine, and she is also referring him to vascular surgery, as his aortic aneurysm had increased in size.  Patient's wife verbalized understanding and thanks for the call.  Let her know to call RN back at direct number if appointments were not received in a timely manner.

## 2022-07-25 ENCOUNTER — HOSPITAL ENCOUNTER (INPATIENT)
Facility: HOSPITAL | Age: 72
LOS: 6 days | Discharge: HOME | End: 2022-07-31
Attending: EMERGENCY MEDICINE | Admitting: HOSPITALIST

## 2022-07-25 ENCOUNTER — APPOINTMENT (OUTPATIENT)
Dept: GENERAL RADIOLOGY | Facility: HOSPITAL | Age: 72
End: 2022-07-25

## 2022-07-25 DIAGNOSIS — R07.9 CHEST PAIN WITH HIGH RISK FOR CARDIAC ETIOLOGY: Primary | ICD-10-CM

## 2022-07-25 DIAGNOSIS — C91.10 CLL (CHRONIC LYMPHOCYTIC LEUKEMIA): ICD-10-CM

## 2022-07-25 DIAGNOSIS — D64.9 ANEMIA, UNSPECIFIED TYPE: ICD-10-CM

## 2022-07-25 DIAGNOSIS — K92.1 GASTROINTESTINAL HEMORRHAGE WITH MELENA: ICD-10-CM

## 2022-07-25 PROBLEM — I48.0 PAROXYSMAL ATRIAL FIBRILLATION: Status: ACTIVE | Noted: 2020-07-24

## 2022-07-25 PROBLEM — E87.1 HYPONATREMIA: Status: ACTIVE | Noted: 2022-07-25

## 2022-07-25 PROBLEM — R94.31 PROLONGED Q-T INTERVAL ON ECG: Status: ACTIVE | Noted: 2022-07-25

## 2022-07-25 LAB
ABO GROUP BLD: NORMAL
ALBUMIN SERPL-MCNC: 3.8 G/DL (ref 3.5–5.2)
ALBUMIN/GLOB SERPL: 2.9 G/DL
ALP SERPL-CCNC: 103 U/L (ref 39–117)
ALT SERPL W P-5'-P-CCNC: 14 U/L (ref 1–41)
ANION GAP SERPL CALCULATED.3IONS-SCNC: 6.8 MMOL/L (ref 5–15)
AST SERPL-CCNC: 13 U/L (ref 1–40)
BASOPHILS # BLD AUTO: 0.02 10*3/MM3 (ref 0–0.2)
BASOPHILS NFR BLD AUTO: 0.3 % (ref 0–1.5)
BILIRUB SERPL-MCNC: 0.5 MG/DL (ref 0–1.2)
BLD GP AB SCN SERPL QL: NEGATIVE
BUN SERPL-MCNC: 18 MG/DL (ref 8–23)
BUN/CREAT SERPL: 20.9 (ref 7–25)
CALCIUM SPEC-SCNC: 8.5 MG/DL (ref 8.6–10.5)
CHLORIDE SERPL-SCNC: 101 MMOL/L (ref 98–107)
CO2 SERPL-SCNC: 23.2 MMOL/L (ref 22–29)
CREAT SERPL-MCNC: 0.86 MG/DL (ref 0.76–1.27)
DEPRECATED RDW RBC AUTO: 52.8 FL (ref 37–54)
EGFRCR SERPLBLD CKD-EPI 2021: 92 ML/MIN/1.73
EOSINOPHIL # BLD AUTO: 0.1 10*3/MM3 (ref 0–0.4)
EOSINOPHIL NFR BLD AUTO: 1.7 % (ref 0.3–6.2)
ERYTHROCYTE [DISTWIDTH] IN BLOOD BY AUTOMATED COUNT: 15.5 % (ref 12.3–15.4)
GLOBULIN UR ELPH-MCNC: 1.3 GM/DL
GLUCOSE BLDC GLUCOMTR-MCNC: 125 MG/DL (ref 70–130)
GLUCOSE SERPL-MCNC: 278 MG/DL (ref 65–99)
HCT VFR BLD AUTO: 17.9 % (ref 37.5–51)
HCT VFR BLD AUTO: 18.7 % (ref 37.5–51)
HCT VFR BLD AUTO: 21.4 % (ref 37.5–51)
HGB BLD-MCNC: 5.5 G/DL (ref 13–17.7)
HGB BLD-MCNC: 6 G/DL (ref 13–17.7)
HGB BLD-MCNC: 6.6 G/DL (ref 13–17.7)
IMM GRANULOCYTES # BLD AUTO: 0.06 10*3/MM3 (ref 0–0.05)
IMM GRANULOCYTES NFR BLD AUTO: 1 % (ref 0–0.5)
LIPASE SERPL-CCNC: 42 U/L (ref 13–60)
LYMPHOCYTES # BLD AUTO: 0.69 10*3/MM3 (ref 0.7–3.1)
LYMPHOCYTES NFR BLD AUTO: 11.4 % (ref 19.6–45.3)
MAGNESIUM SERPL-MCNC: 2 MG/DL (ref 1.6–2.4)
MCH RBC QN AUTO: 29.1 PG (ref 26.6–33)
MCHC RBC AUTO-ENTMCNC: 30.7 G/DL (ref 31.5–35.7)
MCV RBC AUTO: 94.7 FL (ref 79–97)
MONOCYTES # BLD AUTO: 0.43 10*3/MM3 (ref 0.1–0.9)
MONOCYTES NFR BLD AUTO: 7.1 % (ref 5–12)
NEUTROPHILS NFR BLD AUTO: 4.74 10*3/MM3 (ref 1.7–7)
NEUTROPHILS NFR BLD AUTO: 78.5 % (ref 42.7–76)
NRBC BLD AUTO-RTO: 0 /100 WBC (ref 0–0.2)
NT-PROBNP SERPL-MCNC: 459 PG/ML (ref 0–900)
PLATELET # BLD AUTO: 143 10*3/MM3 (ref 140–450)
PMV BLD AUTO: 10.3 FL (ref 6–12)
POTASSIUM SERPL-SCNC: 4.2 MMOL/L (ref 3.5–5.2)
PROT SERPL-MCNC: 5.1 G/DL (ref 6–8.5)
QT INTERVAL: 512 MS
RBC # BLD AUTO: 1.89 10*6/MM3 (ref 4.14–5.8)
RH BLD: POSITIVE
SARS-COV-2 RNA RESP QL NAA+PROBE: NOT DETECTED
SODIUM SERPL-SCNC: 131 MMOL/L (ref 136–145)
T&S EXPIRATION DATE: NORMAL
TROPONIN T SERPL-MCNC: <0.01 NG/ML (ref 0–0.03)
WBC NRBC COR # BLD: 6.04 10*3/MM3 (ref 3.4–10.8)

## 2022-07-25 PROCEDURE — 36415 COLL VENOUS BLD VENIPUNCTURE: CPT | Performed by: NURSE PRACTITIONER

## 2022-07-25 PROCEDURE — 86923 COMPATIBILITY TEST ELECTRIC: CPT

## 2022-07-25 PROCEDURE — P9016 RBC LEUKOCYTES REDUCED: HCPCS

## 2022-07-25 PROCEDURE — 85018 HEMOGLOBIN: CPT | Performed by: HOSPITALIST

## 2022-07-25 PROCEDURE — 83880 ASSAY OF NATRIURETIC PEPTIDE: CPT | Performed by: EMERGENCY MEDICINE

## 2022-07-25 PROCEDURE — 99222 1ST HOSP IP/OBS MODERATE 55: CPT | Performed by: INTERNAL MEDICINE

## 2022-07-25 PROCEDURE — 93010 ELECTROCARDIOGRAM REPORT: CPT | Performed by: INTERNAL MEDICINE

## 2022-07-25 PROCEDURE — 85014 HEMATOCRIT: CPT | Performed by: NURSE PRACTITIONER

## 2022-07-25 PROCEDURE — 85018 HEMOGLOBIN: CPT | Performed by: NURSE PRACTITIONER

## 2022-07-25 PROCEDURE — 80053 COMPREHEN METABOLIC PANEL: CPT | Performed by: EMERGENCY MEDICINE

## 2022-07-25 PROCEDURE — 85014 HEMATOCRIT: CPT | Performed by: HOSPITALIST

## 2022-07-25 PROCEDURE — U0003 INFECTIOUS AGENT DETECTION BY NUCLEIC ACID (DNA OR RNA); SEVERE ACUTE RESPIRATORY SYNDROME CORONAVIRUS 2 (SARS-COV-2) (CORONAVIRUS DISEASE [COVID-19]), AMPLIFIED PROBE TECHNIQUE, MAKING USE OF HIGH THROUGHPUT TECHNOLOGIES AS DESCRIBED BY CMS-2020-01-R: HCPCS | Performed by: EMERGENCY MEDICINE

## 2022-07-25 PROCEDURE — 36430 TRANSFUSION BLD/BLD COMPNT: CPT

## 2022-07-25 PROCEDURE — 83735 ASSAY OF MAGNESIUM: CPT | Performed by: INTERNAL MEDICINE

## 2022-07-25 PROCEDURE — 85025 COMPLETE CBC W/AUTO DIFF WBC: CPT | Performed by: EMERGENCY MEDICINE

## 2022-07-25 PROCEDURE — 93005 ELECTROCARDIOGRAM TRACING: CPT | Performed by: EMERGENCY MEDICINE

## 2022-07-25 PROCEDURE — 86900 BLOOD TYPING SEROLOGIC ABO: CPT | Performed by: EMERGENCY MEDICINE

## 2022-07-25 PROCEDURE — 84484 ASSAY OF TROPONIN QUANT: CPT | Performed by: NURSE PRACTITIONER

## 2022-07-25 PROCEDURE — 99285 EMERGENCY DEPT VISIT HI MDM: CPT

## 2022-07-25 PROCEDURE — 86850 RBC ANTIBODY SCREEN: CPT | Performed by: EMERGENCY MEDICINE

## 2022-07-25 PROCEDURE — 83690 ASSAY OF LIPASE: CPT | Performed by: EMERGENCY MEDICINE

## 2022-07-25 PROCEDURE — P9040 RBC LEUKOREDUCED IRRADIATED: HCPCS

## 2022-07-25 PROCEDURE — 82962 GLUCOSE BLOOD TEST: CPT

## 2022-07-25 PROCEDURE — 84484 ASSAY OF TROPONIN QUANT: CPT | Performed by: EMERGENCY MEDICINE

## 2022-07-25 PROCEDURE — 71045 X-RAY EXAM CHEST 1 VIEW: CPT

## 2022-07-25 PROCEDURE — 86901 BLOOD TYPING SEROLOGIC RH(D): CPT | Performed by: EMERGENCY MEDICINE

## 2022-07-25 PROCEDURE — 86900 BLOOD TYPING SEROLOGIC ABO: CPT

## 2022-07-25 PROCEDURE — U0005 INFEC AGEN DETEC AMPLI PROBE: HCPCS | Performed by: EMERGENCY MEDICINE

## 2022-07-25 RX ORDER — ATORVASTATIN CALCIUM 40 MG/1
40 TABLET, FILM COATED ORAL DAILY
COMMUNITY
End: 2022-12-20

## 2022-07-25 RX ORDER — SODIUM CHLORIDE 0.9 % (FLUSH) 0.9 %
10 SYRINGE (ML) INJECTION EVERY 12 HOURS SCHEDULED
Status: DISCONTINUED | OUTPATIENT
Start: 2022-07-25 | End: 2022-07-31 | Stop reason: HOSPADM

## 2022-07-25 RX ORDER — NICOTINE POLACRILEX 4 MG
15 LOZENGE BUCCAL
Status: DISCONTINUED | OUTPATIENT
Start: 2022-07-25 | End: 2022-07-25

## 2022-07-25 RX ORDER — SODIUM CHLORIDE 0.9 % (FLUSH) 0.9 %
10 SYRINGE (ML) INJECTION AS NEEDED
Status: DISCONTINUED | OUTPATIENT
Start: 2022-07-25 | End: 2022-07-31 | Stop reason: HOSPADM

## 2022-07-25 RX ORDER — NITROGLYCERIN 0.4 MG/1
0.4 TABLET SUBLINGUAL
Status: DISCONTINUED | OUTPATIENT
Start: 2022-07-25 | End: 2022-07-31 | Stop reason: HOSPADM

## 2022-07-25 RX ORDER — ACETAMINOPHEN 160 MG/5ML
650 SOLUTION ORAL EVERY 4 HOURS PRN
Status: DISCONTINUED | OUTPATIENT
Start: 2022-07-25 | End: 2022-07-31 | Stop reason: HOSPADM

## 2022-07-25 RX ORDER — LISINOPRIL 10 MG/1
10 TABLET ORAL NIGHTLY
Status: DISCONTINUED | OUTPATIENT
Start: 2022-07-25 | End: 2022-07-31 | Stop reason: HOSPADM

## 2022-07-25 RX ORDER — SOTALOL HYDROCHLORIDE 80 MG/1
80 TABLET ORAL 2 TIMES DAILY
Status: DISCONTINUED | OUTPATIENT
Start: 2022-07-25 | End: 2022-07-31 | Stop reason: HOSPADM

## 2022-07-25 RX ORDER — ACETAMINOPHEN 650 MG/1
650 SUPPOSITORY RECTAL EVERY 4 HOURS PRN
Status: DISCONTINUED | OUTPATIENT
Start: 2022-07-25 | End: 2022-07-31 | Stop reason: HOSPADM

## 2022-07-25 RX ORDER — INSULIN LISPRO 100 [IU]/ML
0-7 INJECTION, SOLUTION INTRAVENOUS; SUBCUTANEOUS EVERY 6 HOURS
Status: DISCONTINUED | OUTPATIENT
Start: 2022-07-25 | End: 2022-07-25

## 2022-07-25 RX ORDER — ATORVASTATIN CALCIUM 20 MG/1
40 TABLET, FILM COATED ORAL DAILY
Status: DISCONTINUED | OUTPATIENT
Start: 2022-07-25 | End: 2022-07-31 | Stop reason: HOSPADM

## 2022-07-25 RX ORDER — ONDANSETRON 4 MG/1
8 TABLET, FILM COATED ORAL EVERY 8 HOURS PRN
Status: DISCONTINUED | OUTPATIENT
Start: 2022-07-25 | End: 2022-07-31 | Stop reason: HOSPADM

## 2022-07-25 RX ORDER — NITROGLYCERIN 0.4 MG/1
0.4 TABLET SUBLINGUAL
Status: DISCONTINUED | OUTPATIENT
Start: 2022-07-25 | End: 2022-07-25 | Stop reason: SDUPTHER

## 2022-07-25 RX ORDER — ACETAMINOPHEN 325 MG/1
650 TABLET ORAL EVERY 4 HOURS PRN
Status: DISCONTINUED | OUTPATIENT
Start: 2022-07-25 | End: 2022-07-31 | Stop reason: HOSPADM

## 2022-07-25 RX ORDER — DEXTROSE MONOHYDRATE 25 G/50ML
25 INJECTION, SOLUTION INTRAVENOUS
Status: DISCONTINUED | OUTPATIENT
Start: 2022-07-25 | End: 2022-07-25

## 2022-07-25 RX ORDER — ASPIRIN 81 MG/1
324 TABLET, CHEWABLE ORAL ONCE
Status: COMPLETED | OUTPATIENT
Start: 2022-07-25 | End: 2022-07-25

## 2022-07-25 RX ORDER — PANTOPRAZOLE SODIUM 40 MG/10ML
40 INJECTION, POWDER, LYOPHILIZED, FOR SOLUTION INTRAVENOUS
Status: DISCONTINUED | OUTPATIENT
Start: 2022-07-25 | End: 2022-07-25

## 2022-07-25 RX ADMIN — Medication 10 ML: at 12:32

## 2022-07-25 RX ADMIN — Medication 10 ML: at 20:17

## 2022-07-25 RX ADMIN — PANTOPRAZOLE SODIUM 8 MG/HR: 40 INJECTION, POWDER, FOR SOLUTION INTRAVENOUS at 21:59

## 2022-07-25 RX ADMIN — PANTOPRAZOLE SODIUM 8 MG/HR: 40 INJECTION, POWDER, FOR SOLUTION INTRAVENOUS at 17:10

## 2022-07-25 RX ADMIN — ATORVASTATIN CALCIUM 40 MG: 20 TABLET, FILM COATED ORAL at 20:17

## 2022-07-25 RX ADMIN — ASPIRIN 324 MG: 81 TABLET, CHEWABLE ORAL at 10:02

## 2022-07-26 PROBLEM — K92.2 GI BLEED: Status: ACTIVE | Noted: 2022-07-26

## 2022-07-26 PROBLEM — D62 ACUTE BLOOD LOSS ANEMIA: Status: ACTIVE | Noted: 2022-07-26

## 2022-07-26 LAB
BH BB BLOOD EXPIRATION DATE: NORMAL
BH BB BLOOD EXPIRATION DATE: NORMAL
BH BB BLOOD TYPE BARCODE: 5100
BH BB BLOOD TYPE BARCODE: 5100
BH BB DISPENSE STATUS: NORMAL
BH BB DISPENSE STATUS: NORMAL
BH BB PRODUCT CODE: NORMAL
BH BB PRODUCT CODE: NORMAL
BH BB UNIT NUMBER: NORMAL
BH BB UNIT NUMBER: NORMAL
CROSSMATCH INTERPRETATION: NORMAL
CROSSMATCH INTERPRETATION: NORMAL
DEPRECATED RDW RBC AUTO: 53.3 FL (ref 37–54)
ERYTHROCYTE [DISTWIDTH] IN BLOOD BY AUTOMATED COUNT: 16.8 % (ref 12.3–15.4)
HCT VFR BLD AUTO: 20.2 % (ref 37.5–51)
HGB BLD-MCNC: 6.7 G/DL (ref 13–17.7)
INR PPP: 1.24 (ref 0.9–1.1)
MCH RBC QN AUTO: 29.4 PG (ref 26.6–33)
MCHC RBC AUTO-ENTMCNC: 33.2 G/DL (ref 31.5–35.7)
MCV RBC AUTO: 88.6 FL (ref 79–97)
PLATELET # BLD AUTO: 129 10*3/MM3 (ref 140–450)
PMV BLD AUTO: 10.9 FL (ref 6–12)
PROTHROMBIN TIME: 15.5 SECONDS (ref 11.7–14.2)
QT INTERVAL: 438 MS
QT INTERVAL: 456 MS
RBC # BLD AUTO: 2.28 10*6/MM3 (ref 4.14–5.8)
UNIT  ABO: NORMAL
UNIT  ABO: NORMAL
UNIT  RH: NORMAL
UNIT  RH: NORMAL
WBC NRBC COR # BLD: 5.12 10*3/MM3 (ref 3.4–10.8)

## 2022-07-26 PROCEDURE — 86900 BLOOD TYPING SEROLOGIC ABO: CPT

## 2022-07-26 PROCEDURE — 93005 ELECTROCARDIOGRAM TRACING: CPT | Performed by: INTERNAL MEDICINE

## 2022-07-26 PROCEDURE — 85610 PROTHROMBIN TIME: CPT | Performed by: HOSPITALIST

## 2022-07-26 PROCEDURE — 93010 ELECTROCARDIOGRAM REPORT: CPT | Performed by: INTERNAL MEDICINE

## 2022-07-26 PROCEDURE — 36430 TRANSFUSION BLD/BLD COMPNT: CPT

## 2022-07-26 PROCEDURE — 85027 COMPLETE CBC AUTOMATED: CPT | Performed by: HOSPITALIST

## 2022-07-26 PROCEDURE — 99232 SBSQ HOSP IP/OBS MODERATE 35: CPT | Performed by: NURSE PRACTITIONER

## 2022-07-26 PROCEDURE — P9016 RBC LEUKOCYTES REDUCED: HCPCS

## 2022-07-26 PROCEDURE — 99221 1ST HOSP IP/OBS SF/LOW 40: CPT | Performed by: INTERNAL MEDICINE

## 2022-07-26 RX ADMIN — LISINOPRIL 10 MG: 10 TABLET ORAL at 21:19

## 2022-07-26 RX ADMIN — PANTOPRAZOLE SODIUM 8 MG/HR: 40 INJECTION, POWDER, FOR SOLUTION INTRAVENOUS at 23:04

## 2022-07-26 RX ADMIN — PANTOPRAZOLE SODIUM 8 MG/HR: 40 INJECTION, POWDER, FOR SOLUTION INTRAVENOUS at 02:32

## 2022-07-26 RX ADMIN — Medication 10 ML: at 20:02

## 2022-07-26 RX ADMIN — PANTOPRAZOLE SODIUM 8 MG/HR: 40 INJECTION, POWDER, FOR SOLUTION INTRAVENOUS at 18:13

## 2022-07-26 RX ADMIN — Medication 10 ML: at 21:19

## 2022-07-26 RX ADMIN — SOTALOL HYDROCHLORIDE 80 MG: 80 TABLET ORAL at 20:02

## 2022-07-26 RX ADMIN — Medication 10 ML: at 09:11

## 2022-07-26 RX ADMIN — PANTOPRAZOLE SODIUM 8 MG/HR: 40 INJECTION, POWDER, FOR SOLUTION INTRAVENOUS at 07:36

## 2022-07-26 RX ADMIN — PANTOPRAZOLE SODIUM 8 MG/HR: 40 INJECTION, POWDER, FOR SOLUTION INTRAVENOUS at 12:44

## 2022-07-26 RX ADMIN — SOTALOL HYDROCHLORIDE 80 MG: 80 TABLET ORAL at 09:11

## 2022-07-26 RX ADMIN — ATORVASTATIN CALCIUM 40 MG: 20 TABLET, FILM COATED ORAL at 09:11

## 2022-07-27 LAB
BH BB BLOOD EXPIRATION DATE: NORMAL
BH BB BLOOD EXPIRATION DATE: NORMAL
BH BB BLOOD TYPE BARCODE: 5100
BH BB BLOOD TYPE BARCODE: 5100
BH BB DISPENSE STATUS: NORMAL
BH BB DISPENSE STATUS: NORMAL
BH BB PRODUCT CODE: NORMAL
BH BB PRODUCT CODE: NORMAL
BH BB UNIT NUMBER: NORMAL
BH BB UNIT NUMBER: NORMAL
CROSSMATCH INTERPRETATION: NORMAL
CROSSMATCH INTERPRETATION: NORMAL
DEPRECATED RDW RBC AUTO: 49.9 FL (ref 37–54)
ERYTHROCYTE [DISTWIDTH] IN BLOOD BY AUTOMATED COUNT: 15.3 % (ref 12.3–15.4)
GLUCOSE BLDC GLUCOMTR-MCNC: 190 MG/DL (ref 70–130)
HCT VFR BLD AUTO: 33.6 % (ref 37.5–51)
HGB BLD-MCNC: 10.8 G/DL (ref 13–17.7)
MCH RBC QN AUTO: 28.9 PG (ref 26.6–33)
MCHC RBC AUTO-ENTMCNC: 32.1 G/DL (ref 31.5–35.7)
MCV RBC AUTO: 89.8 FL (ref 79–97)
PLATELET # BLD AUTO: 128 10*3/MM3 (ref 140–450)
PMV BLD AUTO: 10.3 FL (ref 6–12)
QT INTERVAL: 499 MS
RBC # BLD AUTO: 3.74 10*6/MM3 (ref 4.14–5.8)
UNIT  ABO: NORMAL
UNIT  ABO: NORMAL
UNIT  RH: NORMAL
UNIT  RH: NORMAL
WBC NRBC COR # BLD: 5.12 10*3/MM3 (ref 3.4–10.8)

## 2022-07-27 PROCEDURE — 99232 SBSQ HOSP IP/OBS MODERATE 35: CPT | Performed by: NURSE PRACTITIONER

## 2022-07-27 PROCEDURE — 85027 COMPLETE CBC AUTOMATED: CPT | Performed by: HOSPITALIST

## 2022-07-27 PROCEDURE — 99232 SBSQ HOSP IP/OBS MODERATE 35: CPT | Performed by: INTERNAL MEDICINE

## 2022-07-27 PROCEDURE — 82962 GLUCOSE BLOOD TEST: CPT

## 2022-07-27 RX ADMIN — SOTALOL HYDROCHLORIDE 80 MG: 80 TABLET ORAL at 20:06

## 2022-07-27 RX ADMIN — Medication 10 ML: at 08:21

## 2022-07-27 RX ADMIN — PANTOPRAZOLE SODIUM 8 MG/HR: 40 INJECTION, POWDER, FOR SOLUTION INTRAVENOUS at 04:05

## 2022-07-27 RX ADMIN — PANTOPRAZOLE SODIUM 8 MG/HR: 40 INJECTION, POWDER, FOR SOLUTION INTRAVENOUS at 14:00

## 2022-07-27 RX ADMIN — Medication 10 ML: at 20:09

## 2022-07-27 RX ADMIN — ATORVASTATIN CALCIUM 40 MG: 20 TABLET, FILM COATED ORAL at 08:20

## 2022-07-27 RX ADMIN — LISINOPRIL 10 MG: 10 TABLET ORAL at 20:06

## 2022-07-27 RX ADMIN — PANTOPRAZOLE SODIUM 8 MG/HR: 40 INJECTION, POWDER, FOR SOLUTION INTRAVENOUS at 23:08

## 2022-07-27 RX ADMIN — SOTALOL HYDROCHLORIDE 80 MG: 80 TABLET ORAL at 08:20

## 2022-07-27 RX ADMIN — PANTOPRAZOLE SODIUM 8 MG/HR: 40 INJECTION, POWDER, FOR SOLUTION INTRAVENOUS at 09:13

## 2022-07-27 RX ADMIN — PANTOPRAZOLE SODIUM 8 MG/HR: 40 INJECTION, POWDER, FOR SOLUTION INTRAVENOUS at 18:28

## 2022-07-28 ENCOUNTER — ANESTHESIA (OUTPATIENT)
Dept: GASTROENTEROLOGY | Facility: HOSPITAL | Age: 72
End: 2022-07-28

## 2022-07-28 ENCOUNTER — ANESTHESIA EVENT (OUTPATIENT)
Dept: GASTROENTEROLOGY | Facility: HOSPITAL | Age: 72
End: 2022-07-28

## 2022-07-28 LAB
DEPRECATED RDW RBC AUTO: 49.7 FL (ref 37–54)
ERYTHROCYTE [DISTWIDTH] IN BLOOD BY AUTOMATED COUNT: 15.5 % (ref 12.3–15.4)
HCT VFR BLD AUTO: 34.7 % (ref 37.5–51)
HGB BLD-MCNC: 11.2 G/DL (ref 13–17.7)
MCH RBC QN AUTO: 28.9 PG (ref 26.6–33)
MCHC RBC AUTO-ENTMCNC: 32.3 G/DL (ref 31.5–35.7)
MCV RBC AUTO: 89.7 FL (ref 79–97)
PLATELET # BLD AUTO: 132 10*3/MM3 (ref 140–450)
PMV BLD AUTO: 11.2 FL (ref 6–12)
RBC # BLD AUTO: 3.87 10*6/MM3 (ref 4.14–5.8)
WBC NRBC COR # BLD: 5.88 10*3/MM3 (ref 3.4–10.8)

## 2022-07-28 PROCEDURE — 43255 EGD CONTROL BLEEDING ANY: CPT | Performed by: INTERNAL MEDICINE

## 2022-07-28 PROCEDURE — 0W3P8ZZ CONTROL BLEEDING IN GASTROINTESTINAL TRACT, VIA NATURAL OR ARTIFICIAL OPENING ENDOSCOPIC: ICD-10-PCS | Performed by: INTERNAL MEDICINE

## 2022-07-28 PROCEDURE — 85027 COMPLETE CBC AUTOMATED: CPT | Performed by: HOSPITALIST

## 2022-07-28 PROCEDURE — 25010000002 PHENYLEPHRINE 10 MG/ML SOLUTION: Performed by: NURSE ANESTHETIST, CERTIFIED REGISTERED

## 2022-07-28 PROCEDURE — 99232 SBSQ HOSP IP/OBS MODERATE 35: CPT | Performed by: NURSE PRACTITIONER

## 2022-07-28 PROCEDURE — 25010000002 PROPOFOL 10 MG/ML EMULSION: Performed by: NURSE ANESTHETIST, CERTIFIED REGISTERED

## 2022-07-28 DEVICE — DEV CLIP ENDO RESOLUTION360 CONTRL ROT 235CM: Type: IMPLANTABLE DEVICE | Site: STOMACH | Status: FUNCTIONAL

## 2022-07-28 RX ORDER — PROPOFOL 10 MG/ML
VIAL (ML) INTRAVENOUS AS NEEDED
Status: DISCONTINUED | OUTPATIENT
Start: 2022-07-28 | End: 2022-07-28 | Stop reason: SURG

## 2022-07-28 RX ORDER — GLYCOPYRROLATE 0.2 MG/ML
INJECTION INTRAMUSCULAR; INTRAVENOUS AS NEEDED
Status: DISCONTINUED | OUTPATIENT
Start: 2022-07-28 | End: 2022-07-28 | Stop reason: SURG

## 2022-07-28 RX ORDER — SODIUM CHLORIDE 9 MG/ML
30 INJECTION, SOLUTION INTRAVENOUS CONTINUOUS PRN
Status: DISCONTINUED | OUTPATIENT
Start: 2022-07-28 | End: 2022-07-31 | Stop reason: HOSPADM

## 2022-07-28 RX ORDER — PANTOPRAZOLE SODIUM 40 MG/1
40 TABLET, DELAYED RELEASE ORAL
Status: DISCONTINUED | OUTPATIENT
Start: 2022-07-29 | End: 2022-07-31 | Stop reason: HOSPADM

## 2022-07-28 RX ORDER — EPHEDRINE SULFATE 50 MG/ML
INJECTION, SOLUTION INTRAVENOUS AS NEEDED
Status: DISCONTINUED | OUTPATIENT
Start: 2022-07-28 | End: 2022-07-28 | Stop reason: SURG

## 2022-07-28 RX ORDER — PROPOFOL 10 MG/ML
VIAL (ML) INTRAVENOUS CONTINUOUS PRN
Status: DISCONTINUED | OUTPATIENT
Start: 2022-07-28 | End: 2022-07-28 | Stop reason: SURG

## 2022-07-28 RX ORDER — LIDOCAINE HYDROCHLORIDE 20 MG/ML
INJECTION, SOLUTION INFILTRATION; PERINEURAL AS NEEDED
Status: DISCONTINUED | OUTPATIENT
Start: 2022-07-28 | End: 2022-07-28 | Stop reason: SURG

## 2022-07-28 RX ORDER — PHENYLEPHRINE HYDROCHLORIDE 10 MG/ML
INJECTION INTRAVENOUS AS NEEDED
Status: DISCONTINUED | OUTPATIENT
Start: 2022-07-28 | End: 2022-07-28 | Stop reason: SURG

## 2022-07-28 RX ADMIN — PANTOPRAZOLE SODIUM 8 MG/HR: 40 INJECTION, POWDER, FOR SOLUTION INTRAVENOUS at 04:22

## 2022-07-28 RX ADMIN — PANTOPRAZOLE SODIUM 8 MG/HR: 40 INJECTION, POWDER, FOR SOLUTION INTRAVENOUS at 18:10

## 2022-07-28 RX ADMIN — PANTOPRAZOLE SODIUM 8 MG/HR: 40 INJECTION, POWDER, FOR SOLUTION INTRAVENOUS at 12:50

## 2022-07-28 RX ADMIN — Medication 200 MCG/KG/MIN: at 10:08

## 2022-07-28 RX ADMIN — Medication 10 ML: at 08:00

## 2022-07-28 RX ADMIN — PHENYLEPHRINE HYDROCHLORIDE 100 MCG: 10 INJECTION INTRAVENOUS at 10:28

## 2022-07-28 RX ADMIN — SODIUM CHLORIDE 30 ML/HR: 9 INJECTION, SOLUTION INTRAVENOUS at 09:32

## 2022-07-28 RX ADMIN — LIDOCAINE HYDROCHLORIDE 60 MG: 20 INJECTION, SOLUTION INFILTRATION; PERINEURAL at 10:08

## 2022-07-28 RX ADMIN — PHENYLEPHRINE HYDROCHLORIDE 100 MCG: 10 INJECTION INTRAVENOUS at 10:23

## 2022-07-28 RX ADMIN — GLYCOPYRROLATE 0.2 MG: 0.2 INJECTION INTRAMUSCULAR; INTRAVENOUS at 10:08

## 2022-07-28 RX ADMIN — PROPOFOL 100 MG: 10 INJECTION, EMULSION INTRAVENOUS at 10:08

## 2022-07-28 RX ADMIN — EPHEDRINE SULFATE 7.5 MG: 50 INJECTION INTRAVENOUS at 10:31

## 2022-07-28 NOTE — ANESTHESIA POSTPROCEDURE EVALUATION
Patient: Macho Stephenson    Procedure Summary     Date: 07/28/22 Room / Location:  [facility]    Anesthesia Start: 1002 Anesthesia Stop: 1034    Procedure: ESOPHAGOGASTRODUODENOSCOPY WITH APC AND X2 RESOLUTION CLIPS (N/A Esophagus) Diagnosis:       Gastrointestinal hemorrhage with melena      (Gastrointestinal hemorrhage with melena [K92.1])    Surgeons: Luis Daniel Walls MD Provider: Monty Sanchez MD    Anesthesia Type: MAC ASA Status: 3          Anesthesia Type: MAC    Vitals  Vitals Value Taken Time   BP 85/49 07/28/22 1036   Temp     Pulse 61 07/28/22 1031   Resp 16 07/28/22 1031   SpO2 99 % 07/28/22 1031           Post Anesthesia Care and Evaluation    Patient location during evaluation: PHASE II  Patient participation: complete - patient participated  Level of consciousness: awake and alert  Pain management: adequate    Airway patency: patent  Anesthetic complications: No anesthetic complications  PONV Status: none  Cardiovascular status: acceptable and hemodynamically stable  Respiratory status: acceptable, nonlabored ventilation and spontaneous ventilation  Hydration status: acceptable

## 2022-07-28 NOTE — ANESTHESIA PREPROCEDURE EVALUATION
Anesthesia Evaluation     Patient summary reviewed and Nursing notes reviewed   NPO Solid Status: > 8 hours  NPO Liquid Status: > 4 hours           Airway   Mallampati: II  TM distance: >3 FB  Neck ROM: full  No difficulty expected  Dental    (+) upper dentures, edentulous and lower dentures    Pulmonary - normal exam   Cardiovascular     ECG reviewed  Rhythm: regular  Rate: normal    (+) hypertension 2 medications or greater, valvular problems/murmurs murmur, CAD, cardiac stents more than 12 months ago dysrhythmias Paroxysmal Atrial Fib, hyperlipidemia,     ROS comment: PAF/hx cardiac stents in 2011  PE comment: Feels like NSR    Neuro/Psych    ROS Comment: RLS  GI/Hepatic/Renal/Endo    (+)  GERD, GI bleeding , diabetes mellitus type 2,     Musculoskeletal     (+) back pain, chronic pain,   Abdominal  - normal exam   Substance History      OB/GYN          Other   blood dyscrasia thrombocytopenia,   history of cancer      Other Comment: CLL                  Anesthesia Plan    ASA 3     MAC     intravenous induction     Anesthetic plan, risks, benefits, and alternatives have been provided, discussed and informed consent has been obtained with: patient.    Plan discussed with CRNA.        CODE STATUS:    Level Of Support Discussed With: Patient  Code Status (Patient has no pulse and is not breathing): CPR (Attempt to Resuscitate)  Medical Interventions (Patient has pulse or is breathing): Full Support

## 2022-07-29 LAB
APTT PPP: 30.3 SECONDS (ref 22.7–35.4)
APTT PPP: 48.9 SECONDS (ref 22.7–35.4)
DEPRECATED RDW RBC AUTO: 47.2 FL (ref 37–54)
ERYTHROCYTE [DISTWIDTH] IN BLOOD BY AUTOMATED COUNT: 15 % (ref 12.3–15.4)
HCT VFR BLD AUTO: 32.3 % (ref 37.5–51)
HGB BLD-MCNC: 10.4 G/DL (ref 13–17.7)
INR PPP: 1.21 (ref 0.9–1.1)
MCH RBC QN AUTO: 28.2 PG (ref 26.6–33)
MCHC RBC AUTO-ENTMCNC: 32.2 G/DL (ref 31.5–35.7)
MCV RBC AUTO: 87.5 FL (ref 79–97)
PLATELET # BLD AUTO: 120 10*3/MM3 (ref 140–450)
PMV BLD AUTO: 10.9 FL (ref 6–12)
PROTHROMBIN TIME: 15.2 SECONDS (ref 11.7–14.2)
RBC # BLD AUTO: 3.69 10*6/MM3 (ref 4.14–5.8)
WBC NRBC COR # BLD: 4.91 10*3/MM3 (ref 3.4–10.8)

## 2022-07-29 PROCEDURE — 85027 COMPLETE CBC AUTOMATED: CPT | Performed by: INTERNAL MEDICINE

## 2022-07-29 PROCEDURE — 85730 THROMBOPLASTIN TIME PARTIAL: CPT | Performed by: INTERNAL MEDICINE

## 2022-07-29 PROCEDURE — 99232 SBSQ HOSP IP/OBS MODERATE 35: CPT | Performed by: INTERNAL MEDICINE

## 2022-07-29 PROCEDURE — 25010000002 HEPARIN (PORCINE) 25000-0.45 UT/250ML-% SOLUTION: Performed by: INTERNAL MEDICINE

## 2022-07-29 PROCEDURE — 85610 PROTHROMBIN TIME: CPT | Performed by: INTERNAL MEDICINE

## 2022-07-29 PROCEDURE — 85730 THROMBOPLASTIN TIME PARTIAL: CPT | Performed by: HOSPITALIST

## 2022-07-29 RX ORDER — HEPARIN SODIUM 10000 [USP'U]/100ML
12 INJECTION, SOLUTION INTRAVENOUS
Status: DISCONTINUED | OUTPATIENT
Start: 2022-07-29 | End: 2022-07-30

## 2022-07-29 RX ORDER — HEPARIN SODIUM 10000 [USP'U]/100ML
12 INJECTION, SOLUTION INTRAVENOUS
Status: DISCONTINUED | OUTPATIENT
Start: 2022-07-29 | End: 2022-07-29

## 2022-07-29 RX ORDER — HEPARIN SODIUM 5000 [USP'U]/ML
30-56.9 INJECTION, SOLUTION INTRAVENOUS; SUBCUTANEOUS EVERY 6 HOURS PRN
Status: DISCONTINUED | OUTPATIENT
Start: 2022-07-29 | End: 2022-07-31 | Stop reason: HOSPADM

## 2022-07-29 RX ADMIN — PANTOPRAZOLE SODIUM 40 MG: 40 TABLET, DELAYED RELEASE ORAL at 06:15

## 2022-07-29 RX ADMIN — SOTALOL HYDROCHLORIDE 80 MG: 80 TABLET ORAL at 08:43

## 2022-07-29 RX ADMIN — Medication 10 ML: at 08:44

## 2022-07-29 RX ADMIN — LISINOPRIL 10 MG: 10 TABLET ORAL at 20:10

## 2022-07-29 RX ADMIN — SOTALOL HYDROCHLORIDE 80 MG: 80 TABLET ORAL at 20:10

## 2022-07-29 RX ADMIN — ATORVASTATIN CALCIUM 40 MG: 20 TABLET, FILM COATED ORAL at 08:43

## 2022-07-29 RX ADMIN — HEPARIN SODIUM 12 UNITS/KG/HR: 10000 INJECTION, SOLUTION INTRAVENOUS at 13:23

## 2022-07-30 LAB
APTT PPP: 67.2 SECONDS (ref 22.7–35.4)
APTT PPP: 71.5 SECONDS (ref 22.7–35.4)
APTT PPP: 90.7 SECONDS (ref 22.7–35.4)
BASOPHILS # BLD AUTO: 0.02 10*3/MM3 (ref 0–0.2)
BASOPHILS NFR BLD AUTO: 0.4 % (ref 0–1.5)
DEPRECATED RDW RBC AUTO: 48.3 FL (ref 37–54)
EOSINOPHIL # BLD AUTO: 0.16 10*3/MM3 (ref 0–0.4)
EOSINOPHIL NFR BLD AUTO: 3.4 % (ref 0.3–6.2)
ERYTHROCYTE [DISTWIDTH] IN BLOOD BY AUTOMATED COUNT: 15.1 % (ref 12.3–15.4)
HCT VFR BLD AUTO: 30.6 % (ref 37.5–51)
HGB BLD-MCNC: 10 G/DL (ref 13–17.7)
IMM GRANULOCYTES # BLD AUTO: 0.03 10*3/MM3 (ref 0–0.05)
IMM GRANULOCYTES NFR BLD AUTO: 0.6 % (ref 0–0.5)
LYMPHOCYTES # BLD AUTO: 0.76 10*3/MM3 (ref 0.7–3.1)
LYMPHOCYTES NFR BLD AUTO: 16.2 % (ref 19.6–45.3)
MCH RBC QN AUTO: 29.2 PG (ref 26.6–33)
MCHC RBC AUTO-ENTMCNC: 32.7 G/DL (ref 31.5–35.7)
MCV RBC AUTO: 89.5 FL (ref 79–97)
MONOCYTES # BLD AUTO: 0.36 10*3/MM3 (ref 0.1–0.9)
MONOCYTES NFR BLD AUTO: 7.7 % (ref 5–12)
NEUTROPHILS NFR BLD AUTO: 3.36 10*3/MM3 (ref 1.7–7)
NEUTROPHILS NFR BLD AUTO: 71.7 % (ref 42.7–76)
NRBC BLD AUTO-RTO: 0 /100 WBC (ref 0–0.2)
PLATELET # BLD AUTO: 110 10*3/MM3 (ref 140–450)
PMV BLD AUTO: 10.5 FL (ref 6–12)
RBC # BLD AUTO: 3.42 10*6/MM3 (ref 4.14–5.8)
WBC NRBC COR # BLD: 4.69 10*3/MM3 (ref 3.4–10.8)

## 2022-07-30 PROCEDURE — 85730 THROMBOPLASTIN TIME PARTIAL: CPT | Performed by: INTERNAL MEDICINE

## 2022-07-30 PROCEDURE — 85730 THROMBOPLASTIN TIME PARTIAL: CPT | Performed by: HOSPITALIST

## 2022-07-30 PROCEDURE — 85025 COMPLETE CBC W/AUTO DIFF WBC: CPT | Performed by: INTERNAL MEDICINE

## 2022-07-30 PROCEDURE — 99232 SBSQ HOSP IP/OBS MODERATE 35: CPT | Performed by: INTERNAL MEDICINE

## 2022-07-30 RX ADMIN — PANTOPRAZOLE SODIUM 40 MG: 40 TABLET, DELAYED RELEASE ORAL at 06:23

## 2022-07-30 RX ADMIN — LISINOPRIL 10 MG: 10 TABLET ORAL at 21:12

## 2022-07-30 RX ADMIN — SOTALOL HYDROCHLORIDE 80 MG: 80 TABLET ORAL at 08:39

## 2022-07-30 RX ADMIN — RIVAROXABAN 20 MG: 20 TABLET, FILM COATED ORAL at 21:12

## 2022-07-30 RX ADMIN — Medication 10 ML: at 08:40

## 2022-07-30 RX ADMIN — ATORVASTATIN CALCIUM 40 MG: 20 TABLET, FILM COATED ORAL at 08:39

## 2022-07-30 RX ADMIN — SOTALOL HYDROCHLORIDE 80 MG: 80 TABLET ORAL at 21:12

## 2022-07-31 ENCOUNTER — READMISSION MANAGEMENT (OUTPATIENT)
Dept: CALL CENTER | Facility: HOSPITAL | Age: 72
End: 2022-07-31

## 2022-07-31 VITALS
TEMPERATURE: 97.5 F | RESPIRATION RATE: 16 BRPM | HEIGHT: 73 IN | SYSTOLIC BLOOD PRESSURE: 112 MMHG | BODY MASS INDEX: 20.54 KG/M2 | WEIGHT: 155 LBS | OXYGEN SATURATION: 96 % | DIASTOLIC BLOOD PRESSURE: 44 MMHG | HEART RATE: 57 BPM

## 2022-07-31 LAB
APTT PPP: 41.8 SECONDS (ref 22.7–35.4)
BASOPHILS # BLD AUTO: 0.03 10*3/MM3 (ref 0–0.2)
BASOPHILS NFR BLD AUTO: 0.6 % (ref 0–1.5)
DEPRECATED RDW RBC AUTO: 48.4 FL (ref 37–54)
EOSINOPHIL # BLD AUTO: 0.14 10*3/MM3 (ref 0–0.4)
EOSINOPHIL NFR BLD AUTO: 2.7 % (ref 0.3–6.2)
ERYTHROCYTE [DISTWIDTH] IN BLOOD BY AUTOMATED COUNT: 15 % (ref 12.3–15.4)
HCT VFR BLD AUTO: 33.6 % (ref 37.5–51)
HGB BLD-MCNC: 10.8 G/DL (ref 13–17.7)
IMM GRANULOCYTES # BLD AUTO: 0.03 10*3/MM3 (ref 0–0.05)
IMM GRANULOCYTES NFR BLD AUTO: 0.6 % (ref 0–0.5)
LYMPHOCYTES # BLD AUTO: 0.63 10*3/MM3 (ref 0.7–3.1)
LYMPHOCYTES NFR BLD AUTO: 11.9 % (ref 19.6–45.3)
MCH RBC QN AUTO: 28.8 PG (ref 26.6–33)
MCHC RBC AUTO-ENTMCNC: 32.1 G/DL (ref 31.5–35.7)
MCV RBC AUTO: 89.6 FL (ref 79–97)
MONOCYTES # BLD AUTO: 0.38 10*3/MM3 (ref 0.1–0.9)
MONOCYTES NFR BLD AUTO: 7.2 % (ref 5–12)
NEUTROPHILS NFR BLD AUTO: 4.07 10*3/MM3 (ref 1.7–7)
NEUTROPHILS NFR BLD AUTO: 77 % (ref 42.7–76)
NRBC BLD AUTO-RTO: 0 /100 WBC (ref 0–0.2)
PLATELET # BLD AUTO: 130 10*3/MM3 (ref 140–450)
PMV BLD AUTO: 11.1 FL (ref 6–12)
RBC # BLD AUTO: 3.75 10*6/MM3 (ref 4.14–5.8)
WBC NRBC COR # BLD: 5.28 10*3/MM3 (ref 3.4–10.8)

## 2022-07-31 PROCEDURE — 85730 THROMBOPLASTIN TIME PARTIAL: CPT | Performed by: INTERNAL MEDICINE

## 2022-07-31 PROCEDURE — 85025 COMPLETE CBC W/AUTO DIFF WBC: CPT | Performed by: INTERNAL MEDICINE

## 2022-07-31 RX ADMIN — PANTOPRAZOLE SODIUM 40 MG: 40 TABLET, DELAYED RELEASE ORAL at 06:21

## 2022-07-31 RX ADMIN — Medication 10 ML: at 08:01

## 2022-07-31 RX ADMIN — SOTALOL HYDROCHLORIDE 80 MG: 80 TABLET ORAL at 08:00

## 2022-07-31 RX ADMIN — ATORVASTATIN CALCIUM 40 MG: 20 TABLET, FILM COATED ORAL at 08:00

## 2022-08-01 NOTE — CASE MANAGEMENT/SOCIAL WORK
Case Management Discharge Note      Final Note: DC'd home 7/31                  Transportation Services  Private: Car    Final Discharge Disposition Code: 01 - home or self-care

## 2022-08-03 ENCOUNTER — READMISSION MANAGEMENT (OUTPATIENT)
Dept: CALL CENTER | Facility: HOSPITAL | Age: 72
End: 2022-08-03

## 2022-08-03 NOTE — OUTREACH NOTE
Medical Week 1 Survey    Flowsheet Row Responses   [facility] patient discharged from? [place]   Does the patient have one of the following disease processes/diagnoses(primary or secondary)? Other   Week 1 attempt successful? Yes   Call start time 1547   Call end time 1549   Discharge diagnosis Acute blood loss anemia   Meds reviewed with patient/caregiver? Yes   Is the patient having any side effects they believe may be caused by any medication additions or changes? No   Does the patient have all medications ordered at discharge? N/A   Is the patient taking all medications as directed (includes completed medication regime)? Yes   Does the patient have a primary care provider?  Yes   Does the patient have an appointment with their PCP within 7 days of discharge? Yes   Has the patient kept scheduled appointments due by today? N/A   Has home health visited the patient within 72 hours of discharge? N/A   Psychosocial issues? No   Did the patient receive a copy of their discharge instructions? Yes   Nursing interventions Reviewed instructions with patient   What is the patient's perception of their health status since discharge? Improving   Is the patient/caregiver able to teach back signs and symptoms related to disease process for when to call PCP? Yes   Is the patient/caregiver able to teach back signs and symptoms related to disease process for when to call 911? Yes   Is the patient/caregiver able to teach back the hierarchy of who to call/visit for symptoms/problems? PCP, Specialist, Home health nurse, Urgent Care, ED, 911 Yes   If the patient is a current smoker, are they able to teach back resources for cessation? --  [always trying to quit]   Week 1 call completed? Yes          MARCY Alexander Registered Nurse

## 2022-08-12 ENCOUNTER — READMISSION MANAGEMENT (OUTPATIENT)
Dept: CALL CENTER | Facility: HOSPITAL | Age: 72
End: 2022-08-12

## 2022-08-12 NOTE — OUTREACH NOTE
Medical Week 2 Survey    Flowsheet Row Responses   [facility] patient discharged from? [place]   Does the patient have one of the following disease processes/diagnoses(primary or secondary)? Other   Week 2 attempt successful? Yes   Call start time 1621   Discharge diagnosis Acute blood loss anemia   Call end time 1621   Is patient permission given to speak with other caregiver? Yes   List who call center can speak with Spouse Nohemi    Person spoke with today (if not patient) and relationship Spouse Nohemi    Meds reviewed with patient/caregiver? Yes   Is the patient taking all medications as directed (includes completed medication regime)? Yes   Does the patient have a primary care provider?  Yes   Has the patient kept scheduled appointments due by today? Yes   Psychosocial issues? No   What is the patient's perception of their health status since discharge? Improving   Is the patient/caregiver able to teach back signs and symptoms related to disease process for when to call PCP? Yes   Is the patient/caregiver able to teach back signs and symptoms related to disease process for when to call 911? Yes   Is the patient/caregiver able to teach back the hierarchy of who to call/visit for symptoms/problems? PCP, Specialist, Home health nurse, Urgent Care, ED, 911 Yes   Week 2 Call Completed? Yes          [facility] - Registered Nurse

## 2022-08-18 ENCOUNTER — LAB (OUTPATIENT)
Dept: LAB | Facility: HOSPITAL | Age: 72
End: 2022-08-18

## 2022-08-18 ENCOUNTER — OFFICE VISIT (OUTPATIENT)
Dept: GASTROENTEROLOGY | Facility: CLINIC | Age: 72
End: 2022-08-18

## 2022-08-18 VITALS
BODY MASS INDEX: 21.42 KG/M2 | HEART RATE: 63 BPM | OXYGEN SATURATION: 97 % | SYSTOLIC BLOOD PRESSURE: 122 MMHG | DIASTOLIC BLOOD PRESSURE: 68 MMHG | HEIGHT: 73 IN | TEMPERATURE: 98.1 F | WEIGHT: 161.6 LBS

## 2022-08-18 DIAGNOSIS — D50.0 IRON DEFICIENCY ANEMIA DUE TO CHRONIC BLOOD LOSS: Primary | ICD-10-CM

## 2022-08-18 DIAGNOSIS — K92.1 MELENA: ICD-10-CM

## 2022-08-18 DIAGNOSIS — K31.819 GASTRIC AVM: ICD-10-CM

## 2022-08-18 LAB
DEPRECATED RDW RBC AUTO: 53.7 FL (ref 37–54)
ERYTHROCYTE [DISTWIDTH] IN BLOOD BY AUTOMATED COUNT: 15.5 % (ref 12.3–15.4)
FERRITIN SERPL-MCNC: 22.7 NG/ML (ref 30–400)
HCT VFR BLD AUTO: 27.4 % (ref 37.5–51)
HGB BLD-MCNC: 8.4 G/DL (ref 13–17.7)
IRON 24H UR-MRATE: 49 MCG/DL (ref 59–158)
IRON SATN MFR SERPL: 11 % (ref 20–50)
MCH RBC QN AUTO: 29 PG (ref 26.6–33)
MCHC RBC AUTO-ENTMCNC: 30.7 G/DL (ref 31.5–35.7)
MCV RBC AUTO: 94.5 FL (ref 79–97)
PLATELET # BLD AUTO: 145 10*3/MM3 (ref 140–450)
PMV BLD AUTO: 9.2 FL (ref 6–12)
RBC # BLD AUTO: 2.9 10*6/MM3 (ref 4.14–5.8)
TIBC SERPL-MCNC: 466 MCG/DL (ref 298–536)
TRANSFERRIN SERPL-MCNC: 313 MG/DL (ref 200–360)
WBC NRBC COR # BLD: 5.54 10*3/MM3 (ref 3.4–10.8)

## 2022-08-18 PROCEDURE — 82728 ASSAY OF FERRITIN: CPT | Performed by: NURSE PRACTITIONER

## 2022-08-18 PROCEDURE — 83540 ASSAY OF IRON: CPT | Performed by: NURSE PRACTITIONER

## 2022-08-18 PROCEDURE — 36415 COLL VENOUS BLD VENIPUNCTURE: CPT | Performed by: NURSE PRACTITIONER

## 2022-08-18 PROCEDURE — 84466 ASSAY OF TRANSFERRIN: CPT | Performed by: NURSE PRACTITIONER

## 2022-08-18 PROCEDURE — 85027 COMPLETE CBC AUTOMATED: CPT | Performed by: NURSE PRACTITIONER

## 2022-08-18 PROCEDURE — 99214 OFFICE O/P EST MOD 30 MIN: CPT | Performed by: NURSE PRACTITIONER

## 2022-08-18 NOTE — PROGRESS NOTES
"Chief Complaint   Patient presents with   • Follow-up         History of Present Illness  Patient is a 71-year-old male who presents today for follow-up. He was hospitalized in July for anemia and GI bleeding.  He was transfused EGD which showed multiple gastric AVMs which were treated with APC.  He was transfused during hospital stay and at day of discharge, hemoglobin was 10.8.  He has a history of recurrent GI bleeding and anemia and there is concern for potential for distal small bowel AVMs.    Patient presents today for follow-up.  He reports he has had continued bleeding.  He has had black tarry stool around 2-3 times per week.  He reports some chest pain with exertion and does feel his blood counts have likely dropped since discharge.    He otherwise denies GI complaints.    He continues on Xarelto, reports his cardiologist stated he was unable to discontinue this at this time.     Result Review :       CT Abdomen Pelvis Without Contrast (06/14/2022 13:40)   Progress Notes by Luis Daniel Walls MD (07/29/2022 07:49)   Progress Notes by Luis Daniel Walls MD (07/27/2022 07:44)   CBC & Differential (07/31/2022 08:45)   Tissue Pathology Exam (03/22/2022 13:09)   SMALL BOWEL ENTEROSCOPY (04/13/2022 10:57)   COLONOSCOPY (03/22/2022 12:50)   UPPER GI ENDOSCOPY (07/28/2022 09:58)   ED to Hosp-Admission (Discharged) with Dean Gomez MD; Luis Cortés MD (07/25/2022)       Vital Signs:   /68   Pulse 63   Temp 98.1 °F (36.7 °C)   Ht 185.4 cm (73\")   Wt 73.3 kg (161 lb 9.6 oz)   SpO2 97%   BMI 21.32 kg/m²     Body mass index is 21.32 kg/m².     Physical Exam  Vitals reviewed.   Constitutional:       General: He is not in acute distress.     Appearance: He is well-developed.   HENT:      Head: Normocephalic and atraumatic.   Pulmonary:      Effort: Pulmonary effort is normal. No respiratory distress.   Abdominal:      General: Abdomen is flat. Bowel sounds are normal. There is no " distension.      Palpations: Abdomen is soft.      Tenderness: There is no abdominal tenderness.   Skin:     General: Skin is dry.      Coloration: Skin is not pale.   Neurological:      Mental Status: He is alert and oriented to person, place, and time.   Psychiatric:         Thought Content: Thought content normal.           Assessment and Plan    Diagnoses and all orders for this visit:    1. Iron deficiency anemia due to chronic blood loss (Primary)  -     Iron Profile  -     Ferritin  -     CBC (No Diff)  -     Endoscopy, GI With Capsule    2. Gastric AVM  -     Iron Profile  -     Ferritin  -     CBC (No Diff)    3. Melena  -     Iron Profile  -     Ferritin  -     CBC (No Diff)  -     Endoscopy, GI With Capsule         Discussion  Patient presents today for follow-up after hospitalization for GI bleeding and anemia.  We will recheck CBC and iron studies today.  Depending upon results, consider referral back to hematology for iron infusion or transfusion.  We will schedule video capsule endoscopy to assess for small bowel AVMs and if present, patient will likely need spiral or balloon enteroscopy to treat small bowel AVMs.          Follow Up   Return for Follow up to review results after testing complete.    Patient Instructions   Check CBC and iron studies today.    Schedule capsule endoscopy for further evaluation.

## 2022-08-23 ENCOUNTER — OFFICE VISIT (OUTPATIENT)
Dept: ONCOLOGY | Facility: CLINIC | Age: 72
End: 2022-08-23

## 2022-08-23 ENCOUNTER — LAB (OUTPATIENT)
Dept: ONCOLOGY | Facility: HOSPITAL | Age: 72
End: 2022-08-23

## 2022-08-23 VITALS
HEART RATE: 62 BPM | HEIGHT: 73 IN | SYSTOLIC BLOOD PRESSURE: 109 MMHG | RESPIRATION RATE: 18 BRPM | OXYGEN SATURATION: 100 % | WEIGHT: 160 LBS | DIASTOLIC BLOOD PRESSURE: 69 MMHG | BODY MASS INDEX: 21.2 KG/M2 | TEMPERATURE: 96.9 F

## 2022-08-23 DIAGNOSIS — R73.9 HYPERGLYCEMIA: ICD-10-CM

## 2022-08-23 DIAGNOSIS — D50.0 BLOOD LOSS ANEMIA: Primary | ICD-10-CM

## 2022-08-23 DIAGNOSIS — D50.0 IRON DEFICIENCY ANEMIA DUE TO CHRONIC BLOOD LOSS: ICD-10-CM

## 2022-08-23 DIAGNOSIS — C91.10 CLL (CHRONIC LYMPHOCYTIC LEUKEMIA): Primary | ICD-10-CM

## 2022-08-23 LAB
ABO GROUP BLD: NORMAL
ALBUMIN SERPL-MCNC: 4.1 G/DL (ref 3.5–5.2)
ALBUMIN/GLOB SERPL: 2.7 G/DL (ref 1.1–2.4)
ALP SERPL-CCNC: 121 U/L (ref 38–116)
ALT SERPL W P-5'-P-CCNC: 18 U/L (ref 0–41)
ANION GAP SERPL CALCULATED.3IONS-SCNC: 12 MMOL/L (ref 5–15)
AST SERPL-CCNC: 18 U/L (ref 0–40)
BASOPHILS # BLD AUTO: 0.02 10*3/MM3 (ref 0–0.2)
BASOPHILS NFR BLD AUTO: 0.3 % (ref 0–1.5)
BILIRUB SERPL-MCNC: 0.5 MG/DL (ref 0.2–1.2)
BLD GP AB SCN SERPL QL: NEGATIVE
BUN SERPL-MCNC: 21 MG/DL (ref 6–20)
BUN/CREAT SERPL: 21 (ref 7.3–30)
CALCIUM SPEC-SCNC: 9.2 MG/DL (ref 8.5–10.2)
CHLORIDE SERPL-SCNC: 102 MMOL/L (ref 98–107)
CO2 SERPL-SCNC: 22 MMOL/L (ref 22–29)
CREAT SERPL-MCNC: 1 MG/DL (ref 0.7–1.3)
DEPRECATED RDW RBC AUTO: 53 FL (ref 37–54)
EGFRCR SERPLBLD CKD-EPI 2021: 80 ML/MIN/1.73
EOSINOPHIL # BLD AUTO: 0.13 10*3/MM3 (ref 0–0.4)
EOSINOPHIL NFR BLD AUTO: 2.2 % (ref 0.3–6.2)
ERYTHROCYTE [DISTWIDTH] IN BLOOD BY AUTOMATED COUNT: 15.7 % (ref 12.3–15.4)
GLOBULIN UR ELPH-MCNC: 1.5 GM/DL (ref 1.8–3.5)
GLUCOSE SERPL-MCNC: 400 MG/DL (ref 74–124)
HBA1C MFR BLD: 5.8 % (ref 4.8–5.6)
HCT VFR BLD AUTO: 25.2 % (ref 37.5–51)
HGB BLD-MCNC: 7.7 G/DL (ref 13–17.7)
IMM GRANULOCYTES # BLD AUTO: 0.06 10*3/MM3 (ref 0–0.05)
IMM GRANULOCYTES NFR BLD AUTO: 1 % (ref 0–0.5)
LYMPHOCYTES # BLD AUTO: 0.54 10*3/MM3 (ref 0.7–3.1)
LYMPHOCYTES NFR BLD AUTO: 8.9 % (ref 19.6–45.3)
MCH RBC QN AUTO: 28.5 PG (ref 26.6–33)
MCHC RBC AUTO-ENTMCNC: 30.6 G/DL (ref 31.5–35.7)
MCV RBC AUTO: 93.3 FL (ref 79–97)
MONOCYTES # BLD AUTO: 0.27 10*3/MM3 (ref 0.1–0.9)
MONOCYTES NFR BLD AUTO: 4.5 % (ref 5–12)
NEUTROPHILS NFR BLD AUTO: 5.02 10*3/MM3 (ref 1.7–7)
NEUTROPHILS NFR BLD AUTO: 83.1 % (ref 42.7–76)
NRBC BLD AUTO-RTO: 0 /100 WBC (ref 0–0.2)
PLATELET # BLD AUTO: 168 10*3/MM3 (ref 140–450)
PMV BLD AUTO: 10.1 FL (ref 6–12)
POTASSIUM SERPL-SCNC: 4.4 MMOL/L (ref 3.5–4.7)
PROT SERPL-MCNC: 5.6 G/DL (ref 6.3–8)
RBC # BLD AUTO: 2.7 10*6/MM3 (ref 4.14–5.8)
RH BLD: POSITIVE
SODIUM SERPL-SCNC: 136 MMOL/L (ref 134–145)
T&S EXPIRATION DATE: NORMAL
WBC NRBC COR # BLD: 6.04 10*3/MM3 (ref 3.4–10.8)

## 2022-08-23 PROCEDURE — 86900 BLOOD TYPING SEROLOGIC ABO: CPT | Performed by: NURSE PRACTITIONER

## 2022-08-23 PROCEDURE — 86901 BLOOD TYPING SEROLOGIC RH(D): CPT | Performed by: NURSE PRACTITIONER

## 2022-08-23 PROCEDURE — 85025 COMPLETE CBC W/AUTO DIFF WBC: CPT

## 2022-08-23 PROCEDURE — 83036 HEMOGLOBIN GLYCOSYLATED A1C: CPT | Performed by: NURSE PRACTITIONER

## 2022-08-23 PROCEDURE — 36591 DRAW BLOOD OFF VENOUS DEVICE: CPT

## 2022-08-23 PROCEDURE — 25010000002 HEPARIN LOCK FLUSH PER 10 UNITS: Performed by: INTERNAL MEDICINE

## 2022-08-23 PROCEDURE — 86923 COMPATIBILITY TEST ELECTRIC: CPT

## 2022-08-23 PROCEDURE — 80053 COMPREHEN METABOLIC PANEL: CPT

## 2022-08-23 PROCEDURE — 86850 RBC ANTIBODY SCREEN: CPT | Performed by: NURSE PRACTITIONER

## 2022-08-23 PROCEDURE — 99215 OFFICE O/P EST HI 40 MIN: CPT | Performed by: NURSE PRACTITIONER

## 2022-08-23 RX ORDER — CLOPIDOGREL BISULFATE 75 MG/1
75 TABLET ORAL NIGHTLY
Status: ON HOLD | COMMUNITY
Start: 2022-08-22 | End: 2023-01-10

## 2022-08-23 RX ORDER — SODIUM CHLORIDE 0.9 % (FLUSH) 0.9 %
10 SYRINGE (ML) INJECTION AS NEEDED
Status: CANCELLED | OUTPATIENT
Start: 2022-08-23

## 2022-08-23 RX ORDER — PROCHLORPERAZINE MALEATE 10 MG
10 TABLET ORAL ONCE
Status: CANCELLED | OUTPATIENT
Start: 2022-08-26 | End: 2022-08-30

## 2022-08-23 RX ORDER — SODIUM CHLORIDE 9 MG/ML
250 INJECTION, SOLUTION INTRAVENOUS AS NEEDED
Status: CANCELLED | OUTPATIENT
Start: 2022-08-24

## 2022-08-23 RX ORDER — SODIUM CHLORIDE 0.9 % (FLUSH) 0.9 %
10 SYRINGE (ML) INJECTION AS NEEDED
Status: DISCONTINUED | OUTPATIENT
Start: 2022-08-23 | End: 2022-08-23 | Stop reason: HOSPADM

## 2022-08-23 RX ORDER — SODIUM CHLORIDE 9 MG/ML
250 INJECTION, SOLUTION INTRAVENOUS ONCE
Status: CANCELLED | OUTPATIENT
Start: 2022-08-26

## 2022-08-23 RX ORDER — HEPARIN SODIUM (PORCINE) LOCK FLUSH IV SOLN 100 UNIT/ML 100 UNIT/ML
500 SOLUTION INTRAVENOUS AS NEEDED
Status: CANCELLED | OUTPATIENT
Start: 2022-08-23

## 2022-08-23 RX ORDER — DIPHENHYDRAMINE HCL 25 MG
25 CAPSULE ORAL ONCE
Status: CANCELLED | OUTPATIENT
Start: 2022-08-24 | End: 2022-08-23

## 2022-08-23 RX ORDER — HEPARIN SODIUM (PORCINE) LOCK FLUSH IV SOLN 100 UNIT/ML 100 UNIT/ML
500 SOLUTION INTRAVENOUS AS NEEDED
Status: DISCONTINUED | OUTPATIENT
Start: 2022-08-23 | End: 2022-08-23 | Stop reason: HOSPADM

## 2022-08-23 RX ORDER — ACETAMINOPHEN 325 MG/1
650 TABLET ORAL ONCE
Status: CANCELLED | OUTPATIENT
Start: 2022-08-24 | End: 2022-08-23

## 2022-08-23 RX ADMIN — Medication 500 UNITS: at 09:47

## 2022-08-23 RX ADMIN — Medication 10 ML: at 09:47

## 2022-08-23 NOTE — PROGRESS NOTES
Subjective     REASON FOR FOLLOW-UP:    1. Chronic lymphoid leukemia, stage 4, presented initially with significant pancytopenia.  · Currently on Gazyva with venetoclax  · Cycle 1 Gazyva given on August 13, 2020    2.  History of angiodysplasia requiring cauterization and history of Hess's esophagus    3.  Bone marrow aspiration biopsy shows hypercellular marrow with 98% involvement with CLL    4. Iron deficiency anemia in the setting of GI bleeding    History of Present Illness   Patient is seen back today in follow-up, having been recently hospitalized from 7/25 to 7/31/2022 with recurrent GI bleed.  Patient is status postplacement of watchman's device on 7/6/2022, on Xarelto at the time.  When admitted his hemoglobin was 5.5.  He received 4 units of PRBCs.  Xarelto was held.  Patient underwent EGD 7/28/2022 revealing multiple nonbleeding angioectasias which were treated with argon plasma coagulation.  He was placed on a heparin drip and then transitioned back to Xarelto as cardiology felt that he could not be off this for a long with new watchman's device.    Patient then saw GI in follow-up last week, 8/18/2022.  Patient was reporting continued GI bleeding with dark stools.  Lab work performed that day showing hemoglobin having already dropped and just 2 weeks from 10.8 to 8.4, ferritin 22, iron sat 11%.  He was scheduled back with us and also is now scheduled to undergo video capsule endoscopy in 2 weeks.    As he is now seen today hemoglobin is worse at 7.7.  Discussed the need in the short-term to transfuse patient again as he is active.  Also discussed more IV iron.  Think the patient switched just yesterday per cardiology for Xarelto and Plavix.  Hopefully this will help with some of his ongoing bleeding.    He denies other concerns at this time.    PAST MEDICAL HISTORY:   1. Recurrent atrial fibrillation followed by cardiology. He has had drug eluting stent placed x 3.   2. High cholesterol.    3. Hypertension.       HEMATOLOGIC/ONCOLOGIC HISTORY:       The patient is 62-year-old gentleman with the above history currently here for followup. He has no complaints. He denies fever, night sweats or weight loss. He does not have any lymphadenopathy. He feels good. He had some fatigue but his CBC shows a go od hemoglobin.   The patient is followed by Dr. Kevin Chandra. He had seen Dr. Chandra 7/18/13 for a history of coronary artery disease status drug eluting stent placement to the right coronary artery and left anterior descending artery in February 2011. Histor y of paroxysmal atrial fibrillation and hyperlipidemia. He presented to [facility] on 6/23/13 with palpitations and was found to have atrial fibrillation with rapid ventricular response. He was given IV Cardizem and converted spontaneously to normal sinus rhythm. He was found to have elevated white count at 18.9 and denied any symptoms of infection or urinary complaints. TSH was normal, troponin levels were negative. He was asked to continue aspirin and metoprolol for that. If he contin u ed to have atrial fibrillation, they will consider antiarrhythmic therapy with or without a short term anticoagulation therapy. However, currently the patient is feeling reasonably good. He has no complaints. His CBC 7/22/13 showed WBC 17,000, hemoglob i n 16.4 and platelet count of 174,000. Back 9/28/13 his hemoglobin was 15.1, WBC 13.3 and platelets 197,000. He has had a persistently elevated WBC but he is totally asymptomatic. He does not have any fever, night sweats or lymphadenopathy. He is here to be evaluated for his elevated white count.       Peripheral blood flow cytometry done 08/16/13 shows 22% chronic lymphoid leukemia cells, and they expressed ZAP-70 but not CD38. The total number of cells approximated 60% T cells, 32% B cells and 1% natural k iller cells. The B cells were monoclonal and expressed CD19, CD20, CD22, CD5, CD23, CD11c, ZAP-70  and T cell antigens. There was a normal CD4/CD8 ratio. CT of the chest, abdomen and pelvis does not show evidence of metastasis. Peripheral blood for DILLON -2 was negative. It was negative for T11:14 translocation.       CT scan done on 08/21/2013 shows 5 to 6 mm noncalcified nodule in the left lower lobe which is unchanged from December 2010. Otherwise no evidence of any lymphadenopathy or hepatosplenomegaly.       MRI of the spine shows arthritis and disc bulge and likely hemangiomas, with 1 lesion showing increased signal intensity at T2, which is likely a hemangioma. Bone scan could be done, but patient does not even have much pain there. CT scan of the chest, a bdomen and pelvis was done on 11/23/2014. He has tiny lymph nodes in the neck which are difficult to palpate. Right jugular one is 1.4 x 0.9 and left supraclavicular one is 1.5 x 1.1. He also has a subcarinal lymph node which is 1.7 x 1.2 cm, and he ha s a portocaval lymph node which has increased from 2.5 to 2.8. Patient is totally asymptomatic. He does not have any B symptoms, so will continue followup with observation.     Patient is a 69-year-old gentleman with history of chronic lymphocytic leukemia/SLL who recently got admitted to [facility] because of GI bleed.  He was seen by Dr. Montero.  He underwent EGD colonoscopy.  He was found to have angiodysplasia for which he underwent argon coagulation.  He required 3 units of blood.  Patient had a normal esophagus normal stomach and normal duodenum CLOtest was negative he was asked to take Protonix 40 mg daily.  Colonoscopy showed blood in the entire examined colon but there was a single bleeding colonic angiectasia which was treated with argon plasma coagulation.    He was admitted twice.  Initially he was admitted on July 10 at which time he stayed 3 days and he was evaluated for chest pain.  He underwent a cardiac work-up with stress test and echo.  The echo was normal but the stress test showed  medium sized moderate severe severity fixed perfusion defect.  Of the inferior wall consistent with infarction.  However according to patient cardiology did not think he had any cardiac problems.  I have left a message for patient's cardiologist to call me today.  Patient subsequently got readmitted with GI bleed and required 1/3 unit of transfusion.  He was found to have thrombocytopenia and hematology was consulted.    His hemoglobin had dropped down to as low as 7 and his platelets had gone down to 87.  Today it is 35 and his hemoglobin is 9.4 today.  He denies active bleeding.  He has lost weight recently.  He does not have any fever or night sweats.    He had ultrasound of spleen which showed enlarged spleen centimeters in length    Patient was started on Gazyva with Leukeran but subsequently switched to venetoclax with Gazyva.  His venetoclax dose is 100 mg daily and he receives Gazyva once a month.    CT scan done 2020 shows significant response     MEDICATIONS: The current medication list was reviewed with the patient and updated in the EMR this date per the medical assistant. Medication dosages and frequencies were confirmed to be accurate.       ALLERGIES: PENICILLIN which causes him to swell up. He is allergic to IV CONTRAST DYE.     SOCIAL HISTORY: He is . He smokes one pack per day for 35 years. He consumes three to four alcoholic drinks per week. He has no tattoos and no previous transfusions.       FAMILY HISTORY: Father  at 82 with MI. Mother  at 82 with bone cancer. He has one brother age 65 in good health and two sisters 69 and 57 in good health.   Past Medical History, Social History, and Family History are unchanged from my prior documentation on     Review of Systems   Constitutional: Positive for fatigue. Negative for appetite change, chills, diaphoresis, fever and unexpected weight change.   HENT: Negative for hearing loss, sore throat and trouble  swallowing.    Respiratory: Negative for cough, chest tightness, shortness of breath and wheezing.         Chest pressure with exertion   Cardiovascular: Negative for chest pain, palpitations and leg swelling.   Gastrointestinal: Negative for abdominal distention, abdominal pain, constipation, nausea and vomiting.   Genitourinary: Negative for dysuria, frequency, hematuria and urgency.   Musculoskeletal: Negative for joint swelling.        No muscle weakness.   Skin: Negative for rash and wound.   Neurological: Negative for seizures, syncope, speech difficulty, weakness, numbness and headaches.   Hematological: Negative for adenopathy. Does not bruise/bleed easily.   Psychiatric/Behavioral: Negative for behavioral problems, confusion and suicidal ideas.   All other systems reviewed and are negative.    Reviewed and updated 08/23/22      Patient Active Problem List   Diagnosis   • CLL (chronic lymphocytic leukemia) (HCC)   • Acute on chronic anemia   • Encounter for hepatitis C screening test for low risk patient    • Thrombocytopenia (HCC)   • H/O heart artery stent   • Stented coronary artery   • Arteriosclerosis of coronary artery   • Paroxysmal atrial fibrillation (HCC)   • Paroxysmal atrial fibrillation (HCC)   • Fall   • Fracture, olecranon   • Hyperlipidemia   • Long term (current) use of anticoagulants   • Lower GI bleed   • Olecranon bursitis   • Shoulder pain   • Smoker   • CLL (chronic lymphocytic leukemia) (HCC)   • Dehydration   • Drug-induced nausea and vomiting   • Encounter for long-term (current) use of other medications   • Neutropenia (HCC)   • Calculus of gallbladder without cholecystitis without obstruction   • Cholecystitis   • Iron deficiency   • Adverse effect of iron   • Melena   • Change in bowel habits   • Acute upper GI bleed   • Acute blood loss anemia   • Type 2 diabetes mellitus, without long-term current use of insulin (HCC)   • Prolonged Q-T interval on ECG   • Chest pain with high  risk for cardiac etiology   • Hyponatremia   • Acute blood loss anemia   • GI bleed   • Iron deficiency anemia due to chronic blood loss       MEDICATIONS:  Medication Reconciliation for the patient has been reviewed by me and documented in the patients chart.    ALLERGIES:    Allergies   Allergen Reactions   • Penicillins Swelling         Vitals:    08/23/22 0953   BP: 109/69   Pulse: 62   Resp: 18   Temp: 96.9 °F (36.1 °C)   SpO2: 100%        Current Status 8/23/2022   ECOG score 0      Physical exam:      This patient's ACP documentation is up to date, and there's nothing further left to document.    CONSTITUTIONAL:  Vital signs reviewed.  No distress, looks comfortable.  RESPIRATORY:  Normal respiratory effort.  Lungs clear to auscultation bilaterally.  CARDIOVASCULAR:  Normal S1, S2.  No murmurs rubs or gallops.  No significant lower extremity edema.  GASTROINTESTINAL: Abdomen appears unremarkable.  Nontender.  No hepatomegaly.  No splenomegaly.  LYMPHATIC:  No cervical, supraclavicular, axillary lymphadenopathy.  SKIN:  Warm.  No rashes.  PSYCHIATRIC:  Normal judgment and insight.  Normal mood and affect..    RECENT LABS:  Results from last 7 days   Lab Units 08/23/22  0940 08/18/22  0837   WBC 10*3/mm3 6.04 5.54   NEUTROS ABS 10*3/mm3 5.02  --    HEMOGLOBIN g/dL 7.7* 8.4*   HEMATOCRIT % 25.2* 27.4*   PLATELETS 10*3/mm3 168 145     Results from last 7 days   Lab Units 08/23/22  0940 08/18/22  0837   SODIUM mmol/L 136  --    POTASSIUM mmol/L 4.4  --    CHLORIDE mmol/L 102  --    CO2 mmol/L 22.0  --    BUN mg/dL 21*  --    CREATININE mg/dL 1.00  --    CALCIUM mg/dL 9.2  --    ALBUMIN g/dL 4.10  --    BILIRUBIN mg/dL 0.5  --    ALK PHOS U/L 121*  --    ALT (SGPT) U/L 18  --    AST (SGOT) U/L 18  --    GLUCOSE mg/dL 400*  --    FERRITIN ng/mL  --  22.70*   IRON mcg/dL  --  49*   TIBC mcg/dL  --  466   Iron sat = 11%    Assessment & Plan   1. Stage 2 CLL without evidence of any disease progression.  I have reviewed  the CT scan from 3/19/2018 there is minimal progression but clinically patient is asymptomatic and we will follow with observation.  Will monitor with labs. No evidence of any frequent infections, no major worsening of his white count. He has no fever or night sweats or any other symptoms.   · Patient knows that he is prone for infections and he is mainly staying at home during the COVID-19 situation time  · Recent admission to [facility] July 10 2020×2.  Initially admitted with chest pain and underwent stress test and echo.  Echo was negative.  Stress test is abnormal but have left message for patient's cardiologist to call us.  His cardiologist is been sent [facility].  · He then got admitted the second time with worsening GI bleed and required total of 3 units of blood.  EGD was negative but colonoscopy showed angiectasia for which he underwent argon ablation.  Currently hemoglobin stable and no active bleeding.  · He was also found to have low platelets with platelets dropping down to 27k and hemoglobin was 7.  Ultrasound showed splenomegaly 15 cm.  Concern is whether he has progressed from his CLL point of view and requires treatment.  · CT scan performed 7/20/2020 did show the increased splenomegaly, but interval decrease in size of the axillary nodes, and shotty mediastinal nodes.  No change in the shotty nodes within the neck and supraclavicular regions.  No new lymphadenopathy.  Bone marrow biopsy however showed preliminarily that there is bone marrow involvement.  Remainder of bone marrow biopsy report is pending.  · Bone marrow shows hypercellular marrow with 100% involvement of chronic lymphocytic leukemia/small lymphocytic lymphoma and diffuse pattern with at least 98% of the total marrow cellularity.  Rare foci of erythropoiesis and rare megakaryocytes are noted.   · Negative for BCL-2 and BCL 6.  Chromosomes shows normal karyotype.  I GVH mutation present.  Positive for gain of 1 q., monosomy 13  "and loss of IGH.  Negative for deletion T p53, negative for gain of chromosomes 9 and 11, negative for 11, 14 fusion.  · The combination of monosomy 13 and gain of 1 q. is thought to be associated with plasma cell myeloma.  However this patient does not have myeloma.  · Scans were reviewed with the patient today.  Dr. Moya also discussed with the patient and recommended he initiate treatment with chlorambucil and Gazyva.  He would not be a good candidate for Imbruvica due to his history of A. fib\".  He cannot take anticoagulation at this time.  The patient was provided with chemotherapy education today, including  Handouts.  He will need a port placed so that we can initiate treatment  · 8/13/2020: Gazyva day 1. Plan also [id number] mg p.o. on day 1 day 15.  We can increase the dose once we know he tolerates and his platelets improved.  · Patient developing pancytopenia when reviewed cycle 1 day 15.  Chlorambucil dose modified to 10 mg for day 15 however it is felt that he cannot tolerate this ongoing.   ·  Plan to switch to Venetoclax along with continuing Gazyva.    · Patient due today for cycle 2-day 1 Gazyva.  He will proceed today as scheduled.  Venetoclax will be shipped to his home with plans to begin this next week on 9/14/2020.    · Patient did receive 1 L normal saline and Neulasta on 9/28/2020 secondary to neutropenia with an ANC of 0.04.  · Patient seen on 10/02/2020 continuing on venetoclax, currently on 100 mg dosing.  He would not increase his dose as he will start on voriconazole after being on venetoclax x1 week which affects the metabolism of venetoclax.  He is currently on day 5 of the 100 mg dosing.  Patient states overall he feels the same except for increased fatigue and nausea.  His nauseousness is controlled with ondansetron however.  Patient will be given 1 L normal saline in the office today as planned.  · Patient returns on 10/12/2020 continuing on venetoclax 100 mg daily as well " as voriconazole.  · Patient is doing well with these treatments except fatigue.  Next cycle 4 due as of number ninth prior to which will obtain CT scans  · November 9, 2020: White count down to 1.89 but patient started Pepcid.  We will hold off Pepcid.  · 11/17/2020 platelet count dropped to 35,000 patient was instructed to hold venetoclax.  · Returns 11/23/2020 WBC up to 6.38, ANC 5.19, hemoglobin 12.4, platelets up to 121.  I have advised him to resume venetoclax 100 mg daily  · December 7, 2020: Platelets 95K.  White count 3.0 with absolute neutrophil count of 2.01.  Cycle 5 Gazyva given.  Continue venetoclax with voriconazole.   · January 4, 2021: Platelets 84,000, white count 3,440, absolute neutrophil count 1,980. Cycle 6 Gazyva given.  · 2/23/2021: WBC 3.0, platelets 90,000, hemoglobin 14.6, ANC 1.65.  Counts overall stable.  Continue Venetoclax 100 mg daily.  · January 4, 2021: Last dose of Gazyva.  · March 16, 2021: Patient is on venetoclax along with voriconazole and tolerating well with plans to complete total of 1 year.  · 4/27/2021: Patient continues on venetoclax 100 mg daily with voriconazole and acyclovir for prophylaxis.  He is tolerating this well.  He is scheduled for scans in 5 weeks.  · June 8, 2021: Patient is to continue venetoclax 100 mg daily with voriconazole and acyclovir prophylaxis.  He is tolerating it very well.  The plan is to continue 8 more weeks and then he will complete the protocol and will be followed with observation.  · I have reviewed the CT scan done on June 2, 2021 and there is no evidence of any lymphadenopathy and the spleen size is decreased in size from 12.5-11.3.  He has mild thrombocytopenia and leukopenia but his hemoglobin is normal.  · 9/7/2021: Patient completed 1 year worth of venetoclax with voriconazole and 6 months of Gazyva.  He has had an excellent response for his CLL.  Spleen decreased in size.  Currently asymptomatic.  Discontinue venetoclax since he  completed 1 year  · March 28, 2022: Patient's hemoglobin back to normal at 13.1.  EGD showed Hses's esophagus and angiodysplasia for which she underwent argon plasma coagulation.  Also colonoscopy showed multiple polyps all of them benign and had 1 angiodysplasia for which he underwent argon coagulation by Dr. Hinds.  Currently asymptomatic  · 5/26/2022 patient without B symptoms or adenopathy.  Acute drop in counts felt to be related to infection (see further discussion below).  Scheduled for repeat CT scans 6/14/2022.  · June 21, 2022: Patient underwent CT scan Bibiana 15, 2022 which showed multifocal pneumonia bilaterally likely secondary to COVID-19 infection in the past.  There admitted they recommended repeat CT in 6 to 8 weeks.  There is a 0.8 cm pulmonary nodule in the left lower lobe is minimally increased in size but similar to that in March 19, 2018.  Patient is improving  · WBC stable/normal at 6.0, 8% lymphs.  Platelets normal at 168,000.    2. History of intermittent thrombocytopenia  · Historically platelets in the 150s.  · Count did drop when patient was treated for CLL on chlorambucil.    · Platelets also dropping on treatment with Venetoclax in the past.    · CLL in remission.  Platelet count currently in the normal range.    3.  Iron deficiency anemia in the setting of recurrent GI bleeding with underlying AVMs, complicated by anticoagulation.   · Patient intolerant to oral iron.  · 1/2022: Patient with recurrent iron deficiency as further detailed below.  Patient referred to GI for further evaluation.  Will see Dr. Hinds 2/9/2022.  · Patient hospitalized 4/10 - 4/13/2022 for acute GI bleed.  EGD showing Hess's esophagus.  Colonoscopy showing angiectasias, cauterized.  During hospitalization, Coumadin was held.    · 4/22/2022 evaluated by gastroenterology, noted to have a declining hemoglobin so they advised him to be seen by our office.  Patient's Hgb dropping to 8.8, Iron saturation 8%,  TIBC 403, ferritin 25.1.  Patient receiving IV Injectafer x2, 4/29 and 5/6/2022.  · 7/25-7/31/2022 hospitalized with recurrent GI bleeding in the setting of known AVMs and being on Xarelto. Xarelto held.  Patient undergoing EGD 7/28/2022 revealing multiple nonbleeding angioectasias treated with argon plasma coagulation.  Patient placed on a heparin drip and then transitioned back to Xarelto as cardiology felt he could not be off this for a long with new Watchman's device.  Hemoglobin at discharge 10.8.  · 8/15/2022 patient seen by GI with continued reports of GI bleeding with noted dark stools.  Hemoglobin hemoglobin having already dropped and just 2 weeks from 10.8 to 8.4, ferritin 22, iron sat 11%. Patient scheduled for GI for capsule endoscopy.  · 8/23/2022 Hgb 7.7.  Plan to transfuse patient and give additional IV iron with Injectafer x2.  Of note Xarelto was stopped yesterday and patient switched to Plavix with hopes of stopping GI bleeding.    4.  History of cluster headaches for which she has to be treated with steroids in the past and has followed up with Dr. Len Péreztoday with significant headaches.  · Patient had CT of the head 8/20/2020 which was negative.  · He was started on prednisone 10 mg daily which was not helpful.  · Patient saw Dr. Bobby, neurology who gave him 2 shots of a new medication Emgality.  This is something that can be given once a month.    · Resolved.    5.  Atrial fibrillation, off anticoagulation given his thrombocytopenia.  · Patient continues on aspirin 81 mg daily if platelet count greater than 60,000.  · Patient sees Dr. Christy at [facility] who follows his INR  · Coumadin held during patient's recent hospitalization April/2022.  Patient wishes to discontinue Coumadin permanently.    · Patient status post watchman's device 7/2022.  Started on Xarelto.  · 8/22/2022 Xarelto discontinued and switched to Plavix in the setting of ongoing GI bleeding per above.    6.  Chronic mild  elevation of alkaline phosphatase.  Stable.      PLAN:   · Proceed with transfusion 2 units PRBCs for symptomatic anemia with active GI bleeding.  · Schedule patient for 2 doses of IV Injectafer.  · Patient undergo capsule endoscopy with GI 9/7/2022  · We will plan to see him back as already scheduled 9/20/2022 with repeat CBC, BMP, ferritin and iron profile.  · Patient is otherwise scheduled back in December with Dr. Moya for routine follow-up.    ADDENDUM: After patient leaving, chemistry results showing glucose of 400.  Patient contacted and states that he was told by his primary care that he did not have diabetes and that he could stop diabetic medications.  Discussed that with this level of glucose he indeed has diabetes without question.  Asked patient to recheck his blood sugar at home and to call me back to go over what of his previous oral medications to restart.  Awaiting callback.  In the meantime we will also check a hemoglobin A1c to have a better idea of the degree of his diabetes.    SECOND ADDENDUM: Patient calling back once at home, rechecking blood sugar and level is 410.  Patient found metformin that he had from previous prescription.  He was instructed to take metformin 500 mg tab and recheck his blood sugar tonight and if still elevated to take 500 again.  For now he will take 500 mg daily and we will get a message to Dr. Collier, PCP, about his elevated blood sugars.    I spent 62 minutes caring for Macho on this date of service. This time includes time spent by me in the following activities: preparing for the visit, reviewing tests, obtaining and/or reviewing a separately obtained history, performing a medically appropriate examination and/or evaluation, counseling and educating the patient/family/caregiver, referring and communicating with other health care professionals, documenting information in the medical record, independently interpreting results and communicating that information  with the patient/family/caregiver and care coordination      Valarie Mclean, APRN  08/23/22      Cc: Dr. Kevin Gilmore

## 2022-08-24 ENCOUNTER — HOSPITAL ENCOUNTER (OUTPATIENT)
Dept: INFUSION THERAPY | Facility: HOSPITAL | Age: 72
Setting detail: INFUSION SERIES
Discharge: HOME | End: 2022-08-24

## 2022-08-24 VITALS
TEMPERATURE: 96.9 F | DIASTOLIC BLOOD PRESSURE: 60 MMHG | OXYGEN SATURATION: 100 % | SYSTOLIC BLOOD PRESSURE: 114 MMHG | RESPIRATION RATE: 16 BRPM | HEART RATE: 63 BPM

## 2022-08-24 DIAGNOSIS — D50.0 IRON DEFICIENCY ANEMIA DUE TO CHRONIC BLOOD LOSS: ICD-10-CM

## 2022-08-24 DIAGNOSIS — C91.10 CLL (CHRONIC LYMPHOCYTIC LEUKEMIA): ICD-10-CM

## 2022-08-24 DIAGNOSIS — D50.0 BLOOD LOSS ANEMIA: Primary | ICD-10-CM

## 2022-08-24 PROCEDURE — 86900 BLOOD TYPING SEROLOGIC ABO: CPT

## 2022-08-24 PROCEDURE — 63710000001 DIPHENHYDRAMINE PER 50 MG: Performed by: NURSE PRACTITIONER

## 2022-08-24 PROCEDURE — P9016 RBC LEUKOCYTES REDUCED: HCPCS

## 2022-08-24 PROCEDURE — G0463 HOSPITAL OUTPT CLINIC VISIT: HCPCS

## 2022-08-24 PROCEDURE — 36430 TRANSFUSION BLD/BLD COMPNT: CPT

## 2022-08-24 PROCEDURE — 25010000002 HEPARIN LOCK FLUSH PER 10 UNITS: Performed by: INTERNAL MEDICINE

## 2022-08-24 RX ORDER — DIPHENHYDRAMINE HCL 25 MG
25 CAPSULE ORAL ONCE
Status: COMPLETED | OUTPATIENT
Start: 2022-08-24 | End: 2022-08-24

## 2022-08-24 RX ORDER — SODIUM CHLORIDE 9 MG/ML
250 INJECTION, SOLUTION INTRAVENOUS AS NEEDED
Status: DISCONTINUED | OUTPATIENT
Start: 2022-08-24 | End: 2022-08-26 | Stop reason: HOSPADM

## 2022-08-24 RX ORDER — HEPARIN SODIUM (PORCINE) LOCK FLUSH IV SOLN 100 UNIT/ML 100 UNIT/ML
500 SOLUTION INTRAVENOUS AS NEEDED
Status: DISCONTINUED | OUTPATIENT
Start: 2022-08-24 | End: 2022-08-26 | Stop reason: HOSPADM

## 2022-08-24 RX ORDER — HEPARIN SODIUM (PORCINE) LOCK FLUSH IV SOLN 100 UNIT/ML 100 UNIT/ML
500 SOLUTION INTRAVENOUS AS NEEDED
Status: CANCELLED | OUTPATIENT
Start: 2022-08-24

## 2022-08-24 RX ORDER — SODIUM CHLORIDE 0.9 % (FLUSH) 0.9 %
10 SYRINGE (ML) INJECTION AS NEEDED
Status: DISCONTINUED | OUTPATIENT
Start: 2022-08-24 | End: 2022-08-26 | Stop reason: HOSPADM

## 2022-08-24 RX ORDER — ACETAMINOPHEN 325 MG/1
650 TABLET ORAL ONCE
Status: COMPLETED | OUTPATIENT
Start: 2022-08-24 | End: 2022-08-24

## 2022-08-24 RX ORDER — SODIUM CHLORIDE 0.9 % (FLUSH) 0.9 %
10 SYRINGE (ML) INJECTION AS NEEDED
Status: CANCELLED | OUTPATIENT
Start: 2022-08-24

## 2022-08-24 RX ADMIN — DIPHENHYDRAMINE HYDROCHLORIDE 25 MG: 25 CAPSULE ORAL at 08:57

## 2022-08-24 RX ADMIN — Medication 10 ML: at 15:05

## 2022-08-24 RX ADMIN — HEPARIN 500 UNITS: 100 SYRINGE at 15:05

## 2022-08-24 RX ADMIN — ACETAMINOPHEN 650 MG: 325 TABLET, FILM COATED ORAL at 08:57

## 2022-08-26 ENCOUNTER — INFUSION (OUTPATIENT)
Dept: ONCOLOGY | Facility: HOSPITAL | Age: 72
End: 2022-08-26

## 2022-08-26 VITALS
SYSTOLIC BLOOD PRESSURE: 123 MMHG | OXYGEN SATURATION: 96 % | DIASTOLIC BLOOD PRESSURE: 73 MMHG | HEART RATE: 64 BPM | WEIGHT: 158.2 LBS | TEMPERATURE: 97.8 F | RESPIRATION RATE: 18 BRPM | BODY MASS INDEX: 20.88 KG/M2

## 2022-08-26 DIAGNOSIS — D50.0 IRON DEFICIENCY ANEMIA DUE TO CHRONIC BLOOD LOSS: Primary | ICD-10-CM

## 2022-08-26 DIAGNOSIS — C91.10 CLL (CHRONIC LYMPHOCYTIC LEUKEMIA): ICD-10-CM

## 2022-08-26 PROCEDURE — 96374 THER/PROPH/DIAG INJ IV PUSH: CPT

## 2022-08-26 PROCEDURE — 25010000002 HEPARIN LOCK FLUSH PER 10 UNITS: Performed by: INTERNAL MEDICINE

## 2022-08-26 PROCEDURE — 63710000001 PROCHLORPERAZINE MALEATE PER 10 MG: Performed by: INTERNAL MEDICINE

## 2022-08-26 PROCEDURE — 25010000002 FERRIC CARBOXYMALTOSE 750 MG/15ML SOLUTION 15 ML VIAL: Performed by: INTERNAL MEDICINE

## 2022-08-26 RX ORDER — SODIUM CHLORIDE 0.9 % (FLUSH) 0.9 %
10 SYRINGE (ML) INJECTION AS NEEDED
Status: DISCONTINUED | OUTPATIENT
Start: 2022-08-26 | End: 2022-08-26 | Stop reason: HOSPADM

## 2022-08-26 RX ORDER — HEPARIN SODIUM (PORCINE) LOCK FLUSH IV SOLN 100 UNIT/ML 100 UNIT/ML
500 SOLUTION INTRAVENOUS AS NEEDED
Status: DISCONTINUED | OUTPATIENT
Start: 2022-08-26 | End: 2022-08-26 | Stop reason: HOSPADM

## 2022-08-26 RX ORDER — SODIUM CHLORIDE 0.9 % (FLUSH) 0.9 %
10 SYRINGE (ML) INJECTION AS NEEDED
Status: CANCELLED | OUTPATIENT
Start: 2022-08-26

## 2022-08-26 RX ORDER — PROCHLORPERAZINE MALEATE 10 MG
10 TABLET ORAL ONCE
Status: COMPLETED | OUTPATIENT
Start: 2022-08-26 | End: 2022-08-26

## 2022-08-26 RX ORDER — HEPARIN SODIUM (PORCINE) LOCK FLUSH IV SOLN 100 UNIT/ML 100 UNIT/ML
500 SOLUTION INTRAVENOUS AS NEEDED
Status: CANCELLED | OUTPATIENT
Start: 2022-08-26

## 2022-08-26 RX ORDER — SODIUM CHLORIDE 9 MG/ML
250 INJECTION, SOLUTION INTRAVENOUS ONCE
Status: COMPLETED | OUTPATIENT
Start: 2022-08-26 | End: 2022-08-26

## 2022-08-26 RX ADMIN — FERRIC CARBOXYMALTOSE INJECTION 750 MG: 50 INJECTION, SOLUTION INTRAVENOUS at 13:55

## 2022-08-26 RX ADMIN — PROCHLORPERAZINE MALEATE 10 MG: 10 TABLET ORAL at 13:50

## 2022-08-26 RX ADMIN — Medication 10 ML: at 14:30

## 2022-08-26 RX ADMIN — Medication 500 UNITS: at 14:30

## 2022-08-26 RX ADMIN — SODIUM CHLORIDE 250 ML: 9 INJECTION, SOLUTION INTRAVENOUS at 13:49

## 2022-09-02 ENCOUNTER — APPOINTMENT (OUTPATIENT)
Dept: ONCOLOGY | Facility: HOSPITAL | Age: 72
End: 2022-09-02

## 2022-09-06 ENCOUNTER — TELEPHONE (OUTPATIENT)
Dept: GASTROENTEROLOGY | Facility: CLINIC | Age: 72
End: 2022-09-06

## 2022-09-06 NOTE — TELEPHONE ENCOUNTER
Called pt, he had questions regarding the prep for a capsule study.  Explained miralax 119gm in 32 oz gatorade x1 at 8pm. Pt verbalized understanding.

## 2022-09-06 NOTE — TELEPHONE ENCOUNTER
Patient has procedure tomorrow.  Supposed to take citrate of mag at 5:00 today, but they can't find any available.  What else can he take?    [phone]

## 2022-09-07 ENCOUNTER — CLINICAL SUPPORT (OUTPATIENT)
Dept: GASTROENTEROLOGY | Facility: CLINIC | Age: 72
End: 2022-09-07

## 2022-09-07 ENCOUNTER — TELEPHONE (OUTPATIENT)
Dept: GASTROENTEROLOGY | Facility: CLINIC | Age: 72
End: 2022-09-07

## 2022-09-07 DIAGNOSIS — D62 ACUTE BLOOD LOSS ANEMIA: ICD-10-CM

## 2022-09-07 PROCEDURE — 91110 GI TRC IMG INTRAL ESOPH-ILE: CPT | Performed by: INTERNAL MEDICINE

## 2022-09-09 ENCOUNTER — INFUSION (OUTPATIENT)
Dept: ONCOLOGY | Facility: HOSPITAL | Age: 72
End: 2022-09-09

## 2022-09-09 VITALS
HEART RATE: 57 BPM | BODY MASS INDEX: 21.25 KG/M2 | SYSTOLIC BLOOD PRESSURE: 120 MMHG | DIASTOLIC BLOOD PRESSURE: 63 MMHG | WEIGHT: 161 LBS | RESPIRATION RATE: 16 BRPM | OXYGEN SATURATION: 95 % | TEMPERATURE: 98 F

## 2022-09-09 DIAGNOSIS — D50.0 IRON DEFICIENCY ANEMIA DUE TO CHRONIC BLOOD LOSS: Primary | ICD-10-CM

## 2022-09-09 PROCEDURE — 25010000002 FERRIC CARBOXYMALTOSE 750 MG/15ML SOLUTION 15 ML VIAL: Performed by: INTERNAL MEDICINE

## 2022-09-09 PROCEDURE — 96374 THER/PROPH/DIAG INJ IV PUSH: CPT

## 2022-09-09 PROCEDURE — 63710000001 PROCHLORPERAZINE MALEATE PER 10 MG: Performed by: INTERNAL MEDICINE

## 2022-09-09 RX ORDER — PROCHLORPERAZINE MALEATE 10 MG
10 TABLET ORAL ONCE
Status: COMPLETED | OUTPATIENT
Start: 2022-09-09 | End: 2022-09-09

## 2022-09-09 RX ORDER — SODIUM CHLORIDE 9 MG/ML
250 INJECTION, SOLUTION INTRAVENOUS ONCE
Status: COMPLETED | OUTPATIENT
Start: 2022-09-09 | End: 2022-09-09

## 2022-09-09 RX ADMIN — SODIUM CHLORIDE 250 ML: 9 INJECTION, SOLUTION INTRAVENOUS at 15:27

## 2022-09-09 RX ADMIN — PROCHLORPERAZINE MALEATE 10 MG: 10 TABLET ORAL at 15:17

## 2022-09-09 RX ADMIN — FERRIC CARBOXYMALTOSE INJECTION 750 MG: 50 INJECTION, SOLUTION INTRAVENOUS at 15:27

## 2022-09-13 ENCOUNTER — PREP FOR SURGERY (OUTPATIENT)
Dept: OTHER | Facility: HOSPITAL | Age: 72
End: 2022-09-13

## 2022-09-13 DIAGNOSIS — K92.2 GASTROINTESTINAL HEMORRHAGE, UNSPECIFIED GASTROINTESTINAL HEMORRHAGE TYPE: ICD-10-CM

## 2022-09-13 DIAGNOSIS — D50.0 IRON DEFICIENCY ANEMIA DUE TO CHRONIC BLOOD LOSS: Primary | ICD-10-CM

## 2022-09-13 NOTE — PROGRESS NOTES
Capsule study shows evidence of fresh blood in the proximal small bowel likely secondary to angioectasias though no discrete bleeding source was noted.  There were multiple scattered angioectasias throughout the small bowel.  The more distal aspects of the small bowel images were somewhat obscured by old blood.  We will plan enteroscopy with APC for further management.

## 2022-09-15 ENCOUNTER — ANESTHESIA (OUTPATIENT)
Dept: GASTROENTEROLOGY | Facility: HOSPITAL | Age: 72
End: 2022-09-15

## 2022-09-15 ENCOUNTER — HOSPITAL ENCOUNTER (OUTPATIENT)
Facility: HOSPITAL | Age: 72
Setting detail: HOSPITAL OUTPATIENT SURGERY
Discharge: HOME | End: 2022-09-15
Attending: INTERNAL MEDICINE | Admitting: INTERNAL MEDICINE

## 2022-09-15 ENCOUNTER — ANESTHESIA EVENT (OUTPATIENT)
Dept: GASTROENTEROLOGY | Facility: HOSPITAL | Age: 72
End: 2022-09-15

## 2022-09-15 VITALS
SYSTOLIC BLOOD PRESSURE: 143 MMHG | DIASTOLIC BLOOD PRESSURE: 62 MMHG | HEART RATE: 62 BPM | OXYGEN SATURATION: 99 % | RESPIRATION RATE: 20 BRPM | TEMPERATURE: 97.8 F | WEIGHT: 157 LBS | HEIGHT: 72 IN | BODY MASS INDEX: 21.26 KG/M2

## 2022-09-15 LAB — GLUCOSE BLDC GLUCOMTR-MCNC: 217 MG/DL (ref 70–130)

## 2022-09-15 PROCEDURE — 43270 EGD LESION ABLATION: CPT | Performed by: INTERNAL MEDICINE

## 2022-09-15 PROCEDURE — 82962 GLUCOSE BLOOD TEST: CPT

## 2022-09-15 PROCEDURE — 25010000002 PROPOFOL 10 MG/ML EMULSION: Performed by: ANESTHESIOLOGY

## 2022-09-15 RX ORDER — PROPOFOL 10 MG/ML
VIAL (ML) INTRAVENOUS CONTINUOUS PRN
Status: DISCONTINUED | OUTPATIENT
Start: 2022-09-15 | End: 2022-09-15 | Stop reason: SURG

## 2022-09-15 RX ORDER — LIDOCAINE HYDROCHLORIDE 20 MG/ML
INJECTION, SOLUTION INFILTRATION; PERINEURAL AS NEEDED
Status: DISCONTINUED | OUTPATIENT
Start: 2022-09-15 | End: 2022-09-15 | Stop reason: SURG

## 2022-09-15 RX ORDER — SODIUM CHLORIDE, SODIUM LACTATE, POTASSIUM CHLORIDE, CALCIUM CHLORIDE 600; 310; 30; 20 MG/100ML; MG/100ML; MG/100ML; MG/100ML
30 INJECTION, SOLUTION INTRAVENOUS CONTINUOUS PRN
Status: DISCONTINUED | OUTPATIENT
Start: 2022-09-15 | End: 2022-09-15 | Stop reason: HOSPADM

## 2022-09-15 RX ORDER — ONDANSETRON 2 MG/ML
4 INJECTION INTRAMUSCULAR; INTRAVENOUS ONCE AS NEEDED
Status: DISCONTINUED | OUTPATIENT
Start: 2022-09-15 | End: 2022-09-15 | Stop reason: HOSPADM

## 2022-09-15 RX ORDER — PROPOFOL 10 MG/ML
VIAL (ML) INTRAVENOUS AS NEEDED
Status: DISCONTINUED | OUTPATIENT
Start: 2022-09-15 | End: 2022-09-15 | Stop reason: SURG

## 2022-09-15 RX ADMIN — Medication 180 MCG/KG/MIN: at 08:14

## 2022-09-15 RX ADMIN — LIDOCAINE HYDROCHLORIDE 50 MG: 20 INJECTION, SOLUTION INFILTRATION; PERINEURAL at 08:11

## 2022-09-15 RX ADMIN — SODIUM CHLORIDE, POTASSIUM CHLORIDE, SODIUM LACTATE AND CALCIUM CHLORIDE 30 ML/HR: 600; 310; 30; 20 INJECTION, SOLUTION INTRAVENOUS at 07:55

## 2022-09-15 RX ADMIN — PROPOFOL 100 MG: 10 INJECTION, EMULSION INTRAVENOUS at 08:11

## 2022-09-15 NOTE — DISCHARGE INSTRUCTIONS

## 2022-09-15 NOTE — ANESTHESIA PREPROCEDURE EVALUATION
Anesthesia Evaluation     Patient summary reviewed and Nursing notes reviewed   NPO Solid Status: > 8 hours  NPO Liquid Status: > 2 hours           Airway   Mallampati: II  Dental - normal exam   (+) edentulous    Pulmonary - normal exam   (+) a smoker Current,   Cardiovascular - normal exam    ECG reviewed    (+) hypertension, valvular problems/murmurs murmur and AS, CAD, cardiac stents dysrhythmias Paroxysmal Atrial Fib, hyperlipidemia,     ROS comment: Reviewed last echo    Neuro/Psych  GI/Hepatic/Renal/Endo    (+)  GERD, GI bleeding , diabetes mellitus type 2,     Musculoskeletal     Abdominal    Substance History      OB/GYN          Other      history of cancer                  Anesthesia Plan    ASA 4     MAC       Anesthetic plan, risks, benefits, and alternatives have been provided, discussed and informed consent has been obtained with: patient.        CODE STATUS:

## 2022-09-15 NOTE — ANESTHESIA POSTPROCEDURE EVALUATION
"Patient: Macho Stepehnson    Procedure Summary     Date: 09/15/22 Room / [facility]:  [facility]    Anesthesia Start: 0802 Anesthesia Stop: 0828    Procedures:       ESOPHAGOGASTRODUODENOSCOPY WITH APC (N/A Esophagus)      SMALL BOWEL ENTEROSCOPY (N/A Esophagus) Diagnosis:       Iron deficiency anemia due to chronic blood loss      Gastrointestinal hemorrhage, unspecified gastrointestinal hemorrhage type      (Iron deficiency anemia due to chronic blood loss [D50.0])      (Gastrointestinal hemorrhage, unspecified gastrointestinal hemorrhage type [K92.2])    Surgeons: Velasquez Hinds MD Provider: Sofia Bradford MD    Anesthesia Type: MAC ASA Status: 4          Anesthesia Type: MAC    Vitals  Vitals Value Taken Time   /68 09/15/22 0838   Temp 36.6 °C (97.8 °F) 09/15/22 0827   Pulse 57 09/15/22 0845   Resp 18 09/15/22 0827   SpO2 99 % 09/15/22 0845   Vitals shown include unvalidated device data.        Post Anesthesia Care and Evaluation    Patient location during evaluation: PHASE II  Patient participation: complete - patient participated  Level of consciousness: awake  Pain management: adequate    Airway patency: patent  Anesthetic complications: No anesthetic complications    Cardiovascular status: acceptable  Respiratory status: acceptable  Hydration status: acceptable    Comments: /60 (BP Location: Left arm, Patient Position: Lying)   Pulse 58   Temp 36.6 °C (97.8 °F) (Oral)   Resp 18   Ht 182.9 cm (72\")   Wt 71.2 kg (157 lb)   SpO2 100%   BMI 21.29 kg/m²       "

## 2022-09-15 NOTE — H&P
No chief complaint on file.      HPI  Patient today for enteroscopy due to abnormal capsule study with blood noted in the small bowel.         Problem List:    Patient Active Problem List   Diagnosis   • CLL (chronic lymphocytic leukemia) (HCC)   • Acute on chronic anemia   • Encounter for hepatitis C screening test for low risk patient    • Thrombocytopenia (HCC)   • H/O heart artery stent   • Stented coronary artery   • Arteriosclerosis of coronary artery   • Paroxysmal atrial fibrillation (HCC)   • Paroxysmal atrial fibrillation (HCC)   • Fall   • Fracture, olecranon   • Hyperlipidemia   • Long term (current) use of anticoagulants   • Lower GI bleed   • Olecranon bursitis   • Shoulder pain   • Smoker   • CLL (chronic lymphocytic leukemia) (HCC)   • Dehydration   • Drug-induced nausea and vomiting   • Encounter for long-term (current) use of other medications   • Neutropenia (HCC)   • Calculus of gallbladder without cholecystitis without obstruction   • Cholecystitis   • Iron deficiency   • Adverse effect of iron   • Melena   • Change in bowel habits   • Acute upper GI bleed   • Acute blood loss anemia   • Type 2 diabetes mellitus, without long-term current use of insulin (HCC)   • Prolonged Q-T interval on ECG   • Chest pain with high risk for cardiac etiology   • Hyponatremia   • Acute blood loss anemia   • GI bleed   • Iron deficiency anemia due to chronic blood loss       Medical History:    Past Medical History:   Diagnosis Date   • A-fib (HCC)     Paroxysmal atrial fibrillation   • Anemia     blood transfusions and iron infusions   • Hess's esophagus    • CAD (coronary artery disease)     has stents coronary artery   • Chronic lymphatic leukemia (HCC)    • CLL (chronic lymphocytic leukemia) (HCC)    • Colon polyps 05/07/2007    Rectum: hyperplastic polyp   • Colon polyps 07/16/2019    Rectum: hyperplastic polyps x2   • Constipation    • Dark stools    • Diabetes mellitus (HCC)    • Diarrhea    • Gall  stones    • GERD (gastroesophageal reflux disease)    • GI bleed    • Gout     wife denies   • Heart murmur    • History of transfusion     no reaction   • Hyperlipidemia    • Hypertension    • Low back pain    • RLS (restless legs syndrome)         Social History:    Social History     Socioeconomic History   • Marital status:      Spouse name: Nohemi   Tobacco Use   • Smoking status: Current Every Day Smoker     Packs/day: 0.50     Years: 40.00     Pack years: 20.00     Types: Cigarettes   • Smokeless tobacco: Never Used   • Tobacco comment: 1/2 PPD   Vaping Use   • Vaping Use: Never used   Substance and Sexual Activity   • Alcohol use: Yes     Alcohol/week: 2.0 standard drinks     Types: 2 Cans of beer per week     Comment: rare   • Drug use: Never   • Sexual activity: Defer       Family History:   Family History   Problem Relation Age of Onset   • Bone cancer Mother    • Heart attack Father    • Diabetes Father    • Diabetes Sister    • Malig Hyperthermia Neg Hx    • Colon cancer Neg Hx    • Colon polyps Neg Hx    • Crohn's disease Neg Hx    • Irritable bowel syndrome Neg Hx    • Ulcerative colitis Neg Hx        Surgical History:   Past Surgical History:   Procedure Laterality Date   • ANKLE FUSION Right     Fusion hardware   • BONE MARROW BIOPSY Left 10/28/2020    CT guided left iliac crest biopsy and FNA-Dr. Darius Reynolds, [facility]   • CARDIAC CATHETERIZATION  02/2011    [facility]   • CARDIOVERSION  04/22/2016    [facility]   • CHOLECYSTECTOMY     • CHOLECYSTECTOMY WITH INTRAOPERATIVE CHOLANGIOGRAM N/A 10/01/2021    Procedure: CHOLECYSTECTOMY LAPAROSCOPIC INTRAOPERATIVE CHOLANGIOGRAM;  Surgeon: Darius Lui MD;  Location: [facility];  Service: General;  Laterality: N/A;   • COLONOSCOPY N/A 2012   • COLONOSCOPY N/A 07/12/2020    [facility]   • COLONOSCOPY N/A 03/22/2022    Procedure: COLONOSCOPY FOR SCREENING;  Surgeon: Velasquez Hinds MD;  Location: [facility] OR;   Service: Gastroenterology;  Laterality: N/A;  Diverticulosis, polyps,   APC of angiodysplasia right colon   • COLONOSCOPY W/ POLYPECTOMY N/A 05/07/2007    Dr. Vivek Siddiqui, [facility]   • CORONARY ANGIOPLASTY WITH STENT PLACEMENT      x3   • CORONARY STENT PLACEMENT  02/2011    [facility], Promus CARLOS x 2 proximal to mid RCA, LAD x 1   • ENDOSCOPY N/A 07/15/2019   • ENDOSCOPY N/A 03/22/2022    Procedure: ESOPHAGOGASTRODUODENOSCOPY;  Surgeon: Velasquez Hinds MD;  Location: [facility] OR;  Service: Gastroenterology;  Laterality: N/A;  APC-duodenal bulb, esophagitis, hiatal hernia, Angioectasis of duodenum   • ENDOSCOPY N/A 7/28/2022    Procedure: ESOPHAGOGASTRODUODENOSCOPY WITH APC AND X2 RESOLUTION CLIPS;  Surgeon: Luis Daniel Walls MD;  [facility];  Service: Gastroenterology;  Laterality: N/A;  PREOP/ GI BLEED  POSTOP/ AVM's IN STOMACH   • ENDOSCOPY AND COLONOSCOPY N/A 07/15/2019    Dr. Ulloa   • ENTEROSCOPY SMALL BOWEL N/A 04/13/2022    Procedure: SMALL BOWEL ENTEROSCOPY;  Surgeon: Luis Daniel Walls MD;  [facility];  Service: Gastroenterology;  Laterality: N/A;  PRE- OBSCURE GI BLEEDING  POST- NON BLEEDING DUODENITIS   • SHOULDER SURGERY Left 2007   • TONSILLECTOMY Bilateral    • VENOUS ACCESS DEVICE (PORT) INSERTION N/A 07/31/2020    Procedure: INSERTION VENOUS ACCESS DEVICE;  Surgeon: Darius Lui MD;  Location: [facility] OR [facility];  Service: General;  Laterality: N/A;       No current facility-administered medications for this encounter.    Allergies:   Allergies   Allergen Reactions   • Penicillins Swelling     As a child        The following portions of the patient's history were reviewed by me and updated as appropriate: review of systems, allergies, current medications, past family history, past medical history, past social history, past surgical history and problem list.    There were no vitals filed for this visit.    PHYSICAL EXAM:    CONSTITUTIONAL:  today's vital  signs reviewed by me  GASTROINTESTINAL: abdomen is soft nontender nondistended with normal active bowel sounds, no masses are appreciated    Assessment/ Plan  We will proceed today with enteroscopy.    Risks and benefits as well as alternatives to endoscopic evaluation were explained to the patient and they voiced understanding and wish to proceed.  These risks include but are not limited to the risk of bleeding, perforation, adverse reaction to sedation, and missed lesions.  The patient was given the opportunity to ask questions prior to the endoscopic procedure.

## 2022-09-20 ENCOUNTER — APPOINTMENT (OUTPATIENT)
Dept: LAB | Facility: HOSPITAL | Age: 72
End: 2022-09-20

## 2022-09-20 ENCOUNTER — OFFICE VISIT (OUTPATIENT)
Dept: ONCOLOGY | Facility: CLINIC | Age: 72
End: 2022-09-20

## 2022-09-20 ENCOUNTER — LAB (OUTPATIENT)
Dept: LAB | Facility: HOSPITAL | Age: 72
End: 2022-09-20

## 2022-09-20 VITALS
HEART RATE: 60 BPM | BODY MASS INDEX: 21.67 KG/M2 | OXYGEN SATURATION: 98 % | TEMPERATURE: 97.3 F | HEIGHT: 72 IN | SYSTOLIC BLOOD PRESSURE: 119 MMHG | WEIGHT: 160 LBS | DIASTOLIC BLOOD PRESSURE: 70 MMHG

## 2022-09-20 DIAGNOSIS — C91.10 CLL (CHRONIC LYMPHOCYTIC LEUKEMIA): ICD-10-CM

## 2022-09-20 DIAGNOSIS — D69.6 THROMBOCYTOPENIA: ICD-10-CM

## 2022-09-20 DIAGNOSIS — D50.0 IRON DEFICIENCY ANEMIA DUE TO CHRONIC BLOOD LOSS: Primary | ICD-10-CM

## 2022-09-20 DIAGNOSIS — D50.0 IRON DEFICIENCY ANEMIA DUE TO CHRONIC BLOOD LOSS: ICD-10-CM

## 2022-09-20 LAB
ANION GAP SERPL CALCULATED.3IONS-SCNC: 10.8 MMOL/L (ref 5–15)
BASOPHILS # BLD AUTO: 0.06 10*3/MM3 (ref 0–0.2)
BASOPHILS NFR BLD AUTO: 1 % (ref 0–1.5)
BUN SERPL-MCNC: 17 MG/DL (ref 6–20)
BUN/CREAT SERPL: 18.9 (ref 7.3–30)
CALCIUM SPEC-SCNC: 9.3 MG/DL (ref 8.5–10.2)
CHLORIDE SERPL-SCNC: 103 MMOL/L (ref 98–107)
CO2 SERPL-SCNC: 23.2 MMOL/L (ref 22–29)
CREAT SERPL-MCNC: 0.9 MG/DL (ref 0.7–1.3)
DEPRECATED RDW RBC AUTO: 71.1 FL (ref 37–54)
EGFRCR SERPLBLD CKD-EPI 2021: 90.2 ML/MIN/1.73
EOSINOPHIL # BLD AUTO: 0.22 10*3/MM3 (ref 0–0.4)
EOSINOPHIL NFR BLD AUTO: 3.5 % (ref 0.3–6.2)
ERYTHROCYTE [DISTWIDTH] IN BLOOD BY AUTOMATED COUNT: 19.5 % (ref 12.3–15.4)
FERRITIN SERPL-MCNC: 317.2 NG/ML (ref 30–400)
GLUCOSE SERPL-MCNC: 275 MG/DL (ref 74–124)
HCT VFR BLD AUTO: 36.1 % (ref 37.5–51)
HGB BLD-MCNC: 11.5 G/DL (ref 13–17.7)
IMM GRANULOCYTES # BLD AUTO: 0.06 10*3/MM3 (ref 0–0.05)
IMM GRANULOCYTES NFR BLD AUTO: 1 % (ref 0–0.5)
IRON 24H UR-MRATE: 102 MCG/DL (ref 59–158)
IRON SATN MFR SERPL: 29 % (ref 14–48)
LYMPHOCYTES # BLD AUTO: 0.79 10*3/MM3 (ref 0.7–3.1)
LYMPHOCYTES NFR BLD AUTO: 12.5 % (ref 19.6–45.3)
MCH RBC QN AUTO: 31.7 PG (ref 26.6–33)
MCHC RBC AUTO-ENTMCNC: 31.9 G/DL (ref 31.5–35.7)
MCV RBC AUTO: 99.4 FL (ref 79–97)
MONOCYTES # BLD AUTO: 0.4 10*3/MM3 (ref 0.1–0.9)
MONOCYTES NFR BLD AUTO: 6.3 % (ref 5–12)
NEUTROPHILS NFR BLD AUTO: 4.77 10*3/MM3 (ref 1.7–7)
NEUTROPHILS NFR BLD AUTO: 75.7 % (ref 42.7–76)
NRBC BLD AUTO-RTO: 0 /100 WBC (ref 0–0.2)
PLATELET # BLD AUTO: 130 10*3/MM3 (ref 140–450)
PMV BLD AUTO: 9.5 FL (ref 6–12)
POTASSIUM SERPL-SCNC: 4.4 MMOL/L (ref 3.5–4.7)
RBC # BLD AUTO: 3.63 10*6/MM3 (ref 4.14–5.8)
SODIUM SERPL-SCNC: 137 MMOL/L (ref 134–145)
TIBC SERPL-MCNC: 351 MCG/DL (ref 249–505)
TRANSFERRIN SERPL-MCNC: 251 MG/DL (ref 200–360)
WBC NRBC COR # BLD: 6.3 10*3/MM3 (ref 3.4–10.8)

## 2022-09-20 PROCEDURE — 99213 OFFICE O/P EST LOW 20 MIN: CPT | Performed by: NURSE PRACTITIONER

## 2022-09-20 PROCEDURE — 85025 COMPLETE CBC W/AUTO DIFF WBC: CPT

## 2022-09-20 PROCEDURE — 82728 ASSAY OF FERRITIN: CPT

## 2022-09-20 PROCEDURE — 36415 COLL VENOUS BLD VENIPUNCTURE: CPT

## 2022-09-20 PROCEDURE — 83540 ASSAY OF IRON: CPT

## 2022-09-20 PROCEDURE — 84466 ASSAY OF TRANSFERRIN: CPT

## 2022-09-20 PROCEDURE — 80048 BASIC METABOLIC PNL TOTAL CA: CPT

## 2022-09-20 NOTE — PROGRESS NOTES
Subjective     REASON FOR FOLLOW-UP:    1. Chronic lymphoid leukemia, stage 4, presented initially with significant pancytopenia.  · Currently on Gazyva with venetoclax  · Cycle 1 Gazyva given on August 13, 2020    2.  History of angiodysplasia requiring cauterization and history of Hess's esophagus    3.  Bone marrow aspiration biopsy shows hypercellular marrow with 98% involvement with CLL    4. Iron deficiency anemia in the setting of GI bleeding    History of Present Illness   Patient is seen back today in follow-up, having been recently hospitalized from 7/25 to 7/31/2022 with recurrent GI bleed.  Patient is status postplacement of watchman's device on 7/6/2022, on Xarelto at the time.  When admitted his hemoglobin was 5.5.  He received 4 units of PRBCs.  Xarelto was held.  Patient underwent EGD 7/28/2022 revealing multiple nonbleeding angioectasias which were treated with argon plasma coagulation.  He was placed on a heparin drip and then transitioned back to Xarelto as cardiology felt that he could not be off this for a long with new watchman's device.    Patient then saw GI in follow-up last week, 8/18/2022.  Patient was reporting continued GI bleeding with dark stools.  Lab work performed that day showing hemoglobin having already dropped and just 2 weeks from 10.8 to 8.4, ferritin 22, iron sat 11%.  When I saw him 8/23/2022 hemoglobin did drop to 7.7.  We went on to give him 2 doses of IV Injectafer soon thereafter.    Patient did undergo capsule endoscopy which showed bleeding, leading to EGD which took place last week, 9/15/2022. He had 2 bleeding angioectasias that were treated with argon plasma.    As he is reviewed back today hemoglobin has improved nicely at 11.5.  Patient is feeling much better.  He believes he is tolerating Plavix well after switching from previous Xarelto.  No obvious further bleeding.    He denies other concerns at this time.    PAST MEDICAL HISTORY:   1. Recurrent atrial  fibrillation followed by cardiology. He has had drug eluting stent placed x 3.   2. High cholesterol.   3. Hypertension.       HEMATOLOGIC/ONCOLOGIC HISTORY:       The patient is 62-year-old gentleman with the above history currently here for followup. He has no complaints. He denies fever, night sweats or weight loss. He does not have any lymphadenopathy. He feels good. He had some fatigue but his CBC shows a go od hemoglobin.   The patient is followed by Dr. Kevin Chandra. He had seen Dr. Chandra 7/18/13 for a history of coronary artery disease status drug eluting stent placement to the right coronary artery and left anterior descending artery in February 2011. Histor y of paroxysmal atrial fibrillation and hyperlipidemia. He presented to [facility] on 6/23/13 with palpitations and was found to have atrial fibrillation with rapid ventricular response. He was given IV Cardizem and converted spontaneously to normal sinus rhythm. He was found to have elevated white count at 18.9 and denied any symptoms of infection or urinary complaints. TSH was normal, troponin levels were negative. He was asked to continue aspirin and metoprolol for that. If he contin u ed to have atrial fibrillation, they will consider antiarrhythmic therapy with or without a short term anticoagulation therapy. However, currently the patient is feeling reasonably good. He has no complaints. His CBC 7/22/13 showed WBC 17,000, hemoglob i n 16.4 and platelet count of 174,000. Back 9/28/13 his hemoglobin was 15.1, WBC 13.3 and platelets 197,000. He has had a persistently elevated WBC but he is totally asymptomatic. He does not have any fever, night sweats or lymphadenopathy. He is here to be evaluated for his elevated white count.       Peripheral blood flow cytometry done 08/16/13 shows 22% chronic lymphoid leukemia cells, and they expressed ZAP-70 but not CD38. The total number of cells approximated 60% T cells, 32% B cells and 1% natural  k iller cells. The B cells were monoclonal and expressed CD19, CD20, CD22, CD5, CD23, CD11c, ZAP-70 and T cell antigens. There was a normal CD4/CD8 ratio. CT of the chest, abdomen and pelvis does not show evidence of metastasis. Peripheral blood for DILLON -2 was negative. It was negative for T11:14 translocation.       CT scan done on 08/21/2013 shows 5 to 6 mm noncalcified nodule in the left lower lobe which is unchanged from December 2010. Otherwise no evidence of any lymphadenopathy or hepatosplenomegaly.       MRI of the spine shows arthritis and disc bulge and likely hemangiomas, with 1 lesion showing increased signal intensity at T2, which is likely a hemangioma. Bone scan could be done, but patient does not even have much pain there. CT scan of the chest, a bdomen and pelvis was done on 11/23/2014. He has tiny lymph nodes in the neck which are difficult to palpate. Right jugular one is 1.4 x 0.9 and left supraclavicular one is 1.5 x 1.1. He also has a subcarinal lymph node which is 1.7 x 1.2 cm, and he ha s a portocaval lymph node which has increased from 2.5 to 2.8. Patient is totally asymptomatic. He does not have any B symptoms, so will continue followup with observation.     Patient is a 69-year-old gentleman with history of chronic lymphocytic leukemia/SLL who recently got admitted to [facility] because of GI bleed.  He was seen by Dr. Montero.  He underwent EGD colonoscopy.  He was found to have angiodysplasia for which he underwent argon coagulation.  He required 3 units of blood.  Patient had a normal esophagus normal stomach and normal duodenum CLOtest was negative he was asked to take Protonix 40 mg daily.  Colonoscopy showed blood in the entire examined colon but there was a single bleeding colonic angiectasia which was treated with argon plasma coagulation.    He was admitted twice.  Initially he was admitted on July 10 at which time he stayed 3 days and he was evaluated for chest pain.  He  underwent a cardiac work-up with stress test and echo.  The echo was normal but the stress test showed medium sized moderate severe severity fixed perfusion defect.  Of the inferior wall consistent with infarction.  However according to patient cardiology did not think he had any cardiac problems.  I have left a message for patient's cardiologist to call me today.  Patient subsequently got readmitted with GI bleed and required 1/3 unit of transfusion.  He was found to have thrombocytopenia and hematology was consulted.    His hemoglobin had dropped down to as low as 7 and his platelets had gone down to 87.  Today it is 35 and his hemoglobin is 9.4 today.  He denies active bleeding.  He has lost weight recently.  He does not have any fever or night sweats.    He had ultrasound of spleen which showed enlarged spleen centimeters in length    Patient was started on Gazyva with Leukeran but subsequently switched to venetoclax with Gazyva.  His venetoclax dose is 100 mg daily and he receives Gazyva once a month.    CT scan done 2020 shows significant response     MEDICATIONS: The current medication list was reviewed with the patient and updated in the EMR this date per the medical assistant. Medication dosages and frequencies were confirmed to be accurate.       ALLERGIES: PENICILLIN which causes him to swell up. He is allergic to IV CONTRAST DYE.     SOCIAL HISTORY: He is . He smokes one pack per day for 35 years. He consumes three to four alcoholic drinks per week. He has no tattoos and no previous transfusions.       FAMILY HISTORY: Father  at 82 with MI. Mother  at 82 with bone cancer. He has one brother age 65 in good health and two sisters 69 and 57 in good health.   Past Medical History, Social History, and Family History are unchanged from my prior documentation on     Review of Systems   Constitutional: Negative for fatigue.  Negative for appetite change, chills,  diaphoresis, fever and unexpected weight change.   HENT: Negative for hearing loss, sore throat and trouble swallowing.    Respiratory: Negative for cough, chest tightness, shortness of breath and wheezing.    Cardiovascular: Negative for chest pain, palpitations and leg swelling.   Gastrointestinal: Negative for abdominal distention, abdominal pain, constipation, nausea and vomiting.   Genitourinary: Negative for dysuria, frequency, hematuria and urgency.   Musculoskeletal: Negative for joint swelling.        No muscle weakness.   Skin: Negative for rash and wound.   Neurological: Negative for seizures, syncope, speech difficulty, weakness, numbness and headaches.   Hematological: Negative for adenopathy. Does not bruise/bleed easily.   Psychiatric/Behavioral: Negative for behavioral problems, confusion and suicidal ideas.   All other systems reviewed and are negative.    Reviewed and updated 09/20/22      Patient Active Problem List   Diagnosis   • CLL (chronic lymphocytic leukemia) (HCC)   • Acute on chronic anemia   • Encounter for hepatitis C screening test for low risk patient    • Thrombocytopenia (HCC)   • H/O heart artery stent   • Stented coronary artery   • Arteriosclerosis of coronary artery   • Paroxysmal atrial fibrillation (HCC)   • Paroxysmal atrial fibrillation (HCC)   • Fall   • Fracture, olecranon   • Hyperlipidemia   • Long term (current) use of anticoagulants   • Lower GI bleed   • Olecranon bursitis   • Shoulder pain   • Smoker   • CLL (chronic lymphocytic leukemia) (HCC)   • Dehydration   • Drug-induced nausea and vomiting   • Encounter for long-term (current) use of other medications   • Neutropenia (HCC)   • Calculus of gallbladder without cholecystitis without obstruction   • Cholecystitis   • Iron deficiency   • Adverse effect of iron   • Melena   • Change in bowel habits   • Acute upper GI bleed   • Acute blood loss anemia   • Type 2 diabetes mellitus, without long-term current use of  insulin (HCC)   • Prolonged Q-T interval on ECG   • Chest pain with high risk for cardiac etiology   • Hyponatremia   • Acute blood loss anemia   • GI bleed   • Iron deficiency anemia due to chronic blood loss       MEDICATIONS:  Medication Reconciliation for the patient has been reviewed by me and documented in the patients chart.    ALLERGIES:    Allergies   Allergen Reactions   • Penicillins Swelling     As a child         There were no vitals filed for this visit.     Current Status 9/9/2022   ECOG score 0      Physical exam:      This patient's ACP documentation is up to date, and there's nothing further left to document.    CONSTITUTIONAL:  Vital signs reviewed.  No distress, looks comfortable.  RESPIRATORY:  Normal respiratory effort.  Lungs clear to auscultation bilaterally.  CARDIOVASCULAR:  Normal S1, S2.  No murmurs rubs or gallops.  No significant lower extremity edema.  GASTROINTESTINAL: Abdomen appears unremarkable.  Nontender.  No hepatomegaly.  No splenomegaly.  LYMPHATIC:  No cervical, supraclavicular, axillary lymphadenopathy.  SKIN:  Warm.  No rashes.  PSYCHIATRIC:  Normal judgment and insight.  Normal mood and affect..    RECENT LABS:  Results from last 7 days   Lab Units 09/20/22  1142   WBC 10*3/mm3 6.30   NEUTROS ABS 10*3/mm3 4.77   HEMOGLOBIN g/dL 11.5*   HEMATOCRIT % 36.1*   PLATELETS 10*3/mm3 130*                   Assessment & Plan   1. Stage 2 CLL without evidence of any disease progression.  I have reviewed the CT scan from 3/19/2018 there is minimal progression but clinically patient is asymptomatic and we will follow with observation.  Will monitor with labs. No evidence of any frequent infections, no major worsening of his white count. He has no fever or night sweats or any other symptoms.   · Patient knows that he is prone for infections and he is mainly staying at home during the COVID-19 situation time  · Recent admission to [facility] July 10 2020×2.  Initially admitted  with chest pain and underwent stress test and echo.  Echo was negative.  Stress test is abnormal but have left message for patient's cardiologist to call us.  His cardiologist is been sent [facility].  · He then got admitted the second time with worsening GI bleed and required total of 3 units of blood.  EGD was negative but colonoscopy showed angiectasia for which he underwent argon ablation.  Currently hemoglobin stable and no active bleeding.  · He was also found to have low platelets with platelets dropping down to 27k and hemoglobin was 7.  Ultrasound showed splenomegaly 15 cm.  Concern is whether he has progressed from his CLL point of view and requires treatment.  · CT scan performed 7/20/2020 did show the increased splenomegaly, but interval decrease in size of the axillary nodes, and shotty mediastinal nodes.  No change in the shotty nodes within the neck and supraclavicular regions.  No new lymphadenopathy.  Bone marrow biopsy however showed preliminarily that there is bone marrow involvement.  Remainder of bone marrow biopsy report is pending.  · Bone marrow shows hypercellular marrow with 100% involvement of chronic lymphocytic leukemia/small lymphocytic lymphoma and diffuse pattern with at least 98% of the total marrow cellularity.  Rare foci of erythropoiesis and rare megakaryocytes are noted.   · Negative for BCL-2 and BCL 6.  Chromosomes shows normal karyotype.  I GVH mutation present.  Positive for gain of 1 q., monosomy 13 and loss of IGH.  Negative for deletion T p53, negative for gain of chromosomes 9 and 11, negative for 11, 14 fusion.  · The combination of monosomy 13 and gain of 1 q. is thought to be associated with plasma cell myeloma.  However this patient does not have myeloma.  · Scans were reviewed with the patient today.  Dr. Moya also discussed with the patient and recommended he initiate treatment with chlorambucil and Gazyva.  He would not be a good candidate for Imbruvica due to his  "history of A. fib\".  He cannot take anticoagulation at this time.  The patient was provided with chemotherapy education today, including  Handouts.  He will need a port placed so that we can initiate treatment  · 8/13/2020: Gazyva day 1. Plan also [id number] mg p.o. on day 1 day 15.  We can increase the dose once we know he tolerates and his platelets improved.  · Patient developing pancytopenia when reviewed cycle 1 day 15.  Chlorambucil dose modified to 10 mg for day 15 however it is felt that he cannot tolerate this ongoing.   ·  Plan to switch to Venetoclax along with continuing Gazyva.    · Patient due today for cycle 2-day 1 Gazyva.  He will proceed today as scheduled.  Venetoclax will be shipped to his home with plans to begin this next week on 9/14/2020.    · Patient did receive 1 L normal saline and Neulasta on 9/28/2020 secondary to neutropenia with an ANC of 0.04.  · Patient seen on 10/02/2020 continuing on venetoclax, currently on 100 mg dosing.  He would not increase his dose as he will start on voriconazole after being on venetoclax x1 week which affects the metabolism of venetoclax.  He is currently on day 5 of the 100 mg dosing.  Patient states overall he feels the same except for increased fatigue and nausea.  His nauseousness is controlled with ondansetron however.  Patient will be given 1 L normal saline in the office today as planned.  · Patient returns on 10/12/2020 continuing on venetoclax 100 mg daily as well as voriconazole.  · Patient is doing well with these treatments except fatigue.  Next cycle 4 due as of number ninth prior to which will obtain CT scans  · November 9, 2020: White count down to 1.89 but patient started Pepcid.  We will hold off Pepcid.  · 11/17/2020 platelet count dropped to 35,000 patient was instructed to hold venetoclax.  · Returns 11/23/2020 WBC up to 6.38, ANC 5.19, hemoglobin 12.4, platelets up to 121.  I have advised him to resume venetoclax 100 mg " daily  · December 7, 2020: Platelets 95K.  White count 3.0 with absolute neutrophil count of 2.01.  Cycle 5 Gazyva given.  Continue venetoclax with voriconazole.   · January 4, 2021: Platelets 84,000, white count 3,440, absolute neutrophil count 1,980. Cycle 6 Gazyva given.  · 2/23/2021: WBC 3.0, platelets 90,000, hemoglobin 14.6, ANC 1.65.  Counts overall stable.  Continue Venetoclax 100 mg daily.  · January 4, 2021: Last dose of Gazyva.  · March 16, 2021: Patient is on venetoclax along with voriconazole and tolerating well with plans to complete total of 1 year.  · 4/27/2021: Patient continues on venetoclax 100 mg daily with voriconazole and acyclovir for prophylaxis.  He is tolerating this well.  He is scheduled for scans in 5 weeks.  · June 8, 2021: Patient is to continue venetoclax 100 mg daily with voriconazole and acyclovir prophylaxis.  He is tolerating it very well.  The plan is to continue 8 more weeks and then he will complete the protocol and will be followed with observation.  · I have reviewed the CT scan done on June 2, 2021 and there is no evidence of any lymphadenopathy and the spleen size is decreased in size from 12.5-11.3.  He has mild thrombocytopenia and leukopenia but his hemoglobin is normal.  · 9/7/2021: Patient completed 1 year worth of venetoclax with voriconazole and 6 months of Gazyva.  He has had an excellent response for his CLL.  Spleen decreased in size.  Currently asymptomatic.  Discontinue venetoclax since he completed 1 year  · March 28, 2022: Patient's hemoglobin back to normal at 13.1.  EGD showed Hess's esophagus and angiodysplasia for which she underwent argon plasma coagulation.  Also colonoscopy showed multiple polyps all of them benign and had 1 angiodysplasia for which he underwent argon coagulation by Dr. Hinds.  Currently asymptomatic  · 5/26/2022 patient without B symptoms or adenopathy.  Acute drop in counts felt to be related to infection (see further discussion  below).  Scheduled for repeat CT scans 6/14/2022.  · June 21, 2022: Patient underwent CT scan Bibiana 15, 2022 which showed multifocal pneumonia bilaterally likely secondary to COVID-19 infection in the past.  There admitted they recommended repeat CT in 6 to 8 weeks.  There is a 0.8 cm pulmonary nodule in the left lower lobe is minimally increased in size but similar to that in March 19, 2018.  Patient is improving  · WBC stable/normal at 6.3, 12% lymphs.  Platelets iron 30,000.      2. History of intermittent thrombocytopenia  · Historically platelets in the 150s.  · Count did drop when patient was treated for CLL on chlorambucil.    · Platelets also dropping on treatment with Venetoclax in the past.    · CLL in remission.  Platelet count currently low normal at 130,000.  Continue to monitor.     3.  Iron deficiency anemia in the setting of recurrent GI bleeding with underlying AVMs, complicated by anticoagulation.   · Patient intolerant to oral iron.  · 1/2022: Patient with recurrent iron deficiency as further detailed below.  Patient referred to GI for further evaluation.  Will see Dr. Hinds 2/9/2022.  · Patient hospitalized 4/10 - 4/13/2022 for acute GI bleed.  EGD showing Hess's esophagus.  Colonoscopy showing angiectasias, cauterized.  During hospitalization, Coumadin was held.    · 4/22/2022 evaluated by gastroenterology, noted to have a declining hemoglobin so they advised him to be seen by our office.  Patient's Hgb dropping to 8.8, Iron saturation 8%, TIBC 403, ferritin 25.1.  Patient receiving IV Injectafer x2, 4/29 and 5/6/2022.  · 7/25-7/31/2022 hospitalized with recurrent GI bleeding in the setting of known AVMs and being on Xarelto. Xarelto held.  Patient undergoing EGD 7/28/2022 revealing multiple nonbleeding angioectasias treated with argon plasma coagulation.  Patient placed on a heparin drip and then transitioned back to Xarelto as cardiology felt he could not be off this for a long with new  Watchman's device.  Hemoglobin at discharge 10.8.  · 8/15/2022 patient seen by GI with continued reports of GI bleeding with noted dark stools.  Hemoglobin hemoglobin having already dropped and just 2 weeks from 10.8 to 8.4, ferritin 22, iron sat 11%. Patient scheduled for GI for capsule endoscopy.  · 8/23/2022: Hgb 7.7.  Plan to transfuse patient and give additional IV iron with Injectafer x2.  Of note Xarelto was stopped and patient switched to Plavix with hopes of stopping GI bleeding.  · Patient undergoing EGD 9/15/2022 with 2 separate angioectasias treated with argon plasma.  · 9/20/2022: Hgb improved to 11.5.  Iron studies pending though presumably improved following recent Injectafer.  Patient understands to call with recurrent bleeding.    4.  History of cluster headaches for which she has to be treated with steroids in the past and has followed up with Dr. Len Péreztoday with significant headaches.  · Patient had CT of the head 8/20/2020 which was negative.  · He was started on prednisone 10 mg daily which was not helpful.  · Patient saw Dr. Bobby, neurology who gave him 2 shots of a new medication Emgality.  This is something that can be given once a month.    · Resolved.    5.  Atrial fibrillation, off anticoagulation given his thrombocytopenia.  · Patient continues on aspirin 81 mg daily if platelet count greater than 60,000.  · Patient sees [facility] Christy at [facility] who follows his INR  · Coumadin held during patient's recent hospitalization April/2022.  Patient wishes to discontinue Coumadin permanently.    · Patient status post watchman's device 7/2022.  Started on Xarelto.  · 8/22/2022 Xarelto discontinued and switched to Plavix in the setting of ongoing GI bleeding per above.    6.  Chronic mild elevation of alkaline phosphatase.  Stable.      PLAN:   · Recent GI notes reviewed.  · Patient without current GI bleeding  · Hemoglobin improved at 11.5.  Iron studies pending.  We will call patient with  any abnormal findings in this regard.  · Otherwise he will follow-up with Dr. Moya as already scheduled in December.  · Patient understands to call with recurrent bleeding.      Valarie Mclean, APRN  09/20/22      Cc: Dr. Kevin Gilmore

## 2022-10-14 ENCOUNTER — TELEPHONE (OUTPATIENT)
Dept: GASTROENTEROLOGY | Facility: CLINIC | Age: 72
End: 2022-10-14

## 2022-10-14 DIAGNOSIS — D50.0 IRON DEFICIENCY ANEMIA DUE TO CHRONIC BLOOD LOSS: Primary | ICD-10-CM

## 2022-10-14 NOTE — TELEPHONE ENCOUNTER
No additional testing needed at this time.  Would recommend monitoring CBC in around 1 month and follow-up office visit if any new or worsening symptoms.

## 2022-10-14 NOTE — TELEPHONE ENCOUNTER
Magy,  Re:  Capsule Endoscopy 09/07/2022   (In media)  Recommendation:  Plan enteroscopy with possible APC.  He had a small bowel enteroscopy 04/2022 and he just had an Upper GI endoscopy with Dr Hinds on 09/15/2022 with recommendations:  Repeat upper endoscopy prn.    Just want to make sure he doesn't need additional testing based on the capsule study result.    Please advise.   Discussed Magy's recommendations with patient's wife, Nohemi. Placed order for CBC in one month. pk

## 2022-12-12 ENCOUNTER — HOSPITAL ENCOUNTER (OUTPATIENT)
Dept: CT IMAGING | Facility: HOSPITAL | Age: 72
Discharge: HOME | End: 2022-12-12
Admitting: INTERNAL MEDICINE

## 2022-12-12 DIAGNOSIS — C91.10 CLL (CHRONIC LYMPHOCYTIC LEUKEMIA): ICD-10-CM

## 2022-12-12 LAB — CREAT BLDA-MCNC: 0.9 MG/DL (ref 0.6–1.3)

## 2022-12-12 PROCEDURE — 0 DIATRIZOATE MEGLUMINE & SODIUM PER 1 ML: Performed by: INTERNAL MEDICINE

## 2022-12-12 PROCEDURE — 82565 ASSAY OF CREATININE: CPT

## 2022-12-12 PROCEDURE — 71260 CT THORAX DX C+: CPT

## 2022-12-12 PROCEDURE — 25010000002 IOPAMIDOL 61 % SOLUTION: Performed by: INTERNAL MEDICINE

## 2022-12-12 PROCEDURE — 70491 CT SOFT TISSUE NECK W/DYE: CPT

## 2022-12-12 PROCEDURE — 74177 CT ABD & PELVIS W/CONTRAST: CPT

## 2022-12-12 RX ADMIN — DIATRIZOATE MEGLUMINE AND DIATRIZOATE SODIUM 30 ML: 660; 100 LIQUID ORAL; RECTAL at 08:30

## 2022-12-12 RX ADMIN — IOPAMIDOL 85 ML: 612 INJECTION, SOLUTION INTRAVENOUS at 10:06

## 2022-12-15 DIAGNOSIS — R19.7 DIARRHEA, UNSPECIFIED TYPE: ICD-10-CM

## 2022-12-15 DIAGNOSIS — D50.0 IRON DEFICIENCY ANEMIA DUE TO CHRONIC BLOOD LOSS: Primary | ICD-10-CM

## 2022-12-15 DIAGNOSIS — C91.10 CLL (CHRONIC LYMPHOCYTIC LEUKEMIA): ICD-10-CM

## 2022-12-15 DIAGNOSIS — R93.89 ABNORMAL FINDING ON IMAGING: ICD-10-CM

## 2022-12-15 NOTE — PROGRESS NOTES
Referral to gastroenterology, Dr. AMPARO Hinds, for evaluation re: diarrhea and abnormal CT scan showing thickening of the descending colon per in-basket message Dr. Moya

## 2022-12-20 ENCOUNTER — TELEPHONE (OUTPATIENT)
Dept: ONCOLOGY | Facility: CLINIC | Age: 72
End: 2022-12-20

## 2022-12-20 ENCOUNTER — LAB (OUTPATIENT)
Dept: ONCOLOGY | Facility: HOSPITAL | Age: 72
End: 2022-12-20

## 2022-12-20 ENCOUNTER — OFFICE VISIT (OUTPATIENT)
Dept: ONCOLOGY | Facility: CLINIC | Age: 72
End: 2022-12-20

## 2022-12-20 ENCOUNTER — PREP FOR SURGERY (OUTPATIENT)
Dept: SURGERY | Facility: SURGERY CENTER | Age: 72
End: 2022-12-20

## 2022-12-20 VITALS
WEIGHT: 163.8 LBS | SYSTOLIC BLOOD PRESSURE: 137 MMHG | RESPIRATION RATE: 16 BRPM | TEMPERATURE: 97.3 F | HEART RATE: 57 BPM | HEIGHT: 72 IN | BODY MASS INDEX: 22.19 KG/M2 | OXYGEN SATURATION: 98 % | DIASTOLIC BLOOD PRESSURE: 77 MMHG

## 2022-12-20 DIAGNOSIS — D50.0 IRON DEFICIENCY ANEMIA DUE TO CHRONIC BLOOD LOSS: ICD-10-CM

## 2022-12-20 DIAGNOSIS — C91.10 CLL (CHRONIC LYMPHOCYTIC LEUKEMIA): Primary | ICD-10-CM

## 2022-12-20 DIAGNOSIS — R93.3 ABNORMAL CT SCAN, COLON: ICD-10-CM

## 2022-12-20 DIAGNOSIS — R19.7 DIARRHEA, UNSPECIFIED TYPE: Primary | ICD-10-CM

## 2022-12-20 DIAGNOSIS — D50.0 IRON DEFICIENCY ANEMIA DUE TO CHRONIC BLOOD LOSS: Primary | ICD-10-CM

## 2022-12-20 DIAGNOSIS — C91.10 CLL (CHRONIC LYMPHOCYTIC LEUKEMIA): ICD-10-CM

## 2022-12-20 LAB
ALBUMIN SERPL-MCNC: 4 G/DL (ref 3.5–5.2)
ALBUMIN/GLOB SERPL: 2.2 G/DL (ref 1.1–2.4)
ALP SERPL-CCNC: 112 U/L (ref 38–116)
ALT SERPL W P-5'-P-CCNC: 20 U/L (ref 0–41)
ANION GAP SERPL CALCULATED.3IONS-SCNC: 10.8 MMOL/L (ref 5–15)
AST SERPL-CCNC: 21 U/L (ref 0–40)
BASOPHILS # BLD AUTO: 0.04 10*3/MM3 (ref 0–0.2)
BASOPHILS NFR BLD AUTO: 0.8 % (ref 0–1.5)
BILIRUB SERPL-MCNC: 0.5 MG/DL (ref 0.2–1.2)
BUN SERPL-MCNC: 15 MG/DL (ref 6–20)
BUN/CREAT SERPL: 18.3 (ref 7.3–30)
CALCIUM SPEC-SCNC: 9.1 MG/DL (ref 8.5–10.2)
CHLORIDE SERPL-SCNC: 105 MMOL/L (ref 98–107)
CO2 SERPL-SCNC: 24.2 MMOL/L (ref 22–29)
CREAT SERPL-MCNC: 0.82 MG/DL (ref 0.7–1.3)
DEPRECATED RDW RBC AUTO: 44.9 FL (ref 37–54)
EGFRCR SERPLBLD CKD-EPI 2021: 92.8 ML/MIN/1.73
EOSINOPHIL # BLD AUTO: 0.29 10*3/MM3 (ref 0–0.4)
EOSINOPHIL NFR BLD AUTO: 5.8 % (ref 0.3–6.2)
ERYTHROCYTE [DISTWIDTH] IN BLOOD BY AUTOMATED COUNT: 13.6 % (ref 12.3–15.4)
FERRITIN SERPL-MCNC: 22.6 NG/ML (ref 30–400)
GLOBULIN UR ELPH-MCNC: 1.8 GM/DL (ref 1.8–3.5)
GLUCOSE SERPL-MCNC: 133 MG/DL (ref 74–124)
HCT VFR BLD AUTO: 32.8 % (ref 37.5–51)
HGB BLD-MCNC: 10.6 G/DL (ref 13–17.7)
IMM GRANULOCYTES # BLD AUTO: 0.03 10*3/MM3 (ref 0–0.05)
IMM GRANULOCYTES NFR BLD AUTO: 0.6 % (ref 0–0.5)
IRON 24H UR-MRATE: 45 MCG/DL (ref 59–158)
IRON SATN MFR SERPL: 10 % (ref 14–48)
LYMPHOCYTES # BLD AUTO: 1.04 10*3/MM3 (ref 0.7–3.1)
LYMPHOCYTES NFR BLD AUTO: 20.8 % (ref 19.6–45.3)
MCH RBC QN AUTO: 29.3 PG (ref 26.6–33)
MCHC RBC AUTO-ENTMCNC: 32.3 G/DL (ref 31.5–35.7)
MCV RBC AUTO: 90.6 FL (ref 79–97)
MONOCYTES # BLD AUTO: 0.35 10*3/MM3 (ref 0.1–0.9)
MONOCYTES NFR BLD AUTO: 7 % (ref 5–12)
NEUTROPHILS NFR BLD AUTO: 3.25 10*3/MM3 (ref 1.7–7)
NEUTROPHILS NFR BLD AUTO: 65 % (ref 42.7–76)
NRBC BLD AUTO-RTO: 0 /100 WBC (ref 0–0.2)
PLATELET # BLD AUTO: 139 10*3/MM3 (ref 140–450)
PMV BLD AUTO: 9.1 FL (ref 6–12)
POTASSIUM SERPL-SCNC: 4.1 MMOL/L (ref 3.5–4.7)
PROT SERPL-MCNC: 5.8 G/DL (ref 6.3–8)
RBC # BLD AUTO: 3.62 10*6/MM3 (ref 4.14–5.8)
SODIUM SERPL-SCNC: 140 MMOL/L (ref 134–145)
TIBC SERPL-MCNC: 440 MCG/DL (ref 249–505)
TRANSFERRIN SERPL-MCNC: 314 MG/DL (ref 200–360)
WBC NRBC COR # BLD: 5 10*3/MM3 (ref 3.4–10.8)

## 2022-12-20 PROCEDURE — 83540 ASSAY OF IRON: CPT

## 2022-12-20 PROCEDURE — 25010000002 HEPARIN LOCK FLUSH PER 10 UNITS: Performed by: INTERNAL MEDICINE

## 2022-12-20 PROCEDURE — 82728 ASSAY OF FERRITIN: CPT

## 2022-12-20 PROCEDURE — 80053 COMPREHEN METABOLIC PANEL: CPT

## 2022-12-20 PROCEDURE — 99214 OFFICE O/P EST MOD 30 MIN: CPT | Performed by: INTERNAL MEDICINE

## 2022-12-20 PROCEDURE — 84466 ASSAY OF TRANSFERRIN: CPT

## 2022-12-20 PROCEDURE — 36591 DRAW BLOOD OFF VENOUS DEVICE: CPT

## 2022-12-20 PROCEDURE — 85025 COMPLETE CBC W/AUTO DIFF WBC: CPT

## 2022-12-20 RX ORDER — ATORVASTATIN CALCIUM 20 MG/1
20 TABLET, FILM COATED ORAL DAILY
COMMUNITY
Start: 2022-12-09 | End: 2023-02-07

## 2022-12-20 RX ORDER — HEPARIN SODIUM (PORCINE) LOCK FLUSH IV SOLN 100 UNIT/ML 100 UNIT/ML
500 SOLUTION INTRAVENOUS AS NEEDED
Status: DISCONTINUED | OUTPATIENT
Start: 2022-12-20 | End: 2022-12-20 | Stop reason: HOSPADM

## 2022-12-20 RX ORDER — PREDNISONE 1 MG/1
TABLET ORAL
COMMUNITY
Start: 2022-10-11 | End: 2023-01-06

## 2022-12-20 RX ORDER — SODIUM CHLORIDE, SODIUM LACTATE, POTASSIUM CHLORIDE, CALCIUM CHLORIDE 600; 310; 30; 20 MG/100ML; MG/100ML; MG/100ML; MG/100ML
30 INJECTION, SOLUTION INTRAVENOUS CONTINUOUS PRN
Status: CANCELLED | OUTPATIENT
Start: 2022-12-20

## 2022-12-20 RX ORDER — SODIUM CHLORIDE 0.9 % (FLUSH) 0.9 %
3 SYRINGE (ML) INJECTION EVERY 12 HOURS SCHEDULED
Status: CANCELLED | OUTPATIENT
Start: 2022-12-20

## 2022-12-20 RX ORDER — SODIUM CHLORIDE 0.9 % (FLUSH) 0.9 %
10 SYRINGE (ML) INJECTION AS NEEDED
Status: DISCONTINUED | OUTPATIENT
Start: 2022-12-20 | End: 2022-12-20 | Stop reason: HOSPADM

## 2022-12-20 RX ORDER — SODIUM CHLORIDE 0.9 % (FLUSH) 0.9 %
10 SYRINGE (ML) INJECTION AS NEEDED
Status: CANCELLED | OUTPATIENT
Start: 2022-12-20

## 2022-12-20 RX ADMIN — Medication 500 UNITS: at 10:29

## 2022-12-20 RX ADMIN — Medication 10 ML: at 10:29

## 2022-12-20 NOTE — TELEPHONE ENCOUNTER
Call to Mr. Stephenson, spoke with his wife, to let them know this RN has spoken with GI office, and appointment given for 1/4/23 at 9:15 AM. Mrs. Stephenson verbalized understanding and thanks.

## 2022-12-20 NOTE — PROGRESS NOTES
Subjective     REASON FOR FOLLOW-UP:    1. Chronic lymphoid leukemia, stage 4, presented initially with significant pancytopenia.  · Currently on Gazyva with venetoclax  · Cycle 1 Gazyva given on August 13, 2020    2.  History of angiodysplasia requiring cauterization and history of Hess's esophagus    3.  Bone marrow aspiration biopsy shows hypercellular marrow with 98% involvement with CLL    4. Iron deficiency anemia in the setting of GI bleeding    History of Present Illness   Patient is seen back today in follow-up, having been recently hospitalized from 7/25 to 7/31/2022 with recurrent GI bleed.  Patient is status postplacement of watchman's device on 7/6/2022, on Xarelto at the time.  When admitted his hemoglobin was 5.5.  He received 4 units of PRBCs.  Xarelto was held.  Patient underwent EGD 7/28/2022 revealing multiple nonbleeding angioectasias which were treated with argon plasma coagulation.  He was placed on a heparin drip and then transitioned back to Xarelto as cardiology felt that he could not be off this for a long with new watchman's device.    Patient then saw GI in follow-up last week, 8/18/2022.  Patient was reporting continued GI bleeding with dark stools.  Lab work performed that day showing hemoglobin having already dropped and just 2 weeks from 10.8 to 8.4, ferritin 22, iron sat 11%.  When I saw him 8/23/2022 hemoglobin did drop to 7.7.  We went on to give him 2 doses of IV Injectafer soon thereafter.    Patient did undergo capsule endoscopy which showed bleeding, leading to EGD which took place last week, 9/15/2022. He had 2 bleeding angioectasias that were treated with argon plasma.    As he is reviewed back today hemoglobin has improved nicely at 11.5.  Patient is feeling much better.  He believes he is tolerating Plavix well after switching from previous Xarelto.  No obvious further bleeding.    Interval history: Patient's hemoglobin is down to 10.5.  He denies active bleeding.  But  his previous scope had shown 2 angiodysplasias in the duodenum which had to be cauterized.  He also had a CT scan and I have reviewed the results which shows thickening of the descending colon and sigmoid colon.  He requires colonoscopy.  We had referred him to Dr. Santa but he has not heard from Dr. Gonzalez yet.  We will try to make the referral again as I am concerned he will require a repeat colonoscopy      PAST MEDICAL HISTORY:   1. Recurrent atrial fibrillation followed by cardiology. He has had drug eluting stent placed x 3.   2. High cholesterol.   3. Hypertension.       HEMATOLOGIC/ONCOLOGIC HISTORY:       The patient is 62-year-old gentleman with the above history currently here for followup. He has no complaints. He denies fever, night sweats or weight loss. He does not have any lymphadenopathy. He feels good. He had some fatigue but his CBC shows a go od hemoglobin.   The patient is followed by Dr. Kevin Chandra. He had seen Dr. Chandra 7/18/13 for a history of coronary artery disease status drug eluting stent placement to the right coronary artery and left anterior descending artery in February 2011. Histor y of paroxysmal atrial fibrillation and hyperlipidemia. He presented to [facility] on 6/23/13 with palpitations and was found to have atrial fibrillation with rapid ventricular response. He was given IV Cardizem and converted spontaneously to normal sinus rhythm. He was found to have elevated white count at 18.9 and denied any symptoms of infection or urinary complaints. TSH was normal, troponin levels were negative. He was asked to continue aspirin and metoprolol for that. If he contin u ed to have atrial fibrillation, they will consider antiarrhythmic therapy with or without a short term anticoagulation therapy. However, currently the patient is feeling reasonably good. He has no complaints. His CBC 7/22/13 showed WBC 17,000, hemoglob i n 16.4 and platelet count of 174,000. Back 9/28/13  his hemoglobin was 15.1, WBC 13.3 and platelets 197,000. He has had a persistently elevated WBC but he is totally asymptomatic. He does not have any fever, night sweats or lymphadenopathy. He is here to be evaluated for his elevated white count.       Peripheral blood flow cytometry done 08/16/13 shows 22% chronic lymphoid leukemia cells, and they expressed ZAP-70 but not CD38. The total number of cells approximated 60% T cells, 32% B cells and 1% natural k iller cells. The B cells were monoclonal and expressed CD19, CD20, CD22, CD5, CD23, CD11c, ZAP-70 and T cell antigens. There was a normal CD4/CD8 ratio. CT of the chest, abdomen and pelvis does not show evidence of metastasis. Peripheral blood for DILLON -2 was negative. It was negative for T11:14 translocation.       CT scan done on 08/21/2013 shows 5 to 6 mm noncalcified nodule in the left lower lobe which is unchanged from December 2010. Otherwise no evidence of any lymphadenopathy or hepatosplenomegaly.       MRI of the spine shows arthritis and disc bulge and likely hemangiomas, with 1 lesion showing increased signal intensity at T2, which is likely a hemangioma. Bone scan could be done, but patient does not even have much pain there. CT scan of the chest, a bdomen and pelvis was done on 11/23/2014. He has tiny lymph nodes in the neck which are difficult to palpate. Right jugular one is 1.4 x 0.9 and left supraclavicular one is 1.5 x 1.1. He also has a subcarinal lymph node which is 1.7 x 1.2 cm, and he ha s a portocaval lymph node which has increased from 2.5 to 2.8. Patient is totally asymptomatic. He does not have any B symptoms, so will continue followup with observation.     Patient is a 69-year-old gentleman with history of chronic lymphocytic leukemia/SLL who recently got admitted to [facility] because of GI bleed.  He was seen by Dr. Montero.  He underwent EGD colonoscopy.  He was found to have angiodysplasia for which he underwent argon  coagulation.  He required 3 units of blood.  Patient had a normal esophagus normal stomach and normal duodenum CLOtest was negative he was asked to take Protonix 40 mg daily.  Colonoscopy showed blood in the entire examined colon but there was a single bleeding colonic angiectasia which was treated with argon plasma coagulation.    He was admitted twice.  Initially he was admitted on July 10 at which time he stayed 3 days and he was evaluated for chest pain.  He underwent a cardiac work-up with stress test and echo.  The echo was normal but the stress test showed medium sized moderate severe severity fixed perfusion defect.  Of the inferior wall consistent with infarction.  However according to patient cardiology did not think he had any cardiac problems.  I have left a message for patient's cardiologist to call me today.  Patient subsequently got readmitted with GI bleed and required 1/3 unit of transfusion.  He was found to have thrombocytopenia and hematology was consulted.    His hemoglobin had dropped down to as low as 7 and his platelets had gone down to 87.  Today it is 35 and his hemoglobin is 9.4 today.  He denies active bleeding.  He has lost weight recently.  He does not have any fever or night sweats.    He had ultrasound of spleen which showed enlarged spleen centimeters in length    Patient was started on Gazyva with Leukeran but subsequently switched to venetoclax with Gazyva.  His venetoclax dose is 100 mg daily and he receives Gazyva once a month.    CT scan done November 4, 2020 shows significant response     MEDICATIONS: The current medication list was reviewed with the patient and updated in the EMR this date per the medical assistant. Medication dosages and frequencies were confirmed to be accurate.       ALLERGIES: PENICILLIN which causes him to swell up. He is allergic to IV CONTRAST DYE.     SOCIAL HISTORY: He is . He smokes one pack per day for 35 years. He consumes  three to four alcoholic drinks per week. He has no tattoos and no previous transfusions.       FAMILY HISTORY: Father  at 82 with MI. Mother  at 82 with bone cancer. He has one brother age 65 in good health and two sisters 69 and 57 in good health.   Past Medical History, Social History, and Family History are unchanged from my prior documentation on     Review of Systems   Constitutional: Negative for fatigue.  Negative for appetite change, chills, diaphoresis, fever and unexpected weight change.   HENT: Negative for hearing loss, sore throat and trouble swallowing.    Respiratory: Negative for cough, chest tightness, shortness of breath and wheezing.    Cardiovascular: Negative for chest pain, palpitations and leg swelling.   Gastrointestinal: Negative for abdominal distention, abdominal pain, constipation, nausea and vomiting.   Genitourinary: Negative for dysuria, frequency, hematuria and urgency.   Musculoskeletal: Negative for joint swelling.        No muscle weakness.   Skin: Negative for rash and wound.   Neurological: Negative for seizures, syncope, speech difficulty, weakness, numbness and headaches.   Hematological: Negative for adenopathy. Does not bruise/bleed easily.   Psychiatric/Behavioral: Negative for behavioral problems, confusion and suicidal ideas.   All other systems reviewed and are negative.    I have reviewed and confirmed the accuracy of the ROS as documented by the MA/LPN/RN Susannah Moya MD        Patient Active Problem List   Diagnosis   • CLL (chronic lymphocytic leukemia) (HCC)   • Acute on chronic anemia   • Encounter for hepatitis C screening test for low risk patient    • Thrombocytopenia (HCC)   • H/O heart artery stent   • Stented coronary artery   • Arteriosclerosis of coronary artery   • Paroxysmal atrial fibrillation (HCC)   • Paroxysmal atrial fibrillation (HCC)   • Fall   • Fracture, olecranon   • Hyperlipidemia   • Long term (current) use of anticoagulants   •  Lower GI bleed   • Olecranon bursitis   • Shoulder pain   • Smoker   • CLL (chronic lymphocytic leukemia) (HCC)   • Dehydration   • Drug-induced nausea and vomiting   • Encounter for long-term (current) use of other medications   • Neutropenia (HCC)   • Calculus of gallbladder without cholecystitis without obstruction   • Cholecystitis   • Iron deficiency   • Adverse effect of iron   • Melena   • Change in bowel habits   • Acute upper GI bleed   • Acute blood loss anemia   • Type 2 diabetes mellitus, without long-term current use of insulin (HCC)   • Prolonged Q-T interval on ECG   • Chest pain with high risk for cardiac etiology   • Hyponatremia   • Acute blood loss anemia   • GI bleed   • Iron deficiency anemia due to chronic blood loss       MEDICATIONS:  Medication Reconciliation for the patient has been reviewed by me and documented in the patients chart.    ALLERGIES:    Allergies   Allergen Reactions   • Penicillins Swelling     As a child         Vitals:    12/20/22 1051   BP: 137/77   Pulse: 57   Resp: 16   Temp: 97.3 °F (36.3 °C)   SpO2: 98%        Current Status 12/20/2022   ECOG score 0      Physical exam:      This patient's ACP documentation is up to date, and there's nothing further left to document.    CONSTITUTIONAL:  Vital signs reviewed.  No distress, looks comfortable.  RESPIRATORY:  Normal respiratory effort.  Lungs clear to auscultation bilaterally.  CARDIOVASCULAR:  Normal S1, S2.  No murmurs rubs or gallops.  No significant lower extremity edema.  GASTROINTESTINAL: Abdomen appears unremarkable.  Nontender.  No hepatomegaly.  No splenomegaly.  LYMPHATIC:  No cervical, supraclavicular, axillary lymphadenopathy.  SKIN:  Warm.  No rashes.  PSYCHIATRIC:  Normal judgment and insight.  Normal mood and affect..  I have reexamined the patient and the results are consistent with the previously documented exam. Susannah Moya MD     RECENT LABS:  Results from last 7 days   Lab Units 12/20/22  2660    WBC 10*3/mm3 5.00   NEUTROS ABS 10*3/mm3 3.25   HEMOGLOBIN g/dL 10.6*   HEMATOCRIT % 32.8*   PLATELETS 10*3/mm3 139*                   Assessment & Plan   1. Stage 2 CLL without evidence of any disease progression.  I have reviewed the CT scan from 3/19/2018 there is minimal progression but clinically patient is asymptomatic and we will follow with observation.  Will monitor with labs. No evidence of any frequent infections, no major worsening of his white count. He has no fever or night sweats or any other symptoms.   · Patient knows that he is prone for infections and he is mainly staying at home during the COVID-19 situation time  · [facility] 2020[facility].  Initially admitted with chest pain and underwent stress test and echo.  Echo was negative.  Stress test is abnormal but have left message for patient's cardiologist to call us.  His cardiologist is been sent [facility].  · He then got admitted the second time with worsening GI bleed and required total of 3 units of blood.  EGD was negative but colonoscopy showed angiectasia for which he underwent argon ablation.  Currently hemoglobin stable and no active bleeding.  · He was also found to have low platelets with platelets dropping down to 27k and hemoglobin was 7.  Ultrasound showed splenomegaly 15 cm.  Concern is whether he has progressed from his CLL point of view and requires treatment.  · CT scan performed 7/20/2020 did show the increased splenomegaly, but interval decrease in size of the axillary nodes, and shotty mediastinal nodes.  No change in the shotty nodes within the neck and supraclavicular regions.  No new lymphadenopathy.  Bone marrow biopsy however showed preliminarily that there is bone marrow involvement.  Remainder of bone marrow biopsy report is pending.  · Bone marrow shows hypercellular marrow with 100% involvement of chronic lymphocytic leukemia/small lymphocytic lymphoma and diffuse pattern with at least 98%  "of the total marrow cellularity.  Rare foci of erythropoiesis and rare megakaryocytes are noted.   · Negative for BCL-2 and BCL 6.  Chromosomes shows normal karyotype.  I GVH mutation present.  Positive for gain of 1 q., monosomy 13 and loss of IGH.  Negative for deletion T p53, negative for gain of chromosomes 9 and 11, negative for 11, 14 fusion.  · The combination of monosomy 13 and gain of 1 q. is thought to be associated with plasma cell myeloma.  However this patient does not have myeloma.  · Scans were reviewed with the patient today.  Dr. Moya also discussed with the patient and recommended he initiate treatment with chlorambucil and Gazyva.  He would not be a good candidate for Imbruvica due to his history of A. fib\".  He cannot take anticoagulation at this time.  The patient was provided with chemotherapy education today, including  Handouts.  He will need a port placed so that we can initiate treatment  · 8/13/2020: Gazyva day 1. Plan also [id number] mg p.o. on day 1 day 15.  We can increase the dose once we know he tolerates and his platelets improved.  · Patient developing pancytopenia when reviewed cycle 1 day 15.  Chlorambucil dose modified to 10 mg for day 15 however it is felt that he cannot tolerate this ongoing.   ·  Plan to switch to Venetoclax along with continuing Gazyva.    · Patient due today for cycle 2-day 1 Gazyva.  He will proceed today as scheduled.  Venetoclax will be shipped to his home with plans to begin this next week on 9/14/2020.    · Patient did receive 1 L normal saline and Neulasta on 9/28/2020 secondary to neutropenia with an ANC of 0.04.  · Patient seen on 10/02/2020 continuing on venetoclax, currently on 100 mg dosing.  He would not increase his dose as he will start on voriconazole after being on venetoclax x1 week which affects the metabolism of venetoclax.  He is currently on day 5 of the 100 mg dosing.  Patient states overall he feels the same except for " increased fatigue and nausea.  His nauseousness is controlled with ondansetron however.  Patient will be given 1 L normal saline in the office today as planned.  · Patient returns on 10/12/2020 continuing on venetoclax 100 mg daily as well as voriconazole.  · Patient is doing well with these treatments except fatigue.  Next cycle 4 due as of number ninth prior to which will obtain CT scans  · November 9, 2020: White count down to 1.89 but patient started Pepcid.  We will hold off Pepcid.  · 11/17/2020 platelet count dropped to 35,000 patient was instructed to hold venetoclax.  · Returns 11/23/2020 WBC up to 6.38, ANC 5.19, hemoglobin 12.4, platelets up to 121.  I have advised him to resume venetoclax 100 mg daily  · December 7, 2020: Platelets 95K.  White count 3.0 with absolute neutrophil count of 2.01.  Cycle 5 Gazyva given.  Continue venetoclax with voriconazole.   · January 4, 2021: Platelets 84,000, white count 3,440, absolute neutrophil count 1,980. Cycle 6 Gazyva given.  · 2/23/2021: WBC 3.0, platelets 90,000, hemoglobin 14.6, ANC 1.65.  Counts overall stable.  Continue Venetoclax 100 mg daily.  · January 4, 2021: Last dose of Gazyva.  · March 16, 2021: Patient is on venetoclax along with voriconazole and tolerating well with plans to complete total of 1 year.  · 4/27/2021: Patient continues on venetoclax 100 mg daily with voriconazole and acyclovir for prophylaxis.  He is tolerating this well.  He is scheduled for scans in 5 weeks.  · June 8, 2021: Patient is to continue venetoclax 100 mg daily with voriconazole and acyclovir prophylaxis.  He is tolerating it very well.  The plan is to continue 8 more weeks and then he will complete the protocol and will be followed with observation.  · I have reviewed the CT scan done on June 2, 2021 and there is no evidence of any lymphadenopathy and the spleen size is decreased in size from 12.5-11.3.  He has mild thrombocytopenia and leukopenia but his hemoglobin is  normal.  · 9/7/2021: Patient completed 1 year worth of venetoclax with voriconazole and 6 months of Gazyva.  He has had an excellent response for his CLL.  Spleen decreased in size.  Currently asymptomatic.  Discontinue venetoclax since he completed 1 year  · March 28, 2022: Patient's hemoglobin back to normal at 13.1.  EGD showed Hess's esophagus and angiodysplasia for which she underwent argon plasma coagulation.  Also colonoscopy showed multiple polyps all of them benign and had 1 angiodysplasia for which he underwent argon coagulation by Dr. Hinds.  Currently asymptomatic  · 5/26/2022 patient without B symptoms or adenopathy.  Acute drop in counts felt to be related to infection (see further discussion below).  Scheduled for repeat CT scans 6/14/2022.  · June 21, 2022: Patient underwent CT scan Bibiana 15, 2022 which showed multifocal pneumonia bilaterally likely secondary to COVID-19 infection in the past.  There admitted they recommended repeat CT in 6 to 8 weeks.  There is a 0.8 cm pulmonary nodule in the left lower lobe is minimally increased in size but similar to that in March 19, 2018.  Patient is improving  · WBC stable/normal at 6.3, 12% lymphs.  Platelets iron 30,000.   · December 28, 2022: Reviewed CT scan which shows thickening of the descending colon and sigmoid colon and patient is anemic.  We will check iron studies.  Refer to Dr. Hinds for colonoscopy.    2. History of intermittent thrombocytopenia  · Historically platelets in the 150s.  · Count did drop when patient was treated for CLL on chlorambucil.    · Platelets also dropping on treatment with Venetoclax in the past.    · CLL in remission.  Platelet count currently low normal at 130,000.  Continue to monitor  · Platelets are stable at 139.     3.  Iron deficiency anemia in the setting of recurrent GI bleeding with underlying AVMs, complicated by anticoagulation.   · Patient intolerant to oral iron.  · 1/2022: Patient with recurrent iron  deficiency as further detailed below.  Patient referred to GI for further evaluation.  Will see Dr. Hinds 2/9/2022.  · Patient hospitalized 4/10 - 4/13/2022 for acute GI bleed.  EGD showing Hess's esophagus.  Colonoscopy showing angiectasias, cauterized.  During hospitalization, Coumadin was held.    · 4/22/2022 evaluated by gastroenterology, noted to have a declining hemoglobin so they advised him to be seen by our office.  Patient's Hgb dropping to 8.8, Iron saturation 8%, TIBC 403, ferritin 25.1.  Patient receiving IV Injectafer x2, 4/29 and 5/6/2022.  · 7/25-7/31/2022 hospitalized with recurrent GI bleeding in the setting of known AVMs and being on Xarelto. Xarelto held.  Patient undergoing EGD 7/28/2022 revealing multiple nonbleeding angioectasias treated with argon plasma coagulation.  Patient placed on a heparin drip and then transitioned back to Xarelto as cardiology felt he could not be off this for a long with new Watchman's device.  Hemoglobin at discharge 10.8.  · 8/15/2022 patient seen by GI with continued reports of GI bleeding with noted dark stools.  Hemoglobin hemoglobin having already dropped and just 2 weeks from 10.8 to 8.4, ferritin 22, iron sat 11%. Patient scheduled for GI for capsule endoscopy.  · 8/23/2022: Hgb 7.7.  Plan to transfuse patient and give additional IV iron with Injectafer x2.  Of note Xarelto was stopped and patient switched to Plavix with hopes of stopping GI bleeding.  · Patient undergoing EGD 9/15/2022 with 2 separate angioectasias treated with argon plasma.  · 9/20/2022: Hgb improved to 11.5.  Iron studies pending though presumably improved following recent Injectafer.  Patient understands to call with recurrent bleeding.  · December 20, 2022 my concern is that his hemoglobin dropped to 10.5 we will check iron studies again    4.  History of cluster headaches for which she has to be treated with steroids in the past and has followed up with Dr. Len Walker.today with  significant headaches.  · Patient had CT of the head 8/20/2020 which was negative.  · He was started on prednisone 10 mg daily which was not helpful.  · Patient saw Dr. Bobby, neurology who gave him 2 shots of a new medication Emgality.  This is something that can be given once a month.    · Resolved.    5.  Atrial fibrillation, off anticoagulation given his thrombocytopenia.  · Patient continues on aspirin 81 mg daily if platelet count greater than 60,000.  · Patient sees [facility]y at [facility] who follows his INR  · Coumadin held during patient's recent hospitalization April/2022.  Patient wishes to discontinue Coumadin permanently.    · Patient status post watchman's device 7/2022.  Started on Xarelto.  · 8/22/2022 Xarelto discontinued and switched to Plavix in the setting of ongoing GI bleeding per above.    6.  Chronic mild elevation of alkaline phosphatase.  Stable.      PLAN:   · Reviewed the results of CT scan and patient has thickening of the descending colon and sigmoid colon  · Hemoglobin dropped and I am concerned he is iron deficient we will check iron studies  · He needs to be referred to Dr. Hinds for colonoscopy as he has had frequent angiectasia's requiring cauterization in the past  · Follow-up with me in 3 months with labs    I spent 30 total minutes, face-to-face, caring for Macho today.  Greater than 50% of this time involved counseling and/or coordination of care as documented within this note.    Susannah Moya MD  12/20/22      Cc: Dr. Kevin Gilmore

## 2022-12-21 ENCOUNTER — TELEPHONE (OUTPATIENT)
Dept: ONCOLOGY | Facility: CLINIC | Age: 72
End: 2022-12-21

## 2022-12-21 RX ORDER — FERROUS SULFATE 325(65) MG
325 TABLET ORAL
Qty: 90 TABLET | Refills: 3 | Status: SHIPPED | OUTPATIENT
Start: 2022-12-21

## 2022-12-21 NOTE — TELEPHONE ENCOUNTER
Called to Mr. Stephenson and left message that his iron levels are low again, and Dr. Moya is prescribing Ferrous Sulfate 325 mg to be taken three times daily. Let him know prescription will be sent to pharmacy, [facility] in [facility].  Requested that Mr. Stephenson call back to direct number to let RN know he has received the message.

## 2022-12-22 ENCOUNTER — PREP FOR SURGERY (OUTPATIENT)
Dept: SURGERY | Facility: SURGERY CENTER | Age: 72
End: 2022-12-22

## 2022-12-22 DIAGNOSIS — D50.0 IRON DEFICIENCY ANEMIA DUE TO CHRONIC BLOOD LOSS: Primary | ICD-10-CM

## 2022-12-22 DIAGNOSIS — R93.3 ABNORMAL CT SCAN, COLON: ICD-10-CM

## 2022-12-22 DIAGNOSIS — R19.7 DIARRHEA, UNSPECIFIED TYPE: ICD-10-CM

## 2022-12-22 RX ORDER — SODIUM CHLORIDE, SODIUM LACTATE, POTASSIUM CHLORIDE, CALCIUM CHLORIDE 600; 310; 30; 20 MG/100ML; MG/100ML; MG/100ML; MG/100ML
30 INJECTION, SOLUTION INTRAVENOUS CONTINUOUS PRN
Status: CANCELLED | OUTPATIENT
Start: 2022-12-22

## 2022-12-22 RX ORDER — SODIUM CHLORIDE 0.9 % (FLUSH) 0.9 %
3 SYRINGE (ML) INJECTION EVERY 12 HOURS SCHEDULED
Status: CANCELLED | OUTPATIENT
Start: 2022-12-22

## 2022-12-22 RX ORDER — SODIUM CHLORIDE 0.9 % (FLUSH) 0.9 %
10 SYRINGE (ML) INJECTION AS NEEDED
Status: CANCELLED | OUTPATIENT
Start: 2022-12-22

## 2022-12-28 PROBLEM — R19.7 DIARRHEA: Status: ACTIVE | Noted: 2022-12-28

## 2022-12-28 PROBLEM — R93.3 ABNORMAL CT SCAN, COLON: Status: ACTIVE | Noted: 2022-12-28

## 2023-01-10 ENCOUNTER — HOSPITAL ENCOUNTER (OUTPATIENT)
Facility: SURGERY CENTER | Age: 73
Setting detail: HOSPITAL OUTPATIENT SURGERY
Discharge: HOME | End: 2023-01-10
Attending: INTERNAL MEDICINE | Admitting: INTERNAL MEDICINE
Payer: MEDICARE

## 2023-01-10 ENCOUNTER — ANESTHESIA (OUTPATIENT)
Dept: SURGERY | Facility: SURGERY CENTER | Age: 73
End: 2023-01-10
Payer: MEDICARE

## 2023-01-10 ENCOUNTER — ANESTHESIA EVENT (OUTPATIENT)
Dept: SURGERY | Facility: SURGERY CENTER | Age: 73
End: 2023-01-10
Payer: MEDICARE

## 2023-01-10 VITALS
HEART RATE: 60 BPM | DIASTOLIC BLOOD PRESSURE: 75 MMHG | TEMPERATURE: 97.7 F | BODY MASS INDEX: 20.86 KG/M2 | OXYGEN SATURATION: 96 % | HEIGHT: 73 IN | RESPIRATION RATE: 16 BRPM | WEIGHT: 157.4 LBS | SYSTOLIC BLOOD PRESSURE: 165 MMHG

## 2023-01-10 DIAGNOSIS — D50.0 IRON DEFICIENCY ANEMIA DUE TO CHRONIC BLOOD LOSS: ICD-10-CM

## 2023-01-10 DIAGNOSIS — R10.9 ABDOMINAL CRAMPING: ICD-10-CM

## 2023-01-10 DIAGNOSIS — R19.7 DIARRHEA, UNSPECIFIED TYPE: ICD-10-CM

## 2023-01-10 DIAGNOSIS — R19.7 DIARRHEA, UNSPECIFIED TYPE: Primary | ICD-10-CM

## 2023-01-10 DIAGNOSIS — R93.3 ABNORMAL CT SCAN, COLON: ICD-10-CM

## 2023-01-10 LAB — GLUCOSE BLDC GLUCOMTR-MCNC: 140 MG/DL (ref 70–130)

## 2023-01-10 PROCEDURE — 45331 SIGMOIDOSCOPY AND BIOPSY: CPT | Performed by: INTERNAL MEDICINE

## 2023-01-10 PROCEDURE — 88305 TISSUE EXAM BY PATHOLOGIST: CPT | Performed by: INTERNAL MEDICINE

## 2023-01-10 PROCEDURE — 25010000002 PROPOFOL 10 MG/ML EMULSION: Performed by: ANESTHESIOLOGY

## 2023-01-10 PROCEDURE — 0 LIDOCAINE 1 % SOLUTION: Performed by: ANESTHESIOLOGY

## 2023-01-10 RX ORDER — MAGNESIUM HYDROXIDE 1200 MG/15ML
LIQUID ORAL AS NEEDED
Status: DISCONTINUED | OUTPATIENT
Start: 2023-01-10 | End: 2023-01-10 | Stop reason: HOSPADM

## 2023-01-10 RX ORDER — SODIUM CHLORIDE 0.9 % (FLUSH) 0.9 %
10 SYRINGE (ML) INJECTION AS NEEDED
Status: DISCONTINUED | OUTPATIENT
Start: 2023-01-10 | End: 2023-01-10 | Stop reason: HOSPADM

## 2023-01-10 RX ORDER — SODIUM CHLORIDE 0.9 % (FLUSH) 0.9 %
3 SYRINGE (ML) INJECTION EVERY 12 HOURS SCHEDULED
Status: DISCONTINUED | OUTPATIENT
Start: 2023-01-10 | End: 2023-01-10 | Stop reason: HOSPADM

## 2023-01-10 RX ORDER — SODIUM CHLORIDE, SODIUM LACTATE, POTASSIUM CHLORIDE, CALCIUM CHLORIDE 600; 310; 30; 20 MG/100ML; MG/100ML; MG/100ML; MG/100ML
30 INJECTION, SOLUTION INTRAVENOUS CONTINUOUS PRN
Status: DISCONTINUED | OUTPATIENT
Start: 2023-01-10 | End: 2023-01-10 | Stop reason: HOSPADM

## 2023-01-10 RX ORDER — PROPOFOL 10 MG/ML
VIAL (ML) INTRAVENOUS AS NEEDED
Status: DISCONTINUED | OUTPATIENT
Start: 2023-01-10 | End: 2023-01-10 | Stop reason: SURG

## 2023-01-10 RX ORDER — DICYCLOMINE HYDROCHLORIDE 10 MG/1
10 CAPSULE ORAL 3 TIMES DAILY PRN
Qty: 90 CAPSULE | Refills: 1 | Status: SHIPPED | OUTPATIENT
Start: 2023-01-10

## 2023-01-10 RX ORDER — LIDOCAINE HYDROCHLORIDE 10 MG/ML
INJECTION, SOLUTION INFILTRATION; PERINEURAL AS NEEDED
Status: DISCONTINUED | OUTPATIENT
Start: 2023-01-10 | End: 2023-01-10 | Stop reason: SURG

## 2023-01-10 RX ADMIN — PROPOFOL 100 MG: 10 INJECTION, EMULSION INTRAVENOUS at 07:32

## 2023-01-10 RX ADMIN — LIDOCAINE HYDROCHLORIDE 30 MG: 10 INJECTION, SOLUTION INFILTRATION; PERINEURAL at 07:32

## 2023-01-10 RX ADMIN — SODIUM CHLORIDE, POTASSIUM CHLORIDE, SODIUM LACTATE AND CALCIUM CHLORIDE 30 ML/HR: 600; 310; 30; 20 INJECTION, SOLUTION INTRAVENOUS at 06:47

## 2023-01-10 RX ADMIN — PROPOFOL 100 MCG/KG/MIN: 10 INJECTION, EMULSION INTRAVENOUS at 07:32

## 2023-01-10 NOTE — H&P
No chief complaint on file.      HPI  Patient today for a flexible sigmoidoscopy due to abnormal CT with colitis.         Problem List:    Patient Active Problem List   Diagnosis   • CLL (chronic lymphocytic leukemia) (HCC)   • Acute on chronic anemia   • Encounter for hepatitis C screening test for low risk patient    • Thrombocytopenia (HCC)   • H/O heart artery stent   • Stented coronary artery   • Arteriosclerosis of coronary artery   • Paroxysmal atrial fibrillation (HCC)   • Paroxysmal atrial fibrillation (HCC)   • Fall   • Fracture, olecranon   • Hyperlipidemia   • Long term (current) use of anticoagulants   • Lower GI bleed   • Olecranon bursitis   • Shoulder pain   • Smoker   • CLL (chronic lymphocytic leukemia) (HCC)   • Dehydration   • Drug-induced nausea and vomiting   • Encounter for long-term (current) use of other medications   • Neutropenia (HCC)   • Calculus of gallbladder without cholecystitis without obstruction   • Cholecystitis   • Iron deficiency   • Adverse effect of iron   • Melena   • Change in bowel habits   • Acute upper GI bleed   • Acute blood loss anemia   • Type 2 diabetes mellitus, without long-term current use of insulin (HCC)   • Prolonged Q-T interval on ECG   • Chest pain with high risk for cardiac etiology   • Hyponatremia   • Acute blood loss anemia   • GI bleed   • Iron deficiency anemia due to chronic blood loss   • Abnormal CT scan, colon   • Diarrhea       Medical History:    Past Medical History:   Diagnosis Date   • A-fib (HCC)     Paroxysmal atrial fibrillation   • Anemia     blood transfusions and iron infusions   • Hess's esophagus    • CAD (coronary artery disease)     has stents coronary artery   • Chronic lymphatic leukemia (HCC)    • CLL (chronic lymphocytic leukemia) (HCC)    • Colon polyps 05/07/2007    Rectum: hyperplastic polyp   • Colon polyps 07/16/2019    Rectum: hyperplastic polyps x2   • Constipation    • Dark stools    • Diabetes mellitus (HCC)    •  Diarrhea    • Gall stones    • GERD (gastroesophageal reflux disease)    • GI bleed    • Gout     wife denies   • Heart murmur    • History of transfusion     no reaction   • Hyperlipidemia    • Hypertension    • Low back pain    • Presence of Watchman left atrial appendage closure device     placed about 2 months ago. 7-2022   • RLS (restless legs syndrome)         Social History:    Social History     Socioeconomic History   • Marital status:      Spouse name: Nohemi   Tobacco Use   • Smoking status: Every Day     Packs/day: 0.50     Years: 40.00     Pack years: 20.00     Types: Cigarettes   • Smokeless tobacco: Never   • Tobacco comments:     1/2 PPD   Vaping Use   • Vaping Use: Never used   Substance and Sexual Activity   • Alcohol use: Yes     Alcohol/week: 2.0 standard drinks     Types: 2 Cans of beer per week     Comment: rare   • Drug use: Never   • Sexual activity: Defer       Family History:   Family History   Problem Relation Age of Onset   • Bone cancer Mother    • Heart attack Father    • Diabetes Father    • Diabetes Sister    • Malig Hyperthermia Neg Hx    • Colon cancer Neg Hx    • Colon polyps Neg Hx    • Crohn's disease Neg Hx    • Irritable bowel syndrome Neg Hx    • Ulcerative colitis Neg Hx        Surgical History:   Past Surgical History:   Procedure Laterality Date   • ANKLE FUSION Right     Fusion hardware   • BONE MARROW BIOPSY Left 10/28/2020    CT guided left iliac crest biopsy and FNA-Dr. Darius Reynolds, [facility]   • CARDIAC CATHETERIZATION  02/2011    [facility]   • CARDIOVERSION  04/22/2016    [facility] Marte   • CHOLECYSTECTOMY     • CHOLECYSTECTOMY WITH INTRAOPERATIVE CHOLANGIOGRAM N/A 10/01/2021    Procedure: CHOLECYSTECTOMY LAPAROSCOPIC INTRAOPERATIVE CHOLANGIOGRAM;  Surgeon: Darius Lui MD;  Location: [facility] OR [facility];  Service: General;  Laterality: N/A;   • COLONOSCOPY N/A 2012   • COLONOSCOPY N/A 07/12/2020    Ray Noe   • COLONOSCOPY N/A 03/22/2022     Procedure: COLONOSCOPY FOR SCREENING;  Surgeon: Velasquez Hinds MD;  Location: [facility];  Service: Gastroenterology;  Laterality: N/A;  Diverticulosis, polyps,   APC of angiodysplasia right colon   • COLONOSCOPY W/ POLYPECTOMY N/A 05/07/2007    Dr. Vivek Siddiqui, [facility]   • CORONARY ANGIOPLASTY WITH STENT PLACEMENT      x3   • CORONARY STENT PLACEMENT  02/2011    [facility], Promus CARLOS x 2 proximal to mid RCA, LAD x 1   • ENDOSCOPY N/A 07/15/2019   • ENDOSCOPY N/A 03/22/2022    Procedure: ESOPHAGOGASTRODUODENOSCOPY;  Surgeon: Velasquez Hinds MD;  Location: [facility];  Service: Gastroenterology;  Laterality: N/A;  APC-duodenal bulb, esophagitis, hiatal hernia, Angioectasis of duodenum   • ENDOSCOPY N/A 7/28/2022    Procedure: ESOPHAGOGASTRODUODENOSCOPY WITH APC AND X2 RESOLUTION CLIPS;  Surgeon: Luis Daniel Walls MD;  Location: [facility] ENDOSCOPY;  Service: Gastroenterology;  Laterality: N/A;  PREOP/ GI BLEED  POSTOP/ AVM's IN STOMACH   • ENDOSCOPY N/A 9/15/2022    Procedure: ESOPHAGOGASTRODUODENOSCOPY WITH APC;  Surgeon: Velasquez Hinds MD;  Location: [facility] ENDOSCOPY;  Service: Gastroenterology;  Laterality: N/A;  Pre: anemia, melena  Post: 2 angioectasias, hiatal hernia   • ENDOSCOPY AND COLONOSCOPY N/A 07/15/2019    Dr. Ulloa   • ENTEROSCOPY SMALL BOWEL N/A 04/13/2022    Procedure: SMALL BOWEL ENTEROSCOPY;  Surgeon: Luis Daniel Walls MD;  Location: [facility] ENDOSCOPY;  Service: Gastroenterology;  Laterality: N/A;  PRE- OBSCURE GI BLEEDING  POST- NON BLEEDING DUODENITIS   • ENTEROSCOPY SMALL BOWEL N/A 9/15/2022    Procedure: SMALL BOWEL ENTEROSCOPY;  Surgeon: Velasquez Hinds MD;  Location: [facility] ENDOSCOPY;  Service: Gastroenterology;  Laterality: N/A;   • SHOULDER SURGERY Left 2007   • TONSILLECTOMY Bilateral    • VENOUS ACCESS DEVICE (PORT) INSERTION N/A 07/31/2020    Procedure: INSERTION VENOUS ACCESS DEVICE;  Surgeon: Darius Lui MD;  [facility];  Service:  General;  Laterality: N/A;         Current Facility-Administered Medications:   •  lactated ringers infusion, 30 mL/hr, Intravenous, Continuous PRN, Michaelle Baez APRN, Last Rate: 30 mL/hr at 01/10/23 0647, 30 mL/hr at 01/10/23 0647  •  sodium chloride 0.9 % flush 10 mL, 10 mL, Intravenous, PRN, Michaelle Baez, APRN  •  sodium chloride 0.9 % flush 3 mL, 3 mL, Intravenous, Q12H, Michaelle Baez APRN    Allergies:   Allergies   Allergen Reactions   • Penicillins Swelling     As a child        The following portions of the patient's history were reviewed by me and updated as appropriate: review of systems, allergies, current medications, past family history, past medical history, past social history, past surgical history and problem list.    Vitals:    01/10/23 0633   BP: 163/76   Pulse: 59   Resp: 16   Temp: 97.5 °F (36.4 °C)   SpO2: 97%       PHYSICAL EXAM:    CONSTITUTIONAL:  today's vital signs reviewed by me  GASTROINTESTINAL: abdomen is soft nontender nondistended with normal active bowel sounds, no masses are appreciated    Assessment/ Plan  We will proceed with flexible sigmoidoscopy.    Risks and benefits as well as alternatives to endoscopic evaluation were explained to the patient and they voiced understanding and wish to proceed.  These risks include but are not limited to the risk of bleeding, perforation, adverse reaction to sedation, and missed lesions.  The patient was given the opportunity to ask questions prior to the endoscopic procedure.

## 2023-01-10 NOTE — ANESTHESIA PREPROCEDURE EVALUATION
Anesthesia Evaluation     Patient summary reviewed and Nursing notes reviewed   NPO Solid Status: > 8 hours  NPO Liquid Status: > 2 hours           Airway   Mallampati: II  TM distance: >3 FB  Neck ROM: full  No difficulty expected  Dental    (+) edentulous    Pulmonary - negative pulmonary ROS and normal exam   Cardiovascular - normal exam    ECG reviewed  Patient on routine beta blocker and Beta blocker given within 24 hours of surgery    (+) hypertension, valvular problems/murmurs, CAD, dysrhythmias Atrial Fib, hyperlipidemia,       Neuro/Psych- negative ROS  GI/Hepatic/Renal/Endo    (+)  GERD,  diabetes mellitus type 2,     Musculoskeletal (-) negative ROS    Abdominal    Substance History - negative use     OB/GYN          Other      history of cancer                  Anesthesia Plan    ASA 3     MAC     intravenous induction     Anesthetic plan, risks, benefits, and alternatives have been provided, discussed and informed consent has been obtained with: patient.        CODE STATUS:

## 2023-01-10 NOTE — ANESTHESIA POSTPROCEDURE EVALUATION
Patient: Macho Stephenson    Procedure Summary     Date: 01/10/23 Room / Location: [facility] / [facility]    Anesthesia Start: 0728 Anesthesia Stop: 0747    Procedure: FLEXIBLE SIGMOIDOSCOPY WITH BIOPSY Diagnosis:       Iron deficiency anemia due to chronic blood loss      Abnormal CT scan, colon      Diarrhea, unspecified type      (Iron deficiency anemia due to chronic blood loss [D50.0])      (Abnormal CT scan, colon [R93.3])      (Diarrhea, unspecified type [R19.7])    Surgeons: Velasquez Hinds MD Provider: Dean Russell MD    Anesthesia Type: MAC ASA Status: 3          Anesthesia Type: MAC    Vitals  Vitals Value Taken Time   /70 01/10/23 0746   Temp 36.5 °C (97.7 °F) 01/10/23 0746   Pulse 62 01/10/23 0746   Resp 16 01/10/23 0746   SpO2 95 % 01/10/23 0746           Post Anesthesia Care and Evaluation    Patient location during evaluation: bedside  Patient participation: complete - patient participated  Level of consciousness: sleepy but conscious  Pain management: adequate    Airway patency: patent  Anesthetic complications: No anesthetic complications  PONV Status: controlled  Cardiovascular status: acceptable  Respiratory status: acceptable  Hydration status: acceptable    Comments: /70 (BP Location: Left arm, Patient Position: Lying)   Pulse 62   Temp 36.5 °C (97.7 °F) (Temporal)   Resp 16   Ht 185.4 cm (73\")   Wt 71.4 kg (157 lb 6.4 oz)   SpO2 95%   BMI 20.77 kg/m²

## 2023-01-31 ENCOUNTER — INFUSION (OUTPATIENT)
Dept: ONCOLOGY | Facility: HOSPITAL | Age: 73
End: 2023-01-31
Payer: MEDICARE

## 2023-01-31 DIAGNOSIS — C91.10 CLL (CHRONIC LYMPHOCYTIC LEUKEMIA): Primary | ICD-10-CM

## 2023-01-31 PROCEDURE — 96523 IRRIG DRUG DELIVERY DEVICE: CPT

## 2023-01-31 PROCEDURE — 25010000002 HEPARIN LOCK FLUSH PER 10 UNITS: Performed by: INTERNAL MEDICINE

## 2023-01-31 RX ORDER — HEPARIN SODIUM (PORCINE) LOCK FLUSH IV SOLN 100 UNIT/ML 100 UNIT/ML
500 SOLUTION INTRAVENOUS AS NEEDED
Status: DISCONTINUED | OUTPATIENT
Start: 2023-01-31 | End: 2023-01-31 | Stop reason: HOSPADM

## 2023-01-31 RX ORDER — SODIUM CHLORIDE 0.9 % (FLUSH) 0.9 %
10 SYRINGE (ML) INJECTION AS NEEDED
Status: DISCONTINUED | OUTPATIENT
Start: 2023-01-31 | End: 2023-01-31 | Stop reason: HOSPADM

## 2023-01-31 RX ADMIN — Medication 500 UNITS: at 13:33

## 2023-01-31 RX ADMIN — Medication 10 ML: at 13:33

## 2023-02-07 ENCOUNTER — OFFICE VISIT (OUTPATIENT)
Dept: GASTROENTEROLOGY | Facility: CLINIC | Age: 73
End: 2023-02-07
Payer: MEDICARE

## 2023-02-07 VITALS
HEIGHT: 73 IN | WEIGHT: 164.2 LBS | TEMPERATURE: 97.7 F | HEART RATE: 70 BPM | BODY MASS INDEX: 21.76 KG/M2 | DIASTOLIC BLOOD PRESSURE: 62 MMHG | OXYGEN SATURATION: 97 % | SYSTOLIC BLOOD PRESSURE: 122 MMHG

## 2023-02-07 DIAGNOSIS — K50.10 SEGMENTAL COLITIS ASSOCIATED WITH DIVERTICULOSIS: Primary | ICD-10-CM

## 2023-02-07 DIAGNOSIS — K57.30 SEGMENTAL COLITIS ASSOCIATED WITH DIVERTICULOSIS: Primary | ICD-10-CM

## 2023-02-07 DIAGNOSIS — D50.0 IRON DEFICIENCY ANEMIA DUE TO CHRONIC BLOOD LOSS: ICD-10-CM

## 2023-02-07 PROCEDURE — 99213 OFFICE O/P EST LOW 20 MIN: CPT

## 2023-02-07 RX ORDER — ROSUVASTATIN CALCIUM 40 MG/1
1 TABLET, COATED ORAL DAILY
COMMUNITY
Start: 2023-01-25

## 2023-02-07 NOTE — PROGRESS NOTES
"Chief Complaint   Patient presents with   • Follow-up     Flexible sigmoidoscopy evaluation           History of Present Illness    Patient is a 72 y.o. who presents to the office for follow up evaluation.  Last in office visit was on 8/18/2022 with Magy LEMUS. Patient has a significant past medical history of anemia and GI bleeding, melena, gastric AVMs treated with APC.     On 9/15/2022 patient underwent EGD with small bowel enteroscopy with Dr. Hinds.EGD remarkable for 2 cm hiatal hernia, 2 angioectasias in the second part of duodenum-coagulation performed using APC.      On 1/10/2023 patient underwent flexible sigmoidoscopy evaluation.  Flexible sigmoidoscopy remarkable for mildly congested and hemorrhagic mucosa in the sigmoid colon consistent with segmental colitis associated with diverticular disease    He presents today for follow-up discussion after undergoing flexible sigmoidoscopy with Dr. Hinds.    For GERD, he continues to take pantoprazole 40 mg once daily approximately 30 to 60 minutes prior to breakfast and describes upper GI symptoms as well controlled without any evidence of heartburn, nausea, vomiting, or dysphagia.    Bowel habits are described as occurring every other day with a soft formed consistency without visible melena or hematochezia.  Denies abdominal pain.  He continues to take dicyclomine 10 mg every day to help control bowel movements    States that approximately 4 weeks ago he stopped taking his anticoagulation therapy and started iron supplementation on 1/31/2023 and is tolerating without issue.    Patient denies known family history of colon polyps, colon cancer, or IBD.       Result Review :       Office Visit with Magy Summers APRN (08/18/2022)  FLEXIBLE SIGMOIDOSCOPY (01/10/2023 07:16)  UPPER GI ENDOSCOPY (09/15/2022 08:07)  Tissue Pathology Exam (01/10/2023 07:39)        Vital Signs:   /62   Pulse 70   Temp 97.7 °F (36.5 °C)   Ht 185.4 cm (73\")   Wt " 74.5 kg (164 lb 3.2 oz)   SpO2 97%   BMI 21.66 kg/m²     Body mass index is 21.66 kg/m².     Physical Exam  Vitals reviewed.   Constitutional:       General: He is not in acute distress.     Appearance: He is well-developed.   HENT:      Head: Normocephalic and atraumatic.   Pulmonary:      Effort: Pulmonary effort is normal. No respiratory distress.   Skin:     General: Skin is dry.      Coloration: Skin is not pale.   Neurological:      Mental Status: He is alert and oriented to person, place, and time.   Psychiatric:         Thought Content: Thought content normal.           Assessment and Plan    Diagnoses and all orders for this visit:    1. Segmental colitis associated with diverticulosis (HCC) (Primary)    2. Iron deficiency anemia due to chronic blood loss           Discussion:    Patient presents today for follow-up discussion after undergoing flexible sigmoidoscopy evaluation.  Discussed endoscopic findings with histologic evaluation suggestive of possible lymphocytic colitis.  Discussed microscopic colitis diagnosis and that if watery diarrhea were to recur to contact our office for further recommendations.  Patient is agreeable to continuing with dicyclomine 10 mg regimen as this is controlling bowel movements.  Notified that a side effect can be constipation and if bowel habits decreased in frequency to reduce dosing secondary to diverticular pockets within the colon as a way to prevent fecal matter from collecting in these pockets.    Also discussed need for increased fiber intake to help promote soft formed complete bowel movements.  Further recommendations to be made pending results of the above work-up and clinical course.   To consider stopping pantoprazole regimen at 1 year follow-up if no further GI bleeding is experienced    Patient is agreeable to the outlined above treatment plan.  Verbalizes understanding and will contact office for any new or worsening concerns.  All questions answered and  support provided.        Patient Instructions   Continue pantoprazole 40 mg once daily as prescribed    Continue dicyclomine 10 mg daily, if constipation develops please reduce dosing as constipation is a known risk factor for fecal matter becoming lodged and diverticular pockets and becoming inflamed.    Please contact our office if you develop any future diarrhea for further recommendations and evaluation.          EMR Dragon/Transcription Disclaimer:  This document has been Dictated utilizing Dragon dictation.

## 2023-02-07 NOTE — PATIENT INSTRUCTIONS
Continue pantoprazole 40 mg once daily as prescribed    Continue dicyclomine 10 mg daily, if constipation develops please reduce dosing as constipation is a known risk factor for fecal matter becoming lodged and diverticular pockets and becoming inflamed.    Please contact our office if you develop any future diarrhea for further recommendations and evaluation.

## 2023-03-20 ENCOUNTER — INFUSION (OUTPATIENT)
Dept: ONCOLOGY | Facility: HOSPITAL | Age: 73
End: 2023-03-20
Payer: MEDICARE

## 2023-03-20 ENCOUNTER — OFFICE VISIT (OUTPATIENT)
Dept: ONCOLOGY | Facility: CLINIC | Age: 73
End: 2023-03-20
Payer: MEDICARE

## 2023-03-20 VITALS
DIASTOLIC BLOOD PRESSURE: 76 MMHG | OXYGEN SATURATION: 97 % | BODY MASS INDEX: 20.6 KG/M2 | HEART RATE: 63 BPM | RESPIRATION RATE: 16 BRPM | WEIGHT: 155.4 LBS | SYSTOLIC BLOOD PRESSURE: 125 MMHG | HEIGHT: 73 IN | TEMPERATURE: 97.4 F

## 2023-03-20 DIAGNOSIS — D50.0 IRON DEFICIENCY ANEMIA DUE TO CHRONIC BLOOD LOSS: ICD-10-CM

## 2023-03-20 DIAGNOSIS — C91.10 CLL (CHRONIC LYMPHOCYTIC LEUKEMIA): Primary | ICD-10-CM

## 2023-03-20 LAB
ALBUMIN SERPL-MCNC: 4.4 G/DL (ref 3.5–5.2)
ALBUMIN/GLOB SERPL: 2.3 G/DL (ref 1.1–2.4)
ALP SERPL-CCNC: 171 U/L (ref 38–116)
ALT SERPL W P-5'-P-CCNC: 38 U/L (ref 0–41)
ANION GAP SERPL CALCULATED.3IONS-SCNC: 11.1 MMOL/L (ref 5–15)
AST SERPL-CCNC: 32 U/L (ref 0–40)
BASOPHILS # BLD AUTO: 0.04 10*3/MM3 (ref 0–0.2)
BASOPHILS NFR BLD AUTO: 0.5 % (ref 0–1.5)
BILIRUB SERPL-MCNC: 0.5 MG/DL (ref 0.2–1.2)
BUN SERPL-MCNC: 19 MG/DL (ref 6–20)
BUN/CREAT SERPL: 19.2 (ref 7.3–30)
CALCIUM SPEC-SCNC: 9.9 MG/DL (ref 8.5–10.2)
CHLORIDE SERPL-SCNC: 100 MMOL/L (ref 98–107)
CO2 SERPL-SCNC: 23.9 MMOL/L (ref 22–29)
CREAT SERPL-MCNC: 0.99 MG/DL (ref 0.7–1.3)
DEPRECATED RDW RBC AUTO: 51.4 FL (ref 37–54)
EGFRCR SERPLBLD CKD-EPI 2021: 80.4 ML/MIN/1.73
EOSINOPHIL # BLD AUTO: 0.11 10*3/MM3 (ref 0–0.4)
EOSINOPHIL NFR BLD AUTO: 1.3 % (ref 0.3–6.2)
ERYTHROCYTE [DISTWIDTH] IN BLOOD BY AUTOMATED COUNT: 15.8 % (ref 12.3–15.4)
FERRITIN SERPL-MCNC: 177.5 NG/ML (ref 30–400)
GLOBULIN UR ELPH-MCNC: 1.9 GM/DL (ref 1.8–3.5)
GLUCOSE SERPL-MCNC: 304 MG/DL (ref 74–124)
HCT VFR BLD AUTO: 42.8 % (ref 37.5–51)
HGB BLD-MCNC: 15.1 G/DL (ref 13–17.7)
IMM GRANULOCYTES # BLD AUTO: 0.11 10*3/MM3 (ref 0–0.05)
IMM GRANULOCYTES NFR BLD AUTO: 1.3 % (ref 0–0.5)
IRON 24H UR-MRATE: 101 MCG/DL (ref 59–158)
IRON SATN MFR SERPL: 29 % (ref 14–48)
LDH SERPL-CCNC: 188 U/L (ref 99–259)
LYMPHOCYTES # BLD AUTO: 1.69 10*3/MM3 (ref 0.7–3.1)
LYMPHOCYTES NFR BLD AUTO: 20.1 % (ref 19.6–45.3)
MCH RBC QN AUTO: 31.8 PG (ref 26.6–33)
MCHC RBC AUTO-ENTMCNC: 35.3 G/DL (ref 31.5–35.7)
MCV RBC AUTO: 90.1 FL (ref 79–97)
MONOCYTES # BLD AUTO: 0.55 10*3/MM3 (ref 0.1–0.9)
MONOCYTES NFR BLD AUTO: 6.5 % (ref 5–12)
NEUTROPHILS NFR BLD AUTO: 5.9 10*3/MM3 (ref 1.7–7)
NEUTROPHILS NFR BLD AUTO: 70.3 % (ref 42.7–76)
NRBC BLD AUTO-RTO: 0 /100 WBC (ref 0–0.2)
PLATELET # BLD AUTO: 132 10*3/MM3 (ref 140–450)
PMV BLD AUTO: 9 FL (ref 6–12)
POTASSIUM SERPL-SCNC: 4.3 MMOL/L (ref 3.5–4.7)
PROT SERPL-MCNC: 6.3 G/DL (ref 6.3–8)
RBC # BLD AUTO: 4.75 10*6/MM3 (ref 4.14–5.8)
SODIUM SERPL-SCNC: 135 MMOL/L (ref 134–145)
TIBC SERPL-MCNC: 346 MCG/DL (ref 249–505)
TRANSFERRIN SERPL-MCNC: 247 MG/DL (ref 200–360)
WBC NRBC COR # BLD: 8.4 10*3/MM3 (ref 3.4–10.8)

## 2023-03-20 PROCEDURE — 1126F AMNT PAIN NOTED NONE PRSNT: CPT | Performed by: INTERNAL MEDICINE

## 2023-03-20 PROCEDURE — 82728 ASSAY OF FERRITIN: CPT

## 2023-03-20 PROCEDURE — 84466 ASSAY OF TRANSFERRIN: CPT

## 2023-03-20 PROCEDURE — 25010000002 HEPARIN LOCK FLUSH PER 10 UNITS: Performed by: INTERNAL MEDICINE

## 2023-03-20 PROCEDURE — 36591 DRAW BLOOD OFF VENOUS DEVICE: CPT

## 2023-03-20 PROCEDURE — 99214 OFFICE O/P EST MOD 30 MIN: CPT | Performed by: INTERNAL MEDICINE

## 2023-03-20 PROCEDURE — 83615 LACTATE (LD) (LDH) ENZYME: CPT

## 2023-03-20 PROCEDURE — 83540 ASSAY OF IRON: CPT

## 2023-03-20 PROCEDURE — 80053 COMPREHEN METABOLIC PANEL: CPT

## 2023-03-20 PROCEDURE — 85025 COMPLETE CBC W/AUTO DIFF WBC: CPT

## 2023-03-20 RX ORDER — HEPARIN SODIUM (PORCINE) LOCK FLUSH IV SOLN 100 UNIT/ML 100 UNIT/ML
500 SOLUTION INTRAVENOUS AS NEEDED
Status: DISCONTINUED | OUTPATIENT
Start: 2023-03-20 | End: 2023-03-20 | Stop reason: HOSPADM

## 2023-03-20 RX ORDER — SODIUM CHLORIDE 0.9 % (FLUSH) 0.9 %
10 SYRINGE (ML) INJECTION AS NEEDED
Status: DISCONTINUED | OUTPATIENT
Start: 2023-03-20 | End: 2023-03-20 | Stop reason: HOSPADM

## 2023-03-20 RX ADMIN — Medication 500 UNITS: at 14:18

## 2023-03-20 RX ADMIN — Medication 10 ML: at 14:18

## 2023-03-20 NOTE — PROGRESS NOTES
Subjective     REASON FOR FOLLOW-UP:    1. Chronic lymphoid leukemia, stage 4, presented initially with significant pancytopenia.  · Currently on Gazyva with venetoclax  · Cycle 1 Gazyva given on August 13, 2020    2.  History of angiodysplasia requiring cauterization and history of Hess's esophagus    3.  Bone marrow aspiration biopsy shows hypercellular marrow with 98% involvement with CLL    4. Iron deficiency anemia in the setting of GI bleeding    History of Present Illness   Patient is seen back today in follow-up, having been recently hospitalized from 7/25 to 7/31/2022 with recurrent GI bleed.  Patient is status postplacement of watchman's device on 7/6/2022, on Xarelto at the time.  When admitted his hemoglobin was 5.5.  He received 4 units of PRBCs.  Xarelto was held.  Patient underwent EGD 7/28/2022 revealing multiple nonbleeding angioectasias which were treated with argon plasma coagulation.  He was placed on a heparin drip and then transitioned back to Xarelto as cardiology felt that he could not be off this for a long with new watchman's device.    Patient then saw GI in follow-up last week, 8/18/2022.  Patient was reporting continued GI bleeding with dark stools.  Lab work performed that day showing hemoglobin having already dropped and just 2 weeks from 10.8 to 8.4, ferritin 22, iron sat 11%.  When I saw him 8/23/2022 hemoglobin did drop to 7.7.  We went on to give him 2 doses of IV Injectafer soon thereafter.    Patient did undergo capsule endoscopy which showed bleeding, leading to EGD which took place last week, 9/15/2022. He had 2 bleeding angioectasias that were treated with argon plasma.    As he is reviewed back today hemoglobin has improved nicely at 11.5.  Patient is feeling much better.  He believes he is tolerating Plavix well after switching from previous Xarelto.  No obvious further bleeding.    Interval history:   We referred patient to GI because of iron deficiency.  A CT scan had  shown abnormality on the sigmoid colon and hence patient underwent sigmoidoscopy with biopsy.  In the past he has had capsule endoscopy which showed angiectasia's.  Reviewed the pathology on sigmoid biopsy which showed normal crypt architecture with focal areas of ischemic change and slightly increased intraepithelial lymphocytes.  Biopsy from part 1 showed normal crypt architecture with the intraepithelial lymphocytes this related to be nonspecific finding could be seen with diverticulitis/diverticulosis.    Patient is gaining weight he has got a good appetite    PAST MEDICAL HISTORY:   1. Recurrent atrial fibrillation followed by cardiology. He has had drug eluting stent placed x 3.   2. High cholesterol.   3. Hypertension.       HEMATOLOGIC/ONCOLOGIC HISTORY:       The patient is 62-year-old gentleman with the above history currently here for followup. He has no complaints. He denies fever, night sweats or weight loss. He does not have any lymphadenopathy. He feels good. He had some fatigue but his CBC shows a go od hemoglobin.   The patient is followed by Dr. Kevin Chandra. He had seen Dr. Chandra 7/18/13 for a history of coronary artery disease status drug eluting stent placement to the right coronary artery and left anterior descending artery in February 2011. Histor y of paroxysmal atrial fibrillation and hyperlipidemia. He presented to  on 6/23/13 with palpitations and was found to have atrial fibrillation with rapid ventricular response. He was given IV Cardizem and converted spontaneously to normal sinus rhythm. He was found to have elevated white count at 18.9 and denied any symptoms of infection or urinary complaints. TSH was normal, troponin levels were negative. He was asked to continue aspirin and metoprolol for that. If he contin u ed to have atrial fibrillation, they will consider antiarrhythmic therapy with or without a short term anticoagulation therapy. However, currently the  patient is feeling reasonably good. He has no complaints. His CBC 7/22/13 showed WBC 17,000, hemoglob i n 16.4 and platelet count of 174,000. Back 9/28/13 his hemoglobin was 15.1, WBC 13.3 and platelets 197,000. He has had a persistently elevated WBC but he is totally asymptomatic. He does not have any fever, night sweats or lymphadenopathy. He is here to be evaluated for his elevated white count.       Peripheral blood flow cytometry done 08/16/13 shows 22% chronic lymphoid leukemia cells, and they expressed ZAP-70 but not CD38. The total number of cells approximated 60% T cells, 32% B cells and 1% natural k iller cells. The B cells were monoclonal and expressed CD19, CD20, CD22, CD5, CD23, CD11c, ZAP-70 and T cell antigens. There was a normal CD4/CD8 ratio. CT of the chest, abdomen and pelvis does not show evidence of metastasis. Peripheral blood for DILLON -2 was negative. It was negative for T11:14 translocation.       CT scan done on 08/21/2013 shows 5 to 6 mm noncalcified nodule in the left lower lobe which is unchanged from December 2010. Otherwise no evidence of any lymphadenopathy or hepatosplenomegaly.       MRI of the spine shows arthritis and disc bulge and likely hemangiomas, with 1 lesion showing increased signal intensity at T2, which is likely a hemangioma. Bone scan could be done, but patient does not even have much pain there. CT scan of the chest, a bdomen and pelvis was done on 11/23/2014. He has tiny lymph nodes in the neck which are difficult to palpate. Right jugular one is 1.4 x 0.9 and left supraclavicular one is 1.5 x 1.1. He also has a subcarinal lymph node which is 1.7 x 1.2 cm, and he ha s a portocaval lymph node which has increased from 2.5 to 2.8. Patient is totally asymptomatic. He does not have any B symptoms, so will continue followup with observation.     Patient is a 69-year-old gentleman with history of chronic lymphocytic leukemia/SLL who recently got admitted to [facility]  because of GI bleed.  He was seen by Dr. Montero.  He underwent EGD colonoscopy.  He was found to have angiodysplasia for which he underwent argon coagulation.  He required 3 units of blood.  Patient had a normal esophagus normal stomach and normal duodenum CLOtest was negative he was asked to take Protonix 40 mg daily.  Colonoscopy showed blood in the entire examined colon but there was a single bleeding colonic angiectasia which was treated with argon plasma coagulation.    He was admitted twice.  Initially he was admitted on July 10 at which time he stayed 3 days and he was evaluated for chest pain.  He underwent a cardiac work-up with stress test and echo.  The echo was normal but the stress test showed medium sized moderate severe severity fixed perfusion defect.  Of the inferior wall consistent with infarction.  However according to patient cardiology did not think he had any cardiac problems.  I have left a message for patient's cardiologist to call me today.  Patient subsequently got readmitted with GI bleed and required 1/3 unit of transfusion.  He was found to have thrombocytopenia and hematology was consulted.    His hemoglobin had dropped down to as low as 7 and his platelets had gone down to 87.  Today it is 35 and his hemoglobin is 9.4 today.  He denies active bleeding.  He has lost weight recently.  He does not have any fever or night sweats.    He had ultrasound of spleen which showed enlarged spleen centimeters in length    Patient was started on Gazyva with Leukeran but subsequently switched to venetoclax with Gazyva.  His venetoclax dose is 100 mg daily and he receives Gazyva once a month.    CT scan done November 4, 2020 shows significant response     MEDICATIONS: The current medication list was reviewed with the patient and updated in the EMR this date per the medical assistant. Medication dosages and frequencies were confirmed to be accurate.       ALLERGIES: PENICILLIN which causes him to swell  up. He is allergic to IV CONTRAST DYE.     SOCIAL HISTORY: He is . He smokes one pack per day for 35 years. He consumes three to four alcoholic drinks per week. He has no tattoos and no previous transfusions.       FAMILY HISTORY: Father  at 82 with MI. Mother  at 82 with bone cancer. He has one brother age 65 in good health and two sisters 69 and 57 in good health.   Past Medical History, Social History, and Family History are unchanged from my prior documentation on     Review of Systems   Constitutional: Negative for fatigue.  Negative for appetite change, chills, diaphoresis, fever and unexpected weight change.   HENT: Negative for hearing loss, sore throat and trouble swallowing.    Respiratory: Negative for cough, chest tightness, shortness of breath and wheezing.    Cardiovascular: Negative for chest pain, palpitations and leg swelling.   Gastrointestinal: Negative for abdominal distention, abdominal pain, constipation, nausea and vomiting.   Genitourinary: Negative for dysuria, frequency, hematuria and urgency.   Musculoskeletal: Negative for joint swelling.        No muscle weakness.   Skin: Negative for rash and wound.   Neurological: Negative for seizures, syncope, speech difficulty, weakness, numbness and headaches.   Hematological: Negative for adenopathy. Does not bruise/bleed easily.   Psychiatric/Behavioral: Negative for behavioral problems, confusion and suicidal ideas.   All other systems reviewed and are negative.    I have reviewed and confirmed the accuracy of the ROS as documented by the MA/LPN/RN Susannah Moya MD        Patient Active Problem List   Diagnosis   • CLL (chronic lymphocytic leukemia) (HCC)   • Acute on chronic anemia   • Encounter for hepatitis C screening test for low risk patient    • Thrombocytopenia (HCC)   • H/O heart artery stent   • Stented coronary artery   • Arteriosclerosis of coronary artery   • Paroxysmal atrial fibrillation (HCC)   •  Paroxysmal atrial fibrillation (HCC)   • Fall   • Fracture, olecranon   • Hyperlipidemia   • Long term (current) use of anticoagulants   • Lower GI bleed   • Olecranon bursitis   • Shoulder pain   • Smoker   • CLL (chronic lymphocytic leukemia) (HCC)   • Dehydration   • Drug-induced nausea and vomiting   • Encounter for long-term (current) use of other medications   • Neutropenia (HCC)   • Calculus of gallbladder without cholecystitis without obstruction   • Cholecystitis   • Iron deficiency   • Adverse effect of iron   • Melena   • Change in bowel habits   • Acute upper GI bleed   • Acute blood loss anemia   • Type 2 diabetes mellitus, without long-term current use of insulin (HCC)   • Prolonged Q-T interval on ECG   • Chest pain with high risk for cardiac etiology   • Hyponatremia   • Acute blood loss anemia   • GI bleed   • Iron deficiency anemia due to chronic blood loss   • Abnormal CT scan, colon   • Diarrhea   • Segmental colitis associated with diverticulosis (HCC)       MEDICATIONS:  Medication Reconciliation for the patient has been reviewed by me and documented in the patients chart.    ALLERGIES:    Allergies   Allergen Reactions   • Penicillins Swelling     As a child         Vitals:    03/20/23 1537   BP: 125/76   Pulse: 63   Resp: 16   Temp: 97.4 °F (36.3 °C)   SpO2: 97%        Current Status 3/20/2023   ECOG score 0      Physical exam:      This patient's ACP documentation is up to date, and there's nothing further left to document.    CONSTITUTIONAL:  Vital signs reviewed.  No distress, looks comfortable.  RESPIRATORY:  Normal respiratory effort.  Lungs clear to auscultation bilaterally.  CARDIOVASCULAR:  Normal S1, S2.  No murmurs rubs or gallops.  No significant lower extremity edema.  GASTROINTESTINAL: Abdomen appears unremarkable.  Nontender.  No hepatomegaly.  No splenomegaly.  LYMPHATIC:  No cervical, supraclavicular, axillary lymphadenopathy.  SKIN:  Warm.  No rashes.  PSYCHIATRIC:  Normal  judgment and insight.  Normal mood and affect..  I have reexamined the patient and the results are consistent with the previously documented exam. Susannah Moya MD     RECENT LABS:  Results from last 7 days   Lab Units 03/20/23  1417   WBC 10*3/mm3 8.40   NEUTROS ABS 10*3/mm3 5.90   HEMOGLOBIN g/dL 15.1   HEMATOCRIT % 42.8   PLATELETS 10*3/mm3 132*     Results from last 7 days   Lab Units 03/20/23  1417   SODIUM mmol/L 135   POTASSIUM mmol/L 4.3   CHLORIDE mmol/L 100   CO2 mmol/L 23.9   BUN mg/dL 19   CREATININE mg/dL 0.99   CALCIUM mg/dL 9.9   ALBUMIN g/dL 4.4   BILIRUBIN mg/dL 0.5   ALK PHOS U/L 171*   ALT (SGPT) U/L 38   AST (SGOT) U/L 32   GLUCOSE mg/dL 304*   FERRITIN ng/mL 177.50   IRON mcg/dL 101   TIBC mcg/dL 346               Assessment & Plan   1. Stage 2 CLL without evidence of any disease progression.  I have reviewed the CT scan from 3/19/2018 there is minimal progression but clinically patient is asymptomatic and we will follow with observation.  Will monitor with labs. No evidence of any frequent infections, no major worsening of his white count. He has no fever or night sweats or any other symptoms.   · Patient knows that he is prone for infections and he is mainly staying at home during the COVID-19 situation time  · Recent admission to [facility] July 10 2020×2.  Initially admitted with chest pain and underwent stress test and echo.  Echo was negative.  Stress test is abnormal but have left message for patient's cardiologist to call us.  His cardiologist is been sent [facility].  · He then got admitted the second time with worsening GI bleed and required total of 3 units of blood.  EGD was negative but colonoscopy showed angiectasia for which he underwent argon ablation.  Currently hemoglobin stable and no active bleeding.  · He was also found to have low platelets with platelets dropping down to 27k and hemoglobin was 7.  Ultrasound showed splenomegaly 15 cm.  Concern is whether he has  "progressed from his CLL point of view and requires treatment.  · CT scan performed 7/20/2020 did show the increased splenomegaly, but interval decrease in size of the axillary nodes, and shotty mediastinal nodes.  No change in the shotty nodes within the neck and supraclavicular regions.  No new lymphadenopathy.  Bone marrow biopsy however showed preliminarily that there is bone marrow involvement.  Remainder of bone marrow biopsy report is pending.  · Bone marrow shows hypercellular marrow with 100% involvement of chronic lymphocytic leukemia/small lymphocytic lymphoma and diffuse pattern with at least 98% of the total marrow cellularity.  Rare foci of erythropoiesis and rare megakaryocytes are noted.   · Negative for BCL-2 and BCL 6.  Chromosomes shows normal karyotype.  I GVH mutation present.  Positive for gain of 1 q., monosomy 13 and loss of IGH.  Negative for deletion T p53, negative for gain of chromosomes 9 and 11, negative for 11, 14 fusion.  · The combination of monosomy 13 and gain of 1 q. is thought to be associated with plasma cell myeloma.  However this patient does not have myeloma.  · Scans were reviewed with the patient today.  Dr. Moya also discussed with the patient and recommended he initiate treatment with chlorambucil and Gazyva.  He would not be a good candidate for Imbruvica due to his history of A. fib\".  He cannot take anticoagulation at this time.  The patient was provided with chemotherapy education today, including  Handouts.  He will need a port placed so that we can initiate treatment  · 8/13/2020: Gazyva day 1. Plan also [id number] mg p.o. on day 1 day 15.  We can increase the dose once we know he tolerates and his platelets improved.  · Patient developing pancytopenia when reviewed cycle 1 day 15.  Chlorambucil dose modified to 10 mg for day 15 however it is felt that he cannot tolerate this ongoing.   ·  Plan to switch to Venetoclax along with continuing Gazyva.  "   · Patient due today for cycle 2-day 1 Gazyva.  He will proceed today as scheduled.  Venetoclax will be shipped to his home with plans to begin this next week on 9/14/2020.    · Patient did receive 1 L normal saline and Neulasta on 9/28/2020 secondary to neutropenia with an ANC of 0.04.  · Patient seen on 10/02/2020 continuing on venetoclax, currently on 100 mg dosing.  He would not increase his dose as he will start on voriconazole after being on venetoclax x1 week which affects the metabolism of venetoclax.  He is currently on day 5 of the 100 mg dosing.  Patient states overall he feels the same except for increased fatigue and nausea.  His nauseousness is controlled with ondansetron however.  Patient will be given 1 L normal saline in the office today as planned.  · Patient returns on 10/12/2020 continuing on venetoclax 100 mg daily as well as voriconazole.  · Patient is doing well with these treatments except fatigue.  Next cycle 4 due as of number ninth prior to which will obtain CT scans  · November 9, 2020: White count down to 1.89 but patient started Pepcid.  We will hold off Pepcid.  · 11/17/2020 platelet count dropped to 35,000 patient was instructed to hold venetoclax.  · Returns 11/23/2020 WBC up to 6.38, ANC 5.19, hemoglobin 12.4, platelets up to 121.  I have advised him to resume venetoclax 100 mg daily  · December 7, 2020: Platelets 95K.  White count 3.0 with absolute neutrophil count of 2.01.  Cycle 5 Gazyva given.  Continue venetoclax with voriconazole.   · January 4, 2021: Platelets 84,000, white count 3,440, absolute neutrophil count 1,980. Cycle 6 Gazyva given.  · 2/23/2021: WBC 3.0, platelets 90,000, hemoglobin 14.6, ANC 1.65.  Counts overall stable.  Continue Venetoclax 100 mg daily.  · January 4, 2021: Last dose of Gazyva.  · March 16, 2021: Patient is on venetoclax along with voriconazole and tolerating well with plans to complete total of 1 year.  · 4/27/2021: Patient continues on  venetoclax 100 mg daily with voriconazole and acyclovir for prophylaxis.  He is tolerating this well.  He is scheduled for scans in 5 weeks.  · June 8, 2021: Patient is to continue venetoclax 100 mg daily with voriconazole and acyclovir prophylaxis.  He is tolerating it very well.  The plan is to continue 8 more weeks and then he will complete the protocol and will be followed with observation.  · I have reviewed the CT scan done on June 2, 2021 and there is no evidence of any lymphadenopathy and the spleen size is decreased in size from 12.5-11.3.  He has mild thrombocytopenia and leukopenia but his hemoglobin is normal.  · 9/7/2021: Patient completed 1 year worth of venetoclax with voriconazole and 6 months of Gazyva.  He has had an excellent response for his CLL.  Spleen decreased in size.  Currently asymptomatic.  Discontinue venetoclax since he completed 1 year  · March 28, 2022: Patient's hemoglobin back to normal at 13.1.  EGD showed Hess's esophagus and angiodysplasia for which she underwent argon plasma coagulation.  Also colonoscopy showed multiple polyps all of them benign and had 1 angiodysplasia for which he underwent argon coagulation by Dr. Hinds.  Currently asymptomatic  · 5/26/2022 patient without B symptoms or adenopathy.  Acute drop in counts felt to be related to infection (see further discussion below).  Scheduled for repeat CT scans 6/14/2022.  · June 21, 2022: Patient underwent CT scan Bibiana 15, 2022 which showed multifocal pneumonia bilaterally likely secondary to COVID-19 infection in the past.  There admitted they recommended repeat CT in 6 to 8 weeks.  There is a 0.8 cm pulmonary nodule in the left lower lobe is minimally increased in size but similar to that in March 19, 2018.  Patient is improving  · WBC stable/normal at 6.3, 12% lymphs.  Platelets iron 30,000.   · December 28, 2022: Reviewed CT scan which shows thickening of the descending colon and sigmoid colon and patient is  anemic.  We will check iron studies.  Refer to Dr. Hinds for colonoscopy.    2. History of intermittent thrombocytopenia  · Historically platelets in the 150s.  · Count did drop when patient was treated for CLL on chlorambucil.    · Platelets also dropping on treatment with Venetoclax in the past.    · CLL in remission.  Platelet count currently low normal at 130,000.  Continue to monitor  · Platelets are stable at 139.     3.  Iron deficiency anemia in the setting of recurrent GI bleeding with underlying AVMs, complicated by anticoagulation.   · Patient intolerant to oral iron.  · 1/2022: Patient with recurrent iron deficiency as further detailed below.  Patient referred to GI for further evaluation.  Will see Dr. Hinds 2/9/2022.  · Patient hospitalized 4/10 - 4/13/2022 for acute GI bleed.  EGD showing Hess's esophagus.  Colonoscopy showing angiectasias, cauterized.  During hospitalization, Coumadin was held.    · 4/22/2022 evaluated by gastroenterology, noted to have a declining hemoglobin so they advised him to be seen by our office.  Patient's Hgb dropping to 8.8, Iron saturation 8%, TIBC 403, ferritin 25.1.  Patient receiving IV Injectafer x2, 4/29 and 5/6/2022.  · 7/25-7/31/2022 hospitalized with recurrent GI bleeding in the setting of known AVMs and being on Xarelto. Xarelto held.  Patient undergoing EGD 7/28/2022 revealing multiple nonbleeding angioectasias treated with argon plasma coagulation.  Patient placed on a heparin drip and then transitioned back to Xarelto as cardiology felt he could not be off this for a long with new Watchman's device.  Hemoglobin at discharge 10.8.  · 8/15/2022 patient seen by GI with continued reports of GI bleeding with noted dark stools.  Hemoglobin hemoglobin having already dropped and just 2 weeks from 10.8 to 8.4, ferritin 22, iron sat 11%. Patient scheduled for GI for capsule endoscopy.  · 8/23/2022: Hgb 7.7.  Plan to transfuse patient and give additional IV iron  with Injectafer x2.  Of note Xarelto was stopped and patient switched to Plavix with hopes of stopping GI bleeding.  · Patient undergoing EGD 9/15/2022 with 2 separate angioectasias treated with argon plasma.  · 9/20/2022: Hgb improved to 11.5.  Iron studies pending though presumably improved following recent Injectafer.  Patient understands to call with recurrent bleeding.  · December 20, 2022 my concern is that his hemoglobin dropped to 10.5 we will check iron studies again  · March 28, 2023: Patient is s/p iron and his hemoglobin today is 15.1 with normal white count and platelet count.  His platelets are 132 which is stable and he has 70% neutrophils and 20% lymphocytes.    4.  History of cluster headaches for which she has to be treated with steroids in the past and has followed up with Dr. Len Péreztoday with significant headaches.  · Patient had CT of the head 8/20/2020 which was negative.  · He was started on prednisone 10 mg daily which was not helpful.  · Patient saw Dr. Bobby, neurology who gave him 2 shots of a new medication Emgality.  This is something that can be given once a month.    · Resolved.    5.  Atrial fibrillation, off anticoagulation given his thrombocytopenia.  · Patient continues on aspirin 81 mg daily if platelet count greater than 60,000.  · Patient sees Dr. Christy at [facility] who follows his INR  · Coumadin held during patient's recent hospitalization April/2022.  Patient wishes to discontinue Coumadin permanently.    · Patient status post watchman's device 7/2022.  Started on Xarelto.  · 8/22/2022 Xarelto discontinued and switched to Plavix in the setting of ongoing GI bleeding per above.    6.  Chronic mild elevation of alkaline phosphatase.  Stable.      PLAN:   · Reviewed colonoscopy and sigmoidoscopy results by Dr. Hinds  · Patient has intraepithelial lymphocytes which is nonspecific  · Currently hemoglobin has come up to 15.1  · Patient has taken ferrous sulfate and tolerated  reasonably well  · Patient will require port flush every 6 weeks x 6 months  · Patient will see nurse practitioner in 6 weeks and 3 months with the port flush and he will see me in 6 months  · No evidence of progression  Susannah Moya MD  03/20/23      Cc: Dr. Kevin Gilmore

## 2023-04-17 ENCOUNTER — TRANSCRIBE ORDERS (OUTPATIENT)
Dept: ADMINISTRATIVE | Facility: HOSPITAL | Age: 73
End: 2023-04-17
Payer: MEDICARE

## 2023-04-17 DIAGNOSIS — M75.42 ROTATOR CUFF IMPINGEMENT SYNDROME, LEFT: Primary | ICD-10-CM

## 2023-05-01 ENCOUNTER — INFUSION (OUTPATIENT)
Dept: ONCOLOGY | Facility: HOSPITAL | Age: 73
End: 2023-05-01
Payer: MEDICARE

## 2023-05-01 ENCOUNTER — OFFICE VISIT (OUTPATIENT)
Dept: ONCOLOGY | Facility: CLINIC | Age: 73
End: 2023-05-01
Payer: MEDICARE

## 2023-05-01 VITALS
OXYGEN SATURATION: 96 % | RESPIRATION RATE: 16 BRPM | DIASTOLIC BLOOD PRESSURE: 75 MMHG | TEMPERATURE: 97.8 F | SYSTOLIC BLOOD PRESSURE: 122 MMHG | HEART RATE: 59 BPM | BODY MASS INDEX: 21.36 KG/M2 | HEIGHT: 73 IN | WEIGHT: 161.2 LBS

## 2023-05-01 DIAGNOSIS — C91.10 CLL (CHRONIC LYMPHOCYTIC LEUKEMIA): ICD-10-CM

## 2023-05-01 DIAGNOSIS — D50.0 IRON DEFICIENCY ANEMIA DUE TO CHRONIC BLOOD LOSS: Primary | ICD-10-CM

## 2023-05-01 DIAGNOSIS — C91.10 CLL (CHRONIC LYMPHOCYTIC LEUKEMIA): Primary | ICD-10-CM

## 2023-05-01 LAB
ALBUMIN SERPL-MCNC: 4.1 G/DL (ref 3.5–5.2)
ALBUMIN/GLOB SERPL: 2.3 G/DL (ref 1.1–2.4)
ALP SERPL-CCNC: 111 U/L (ref 38–116)
ALT SERPL W P-5'-P-CCNC: 24 U/L (ref 0–41)
ANION GAP SERPL CALCULATED.3IONS-SCNC: 10.1 MMOL/L (ref 5–15)
AST SERPL-CCNC: 22 U/L (ref 0–40)
BASOPHILS # BLD AUTO: 0.03 10*3/MM3 (ref 0–0.2)
BASOPHILS NFR BLD AUTO: 0.6 % (ref 0–1.5)
BILIRUB SERPL-MCNC: 0.6 MG/DL (ref 0.2–1.2)
BUN SERPL-MCNC: 15 MG/DL (ref 6–20)
BUN/CREAT SERPL: 18.1 (ref 7.3–30)
CALCIUM SPEC-SCNC: 9.5 MG/DL (ref 8.5–10.2)
CHLORIDE SERPL-SCNC: 104 MMOL/L (ref 98–107)
CO2 SERPL-SCNC: 24.9 MMOL/L (ref 22–29)
CREAT SERPL-MCNC: 0.83 MG/DL (ref 0.7–1.3)
DEPRECATED RDW RBC AUTO: 54.1 FL (ref 37–54)
EGFRCR SERPLBLD CKD-EPI 2021: 92.4 ML/MIN/1.73
EOSINOPHIL # BLD AUTO: 0.11 10*3/MM3 (ref 0–0.4)
EOSINOPHIL NFR BLD AUTO: 2.1 % (ref 0.3–6.2)
ERYTHROCYTE [DISTWIDTH] IN BLOOD BY AUTOMATED COUNT: 15.2 % (ref 12.3–15.4)
FERRITIN SERPL-MCNC: 98.1 NG/ML (ref 30–400)
GLOBULIN UR ELPH-MCNC: 1.8 GM/DL (ref 1.8–3.5)
GLUCOSE SERPL-MCNC: 228 MG/DL (ref 74–124)
HCT VFR BLD AUTO: 37.7 % (ref 37.5–51)
HGB BLD-MCNC: 12.7 G/DL (ref 13–17.7)
IMM GRANULOCYTES # BLD AUTO: 0.06 10*3/MM3 (ref 0–0.05)
IMM GRANULOCYTES NFR BLD AUTO: 1.1 % (ref 0–0.5)
IRON 24H UR-MRATE: 129 MCG/DL (ref 59–158)
IRON SATN MFR SERPL: 35 % (ref 14–48)
LYMPHOCYTES # BLD AUTO: 1.22 10*3/MM3 (ref 0.7–3.1)
LYMPHOCYTES NFR BLD AUTO: 22.8 % (ref 19.6–45.3)
MCH RBC QN AUTO: 32.7 PG (ref 26.6–33)
MCHC RBC AUTO-ENTMCNC: 33.7 G/DL (ref 31.5–35.7)
MCV RBC AUTO: 97.2 FL (ref 79–97)
MONOCYTES # BLD AUTO: 0.36 10*3/MM3 (ref 0.1–0.9)
MONOCYTES NFR BLD AUTO: 6.7 % (ref 5–12)
NEUTROPHILS NFR BLD AUTO: 3.56 10*3/MM3 (ref 1.7–7)
NEUTROPHILS NFR BLD AUTO: 66.7 % (ref 42.7–76)
NRBC BLD AUTO-RTO: 0 /100 WBC (ref 0–0.2)
PLATELET # BLD AUTO: 136 10*3/MM3 (ref 140–450)
PMV BLD AUTO: 9.4 FL (ref 6–12)
POTASSIUM SERPL-SCNC: 3.7 MMOL/L (ref 3.5–4.7)
PROT SERPL-MCNC: 5.9 G/DL (ref 6.3–8)
RBC # BLD AUTO: 3.88 10*6/MM3 (ref 4.14–5.8)
SODIUM SERPL-SCNC: 139 MMOL/L (ref 134–145)
TIBC SERPL-MCNC: 365 MCG/DL (ref 249–505)
TRANSFERRIN SERPL-MCNC: 261 MG/DL (ref 200–360)
WBC NRBC COR # BLD: 5.34 10*3/MM3 (ref 3.4–10.8)

## 2023-05-01 PROCEDURE — 36415 COLL VENOUS BLD VENIPUNCTURE: CPT | Performed by: NURSE PRACTITIONER

## 2023-05-01 PROCEDURE — 85025 COMPLETE CBC W/AUTO DIFF WBC: CPT

## 2023-05-01 PROCEDURE — 80053 COMPREHEN METABOLIC PANEL: CPT

## 2023-05-01 PROCEDURE — 84466 ASSAY OF TRANSFERRIN: CPT | Performed by: NURSE PRACTITIONER

## 2023-05-01 PROCEDURE — 36591 DRAW BLOOD OFF VENOUS DEVICE: CPT

## 2023-05-01 PROCEDURE — 25010000002 HEPARIN LOCK FLUSH PER 10 UNITS: Performed by: INTERNAL MEDICINE

## 2023-05-01 PROCEDURE — 83540 ASSAY OF IRON: CPT | Performed by: NURSE PRACTITIONER

## 2023-05-01 PROCEDURE — 82728 ASSAY OF FERRITIN: CPT | Performed by: NURSE PRACTITIONER

## 2023-05-01 RX ORDER — HEPARIN SODIUM (PORCINE) LOCK FLUSH IV SOLN 100 UNIT/ML 100 UNIT/ML
500 SOLUTION INTRAVENOUS AS NEEDED
Status: DISCONTINUED | OUTPATIENT
Start: 2023-05-01 | End: 2023-05-01 | Stop reason: HOSPADM

## 2023-05-01 RX ORDER — SODIUM CHLORIDE 0.9 % (FLUSH) 0.9 %
10 SYRINGE (ML) INJECTION AS NEEDED
Status: DISCONTINUED | OUTPATIENT
Start: 2023-05-01 | End: 2023-05-01 | Stop reason: HOSPADM

## 2023-05-01 RX ORDER — ATORVASTATIN CALCIUM 20 MG/1
1 TABLET, FILM COATED ORAL DAILY
COMMUNITY
Start: 2023-03-08

## 2023-05-01 RX ADMIN — Medication 500 UNITS: at 14:22

## 2023-05-01 RX ADMIN — Medication 10 ML: at 14:22

## 2023-05-01 NOTE — PROGRESS NOTES
Subjective     REASON FOR FOLLOW-UP:    1. Chronic lymphoid leukemia, stage 4, presented initially with significant pancytopenia.  · Currently on Gazyva with venetoclax  · Cycle 1 Gazyva given on August 13, 2020    2.  History of angiodysplasia requiring cauterization and history of Hess's esophagus    3.  Bone marrow aspiration biopsy shows hypercellular marrow with 98% involvement with CLL    4. Iron deficiency anemia in the setting of GI bleeding    History of Present Illness   Patient is seen back today in follow-up, having been recently hospitalized from 7/25 to 7/31/2022 with recurrent GI bleed.  Patient is status postplacement of watchman's device on 7/6/2022, on Xarelto at the time.  When admitted his hemoglobin was 5.5.  He received 4 units of PRBCs.  Xarelto was held.  Patient underwent EGD 7/28/2022 revealing multiple nonbleeding angioectasias which were treated with argon plasma coagulation.  He was placed on a heparin drip and then transitioned back to Xarelto as cardiology felt that he could not be off this for a long with new watchman's device.    Patient then saw GI in follow-up last week, 8/18/2022.  Patient was reporting continued GI bleeding with dark stools.  Lab work performed that day showing hemoglobin having already dropped and just 2 weeks from 10.8 to 8.4, ferritin 22, iron sat 11%.  When I saw him 8/23/2022 hemoglobin did drop to 7.7.  We went on to give him 2 doses of IV Injectafer soon thereafter.    Patient did undergo capsule endoscopy which showed bleeding, leading to EGD which took place last week, 9/15/2022. He had 2 bleeding angioectasias that were treated with argon plasma.    As he is reviewed back today hemoglobin has improved nicely at 11.5.  Patient is feeling much better.  He believes he is tolerating Plavix well after switching from previous Xarelto.  No obvious further bleeding.    We referred patient to GI because of iron deficiency.  A CT scan had shown abnormality  on the sigmoid colon and hence patient underwent sigmoidoscopy with biopsy.  In the past he has had capsule endoscopy which showed angiectasia's.  Reviewed the pathology on sigmoid biopsy which showed normal crypt architecture with focal areas of ischemic change and slightly increased intraepithelial lymphocytes.  Biopsy from part 1 showed normal crypt architecture with the intraepithelial lymphocytes this related to be nonspecific finding could be seen with diverticulitis/diverticulosis.    INTERVAL HISTORY:  Patient returns the office today overall feeling well.  He denies any recent bleeding or dark stools.  His energy level is stable.  He has had no appetite changes and his weight is actually up slightly.      PAST MEDICAL HISTORY:   1. Recurrent atrial fibrillation followed by cardiology. He has had drug eluting stent placed x 3.   2. High cholesterol.   3. Hypertension.       HEMATOLOGIC/ONCOLOGIC HISTORY:       The patient is 62-year-old gentleman with the above history currently here for followup. He has no complaints. He denies fever, night sweats or weight loss. He does not have any lymphadenopathy. He feels good. He had some fatigue but his CBC shows a go od hemoglobin.   The patient is followed by Dr. Kevin Chandra. He had seen Dr. Chandra 7/18/13 for a history of coronary artery disease status drug eluting stent placement to the right coronary artery and left anterior descending artery in February 2011. Histor y of paroxysmal atrial fibrillation and hyperlipidemia. He presented to  on 6/23/13 with palpitations and was found to have atrial fibrillation with rapid ventricular response. He was given IV Cardizem and converted spontaneously to normal sinus rhythm. He was found to have elevated white count at 18.9 and denied any symptoms of infection or urinary complaints. TSH was normal, troponin levels were negative. He was asked to continue aspirin and metoprolol for that. If he contin  u ed to have atrial fibrillation, they will consider antiarrhythmic therapy with or without a short term anticoagulation therapy. However, currently the patient is feeling reasonably good. He has no complaints. His CBC 7/22/13 showed WBC 17,000, hemoglob i n 16.4 and platelet count of 174,000. Back 9/28/13 his hemoglobin was 15.1, WBC 13.3 and platelets 197,000. He has had a persistently elevated WBC but he is totally asymptomatic. He does not have any fever, night sweats or lymphadenopathy. He is here to be evaluated for his elevated white count.       Peripheral blood flow cytometry done 08/16/13 shows 22% chronic lymphoid leukemia cells, and they expressed ZAP-70 but not CD38. The total number of cells approximated 60% T cells, 32% B cells and 1% natural k iller cells. The B cells were monoclonal and expressed CD19, CD20, CD22, CD5, CD23, CD11c, ZAP-70 and T cell antigens. There was a normal CD4/CD8 ratio. CT of the chest, abdomen and pelvis does not show evidence of metastasis. Peripheral blood for DILLON -2 was negative. It was negative for T11:14 translocation.       CT scan done on 08/21/2013 shows 5 to 6 mm noncalcified nodule in the left lower lobe which is unchanged from December 2010. Otherwise no evidence of any lymphadenopathy or hepatosplenomegaly.       MRI of the spine shows arthritis and disc bulge and likely hemangiomas, with 1 lesion showing increased signal intensity at T2, which is likely a hemangioma. Bone scan could be done, but patient does not even have much pain there. CT scan of the chest, a bdomen and pelvis was done on 11/23/2014. He has tiny lymph nodes in the neck which are difficult to palpate. Right jugular one is 1.4 x 0.9 and left supraclavicular one is 1.5 x 1.1. He also has a subcarinal lymph node which is 1.7 x 1.2 cm, and he ha s a portocaval lymph node which has increased from 2.5 to 2.8. Patient is totally asymptomatic. He does not have any B symptoms, so will continue followup  with observation.     Patient is a 69-year-old gentleman with history of chronic lymphocytic leukemia/SLL who recently got admitted to [facility] because of GI bleed.  He was seen by Dr. Montero.  He underwent EGD colonoscopy.  He was found to have angiodysplasia for which he underwent argon coagulation.  He required 3 units of blood.  Patient had a normal esophagus normal stomach and normal duodenum CLOtest was negative he was asked to take Protonix 40 mg daily.  Colonoscopy showed blood in the entire examined colon but there was a single bleeding colonic angiectasia which was treated with argon plasma coagulation.    He was admitted twice.  Initially he was admitted on July 10 at which time he stayed 3 days and he was evaluated for chest pain.  He underwent a cardiac work-up with stress test and echo.  The echo was normal but the stress test showed medium sized moderate severe severity fixed perfusion defect.  Of the inferior wall consistent with infarction.  However according to patient cardiology did not think he had any cardiac problems.  I have left a message for patient's cardiologist to call me today.  Patient subsequently got readmitted with GI bleed and required 1/3 unit of transfusion.  He was found to have thrombocytopenia and hematology was consulted.    His hemoglobin had dropped down to as low as 7 and his platelets had gone down to 87.  Today it is 35 and his hemoglobin is 9.4 today.  He denies active bleeding.  He has lost weight recently.  He does not have any fever or night sweats.    He had ultrasound of spleen which showed enlarged spleen centimeters in length    Patient was started on Gazyva with Leukeran but subsequently switched to venetoclax with Gazyva.  His venetoclax dose is 100 mg daily and he receives Gazyva once a month.    CT scan done November 4, 2020 shows significant response     MEDICATIONS: The current medication list was reviewed with the patient and updated in the EMR this  date per the medical assistant. Medication dosages and frequencies were confirmed to be accurate.       ALLERGIES: PENICILLIN which causes him to swell up. He is allergic to IV CONTRAST DYE.     SOCIAL HISTORY: He is . He smokes one pack per day for 35 years. He consumes three to four alcoholic drinks per week. He has no tattoos and no previous transfusions.       FAMILY HISTORY: Father  at 82 with MI. Mother  at 82 with bone cancer. He has one brother age 65 in good health and two sisters 69 and 57 in good health.   Past Medical History, Social History, and Family History are unchanged from my prior documentation on     Review of Systems   Constitutional: Negative for fatigue.  Negative for appetite change, chills, diaphoresis, fever and unexpected weight change.   HENT: Negative for hearing loss, sore throat and trouble swallowing.    Respiratory: Negative for cough, chest tightness, shortness of breath and wheezing.    Cardiovascular: Negative for chest pain, palpitations and leg swelling.   Gastrointestinal: Negative for abdominal distention, abdominal pain, constipation, nausea and vomiting.   Genitourinary: Negative for dysuria, frequency, hematuria and urgency.   Musculoskeletal: Negative for joint swelling.        No muscle weakness.   Skin: Negative for rash and wound.   Neurological: Negative for seizures, syncope, speech difficulty, weakness, numbness and headaches.   Hematological: Negative for adenopathy. Does not bruise/bleed easily.   Psychiatric/Behavioral: Negative for behavioral problems, confusion and suicidal ideas.   All other systems reviewed and are negative.    I have reviewed and confirmed the accuracy of the ROS as documented by the MA/LPKALEB/RN MARIAH Luna        Patient Active Problem List   Diagnosis   • CLL (chronic lymphocytic leukemia) (HCC)   • Acute on chronic anemia   • Encounter for hepatitis C screening test for low risk patient    •  Thrombocytopenia   • H/O heart artery stent   • Stented coronary artery   • Arteriosclerosis of coronary artery   • Paroxysmal atrial fibrillation   • Paroxysmal atrial fibrillation (HCC)   • Fall   • Fracture, olecranon   • Hyperlipidemia   • Long term (current) use of anticoagulants   • Lower GI bleed   • Olecranon bursitis   • Shoulder pain   • Smoker   • CLL (chronic lymphocytic leukemia)   • Dehydration   • Drug-induced nausea and vomiting   • Encounter for long-term (current) use of other medications   • Neutropenia   • Calculus of gallbladder without cholecystitis without obstruction   • Cholecystitis   • Iron deficiency   • Adverse effect of iron   • Melena   • Change in bowel habits   • Acute upper GI bleed   • Acute blood loss anemia   • Type 2 diabetes mellitus, without long-term current use of insulin   • Prolonged Q-T interval on ECG   • Chest pain with high risk for cardiac etiology   • Hyponatremia   • Acute blood loss anemia   • GI bleed   • Iron deficiency anemia due to chronic blood loss   • Abnormal CT scan, colon   • Diarrhea   • Segmental colitis associated with diverticulosis       MEDICATIONS:  Medication Reconciliation for the patient has been reviewed by me and documented in the patients chart.    ALLERGIES:    Allergies   Allergen Reactions   • Penicillins Swelling     As a child         Vitals:    05/01/23 1451   BP: 122/75   Pulse: 59   Resp: 16   Temp: 97.8 °F (36.6 °C)   SpO2: 96%            5/1/2023     2:54 PM   Current Status   ECOG score 0      Physical exam:      This patient's ACP documentation is up to date, and there's nothing further left to document.    CONSTITUTIONAL:  Vital signs reviewed.  No distress, looks comfortable.  RESPIRATORY:  Normal respiratory effort.  Lungs clear to auscultation bilaterally.  CARDIOVASCULAR:  Normal S1, S2.  No murmurs rubs or gallops.  No significant lower extremity edema.  GASTROINTESTINAL: Abdomen appears unremarkable.  Nontender.  No  hepatomegaly.  No splenomegaly.  LYMPHATIC:  No cervical, supraclavicular, axillary lymphadenopathy.  SKIN:  Warm.  No rashes.  PSYCHIATRIC:  Normal judgment and insight.  Normal mood and affect..  I have reexamined the patient and the results are consistent with the previously documented exam. MARIAH Luna     RECENT LABS:  Results from last 7 days   Lab Units 05/01/23  1421   WBC 10*3/mm3 5.34   NEUTROS ABS 10*3/mm3 3.56   HEMOGLOBIN g/dL 12.7*   HEMATOCRIT % 37.7   PLATELETS 10*3/mm3 136*                   Assessment & Plan   1. Stage 2 CLL without evidence of any disease progression.  I have reviewed the CT scan from 3/19/2018 there is minimal progression but clinically patient is asymptomatic and we will follow with observation.  Will monitor with labs. No evidence of any frequent infections, no major worsening of his white count. He has no fever or night sweats or any other symptoms.   · Patient knows that he is prone for infections and he is mainly staying at home during the COVID-19 situation time  · Recent admission to [facility] July 10 2020×2.  Initially admitted with chest pain and underwent stress test and echo.  Echo was negative.  Stress test is abnormal but have left message for patient's cardiologist to call us.  His cardiologist is been sent [facility].  · He then got admitted the second time with worsening GI bleed and required total of 3 units of blood.  EGD was negative but colonoscopy showed angiectasia for which he underwent argon ablation.  Currently hemoglobin stable and no active bleeding.  · He was also found to have low platelets with platelets dropping down to 27k and hemoglobin was 7.  Ultrasound showed splenomegaly 15 cm.  Concern is whether he has progressed from his CLL point of view and requires treatment.  · CT scan performed 7/20/2020 did show the increased splenomegaly, but interval decrease in size of the axillary nodes, and shotty mediastinal nodes.  No change in the  "shotty nodes within the neck and supraclavicular regions.  No new lymphadenopathy.  Bone marrow biopsy however showed preliminarily that there is bone marrow involvement.  Remainder of bone marrow biopsy report is pending.  · Bone marrow shows hypercellular marrow with 100% involvement of chronic lymphocytic leukemia/small lymphocytic lymphoma and diffuse pattern with at least 98% of the total marrow cellularity.  Rare foci of erythropoiesis and rare megakaryocytes are noted.   · Negative for BCL-2 and BCL 6.  Chromosomes shows normal karyotype.  I GVH mutation present.  Positive for gain of 1 q., monosomy 13 and loss of IGH.  Negative for deletion T p53, negative for gain of chromosomes 9 and 11, negative for 11, 14 fusion.  · The combination of monosomy 13 and gain of 1 q. is thought to be associated with plasma cell myeloma.  However this patient does not have myeloma.  · Scans were reviewed with the patient today.  Dr. Moya also discussed with the patient and recommended he initiate treatment with chlorambucil and Gazyva.  He would not be a good candidate for Imbruvica due to his history of A. fib\".  He cannot take anticoagulation at this time.  The patient was provided with chemotherapy education today, including  Handouts.  He will need a port placed so that we can initiate treatment  · 8/13/2020: Gazyva day 1. Plan also [id number] mg p.o. on day 1 day 15.  We can increase the dose once we know he tolerates and his platelets improved.  · Patient developing pancytopenia when reviewed cycle 1 day 15.  Chlorambucil dose modified to 10 mg for day 15 however it is felt that he cannot tolerate this ongoing.   ·  Plan to switch to Venetoclax along with continuing Gazyva.    · Patient due today for cycle 2-day 1 Gazyva.  He will proceed today as scheduled.  Venetoclax will be shipped to his home with plans to begin this next week on 9/14/2020.    · Patient did receive 1 L normal saline and Neulasta on " 9/28/2020 secondary to neutropenia with an ANC of 0.04.  · Patient seen on 10/02/2020 continuing on venetoclax, currently on 100 mg dosing.  He would not increase his dose as he will start on voriconazole after being on venetoclax x1 week which affects the metabolism of venetoclax.  He is currently on day 5 of the 100 mg dosing.  Patient states overall he feels the same except for increased fatigue and nausea.  His nauseousness is controlled with ondansetron however.  Patient will be given 1 L normal saline in the office today as planned.  · Patient returns on 10/12/2020 continuing on venetoclax 100 mg daily as well as voriconazole.  · Patient is doing well with these treatments except fatigue.  Next cycle 4 due as of number ninth prior to which will obtain CT scans  · November 9, 2020: White count down to 1.89 but patient started Pepcid.  We will hold off Pepcid.  · 11/17/2020 platelet count dropped to 35,000 patient was instructed to hold venetoclax.  · Returns 11/23/2020 WBC up to 6.38, ANC 5.19, hemoglobin 12.4, platelets up to 121.  I have advised him to resume venetoclax 100 mg daily  · December 7, 2020: Platelets 95K.  White count 3.0 with absolute neutrophil count of 2.01.  Cycle 5 Gazyva given.  Continue venetoclax with voriconazole.   · January 4, 2021: Platelets 84,000, white count 3,440, absolute neutrophil count 1,980. Cycle 6 Gazyva given.  · 2/23/2021: WBC 3.0, platelets 90,000, hemoglobin 14.6, ANC 1.65.  Counts overall stable.  Continue Venetoclax 100 mg daily.  · January 4, 2021: Last dose of Gazyva.  · March 16, 2021: Patient is on venetoclax along with voriconazole and tolerating well with plans to complete total of 1 year.  · 4/27/2021: Patient continues on venetoclax 100 mg daily with voriconazole and acyclovir for prophylaxis.  He is tolerating this well.  He is scheduled for scans in 5 weeks.  · June 8, 2021: Patient is to continue venetoclax 100 mg daily with voriconazole and acyclovir  prophylaxis.  He is tolerating it very well.  The plan is to continue 8 more weeks and then he will complete the protocol and will be followed with observation.  · I have reviewed the CT scan done on June 2, 2021 and there is no evidence of any lymphadenopathy and the spleen size is decreased in size from 12.5-11.3.  He has mild thrombocytopenia and leukopenia but his hemoglobin is normal.  · 9/7/2021: Patient completed 1 year worth of venetoclax with voriconazole and 6 months of Gazyva.  He has had an excellent response for his CLL.  Spleen decreased in size.  Currently asymptomatic.  Discontinue venetoclax since he completed 1 year  · March 28, 2022: Patient's hemoglobin back to normal at 13.1.  EGD showed Hess's esophagus and angiodysplasia for which she underwent argon plasma coagulation.  Also colonoscopy showed multiple polyps all of them benign and had 1 angiodysplasia for which he underwent argon coagulation by Dr. Hinds.  Currently asymptomatic  · 5/26/2022 patient without B symptoms or adenopathy.  Acute drop in counts felt to be related to infection (see further discussion below).  Scheduled for repeat CT scans 6/14/2022.  · June 21, 2022: Patient underwent CT scan Bibiana 15, 2022 which showed multifocal pneumonia bilaterally likely secondary to COVID-19 infection in the past.  There admitted they recommended repeat CT in 6 to 8 weeks.  There is a 0.8 cm pulmonary nodule in the left lower lobe is minimally increased in size but similar to that in March 19, 2018.  Patient is improving  · WBC stable/normal at 6.3, 12% lymphs.  Platelets iron 30,000.   · December 28, 2022: Reviewed CT scan which shows thickening of the descending colon and sigmoid colon and patient is anemic.  We will check iron studies.  Refer to Dr. Hinds for colonoscopy.  · 5/1/2023 WBC 5.34, 22% lymphs, platelets stable at 136,000    2. History of intermittent thrombocytopenia  · Historically platelets in the 150s.  · Count did drop  when patient was treated for CLL on chlorambucil.    · Platelets also dropping on treatment with Venetoclax in the past.    · CLL in remission.  Platelet count currently low normal at 130,000.  Continue to monitor  · Platelets are stable at 136,000    3.  Iron deficiency anemia in the setting of recurrent GI bleeding with underlying AVMs, complicated by anticoagulation.   · Patient intolerant to oral iron.  · 1/2022: Patient with recurrent iron deficiency as further detailed below.  Patient referred to GI for further evaluation.  Will see Dr. Hinds 2/9/2022.  · Patient hospitalized 4/10 - 4/13/2022 for acute GI bleed.  EGD showing Hess's esophagus.  Colonoscopy showing angiectasias, cauterized.  During hospitalization, Coumadin was held.    · 4/22/2022 evaluated by gastroenterology, noted to have a declining hemoglobin so they advised him to be seen by our office.  Patient's Hgb dropping to 8.8, Iron saturation 8%, TIBC 403, ferritin 25.1.  Patient receiving IV Injectafer x2, 4/29 and 5/6/2022.  · 7/25-7/31/2022 hospitalized with recurrent GI bleeding in the setting of known AVMs and being on Xarelto. Xarelto held.  Patient undergoing EGD 7/28/2022 revealing multiple nonbleeding angioectasias treated with argon plasma coagulation.  Patient placed on a heparin drip and then transitioned back to Xarelto as cardiology felt he could not be off this for a long with new Watchman's device.  Hemoglobin at discharge 10.8.  · 8/15/2022 patient seen by GI with continued reports of GI bleeding with noted dark stools.  Hemoglobin hemoglobin having already dropped and just 2 weeks from 10.8 to 8.4, ferritin 22, iron sat 11%. Patient scheduled for GI for capsule endoscopy.  · 8/23/2022: Hgb 7.7.  Plan to transfuse patient and give additional IV iron with Injectafer x2.  Of note Xarelto was stopped and patient switched to Plavix with hopes of stopping GI bleeding.  · Patient undergoing EGD 9/15/2022 with 2 separate  angioectasias treated with argon plasma.  · 9/20/2022: Hgb improved to 11.5.  Iron studies pending though presumably improved following recent Injectafer.  Patient understands to call with recurrent bleeding.  · December 20, 2022 my concern is that his hemoglobin dropped to 10.5 we will check iron studies again  · March 28, 2023: Patient is s/p iron and his hemoglobin today is 15.1 with normal white count and platelet count.  His platelets are 132 which is stable and he has 70% neutrophils and 20% lymphocytes.  · 5/3/2023 hemoglobin today 12.7 which is down slightly from recent.  Ferritin iron studies however adequate with iron saturation 35% and ferritin 98.  Continue to monitor.  Patient denies bleeding.    4.  History of cluster headaches for which she has to be treated with steroids in the past and has followed up with Dr. Len Péreztoday with significant headaches.  · Patient had CT of the head 8/20/2020 which was negative.  · He was started on prednisone 10 mg daily which was not helpful.  · Patient saw Dr. Bobby, neurology who gave him 2 shots of a new medication Emgality.  This is something that can be given once a month.    · Resolved.    5.  Atrial fibrillation, off anticoagulation given his thrombocytopenia.  · Patient continues on aspirin 81 mg daily if platelet count greater than 60,000.  · Patient sees [facility] Christy at [facility] who follows his INR  · Coumadin held during patient's recent hospitalization April/2022.  Patient wishes to discontinue Coumadin permanently.    · Patient status post watchman's device 7/2022.  Started on Xarelto.  · 8/22/2022 Xarelto discontinued and switched to Plavix in the setting of ongoing GI bleeding per above.    6.  Chronic mild elevation of alkaline phosphatase.  Stable.      PLAN:   · Patient continues port flushes every 6 weeks.  · Patient will return in 6 weeks for review by nurse practitioner with CBC, CMP, ferritin, iron profile.  He will follow-up in 3 months with  Dr. Moya and the same labs.  Charlene Johansen, APRN  05/01/23      Cc: Dr. Kevin Gilmore

## 2023-05-03 ENCOUNTER — HOSPITAL ENCOUNTER (OUTPATIENT)
Dept: MRI IMAGING | Facility: HOSPITAL | Age: 73
Discharge: HOME | End: 2023-05-03
Payer: MEDICARE

## 2023-05-03 DIAGNOSIS — M75.42 ROTATOR CUFF IMPINGEMENT SYNDROME, LEFT: ICD-10-CM

## 2023-05-03 PROBLEM — D62 ACUTE BLOOD LOSS ANEMIA: Status: RESOLVED | Noted: 2022-07-26 | Resolved: 2023-05-03

## 2023-05-05 ENCOUNTER — TRANSCRIBE ORDERS (OUTPATIENT)
Dept: ADMINISTRATIVE | Facility: HOSPITAL | Age: 73
End: 2023-05-05
Payer: MEDICARE

## 2023-05-09 ENCOUNTER — TRANSCRIBE ORDERS (OUTPATIENT)
Dept: ADMINISTRATIVE | Facility: HOSPITAL | Age: 73
End: 2023-05-09
Payer: MEDICARE

## 2023-05-09 DIAGNOSIS — M25.512 PAIN OF LEFT SHOULDER REGION: ICD-10-CM

## 2023-05-24 ENCOUNTER — APPOINTMENT (OUTPATIENT)
Dept: CT IMAGING | Facility: HOSPITAL | Age: 73
End: 2023-05-24
Payer: MEDICARE

## 2023-05-24 ENCOUNTER — HOSPITAL ENCOUNTER (EMERGENCY)
Facility: HOSPITAL | Age: 73
Discharge: HOME | End: 2023-05-25
Attending: EMERGENCY MEDICINE | Admitting: EMERGENCY MEDICINE
Payer: MEDICARE

## 2023-05-24 DIAGNOSIS — R10.84 GENERALIZED ABDOMINAL PAIN: ICD-10-CM

## 2023-05-24 DIAGNOSIS — R11.2 NAUSEA AND VOMITING, UNSPECIFIED VOMITING TYPE: ICD-10-CM

## 2023-05-24 DIAGNOSIS — A04.0 ENTEROPATHOGENIC ESCHERICHIA COLI INFECTION: ICD-10-CM

## 2023-05-24 DIAGNOSIS — A08.0 ROTAVIRUS ENTERITIS: Primary | ICD-10-CM

## 2023-05-24 LAB
ADV 40+41 DNA STL QL NAA+NON-PROBE: NOT DETECTED
ALBUMIN SERPL-MCNC: 5.3 G/DL (ref 3.5–5.2)
ALBUMIN/GLOB SERPL: 2.2 G/DL
ALP SERPL-CCNC: 111 U/L (ref 39–117)
ALT SERPL W P-5'-P-CCNC: 47 U/L (ref 1–41)
ANION GAP SERPL CALCULATED.3IONS-SCNC: 14 MMOL/L (ref 5–15)
AST SERPL-CCNC: 46 U/L (ref 1–40)
ASTRO TYP 1-8 RNA STL QL NAA+NON-PROBE: NOT DETECTED
BASOPHILS # BLD AUTO: 0.02 10*3/MM3 (ref 0–0.2)
BASOPHILS NFR BLD AUTO: 0.2 % (ref 0–1.5)
BILIRUB SERPL-MCNC: 0.9 MG/DL (ref 0–1.2)
BUN SERPL-MCNC: 32 MG/DL (ref 8–23)
BUN/CREAT SERPL: 26.2 (ref 7–25)
C CAYETANENSIS DNA STL QL NAA+NON-PROBE: NOT DETECTED
C COLI+JEJ+UPSA DNA STL QL NAA+NON-PROBE: NOT DETECTED
C DIFF TOX GENS STL QL NAA+PROBE: NEGATIVE
CALCIUM SPEC-SCNC: 9.6 MG/DL (ref 8.6–10.5)
CHLORIDE SERPL-SCNC: 99 MMOL/L (ref 98–107)
CO2 SERPL-SCNC: 26 MMOL/L (ref 22–29)
CREAT SERPL-MCNC: 1.22 MG/DL (ref 0.76–1.27)
CRYPTOSP DNA STL QL NAA+NON-PROBE: NOT DETECTED
DEPRECATED RDW RBC AUTO: 47.7 FL (ref 37–54)
E HISTOLYT DNA STL QL NAA+NON-PROBE: NOT DETECTED
EAEC PAA PLAS AGGR+AATA ST NAA+NON-PRB: NOT DETECTED
EC STX1+STX2 GENES STL QL NAA+NON-PROBE: NOT DETECTED
EGFRCR SERPLBLD CKD-EPI 2021: 62.6 ML/MIN/1.73
EOSINOPHIL # BLD AUTO: 0 10*3/MM3 (ref 0–0.4)
EOSINOPHIL NFR BLD AUTO: 0 % (ref 0.3–6.2)
EPEC EAE GENE STL QL NAA+NON-PROBE: DETECTED
ERYTHROCYTE [DISTWIDTH] IN BLOOD BY AUTOMATED COUNT: 13.4 % (ref 12.3–15.4)
ETEC LTA+ST1A+ST1B TOX ST NAA+NON-PROBE: NOT DETECTED
G LAMBLIA DNA STL QL NAA+NON-PROBE: NOT DETECTED
GLOBULIN UR ELPH-MCNC: 2.4 GM/DL
GLUCOSE SERPL-MCNC: 229 MG/DL (ref 65–99)
HCT VFR BLD AUTO: 49.8 % (ref 37.5–51)
HGB BLD-MCNC: 16.9 G/DL (ref 13–17.7)
IMM GRANULOCYTES # BLD AUTO: 0.08 10*3/MM3 (ref 0–0.05)
IMM GRANULOCYTES NFR BLD AUTO: 0.8 % (ref 0–0.5)
LIPASE SERPL-CCNC: 20 U/L (ref 13–60)
LYMPHOCYTES # BLD AUTO: 0.82 10*3/MM3 (ref 0.7–3.1)
LYMPHOCYTES NFR BLD AUTO: 8 % (ref 19.6–45.3)
MAGNESIUM SERPL-MCNC: 1.8 MG/DL (ref 1.6–2.4)
MCH RBC QN AUTO: 32.5 PG (ref 26.6–33)
MCHC RBC AUTO-ENTMCNC: 33.9 G/DL (ref 31.5–35.7)
MCV RBC AUTO: 95.8 FL (ref 79–97)
MONOCYTES # BLD AUTO: 0.73 10*3/MM3 (ref 0.1–0.9)
MONOCYTES NFR BLD AUTO: 7.1 % (ref 5–12)
NEUTROPHILS NFR BLD AUTO: 8.56 10*3/MM3 (ref 1.7–7)
NEUTROPHILS NFR BLD AUTO: 83.9 % (ref 42.7–76)
NOROVIRUS GI+II RNA STL QL NAA+NON-PROBE: NOT DETECTED
NRBC BLD AUTO-RTO: 0 /100 WBC (ref 0–0.2)
P SHIGELLOIDES DNA STL QL NAA+NON-PROBE: NOT DETECTED
PLATELET # BLD AUTO: 146 10*3/MM3 (ref 140–450)
PMV BLD AUTO: 9.9 FL (ref 6–12)
POTASSIUM SERPL-SCNC: 3.9 MMOL/L (ref 3.5–5.2)
PROT SERPL-MCNC: 7.7 G/DL (ref 6–8.5)
RBC # BLD AUTO: 5.2 10*6/MM3 (ref 4.14–5.8)
RVA RNA STL QL NAA+NON-PROBE: DETECTED
S ENT+BONG DNA STL QL NAA+NON-PROBE: NOT DETECTED
SAPO I+II+IV+V RNA STL QL NAA+NON-PROBE: NOT DETECTED
SHIGELLA SP+EIEC IPAH ST NAA+NON-PROBE: NOT DETECTED
SODIUM SERPL-SCNC: 139 MMOL/L (ref 136–145)
V CHOL+PARA+VUL DNA STL QL NAA+NON-PROBE: NOT DETECTED
V CHOLERAE DNA STL QL NAA+NON-PROBE: NOT DETECTED
WBC NRBC COR # BLD: 10.21 10*3/MM3 (ref 3.4–10.8)
Y ENTEROCOL DNA STL QL NAA+NON-PROBE: NOT DETECTED

## 2023-05-24 PROCEDURE — 87507 IADNA-DNA/RNA PROBE TQ 12-25: CPT | Performed by: PHYSICIAN ASSISTANT

## 2023-05-24 PROCEDURE — 96361 HYDRATE IV INFUSION ADD-ON: CPT

## 2023-05-24 PROCEDURE — 74177 CT ABD & PELVIS W/CONTRAST: CPT

## 2023-05-24 PROCEDURE — 25510000001 IOPAMIDOL 61 % SOLUTION: Performed by: EMERGENCY MEDICINE

## 2023-05-24 PROCEDURE — 80053 COMPREHEN METABOLIC PANEL: CPT | Performed by: EMERGENCY MEDICINE

## 2023-05-24 PROCEDURE — 83690 ASSAY OF LIPASE: CPT | Performed by: EMERGENCY MEDICINE

## 2023-05-24 PROCEDURE — 25010000002 ONDANSETRON PER 1 MG: Performed by: EMERGENCY MEDICINE

## 2023-05-24 PROCEDURE — 99283 EMERGENCY DEPT VISIT LOW MDM: CPT

## 2023-05-24 PROCEDURE — 96372 THER/PROPH/DIAG INJ SC/IM: CPT

## 2023-05-24 PROCEDURE — 96374 THER/PROPH/DIAG INJ IV PUSH: CPT

## 2023-05-24 PROCEDURE — 83735 ASSAY OF MAGNESIUM: CPT | Performed by: EMERGENCY MEDICINE

## 2023-05-24 PROCEDURE — 87493 C DIFF AMPLIFIED PROBE: CPT | Performed by: PHYSICIAN ASSISTANT

## 2023-05-24 PROCEDURE — 25010000002 DICYCLOMINE PER 20 MG: Performed by: PHYSICIAN ASSISTANT

## 2023-05-24 PROCEDURE — 85025 COMPLETE CBC W/AUTO DIFF WBC: CPT | Performed by: EMERGENCY MEDICINE

## 2023-05-24 RX ORDER — DICYCLOMINE HYDROCHLORIDE 10 MG/ML
20 INJECTION INTRAMUSCULAR ONCE
Status: COMPLETED | OUTPATIENT
Start: 2023-05-24 | End: 2023-05-24

## 2023-05-24 RX ORDER — DICYCLOMINE HYDROCHLORIDE 10 MG/1
20 CAPSULE ORAL ONCE
Status: COMPLETED | OUTPATIENT
Start: 2023-05-24 | End: 2023-05-24

## 2023-05-24 RX ORDER — DICYCLOMINE HCL 20 MG
20 TABLET ORAL EVERY 6 HOURS PRN
Qty: 60 TABLET | Refills: 0 | Status: SHIPPED | OUTPATIENT
Start: 2023-05-24

## 2023-05-24 RX ORDER — LOPERAMIDE HYDROCHLORIDE 2 MG/1
2 CAPSULE ORAL 4 TIMES DAILY PRN
Qty: 60 CAPSULE | Refills: 0 | Status: SHIPPED | OUTPATIENT
Start: 2023-05-24

## 2023-05-24 RX ORDER — ONDANSETRON 2 MG/ML
4 INJECTION INTRAMUSCULAR; INTRAVENOUS ONCE
Status: COMPLETED | OUTPATIENT
Start: 2023-05-24 | End: 2023-05-24

## 2023-05-24 RX ADMIN — IOPAMIDOL 100 ML: 612 INJECTION, SOLUTION INTRAVENOUS at 20:15

## 2023-05-24 RX ADMIN — SODIUM CHLORIDE, POTASSIUM CHLORIDE, SODIUM LACTATE AND CALCIUM CHLORIDE 1000 ML: 600; 310; 30; 20 INJECTION, SOLUTION INTRAVENOUS at 21:18

## 2023-05-24 RX ADMIN — DICYCLOMINE HYDROCHLORIDE 20 MG: 10 CAPSULE ORAL at 22:40

## 2023-05-24 RX ADMIN — DICYCLOMINE HYDROCHLORIDE 20 MG: 10 INJECTION, SOLUTION INTRAMUSCULAR at 19:21

## 2023-05-24 RX ADMIN — ONDANSETRON 4 MG: 2 INJECTION INTRAMUSCULAR; INTRAVENOUS at 18:47

## 2023-05-24 RX ADMIN — SODIUM CHLORIDE 1000 ML: 9 INJECTION, SOLUTION INTRAVENOUS at 18:47

## 2023-05-24 NOTE — ED PROVIDER NOTES
EMERGENCY DEPARTMENT ENCOUNTER    Room Number:  19/19  Date seen:  5/25/2023  PCP: Len Collier MD        HPI:  Chief Complaint: Vomiting and diarrhea    Context: Macho Stephenson is a 72 y.o. male who presents to the ED c/o vomiting and diarrhea that started 2 days ago Monday.  Patient reports he has had intractable vomiting and diarrhea.  No blood noted in the emesis or stools.  Wife recently recovered from a several month infection with C. difficile.  Reports associated generalized abdominal pain.  No injury or trauma to the abdomen.  Patient denies associated fever, syncope, chest pain, dyspnea.  No recent changes in medications.  Not on active chemo or immunotherapy.  Patient has had prior cholecystectomy.  No other systemic complaints at this time.  Patient endorses tobacco and alcohol use.      External (non-ED) record review:   · Reviewed note from office visit with oncology on 5/1/2023 where patient seen for iron deficiency anemia and CLL.  Reviewed assessment and plan.  Patient to continue port flushes every 6 weeks, return to office in 6 weeks for labs, and follow-up with physician in 3 months.  · Reviewed recent laboratory studies.  Most recent CBC with hemoglobin 12.7.  Most recent CMP with creatinine 0.83.      PAST MEDICAL HISTORY  Active Ambulatory Problems     Diagnosis Date Noted   • CLL (chronic lymphocytic leukemia) (HCC) 03/19/2017   • Acute on chronic anemia 10/10/2019   • Encounter for hepatitis C screening test for low risk patient  07/17/2020   • Thrombocytopenia 07/17/2020   • H/O heart artery stent 02/24/2011   • Stented coronary artery 07/24/2020   • Arteriosclerosis of coronary artery 04/21/2016   • Paroxysmal atrial fibrillation 07/24/2020   • Paroxysmal atrial fibrillation (HCC) 10/29/2014   • Fall 04/11/2020   • Fracture, olecranon 04/11/2020   • Hyperlipidemia 07/24/2020   • Long term (current) use of anticoagulants 10/25/2018   • Lower GI bleed 07/11/2020   • Olecranon  bursitis 04/11/2020   • Shoulder pain 04/11/2020   • Smoker 12/05/2019   • CLL (chronic lymphocytic leukemia) 12/01/2017   • Dehydration 08/20/2020   • Drug-induced nausea and vomiting 10/12/2020   • Encounter for long-term (current) use of other medications 11/09/2020   • Neutropenia 11/09/2020   • Calculus of gallbladder without cholecystitis without obstruction 09/27/2021   • Cholecystitis 10/14/2021   • Iron deficiency 01/11/2022   • Adverse effect of iron 01/12/2022   • Melena 02/15/2022   • Change in bowel habits 02/15/2022   • Acute upper GI bleed 04/10/2022   • Acute blood loss anemia 04/11/2022   • Type 2 diabetes mellitus, without long-term current use of insulin 04/11/2022   • Prolonged Q-T interval on ECG 07/25/2022   • Chest pain with high risk for cardiac etiology 07/25/2022   • Hyponatremia 07/25/2022   • GI bleed 07/26/2022   • Iron deficiency anemia due to chronic blood loss 08/23/2022   • Abnormal CT scan, colon 12/28/2022   • Diarrhea 12/28/2022   • Segmental colitis associated with diverticulosis 02/07/2023     Resolved Ambulatory Problems     Diagnosis Date Noted   • Leukocytosis 07/11/2016   • Chest pain 04/21/2016   • Acute blood loss anemia 07/26/2022     Past Medical History:   Diagnosis Date   • A-fib    • Anemia    • Hess's esophagus    • CAD (coronary artery disease)    • Chronic lymphatic leukemia    • Colon polyps 05/07/2007   • Colon polyps 07/16/2019   • Constipation    • Dark stools    • Diabetes mellitus    • Gall stones    • GERD (gastroesophageal reflux disease)    • Gout    • Heart murmur    • History of transfusion    • Hypertension    • Low back pain    • Presence of Watchman left atrial appendage closure device    • RLS (restless legs syndrome)          PAST SURGICAL HISTORY  Past Surgical History:   Procedure Laterality Date   • ANKLE FUSION Right     Fusion hardware   • BONE MARROW BIOPSY Left 10/28/2020    CT guided left iliac crest biopsy and FNA-Dr. Darius Reynolds,  [facility]   • CARDIAC CATHETERIZATION  02/2011    [facility]   • CARDIOVERSION  04/22/2016    [facility]   • CHOLECYSTECTOMY     • CHOLECYSTECTOMY WITH INTRAOPERATIVE CHOLANGIOGRAM N/A 10/01/2021    Procedure: CHOLECYSTECTOMY LAPAROSCOPIC INTRAOPERATIVE CHOLANGIOGRAM;  Surgeon: Darius Lui MD;  Location:  [facility];  Service: General;  Laterality: N/A;   • COLONOSCOPY N/A 2012   • COLONOSCOPY N/A 07/12/2020    [facility]   • COLONOSCOPY N/A 03/22/2022    Procedure: COLONOSCOPY FOR SCREENING;  Surgeon: Velasquez Hinds MD;  Location: [facility];  Service: Gastroenterology;  Laterality: N/A;  Diverticulosis, polyps,   APC of angiodysplasia right colon   • COLONOSCOPY W/ POLYPECTOMY N/A 05/07/2007    Dr. Vivek Siddiqui, [facility]   • CORONARY ANGIOPLASTY WITH STENT PLACEMENT      x3   • CORONARY STENT PLACEMENT  02/2011    [facility], Promus CARLOS x 2 proximal to mid RCA, LAD x 1   • ENDOSCOPY N/A 07/15/2019   • ENDOSCOPY N/A 03/22/2022    Procedure: ESOPHAGOGASTRODUODENOSCOPY;  Surgeon: Velasquez Hinds MD;  Location: [facility];  Service: Gastroenterology;  Laterality: N/A;  APC-duodenal bulb, esophagitis, hiatal hernia, Angioectasis of duodenum   • ENDOSCOPY N/A 7/28/2022    Procedure: ESOPHAGOGASTRODUODENOSCOPY WITH APC AND X2 RESOLUTION CLIPS;  Surgeon: Luis Daniel Walls MD;  [facility];  Service: Gastroenterology;  Laterality: N/A;  PREOP/ GI BLEED  POSTOP/ AVM's IN STOMACH   • ENDOSCOPY N/A 9/15/2022    Procedure: ESOPHAGOGASTRODUODENOSCOPY WITH APC;  Surgeon: Velasquez Hinds MD;  [facility];  Service: Gastroenterology;  Laterality: N/A;  Pre: anemia, melena  Post: 2 angioectasias, hiatal hernia   • ENDOSCOPY AND COLONOSCOPY N/A 07/15/2019    Dr. Ulloa   • ENTEROSCOPY SMALL BOWEL N/A 04/13/2022    Procedure: SMALL BOWEL ENTEROSCOPY;  Surgeon: Luis Daniel Walls MD;  [facility];  Service: Gastroenterology;  Laterality:  N/A;  PRE- OBSCURE GI BLEEDING  POST- NON BLEEDING DUODENITIS   • ENTEROSCOPY SMALL BOWEL N/A 9/15/2022    Procedure: SMALL BOWEL ENTEROSCOPY;  Surgeon: Velasquez Hinds MD;  Location: [facility] ENDOSCOPY;  Service: Gastroenterology;  Laterality: N/A;   • SHOULDER SURGERY Left 2007   • SIGMOIDOSCOPY N/A 1/10/2023    Procedure: FLEXIBLE SIGMOIDOSCOPY WITH BIOPSY;  Surgeon: Velasquez Hinds MD;  Location: [facility] OR;  Service: Gastroenterology;  Laterality: N/A;  DIVERTICULOSIS, COLITIS   • TONSILLECTOMY Bilateral    • VENOUS ACCESS DEVICE (PORT) INSERTION N/A 07/31/2020    Procedure: INSERTION VENOUS ACCESS DEVICE;  Surgeon: Darius Lui MD;  [place] [facility];  Service: General;  Laterality: N/A;         FAMILY HISTORY  Family History   Problem Relation Age of Onset   • Bone cancer Mother    • Heart attack Father    • Diabetes Father    • Diabetes Sister    • Malig Hyperthermia Neg Hx    • Colon cancer Neg Hx    • Colon polyps Neg Hx    • Crohn's disease Neg Hx    • Irritable bowel syndrome Neg Hx    • Ulcerative colitis Neg Hx          SOCIAL HISTORY  Social History     Socioeconomic History   • Marital status:      Spouse name: Nohemi   Tobacco Use   • Smoking status: Every Day     Packs/day: 0.50     Years: 40.00     Pack years: 20.00     Types: Cigarettes   • Smokeless tobacco: Never   • Tobacco comments:     1/2 PPD   Vaping Use   • Vaping Use: Never used   Substance and Sexual Activity   • Alcohol use: Yes     Alcohol/week: 2.0 standard drinks     Types: 2 Cans of beer per week     Comment: rare   • Drug use: Never   • Sexual activity: Defer         ALLERGIES  Penicillins        REVIEW OF SYSTEMS  Review of Systems   Constitutional: Negative for chills and fever.   HENT: Negative for ear pain and sore throat.    Respiratory: Negative for cough and shortness of breath.    Cardiovascular: Negative for chest pain and palpitations.   Gastrointestinal: Positive for abdominal pain, diarrhea,  nausea and vomiting.   Genitourinary: Negative for dysuria and hematuria.   Musculoskeletal: Negative for arthralgias and joint swelling.   Skin: Negative for pallor and rash.   Neurological: Negative for syncope and headaches.   Psychiatric/Behavioral: Negative for confusion and hallucinations.            PHYSICAL EXAM  ED Triage Vitals   Temp Heart Rate Resp BP SpO2   05/24/23 1723 05/24/23 1723 05/24/23 1723 05/24/23 1724 05/24/23 1723   98 °F (36.7 °C) 79 18 140/85 98 %      Temp src Heart Rate Source Patient Position BP Location FiO2 (%)   -- -- -- -- --              Physical Exam  Constitutional:       General: He is not in acute distress.     Appearance: Normal appearance.   HENT:      Head: Normocephalic and atraumatic.      Nose: Nose normal.      Mouth/Throat:      Mouth: Mucous membranes are dry.   Eyes:      Conjunctiva/sclera: Conjunctivae normal.      Pupils: Pupils are equal, round, and reactive to light.   Cardiovascular:      Rate and Rhythm: Normal rate and regular rhythm.      Pulses: Normal pulses.      Heart sounds: Normal heart sounds.   Pulmonary:      Effort: Pulmonary effort is normal.      Breath sounds: Normal breath sounds.   Abdominal:      General: There is no distension.   Musculoskeletal:         General: Normal range of motion.      Cervical back: Normal range of motion and neck supple.   Skin:     General: Skin is warm.      Capillary Refill: Capillary refill takes less than 2 seconds.   Neurological:      General: No focal deficit present.      Mental Status: He is alert and oriented to person, place, and time.   Psychiatric:         Mood and Affect: Mood normal.         Vital signs and nursing notes reviewed.          LAB RESULTS  Recent Results (from the past 24 hour(s))   Comprehensive Metabolic Panel    Collection Time: 05/24/23  6:46 PM    Specimen: Blood   Result Value Ref Range    Glucose 229 (H) 65 - 99 mg/dL    BUN 32 (H) 8 - 23 mg/dL    Creatinine 1.22 0.76 - 1.27 mg/dL     Sodium 139 136 - 145 mmol/L    Potassium 3.9 3.5 - 5.2 mmol/L    Chloride 99 98 - 107 mmol/L    CO2 26.0 22.0 - 29.0 mmol/L    Calcium 9.6 8.6 - 10.5 mg/dL    Total Protein 7.7 6.0 - 8.5 g/dL    Albumin 5.3 (H) 3.5 - 5.2 g/dL    ALT (SGPT) 47 (H) 1 - 41 U/L    AST (SGOT) 46 (H) 1 - 40 U/L    Alkaline Phosphatase 111 39 - 117 U/L    Total Bilirubin 0.9 0.0 - 1.2 mg/dL    Globulin 2.4 gm/dL    A/G Ratio 2.2 g/dL    BUN/Creatinine Ratio 26.2 (H) 7.0 - 25.0    Anion Gap 14.0 5.0 - 15.0 mmol/L    eGFR 62.6 >60.0 mL/min/1.73   Lipase    Collection Time: 05/24/23  6:46 PM    Specimen: Blood   Result Value Ref Range    Lipase 20 13 - 60 U/L   Magnesium    Collection Time: 05/24/23  6:46 PM    Specimen: Blood   Result Value Ref Range    Magnesium 1.8 1.6 - 2.4 mg/dL   CBC Auto Differential    Collection Time: 05/24/23  6:46 PM    Specimen: Blood   Result Value Ref Range    WBC 10.21 3.40 - 10.80 10*3/mm3    RBC 5.20 4.14 - 5.80 10*6/mm3    Hemoglobin 16.9 13.0 - 17.7 g/dL    Hematocrit 49.8 37.5 - 51.0 %    MCV 95.8 79.0 - 97.0 fL    MCH 32.5 26.6 - 33.0 pg    MCHC 33.9 31.5 - 35.7 g/dL    RDW 13.4 12.3 - 15.4 %    RDW-SD 47.7 37.0 - 54.0 fl    MPV 9.9 6.0 - 12.0 fL    Platelets 146 140 - 450 10*3/mm3    Neutrophil % 83.9 (H) 42.7 - 76.0 %    Lymphocyte % 8.0 (L) 19.6 - 45.3 %    Monocyte % 7.1 5.0 - 12.0 %    Eosinophil % 0.0 (L) 0.3 - 6.2 %    Basophil % 0.2 0.0 - 1.5 %    Immature Grans % 0.8 (H) 0.0 - 0.5 %    Neutrophils, Absolute 8.56 (H) 1.70 - 7.00 10*3/mm3    Lymphocytes, Absolute 0.82 0.70 - 3.10 10*3/mm3    Monocytes, Absolute 0.73 0.10 - 0.90 10*3/mm3    Eosinophils, Absolute 0.00 0.00 - 0.40 10*3/mm3    Basophils, Absolute 0.02 0.00 - 0.20 10*3/mm3    Immature Grans, Absolute 0.08 (H) 0.00 - 0.05 10*3/mm3    nRBC 0.0 0.0 - 0.2 /100 WBC   Gastrointestinal Panel, PCR - Stool, Per Rectum    Collection Time: 05/24/23  7:01 PM    Specimen: Per Rectum; Stool   Result Value Ref Range    Campylobacter Not Detected  Not Detected    Plesiomonas shigelloides Not Detected Not Detected    Salmonella Not Detected Not Detected    Vibrio Not Detected Not Detected    Vibrio cholerae Not Detected Not Detected    Yersinia enterocolitica Not Detected Not Detected    Enteroaggregative E. coli (EAEC) Not Detected Not Detected    Enteropathogenic E. coli (EPEC) Detected (A) Not Detected    Enterotoxigenic E. coli (ETEC) lt/st Not Detected Not Detected    Shiga-like toxin-producing E. coli (STEC) stx1/stx2 Not Detected Not Detected    Shigella/Enteroinvasive E. coli (EIEC) Not Detected Not Detected    Cryptosporidium Not Detected Not Detected    Cyclospora cayetanensis Not Detected Not Detected    Entamoeba histolytica Not Detected Not Detected    Giardia lamblia Not Detected Not Detected    Adenovirus F40/41 Not Detected Not Detected    Astrovirus Not Detected Not Detected    Norovirus GI/GII Not Detected Not Detected    Rotavirus A Detected (A) Not Detected    Sapovirus (I, II, IV or V) Not Detected Not Detected   Clostridioides difficile Toxin, PCR - Stool, Per Rectum    Collection Time: 05/24/23  7:01 PM    Specimen: Per Rectum; Stool   Result Value Ref Range    C. Difficile Toxins by PCR Negative Negative       Ordered the above labs and reviewed the results.        RADIOLOGY  CT Abdomen Pelvis With Contrast    Result Date: 5/24/2023  CT ABDOMEN PELVIS W CONTRAST-  Radiation dose reduction techniques were utilized, including automated exposure control and exposure modulation based on body size.  Clinical: Generalized abdominal pain with vomiting and diarrhea  COMPARISON examination 12/12/2022  FINDINGS: The liver, pancreas, spleen and adrenal glands have a satisfactory appearance. No biliary duct dilatation status post cholecystectomy. Stomach is collapsed, contour within normal limits, no duodenal abnormality seen. There are bilateral renal cysts, no suspicious renal lesion or calculus. No obstructive uropathy. The ureters are normal in  course and caliber to the urinary bladder which is typical in appearance. No bladder stone or wall thickening. Prostate is heterogenous with central calcifications.  Infrarenal aortic aneurysm, maximum transverse diameter is 4 cm similar to the previous examination.  The appendix is normal. Minimal diverticulosis of the colon, no diverticulitis. There is combination of fluid and formed fecal material within the colon. No: Wall thickening. The small bowel is satisfactory in appearance. No free air or free intraperitoneal fluid. L5 spondylolysis and spondylolisthesis.  CONCLUSION: There is diverticulosis of the colon without diverticulitis. There is combination of fluid and formed fecal material within the colon, no colon wall thickening. The appendix and small bowel have a normal appearance. No biliary duct dilatation status post cholecystectomy, stable infrarenal aortic aneurysm, small bilateral renal cysts again seen.   This report was finalized on 5/24/2023 8:41 PM by Dr. Toy Parnell M.D.        Ordered the above noted radiological studies. Reviewed by me in PACS.            MEDICATIONS GIVEN IN ER  Medications   ondansetron (ZOFRAN) injection 4 mg (4 mg Intravenous Given by Other 5/24/23 1847)   sodium chloride 0.9 % bolus 1,000 mL (0 mL Intravenous Stopped 5/24/23 2118)   lactated ringers bolus 1,000 mL (0 mL Intravenous Stopped 5/24/23 2240)   dicyclomine (BENTYL) injection 20 mg (20 mg Intramuscular Given 5/24/23 1921)   iopamidol (ISOVUE-300) 61 % injection 100 mL (100 mL Intravenous Given 5/24/23 2015)   dicyclomine (BENTYL) capsule 20 mg (20 mg Oral Given 5/24/23 2240)                   MEDICAL DECISION MAKING, PROGRESS, and CONSULTS    All labs have been independently reviewed by me.  All radiology studies have been reviewed by me and I have also reviewed the radiology report.   EKG's independently viewed and interpreted by me.  Discussion below represents my analysis of pertinent findings related to  patient's condition, differential diagnosis, treatment plan and final disposition.      Additional sources:    - Chronic or social conditions impacting care: CLL        Orders placed during this visit:  Orders Placed This Encounter   Procedures   • Gastrointestinal Panel, PCR - Stool, Per Rectum   • Clostridioides difficile Toxin - Stool, Per Rectum   • Clostridioides difficile Toxin, PCR - Stool, Per Rectum   • CT Abdomen Pelvis With Contrast   • Comprehensive Metabolic Panel   • Lipase   • Urinalysis With Microscopic If Indicated (No Culture) - Urine, Clean Catch   • Magnesium   • CBC Auto Differential   • Patient Isolation Contact Spore   • CBC & Differential         Additional orders considered but not ordered:  Opioid pain medication    Differential diagnosis:  Viral gastroenteritis, C. difficile colitis, food poisoning      Independent interpretation of labs, radiology studies, and discussions with consultants:  ED Course as of 05/25/23 0006   Wed May 24, 2023   2018 BUN(!): 32 [MP]   2018 Creatinine: 1.22 [MP]   2019 CT scan of abdomen and pelvis independently interpreted by me as no small bowel obstruction [MP]   2050 Enteropathogenic E. coli (EPEC)(!): Detected [MP]   2343 Patient feels improved after p.o. Bentyl and has not had any further vomiting.  Will discharge with Bentyl, encouraged continued oral hydration.  Patient feels comfortable and wishes to be discharged home. [MP]   2344 Rotavirus A(!): Detected [MP]   Thu May 25, 2023   0005 Patient presents with diarrhea and vomiting, worked up today with labs, UA, C. difficile, GI PCR, CT abdomen and pelvis.  Noted to be positive for EPEC and rotavirus on GI PCR.  Patient continues to have diarrhea but abdominal pain improved after Bentyl.  He is tolerating p.o. without difficulty.  Encouraged him to continue oral hydration at home, will discharge with Imodium and Bentyl to help with symptoms.  Encourage close PCP follow-up and discussed ED return  precautions.  He is otherwise well-appearing, hemodynamically stable, and therefore appropriate for discharge. [MP]      ED Course User Index  [MP] Gabriela Chapman, KISHOR             Patient was wearing a face mask when I entered the room and they continued to wear a mask throughout their stay in the ED.  I wore PPE, including  gloves, face mask with shield or face mask with goggles whenever I was in the room with patient.     DIAGNOSIS  Final diagnoses:   Rotavirus enteritis   Enteropathogenic Escherichia coli infection   Nausea and vomiting, unspecified vomiting type   Generalized abdominal pain           Follow Up:  Len Collier MD  [address]  [facility] [address] [phone]    Schedule an appointment as soon as possible for a visit   For follow-up of your GI infections      RX:     Medication List      New Prescriptions    loperamide 2 MG capsule  Commonly known as: IMODIUM  Take 1 capsule by mouth 4 (Four) Times a Day As Needed for Diarrhea.        Changed    * dicyclomine 10 MG capsule  Commonly known as: BENTYL  Take 1 capsule by mouth 3 (Three) Times a Day As Needed (abdominal pain/diarrhea). Begin taking 1 tab twice day, can increase to 1 tablet three times as a day as tolerated  What changed: Another medication with the same name was added. Make sure you understand how and when to take each.     * dicyclomine 20 MG tablet  Commonly known as: BENTYL  Take 1 tablet by mouth Every 6 (Six) Hours As Needed (Abdominal pain).  What changed: You were already taking a medication with the same name, and this prescription was added. Make sure you understand how and when to take each.         * This list has 2 medication(s) that are the same as other medications prescribed for you. Read the directions carefully, and ask your doctor or other care provider to review them with you.               Where to Get Your Medications      You can get these medications from any pharmacy    Bring a paper  prescription for each of these medications  · dicyclomine 20 MG tablet  · loperamide 2 MG capsule         Latest Documented Vital Signs:  As of 00:06 EDT  BP- 136/87 HR- 73 Temp- 98 °F (36.7 °C) O2 sat- 96%              --    Please note that portions of this were completed with a voice recognition program.       Note Disclaimer: At [facility], we believe that sharing information builds trust and better relationships. You are receiving this note because you are receiving care at [facility] or recently visited. It is possible you will see health information before a provider has talked with you about it. This kind of information can be easy to misunderstand. To help you fully understand what it means for your health, we urge you to discuss this note with your provider.           Gabriela Chapman PA-C  05/25/23 0007

## 2023-05-25 VITALS
WEIGHT: 161 LBS | RESPIRATION RATE: 18 BRPM | OXYGEN SATURATION: 96 % | HEART RATE: 73 BPM | DIASTOLIC BLOOD PRESSURE: 87 MMHG | BODY MASS INDEX: 21.81 KG/M2 | HEIGHT: 72 IN | TEMPERATURE: 98 F | SYSTOLIC BLOOD PRESSURE: 136 MMHG

## 2023-05-25 NOTE — DISCHARGE INSTRUCTIONS
Follow-up with your primary care provider.  Use dicyclomine and Imodium as prescribed to help with your diarrhea and abdominal pain.  Use home nausea medicine to help with nausea and vomiting.  Drink plenty of clear liquids to replace the fluids that you are losing through your diarrhea.  Return to emergency department for any new or worsening symptoms.

## 2023-06-09 ENCOUNTER — TRANSCRIBE ORDERS (OUTPATIENT)
Dept: ADMINISTRATIVE | Facility: HOSPITAL | Age: 73
End: 2023-06-09
Payer: MEDICARE

## 2023-06-09 DIAGNOSIS — M25.512 PAIN OF LEFT SHOULDER REGION: Primary | ICD-10-CM

## 2023-06-12 ENCOUNTER — INFUSION (OUTPATIENT)
Dept: ONCOLOGY | Facility: HOSPITAL | Age: 73
End: 2023-06-12
Payer: MEDICARE

## 2023-06-12 ENCOUNTER — OFFICE VISIT (OUTPATIENT)
Dept: ONCOLOGY | Facility: CLINIC | Age: 73
End: 2023-06-12
Payer: MEDICARE

## 2023-06-12 VITALS
SYSTOLIC BLOOD PRESSURE: 144 MMHG | WEIGHT: 161.2 LBS | OXYGEN SATURATION: 97 % | DIASTOLIC BLOOD PRESSURE: 71 MMHG | TEMPERATURE: 98.4 F | HEIGHT: 72 IN | BODY MASS INDEX: 21.83 KG/M2 | RESPIRATION RATE: 16 BRPM | HEART RATE: 57 BPM

## 2023-06-12 DIAGNOSIS — C91.10 CLL (CHRONIC LYMPHOCYTIC LEUKEMIA): ICD-10-CM

## 2023-06-12 DIAGNOSIS — D69.6 THROMBOCYTOPENIA: ICD-10-CM

## 2023-06-12 DIAGNOSIS — D64.9 ACUTE ON CHRONIC ANEMIA: Primary | ICD-10-CM

## 2023-06-12 DIAGNOSIS — C91.10 CLL (CHRONIC LYMPHOCYTIC LEUKEMIA): Primary | ICD-10-CM

## 2023-06-12 LAB
ALBUMIN SERPL-MCNC: 3.5 G/DL (ref 3.5–5.2)
ALBUMIN/GLOB SERPL: 1.6 G/DL (ref 1.1–2.4)
ALP SERPL-CCNC: 119 U/L (ref 38–116)
ALT SERPL W P-5'-P-CCNC: 22 U/L (ref 0–41)
ANION GAP SERPL CALCULATED.3IONS-SCNC: 11 MMOL/L (ref 5–15)
AST SERPL-CCNC: 21 U/L (ref 0–40)
BASOPHILS # BLD AUTO: 0.02 10*3/MM3 (ref 0–0.2)
BASOPHILS NFR BLD AUTO: 0.4 % (ref 0–1.5)
BILIRUB SERPL-MCNC: 0.5 MG/DL (ref 0.2–1.2)
BUN SERPL-MCNC: 9 MG/DL (ref 6–20)
BUN/CREAT SERPL: 10.8 (ref 7.3–30)
CALCIUM SPEC-SCNC: 9 MG/DL (ref 8.5–10.2)
CHLORIDE SERPL-SCNC: 102 MMOL/L (ref 98–107)
CO2 SERPL-SCNC: 24 MMOL/L (ref 22–29)
CREAT SERPL-MCNC: 0.83 MG/DL (ref 0.7–1.3)
DEPRECATED RDW RBC AUTO: 50.7 FL (ref 37–54)
EGFRCR SERPLBLD CKD-EPI 2021: 92.4 ML/MIN/1.73
EOSINOPHIL # BLD AUTO: 0.1 10*3/MM3 (ref 0–0.4)
EOSINOPHIL NFR BLD AUTO: 2 % (ref 0.3–6.2)
ERYTHROCYTE [DISTWIDTH] IN BLOOD BY AUTOMATED COUNT: 14.1 % (ref 12.3–15.4)
FERRITIN SERPL-MCNC: 50.2 NG/ML (ref 30–400)
GLOBULIN UR ELPH-MCNC: 2.2 GM/DL (ref 1.8–3.5)
GLUCOSE SERPL-MCNC: 287 MG/DL (ref 74–124)
HCT VFR BLD AUTO: 37.7 % (ref 37.5–51)
HGB BLD-MCNC: 12.1 G/DL (ref 13–17.7)
IMM GRANULOCYTES # BLD AUTO: 0.04 10*3/MM3 (ref 0–0.05)
IMM GRANULOCYTES NFR BLD AUTO: 0.8 % (ref 0–0.5)
IRON 24H UR-MRATE: 71 MCG/DL (ref 59–158)
IRON SATN MFR SERPL: 22 % (ref 14–48)
LYMPHOCYTES # BLD AUTO: 1.09 10*3/MM3 (ref 0.7–3.1)
LYMPHOCYTES NFR BLD AUTO: 21.7 % (ref 19.6–45.3)
MCH RBC QN AUTO: 32.2 PG (ref 26.6–33)
MCHC RBC AUTO-ENTMCNC: 32.1 G/DL (ref 31.5–35.7)
MCV RBC AUTO: 100.3 FL (ref 79–97)
MONOCYTES # BLD AUTO: 0.27 10*3/MM3 (ref 0.1–0.9)
MONOCYTES NFR BLD AUTO: 5.4 % (ref 5–12)
NEUTROPHILS NFR BLD AUTO: 3.51 10*3/MM3 (ref 1.7–7)
NEUTROPHILS NFR BLD AUTO: 69.7 % (ref 42.7–76)
NRBC BLD AUTO-RTO: 0 /100 WBC (ref 0–0.2)
PLATELET # BLD AUTO: 131 10*3/MM3 (ref 140–450)
PMV BLD AUTO: 9.6 FL (ref 6–12)
POTASSIUM SERPL-SCNC: 3.9 MMOL/L (ref 3.5–4.7)
PROT SERPL-MCNC: 5.7 G/DL (ref 6.3–8)
RBC # BLD AUTO: 3.76 10*6/MM3 (ref 4.14–5.8)
SODIUM SERPL-SCNC: 137 MMOL/L (ref 134–145)
TIBC SERPL-MCNC: 329 MCG/DL (ref 249–505)
TRANSFERRIN SERPL-MCNC: 235 MG/DL (ref 200–360)
WBC NRBC COR # BLD: 5.03 10*3/MM3 (ref 3.4–10.8)

## 2023-06-12 PROCEDURE — 36591 DRAW BLOOD OFF VENOUS DEVICE: CPT

## 2023-06-12 PROCEDURE — 80053 COMPREHEN METABOLIC PANEL: CPT

## 2023-06-12 PROCEDURE — 82728 ASSAY OF FERRITIN: CPT

## 2023-06-12 PROCEDURE — 25010000002 HEPARIN LOCK FLUSH PER 10 UNITS: Performed by: NURSE PRACTITIONER

## 2023-06-12 PROCEDURE — 85025 COMPLETE CBC W/AUTO DIFF WBC: CPT

## 2023-06-12 PROCEDURE — 83540 ASSAY OF IRON: CPT | Performed by: NURSE PRACTITIONER

## 2023-06-12 PROCEDURE — 84466 ASSAY OF TRANSFERRIN: CPT | Performed by: NURSE PRACTITIONER

## 2023-06-12 RX ORDER — HEPARIN SODIUM (PORCINE) LOCK FLUSH IV SOLN 100 UNIT/ML 100 UNIT/ML
500 SOLUTION INTRAVENOUS AS NEEDED
Status: DISCONTINUED | OUTPATIENT
Start: 2023-06-12 | End: 2023-06-12 | Stop reason: HOSPADM

## 2023-06-12 RX ORDER — SODIUM CHLORIDE 0.9 % (FLUSH) 0.9 %
10 SYRINGE (ML) INJECTION AS NEEDED
Status: DISCONTINUED | OUTPATIENT
Start: 2023-06-12 | End: 2023-06-12 | Stop reason: HOSPADM

## 2023-06-12 RX ADMIN — Medication 500 UNITS: at 11:32

## 2023-06-12 RX ADMIN — Medication 10 ML: at 11:33

## 2023-06-12 NOTE — PROGRESS NOTES
Subjective     REASON FOR FOLLOW-UP:    1. Chronic lymphoid leukemia, stage 4, presented initially with significant pancytopenia.  Currently on Gazyva with venetoclax  Cycle 1 Gazyva given on August 13, 2020    2.  History of angiodysplasia requiring cauterization and history of Hess's esophagus    3.  Bone marrow aspiration biopsy shows hypercellular marrow with 98% involvement with CLL    4. Iron deficiency anemia in the setting of GI bleeding    History of Present Illness   Patient is seen back today in follow-up, having been recently hospitalized from 7/25 to 7/31/2022 with recurrent GI bleed.  Patient is status postplacement of watchman's device on 7/6/2022, on Xarelto at the time.  When admitted his hemoglobin was 5.5.  He received 4 units of PRBCs.  Xarelto was held.  Patient underwent EGD 7/28/2022 revealing multiple nonbleeding angioectasias which were treated with argon plasma coagulation.  He was placed on a heparin drip and then transitioned back to Xarelto as cardiology felt that he could not be off this for a long with new watchman's device.    Patient then saw GI in follow-up last week, 8/18/2022.  Patient was reporting continued GI bleeding with dark stools.  Lab work performed that day showing hemoglobin having already dropped and just 2 weeks from 10.8 to 8.4, ferritin 22, iron sat 11%.  When I saw him 8/23/2022 hemoglobin did drop to 7.7.  We went on to give him 2 doses of IV Injectafer soon thereafter.    Patient did undergo capsule endoscopy which showed bleeding, leading to EGD which took place last week, 9/15/2022. He had 2 bleeding angioectasias that were treated with argon plasma.    As he is reviewed back today hemoglobin has improved nicely at 11.5.  Patient is feeling much better.  He believes he is tolerating Plavix well after switching from previous Xarelto.  No obvious further bleeding.    We referred patient to GI because of iron deficiency.  A CT scan had shown abnormality on  the sigmoid colon and hence patient underwent sigmoidoscopy with biopsy.  In the past he has had capsule endoscopy which showed angiectasia's.  Reviewed the pathology on sigmoid biopsy which showed normal crypt architecture with focal areas of ischemic change and slightly increased intraepithelial lymphocytes.  Biopsy from part 1 showed normal crypt architecture with the intraepithelial lymphocytes this related to be nonspecific finding could be seen with diverticulitis/diverticulosis.    INTERVAL HISTORY:  Patient returns the office today feeling well.  He did have an ER visit for an ecoli infection.  He reports recovering from this with regular bowel movements.  He denies any recent nausea, night sweats or weight loss.       PAST MEDICAL HISTORY:   Recurrent atrial fibrillation followed by cardiology. He has had drug eluting stent placed x 3.   High cholesterol.   Hypertension.       HEMATOLOGIC/ONCOLOGIC HISTORY:       The patient is 62-year-old gentleman with the above history currently here for followup. He has no complaints. He denies fever, night sweats or weight loss. He does not have any lymphadenopathy. He feels good. He had some fatigue but his CBC shows a go od hemoglobin.   The patient is followed by Dr. Kevin Chandra. He had seen Dr. Chandra 7/18/13 for a history of coronary artery disease status drug eluting stent placement to the right coronary artery and left anterior descending artery in February 2011. Histor y of paroxysmal atrial fibrillation and hyperlipidemia. He presented to [facility] on 6/23/13 with palpitations and was found to have atrial fibrillation with rapid ventricular response. He was given IV Cardizem and converted spontaneously to normal sinus rhythm. He was found to have elevated white count at 18.9 and denied any symptoms of infection or urinary complaints. TSH was normal, troponin levels were negative. He was asked to continue aspirin and metoprolol for that. If he  contin u ed to have atrial fibrillation, they will consider antiarrhythmic therapy with or without a short term anticoagulation therapy. However, currently the patient is feeling reasonably good. He has no complaints. His CBC 7/22/13 showed WBC 17,000, hemoglob i n 16.4 and platelet count of 174,000. Back 9/28/13 his hemoglobin was 15.1, WBC 13.3 and platelets 197,000. He has had a persistently elevated WBC but he is totally asymptomatic. He does not have any fever, night sweats or lymphadenopathy. He is here to be evaluated for his elevated white count.       Peripheral blood flow cytometry done 08/16/13 shows 22% chronic lymphoid leukemia cells, and they expressed ZAP-70 but not CD38. The total number of cells approximated 60% T cells, 32% B cells and 1% natural k iller cells. The B cells were monoclonal and expressed CD19, CD20, CD22, CD5, CD23, CD11c, ZAP-70 and T cell antigens. There was a normal CD4/CD8 ratio. CT of the chest, abdomen and pelvis does not show evidence of metastasis. Peripheral blood for DILLON -2 was negative. It was negative for T11:14 translocation.       CT scan done on 08/21/2013 shows 5 to 6 mm noncalcified nodule in the left lower lobe which is unchanged from December 2010. Otherwise no evidence of any lymphadenopathy or hepatosplenomegaly.       MRI of the spine shows arthritis and disc bulge and likely hemangiomas, with 1 lesion showing increased signal intensity at T2, which is likely a hemangioma. Bone scan could be done, but patient does not even have much pain there. CT scan of the chest, a bdomen and pelvis was done on 11/23/2014. He has tiny lymph nodes in the neck which are difficult to palpate. Right jugular one is 1.4 x 0.9 and left supraclavicular one is 1.5 x 1.1. He also has a subcarinal lymph node which is 1.7 x 1.2 cm, and he ha s a portocaval lymph node which has increased from 2.5 to 2.8. Patient is totally asymptomatic. He does not have any B symptoms, so will continue  followup with observation.     Patient is a 69-year-old gentleman with history of chronic lymphocytic leukemia/SLL who recently got admitted to [facility] because of GI bleed.  He was seen by Dr. Montero.  He underwent EGD colonoscopy.  He was found to have angiodysplasia for which he underwent argon coagulation.  He required 3 units of blood.  Patient had a normal esophagus normal stomach and normal duodenum CLOtest was negative he was asked to take Protonix 40 mg daily.  Colonoscopy showed blood in the entire examined colon but there was a single bleeding colonic angiectasia which was treated with argon plasma coagulation.    He was admitted twice.  Initially he was admitted on July 10 at which time he stayed 3 days and he was evaluated for chest pain.  He underwent a cardiac work-up with stress test and echo.  The echo was normal but the stress test showed medium sized moderate severe severity fixed perfusion defect.  Of the inferior wall consistent with infarction.  However according to patient cardiology did not think he had any cardiac problems.  I have left a message for patient's cardiologist to call me today.  Patient subsequently got readmitted with GI bleed and required 1/3 unit of transfusion.  He was found to have thrombocytopenia and hematology was consulted.    His hemoglobin had dropped down to as low as 7 and his platelets had gone down to 87.  Today it is 35 and his hemoglobin is 9.4 today.  He denies active bleeding.  He has lost weight recently.  He does not have any fever or night sweats.    He had ultrasound of spleen which showed enlarged spleen centimeters in length    Patient was started on Gazyva with Leukeran but subsequently switched to venetoclax with Gazyva.  His venetoclax dose is 100 mg daily and he receives Gazyva once a month.    CT scan done November 4, 2020 shows significant response     MEDICATIONS: The current medication list was reviewed with the patient and updated in the  EMR this date per the medical assistant. Medication dosages and frequencies were confirmed to be accurate.       ALLERGIES: PENICILLIN which causes him to swell up. He is allergic to IV CONTRAST DYE.     SOCIAL HISTORY: He is . He smokes one pack per day for 35 years. He consumes three to four alcoholic drinks per week. He has no tattoos and no previous transfusions.       FAMILY HISTORY: Father  at 82 with MI. Mother  at 82 with bone cancer. He has one brother age 65 in good health and two sisters 69 and 57 in good health.   Past Medical History, Social History, and Family History are unchanged from my prior documentation on     Review of Systems   Constitutional: Negative for fatigue.  Negative for appetite change, chills, diaphoresis, fever and unexpected weight change.   HENT: Negative for hearing loss, sore throat and trouble swallowing.    Respiratory: Negative for cough, chest tightness, shortness of breath and wheezing.    Cardiovascular: Negative for chest pain, palpitations and leg swelling.   Gastrointestinal: Negative for abdominal distention, abdominal pain, constipation, nausea and vomiting.   Genitourinary: Negative for dysuria, frequency, hematuria and urgency.   Musculoskeletal: Negative for joint swelling.        No muscle weakness.   Skin: Negative for rash and wound.   Neurological: Negative for seizures, syncope, speech difficulty, weakness, numbness and headaches.   Hematological: Negative for adenopathy. Does not bruise/bleed easily.   Psychiatric/Behavioral: Negative for behavioral problems, confusion and suicidal ideas.   All other systems reviewed and are negative.    I have reviewed and confirmed the accuracy of the ROS as documented by the MA/LPKALEB/RN MARIAH Luna        Patient Active Problem List   Diagnosis    CLL (chronic lymphocytic leukemia)    Acute on chronic anemia    Encounter for hepatitis C screening test for low risk patient      Thrombocytopenia    H/O heart artery stent    Stented coronary artery    Arteriosclerosis of coronary artery    Paroxysmal atrial fibrillation    Paroxysmal atrial fibrillation    Fall    Fracture, olecranon    Hyperlipidemia    Long term (current) use of anticoagulants    Lower GI bleed    Olecranon bursitis    Shoulder pain    Smoker    CLL (chronic lymphocytic leukemia)    Dehydration    Drug-induced nausea and vomiting    Encounter for long-term (current) use of other medications    Neutropenia    Calculus of gallbladder without cholecystitis without obstruction    Cholecystitis    Iron deficiency    Adverse effect of iron    Melena    Change in bowel habits    Acute upper GI bleed    Acute blood loss anemia    Type 2 diabetes mellitus, without long-term current use of insulin    Prolonged Q-T interval on ECG    Chest pain with high risk for cardiac etiology    Hyponatremia    GI bleed    Iron deficiency anemia due to chronic blood loss    Abnormal CT scan, colon    Diarrhea    Segmental colitis associated with diverticulosis       MEDICATIONS:  Medication Reconciliation for the patient has been reviewed by me and documented in the patients chart.    ALLERGIES:    Allergies   Allergen Reactions    Penicillins Swelling     As a child         Vitals:    06/12/23 1209   BP: 144/71   Pulse: 57   Resp: 16   Temp: 98.4 °F (36.9 °C)   SpO2: 97%              6/12/2023    12:10 PM   Current Status   ECOG score 0      Physical exam:      This patient's ACP documentation is up to date, and there's nothing further left to document.    CONSTITUTIONAL:  Vital signs reviewed.  No distress, looks comfortable.  RESPIRATORY:  Normal respiratory effort.  Lungs clear to auscultation bilaterally.  CARDIOVASCULAR:  Normal S1, S2.  No murmurs rubs or gallops.  No significant lower extremity edema.  GASTROINTESTINAL: Abdomen appears unremarkable.  Nontender.  No hepatomegaly.  No splenomegaly.  LYMPHATIC:  No cervical, supraclavicular,  axillary lymphadenopathy.  SKIN:  Warm.  No rashes.  PSYCHIATRIC:  Normal judgment and insight.  Normal mood and affect..  I have reexamined the patient and the results are consistent with the previously documented exam. MARIAH Luna     RECENT LABS:  Results from last 7 days   Lab Units 06/12/23  1130   WBC 10*3/mm3 5.03   NEUTROS ABS 10*3/mm3 3.51   HEMOGLOBIN g/dL 12.1*   HEMATOCRIT % 37.7   PLATELETS 10*3/mm3 131*     Results from last 7 days   Lab Units 06/12/23  1130   SODIUM mmol/L 137   POTASSIUM mmol/L 3.9   CHLORIDE mmol/L 102   CO2 mmol/L 24.0   BUN mg/dL 9   CREATININE mg/dL 0.83   CALCIUM mg/dL 9.0   ALBUMIN g/dL 3.5   BILIRUBIN mg/dL 0.5   ALK PHOS U/L 119*   ALT (SGPT) U/L 22   AST (SGOT) U/L 21   GLUCOSE mg/dL 287*   FERRITIN ng/mL 50.20   IRON mcg/dL 71   TIBC mcg/dL 329               Assessment & Plan   1. Stage 2 CLL without evidence of any disease progression.  I have reviewed the CT scan from 3/19/2018 there is minimal progression but clinically patient is asymptomatic and we will follow with observation.  Will monitor with labs. No evidence of any frequent infections, no major worsening of his white count. He has no fever or night sweats or any other symptoms.   Patient knows that he is prone for infections and he is mainly staying at home during the COVID-19 situation time  Recent admission to [facility] July 10 2020×2.  Initially admitted with chest pain and underwent stress test and echo.  Echo was negative.  Stress test is abnormal but have left message for patient's cardiologist to call us.  His cardiologist is been sent [facility].  He then got admitted the second time with worsening GI bleed and required total of 3 units of blood.  EGD was negative but colonoscopy showed angiectasia for which he underwent argon ablation.  Currently hemoglobin stable and no active bleeding.  He was also found to have low platelets with platelets dropping down to 27k and hemoglobin was 7.   "Ultrasound showed splenomegaly 15 cm.  Concern is whether he has progressed from his CLL point of view and requires treatment.  CT scan performed 7/20/2020 did show the increased splenomegaly, but interval decrease in size of the axillary nodes, and shotty mediastinal nodes.  No change in the shotty nodes within the neck and supraclavicular regions.  No new lymphadenopathy.  Bone marrow biopsy however showed preliminarily that there is bone marrow involvement.  Remainder of bone marrow biopsy report is pending.  Bone marrow shows hypercellular marrow with 100% involvement of chronic lymphocytic leukemia/small lymphocytic lymphoma and diffuse pattern with at least 98% of the total marrow cellularity.  Rare foci of erythropoiesis and rare megakaryocytes are noted.   Negative for BCL-2 and BCL 6.  Chromosomes shows normal karyotype.  I GVH mutation present.  Positive for gain of 1 q., monosomy 13 and loss of IGH.  Negative for deletion T p53, negative for gain of chromosomes 9 and 11, negative for 11, 14 fusion.  The combination of monosomy 13 and gain of 1 q. is thought to be associated with plasma cell myeloma.  However this patient does not have myeloma.  Scans were reviewed with the patient today.  Dr. Moya also discussed with the patient and recommended he initiate treatment with chlorambucil and Gazyva.  He would not be a good candidate for Imbruvica due to his history of A. fib\".  He cannot take anticoagulation at this time.  The patient was provided with chemotherapy education today, including  Handouts.  He will need a port placed so that we can initiate treatment  8/13/2020: Gazyva day 1. Plan also [id number] mg p.o. on day 1 day 15.  We can increase the dose once we know he tolerates and his platelets improved.  Patient developing pancytopenia when reviewed cycle 1 day 15.  Chlorambucil dose modified to 10 mg for day 15 however it is felt that he cannot tolerate this ongoing.    Plan to switch to " Venetoclax along with continuing Gazyva.    Patient due today for cycle 2-day 1 Gazyva.  He will proceed today as scheduled.  Venetoclax will be shipped to his home with plans to begin this next week on 9/14/2020.    Patient did receive 1 L normal saline and Neulasta on 9/28/2020 secondary to neutropenia with an ANC of 0.04.  Patient seen on 10/02/2020 continuing on venetoclax, currently on 100 mg dosing.  He would not increase his dose as he will start on voriconazole after being on venetoclax x1 week which affects the metabolism of venetoclax.  He is currently on day 5 of the 100 mg dosing.  Patient states overall he feels the same except for increased fatigue and nausea.  His nauseousness is controlled with ondansetron however.  Patient will be given 1 L normal saline in the office today as planned.  Patient returns on 10/12/2020 continuing on venetoclax 100 mg daily as well as voriconazole.  Patient is doing well with these treatments except fatigue.  Next cycle 4 due as of number ninth prior to which will obtain CT scans  November 9, 2020: White count down to 1.89 but patient started Pepcid.  We will hold off Pepcid.  11/17/2020 platelet count dropped to 35,000 patient was instructed to hold venetoclax.  Returns 11/23/2020 WBC up to 6.38, ANC 5.19, hemoglobin 12.4, platelets up to 121.  I have advised him to resume venetoclax 100 mg daily  December 7, 2020: Platelets 95K.  White count 3.0 with absolute neutrophil count of 2.01.  Cycle 5 Gazyva given.  Continue venetoclax with voriconazole.   January 4, 2021: Platelets 84,000, white count 3,440, absolute neutrophil count 1,980. Cycle 6 Gazyva given.  2/23/2021: WBC 3.0, platelets 90,000, hemoglobin 14.6, ANC 1.65.  Counts overall stable.  Continue Venetoclax 100 mg daily.  January 4, 2021: Last dose of Gazyva.  March 16, 2021: Patient is on venetoclax along with voriconazole and tolerating well with plans to complete total of 1 year.  4/27/2021: Patient  continues on venetoclax 100 mg daily with voriconazole and acyclovir for prophylaxis.  He is tolerating this well.  He is scheduled for scans in 5 weeks.  June 8, 2021: Patient is to continue venetoclax 100 mg daily with voriconazole and acyclovir prophylaxis.  He is tolerating it very well.  The plan is to continue 8 more weeks and then he will complete the protocol and will be followed with observation.  I have reviewed the CT scan done on June 2, 2021 and there is no evidence of any lymphadenopathy and the spleen size is decreased in size from 12.5-11.3.  He has mild thrombocytopenia and leukopenia but his hemoglobin is normal.  9/7/2021: Patient completed 1 year worth of venetoclax with voriconazole and 6 months of Gazyva.  He has had an excellent response for his CLL.  Spleen decreased in size.  Currently asymptomatic.  Discontinue venetoclax since he completed 1 year  March 28, 2022: Patient's hemoglobin back to normal at 13.1.  EGD showed Hess's esophagus and angiodysplasia for which she underwent argon plasma coagulation.  Also colonoscopy showed multiple polyps all of them benign and had 1 angiodysplasia for which he underwent argon coagulation by Dr. Hinds.  Currently asymptomatic  5/26/2022 patient without B symptoms or adenopathy.  Acute drop in counts felt to be related to infection (see further discussion below).  Scheduled for repeat CT scans 6/14/2022.  June 21, 2022: Patient underwent CT scan Bibiana 15, 2022 which showed multifocal pneumonia bilaterally likely secondary to COVID-19 infection in the past.  There admitted they recommended repeat CT in 6 to 8 weeks.  There is a 0.8 cm pulmonary nodule in the left lower lobe is minimally increased in size but similar to that in March 19, 2018.  Patient is improving  WBC stable/normal at 6.3, 12% lymphs.  Platelets iron 30,000.   December 28, 2022: Reviewed CT scan which shows thickening of the descending colon and sigmoid colon and patient is anemic.   We will check iron studies.  Refer to Dr. Hinds for colonoscopy.  5/1/2023 WBC 5.34, 22% lymphs, platelets stable at 136,000  6/12/2023 WBC 5.03, 21% lymphocytes, 9 platelets stable at 131,000    2. History of intermittent thrombocytopenia  Historically platelets in the 150s.  Count did drop when patient was treated for CLL on chlorambucil.    Platelets also dropping on treatment with Venetoclax in the past.    CLL in remission.  Platelet count currently low normal at 130,000.  Continue to monitor  Platelets are stable at 131,000    3.  Iron deficiency anemia in the setting of recurrent GI bleeding with underlying AVMs, complicated by anticoagulation.   Patient intolerant to oral iron.  1/2022: Patient with recurrent iron deficiency as further detailed below.  Patient referred to GI for further evaluation.  Will see Dr. Hinds 2/9/2022.  Patient hospitalized 4/10 - 4/13/2022 for acute GI bleed.  EGD showing Hess's esophagus.  Colonoscopy showing angiectasias, cauterized.  During hospitalization, Coumadin was held.    4/22/2022 evaluated by gastroenterology, noted to have a declining hemoglobin so they advised him to be seen by our office.  Patient's Hgb dropping to 8.8, Iron saturation 8%, TIBC 403, ferritin 25.1.  Patient receiving IV Injectafer x2, 4/29 and 5/6/2022.  7/25-7/31/2022 hospitalized with recurrent GI bleeding in the setting of known AVMs and being on Xarelto. Xarelto held.  Patient undergoing EGD 7/28/2022 revealing multiple nonbleeding angioectasias treated with argon plasma coagulation.  Patient placed on a heparin drip and then transitioned back to Xarelto as cardiology felt he could not be off this for a long with new Watchman's device.  Hemoglobin at discharge 10.8.  8/15/2022 patient seen by GI with continued reports of GI bleeding with noted dark stools.  Hemoglobin hemoglobin having already dropped and just 2 weeks from 10.8 to 8.4, ferritin 22, iron sat 11%. Patient scheduled for GI for  capsule endoscopy.  8/23/2022: Hgb 7.7.  Plan to transfuse patient and give additional IV iron with Injectafer x2.  Of note Xarelto was stopped and patient switched to Plavix with hopes of stopping GI bleeding.  Patient undergoing EGD 9/15/2022 with 2 separate angioectasias treated with argon plasma.  9/20/2022: Hgb improved to 11.5.  Iron studies pending though presumably improved following recent Injectafer.  Patient understands to call with recurrent bleeding.  December 20, 2022 my concern is that his hemoglobin dropped to 10.5 we will check iron studies again  March 28, 2023: Patient is s/p iron and his hemoglobin today is 15.1 with normal white count and platelet count.  His platelets are 132 which is stable and he has 70% neutrophils and 20% lymphocytes.  5/3/2023 hemoglobin today 12.7 which is down slightly from recent.  Ferritin iron studies however adequate with iron saturation 35% and ferritin 98.  Continue to monitor.  Patient denies bleeding.  6/12/2023 hemoglobin 12.1 today, ferritin 15, iron saturation 22%    4.  History of cluster headaches for which she has to be treated with steroids in the past and has followed up with Dr. Len Walker.today with significant headaches.  Patient had CT of the head 8/20/2020 which was negative.  He was started on prednisone 10 mg daily which was not helpful.  Patient saw Dr. Bobby, neurology who gave him 2 shots of a new medication Emgality.  This is something that can be given once a month.    Resolved.    5.  Atrial fibrillation, off anticoagulation given his thrombocytopenia.  Patient continues on aspirin 81 mg daily if platelet count greater than 60,000.  Patient sees [facility] Christy at [facility] who follows his INR  Coumadin held during patient's recent hospitalization April/2022.  Patient wishes to discontinue Coumadin permanently.    Patient status post watchman's device 7/2022.  Started on Xarelto.  8/22/2022 Xarelto discontinued and switched to Plavix in the  setting of ongoing GI bleeding per above.    6.  Chronic mild elevation of alkaline phosphatase.  Stable.      PLAN:   Continue port flush every 6 weeks  Follow-up with Dr. Moya in 3 months with CBC, CMP, ferritin, iron profile  Charlene Johansen, MARIAH  06/12/23      Cc: Dr. Kevin Gilmore

## 2023-07-19 ENCOUNTER — HOSPITAL ENCOUNTER (OUTPATIENT)
Facility: HOSPITAL | Age: 73
Setting detail: HOSPITAL OUTPATIENT SURGERY
Discharge: HOME | End: 2023-07-19
Attending: ORTHOPAEDIC SURGERY | Admitting: ORTHOPAEDIC SURGERY
Payer: MEDICARE

## 2023-07-19 VITALS
HEART RATE: 63 BPM | SYSTOLIC BLOOD PRESSURE: 144 MMHG | RESPIRATION RATE: 18 BRPM | OXYGEN SATURATION: 96 % | TEMPERATURE: 97.4 F | DIASTOLIC BLOOD PRESSURE: 73 MMHG

## 2023-07-19 LAB
GLUCOSE BLDC GLUCOMTR-MCNC: 231 MG/DL (ref 70–130)
GLUCOSE BLDC GLUCOMTR-MCNC: 260 MG/DL (ref 70–130)

## 2023-07-19 PROCEDURE — 25010000002 EPINEPHRINE PER 0.1 MG: Performed by: ORTHOPAEDIC SURGERY

## 2023-07-19 PROCEDURE — 94640 AIRWAY INHALATION TREATMENT: CPT

## 2023-07-19 PROCEDURE — 82948 REAGENT STRIP/BLOOD GLUCOSE: CPT

## 2023-07-19 PROCEDURE — 94761 N-INVAS EAR/PLS OXIMETRY MLT: CPT

## 2023-07-19 PROCEDURE — 94799 UNLISTED PULMONARY SVC/PX: CPT

## 2023-07-19 PROCEDURE — 0 CLINDAMYCIN 900 MG/50ML SOLUTION: Performed by: ORTHOPAEDIC SURGERY

## 2023-07-19 PROCEDURE — 25010000002 MIDAZOLAM PER 1 MG: Performed by: ANESTHESIOLOGY

## 2023-07-19 PROCEDURE — C1713 ANCHOR/SCREW BN/BN,TIS/BN: HCPCS | Performed by: ORTHOPAEDIC SURGERY

## 2023-07-19 PROCEDURE — 25010000002 FENTANYL CITRATE (PF) 50 MCG/ML SOLUTION: Performed by: ANESTHESIOLOGY

## 2023-07-19 DEVICE — IMPLANTABLE DEVICE
Type: IMPLANTABLE DEVICE | Site: SHOULDER | Status: FUNCTIONAL
Brand: JUGGERKNOT SOFT ANCHORS

## 2023-07-19 RX ORDER — MIDAZOLAM HYDROCHLORIDE 1 MG/ML
0.5 INJECTION INTRAMUSCULAR; INTRAVENOUS
Status: DISCONTINUED | OUTPATIENT
Start: 2023-07-19 | End: 2023-07-19 | Stop reason: HOSPADM

## 2023-07-19 RX ORDER — PROMETHAZINE HYDROCHLORIDE 25 MG/1
25 SUPPOSITORY RECTAL ONCE AS NEEDED
Status: DISCONTINUED | OUTPATIENT
Start: 2023-07-19 | End: 2023-07-19 | Stop reason: HOSPADM

## 2023-07-19 RX ORDER — PROMETHAZINE HYDROCHLORIDE 25 MG/1
25 TABLET ORAL ONCE AS NEEDED
Status: DISCONTINUED | OUTPATIENT
Start: 2023-07-19 | End: 2023-07-19 | Stop reason: HOSPADM

## 2023-07-19 RX ORDER — HYDROMORPHONE HYDROCHLORIDE 1 MG/ML
0.5 INJECTION, SOLUTION INTRAMUSCULAR; INTRAVENOUS; SUBCUTANEOUS
Status: DISCONTINUED | OUTPATIENT
Start: 2023-07-19 | End: 2023-07-19 | Stop reason: HOSPADM

## 2023-07-19 RX ORDER — FENTANYL CITRATE 50 UG/ML
50 INJECTION, SOLUTION INTRAMUSCULAR; INTRAVENOUS ONCE AS NEEDED
Status: COMPLETED | OUTPATIENT
Start: 2023-07-19 | End: 2023-07-19

## 2023-07-19 RX ORDER — SODIUM CHLORIDE 0.9 % (FLUSH) 0.9 %
3 SYRINGE (ML) INJECTION EVERY 12 HOURS SCHEDULED
Status: DISCONTINUED | OUTPATIENT
Start: 2023-07-19 | End: 2023-07-19 | Stop reason: HOSPADM

## 2023-07-19 RX ORDER — EPHEDRINE SULFATE 50 MG/ML
5 INJECTION, SOLUTION INTRAVENOUS ONCE AS NEEDED
Status: DISCONTINUED | OUTPATIENT
Start: 2023-07-19 | End: 2023-07-19 | Stop reason: HOSPADM

## 2023-07-19 RX ORDER — FENTANYL CITRATE 50 UG/ML
50 INJECTION, SOLUTION INTRAMUSCULAR; INTRAVENOUS
Status: DISCONTINUED | OUTPATIENT
Start: 2023-07-19 | End: 2023-07-19 | Stop reason: HOSPADM

## 2023-07-19 RX ORDER — FLUMAZENIL 0.1 MG/ML
0.2 INJECTION INTRAVENOUS AS NEEDED
Status: DISCONTINUED | OUTPATIENT
Start: 2023-07-19 | End: 2023-07-19 | Stop reason: HOSPADM

## 2023-07-19 RX ORDER — FAMOTIDINE 10 MG/ML
20 INJECTION, SOLUTION INTRAVENOUS ONCE
Status: COMPLETED | OUTPATIENT
Start: 2023-07-19 | End: 2023-07-19

## 2023-07-19 RX ORDER — LIDOCAINE HYDROCHLORIDE 10 MG/ML
0.5 INJECTION, SOLUTION EPIDURAL; INFILTRATION; INTRACAUDAL; PERINEURAL ONCE AS NEEDED
Status: DISCONTINUED | OUTPATIENT
Start: 2023-07-19 | End: 2023-07-19 | Stop reason: HOSPADM

## 2023-07-19 RX ORDER — NALOXONE HCL 0.4 MG/ML
0.2 VIAL (ML) INJECTION AS NEEDED
Status: DISCONTINUED | OUTPATIENT
Start: 2023-07-19 | End: 2023-07-19 | Stop reason: HOSPADM

## 2023-07-19 RX ORDER — ONDANSETRON 2 MG/ML
4 INJECTION INTRAMUSCULAR; INTRAVENOUS ONCE AS NEEDED
Status: DISCONTINUED | OUTPATIENT
Start: 2023-07-19 | End: 2023-07-19 | Stop reason: HOSPADM

## 2023-07-19 RX ORDER — DROPERIDOL 2.5 MG/ML
0.62 INJECTION, SOLUTION INTRAMUSCULAR; INTRAVENOUS
Status: DISCONTINUED | OUTPATIENT
Start: 2023-07-19 | End: 2023-07-19 | Stop reason: HOSPADM

## 2023-07-19 RX ORDER — IPRATROPIUM BROMIDE AND ALBUTEROL SULFATE 2.5; .5 MG/3ML; MG/3ML
3 SOLUTION RESPIRATORY (INHALATION)
Status: DISCONTINUED | OUTPATIENT
Start: 2023-07-19 | End: 2023-07-19 | Stop reason: HOSPADM

## 2023-07-19 RX ORDER — IPRATROPIUM BROMIDE AND ALBUTEROL SULFATE 2.5; .5 MG/3ML; MG/3ML
3 SOLUTION RESPIRATORY (INHALATION) ONCE AS NEEDED
Status: DISCONTINUED | OUTPATIENT
Start: 2023-07-19 | End: 2023-07-19 | Stop reason: HOSPADM

## 2023-07-19 RX ORDER — OXYCODONE AND ACETAMINOPHEN 7.5; 325 MG/1; MG/1
1 TABLET ORAL EVERY 4 HOURS PRN
Status: DISCONTINUED | OUTPATIENT
Start: 2023-07-19 | End: 2023-07-19 | Stop reason: HOSPADM

## 2023-07-19 RX ORDER — DIPHENHYDRAMINE HYDROCHLORIDE 50 MG/ML
12.5 INJECTION INTRAMUSCULAR; INTRAVENOUS
Status: DISCONTINUED | OUTPATIENT
Start: 2023-07-19 | End: 2023-07-19 | Stop reason: HOSPADM

## 2023-07-19 RX ORDER — OXYCODONE HYDROCHLORIDE AND ACETAMINOPHEN 5; 325 MG/1; MG/1
1 TABLET ORAL EVERY 4 HOURS PRN
Qty: 40 TABLET | Refills: 0 | Status: SHIPPED | OUTPATIENT
Start: 2023-07-19

## 2023-07-19 RX ORDER — LABETALOL HYDROCHLORIDE 5 MG/ML
5 INJECTION, SOLUTION INTRAVENOUS
Status: DISCONTINUED | OUTPATIENT
Start: 2023-07-19 | End: 2023-07-19 | Stop reason: HOSPADM

## 2023-07-19 RX ORDER — SODIUM CHLORIDE, SODIUM LACTATE, POTASSIUM CHLORIDE, CALCIUM CHLORIDE 600; 310; 30; 20 MG/100ML; MG/100ML; MG/100ML; MG/100ML
9 INJECTION, SOLUTION INTRAVENOUS CONTINUOUS
Status: DISCONTINUED | OUTPATIENT
Start: 2023-07-19 | End: 2023-07-19 | Stop reason: HOSPADM

## 2023-07-19 RX ORDER — HYDROCODONE BITARTRATE AND ACETAMINOPHEN 7.5; 325 MG/1; MG/1
1 TABLET ORAL ONCE AS NEEDED
Status: DISCONTINUED | OUTPATIENT
Start: 2023-07-19 | End: 2023-07-19 | Stop reason: HOSPADM

## 2023-07-19 RX ORDER — CLINDAMYCIN PHOSPHATE 900 MG/50ML
900 INJECTION INTRAVENOUS EVERY 8 HOURS
Status: COMPLETED | OUTPATIENT
Start: 2023-07-19 | End: 2023-07-19

## 2023-07-19 RX ORDER — HYDRALAZINE HYDROCHLORIDE 20 MG/ML
5 INJECTION INTRAMUSCULAR; INTRAVENOUS
Status: DISCONTINUED | OUTPATIENT
Start: 2023-07-19 | End: 2023-07-19 | Stop reason: HOSPADM

## 2023-07-19 RX ORDER — SODIUM CHLORIDE 0.9 % (FLUSH) 0.9 %
3-10 SYRINGE (ML) INJECTION AS NEEDED
Status: DISCONTINUED | OUTPATIENT
Start: 2023-07-19 | End: 2023-07-19 | Stop reason: HOSPADM

## 2023-07-19 RX ADMIN — IPRATROPIUM BROMIDE AND ALBUTEROL SULFATE 3 ML: 2.5; .5 SOLUTION RESPIRATORY (INHALATION) at 08:37

## 2023-07-19 RX ADMIN — MIDAZOLAM 0.5 MG: 1 INJECTION INTRAMUSCULAR; INTRAVENOUS at 09:02

## 2023-07-19 RX ADMIN — SODIUM CHLORIDE, POTASSIUM CHLORIDE, SODIUM LACTATE AND CALCIUM CHLORIDE 9 ML/HR: 600; 310; 30; 20 INJECTION, SOLUTION INTRAVENOUS at 08:47

## 2023-07-19 RX ADMIN — CLINDAMYCIN PHOSPHATE 900 MG: 900 INJECTION, SOLUTION INTRAVENOUS at 09:40

## 2023-07-19 RX ADMIN — FENTANYL CITRATE 50 MCG: 50 INJECTION, SOLUTION INTRAMUSCULAR; INTRAVENOUS at 09:02

## 2023-07-19 RX ADMIN — FAMOTIDINE 20 MG: 10 INJECTION INTRAVENOUS at 08:47

## 2023-07-19 NOTE — OP NOTE
SHOULDER ARTHROSCOPY  Procedure Note    Macho Stephenson  7/19/2023    Pre-op Diagnosis:   1.  Left shoulder impingement  2.  Partial-thickness cuff tear  3.  Labral tear    Post-op Diagnosis:     1.  Full-thickness rotator cuff tear  2.  Impingement  3.  Labral tear    Procedure(s):  1.  Left arthroscopic rotator cuff repair with anchor x1  2.  Extensive debridement and acromioplasty  3.   4.     Surgeon(s):  Bill Bradford MD    Anesthesia: General  Anesthesiologist: Sharif Lin MD  CRNA: Brielle Connell CRNA    Staff:   Circulator: Suma Burkett RN  Scrub Person: Henny Hallman  Assistant: Justin Calhoun PA-C      Drains: None    Estimated Blood Loss: minimal    Specimen: * No orders in the log *    Description of Procedure:   I.  Following induction of anesthesia the patient was placed in the beachchair position.  Left shoulder was prepped and draped in a sterile fashion.  I injected the joint and created the posterior portal and inserted the cannula into the joint.  The paient was found to have fraying of the labrum as well as an undersurface tear of the anterior supraspinatus that appears to involve 75 to 80% of the tendon.  I created the anterior portal and inserted the shaver and debrided the labrum back to stable tissue as well as debriding the undersurface of the rotator cuff.     II.  I then placed the camera in the subacromial space.  The pateint was found to have a large bony impinging lesion from the anterolateral acromion.  I created the lateral portal inserted the Arthrex thermal probe which I used to release soft tissue from the acromion and to release the coracoacromial ligament.  I then inserted the 4 mm bur and performed the acromioplasty removing approximately 11 to 12 mm of bone.     III.  With the arm in abduction and external rotation I then debrided the bursa and identified the thin area of the anterior supraspinatus.  I debrided the atrophic rotator cuff tissue resulted  in around __ cm full-thickness tear of the anterior supraspinatus.  I debrided the greater tuberosity down to bleeding cancellus bony surface and placed __ Biomet 2.9 mm juggernaut suture anchor at the lateral margin of the tuberosity.  I shuttled one limb of each suture through the lateral edge of the tear which I then tied with an SMC sliding knot.  We achieved complete coverage of the tuberosity.           Findings: See Dictation    Complications: None      Bill Bradford MD     Date: 7/19/2023  Time: 10:46 EDT

## 2023-07-19 NOTE — DISCHARGE INSTRUCTIONS
What to expect after a Nerve Block    Nerve blocks administered to block pain affect many types of nerves, including those nerves that control movement, pain, and normal sensation. Following a nerve block, you may notice some bruising at the site where the block was given. You may experience sensations such as: numbness of the affected area or limb, tingling, heaviness (that is the limb feels heavy to you), weakness or inability to move the affected arm or leg, or a feeling as if your arm or leg has “fallen asleep.”     A nerve block can last from 2 to 36 hours depending on the medications used.  Usually the weakness wears off first followed by the tingling and heaviness. As the block wears off, you may begin to notice pain; however, this sequence of events may occur in any order. Typically, you will be able to move your limb before you will feel it. Once a nerve block begins to wear off, the effects are usually completely gone within 60 minutes.  If you experience continued side effects that you believe are block related for longer than 48 hours, please call your healthcare provider. Please see block-specific instructions below.    Instructions for any block involving the shoulder or arm  If you have had any kind of shoulder/arm block, you will go home with your arm in a sling. Wear the sling until the block has completely worn off. You may be required to wear it for a longer period of time per your surgeon’s recommendations.  If you have had a shoulder/arm block, it is a good idea to sleep on a recliner with pillows under your arm.    You may experience symptoms such as:  Shortness of breath  Hoarseness   Blurry vision  Unequal pupils  Drooping of your face on the same side as the block was performed    These are side effects associated with this kind of block and should go away within 12 hours.    Note: If you have severe or prolonged shortness of breath, please seek medical assistance as soon as possible.      Protection of a “blocked” arm or leg (limb)  After a nerve block, you cannot feel pain, pressure, or extremes of temperature in the affected limb. And because of this, your blocked limb is at more risk for injury. For example, it is possible to burn your limb on an extremely hot surface without feeling it.     When resting, it is important to reposition your limb periodically to avoid prolonged pressure on it. This may require the use of pillows and padding.    While sleeping, you should avoid rolling onto the affected limb or putting too much pressure on it.     If you have a cast or tight dressing, check the color of your fingers or toes of the affected limb. Call your surgeon if they look discolored (that is, dusky, dark colored).    Use caution in cold weather. Cover your limb appropriately to protect it from the cold.      Pain Management:    Your surgeon will give you a prescription for pain medication. Begin taking this before the nerve block wears off. Bear in mind that sometimes the block can wear off in the middle of the night.      Pendulum Exercises for Post Op Shoulder Surgery      Lean over with your good arm supported on a table or chair.  Relax the injured arm and allow it to hang straight down at your side.  Slowly begin to swing the relaxed arm back and forth.  Then swing it side to side.  Next, move it in a [place]. Now,  reverse the direction.        Generally, you should spend about 5 minutes doing this exercise.  It should be done 3-4 times daily or as directed by your physician.

## 2023-07-24 ENCOUNTER — INFUSION (OUTPATIENT)
Dept: ONCOLOGY | Facility: HOSPITAL | Age: 73
End: 2023-07-24
Payer: MEDICARE

## 2023-07-24 DIAGNOSIS — C91.10 CLL (CHRONIC LYMPHOCYTIC LEUKEMIA): Primary | ICD-10-CM

## 2023-07-24 PROCEDURE — 96523 IRRIG DRUG DELIVERY DEVICE: CPT

## 2023-07-24 PROCEDURE — 25010000002 HEPARIN LOCK FLUSH PER 10 UNITS: Performed by: INTERNAL MEDICINE

## 2023-07-24 RX ORDER — HEPARIN SODIUM (PORCINE) LOCK FLUSH IV SOLN 100 UNIT/ML 100 UNIT/ML
500 SOLUTION INTRAVENOUS AS NEEDED
Status: DISCONTINUED | OUTPATIENT
Start: 2023-07-24 | End: 2023-07-24 | Stop reason: HOSPADM

## 2023-07-24 RX ORDER — SODIUM CHLORIDE 0.9 % (FLUSH) 0.9 %
10 SYRINGE (ML) INJECTION AS NEEDED
Status: DISCONTINUED | OUTPATIENT
Start: 2023-07-24 | End: 2023-07-24 | Stop reason: HOSPADM

## 2023-07-24 RX ADMIN — Medication 500 UNITS: at 13:51

## 2023-07-24 RX ADMIN — Medication 10 ML: at 13:51

## 2023-10-03 ENCOUNTER — TRANSCRIBE ORDERS (OUTPATIENT)
Dept: ADMINISTRATIVE | Facility: HOSPITAL | Age: 73
End: 2023-10-03
Payer: MEDICARE

## 2023-10-03 DIAGNOSIS — Z72.0 TOBACCO ABUSE: Primary | ICD-10-CM

## 2023-10-27 ENCOUNTER — INFUSION (OUTPATIENT)
Dept: ONCOLOGY | Facility: HOSPITAL | Age: 73
End: 2023-10-27
Payer: MEDICARE

## 2023-10-27 ENCOUNTER — OFFICE VISIT (OUTPATIENT)
Dept: ONCOLOGY | Facility: CLINIC | Age: 73
End: 2023-10-27
Payer: MEDICARE

## 2023-10-27 VITALS
RESPIRATION RATE: 18 BRPM | TEMPERATURE: 97.3 F | HEART RATE: 58 BPM | DIASTOLIC BLOOD PRESSURE: 75 MMHG | OXYGEN SATURATION: 98 % | SYSTOLIC BLOOD PRESSURE: 149 MMHG | HEIGHT: 72 IN | WEIGHT: 152.4 LBS | BODY MASS INDEX: 20.64 KG/M2

## 2023-10-27 DIAGNOSIS — C91.10 CLL (CHRONIC LYMPHOCYTIC LEUKEMIA): Primary | ICD-10-CM

## 2023-10-27 LAB
ALBUMIN SERPL-MCNC: 4.2 G/DL (ref 3.5–5.2)
ALBUMIN/GLOB SERPL: 2.1 G/DL
ALP SERPL-CCNC: 135 U/L (ref 39–117)
ALT SERPL W P-5'-P-CCNC: 46 U/L (ref 1–41)
ANION GAP SERPL CALCULATED.3IONS-SCNC: 16 MMOL/L (ref 5–15)
AST SERPL-CCNC: 34 U/L (ref 1–40)
BASOPHILS # BLD AUTO: 0.05 10*3/MM3 (ref 0–0.2)
BASOPHILS NFR BLD AUTO: 0.6 % (ref 0–1.5)
BILIRUB SERPL-MCNC: 0.6 MG/DL (ref 0–1.2)
BUN SERPL-MCNC: 22 MG/DL (ref 8–23)
BUN/CREAT SERPL: 21.6 (ref 7–25)
CALCIUM SPEC-SCNC: 9.3 MG/DL (ref 8.6–10.5)
CHLORIDE SERPL-SCNC: 101 MMOL/L (ref 98–107)
CO2 SERPL-SCNC: 22 MMOL/L (ref 22–29)
CREAT SERPL-MCNC: 1.02 MG/DL (ref 0.7–1.3)
DEPRECATED RDW RBC AUTO: 47 FL (ref 37–54)
EGFRCR SERPLBLD CKD-EPI 2021: 77.1 ML/MIN/1.73
EOSINOPHIL # BLD AUTO: 0.22 10*3/MM3 (ref 0–0.4)
EOSINOPHIL NFR BLD AUTO: 2.7 % (ref 0.3–6.2)
ERYTHROCYTE [DISTWIDTH] IN BLOOD BY AUTOMATED COUNT: 14.1 % (ref 12.3–15.4)
FERRITIN SERPL-MCNC: 45.2 NG/ML (ref 30–400)
GLOBULIN UR ELPH-MCNC: 2 GM/DL
GLUCOSE SERPL-MCNC: 104 MG/DL (ref 65–99)
HCT VFR BLD AUTO: 44.7 % (ref 37.5–51)
HGB BLD-MCNC: 14.9 G/DL (ref 13–17.7)
IMM GRANULOCYTES # BLD AUTO: 0.06 10*3/MM3 (ref 0–0.05)
IMM GRANULOCYTES NFR BLD AUTO: 0.7 % (ref 0–0.5)
IRON 24H UR-MRATE: 144 MCG/DL (ref 59–158)
IRON SATN MFR SERPL: 32 % (ref 20–50)
LYMPHOCYTES # BLD AUTO: 2.07 10*3/MM3 (ref 0.7–3.1)
LYMPHOCYTES NFR BLD AUTO: 25.7 % (ref 19.6–45.3)
MCH RBC QN AUTO: 30.4 PG (ref 26.6–33)
MCHC RBC AUTO-ENTMCNC: 33.3 G/DL (ref 31.5–35.7)
MCV RBC AUTO: 91.2 FL (ref 79–97)
MONOCYTES # BLD AUTO: 0.56 10*3/MM3 (ref 0.1–0.9)
MONOCYTES NFR BLD AUTO: 7 % (ref 5–12)
NEUTROPHILS NFR BLD AUTO: 5.09 10*3/MM3 (ref 1.7–7)
NEUTROPHILS NFR BLD AUTO: 63.3 % (ref 42.7–76)
NRBC BLD AUTO-RTO: 0 /100 WBC (ref 0–0.2)
PLATELET # BLD AUTO: 190 10*3/MM3 (ref 140–450)
PMV BLD AUTO: 9.6 FL (ref 6–12)
POTASSIUM SERPL-SCNC: 4.4 MMOL/L (ref 3.5–5.2)
PROT SERPL-MCNC: 6.2 G/DL (ref 6–8.5)
RBC # BLD AUTO: 4.9 10*6/MM3 (ref 4.14–5.8)
SODIUM SERPL-SCNC: 139 MMOL/L (ref 136–145)
TIBC SERPL-MCNC: 456 MCG/DL (ref 298–536)
TRANSFERRIN SERPL-MCNC: 326 MG/DL (ref 200–360)
WBC NRBC COR # BLD: 8.05 10*3/MM3 (ref 3.4–10.8)

## 2023-10-27 PROCEDURE — 82728 ASSAY OF FERRITIN: CPT

## 2023-10-27 PROCEDURE — 83540 ASSAY OF IRON: CPT

## 2023-10-27 PROCEDURE — 80053 COMPREHEN METABOLIC PANEL: CPT

## 2023-10-27 PROCEDURE — 25010000002 HEPARIN LOCK FLUSH PER 10 UNITS: Performed by: INTERNAL MEDICINE

## 2023-10-27 PROCEDURE — 85025 COMPLETE CBC W/AUTO DIFF WBC: CPT

## 2023-10-27 PROCEDURE — 1160F RVW MEDS BY RX/DR IN RCRD: CPT | Performed by: INTERNAL MEDICINE

## 2023-10-27 PROCEDURE — 99213 OFFICE O/P EST LOW 20 MIN: CPT | Performed by: INTERNAL MEDICINE

## 2023-10-27 PROCEDURE — 1126F AMNT PAIN NOTED NONE PRSNT: CPT | Performed by: INTERNAL MEDICINE

## 2023-10-27 PROCEDURE — 84466 ASSAY OF TRANSFERRIN: CPT

## 2023-10-27 PROCEDURE — 1159F MED LIST DOCD IN RCRD: CPT | Performed by: INTERNAL MEDICINE

## 2023-10-27 PROCEDURE — 36591 DRAW BLOOD OFF VENOUS DEVICE: CPT

## 2023-10-27 RX ORDER — SODIUM CHLORIDE 0.9 % (FLUSH) 0.9 %
10 SYRINGE (ML) INJECTION AS NEEDED
Status: DISCONTINUED | OUTPATIENT
Start: 2023-10-27 | End: 2023-10-27 | Stop reason: HOSPADM

## 2023-10-27 RX ORDER — HEPARIN SODIUM (PORCINE) LOCK FLUSH IV SOLN 100 UNIT/ML 100 UNIT/ML
500 SOLUTION INTRAVENOUS AS NEEDED
Status: DISCONTINUED | OUTPATIENT
Start: 2023-10-27 | End: 2023-10-27 | Stop reason: HOSPADM

## 2023-10-27 RX ORDER — BUPROPION HYDROCHLORIDE 150 MG/1
150 TABLET ORAL
COMMUNITY
Start: 2023-10-02 | End: 2024-03-30

## 2023-10-27 RX ORDER — ROSUVASTATIN CALCIUM 5 MG/1
5 TABLET, COATED ORAL DAILY
COMMUNITY
Start: 2023-10-02 | End: 2024-03-30

## 2023-10-27 RX ADMIN — Medication 10 ML: at 14:13

## 2023-10-27 RX ADMIN — Medication 500 UNITS: at 14:13

## 2023-10-27 NOTE — PROGRESS NOTES
Subjective     REASON FOR FOLLOW-UP:    1. Chronic lymphoid leukemia, stage 4, presented initially with significant pancytopenia.  Currently on Gazyva with venetoclax  Cycle 1 Gazyva given on August 13, 2020    2.  History of angiodysplasia requiring cauterization and history of Hess's esophagus    3.  Bone marrow aspiration biopsy shows hypercellular marrow with 98% involvement with CLL    4. Iron deficiency anemia in the setting of GI bleeding    History of Present Illness   Patient is seen back today in follow-up, having been recently hospitalized from 7/25 to 7/31/2022 with recurrent GI bleed.  Patient is status postplacement of watchman's device on 7/6/2022, on Xarelto at the time.  When admitted his hemoglobin was 5.5.  He received 4 units of PRBCs.  Xarelto was held.  Patient underwent EGD 7/28/2022 revealing multiple nonbleeding angioectasias which were treated with argon plasma coagulation.  He was placed on a heparin drip and then transitioned back to Xarelto as cardiology felt that he could not be off this for a long with new watchman's device.    Patient then saw GI in follow-up last week, 8/18/2022.  Patient was reporting continued GI bleeding with dark stools.  Lab work performed that day showing hemoglobin having already dropped and just 2 weeks from 10.8 to 8.4, ferritin 22, iron sat 11%.  When I saw him 8/23/2022 hemoglobin did drop to 7.7.  We went on to give him 2 doses of IV Injectafer soon thereafter.    Patient did undergo capsule endoscopy which showed bleeding, leading to EGD which took place last week, 9/15/2022. He had 2 bleeding angioectasias that were treated with argon plasma.    As he is reviewed back today hemoglobin has improved nicely at 11.5.  Patient is feeling much better.  He believes he is tolerating Plavix well after switching from previous Xarelto.  No obvious further bleeding.    We referred patient to GI because of iron deficiency.  A CT scan had shown abnormality on  the sigmoid colon and hence patient underwent sigmoidoscopy with biopsy.  In the past he has had capsule endoscopy which showed angiectasia's.  Reviewed the pathology on sigmoid biopsy which showed normal crypt architecture with focal areas of ischemic change and slightly increased intraepithelial lymphocytes.  Biopsy from part 1 showed normal crypt architecture with the intraepithelial lymphocytes this related to be nonspecific finding could be seen with diverticulitis/diverticulosis.    INTERVAL HISTORY:  Patient is doing very well without any complaints.  Patient has no fever night sweats or weight loss.  No lymphadenopathy.  He still has a port and has had the port flush today.      PAST MEDICAL HISTORY:   Recurrent atrial fibrillation followed by cardiology. He has had drug eluting stent placed x 3.   High cholesterol.   Hypertension.       HEMATOLOGIC/ONCOLOGIC HISTORY:       The patient is 62-year-old gentleman with the above history currently here for followup. He has no complaints. He denies fever, night sweats or weight loss. He does not have any lymphadenopathy. He feels good. He had some fatigue but his CBC shows a go od hemoglobin.   The patient is followed by Dr. Kevin Chandra. He had seen Dr. Chandra 7/18/13 for a history of coronary artery disease status drug eluting stent placement to the right coronary artery and left anterior descending artery in February 2011. Histor y of paroxysmal atrial fibrillation and hyperlipidemia. He presented to [facility] on 6/23/13 with palpitations and was found to have atrial fibrillation with rapid ventricular response. He was given IV Cardizem and converted spontaneously to normal sinus rhythm. He was found to have elevated white count at 18.9 and denied any symptoms of infection or urinary complaints. TSH was normal, troponin levels were negative. He was asked to continue aspirin and metoprolol for that. If he contin u ed to have atrial fibrillation,  they will consider antiarrhythmic therapy with or without a short term anticoagulation therapy. However, currently the patient is feeling reasonably good. He has no complaints. His CBC 7/22/13 showed WBC 17,000, hemoglob i n 16.4 and platelet count of 174,000. Back 9/28/13 his hemoglobin was 15.1, WBC 13.3 and platelets 197,000. He has had a persistently elevated WBC but he is totally asymptomatic. He does not have any fever, night sweats or lymphadenopathy. He is here to be evaluated for his elevated white count.       Peripheral blood flow cytometry done 08/16/13 shows 22% chronic lymphoid leukemia cells, and they expressed ZAP-70 but not CD38. The total number of cells approximated 60% T cells, 32% B cells and 1% natural k iller cells. The B cells were monoclonal and expressed CD19, CD20, CD22, CD5, CD23, CD11c, ZAP-70 and T cell antigens. There was a normal CD4/CD8 ratio. CT of the chest, abdomen and pelvis does not show evidence of metastasis. Peripheral blood for DILLON -2 was negative. It was negative for T11:14 translocation.       CT scan done on 08/21/2013 shows 5 to 6 mm noncalcified nodule in the left lower lobe which is unchanged from December 2010. Otherwise no evidence of any lymphadenopathy or hepatosplenomegaly.       MRI of the spine shows arthritis and disc bulge and likely hemangiomas, with 1 lesion showing increased signal intensity at T2, which is likely a hemangioma. Bone scan could be done, but patient does not even have much pain there. CT scan of the chest, a bdomen and pelvis was done on 11/23/2014. He has tiny lymph nodes in the neck which are difficult to palpate. Right jugular one is 1.4 x 0.9 and left supraclavicular one is 1.5 x 1.1. He also has a subcarinal lymph node which is 1.7 x 1.2 cm, and he ha s a portocaval lymph node which has increased from 2.5 to 2.8. Patient is totally asymptomatic. He does not have any B symptoms, so will continue followup with observation.     Patient is a  69-year-old gentleman with history of chronic lymphocytic leukemia/SLL who recently got admitted to [facility] because of GI bleed.  He was seen by Dr. Montero.  He underwent EGD colonoscopy.  He was found to have angiodysplasia for which he underwent argon coagulation.  He required 3 units of blood.  Patient had a normal esophagus normal stomach and normal duodenum CLOtest was negative he was asked to take Protonix 40 mg daily.  Colonoscopy showed blood in the entire examined colon but there was a single bleeding colonic angiectasia which was treated with argon plasma coagulation.    He was admitted twice.  Initially he was admitted on July 10 at which time he stayed 3 days and he was evaluated for chest pain.  He underwent a cardiac work-up with stress test and echo.  The echo was normal but the stress test showed medium sized moderate severe severity fixed perfusion defect.  Of the inferior wall consistent with infarction.  However according to patient cardiology did not think he had any cardiac problems.  I have left a message for patient's cardiologist to call me today.  Patient subsequently got readmitted with GI bleed and required 1/3 unit of transfusion.  He was found to have thrombocytopenia and hematology was consulted.    His hemoglobin had dropped down to as low as 7 and his platelets had gone down to 87.  Today it is 35 and his hemoglobin is 9.4 today.  He denies active bleeding.  He has lost weight recently.  He does not have any fever or night sweats.    He had ultrasound of spleen which showed enlarged spleen centimeters in length    Patient was started on Gazyva with Leukeran but subsequently switched to venetoclax with Gazyva.  His venetoclax dose is 100 mg daily and he receives Gazyva once a month.    CT scan done November 4, 2020 shows significant response     MEDICATIONS: The current medication list was reviewed with the patient and updated in the EMR this date per the medical assistant.  Medication dosages and frequencies were confirmed to be accurate.       ALLERGIES: PENICILLIN which causes him to swell up. He is allergic to IV CONTRAST DYE.     SOCIAL HISTORY: He is . He smokes one pack per day for 35 years. He consumes three to four alcoholic drinks per week. He has no tattoos and no previous transfusions.       FAMILY HISTORY: Father  at 82 with MI. Mother  at 82 with bone cancer. He has one brother age 65 in good health and two sisters 69 and 57 in good health.   Past Medical History, Social History, and Family History are unchanged from my prior documentation on     Review of Systems   Constitutional: Negative for fatigue.  Negative for appetite change, chills, diaphoresis, fever and unexpected weight change.   HENT: Negative for hearing loss, sore throat and trouble swallowing.    Respiratory: Negative for cough, chest tightness, shortness of breath and wheezing.    Cardiovascular: Negative for chest pain, palpitations and leg swelling.   Gastrointestinal: Negative for abdominal distention, abdominal pain, constipation, nausea and vomiting.   Genitourinary: Negative for dysuria, frequency, hematuria and urgency.   Musculoskeletal: Negative for joint swelling.        No muscle weakness.   Skin: Negative for rash and wound.   Neurological: Negative for seizures, syncope, speech difficulty, weakness, numbness and headaches.   Hematological: Negative for adenopathy. Does not bruise/bleed easily.   Psychiatric/Behavioral: Negative for behavioral problems, confusion and suicidal ideas.   All other systems reviewed and are negative.    I have reviewed and confirmed the accuracy of the ROS as documented by the MA/LPN/RN Susannah Moya MD        Patient Active Problem List   Diagnosis    CLL (chronic lymphocytic leukemia)    Acute on chronic anemia    Encounter for hepatitis C screening test for low risk patient     Thrombocytopenia    H/O heart artery stent    Stented  coronary artery    Arteriosclerosis of coronary artery    Paroxysmal atrial fibrillation    Paroxysmal atrial fibrillation    Fall    Fracture, olecranon    Hyperlipidemia    Long term (current) use of anticoagulants    Lower GI bleed    Olecranon bursitis    Shoulder pain    Smoker    CLL (chronic lymphocytic leukemia)    Dehydration    Drug-induced nausea and vomiting    Encounter for long-term (current) use of other medications    Neutropenia    Calculus of gallbladder without cholecystitis without obstruction    Cholecystitis    Iron deficiency    Adverse effect of iron    Melena    Change in bowel habits    Acute upper GI bleed    Acute blood loss anemia    Type 2 diabetes mellitus, without long-term current use of insulin    Prolonged Q-T interval on ECG    Chest pain with high risk for cardiac etiology    Hyponatremia    GI bleed    Iron deficiency anemia due to chronic blood loss    Abnormal CT scan, colon    Diarrhea    Segmental colitis associated with diverticulosis       MEDICATIONS:  Medication Reconciliation for the patient has been reviewed by me and documented in the patients chart.    ALLERGIES:    Allergies   Allergen Reactions    Penicillins Swelling     As a child         Vitals:    10/27/23 1433   BP: 149/75   Pulse: 58   Resp: 18   Temp: 97.3 °F (36.3 °C)   SpO2: 98%                10/27/2023     2:16 PM   Current Status   ECOG score 0      Physical exam:      This patient's ACP documentation is up to date, and there's nothing further left to document.    CONSTITUTIONAL:  Vital signs reviewed.  No distress, looks comfortable.  RESPIRATORY:  Normal respiratory effort.  Lungs clear to auscultation bilaterally.  CARDIOVASCULAR:  Normal S1, S2.  No murmurs rubs or gallops.  No significant lower extremity edema.  GASTROINTESTINAL: Abdomen appears unremarkable.  Nontender.  No hepatomegaly.  No splenomegaly.  LYMPHATIC:  No cervical, supraclavicular, axillary lymphadenopathy.  SKIN:  Warm.  No  rashes.  PSYCHIATRIC:  Normal judgment and insight.  Normal mood and affect..  I have reexamined the patient and the results are consistent with the previously documented exam. Susannah Moya MD     RECENT LABS:  Results from last 7 days   Lab Units 10/27/23  1406   WBC 10*3/mm3 8.05   NEUTROS ABS 10*3/mm3 5.09   HEMOGLOBIN g/dL 14.9   HEMATOCRIT % 44.7   PLATELETS 10*3/mm3 190       Results from last 7 days   Lab Units 10/27/23  1406   SODIUM mmol/L 139   POTASSIUM mmol/L 4.4   CHLORIDE mmol/L 101   CO2 mmol/L 22.0   BUN mg/dL 22   CREATININE mg/dL 1.02   CALCIUM mg/dL 9.3   ALBUMIN g/dL 4.2   BILIRUBIN mg/dL 0.6   ALK PHOS U/L 135*   ALT (SGPT) U/L 46*   AST (SGOT) U/L 34   GLUCOSE mg/dL 104*   FERRITIN ng/mL 45.20   IRON mcg/dL 144   TIBC mcg/dL 456                 Assessment & Plan   1. Stage 2 CLL without evidence of any disease progression.  I have reviewed the CT scan from 3/19/2018 there is minimal progression but clinically patient is asymptomatic and we will follow with observation.  Will monitor with labs. No evidence of any frequent infections, no major worsening of his white count. He has no fever or night sweats or any other symptoms.   Patient knows that he is prone for infections and he is mainly staying at home during the COVID-19 situation time  Recent admission to [facility] July 10 2020×2.  Initially admitted with chest pain and underwent stress test and echo.  Echo was negative.  Stress test is abnormal but have left message for patient's cardiologist to call us.  His cardiologist is been sent [facility].  He then got admitted the second time with worsening GI bleed and required total of 3 units of blood.  EGD was negative but colonoscopy showed angiectasia for which he underwent argon ablation.  Currently hemoglobin stable and no active bleeding.  He was also found to have low platelets with platelets dropping down to 27k and hemoglobin was 7.  Ultrasound showed splenomegaly 15 cm.  Concern  "is whether he has progressed from his CLL point of view and requires treatment.  CT scan performed 7/20/2020 did show the increased splenomegaly, but interval decrease in size of the axillary nodes, and shotty mediastinal nodes.  No change in the shotty nodes within the neck and supraclavicular regions.  No new lymphadenopathy.  Bone marrow biopsy however showed preliminarily that there is bone marrow involvement.  Remainder of bone marrow biopsy report is pending.  Bone marrow shows hypercellular marrow with 100% involvement of chronic lymphocytic leukemia/small lymphocytic lymphoma and diffuse pattern with at least 98% of the total marrow cellularity.  Rare foci of erythropoiesis and rare megakaryocytes are noted.   Negative for BCL-2 and BCL 6.  Chromosomes shows normal karyotype.  I GVH mutation present.  Positive for gain of 1 q., monosomy 13 and loss of IGH.  Negative for deletion T p53, negative for gain of chromosomes 9 and 11, negative for 11, 14 fusion.  The combination of monosomy 13 and gain of 1 q. is thought to be associated with plasma cell myeloma.  However this patient does not have myeloma.  Scans were reviewed with the patient today.  Dr. Moya also discussed with the patient and recommended he initiate treatment with chlorambucil and Gazyva.  He would not be a good candidate for Imbruvica due to his history of A. fib\".  He cannot take anticoagulation at this time.  The patient was provided with chemotherapy education today, including  Handouts.  He will need a port placed so that we can initiate treatment  8/13/2020: Gazyva day 1. Plan also [id number] mg p.o. on day 1 day 15.  We can increase the dose once we know he tolerates and his platelets improved.  Patient developing pancytopenia when reviewed cycle 1 day 15.  Chlorambucil dose modified to 10 mg for day 15 however it is felt that he cannot tolerate this ongoing.    Plan to switch to Venetoclax along with continuing Gazyva.  "   Patient due today for cycle 2-day 1 Gazyva.  He will proceed today as scheduled.  Venetoclax will be shipped to his home with plans to begin this next week on 9/14/2020.    Patient did receive 1 L normal saline and Neulasta on 9/28/2020 secondary to neutropenia with an ANC of 0.04.  Patient seen on 10/02/2020 continuing on venetoclax, currently on 100 mg dosing.  He would not increase his dose as he will start on voriconazole after being on venetoclax x1 week which affects the metabolism of venetoclax.  He is currently on day 5 of the 100 mg dosing.  Patient states overall he feels the same except for increased fatigue and nausea.  His nauseousness is controlled with ondansetron however.  Patient will be given 1 L normal saline in the office today as planned.  Patient returns on 10/12/2020 continuing on venetoclax 100 mg daily as well as voriconazole.  Patient is doing well with these treatments except fatigue.  Next cycle 4 due as of number ninth prior to which will obtain CT scans  November 9, 2020: White count down to 1.89 but patient started Pepcid.  We will hold off Pepcid.  11/17/2020 platelet count dropped to 35,000 patient was instructed to hold venetoclax.  Returns 11/23/2020 WBC up to 6.38, ANC 5.19, hemoglobin 12.4, platelets up to 121.  I have advised him to resume venetoclax 100 mg daily  December 7, 2020: Platelets 95K.  White count 3.0 with absolute neutrophil count of 2.01.  Cycle 5 Gazyva given.  Continue venetoclax with voriconazole.   January 4, 2021: Platelets 84,000, white count 3,440, absolute neutrophil count 1,980. Cycle 6 Gazyva given.  2/23/2021: WBC 3.0, platelets 90,000, hemoglobin 14.6, ANC 1.65.  Counts overall stable.  Continue Venetoclax 100 mg daily.  January 4, 2021: Last dose of Gazyva.  March 16, 2021: Patient is on venetoclax along with voriconazole and tolerating well with plans to complete total of 1 year.  4/27/2021: Patient continues on venetoclax 100 mg daily with  voriconazole and acyclovir for prophylaxis.  He is tolerating this well.  He is scheduled for scans in 5 weeks.  June 8, 2021: Patient is to continue venetoclax 100 mg daily with voriconazole and acyclovir prophylaxis.  He is tolerating it very well.  The plan is to continue 8 more weeks and then he will complete the protocol and will be followed with observation.  I have reviewed the CT scan done on June 2, 2021 and there is no evidence of any lymphadenopathy and the spleen size is decreased in size from 12.5-11.3.  He has mild thrombocytopenia and leukopenia but his hemoglobin is normal.  9/7/2021: Patient completed 1 year worth of venetoclax with voriconazole and 6 months of Gazyva.  He has had an excellent response for his CLL.  Spleen decreased in size.  Currently asymptomatic.  Discontinue venetoclax since he completed 1 year  March 28, 2022: Patient's hemoglobin back to normal at 13.1.  EGD showed Hess's esophagus and angiodysplasia for which she underwent argon plasma coagulation.  Also colonoscopy showed multiple polyps all of them benign and had 1 angiodysplasia for which he underwent argon coagulation by Dr. Hinds.  Currently asymptomatic  5/26/2022 patient without B symptoms or adenopathy.  Acute drop in counts felt to be related to infection (see further discussion below).  Scheduled for repeat CT scans 6/14/2022.  June 21, 2022: Patient underwent CT scan Bibiana 15, 2022 which showed multifocal pneumonia bilaterally likely secondary to COVID-19 infection in the past.  There admitted they recommended repeat CT in 6 to 8 weeks.  There is a 0.8 cm pulmonary nodule in the left lower lobe is minimally increased in size but similar to that in March 19, 2018.  Patient is improving  WBC stable/normal at 6.3, 12% lymphs.  Platelets iron 30,000.   December 28, 2022: Reviewed CT scan which shows thickening of the descending colon and sigmoid colon and patient is anemic.  We will check iron studies.  Refer to   [facility] for colonoscopy.  5/1/2023 WBC 5.34, 22% lymphs, platelets stable at 136,000  6/12/2023 WBC 5.03, 21% lymphocytes, 9 platelets stable at 131,000  October 27, 2023: Hemoglobin is 14.1, white count of 8.0 and platelet count of 190.    2. History of intermittent thrombocytopenia  Historically platelets in the 150s.  Count did drop when patient was treated for CLL on chlorambucil.    Platelets also dropping on treatment with Venetoclax in the past.    CLL in remission.  Platelet count currently low normal at 130,000.  Continue to monitor  Platelets are stable at 131,000    3.  Iron deficiency anemia in the setting of recurrent GI bleeding with underlying AVMs, complicated by anticoagulation.   Patient intolerant to oral iron.  1/2022: Patient with recurrent iron deficiency as further detailed below.  Patient referred to GI for further evaluation.  Will see Dr. Hinds 2/9/2022.  Patient hospitalized 4/10 - 4/13/2022 for acute GI bleed.  EGD showing Hess's esophagus.  Colonoscopy showing angiectasias, cauterized.  During hospitalization, Coumadin was held.    4/22/2022 evaluated by gastroenterology, noted to have a declining hemoglobin so they advised him to be seen by our office.  Patient's Hgb dropping to 8.8, Iron saturation 8%, TIBC 403, ferritin 25.1.  Patient receiving IV Injectafer x2, 4/29 and 5/6/2022.  7/25-7/31/2022 hospitalized with recurrent GI bleeding in the setting of known AVMs and being on Xarelto. Xarelto held.  Patient undergoing EGD 7/28/2022 revealing multiple nonbleeding angioectasias treated with argon plasma coagulation.  Patient placed on a heparin drip and then transitioned back to Xarelto as cardiology felt he could not be off this for a long with new Watchman's device.  Hemoglobin at discharge 10.8.  8/15/2022 patient seen by GI with continued reports of GI bleeding with noted dark stools.  Hemoglobin hemoglobin having already dropped and just 2 weeks from 10.8 to 8.4, ferritin 22,  iron sat 11%. Patient scheduled for GI for capsule endoscopy.  8/23/2022: Hgb 7.7.  Plan to transfuse patient and give additional IV iron with Injectafer x2.  Of note Xarelto was stopped and patient switched to Plavix with hopes of stopping GI bleeding.  Patient undergoing EGD 9/15/2022 with 2 separate angioectasias treated with argon plasma.  9/20/2022: Hgb improved to 11.5.  Iron studies pending though presumably improved following recent Injectafer.  Patient understands to call with recurrent bleeding.  December 20, 2022 my concern is that his hemoglobin dropped to 10.5 we will check iron studies again  March 28, 2023: Patient is s/p iron and his hemoglobin today is 15.1 with normal white count and platelet count.  His platelets are 132 which is stable and he has 70% neutrophils and 20% lymphocytes.  5/3/2023 hemoglobin today 12.7 which is down slightly from recent.  Ferritin iron studies however adequate with iron saturation 35% and ferritin 98.  Continue to monitor.  Patient denies bleeding.  6/12/2023 hemoglobin 12.1 today, ferritin 15, iron saturation 22%  October 27, 2023: Hemoglobin 14.9.  Ferritin pending no evidence of active bleeding EGD colonoscopy had shown polyps in the colon which were tubular adenoma and EGD had just shown esophagitis    4.  History of cluster headaches for which she has to be treated with steroids in the past and has followed up with Dr. Len Walker.today with significant headaches.  Patient had CT of the head 8/20/2020 which was negative.  He was started on prednisone 10 mg daily which was not helpful.  Patient saw Dr. Bobby, neurology who gave him 2 shots of a new medication Emgality.  This is something that can be given once a month.    Resolved.    5.  Atrial fibrillation, off anticoagulation given his thrombocytopenia.  Patient continues on aspirin 81 mg daily if platelet count greater than 60,000.  Patient sees Dr[facility]rsity at [facility] who follows his INR  Coumadin held during  patient's recent hospitalization April/2022.  Patient wishes to discontinue Coumadin permanently.    Patient status post watchman's device 7/2022.  Started on Xarelto.  8/22/2022 Xarelto discontinued and switched to Plavix in the setting of ongoing GI bleeding per above.    6.  Chronic mild elevation of alkaline phosphatase.  Stable.      PLAN:   Reviewed results of EGD colonoscopy which has been negative except polyps which have been resected and benign  There was some red reddened mucosa at the sigmoid colonoscopy done by Dr. Velasquez Hinds but patient denies any current GI bleed  No B symptoms  Patient's anemia and thrombocytopenia completely resolved and he has a normal differential  Follow-up with me in 6 months with labs and 3 months with nurse practitioner with port flush every 6 weeks  MD Susannah Daugherty MD  10/27/23      Cc: Dr. Kevin Gilmore

## 2023-11-01 ENCOUNTER — HOSPITAL ENCOUNTER (OUTPATIENT)
Dept: CT IMAGING | Facility: HOSPITAL | Age: 73
Discharge: HOME | End: 2023-11-01
Admitting: FAMILY MEDICINE
Payer: MEDICARE

## 2023-11-01 DIAGNOSIS — Z72.0 TOBACCO ABUSE: ICD-10-CM

## 2023-11-01 PROCEDURE — 71271 CT THORAX LUNG CANCER SCR C-: CPT

## 2023-12-04 ENCOUNTER — INFUSION (OUTPATIENT)
Dept: ONCOLOGY | Facility: HOSPITAL | Age: 73
End: 2023-12-04
Payer: MEDICARE

## 2023-12-04 DIAGNOSIS — C91.10 CLL (CHRONIC LYMPHOCYTIC LEUKEMIA): Primary | ICD-10-CM

## 2023-12-04 RX ORDER — SODIUM CHLORIDE 0.9 % (FLUSH) 0.9 %
10 SYRINGE (ML) INJECTION AS NEEDED
Status: DISCONTINUED | OUTPATIENT
Start: 2023-12-04 | End: 2023-12-04 | Stop reason: HOSPADM

## 2023-12-04 RX ORDER — HEPARIN SODIUM (PORCINE) LOCK FLUSH IV SOLN 100 UNIT/ML 100 UNIT/ML
500 SOLUTION INTRAVENOUS AS NEEDED
Status: DISCONTINUED | OUTPATIENT
Start: 2023-12-04 | End: 2023-12-04 | Stop reason: HOSPADM

## 2023-12-13 ENCOUNTER — INFUSION (OUTPATIENT)
Dept: ONCOLOGY | Facility: HOSPITAL | Age: 73
End: 2023-12-13
Payer: MEDICARE

## 2023-12-13 DIAGNOSIS — C91.10 CLL (CHRONIC LYMPHOCYTIC LEUKEMIA): Primary | ICD-10-CM

## 2023-12-13 PROCEDURE — 96523 IRRIG DRUG DELIVERY DEVICE: CPT

## 2023-12-13 PROCEDURE — 25010000002 HEPARIN LOCK FLUSH PER 10 UNITS: Performed by: INTERNAL MEDICINE

## 2023-12-13 RX ORDER — HEPARIN SODIUM (PORCINE) LOCK FLUSH IV SOLN 100 UNIT/ML 100 UNIT/ML
500 SOLUTION INTRAVENOUS AS NEEDED
Status: DISCONTINUED | OUTPATIENT
Start: 2023-12-13 | End: 2023-12-13 | Stop reason: HOSPADM

## 2023-12-13 RX ORDER — SODIUM CHLORIDE 0.9 % (FLUSH) 0.9 %
10 SYRINGE (ML) INJECTION AS NEEDED
Status: DISCONTINUED | OUTPATIENT
Start: 2023-12-13 | End: 2023-12-13 | Stop reason: HOSPADM

## 2023-12-13 RX ADMIN — Medication 10 ML: at 14:29

## 2023-12-13 RX ADMIN — Medication 500 UNITS: at 14:30

## 2024-01-02 NOTE — PROGRESS NOTES
Name: Macho Stephenson ADMIT: 4/10/2022   : 1950  PCP: Kevin Chandra MD    MRN: [id number] LOS: 2 days   AGE/SEX: 71 y.o. male  ROOM: [facility]     Subjective   Subjective   Denies any BM today.  No pain, feels good    Review of Systems     Objective   Objective   Vital Signs  Temp:  [97.8 °F (36.6 °C)-98.4 °F (36.9 °C)] 97.8 °F (36.6 °C)  Heart Rate:  [61-63] 61  Resp:  [18] 18  BP: (100-117)/(55-89) 117/67  SpO2:  [95 %-97 %] 95 %  on   ;   Device (Oxygen Therapy): room air  Body mass index is 22.51 kg/m².  Physical Exam  Vitals and nursing note reviewed.   Constitutional:       Appearance: Normal appearance. He is not ill-appearing.   HENT:      Head: Normocephalic and atraumatic.   Cardiovascular:      Rate and Rhythm: Normal rate and regular rhythm.   Pulmonary:      Effort: Pulmonary effort is normal. No respiratory distress.      Breath sounds: No wheezing.   Abdominal:      General: Bowel sounds are normal.      Palpations: Abdomen is soft.      Tenderness: There is no abdominal tenderness.   Musculoskeletal:      Right lower leg: No edema.      Left lower leg: No edema.   Skin:     General: Skin is warm and dry.   Neurological:      General: No focal deficit present.      Mental Status: He is alert.   Psychiatric:         Mood and Affect: Mood normal.         Results Review     I reviewed the patient's new clinical results.  Results from last 7 days   Lab Units 22  07522  1639 22  0820 22  0333 04/10/22  2358 04/10/22  1411   WBC 10*3/mm3 4.27  --   --   --  4.38  --  5.42   HEMOGLOBIN g/dL 9.2*  9.2* 9.2* 9.7* 9.1* 9.4*   < > 10.5*   PLATELETS 10*3/mm3 119*  --   --   --  120*  --  137*    < > = values in this interval not displayed.     Results from last 7 days   Lab Units 22  07522  0333 04/10/22  1411   SODIUM mmol/L 139 139 134*   POTASSIUM mmol/L 3.9 3.9 3.7   CHLORIDE mmol/L 106 108* 102   CO2 mmol/L 25.2 25.0 24.0   BUN mg/dL 14 13 18    CREATININE mg/dL 1.01 0.85 0.92   GLUCOSE mg/dL 159* 156* 201*   EGFR mL/min/1.73 79.0 92.3 88.4     Results from last 7 days   Lab Units 04/12/22  0759 04/10/22  1411   ALBUMIN g/dL 3.50 4.10   BILIRUBIN mg/dL 0.4 0.4   ALK PHOS U/L 106 126*   AST (SGOT) U/L 22 21   ALT (SGPT) U/L 24 26     Results from last 7 days   Lab Units 04/12/22  0759 04/11/22  0333 04/10/22  1411   CALCIUM mg/dL 8.4* 8.2* 8.7   ALBUMIN g/dL 3.50  --  4.10     Results from last 7 days   Lab Units 04/10/22  1411   LACTATE mmol/L 1.2     Hemoglobin A1C   Date/Time Value Ref Range Status   04/11/2022 0333 5.80 (H) 4.80 - 5.60 % Final     Glucose   Date/Time Value Ref Range Status   04/12/2022 1059 123 70 - 130 mg/dL Final     Comment:     Meter: [id number] : [id number] Nenajerrychas Lopez NA   04/12/2022 0646 175 (H) 70 - 130 mg/dL Final     Comment:     Meter: [id number] : [id number] Austin Ahmook NA   04/11/2022 1657 131 (H) 70 - 130 mg/dL Final     Comment:     Meter: [id number] : [id number] Arsenio Jimenez RN   04/11/2022 1110 213 (H) 70 - 130 mg/dL Final     Comment:     Meter: [id number] : [id number] Kayla Tidwell NA   04/11/2022 0621 183 (H) 70 - 130 mg/dL Final     Comment:     Meter: [id number] : [id number] Sue GARCIA   04/10/2022 2043 261 (H) 70 - 130 mg/dL Final     Comment:     Meter: [id number] : [id number] Sue GARCIA         Scheduled Medications  allopurinol, 300 mg, Oral, Nightly  atorvastatin, 20 mg, Oral, Nightly  insulin lispro, 0-9 Units, Subcutaneous, TID With Meals  linagliptin, 5 mg, Oral, Daily  lisinopril, 10 mg, Oral, Nightly  pantoprazole, 40 mg, Intravenous, Q12H  sotalol, 80 mg, Oral, BID    Infusions  sodium chloride, 100 mL/hr, Last Rate: 100 mL/hr (04/11/22 8871)    Diet  Diet Regular; Consistent Carbohydrate  NPO Diet NPO Type: Sips with Meds       Assessment/Plan     Active Hospital Problems    Diagnosis  POA   • **Acute upper GI bleed [K92.2]  Yes   • Acute blood loss anemia [D62]  Yes    • Type 2 diabetes mellitus, without long-term current use of insulin (HCC) [E11.9]  Unknown   • CLL (chronic lymphocytic leukemia) (HCC) [C91.10]  Yes   • Arteriosclerosis of coronary artery [I25.10]  Yes   • Paroxysmal atrial fibrillation (HCC) [I48.0]  Yes      Resolved Hospital Problems   No resolved problems to display.       71 y.o. male with history of A. fib on chronic anticoagulation along with recent endoscopy showing AVMs in the duodenum 3/22, admitted with melena and presumed upper GI bleed.    Upper GI bleed-likely recurrent due to AVMs  -Continue Protonix twice daily  -EGD planned for the morning if INR improved.  Still 1.9 today so procedure canceled    Supratherapeutic INR-status post vitamin K x1.  Holding Coumadin, recheck in a.m.    Acute blood loss anemia: Stable.  Although much worse than it was a few weeks ago (13.2)    PAF: Stable on sotalol but obviously off anticoagulation.  Offered cardiology consult here but he would prefer to discuss with his normal outpatient cardiologist Dr. Rosa after d/c    DM2: Continue Tradjenta and sliding scale as needed      · SCDs for DVT prophylaxis.  · Dispo: TBD depending on EGD results and stability of H&H. ?tomorrow      Dean Edwards MD  [facility]  04/12/22  13:58 EDT     Never

## 2024-01-16 ENCOUNTER — INFUSION (OUTPATIENT)
Dept: ONCOLOGY | Facility: HOSPITAL | Age: 74
End: 2024-01-16
Payer: MEDICARE

## 2024-01-16 ENCOUNTER — OFFICE VISIT (OUTPATIENT)
Dept: ONCOLOGY | Facility: CLINIC | Age: 74
End: 2024-01-16
Payer: MEDICARE

## 2024-01-16 VITALS
RESPIRATION RATE: 18 BRPM | WEIGHT: 155.5 LBS | HEART RATE: 62 BPM | BODY MASS INDEX: 21.06 KG/M2 | DIASTOLIC BLOOD PRESSURE: 72 MMHG | HEIGHT: 72 IN | OXYGEN SATURATION: 99 % | SYSTOLIC BLOOD PRESSURE: 132 MMHG | TEMPERATURE: 97.8 F

## 2024-01-16 DIAGNOSIS — C91.10 CLL (CHRONIC LYMPHOCYTIC LEUKEMIA): Primary | ICD-10-CM

## 2024-01-16 DIAGNOSIS — C91.10 CLL (CHRONIC LYMPHOCYTIC LEUKEMIA): ICD-10-CM

## 2024-01-16 DIAGNOSIS — D50.0 IRON DEFICIENCY ANEMIA DUE TO CHRONIC BLOOD LOSS: Primary | ICD-10-CM

## 2024-01-16 DIAGNOSIS — I99.8 ANGIECTASIA: ICD-10-CM

## 2024-01-16 DIAGNOSIS — K22.719 BARRETT'S ESOPHAGUS WITH DYSPLASIA: ICD-10-CM

## 2024-01-16 LAB
ALBUMIN SERPL-MCNC: 4 G/DL (ref 3.5–5.2)
ALBUMIN/GLOB SERPL: 1.8 G/DL
ALP SERPL-CCNC: 79 U/L (ref 39–117)
ALT SERPL W P-5'-P-CCNC: 17 U/L (ref 1–41)
ANION GAP SERPL CALCULATED.3IONS-SCNC: 10.9 MMOL/L (ref 5–15)
AST SERPL-CCNC: 25 U/L (ref 1–40)
BASOPHILS # BLD AUTO: 0.05 10*3/MM3 (ref 0–0.2)
BASOPHILS NFR BLD AUTO: 0.6 % (ref 0–1.5)
BILIRUB SERPL-MCNC: 0.5 MG/DL (ref 0–1.2)
BUN SERPL-MCNC: 19 MG/DL (ref 8–23)
BUN/CREAT SERPL: 16.7 (ref 7–25)
CALCIUM SPEC-SCNC: 9.1 MG/DL (ref 8.6–10.5)
CHLORIDE SERPL-SCNC: 103 MMOL/L (ref 98–107)
CO2 SERPL-SCNC: 23.1 MMOL/L (ref 22–29)
CREAT SERPL-MCNC: 1.14 MG/DL (ref 0.76–1.27)
DEPRECATED RDW RBC AUTO: 53.6 FL (ref 37–54)
EGFRCR SERPLBLD CKD-EPI 2021: 67.5 ML/MIN/1.73
EOSINOPHIL # BLD AUTO: 0.2 10*3/MM3 (ref 0–0.4)
EOSINOPHIL NFR BLD AUTO: 2.5 % (ref 0.3–6.2)
ERYTHROCYTE [DISTWIDTH] IN BLOOD BY AUTOMATED COUNT: 15.3 % (ref 12.3–15.4)
FERRITIN SERPL-MCNC: 21 NG/ML (ref 30–400)
GLOBULIN UR ELPH-MCNC: 2.2 GM/DL
GLUCOSE SERPL-MCNC: 153 MG/DL (ref 65–99)
HCT VFR BLD AUTO: 34.2 % (ref 37.5–51)
HGB BLD-MCNC: 11.2 G/DL (ref 13–17.7)
IMM GRANULOCYTES # BLD AUTO: 0.09 10*3/MM3 (ref 0–0.05)
IMM GRANULOCYTES NFR BLD AUTO: 1.1 % (ref 0–0.5)
IRON 24H UR-MRATE: 36 MCG/DL (ref 59–158)
IRON SATN MFR SERPL: 8 % (ref 20–50)
LYMPHOCYTES # BLD AUTO: 1.85 10*3/MM3 (ref 0.7–3.1)
LYMPHOCYTES NFR BLD AUTO: 22.9 % (ref 19.6–45.3)
MCH RBC QN AUTO: 31.4 PG (ref 26.6–33)
MCHC RBC AUTO-ENTMCNC: 32.7 G/DL (ref 31.5–35.7)
MCV RBC AUTO: 95.8 FL (ref 79–97)
MONOCYTES # BLD AUTO: 0.53 10*3/MM3 (ref 0.1–0.9)
MONOCYTES NFR BLD AUTO: 6.6 % (ref 5–12)
NEUTROPHILS NFR BLD AUTO: 5.36 10*3/MM3 (ref 1.7–7)
NEUTROPHILS NFR BLD AUTO: 66.3 % (ref 42.7–76)
NRBC BLD AUTO-RTO: 0 /100 WBC (ref 0–0.2)
PLATELET # BLD AUTO: 167 10*3/MM3 (ref 140–450)
PMV BLD AUTO: 8.8 FL (ref 6–12)
POTASSIUM SERPL-SCNC: 4.3 MMOL/L (ref 3.5–5.2)
PROT SERPL-MCNC: 6.2 G/DL (ref 6–8.5)
RBC # BLD AUTO: 3.57 10*6/MM3 (ref 4.14–5.8)
SODIUM SERPL-SCNC: 137 MMOL/L (ref 136–145)
TIBC SERPL-MCNC: 434 MCG/DL (ref 298–536)
TRANSFERRIN SERPL-MCNC: 310 MG/DL (ref 200–360)
WBC NRBC COR # BLD AUTO: 8.08 10*3/MM3 (ref 3.4–10.8)

## 2024-01-16 PROCEDURE — 85025 COMPLETE CBC W/AUTO DIFF WBC: CPT

## 2024-01-16 PROCEDURE — 84466 ASSAY OF TRANSFERRIN: CPT | Performed by: NURSE PRACTITIONER

## 2024-01-16 PROCEDURE — 36591 DRAW BLOOD OFF VENOUS DEVICE: CPT

## 2024-01-16 PROCEDURE — 83540 ASSAY OF IRON: CPT | Performed by: NURSE PRACTITIONER

## 2024-01-16 PROCEDURE — 82728 ASSAY OF FERRITIN: CPT | Performed by: NURSE PRACTITIONER

## 2024-01-16 PROCEDURE — 80053 COMPREHEN METABOLIC PANEL: CPT

## 2024-01-16 PROCEDURE — 25010000002 HEPARIN LOCK FLUSH PER 10 UNITS: Performed by: INTERNAL MEDICINE

## 2024-01-16 RX ORDER — SODIUM CHLORIDE 0.9 % (FLUSH) 0.9 %
10 SYRINGE (ML) INJECTION AS NEEDED
Status: DISCONTINUED | OUTPATIENT
Start: 2024-01-16 | End: 2024-01-16 | Stop reason: HOSPADM

## 2024-01-16 RX ORDER — HEPARIN SODIUM (PORCINE) LOCK FLUSH IV SOLN 100 UNIT/ML 100 UNIT/ML
500 SOLUTION INTRAVENOUS AS NEEDED
Status: DISCONTINUED | OUTPATIENT
Start: 2024-01-16 | End: 2024-01-16 | Stop reason: HOSPADM

## 2024-01-16 RX ORDER — EZETIMIBE 10 MG/1
10 TABLET ORAL DAILY
COMMUNITY
Start: 2023-12-19

## 2024-01-16 RX ORDER — OXYCODONE HYDROCHLORIDE AND ACETAMINOPHEN 5; 325 MG/1; MG/1
TABLET ORAL
COMMUNITY

## 2024-01-16 RX ORDER — PANTOPRAZOLE SODIUM 40 MG/1
40 TABLET, DELAYED RELEASE ORAL DAILY
Qty: 90 TABLET | Refills: 3 | Status: SHIPPED | OUTPATIENT
Start: 2024-01-16

## 2024-01-16 RX ADMIN — Medication 500 UNITS: at 13:04

## 2024-01-16 RX ADMIN — Medication 10 ML: at 13:04

## 2024-01-16 NOTE — PROGRESS NOTES
Subjective     REASON FOR FOLLOW-UP:    1. Chronic lymphoid leukemia, stage 4, presented initially with significant pancytopenia.  Currently on Gazyva with venetoclax  Cycle 1 Gazyva given on August 13, 2020    2.  History of angiodysplasia requiring cauterization and history of Hess's esophagus    3.  Bone marrow aspiration biopsy shows hypercellular marrow with 98% involvement with CLL    4. Iron deficiency anemia in the setting of GI bleeding    History of Present Illness   Patient is seen back today in follow-up with history of CLL in remission following Gazyva and venetoclax as well as followed for history of iron deficiency anemia in the setting of recurrent GI bleeding.    He is reviewed back today and 3-month follow-up.  He is doing well with no new symptoms of concern.  We discussed concern that his hemoglobin has dropped from 14.93 months ago down to 11.2 today.  Unfortunately iron studies confirm that he is iron deficient again with ferritin down to 21, iron sat 8%.  It is learned that patient stopped his Protonix thinking he did not need it.  He does have a history of documented esophagitis per scopes and bleeding angioectasias.  Last scopes were done in the fall 2022.    We discussed the need for additional IV iron.  Patient cannot take oral iron due to GI upset.  Also plan referral back to Dr. Luis Daniel Guevara for consideration of repeat scopes.  Patient has required intermittent argon plasma treatment to bleeding angiectasia's.    He denies other concerns at this time.    PAST MEDICAL HISTORY:   Recurrent atrial fibrillation followed by cardiology. He has had drug eluting stent placed x 3.   High cholesterol.   Hypertension.       HEMATOLOGIC/ONCOLOGIC HISTORY:       The patient is 62-year-old gentleman with the above history currently here for followup. He has no complaints. He denies fever, night sweats or weight loss. He does not have any lymphadenopathy. He feels good. He had some fatigue but  his CBC shows a go od hemoglobin.   The patient is followed by Dr. Kevin Chandra. He had seen Dr. Chandra 7/18/13 for a history of coronary artery disease status drug eluting stent placement to the right coronary artery and left anterior descending artery in February 2011. Histor y of paroxysmal atrial fibrillation and hyperlipidemia. He presented to [facility] on 6/23/13 with palpitations and was found to have atrial fibrillation with rapid ventricular response. He was given IV Cardizem and converted spontaneously to normal sinus rhythm. He was found to have elevated white count at 18.9 and denied any symptoms of infection or urinary complaints. TSH was normal, troponin levels were negative. He was asked to continue aspirin and metoprolol for that. If he contin u ed to have atrial fibrillation, they will consider antiarrhythmic therapy with or without a short term anticoagulation therapy. However, currently the patient is feeling reasonably good. He has no complaints. His CBC 7/22/13 showed WBC 17,000, hemoglob i n 16.4 and platelet count of 174,000. Back 9/28/13 his hemoglobin was 15.1, WBC 13.3 and platelets 197,000. He has had a persistently elevated WBC but he is totally asymptomatic. He does not have any fever, night sweats or lymphadenopathy. He is here to be evaluated for his elevated white count.       Peripheral blood flow cytometry done 08/16/13 shows 22% chronic lymphoid leukemia cells, and they expressed ZAP-70 but not CD38. The total number of cells approximated 60% T cells, 32% B cells and 1% natural k iller cells. The B cells were monoclonal and expressed CD19, CD20, CD22, CD5, CD23, CD11c, ZAP-70 and T cell antigens. There was a normal CD4/CD8 ratio. CT of the chest, abdomen and pelvis does not show evidence of metastasis. Peripheral blood for DILLON -2 was negative. It was negative for T11:14 translocation.       CT scan done on 08/21/2013 shows 5 to 6 mm noncalcified nodule in the left  lower lobe which is unchanged from December 2010. Otherwise no evidence of any lymphadenopathy or hepatosplenomegaly.       MRI of the spine shows arthritis and disc bulge and likely hemangiomas, with 1 lesion showing increased signal intensity at T2, which is likely a hemangioma. Bone scan could be done, but patient does not even have much pain there. CT scan of the chest, a bdomen and pelvis was done on 11/23/2014. He has tiny lymph nodes in the neck which are difficult to palpate. Right jugular one is 1.4 x 0.9 and left supraclavicular one is 1.5 x 1.1. He also has a subcarinal lymph node which is 1.7 x 1.2 cm, and he ha s a portocaval lymph node which has increased from 2.5 to 2.8. Patient is totally asymptomatic. He does not have any B symptoms, so will continue followup with observation.     Patient is a 69-year-old gentleman with history of chronic lymphocytic leukemia/SLL who recently got admitted to [facility] because of GI bleed.  He was seen by Dr. Montero.  He underwent EGD colonoscopy.  He was found to have angiodysplasia for which he underwent argon coagulation.  He required 3 units of blood.  Patient had a normal esophagus normal stomach and normal duodenum CLOtest was negative he was asked to take Protonix 40 mg daily.  Colonoscopy showed blood in the entire examined colon but there was a single bleeding colonic angiectasia which was treated with argon plasma coagulation.    He was admitted twice.  Initially he was admitted on July 10 at which time he stayed 3 days and he was evaluated for chest pain.  He underwent a cardiac work-up with stress test and echo.  The echo was normal but the stress test showed medium sized moderate severe severity fixed perfusion defect.  Of the inferior wall consistent with infarction.  However according to patient cardiology did not think he had any cardiac problems.  I have left a message for patient's cardiologist to call me today.  Patient subsequently got  readmitted with GI bleed and required 1/3 unit of transfusion.  He was found to have thrombocytopenia and hematology was consulted.    His hemoglobin had dropped down to as low as 7 and his platelets had gone down to 87.  Today it is 35 and his hemoglobin is 9.4 today.  He denies active bleeding.  He has lost weight recently.  He does not have any fever or night sweats.    He had ultrasound of spleen which showed enlarged spleen centimeters in length    Patient was started on Gazyva with Leukeran but subsequently switched to venetoclax with Gazyva.  His venetoclax dose is 100 mg daily and he receives Gazyva once a month.    CT scan done 2020 shows significant response     MEDICATIONS: The current medication list was reviewed with the patient and updated in the EMR this date per the medical assistant. Medication dosages and frequencies were confirmed to be accurate.       ALLERGIES: PENICILLIN which causes him to swell up. He is allergic to IV CONTRAST DYE.     SOCIAL HISTORY: He is . He smokes one pack per day for 35 years. He consumes three to four alcoholic drinks per week. He has no tattoos and no previous transfusions.       FAMILY HISTORY: Father  at 82 with MI. Mother  at 82 with bone cancer. He has one brother age 65 in good health and two sisters 69 and 57 in good health.   Past Medical History, Social History, and Family History are unchanged from my prior documentation on     Review of Systems   Constitutional: Negative for fatigue.  Negative for appetite change, chills, diaphoresis, fever and unexpected weight change.   HENT: Negative for hearing loss, sore throat and trouble swallowing.    Respiratory: Negative for cough, chest tightness, shortness of breath and wheezing.    Cardiovascular: Negative for chest pain, palpitations and leg swelling.   Gastrointestinal: Negative for abdominal distention, abdominal pain, constipation, nausea and vomiting.    Genitourinary: Negative for dysuria, frequency, hematuria and urgency.   Musculoskeletal: Negative for joint swelling.        No muscle weakness.   Skin: Negative for rash and wound.   Neurological: Negative for seizures, syncope, speech difficulty, weakness, numbness and headaches.   Hematological: Negative for adenopathy. Does not bruise/bleed easily.   Psychiatric/Behavioral: Negative for behavioral problems, confusion and suicidal ideas.   All other systems reviewed and are negative.    I have reviewed and confirmed the accuracy of the ROS as documented by the MA/LPN/RN MARIAH Garcia        Patient Active Problem List   Diagnosis    CLL (chronic lymphocytic leukemia)    Acute on chronic anemia    Encounter for hepatitis C screening test for low risk patient     Thrombocytopenia    H/O heart artery stent    Stented coronary artery    Arteriosclerosis of coronary artery    Paroxysmal atrial fibrillation    Paroxysmal atrial fibrillation    Fall    Fracture, olecranon    Hyperlipidemia    Long term (current) use of anticoagulants    Lower GI bleed    Olecranon bursitis    Shoulder pain    Smoker    CLL (chronic lymphocytic leukemia)    Dehydration    Drug-induced nausea and vomiting    Encounter for long-term (current) use of other medications    Neutropenia    Calculus of gallbladder without cholecystitis without obstruction    Cholecystitis    Iron deficiency    Adverse effect of iron    Melena    Change in bowel habits    Acute upper GI bleed    Acute blood loss anemia    Type 2 diabetes mellitus, without long-term current use of insulin    Prolonged Q-T interval on ECG    Chest pain with high risk for cardiac etiology    Hyponatremia    GI bleed    Iron deficiency anemia due to chronic blood loss    Abnormal CT scan, colon    Diarrhea    Segmental colitis associated with diverticulosis       MEDICATIONS:  Medication Reconciliation for the patient has been reviewed by me and documented in the  patients chart.    ALLERGIES:    Allergies   Allergen Reactions    Penicillins Swelling     As a child         Vitals:    01/16/24 1326   BP: 132/72   Pulse: 62   Resp: 18   Temp: 97.8 °F (36.6 °C)   SpO2: 99%                  1/16/2024     1:14 PM   Current Status   ECOG score 0      Physical exam:      This patient's ACP documentation is up to date, and there's nothing further left to document.    CONSTITUTIONAL:  Vital signs reviewed.  No distress, looks comfortable.  RESPIRATORY:  Normal respiratory effort.  Lungs clear to auscultation bilaterally.  CARDIOVASCULAR:  Normal S1, S2.  No murmurs rubs or gallops.  No significant lower extremity edema.  GASTROINTESTINAL: Abdomen appears unremarkable.  Nontender.  No hepatomegaly.  No splenomegaly.  LYMPHATIC:  No cervical, supraclavicular, axillary lymphadenopathy.  SKIN:  Warm.  No rashes.  PSYCHIATRIC:  Normal judgment and insight.  Normal mood and affect..    I have reexamined the patient and the results are consistent with the previously documented exam. Valarie Mclean, MARIAH     RECENT LABS:  Results from last 7 days   Lab Units 01/16/24  1304   WBC 10*3/mm3 8.08   NEUTROS ABS 10*3/mm3 5.36   HEMOGLOBIN g/dL 11.2*   HEMATOCRIT % 34.2*   PLATELETS 10*3/mm3 167       Results from last 7 days   Lab Units 01/16/24  1304   SODIUM mmol/L 137   POTASSIUM mmol/L 4.3   CHLORIDE mmol/L 103   CO2 mmol/L 23.1   BUN mg/dL 19   CREATININE mg/dL 1.14   CALCIUM mg/dL 9.1   ALBUMIN g/dL 4.0   BILIRUBIN mg/dL 0.5   ALK PHOS U/L 79   ALT (SGPT) U/L 17   AST (SGOT) U/L 25   GLUCOSE mg/dL 153*   FERRITIN ng/mL 21.00*   IRON mcg/dL 36*   TIBC mcg/dL 434   Iron sat 8%                Assessment & Plan   1. Stage 2 CLL without evidence of any disease progression.  I have reviewed the CT scan from 3/19/2018 there is minimal progression but clinically patient is asymptomatic and we will follow with observation.  Will monitor with labs. No evidence of any frequent infections, no  major worsening of his white count. He has no fever or night sweats or any other symptoms.   Patient knows that he is prone for infections and he is mainly staying at home during the COVID-19 situation time  Recent admission to [facility] July 10 2020×2.  Initially admitted with chest pain and underwent stress test and echo.  Echo was negative.  Stress test is abnormal but have left message for patient's cardiologist to call us.  His cardiologist is been sent [facility].  He then got admitted the second time with worsening GI bleed and required total of 3 units of blood.  EGD was negative but colonoscopy showed angiectasia for which he underwent argon ablation.  Currently hemoglobin stable and no active bleeding.  He was also found to have low platelets with platelets dropping down to 27k and hemoglobin was 7.  Ultrasound showed splenomegaly 15 cm.  Concern is whether he has progressed from his CLL point of view and requires treatment.  CT scan performed 7/20/2020 did show the increased splenomegaly, but interval decrease in size of the axillary nodes, and shotty mediastinal nodes.  No change in the shotty nodes within the neck and supraclavicular regions.  No new lymphadenopathy.  Bone marrow biopsy however showed preliminarily that there is bone marrow involvement.  Remainder of bone marrow biopsy report is pending.  Bone marrow shows hypercellular marrow with 100% involvement of chronic lymphocytic leukemia/small lymphocytic lymphoma and diffuse pattern with at least 98% of the total marrow cellularity.  Rare foci of erythropoiesis and rare megakaryocytes are noted.   Negative for BCL-2 and BCL 6.  Chromosomes shows normal karyotype.  I GVH mutation present.  Positive for gain of 1 q., monosomy 13 and loss of IGH.  Negative for deletion T p53, negative for gain of chromosomes 9 and 11, negative for 11, 14 fusion.  The combination of monosomy 13 and gain of 1 q. is thought to be associated with plasma cell  "myeloma.  However this patient does not have myeloma.  Scans were reviewed with the patient today.  Dr. Moya also discussed with the patient and recommended he initiate treatment with chlorambucil and Gazyva.  He would not be a good candidate for Imbruvica due to his history of A. fib\".  He cannot take anticoagulation at this time.  The patient was provided with chemotherapy education today, including  Handouts.  He will need a port placed so that we can initiate treatment  8/13/2020: Gazyva day 1. Plan also [id number] mg p.o. on day 1 day 15.  We can increase the dose once we know he tolerates and his platelets improved.  Patient developing pancytopenia when reviewed cycle 1 day 15.  Chlorambucil dose modified to 10 mg for day 15 however it is felt that he cannot tolerate this ongoing.    Plan to switch to Venetoclax along with continuing Gazyva.    Patient due today for cycle 2-day 1 Gazyva.  He will proceed today as scheduled.  Venetoclax will be shipped to his home with plans to begin this next week on 9/14/2020.    Patient did receive 1 L normal saline and Neulasta on 9/28/2020 secondary to neutropenia with an ANC of 0.04.  Patient seen on 10/02/2020 continuing on venetoclax, currently on 100 mg dosing.  He would not increase his dose as he will start on voriconazole after being on venetoclax x1 week which affects the metabolism of venetoclax.  He is currently on day 5 of the 100 mg dosing.  Patient states overall he feels the same except for increased fatigue and nausea.  His nauseousness is controlled with ondansetron however.  Patient will be given 1 L normal saline in the office today as planned.  Patient returns on 10/12/2020 continuing on venetoclax 100 mg daily as well as voriconazole.  Patient is doing well with these treatments except fatigue.  Next cycle 4 due as of number ninth prior to which will obtain CT scans  November 9, 2020: White count down to 1.89 but patient started Pepcid.  We will " hold off Pepcid.  11/17/2020 platelet count dropped to 35,000 patient was instructed to hold venetoclax.  Returns 11/23/2020 WBC up to 6.38, ANC 5.19, hemoglobin 12.4, platelets up to 121.  I have advised him to resume venetoclax 100 mg daily  December 7, 2020: Platelets 95K.  White count 3.0 with absolute neutrophil count of 2.01.  Cycle 5 Gazyva given.  Continue venetoclax with voriconazole.   January 4, 2021: Platelets 84,000, white count 3,440, absolute neutrophil count 1,980. Cycle 6 Gazyva given.  2/23/2021: WBC 3.0, platelets 90,000, hemoglobin 14.6, ANC 1.65.  Counts overall stable.  Continue Venetoclax 100 mg daily.  January 4, 2021: Last dose of Gazyva.  March 16, 2021: Patient is on venetoclax along with voriconazole and tolerating well with plans to complete total of 1 year.  4/27/2021: Patient continues on venetoclax 100 mg daily with voriconazole and acyclovir for prophylaxis.  He is tolerating this well.  He is scheduled for scans in 5 weeks.  June 8, 2021: Patient is to continue venetoclax 100 mg daily with voriconazole and acyclovir prophylaxis.  He is tolerating it very well.  The plan is to continue 8 more weeks and then he will complete the protocol and will be followed with observation.  I have reviewed the CT scan done on June 2, 2021 and there is no evidence of any lymphadenopathy and the spleen size is decreased in size from 12.5-11.3.  He has mild thrombocytopenia and leukopenia but his hemoglobin is normal.  9/7/2021: Patient completed 1 year worth of venetoclax with voriconazole and 6 months of Gazyva.  He has had an excellent response for his CLL.  Spleen decreased in size.  Currently asymptomatic.  Discontinue venetoclax since he completed 1 year  March 28, 2022: Patient's hemoglobin back to normal at 13.1.  EGD showed Hess's esophagus and angiodysplasia for which she underwent argon plasma coagulation.  Also colonoscopy showed multiple polyps all of them benign and had 1  angiodysplasia for which he underwent argon coagulation by Dr. Hinds.  Currently asymptomatic  5/26/2022 patient without B symptoms or adenopathy.  Acute drop in counts felt to be related to infection (see further discussion below).  Scheduled for repeat CT scans 6/14/2022.  June 21, 2022: Patient underwent CT scan Bibiana 15, 2022 which showed multifocal pneumonia bilaterally likely secondary to COVID-19 infection in the past.  There admitted they recommended repeat CT in 6 to 8 weeks.  There is a 0.8 cm pulmonary nodule in the left lower lobe is minimally increased in size but similar to that in March 19, 2018.  Patient is improving  WBC stable/normal at 6.3, 12% lymphs.  Platelets iron 30,000.   December 28, 2022: Reviewed CT scan which shows thickening of the descending colon and sigmoid colon and patient is anemic.  We will check iron studies.  Refer to Dr. Hinds for colonoscopy.  5/1/2023 WBC 5.34, 22% lymphs, platelets stable at 136,000  6/12/2023 WBC 5.03, 21% lymphocytes, 9 platelets stable at 131,000  October 27, 2023: Hemoglobin is 14.1, white count of 8.0 and platelet count of 190.  1/16/2024 WBC normal at 8.08, ALC 1.85, percent lymphs 22%.  Hgb dropping (see below regarding recurrent DAVID).  Platelets remain normal at 167,000.  No evidence of disease recurrence.      2. History of intermittent thrombocytopenia  Historically platelets in the 150s.  Count did drop when patient was treated for CLL on chlorambucil.    Platelets also dropping on treatment with Venetoclax in the past.    CLL in remission.  Platelets are stable at 167,000    3.  Iron deficiency anemia in the setting of recurrent GI bleeding with underlying AVMs, complicated by anticoagulation.   Patient intolerant to oral iron.  1/2022: Patient with recurrent iron deficiency as further detailed below.  Patient referred to GI for further evaluation.  Will see Dr. Hinds 2/9/2022.  Patient hospitalized 4/10 - 4/13/2022 for acute GI bleed.  EGD  showing Hess's esophagus.  Colonoscopy showing angiectasias, cauterized.  During hospitalization, Coumadin was held.    4/22/2022 evaluated by gastroenterology, noted to have a declining hemoglobin so they advised him to be seen by our office.  Patient's Hgb dropping to 8.8, Iron saturation 8%, TIBC 403, ferritin 25.1.  Patient receiving IV Injectafer x2, 4/29 and 5/6/2022.  7/25-7/31/2022 hospitalized with recurrent GI bleeding in the setting of known AVMs and being on Xarelto. Xarelto held.  Patient undergoing EGD 7/28/2022 revealing multiple nonbleeding angioectasias treated with argon plasma coagulation.  Patient placed on a heparin drip and then transitioned back to Xarelto as cardiology felt he could not be off this for a long with new Watchman's device.  Hemoglobin at discharge 10.8.  8/15/2022 patient seen by GI with continued reports of GI bleeding with noted dark stools.  Hemoglobin hemoglobin having already dropped and just 2 weeks from 10.8 to 8.4, ferritin 22, iron sat 11%. Patient scheduled for GI for capsule endoscopy.  8/23/2022: Hgb 7.7.  Plan to transfuse patient and give additional IV iron with Injectafer x2.  Of note Xarelto was stopped and patient switched to Plavix with hopes of stopping GI bleeding.  Patient undergoing EGD 9/15/2022 with 2 separate angioectasias treated with argon plasma.  9/20/2022: Hgb improved to 11.5.  Iron studies pending though presumably improved following recent Injectafer.  Patient understands to call with recurrent bleeding.  December 20, 2022 my concern is that his hemoglobin dropped to 10.5 we will check iron studies again  March 28, 2023: Patient is s/p iron and his hemoglobin today is 15.1 with normal white count and platelet count.  His platelets are 132 which is stable and he has 70% neutrophils and 20% lymphocytes.  5/3/2023 hemoglobin today 12.7 which is down slightly from recent.  Ferritin iron studies however adequate with iron saturation 35% and  ferritin 98.  Continue to monitor.  Patient denies bleeding.  6/12/2023 hemoglobin 12.1 today, ferritin 15, iron saturation 22%  October 27, 2023: Hemoglobin 14.9.  Ferritin 45, iron sat 32%.    1/16/2024 Hgb down to 11.2, ferritin 21, iron sat 8%.  Discussed with patient concern that he is bleeding again in the setting of known AVM history.  He also admits that he stopped his Protonix a while ago thinking he did not need it.  Instructed to restart Protonix.  We will give him additional IV Injectafer as he cannot tolerate oral iron due to GI upset.  Refer back to Dr. Guevara to discuss whether he needs repeat scopes.    4.  History of cluster headaches for which she has to be treated with steroids in the past and has followed up with Dr. Len Walker. Today with significant headaches.  Patient had CT of the head 8/20/2020 which was negative.  He was started on prednisone 10 mg daily which was not helpful.  Patient saw Dr. Bobby, neurology who gave him 2 shots of a new medication Emgality.  This is something that can be given once a month.    Resolved.    5.  Atrial fibrillation, off anticoagulation given his thrombocytopenia.  Patient continues on aspirin 81 mg daily if platelet count greater than 60,000.  Patient sees Dr. Christy at [facility] who follows his INR  Coumadin held during patient's recent hospitalization April/2022.  Patient wishes to discontinue Coumadin permanently.    Patient status post watchman's device 7/2022.  Started on Xarelto.  8/22/2022 Xarelto discontinued and switched to Plavix in the setting of ongoing GI bleeding per above.    6.  Chronic mild elevation of alkaline phosphatase.  Stable.      PLAN:   Patient with recurrent DAVID in the setting of chronic blood loss with AVMs.  Plan additional IV iron with Injectafer (patient cannot take oral iron due to GI upset).  Patient instructed to get back on Protonix.  Refilled for him today.  Referral placed back to Dr. Luis Daniel Felix to discuss any  potential need for repeat EGD/colonoscopy.  Patient remains in remission in regards to his history of CLL.  Patient will otherwise return in 6 weeks for port flush and in 3 months for follow-up with Dr. Moya with repeat CBC, ferritin, iron profile.      I spent 65 minutes caring for Macho on this date of service. This time includes time spent by me in the following activities: preparing for the visit, reviewing tests, obtaining and/or reviewing a separately obtained history, counseling and educating the patient/family/caregiver, ordering medications, tests, or procedures, referring and communicating with other health care professionals, documenting information in the medical record, independently interpreting results and communicating that information with the patient/family/caregiver, and care coordination      Valarie Mclean, APRN   01/16/24      Cc: Dr. Kevin Gilmore

## 2024-01-23 ENCOUNTER — INFUSION (OUTPATIENT)
Dept: ONCOLOGY | Facility: HOSPITAL | Age: 74
End: 2024-01-23
Payer: MEDICARE

## 2024-01-23 VITALS
OXYGEN SATURATION: 97 % | DIASTOLIC BLOOD PRESSURE: 73 MMHG | BODY MASS INDEX: 21.15 KG/M2 | WEIGHT: 156 LBS | HEART RATE: 66 BPM | TEMPERATURE: 97.8 F | SYSTOLIC BLOOD PRESSURE: 150 MMHG | RESPIRATION RATE: 16 BRPM

## 2024-01-23 DIAGNOSIS — E61.1 IRON DEFICIENCY: Primary | ICD-10-CM

## 2024-01-23 DIAGNOSIS — T45.4X5A ADVERSE EFFECT OF IRON, INITIAL ENCOUNTER: ICD-10-CM

## 2024-01-23 DIAGNOSIS — C91.10 CLL (CHRONIC LYMPHOCYTIC LEUKEMIA): ICD-10-CM

## 2024-01-23 PROCEDURE — 25010000002 FERRIC CARBOXYMALTOSE 750 MG/15ML SOLUTION 15 ML VIAL: Performed by: INTERNAL MEDICINE

## 2024-01-23 PROCEDURE — 25810000003 SODIUM CHLORIDE 0.9 % SOLUTION: Performed by: INTERNAL MEDICINE

## 2024-01-23 PROCEDURE — 96374 THER/PROPH/DIAG INJ IV PUSH: CPT

## 2024-01-23 PROCEDURE — 96365 THER/PROPH/DIAG IV INF INIT: CPT

## 2024-01-23 PROCEDURE — 63710000001 PROCHLORPERAZINE MALEATE PER 10 MG: Performed by: INTERNAL MEDICINE

## 2024-01-23 PROCEDURE — 25010000002 HEPARIN LOCK FLUSH PER 10 UNITS: Performed by: INTERNAL MEDICINE

## 2024-01-23 PROCEDURE — A9270 NON-COVERED ITEM OR SERVICE: HCPCS | Performed by: INTERNAL MEDICINE

## 2024-01-23 RX ORDER — HEPARIN SODIUM (PORCINE) LOCK FLUSH IV SOLN 100 UNIT/ML 100 UNIT/ML
500 SOLUTION INTRAVENOUS AS NEEDED
Status: DISCONTINUED | OUTPATIENT
Start: 2024-01-23 | End: 2024-01-23 | Stop reason: HOSPADM

## 2024-01-23 RX ORDER — SODIUM CHLORIDE 0.9 % (FLUSH) 0.9 %
10 SYRINGE (ML) INJECTION AS NEEDED
Status: DISCONTINUED | OUTPATIENT
Start: 2024-01-23 | End: 2024-01-23 | Stop reason: HOSPADM

## 2024-01-23 RX ORDER — PROCHLORPERAZINE MALEATE 10 MG
10 TABLET ORAL ONCE
Status: COMPLETED | OUTPATIENT
Start: 2024-01-23 | End: 2024-01-23

## 2024-01-23 RX ORDER — SODIUM CHLORIDE 9 MG/ML
20 INJECTION, SOLUTION INTRAVENOUS ONCE
Status: COMPLETED | OUTPATIENT
Start: 2024-01-23 | End: 2024-01-23

## 2024-01-23 RX ADMIN — SODIUM CHLORIDE 20 ML/HR: 9 INJECTION, SOLUTION INTRAVENOUS at 13:05

## 2024-01-23 RX ADMIN — FERRIC CARBOXYMALTOSE INJECTION 750 MG: 50 INJECTION, SOLUTION INTRAVENOUS at 13:13

## 2024-01-23 RX ADMIN — Medication 10 ML: at 14:04

## 2024-01-23 RX ADMIN — PROCHLORPERAZINE MALEATE 10 MG: 10 TABLET ORAL at 13:04

## 2024-01-23 RX ADMIN — Medication 500 UNITS: at 14:04

## 2024-01-30 ENCOUNTER — INFUSION (OUTPATIENT)
Dept: ONCOLOGY | Facility: HOSPITAL | Age: 74
End: 2024-01-30
Payer: MEDICARE

## 2024-01-30 VITALS
DIASTOLIC BLOOD PRESSURE: 63 MMHG | TEMPERATURE: 97.5 F | BODY MASS INDEX: 21.1 KG/M2 | WEIGHT: 155.6 LBS | RESPIRATION RATE: 16 BRPM | HEART RATE: 65 BPM | SYSTOLIC BLOOD PRESSURE: 107 MMHG | OXYGEN SATURATION: 98 %

## 2024-01-30 DIAGNOSIS — T45.4X5A ADVERSE EFFECT OF IRON, INITIAL ENCOUNTER: ICD-10-CM

## 2024-01-30 DIAGNOSIS — E61.1 IRON DEFICIENCY: Primary | ICD-10-CM

## 2024-01-30 DIAGNOSIS — C91.10 CLL (CHRONIC LYMPHOCYTIC LEUKEMIA): ICD-10-CM

## 2024-01-30 PROCEDURE — 63710000001 PROCHLORPERAZINE MALEATE PER 10 MG: Performed by: INTERNAL MEDICINE

## 2024-01-30 PROCEDURE — 25810000003 SODIUM CHLORIDE 0.9 % SOLUTION: Performed by: INTERNAL MEDICINE

## 2024-01-30 PROCEDURE — 25010000002 HEPARIN LOCK FLUSH PER 10 UNITS: Performed by: INTERNAL MEDICINE

## 2024-01-30 PROCEDURE — A9270 NON-COVERED ITEM OR SERVICE: HCPCS | Performed by: INTERNAL MEDICINE

## 2024-01-30 PROCEDURE — 25010000002 FERRIC CARBOXYMALTOSE 750 MG/15ML SOLUTION 15 ML VIAL: Performed by: INTERNAL MEDICINE

## 2024-01-30 PROCEDURE — 96374 THER/PROPH/DIAG INJ IV PUSH: CPT

## 2024-01-30 RX ORDER — PROCHLORPERAZINE MALEATE 10 MG
10 TABLET ORAL ONCE
Status: COMPLETED | OUTPATIENT
Start: 2024-01-30 | End: 2024-01-30

## 2024-01-30 RX ORDER — HEPARIN SODIUM (PORCINE) LOCK FLUSH IV SOLN 100 UNIT/ML 100 UNIT/ML
500 SOLUTION INTRAVENOUS AS NEEDED
Status: DISCONTINUED | OUTPATIENT
Start: 2024-01-30 | End: 2024-01-30 | Stop reason: HOSPADM

## 2024-01-30 RX ORDER — SODIUM CHLORIDE 0.9 % (FLUSH) 0.9 %
10 SYRINGE (ML) INJECTION AS NEEDED
Status: DISCONTINUED | OUTPATIENT
Start: 2024-01-30 | End: 2024-01-30 | Stop reason: HOSPADM

## 2024-01-30 RX ORDER — SODIUM CHLORIDE 9 MG/ML
20 INJECTION, SOLUTION INTRAVENOUS ONCE
Status: COMPLETED | OUTPATIENT
Start: 2024-01-30 | End: 2024-01-30

## 2024-01-30 RX ADMIN — PROCHLORPERAZINE MALEATE 10 MG: 10 TABLET ORAL at 12:57

## 2024-01-30 RX ADMIN — Medication 10 ML: at 13:39

## 2024-01-30 RX ADMIN — FERRIC CARBOXYMALTOSE INJECTION 750 MG: 50 INJECTION, SOLUTION INTRAVENOUS at 13:05

## 2024-01-30 RX ADMIN — Medication 500 UNITS: at 13:39

## 2024-01-30 RX ADMIN — SODIUM CHLORIDE 20 ML/HR: 9 INJECTION, SOLUTION INTRAVENOUS at 13:05

## 2024-02-27 ENCOUNTER — INFUSION (OUTPATIENT)
Dept: ONCOLOGY | Facility: HOSPITAL | Age: 74
End: 2024-02-27
Payer: MEDICARE

## 2024-02-27 DIAGNOSIS — C91.10 CLL (CHRONIC LYMPHOCYTIC LEUKEMIA): Primary | ICD-10-CM

## 2024-02-27 PROCEDURE — 96523 IRRIG DRUG DELIVERY DEVICE: CPT

## 2024-02-27 PROCEDURE — 25010000002 HEPARIN LOCK FLUSH PER 10 UNITS: Performed by: INTERNAL MEDICINE

## 2024-02-27 RX ORDER — SODIUM CHLORIDE 0.9 % (FLUSH) 0.9 %
10 SYRINGE (ML) INJECTION AS NEEDED
Status: DISCONTINUED | OUTPATIENT
Start: 2024-02-27 | End: 2024-02-27 | Stop reason: HOSPADM

## 2024-02-27 RX ORDER — HEPARIN SODIUM (PORCINE) LOCK FLUSH IV SOLN 100 UNIT/ML 100 UNIT/ML
500 SOLUTION INTRAVENOUS AS NEEDED
Status: DISCONTINUED | OUTPATIENT
Start: 2024-02-27 | End: 2024-02-27 | Stop reason: HOSPADM

## 2024-02-27 RX ADMIN — Medication 500 UNITS: at 13:08

## 2024-02-27 RX ADMIN — Medication 10 ML: at 13:08

## 2024-03-12 ENCOUNTER — OFFICE VISIT (OUTPATIENT)
Dept: GASTROENTEROLOGY | Facility: CLINIC | Age: 74
End: 2024-03-12
Payer: MEDICARE

## 2024-03-12 ENCOUNTER — PREP FOR SURGERY (OUTPATIENT)
Dept: SURGERY | Facility: SURGERY CENTER | Age: 74
End: 2024-03-12
Payer: MEDICARE

## 2024-03-12 VITALS
OXYGEN SATURATION: 95 % | DIASTOLIC BLOOD PRESSURE: 70 MMHG | BODY MASS INDEX: 21.41 KG/M2 | HEART RATE: 65 BPM | HEIGHT: 72 IN | WEIGHT: 158.1 LBS | SYSTOLIC BLOOD PRESSURE: 110 MMHG | TEMPERATURE: 97.1 F

## 2024-03-12 DIAGNOSIS — K31.819 GASTRIC AVM: ICD-10-CM

## 2024-03-12 DIAGNOSIS — K92.1 MELENA: ICD-10-CM

## 2024-03-12 DIAGNOSIS — D50.0 IRON DEFICIENCY ANEMIA DUE TO CHRONIC BLOOD LOSS: Primary | ICD-10-CM

## 2024-03-12 DIAGNOSIS — K22.70 BARRETT'S ESOPHAGUS WITHOUT DYSPLASIA: ICD-10-CM

## 2024-03-12 DIAGNOSIS — I99.8 ANGIECTASIA: ICD-10-CM

## 2024-03-12 RX ORDER — PANTOPRAZOLE SODIUM 40 MG/1
40 TABLET, DELAYED RELEASE ORAL 2 TIMES DAILY
Qty: 180 TABLET | Refills: 3 | Status: SHIPPED | OUTPATIENT
Start: 2024-03-12

## 2024-03-12 RX ORDER — SODIUM CHLORIDE, SODIUM LACTATE, POTASSIUM CHLORIDE, CALCIUM CHLORIDE 600; 310; 30; 20 MG/100ML; MG/100ML; MG/100ML; MG/100ML
30 INJECTION, SOLUTION INTRAVENOUS CONTINUOUS PRN
OUTPATIENT
Start: 2024-03-12

## 2024-03-12 RX ORDER — SODIUM CHLORIDE 0.9 % (FLUSH) 0.9 %
3 SYRINGE (ML) INJECTION EVERY 12 HOURS SCHEDULED
OUTPATIENT
Start: 2024-03-12

## 2024-03-12 RX ORDER — SUCRALFATE 1 G/1
1 TABLET ORAL 3 TIMES DAILY PRN
Qty: 120 TABLET | Refills: 1 | Status: SHIPPED | OUTPATIENT
Start: 2024-03-12 | End: 2024-03-12

## 2024-03-12 RX ORDER — SODIUM CHLORIDE 0.9 % (FLUSH) 0.9 %
10 SYRINGE (ML) INJECTION AS NEEDED
OUTPATIENT
Start: 2024-03-12

## 2024-03-12 RX ORDER — SUCRALFATE 1 G/1
TABLET ORAL
Qty: 270 TABLET | Refills: 3 | Status: SHIPPED | OUTPATIENT
Start: 2024-03-12

## 2024-03-12 NOTE — PATIENT INSTRUCTIONS
Schedule EGD and colonoscopy, orders placed -with Dr. Landin    For GERD:    Follow antireflux precautions:    Begin taking pantoprazole 40 mg twice daily before breakfast and dinner  You can also begin taking sucralfate up to 3 times daily as needed for abdominal pain  Avoiding eating within 3 to 4 hours of bedtime.    Avoid foods that can trigger symptoms which may include:  citrus fruits  spicy foods,  Tomatoes  Red sauces   Chocolate  coffee/tea  caffeinated or carbonated beverages  alcohol

## 2024-03-12 NOTE — PROGRESS NOTES
"Chief Complaint   Patient presents with    Black or Bloody Stool           History of Present Illness    Patient is a 73 y.o. who presents to the office for follow up evaluation.  Last in office visit was on  2/7/23 . Patient has a significant past medical history of anemia and GI bleeding, melena, gastric AVMs treated with APC.      On 9/15/2022 patient underwent EGD with small bowel enteroscopy with Dr. Hinds.EGD remarkable for 2 cm hiatal hernia, 2 angioectasias in the second part of duodenum-coagulation performed using APC.       On 1/10/2023 patient underwent flexible sigmoidoscopy evaluation.  Flexible sigmoidoscopy remarkable for mildly congested and hemorrhagic mucosa in the sigmoid colon consistent with segmental colitis associated with diverticular disease    1/16/24 CBC noted Hgb 11.2 compared to 10/2/23 of 14.9    For GERD, he reports use of pantoprazole 40 mg once daily prior to breakfast.  Since last in office visit there was a period of 4 to 5 months in which pantoprazole was stopped since symptoms had resolved.  During this time he did experience intermittent upper abdominal pain without visible etiology to onset.    He is also experienced a recurrence and melanotic stools since January 2024 despite restarting pantoprazole.  Denies hematochezia.  No abdominal pain, nausea, vomiting, or dysphagia.    He denies use of NSAIDs, alcohol however he does use tobacco    Patient denies known family history of colon polyps, colon cancer, or IBD.       Result Review :       Office Visit with Ayanna Prince APRN (02/07/2023)   Iron Profile (01/16/2024 13:04)  Ferritin (01/16/2024 13:04)  Comprehensive Metabolic Panel (01/16/2024 13:04)  CBC & Differential (01/16/2024 13:04)    Vital Signs:   /70   Pulse 65   Temp 97.1 °F (36.2 °C)   Ht 182.9 cm (72.01\")   Wt 71.7 kg (158 lb 1.6 oz)   SpO2 95%   BMI 21.44 kg/m²     Body mass index is 21.44 kg/m².     Physical Exam  Vitals reviewed. "   Constitutional:       General: He is not in acute distress.     Appearance: Normal appearance. He is not ill-appearing.   Eyes:      General: No scleral icterus.  Pulmonary:      Effort: Pulmonary effort is normal. No respiratory distress.   Abdominal:      General: Bowel sounds are normal. There is no distension.      Palpations: Abdomen is soft. Abdomen is not rigid. There is no mass or pulsatile mass.      Tenderness: There is no abdominal tenderness. There is no guarding or rebound.      Hernia: No hernia is present.   Skin:     Coloration: Skin is not jaundiced.   Neurological:      Mental Status: He is alert and oriented to person, place, and time.   Psychiatric:         Thought Content: Thought content normal.         Judgment: Judgment normal.           Assessment and Plan    Diagnoses and all orders for this visit:    1. Iron deficiency anemia due to chronic blood loss (Primary)  -     pantoprazole (PROTONIX) 40 MG EC tablet; Take 1 tablet by mouth 2 (Two) Times a Day.  Dispense: 180 tablet; Refill: 3    2. Gastric AVM  -     pantoprazole (PROTONIX) 40 MG EC tablet; Take 1 tablet by mouth 2 (Two) Times a Day.  Dispense: 180 tablet; Refill: 3  -     sucralfate (CARAFATE) 1 g tablet; Take 1 tablet by mouth 3 (Three) Times a Day As Needed (for stomach). Ensure no other medications within 1 hour of dose or 2 to 4 hours after taking  Dispense: 120 tablet; Refill: 1    3. Hess's esophagus without dysplasia  -     pantoprazole (PROTONIX) 40 MG EC tablet; Take 1 tablet by mouth 2 (Two) Times a Day.  Dispense: 180 tablet; Refill: 3  -     sucralfate (CARAFATE) 1 g tablet; Take 1 tablet by mouth 3 (Three) Times a Day As Needed (for stomach). Ensure no other medications within 1 hour of dose or 2 to 4 hours after taking  Dispense: 120 tablet; Refill: 1    4. Melena           Discussion:    Patient is a pleasant 73-year-old male who presents today for further evaluation of melanotic stools and anemia after  encouragement from hematology provider MARIAH Rene.    He is agreeable to proceeding with EGD and colonoscopy evaluation to assess for GI source to anemia as he does have a history of AVMs that required treatment with APC.    While awaiting endoscopic evaluation he will begin taking pantoprazole 40 mg twice daily before breakfast and dinner.  I have also encouraged him to begin taking sucralfate up to 3 times daily as needed for stomach pain.  Reiterated the importance of smoking cessation as well as avoiding NSAIDs and alcohol as this can worsen upper GI irritation and increased risk of bleeding.    He will continue to follow-up with hematology for monitoring of hemoglobin and iron stores.    He will follow-up in our office 2 to 4 weeks after undergoing endoscopic evaluation with Further recommendations to be made pending results of the above work-up and clinical course.  If workup negative and symptoms persist, to consider completion of capsule endoscopy to assess for small bowel source to anemia.    Patient is agreeable to the outlined above treatment plan.  Verbalizes understanding and will contact office for any new or worsening concerns.  All questions answered and support provided.        Patient Instructions   Schedule EGD and colonoscopy, orders placed -with Dr. Landin    For GERD:    Follow antireflux precautions:    Begin taking pantoprazole 40 mg twice daily before breakfast and dinner  You can also begin taking sucralfate up to 3 times daily as needed for abdominal pain  Avoiding eating within 3 to 4 hours of bedtime.    Avoid foods that can trigger symptoms which may include:  citrus fruits  spicy foods,  Tomatoes  Red sauces   Chocolate  coffee/tea  caffeinated or carbonated beverages  alcohol          EMR Dragon/Transcription Disclaimer:  This document has been Dictated utilizing Dragon dictation.

## 2024-03-29 ENCOUNTER — ANESTHESIA EVENT (OUTPATIENT)
Dept: PERIOP | Facility: HOSPITAL | Age: 74
End: 2024-03-29
Payer: MEDICARE

## 2024-03-29 DIAGNOSIS — I72.4 ANEURYSM OF ARTERY OF LOWER EXTREMITY: Primary | ICD-10-CM

## 2024-04-05 ENCOUNTER — ANESTHESIA (OUTPATIENT)
Dept: PERIOP | Facility: HOSPITAL | Age: 74
End: 2024-04-05
Payer: MEDICARE

## 2024-04-05 ENCOUNTER — HOSPITAL ENCOUNTER (OUTPATIENT)
Facility: HOSPITAL | Age: 74
Setting detail: HOSPITAL OUTPATIENT SURGERY
Discharge: HOME | End: 2024-04-05
Attending: STUDENT IN AN ORGANIZED HEALTH CARE EDUCATION/TRAINING PROGRAM | Admitting: STUDENT IN AN ORGANIZED HEALTH CARE EDUCATION/TRAINING PROGRAM
Payer: MEDICARE

## 2024-04-05 VITALS
WEIGHT: 151.6 LBS | BODY MASS INDEX: 20.56 KG/M2 | TEMPERATURE: 97.7 F | OXYGEN SATURATION: 97 % | SYSTOLIC BLOOD PRESSURE: 157 MMHG | HEART RATE: 59 BPM | DIASTOLIC BLOOD PRESSURE: 73 MMHG | RESPIRATION RATE: 18 BRPM

## 2024-04-05 DIAGNOSIS — K31.819 GASTRIC AVM: ICD-10-CM

## 2024-04-05 DIAGNOSIS — K92.1 MELENA: ICD-10-CM

## 2024-04-05 DIAGNOSIS — D50.0 IRON DEFICIENCY ANEMIA DUE TO CHRONIC BLOOD LOSS: ICD-10-CM

## 2024-04-05 DIAGNOSIS — I99.8 ANGIECTASIA: ICD-10-CM

## 2024-04-05 LAB — GLUCOSE BLDC GLUCOMTR-MCNC: 146 MG/DL (ref 70–130)

## 2024-04-05 PROCEDURE — 45380 COLONOSCOPY AND BIOPSY: CPT | Performed by: STUDENT IN AN ORGANIZED HEALTH CARE EDUCATION/TRAINING PROGRAM

## 2024-04-05 PROCEDURE — 25010000002 GLUCAGON (RDNA) PER 1 MG: Performed by: NURSE ANESTHETIST, CERTIFIED REGISTERED

## 2024-04-05 PROCEDURE — 43239 EGD BIOPSY SINGLE/MULTIPLE: CPT | Performed by: STUDENT IN AN ORGANIZED HEALTH CARE EDUCATION/TRAINING PROGRAM

## 2024-04-05 PROCEDURE — 88305 TISSUE EXAM BY PATHOLOGIST: CPT | Performed by: STUDENT IN AN ORGANIZED HEALTH CARE EDUCATION/TRAINING PROGRAM

## 2024-04-05 PROCEDURE — 25810000003 LACTATED RINGERS PER 1000 ML: Performed by: NURSE ANESTHETIST, CERTIFIED REGISTERED

## 2024-04-05 PROCEDURE — 25010000002 PROPOFOL 200 MG/20ML EMULSION: Performed by: NURSE ANESTHETIST, CERTIFIED REGISTERED

## 2024-04-05 PROCEDURE — 82948 REAGENT STRIP/BLOOD GLUCOSE: CPT

## 2024-04-05 RX ORDER — ONDANSETRON 2 MG/ML
4 INJECTION INTRAMUSCULAR; INTRAVENOUS ONCE AS NEEDED
Status: DISCONTINUED | OUTPATIENT
Start: 2024-04-05 | End: 2024-04-05 | Stop reason: HOSPADM

## 2024-04-05 RX ORDER — SODIUM CHLORIDE 0.9 % (FLUSH) 0.9 %
10 SYRINGE (ML) INJECTION EVERY 12 HOURS SCHEDULED
Status: DISCONTINUED | OUTPATIENT
Start: 2024-04-05 | End: 2024-04-05 | Stop reason: HOSPADM

## 2024-04-05 RX ORDER — SODIUM CHLORIDE 0.9 % (FLUSH) 0.9 %
10 SYRINGE (ML) INJECTION AS NEEDED
Status: DISCONTINUED | OUTPATIENT
Start: 2024-04-05 | End: 2024-04-05 | Stop reason: HOSPADM

## 2024-04-05 RX ORDER — MAGNESIUM HYDROXIDE 1200 MG/15ML
LIQUID ORAL AS NEEDED
Status: DISCONTINUED | OUTPATIENT
Start: 2024-04-05 | End: 2024-04-05 | Stop reason: HOSPADM

## 2024-04-05 RX ORDER — LIDOCAINE HYDROCHLORIDE 20 MG/ML
INJECTION, SOLUTION INFILTRATION; PERINEURAL AS NEEDED
Status: DISCONTINUED | OUTPATIENT
Start: 2024-04-05 | End: 2024-04-05 | Stop reason: SURG

## 2024-04-05 RX ORDER — SODIUM CHLORIDE 9 MG/ML
40 INJECTION, SOLUTION INTRAVENOUS AS NEEDED
Status: DISCONTINUED | OUTPATIENT
Start: 2024-04-05 | End: 2024-04-05 | Stop reason: HOSPADM

## 2024-04-05 RX ORDER — SODIUM CHLORIDE, SODIUM LACTATE, POTASSIUM CHLORIDE, CALCIUM CHLORIDE 600; 310; 30; 20 MG/100ML; MG/100ML; MG/100ML; MG/100ML
100 INJECTION, SOLUTION INTRAVENOUS ONCE
Status: DISCONTINUED | OUTPATIENT
Start: 2024-04-05 | End: 2024-04-05 | Stop reason: HOSPADM

## 2024-04-05 RX ORDER — SODIUM CHLORIDE 0.9 % (FLUSH) 0.9 %
3 SYRINGE (ML) INJECTION EVERY 12 HOURS SCHEDULED
Status: DISCONTINUED | OUTPATIENT
Start: 2024-04-05 | End: 2024-04-05 | Stop reason: HOSPADM

## 2024-04-05 RX ORDER — SODIUM CHLORIDE, SODIUM LACTATE, POTASSIUM CHLORIDE, CALCIUM CHLORIDE 600; 310; 30; 20 MG/100ML; MG/100ML; MG/100ML; MG/100ML
9 INJECTION, SOLUTION INTRAVENOUS CONTINUOUS PRN
Status: DISCONTINUED | OUTPATIENT
Start: 2024-04-05 | End: 2024-04-05 | Stop reason: HOSPADM

## 2024-04-05 RX ORDER — SODIUM CHLORIDE, SODIUM LACTATE, POTASSIUM CHLORIDE, CALCIUM CHLORIDE 600; 310; 30; 20 MG/100ML; MG/100ML; MG/100ML; MG/100ML
30 INJECTION, SOLUTION INTRAVENOUS CONTINUOUS PRN
Status: DISCONTINUED | OUTPATIENT
Start: 2024-04-05 | End: 2024-04-05 | Stop reason: HOSPADM

## 2024-04-05 RX ORDER — PROPOFOL 10 MG/ML
INJECTION, EMULSION INTRAVENOUS AS NEEDED
Status: DISCONTINUED | OUTPATIENT
Start: 2024-04-05 | End: 2024-04-05 | Stop reason: SURG

## 2024-04-05 RX ADMIN — PROPOFOL 30 MG: 10 INJECTION, EMULSION INTRAVENOUS at 12:43

## 2024-04-05 RX ADMIN — PROPOFOL 30 MG: 10 INJECTION, EMULSION INTRAVENOUS at 12:52

## 2024-04-05 RX ADMIN — PROPOFOL 40 MG: 10 INJECTION, EMULSION INTRAVENOUS at 12:25

## 2024-04-05 RX ADMIN — PROPOFOL 30 MG: 10 INJECTION, EMULSION INTRAVENOUS at 12:49

## 2024-04-05 RX ADMIN — GLUCAGON HYDROCHLORIDE 1 MG: KIT at 12:49

## 2024-04-05 RX ADMIN — PROPOFOL 30 MG: 10 INJECTION, EMULSION INTRAVENOUS at 12:55

## 2024-04-05 RX ADMIN — PROPOFOL 30 MG: 10 INJECTION, EMULSION INTRAVENOUS at 12:46

## 2024-04-05 RX ADMIN — PROPOFOL 30 MG: 10 INJECTION, EMULSION INTRAVENOUS at 12:37

## 2024-04-05 RX ADMIN — PROPOFOL 30 MG: 10 INJECTION, EMULSION INTRAVENOUS at 12:40

## 2024-04-05 RX ADMIN — PROPOFOL 30 MG: 10 INJECTION, EMULSION INTRAVENOUS at 12:28

## 2024-04-05 RX ADMIN — PROPOFOL 80 MG: 10 INJECTION, EMULSION INTRAVENOUS at 12:22

## 2024-04-05 RX ADMIN — LIDOCAINE HYDROCHLORIDE 60 MG: 20 INJECTION, SOLUTION INFILTRATION; PERINEURAL at 12:22

## 2024-04-05 RX ADMIN — PROPOFOL 50 MG: 10 INJECTION, EMULSION INTRAVENOUS at 12:31

## 2024-04-05 RX ADMIN — SODIUM CHLORIDE, POTASSIUM CHLORIDE, SODIUM LACTATE AND CALCIUM CHLORIDE 9 ML/HR: 600; 310; 30; 20 INJECTION, SOLUTION INTRAVENOUS at 10:02

## 2024-04-05 RX ADMIN — PROPOFOL 30 MG: 10 INJECTION, EMULSION INTRAVENOUS at 12:34

## 2024-04-05 RX ADMIN — PROPOFOL 30 MG: 10 INJECTION, EMULSION INTRAVENOUS at 13:01

## 2024-04-05 RX ADMIN — PROPOFOL 30 MG: 10 INJECTION, EMULSION INTRAVENOUS at 12:58

## 2024-04-05 NOTE — H&P
Patient Care Team:  Len Collier MD as PCP - General (Family Medicine)  Susannah Moya MD as Consulting Physician (Hematology and Oncology)  Robert Rodriguez MD as Consulting Physician (Interventional Cardiology)  Leonardo Wyman MD as Consulting Physician (Vascular Surgery)  Bernabe Ulloa MD as Consulting Physician (Gastroenterology)  Len Walker DO as Consulting Physician (Neurology)  Arleen Villareal APRN as Nurse Practitioner (Gastroenterology)  Magy Summers APRN as Nurse Practitioner (Nurse Practitioner)  Kevin Chandra MD as Referring Physician (Family Medicine)  Ayanna Prince APRN as Nurse Practitioner (Gastroenterology)    CHIEF COMPLAINT:  DAVID and Melena    HISTORY OF PRESENT ILLNESS:  DAVID and Melena    Past Medical History:   Diagnosis Date    A-fib     Paroxysmal atrial fibrillation    Anemia     blood transfusions and iron infusions    CAD (coronary artery disease)     has stent [id number] artery    CLL (chronic lymphocytic leukemia)     FOLLOWED BY CBC OFFICE, HX CHEMO 5-6 YEARS AGO    Colon polyps 05/07/2007    Rectum: hyperplastic polyp    Colon polyps 07/16/2019    Rectum: hyperplastic polyps x2    Diabetes mellitus     Drug-induced nausea and vomiting 10/12/2020    GERD (gastroesophageal reflux disease)     GI bleed     History of transfusion     no reaction    Hyperlipidemia     Hypertension     Left shoulder pain     PAIN, LIMITED MOBILITY,WEAKNESS    Low back pain     Presence of Watchman left atrial appendage closure device     7-2022    Presence of Watchman left atrial appendage closure device     RLS (restless legs syndrome)      Past Surgical History:   Procedure Laterality Date    ANKLE FUSION Right     Fusion hardware    ATRIAL APPENDAGE EXCLUSION LEFT WITH TRANSESOPHAGEAL ECHOCARDIOGRAM  07/2022    BONE MARROW BIOPSY Left 10/28/2020    CT guided left iliac crest biopsy and FNA-Dr. Darius Reynolds, [facility]    CARDIAC CATHETERIZATION  02/2011    Jennifer     CARDIOVERSION  04/22/2016    Ray Marte    CHOLECYSTECTOMY WITH INTRAOPERATIVE CHOLANGIOGRAM N/A 10/01/2021    Procedure: CHOLECYSTECTOMY LAPAROSCOPIC INTRAOPERATIVE CHOLANGIOGRAM;  Surgeon: Darius Lui MD;  [facility];  Service: General;  Laterality: N/A;    COLONOSCOPY N/A 2012    COLONOSCOPY N/A 07/12/2020    Ray Noe    COLONOSCOPY N/A 03/22/2022    Procedure: COLONOSCOPY FOR SCREENING;  Surgeon: Velasquez Hinds MD;  Location: [facility] MAIN OR;  Service: Gastroenterology;  Laterality: N/A;  Diverticulosis, polyps,   APC of angiodysplasia right colon    COLONOSCOPY W/ POLYPECTOMY N/A 05/07/2007    Dr. Vivek Siddiqui, [facility]    CORONARY ANGIOPLASTY WITH STENT PLACEMENT      x3    CORONARY STENT PLACEMENT  02/2011    [place], Promus CARLOS x 2 proximal to mid RCA, LAD x 1    ENDOSCOPY N/A 07/15/2019    ENDOSCOPY N/A 03/22/2022    Procedure: ESOPHAGOGASTRODUODENOSCOPY;  Surgeon: Velasquez Hinds MD;  Location: [facility];  Service: Gastroenterology;  Laterality: N/A;  APC-duodenal bulb, esophagitis, hiatal hernia, Angioectasis of duodenum    ENDOSCOPY N/A 07/28/2022    Procedure: ESOPHAGOGASTRODUODENOSCOPY WITH APC AND X2 RESOLUTION CLIPS;  Surgeon: Luis Daniel Walls MD;  [facility];  Service: Gastroenterology;  Laterality: N/A;  PREOP/ GI BLEED  POSTOP/ AVM's IN STOMACH    ENDOSCOPY N/A 09/15/2022    Procedure: ESOPHAGOGASTRODUODENOSCOPY WITH APC;  Surgeon: Velasquez Hinds MD;  [facility];  Service: Gastroenterology;  Laterality: N/A;  Pre: anemia, melena  Post: 2 angioectasias, hiatal hernia    ENDOSCOPY AND COLONOSCOPY N/A 07/15/2019    Dr. Ulloa    ENTEROSCOPY SMALL BOWEL N/A 04/13/2022    Procedure: SMALL BOWEL ENTEROSCOPY;  Surgeon: Luis Daniel Walls MD;  [facility];  Service: Gastroenterology;  Laterality: N/A;  PRE- OBSCURE GI BLEEDING  POST- NON BLEEDING DUODENITIS    ENTEROSCOPY SMALL BOWEL  N/A 09/15/2022    Procedure: SMALL BOWEL ENTEROSCOPY;  Surgeon: Velasquez Hinds MD;  Location:  [facility];  Service: Gastroenterology;  Laterality: N/A;    SHOULDER ARTHROSCOPY Left 7/19/2023    Procedure: LEFT SHOULDER ARTHROSCOPY WITH ROTATOR CUFF REPAIR, ACROMIOPLASTY AND EXTENSIVE DEBRIDEMENT;  Surgeon: Bill Bradford MD;  Location:  [facility];  Service: Orthopedics;  Laterality: Left;    SHOULDER SURGERY Left 2007    TORN LIGAMENT WITH METAL    SIGMOIDOSCOPY N/A 01/10/2023    Procedure: FLEXIBLE SIGMOIDOSCOPY WITH BIOPSY;  Surgeon: Velasquez Hinds MD;  Location: [facility] OR;  Service: Gastroenterology;  Laterality: N/A;  DIVERTICULOSIS, COLITIS    TONSILLECTOMY Bilateral     VENOUS ACCESS DEVICE (PORT) INSERTION N/A 07/31/2020    Procedure: INSERTION VENOUS ACCESS DEVICE;  Surgeon: Darius Lui MD;  Location:  [facility];  Service: General;  Laterality: N/A;     Family History   Problem Relation Age of Onset    Bone cancer Mother     Heart attack Father     Diabetes Father     Diabetes Sister     Malig Hyperthermia Neg Hx     Colon cancer Neg Hx     Colon polyps Neg Hx     Crohn's disease Neg Hx     Irritable bowel syndrome Neg Hx     Ulcerative colitis Neg Hx      Social History     Tobacco Use    Smoking status: Every Day     Current packs/day: 0.50     Average packs/day: 0.5 packs/day for 40.0 years (20.0 ttl pk-yrs)     Types: Cigarettes    Smokeless tobacco: Never    Tobacco comments:     1/2 PPD   Vaping Use    Vaping status: Never Used   Substance Use Topics    Alcohol use: Yes     Alcohol/week: 2.0 standard drinks of alcohol     Types: 2 Cans of beer per week     Comment: rare    Drug use: Never     Medications Prior to Admission   Medication Sig Dispense Refill Last Dose    atorvastatin (LIPITOR) 20 MG tablet Take 1 tablet by mouth Daily.   4/4/2024    dicyclomine (BENTYL) 20 MG tablet Take 1 tablet by mouth Every 6 (Six) Hours As Needed (Abdominal pain). 60 tablet 0 Past  Month    ezetimibe (ZETIA) 10 MG tablet Take 1 tablet by mouth Daily.   4/4/2024    lisinopril (PRINIVIL,ZESTRIL) 10 MG tablet Take 1 tablet by mouth Daily.  0 4/4/2024    metFORMIN (GLUCOPHAGE) 500 MG tablet Take 1 tablet by mouth Daily With Breakfast.   4/4/2024    pantoprazole (PROTONIX) 40 MG EC tablet Take 1 tablet by mouth 2 (Two) Times a Day. 180 tablet 3 4/4/2024    sotalol (BETAPACE) 80 MG tablet Take 1 tablet by mouth 2 (Two) Times a Day.   4/4/2024    sucralfate (CARAFATE) 1 g tablet TAKE 1 TABLET BY MOUTH THREE TIMES DAILY FOR STOMACH. ENSURE NO OTHER MEDICATIONS TAKEN WITHIN 1 HOUR OF DOSE OR 2 TO 4 HOURS AFTER TAKING 270 tablet 3 Past Month    nitroglycerin (NITROSTAT) 0.4 MG SL tablet Place 1 tablet under the tongue Every 5 (Five) Minutes As Needed.       oxyCODONE-acetaminophen (PERCOCET) 5-325 MG per tablet  (Patient not taking: Reported on 3/12/2024)        Allergies:  Penicillins    REVIEW OF SYSTEMS:  Please see the above history of present illness for pertinent positives and negatives.  The remainder of the patient's systems have been reviewed and are negative.     Vital Signs  Temp:  [97.7 °F (36.5 °C)] 97.7 °F (36.5 °C)  Heart Rate:  [62] 62  Resp:  [18] 18  BP: (150)/(79) 150/79    Flowsheet Rows      Flowsheet Row First Filed Value   Admission Height --   Admission Weight 68.8 kg (151 lb 9.6 oz) Documented at 04/05/2024 0955             Physical Exam:  Physical Exam   Constitutional: Patient appears well-developed and well-nourished and in no acute distress   HEENT:   Head: Normocephalic and atraumatic.   Eyes:  Pupils are equal, round, and reactive to light. EOM are intact. Sclerae are anicteric and non-injected.  Mouth and Throat: Patient has moist mucous membranes. Oropharynx is clear of any erythema or exudate.     Neck: Neck supple. No JVD present. No thyromegaly present. No lymphadenopathy present.  Cardiovascular: Regular rate, regular rhythm, S1 normal and S2 normal.  Exam reveals no  gallop and no friction rub.  No murmur heard.  Pulmonary/Chest: Lungs are clear to auscultation bilaterally. No respiratory distress. No wheezes. No rhonchi. No rales.   Abdominal: Soft. Bowel sounds are normal. No distension and no mass. There is no hepatosplenomegaly. There is no tenderness.   Musculoskeletal: Normal Muscle tone  Extremities: No edema. Pulses are palpable in all 4 extremities.  Neurological: Patient is alert and oriented to person, place, and time. Cranial nerves II-XII are grossly intact with no focal deficits.  Skin: Skin is warm. No rash noted. Nails show no clubbing.  No cyanosis or erythema.    Debilities/Disabilities Identified: None  Emotional Behavior: Appropriate     Results Review:   I reviewed the patient's new clinical results.    Lab Results (most recent)       Procedure Component Value Units Date/Time    POC Glucose Once [[id number]]  (Abnormal) Collected: 04/05/24 1005    Specimen: Blood Updated: 04/05/24 1011     Glucose 146 mg/dL             Imaging Results (Most Recent)       None          reviewed    ECG/EMG Results (most recent)       None          reviewed    Assessment & Plan   DAVID and Melena /  EGD and colonoscopy      I discussed the patient's findings and my recommendations with patient.     Mahendra Landin MD  04/05/24  12:22 EDT    Time: 10 min prior to procedure.

## 2024-04-05 NOTE — ANESTHESIA POSTPROCEDURE EVALUATION
Patient: Macho Stephenson    Procedure Summary       Date: 04/05/24 Room / Location: [facility] ENDOSCOPY 1 /  [facility]    Anesthesia Start: 1218 Anesthesia Stop: 1309    Procedures:       ESOPHAGOGASTRODUODENOSCOPY WITH BIOPSY      COLONOSCOPY WITH POLYPECTOMY Diagnosis:       Iron deficiency anemia due to chronic blood loss      Gastric AVM      Angiectasia      Melena      (Iron deficiency anemia due to chronic blood loss [D50.0])      (Gastric AVM [K31.819])      (Angiectasia [I99.8])      (Melena [K92.1])    Surgeons: Mahendra Landin MD Provider: Calvin Blunt CRNA    Anesthesia Type: MAC ASA Status: 3            Anesthesia Type: MAC    Vitals  Vitals Value Taken Time   /73 04/05/24 1325   Temp     Pulse 61 04/05/24 1332   Resp 18 04/05/24 1331   SpO2 97 % 04/05/24 1332   Vitals shown include unfiled device data.        Post Anesthesia Care and Evaluation    Patient location during evaluation: PHASE II  Patient participation: complete - patient participated  Level of consciousness: awake  Pain score: 0  Pain management: adequate    Airway patency: patent  Anesthetic complications: No anesthetic complications  PONV Status: none  Cardiovascular status: acceptable  Respiratory status: acceptable  Hydration status: acceptable

## 2024-04-08 LAB
LAB AP CASE REPORT: NORMAL
PATH REPORT.FINAL DX SPEC: NORMAL
PATH REPORT.GROSS SPEC: NORMAL

## 2024-04-09 ENCOUNTER — INFUSION (OUTPATIENT)
Dept: ONCOLOGY | Facility: HOSPITAL | Age: 74
End: 2024-04-09
Payer: MEDICARE

## 2024-04-09 ENCOUNTER — OFFICE VISIT (OUTPATIENT)
Dept: ONCOLOGY | Facility: CLINIC | Age: 74
End: 2024-04-09
Payer: MEDICARE

## 2024-04-09 VITALS
HEIGHT: 72 IN | SYSTOLIC BLOOD PRESSURE: 138 MMHG | OXYGEN SATURATION: 96 % | BODY MASS INDEX: 21.26 KG/M2 | RESPIRATION RATE: 18 BRPM | DIASTOLIC BLOOD PRESSURE: 83 MMHG | TEMPERATURE: 98 F | WEIGHT: 157 LBS | HEART RATE: 61 BPM

## 2024-04-09 DIAGNOSIS — E61.1 IRON DEFICIENCY: ICD-10-CM

## 2024-04-09 DIAGNOSIS — C91.10 CLL (CHRONIC LYMPHOCYTIC LEUKEMIA): Primary | ICD-10-CM

## 2024-04-09 LAB
ALBUMIN SERPL-MCNC: 4 G/DL (ref 3.5–5.2)
ALBUMIN/GLOB SERPL: 1.9 G/DL
ALP SERPL-CCNC: 113 U/L (ref 39–117)
ALT SERPL W P-5'-P-CCNC: 18 U/L (ref 1–41)
ANION GAP SERPL CALCULATED.3IONS-SCNC: 9.6 MMOL/L (ref 5–15)
AST SERPL-CCNC: 21 U/L (ref 1–40)
BASOPHILS # BLD AUTO: 0.05 10*3/MM3 (ref 0–0.2)
BASOPHILS NFR BLD AUTO: 0.7 % (ref 0–1.5)
BILIRUB SERPL-MCNC: 0.6 MG/DL (ref 0–1.2)
BUN SERPL-MCNC: 18 MG/DL (ref 8–23)
BUN/CREAT SERPL: 17.1 (ref 7–25)
CALCIUM SPEC-SCNC: 9.5 MG/DL (ref 8.6–10.5)
CHLORIDE SERPL-SCNC: 101 MMOL/L (ref 98–107)
CO2 SERPL-SCNC: 25.4 MMOL/L (ref 22–29)
CREAT SERPL-MCNC: 1.05 MG/DL (ref 0.76–1.27)
DEPRECATED RDW RBC AUTO: 46.6 FL (ref 37–54)
EGFRCR SERPLBLD CKD-EPI 2021: 74.5 ML/MIN/1.73
EOSINOPHIL # BLD AUTO: 0.16 10*3/MM3 (ref 0–0.4)
EOSINOPHIL NFR BLD AUTO: 2.3 % (ref 0.3–6.2)
ERYTHROCYTE [DISTWIDTH] IN BLOOD BY AUTOMATED COUNT: 13.2 % (ref 12.3–15.4)
FERRITIN SERPL-MCNC: 88.2 NG/ML (ref 30–400)
GLOBULIN UR ELPH-MCNC: 2.1 GM/DL
GLUCOSE SERPL-MCNC: 177 MG/DL (ref 65–99)
HCT VFR BLD AUTO: 41.4 % (ref 37.5–51)
HGB BLD-MCNC: 13.9 G/DL (ref 13–17.7)
IMM GRANULOCYTES # BLD AUTO: 0.04 10*3/MM3 (ref 0–0.05)
IMM GRANULOCYTES NFR BLD AUTO: 0.6 % (ref 0–0.5)
IRON 24H UR-MRATE: 84 MCG/DL (ref 59–158)
IRON SATN MFR SERPL: 23 % (ref 20–50)
LYMPHOCYTES # BLD AUTO: 1.78 10*3/MM3 (ref 0.7–3.1)
LYMPHOCYTES NFR BLD AUTO: 25.5 % (ref 19.6–45.3)
MCH RBC QN AUTO: 32.1 PG (ref 26.6–33)
MCHC RBC AUTO-ENTMCNC: 33.6 G/DL (ref 31.5–35.7)
MCV RBC AUTO: 95.6 FL (ref 79–97)
MONOCYTES # BLD AUTO: 0.45 10*3/MM3 (ref 0.1–0.9)
MONOCYTES NFR BLD AUTO: 6.5 % (ref 5–12)
NEUTROPHILS NFR BLD AUTO: 4.49 10*3/MM3 (ref 1.7–7)
NEUTROPHILS NFR BLD AUTO: 64.4 % (ref 42.7–76)
NRBC BLD AUTO-RTO: 0 /100 WBC (ref 0–0.2)
PLATELET # BLD AUTO: 151 10*3/MM3 (ref 140–450)
PMV BLD AUTO: 9.2 FL (ref 6–12)
POTASSIUM SERPL-SCNC: 4.2 MMOL/L (ref 3.5–5.2)
PROT SERPL-MCNC: 6.1 G/DL (ref 6–8.5)
RBC # BLD AUTO: 4.33 10*6/MM3 (ref 4.14–5.8)
SODIUM SERPL-SCNC: 136 MMOL/L (ref 136–145)
TIBC SERPL-MCNC: 365 MCG/DL (ref 298–536)
TRANSFERRIN SERPL-MCNC: 245 MG/DL (ref 200–360)
WBC NRBC COR # BLD AUTO: 6.97 10*3/MM3 (ref 3.4–10.8)

## 2024-04-09 PROCEDURE — 85025 COMPLETE CBC W/AUTO DIFF WBC: CPT

## 2024-04-09 PROCEDURE — 1126F AMNT PAIN NOTED NONE PRSNT: CPT | Performed by: INTERNAL MEDICINE

## 2024-04-09 PROCEDURE — 1159F MED LIST DOCD IN RCRD: CPT | Performed by: INTERNAL MEDICINE

## 2024-04-09 PROCEDURE — 36591 DRAW BLOOD OFF VENOUS DEVICE: CPT

## 2024-04-09 PROCEDURE — 25010000002 HEPARIN LOCK FLUSH PER 10 UNITS: Performed by: INTERNAL MEDICINE

## 2024-04-09 PROCEDURE — 83540 ASSAY OF IRON: CPT

## 2024-04-09 PROCEDURE — 82728 ASSAY OF FERRITIN: CPT

## 2024-04-09 PROCEDURE — 80053 COMPREHEN METABOLIC PANEL: CPT

## 2024-04-09 PROCEDURE — 1160F RVW MEDS BY RX/DR IN RCRD: CPT | Performed by: INTERNAL MEDICINE

## 2024-04-09 PROCEDURE — 99214 OFFICE O/P EST MOD 30 MIN: CPT | Performed by: INTERNAL MEDICINE

## 2024-04-09 PROCEDURE — 84466 ASSAY OF TRANSFERRIN: CPT

## 2024-04-09 RX ORDER — SODIUM CHLORIDE 0.9 % (FLUSH) 0.9 %
10 SYRINGE (ML) INJECTION AS NEEDED
Status: DISCONTINUED | OUTPATIENT
Start: 2024-04-09 | End: 2024-04-09 | Stop reason: HOSPADM

## 2024-04-09 RX ORDER — HEPARIN SODIUM (PORCINE) LOCK FLUSH IV SOLN 100 UNIT/ML 100 UNIT/ML
500 SOLUTION INTRAVENOUS AS NEEDED
Status: DISCONTINUED | OUTPATIENT
Start: 2024-04-09 | End: 2024-04-09 | Stop reason: HOSPADM

## 2024-04-09 RX ADMIN — Medication 500 UNITS: at 12:12

## 2024-04-09 RX ADMIN — Medication 10 ML: at 12:12

## 2024-04-09 NOTE — PROGRESS NOTES
Subjective     REASON FOR FOLLOW-UP:    1. Chronic lymphoid leukemia, stage 4, presented initially with significant pancytopenia.  Currently on Gazyva with venetoclax  Cycle 1 Gazyva given on August 13, 2020    2.  History of angiodysplasia requiring cauterization and history of Hess's esophagus    3.  Bone marrow aspiration biopsy shows hypercellular marrow with 98% involvement with CLL    4. Iron deficiency anemia in the setting of GI bleeding    History of Present Illness   Patient is seen back today in follow-up with history of CLL in remission following Gazyva and venetoclax as well as followed for history of iron deficiency anemia in the setting of recurrent GI bleeding.    April 9, 2024: Patient has no complaints.  His ferritin is 88, iron saturation of 23 and TIBC of 365.  EGD showed erythema in the gastric antrum biopsies were taken for H. pylori and a single 5 mm mucosal nodule was found in the second portion of the duodenum biopsies were taken.  Colonoscopy showed a 2 mm polyp in the descending colon and the polyp was removed otherwise nonbleeding hemorrhoids.  This was done on April 5, 2024.  Showed adenoma in the duodenum.  No granuloma no evidence of H. pylori in the stomach and no malignancy.  Colon also had tubular adenoma    Globin is 13.9 a white count of 6.97 and platelet of 151.    PAST MEDICAL HISTORY:   Recurrent atrial fibrillation followed by cardiology. He has had drug eluting stent placed x 3.   High cholesterol.   Hypertension.       HEMATOLOGIC/ONCOLOGIC HISTORY:       The patient is 62-year-old gentleman with the above history currently here for followup. He has no complaints. He denies fever, night sweats or weight loss. He does not have any lymphadenopathy. He feels good. He had some fatigue but his CBC shows a go od hemoglobin.   The patient is followed by Dr. Kevin Chandra. He had seen Dr. Chandra 7/18/13 for a history of coronary artery disease status drug eluting stent placement to  the right coronary artery and left anterior descending artery in February 2011. Histor y of paroxysmal atrial fibrillation and hyperlipidemia. He presented to [facility] on 6/23/13 with palpitations and was found to have atrial fibrillation with rapid ventricular response. He was given IV Cardizem and converted spontaneously to normal sinus rhythm. He was found to have elevated white count at 18.9 and denied any symptoms of infection or urinary complaints. TSH was normal, troponin levels were negative. He was asked to continue aspirin and metoprolol for that. If he contin u ed to have atrial fibrillation, they will consider antiarrhythmic therapy with or without a short term anticoagulation therapy. However, currently the patient is feeling reasonably good. He has no complaints. His CBC 7/22/13 showed WBC 17,000, hemoglob i n 16.4 and platelet count of 174,000. Back 9/28/13 his hemoglobin was 15.1, WBC 13.3 and platelets 197,000. He has had a persistently elevated WBC but he is totally asymptomatic. He does not have any fever, night sweats or lymphadenopathy. He is here to be evaluated for his elevated white count.       Peripheral blood flow cytometry done 08/16/13 shows 22% chronic lymphoid leukemia cells, and they expressed ZAP-70 but not CD38. The total number of cells approximated 60% T cells, 32% B cells and 1% natural k iller cells. The B cells were monoclonal and expressed CD19, CD20, CD22, CD5, CD23, CD11c, ZAP-70 and T cell antigens. There was a normal CD4/CD8 ratio. CT of the chest, abdomen and pelvis does not show evidence of metastasis. Peripheral blood for DILLON -2 was negative. It was negative for T11:14 translocation.       CT scan done on 08/21/2013 shows 5 to 6 mm noncalcified nodule in the left lower lobe which is unchanged from December 2010. Otherwise no evidence of any lymphadenopathy or hepatosplenomegaly.       MRI of the spine shows arthritis and disc bulge and likely  hemangiomas, with 1 lesion showing increased signal intensity at T2, which is likely a hemangioma. Bone scan could be done, but patient does not even have much pain there. CT scan of the chest, a bdomen and pelvis was done on 11/23/2014. He has tiny lymph nodes in the neck which are difficult to palpate. Right jugular one is 1.4 x 0.9 and left supraclavicular one is 1.5 x 1.1. He also has a subcarinal lymph node which is 1.7 x 1.2 cm, and he ha s a portocaval lymph node which has increased from 2.5 to 2.8. Patient is totally asymptomatic. He does not have any B symptoms, so will continue followup with observation.     Patient is a 69-year-old gentleman with history of chronic lymphocytic leukemia/SLL who recently got admitted to [facility] because of GI bleed.  He was seen by Dr. Montero.  He underwent EGD colonoscopy.  He was found to have angiodysplasia for which he underwent argon coagulation.  He required 3 units of blood.  Patient had a normal esophagus normal stomach and normal duodenum CLOtest was negative he was asked to take Protonix 40 mg daily.  Colonoscopy showed blood in the entire examined colon but there was a single bleeding colonic angiectasia which was treated with argon plasma coagulation.    He was admitted twice.  Initially he was admitted on July 10 at which time he stayed 3 days and he was evaluated for chest pain.  He underwent a cardiac work-up with stress test and echo.  The echo was normal but the stress test showed medium sized moderate severe severity fixed perfusion defect.  Of the inferior wall consistent with infarction.  However according to patient cardiology did not think he had any cardiac problems.  I have left a message for patient's cardiologist to call me today.  Patient subsequently got readmitted with GI bleed and required 1/3 unit of transfusion.  He was found to have thrombocytopenia and hematology was consulted.    His hemoglobin had dropped down to as low as 7 and  his platelets had gone down to 87.  Today it is 35 and his hemoglobin is 9.4 today.  He denies active bleeding.  He has lost weight recently.  He does not have any fever or night sweats.    He had ultrasound of spleen which showed enlarged spleen centimeters in length    Patient was started on Gazyva with Leukeran but subsequently switched to venetoclax with Gazyva.  His venetoclax dose is 100 mg daily and he receives Gazyva once a month.    CT scan done 2020 shows significant response     MEDICATIONS: The current medication list was reviewed with the patient and updated in the EMR this date per the medical assistant. Medication dosages and frequencies were confirmed to be accurate.       ALLERGIES: PENICILLIN which causes him to swell up. He is allergic to IV CONTRAST DYE.     SOCIAL HISTORY: He is . He smokes one pack per day for 35 years. He consumes three to four alcoholic drinks per week. He has no tattoos and no previous transfusions.       FAMILY HISTORY: Father  at 82 with MI. Mother  at 82 with bone cancer. He has one brother age 65 in good health and two sisters 69 and 57 in good health.   Past Medical History, Social History, and Family History are unchanged from my prior documentation on     Review of Systems   Constitutional: Negative for fatigue.  Negative for appetite change, chills, diaphoresis, fever and unexpected weight change.   HENT: Negative for hearing loss, sore throat and trouble swallowing.    Respiratory: Negative for cough, chest tightness, shortness of breath and wheezing.    Cardiovascular: Negative for chest pain, palpitations and leg swelling.   Gastrointestinal: Negative for abdominal distention, abdominal pain, constipation, nausea and vomiting.   Genitourinary: Negative for dysuria, frequency, hematuria and urgency.   Musculoskeletal: Negative for joint swelling.        No muscle weakness.   Skin: Negative for rash and wound.    Neurological: Negative for seizures, syncope, speech difficulty, weakness, numbness and headaches.   Hematological: Negative for adenopathy. Does not bruise/bleed easily.   Psychiatric/Behavioral: Negative for behavioral problems, confusion and suicidal ideas.   All other systems reviewed and are negative.    I have reviewed and confirmed the accuracy of the ROS as documented by the MA/LPN/RN Susannah Moya MD        Patient Active Problem List   Diagnosis    CLL (chronic lymphocytic leukemia)    Acute on chronic anemia    Encounter for hepatitis C screening test for low risk patient     Thrombocytopenia    H/O heart artery stent    Stented coronary artery    Arteriosclerosis of coronary artery    Paroxysmal atrial fibrillation    Paroxysmal atrial fibrillation    Fall    Fracture, olecranon    Hyperlipidemia    Long term (current) use of anticoagulants    Lower GI bleed    Olecranon bursitis    Shoulder pain    Smoker    CLL (chronic lymphocytic leukemia)    Dehydration    Drug-induced nausea and vomiting    Encounter for long-term (current) use of other medications    Neutropenia    Calculus of gallbladder without cholecystitis without obstruction    Cholecystitis    Iron deficiency    Adverse effect of iron    Melena    Change in bowel habits    Acute upper GI bleed    Acute blood loss anemia    Type 2 diabetes mellitus, without long-term current use of insulin    Prolonged Q-T interval on ECG    Chest pain with high risk for cardiac etiology    Hyponatremia    GI bleed    Iron deficiency anemia due to chronic blood loss    Abnormal CT scan, colon    Diarrhea    Segmental colitis associated with diverticulosis    Gastric AVM    Angiectasia       MEDICATIONS:  Medication Reconciliation for the patient has been reviewed by me and documented in the patients chart.    ALLERGIES:    Allergies   Allergen Reactions    Penicillins Swelling     As a child         Vitals:    04/09/24 1302   BP: 138/83   Pulse: 61    Resp: 18   Temp: 98 °F (36.7 °C)   SpO2: 96%                    4/9/2024    12:58 PM   Current Status   ECOG score 0      Physical exam:      This patient's ACP documentation is up to date, and there's nothing further left to document.    CONSTITUTIONAL:  Vital signs reviewed.  No distress, looks comfortable.  RESPIRATORY:  Normal respiratory effort.  Lungs clear to auscultation bilaterally.  CARDIOVASCULAR:  Normal S1, S2.  No murmurs rubs or gallops.  No significant lower extremity edema.  GASTROINTESTINAL: Abdomen appears unremarkable.  Nontender.  No hepatomegaly.  No splenomegaly.  LYMPHATIC:  No cervical, supraclavicular, axillary lymphadenopathy.  SKIN:  Warm.  No rashes.  PSYCHIATRIC:  Normal judgment and insight.  Normal mood and affect..    I have reexamined the patient and the results are consistent with the previously documented exam. Susannah Moya MD     RECENT LABS:  Results from last 7 days   Lab Units 04/09/24  1212   WBC 10*3/mm3 6.97   NEUTROS ABS 10*3/mm3 4.49   HEMOGLOBIN g/dL 13.9   HEMATOCRIT % 41.4   PLATELETS 10*3/mm3 151       Results from last 7 days   Lab Units 04/09/24  1212   SODIUM mmol/L 136   POTASSIUM mmol/L 4.2   CHLORIDE mmol/L 101   CO2 mmol/L 25.4   BUN mg/dL 18   CREATININE mg/dL 1.05   CALCIUM mg/dL 9.5   ALBUMIN g/dL 4.0   BILIRUBIN mg/dL 0.6   ALK PHOS U/L 113   ALT (SGPT) U/L 18   AST (SGOT) U/L 21   GLUCOSE mg/dL 177*   FERRITIN ng/mL 88.20   IRON mcg/dL 84   TIBC mcg/dL 365   Iron sat 8%                Assessment & Plan   1. Stage 2 CLL without evidence of any disease progression.  I have reviewed the CT scan from 3/19/2018 there is minimal progression but clinically patient is asymptomatic and we will follow with observation.  Will monitor with labs. No evidence of any frequent infections, no major worsening of his white count. He has no fever or night sweats or any other symptoms.   Patient knows that he is prone for infections and he is mainly staying at home during  the COVID-19 situation time  Recent admission to [facility] July 10 2020×2.  Initially admitted with chest pain and underwent stress test and echo.  Echo was negative.  Stress test is abnormal but have left message for patient's cardiologist to call us.  His cardiologist is been sent [facility].  He then got admitted the second time with worsening GI bleed and required total of 3 units of blood.  EGD was negative but colonoscopy showed angiectasia for which he underwent argon ablation.  Currently hemoglobin stable and no active bleeding.  He was also found to have low platelets with platelets dropping down to 27k and hemoglobin was 7.  Ultrasound showed splenomegaly 15 cm.  Concern is whether he has progressed from his CLL point of view and requires treatment.  CT scan performed 7/20/2020 did show the increased splenomegaly, but interval decrease in size of the axillary nodes, and shotty mediastinal nodes.  No change in the shotty nodes within the neck and supraclavicular regions.  No new lymphadenopathy.  Bone marrow biopsy however showed preliminarily that there is bone marrow involvement.  Remainder of bone marrow biopsy report is pending.  Bone marrow shows hypercellular marrow with 100% involvement of chronic lymphocytic leukemia/small lymphocytic lymphoma and diffuse pattern with at least 98% of the total marrow cellularity.  Rare foci of erythropoiesis and rare megakaryocytes are noted.   Negative for BCL-2 and BCL 6.  Chromosomes shows normal karyotype.  I GVH mutation present.  Positive for gain of 1 q., monosomy 13 and loss of IGH.  Negative for deletion T p53, negative for gain of chromosomes 9 and 11, negative for 11, 14 fusion.  The combination of monosomy 13 and gain of 1 q. is thought to be associated with plasma cell myeloma.  However this patient does not have myeloma.  Scans were reviewed with the patient today.  Dr. Moya also discussed with the patient and recommended he initiate treatment  "with chlorambucil and Gazyva.  He would not be a good candidate for Imbruvica due to his history of A. fib\".  He cannot take anticoagulation at this time.  The patient was provided with chemotherapy education today, including  Handouts.  He will need a port placed so that we can initiate treatment  8/13/2020: Gazyva day 1. Plan also [id number] mg p.o. on day 1 day 15.  We can increase the dose once we know he tolerates and his platelets improved.  Patient developing pancytopenia when reviewed cycle 1 day 15.  Chlorambucil dose modified to 10 mg for day 15 however it is felt that he cannot tolerate this ongoing.    Plan to switch to Venetoclax along with continuing Gazyva.    Patient due today for cycle 2-day 1 Gazyva.  He will proceed today as scheduled.  Venetoclax will be shipped to his home with plans to begin this next week on 9/14/2020.    Patient did receive 1 L normal saline and Neulasta on 9/28/2020 secondary to neutropenia with an ANC of 0.04.  Patient seen on 10/02/2020 continuing on venetoclax, currently on 100 mg dosing.  He would not increase his dose as he will start on voriconazole after being on venetoclax x1 week which affects the metabolism of venetoclax.  He is currently on day 5 of the 100 mg dosing.  Patient states overall he feels the same except for increased fatigue and nausea.  His nauseousness is controlled with ondansetron however.  Patient will be given 1 L normal saline in the office today as planned.  Patient returns on 10/12/2020 continuing on venetoclax 100 mg daily as well as voriconazole.  Patient is doing well with these treatments except fatigue.  Next cycle 4 due as of number ninth prior to which will obtain CT scans  November 9, 2020: White count down to 1.89 but patient started Pepcid.  We will hold off Pepcid.  11/17/2020 platelet count dropped to 35,000 patient was instructed to hold venetoclax.  Returns 11/23/2020 WBC up to 6.38, ANC 5.19, hemoglobin 12.4, platelets up " to 121.  I have advised him to resume venetoclax 100 mg daily  December 7, 2020: Platelets 95K.  White count 3.0 with absolute neutrophil count of 2.01.  Cycle 5 Gazyva given.  Continue venetoclax with voriconazole.   January 4, 2021: Platelets 84,000, white count 3,440, absolute neutrophil count 1,980. Cycle 6 Gazyva given.  2/23/2021: WBC 3.0, platelets 90,000, hemoglobin 14.6, ANC 1.65.  Counts overall stable.  Continue Venetoclax 100 mg daily.  January 4, 2021: Last dose of Gazyva.  March 16, 2021: Patient is on venetoclax along with voriconazole and tolerating well with plans to complete total of 1 year.  4/27/2021: Patient continues on venetoclax 100 mg daily with voriconazole and acyclovir for prophylaxis.  He is tolerating this well.  He is scheduled for scans in 5 weeks.  June 8, 2021: Patient is to continue venetoclax 100 mg daily with voriconazole and acyclovir prophylaxis.  He is tolerating it very well.  The plan is to continue 8 more weeks and then he will complete the protocol and will be followed with observation.  I have reviewed the CT scan done on June 2, 2021 and there is no evidence of any lymphadenopathy and the spleen size is decreased in size from 12.5-11.3.  He has mild thrombocytopenia and leukopenia but his hemoglobin is normal.  9/7/2021: Patient completed 1 year worth of venetoclax with voriconazole and 6 months of Gazyva.  He has had an excellent response for his CLL.  Spleen decreased in size.  Currently asymptomatic.  Discontinue venetoclax since he completed 1 year  March 28, 2022: Patient's hemoglobin back to normal at 13.1.  EGD showed Hess's esophagus and angiodysplasia for which she underwent argon plasma coagulation.  Also colonoscopy showed multiple polyps all of them benign and had 1 angiodysplasia for which he underwent argon coagulation by Dr. Hinds.  Currently asymptomatic  5/26/2022 patient without B symptoms or adenopathy.  Acute drop in counts felt to be related to  infection (see further discussion below).  Scheduled for repeat CT scans 6/14/2022.  June 21, 2022: Patient underwent CT scan Bibiana 15, 2022 which showed multifocal pneumonia bilaterally likely secondary to COVID-19 infection in the past.  There admitted they recommended repeat CT in 6 to 8 weeks.  There is a 0.8 cm pulmonary nodule in the left lower lobe is minimally increased in size but similar to that in March 19, 2018.  Patient is improving  WBC stable/normal at 6.3, 12% lymphs.  Platelets iron 30,000.   December 28, 2022: Reviewed CT scan which shows thickening of the descending colon and sigmoid colon and patient is anemic.  We will check iron studies.  Refer to Dr. Hinds for colonoscopy.  5/1/2023 WBC 5.34, 22% lymphs, platelets stable at 136,000  6/12/2023 WBC 5.03, 21% lymphocytes, 9 platelets stable at 131,000  October 27, 2023: Hemoglobin is 14.1, white count of 8.0 and platelet count of 190.  1/16/2024 WBC normal at 8.08, ALC 1.85, percent lymphs 22%.  Hgb dropping (see below regarding recurrent DAVID).  Platelets remain normal at 167,000.  No evidence of disease recurrence.    April 9, 2024: Patient totally asymptomatic with a normal CBC.  Hemoglobin 13.9, white count 6.97 and platelet of 151 with 64% neutrophils and 25% lymphs    2. History of intermittent thrombocytopenia  Historically platelets in the 150s.  Count did drop when patient was treated for CLL on chlorambucil.    Platelets also dropping on treatment with Venetoclax in the past.    CLL in remission.  Platelets are stable at 167,000    3.  Iron deficiency anemia in the setting of recurrent GI bleeding with underlying AVMs, complicated by anticoagulation.   Patient intolerant to oral iron.  1/2022: Patient with recurrent iron deficiency as further detailed below.  Patient referred to GI for further evaluation.  Will see Dr. Hinds 2/9/2022.  Patient hospitalized 4/10 - 4/13/2022 for acute GI bleed.  EGD showing Hess's esophagus.   Colonoscopy showing angiectasias, cauterized.  During hospitalization, Coumadin was held.    4/22/2022 evaluated by gastroenterology, noted to have a declining hemoglobin so they advised him to be seen by our office.  Patient's Hgb dropping to 8.8, Iron saturation 8%, TIBC 403, ferritin 25.1.  Patient receiving IV Injectafer x2, 4/29 and 5/6/2022.  7/25-7/31/2022 hospitalized with recurrent GI bleeding in the setting of known AVMs and being on Xarelto. Xarelto held.  Patient undergoing EGD 7/28/2022 revealing multiple nonbleeding angioectasias treated with argon plasma coagulation.  Patient placed on a heparin drip and then transitioned back to Xarelto as cardiology felt he could not be off this for a long with new Watchman's device.  Hemoglobin at discharge 10.8.  8/15/2022 patient seen by GI with continued reports of GI bleeding with noted dark stools.  Hemoglobin hemoglobin having already dropped and just 2 weeks from 10.8 to 8.4, ferritin 22, iron sat 11%. Patient scheduled for GI for capsule endoscopy.  8/23/2022: Hgb 7.7.  Plan to transfuse patient and give additional IV iron with Injectafer x2.  Of note Xarelto was stopped and patient switched to Plavix with hopes of stopping GI bleeding.  Patient undergoing EGD 9/15/2022 with 2 separate angioectasias treated with argon plasma.  9/20/2022: Hgb improved to 11.5.  Iron studies pending though presumably improved following recent Injectafer.  Patient understands to call with recurrent bleeding.  December 20, 2022 my concern is that his hemoglobin dropped to 10.5 we will check iron studies again  March 28, 2023: Patient is s/p iron and his hemoglobin today is 15.1 with normal white count and platelet count.  His platelets are 132 which is stable and he has 70% neutrophils and 20% lymphocytes.  5/3/2023 hemoglobin today 12.7 which is down slightly from recent.  Ferritin iron studies however adequate with iron saturation 35% and ferritin 98.  Continue to monitor.   Patient denies bleeding.  6/12/2023 hemoglobin 12.1 today, ferritin 15, iron saturation 22%  October 27, 2023: Hemoglobin 14.9.  Ferritin 45, iron sat 32%.    1/16/2024 Hgb down to 11.2, ferritin 21, iron sat 8%.  Discussed with patient concern that he is bleeding again in the setting of known AVM history.  He also admits that he stopped his Protonix a while ago thinking he did not need it.  Instructed to restart Protonix.  We will give him additional IV Injectafer as he cannot tolerate oral iron due to GI upset.  Refer back to Dr. Guevara to discuss whether he needs repeat scopes.  April 9, 2024: Reviewed EGD and colonoscopy from April 5, 2024 which showed duodenal nodule benign no H. pylori and colonoscopy showed a small polyp which was benign tubular adenoma.    4.  History of cluster headaches for which she has to be treated with steroids in the past and has followed up with Dr. Len Walker. Today with significant headaches.  Patient had CT of the head 8/20/2020 which was negative.  He was started on prednisone 10 mg daily which was not helpful.  Patient saw Dr. Bobby, neurology who gave him 2 shots of a new medication Emgality.  This is something that can be given once a month.    Resolved.    5.  Atrial fibrillation, off anticoagulation given his thrombocytopenia.  Patient continues on aspirin 81 mg daily if platelet count greater than 60,000.  Patient sees [facility] Christy at [facility] who follows his INR  Coumadin held during patient's recent hospitalization April/2022.  Patient wishes to discontinue Coumadin permanently.    Patient status post watchman's device 7/2022.  Started on Xarelto.  8/22/2022 Xarelto discontinued and switched to Plavix in the setting of ongoing GI bleeding per above.    6.  Chronic mild elevation of alkaline phosphatase.  Stable.      PLAN:   Reviewed EGD colonoscopy which is negative from April 5, 2024  No further evidence of iron deficiency  Patient will require port flush every 6  weeks  He will see me in 3 months with labs      Susannah Moya MD   04/14/24      Cc: Dr. Kevin Gilmore

## 2024-04-11 NOTE — PROGRESS NOTES
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  Continue Protonix for esophagitis, gastritis, and duodenitis seen (which is also likely contributing to DAVID & melena). No surveillance needed for TA of duodenum removed given low malignancy potential.     - C/s for DAVID   - Findings/path: a sub-cm TA removed   - POC: repeat c/s in 7 years if patient wishes to continue surveillance colonoscopies given his age

## 2024-06-17 ENCOUNTER — TELEPHONE (OUTPATIENT)
Dept: ONCOLOGY | Facility: CLINIC | Age: 74
End: 2024-06-17

## 2024-06-17 NOTE — TELEPHONE ENCOUNTER
"  Caller: Macho Stephenson \"JOHN\"    Relationship to patient: Self    Best call back number: [phone]    Chief complaint: PT CALLED TO RESCHEDULE     Type of visit: PORT FLUSH     Requested date: LATE AFTERNOON      If rescheduling, when is the original appointment: 5-28-24     "

## 2024-06-19 ENCOUNTER — INFUSION (OUTPATIENT)
Dept: ONCOLOGY | Facility: HOSPITAL | Age: 74
End: 2024-06-19
Payer: MEDICARE

## 2024-06-19 DIAGNOSIS — C91.10 CLL (CHRONIC LYMPHOCYTIC LEUKEMIA): Primary | ICD-10-CM

## 2024-06-19 PROCEDURE — 25010000002 HEPARIN LOCK FLUSH PER 10 UNITS: Performed by: INTERNAL MEDICINE

## 2024-06-19 PROCEDURE — 96523 IRRIG DRUG DELIVERY DEVICE: CPT

## 2024-06-19 RX ORDER — HEPARIN SODIUM (PORCINE) LOCK FLUSH IV SOLN 100 UNIT/ML 100 UNIT/ML
500 SOLUTION INTRAVENOUS AS NEEDED
Status: DISCONTINUED | OUTPATIENT
Start: 2024-06-19 | End: 2024-06-19 | Stop reason: HOSPADM

## 2024-06-19 RX ORDER — SODIUM CHLORIDE 0.9 % (FLUSH) 0.9 %
10 SYRINGE (ML) INJECTION AS NEEDED
Status: DISCONTINUED | OUTPATIENT
Start: 2024-06-19 | End: 2024-06-19 | Stop reason: HOSPADM

## 2024-06-19 RX ADMIN — Medication 10 ML: at 14:29

## 2024-06-19 RX ADMIN — Medication 500 UNITS: at 14:30

## 2024-07-18 ENCOUNTER — INFUSION (OUTPATIENT)
Dept: ONCOLOGY | Facility: HOSPITAL | Age: 74
End: 2024-07-18
Payer: MEDICARE

## 2024-07-18 ENCOUNTER — OFFICE VISIT (OUTPATIENT)
Dept: ONCOLOGY | Facility: CLINIC | Age: 74
End: 2024-07-18
Payer: MEDICARE

## 2024-07-18 VITALS
HEART RATE: 57 BPM | WEIGHT: 155.8 LBS | TEMPERATURE: 98.1 F | BODY MASS INDEX: 21.1 KG/M2 | DIASTOLIC BLOOD PRESSURE: 73 MMHG | OXYGEN SATURATION: 97 % | SYSTOLIC BLOOD PRESSURE: 138 MMHG | RESPIRATION RATE: 16 BRPM | HEIGHT: 72 IN

## 2024-07-18 DIAGNOSIS — C91.10 CLL (CHRONIC LYMPHOCYTIC LEUKEMIA): Primary | ICD-10-CM

## 2024-07-18 DIAGNOSIS — E61.1 IRON DEFICIENCY: ICD-10-CM

## 2024-07-18 LAB
ALBUMIN SERPL-MCNC: 4.1 G/DL (ref 3.5–5.2)
ALBUMIN/GLOB SERPL: 2.2 G/DL
ALP SERPL-CCNC: 83 U/L (ref 39–117)
ALT SERPL W P-5'-P-CCNC: 17 U/L (ref 1–41)
ANION GAP SERPL CALCULATED.3IONS-SCNC: 10.1 MMOL/L (ref 5–15)
AST SERPL-CCNC: 23 U/L (ref 1–40)
BASOPHILS # BLD AUTO: 0.06 10*3/MM3 (ref 0–0.2)
BASOPHILS NFR BLD AUTO: 0.9 % (ref 0–1.5)
BILIRUB SERPL-MCNC: 0.5 MG/DL (ref 0–1.2)
BUN SERPL-MCNC: 16 MG/DL (ref 8–23)
BUN/CREAT SERPL: 16.5 (ref 7–25)
CALCIUM SPEC-SCNC: 9.4 MG/DL (ref 8.6–10.5)
CHLORIDE SERPL-SCNC: 102 MMOL/L (ref 98–107)
CO2 SERPL-SCNC: 25.9 MMOL/L (ref 22–29)
CREAT SERPL-MCNC: 0.97 MG/DL (ref 0.76–1.27)
DEPRECATED RDW RBC AUTO: 46.4 FL (ref 37–54)
EGFRCR SERPLBLD CKD-EPI 2021: 81.9 ML/MIN/1.73
EOSINOPHIL # BLD AUTO: 0.19 10*3/MM3 (ref 0–0.4)
EOSINOPHIL NFR BLD AUTO: 2.9 % (ref 0.3–6.2)
ERYTHROCYTE [DISTWIDTH] IN BLOOD BY AUTOMATED COUNT: 13.5 % (ref 12.3–15.4)
FERRITIN SERPL-MCNC: 49.8 NG/ML (ref 30–400)
GLOBULIN UR ELPH-MCNC: 1.9 GM/DL
GLUCOSE SERPL-MCNC: 132 MG/DL (ref 65–99)
HCT VFR BLD AUTO: 37.6 % (ref 37.5–51)
HGB BLD-MCNC: 12.2 G/DL (ref 13–17.7)
IMM GRANULOCYTES # BLD AUTO: 0.03 10*3/MM3 (ref 0–0.05)
IMM GRANULOCYTES NFR BLD AUTO: 0.5 % (ref 0–0.5)
IRON 24H UR-MRATE: 46 MCG/DL (ref 59–158)
IRON SATN MFR SERPL: 11 % (ref 20–50)
LYMPHOCYTES # BLD AUTO: 1.7 10*3/MM3 (ref 0.7–3.1)
LYMPHOCYTES NFR BLD AUTO: 25.7 % (ref 19.6–45.3)
MCH RBC QN AUTO: 30.5 PG (ref 26.6–33)
MCHC RBC AUTO-ENTMCNC: 32.4 G/DL (ref 31.5–35.7)
MCV RBC AUTO: 94 FL (ref 79–97)
MONOCYTES # BLD AUTO: 0.44 10*3/MM3 (ref 0.1–0.9)
MONOCYTES NFR BLD AUTO: 6.7 % (ref 5–12)
NEUTROPHILS NFR BLD AUTO: 4.19 10*3/MM3 (ref 1.7–7)
NEUTROPHILS NFR BLD AUTO: 63.3 % (ref 42.7–76)
NRBC BLD AUTO-RTO: 0 /100 WBC (ref 0–0.2)
PLATELET # BLD AUTO: 148 10*3/MM3 (ref 140–450)
PMV BLD AUTO: 9.2 FL (ref 6–12)
POTASSIUM SERPL-SCNC: 3.9 MMOL/L (ref 3.5–5.2)
PROT SERPL-MCNC: 6 G/DL (ref 6–8.5)
RBC # BLD AUTO: 4 10*6/MM3 (ref 4.14–5.8)
SODIUM SERPL-SCNC: 138 MMOL/L (ref 136–145)
TIBC SERPL-MCNC: 408 MCG/DL (ref 298–536)
TRANSFERRIN SERPL-MCNC: 274 MG/DL (ref 200–360)
WBC NRBC COR # BLD AUTO: 6.61 10*3/MM3 (ref 3.4–10.8)

## 2024-07-18 PROCEDURE — 99213 OFFICE O/P EST LOW 20 MIN: CPT | Performed by: INTERNAL MEDICINE

## 2024-07-18 PROCEDURE — 25010000002 HEPARIN LOCK FLUSH PER 10 UNITS: Performed by: INTERNAL MEDICINE

## 2024-07-18 PROCEDURE — 84466 ASSAY OF TRANSFERRIN: CPT

## 2024-07-18 PROCEDURE — 1160F RVW MEDS BY RX/DR IN RCRD: CPT | Performed by: INTERNAL MEDICINE

## 2024-07-18 PROCEDURE — 83540 ASSAY OF IRON: CPT

## 2024-07-18 PROCEDURE — 1159F MED LIST DOCD IN RCRD: CPT | Performed by: INTERNAL MEDICINE

## 2024-07-18 PROCEDURE — 80053 COMPREHEN METABOLIC PANEL: CPT

## 2024-07-18 PROCEDURE — 85025 COMPLETE CBC W/AUTO DIFF WBC: CPT

## 2024-07-18 PROCEDURE — 1126F AMNT PAIN NOTED NONE PRSNT: CPT | Performed by: INTERNAL MEDICINE

## 2024-07-18 PROCEDURE — 82728 ASSAY OF FERRITIN: CPT

## 2024-07-18 RX ORDER — SODIUM CHLORIDE 0.9 % (FLUSH) 0.9 %
10 SYRINGE (ML) INJECTION AS NEEDED
Status: DISCONTINUED | OUTPATIENT
Start: 2024-07-18 | End: 2024-07-18 | Stop reason: HOSPADM

## 2024-07-18 RX ORDER — HEPARIN SODIUM (PORCINE) LOCK FLUSH IV SOLN 100 UNIT/ML 100 UNIT/ML
500 SOLUTION INTRAVENOUS AS NEEDED
Status: DISCONTINUED | OUTPATIENT
Start: 2024-07-18 | End: 2024-07-18 | Stop reason: HOSPADM

## 2024-07-18 RX ADMIN — Medication 10 ML: at 13:34

## 2024-07-18 RX ADMIN — Medication 500 UNITS: at 13:35

## 2024-07-18 NOTE — PROGRESS NOTES
Subjective     REASON FOR FOLLOW-UP:    1. Chronic lymphoid leukemia, stage 4, presented initially with significant pancytopenia.  Currently on Gazyva with venetoclax  Cycle 1 Gazyva given on August 13, 2020    2.  History of angiodysplasia requiring cauterization and history of Hess's esophagus    3.  Bone marrow aspiration biopsy shows hypercellular marrow with 98% involvement with CLL    4. Iron deficiency anemia in the setting of GI bleeding    History of Present Illness   Patient is seen back today in follow-up with history of CLL in remission following Gazyva and venetoclax as well as followed for history of iron deficiency anemia in the setting of recurrent GI bleeding.    April 9, 2024: Patient has no complaints.  His ferritin is 88, iron saturation of 23 and TIBC of 365.  EGD showed erythema in the gastric antrum biopsies were taken for H. pylori and a single 5 mm mucosal nodule was found in the second portion of the duodenum biopsies were taken.  Colonoscopy showed a 2 mm polyp in the descending colon and the polyp was removed otherwise nonbleeding hemorrhoids.  This was done on April 5, 2024.  Showed adenoma in the duodenum.  No granuloma no evidence of H. pylori in the stomach and no malignancy.  Colon also had tubular adenoma    Globin is 13.9 a white count of 6.97 and platelet of 151.    July 19, 2024: Patient is currently doing well without complaints.  No fever night sweats or weight loss no lymphadenopathy.  Weight is stable.  Patient is still working full-time.    PAST MEDICAL HISTORY:   Recurrent atrial fibrillation followed by cardiology. He has had drug eluting stent placed x 3.   High cholesterol.   Hypertension.       HEMATOLOGIC/ONCOLOGIC HISTORY:       The patient is 62-year-old gentleman with the above history currently here for followup. He has no complaints. He denies fever, night sweats or weight loss. He does not have any lymphadenopathy. He feels good. He had some fatigue but his  CBC shows a go od hemoglobin.   The patient is followed by Dr. Kevin Chandra. He had seen Dr. Chandra 7/18/13 for a history of coronary artery disease status drug eluting stent placement to the right coronary artery and left anterior descending artery in February 2011. Histor y of paroxysmal atrial fibrillation and hyperlipidemia. He presented to [facility] on 6/23/13 with palpitations and was found to have atrial fibrillation with rapid ventricular response. He was given IV Cardizem and converted spontaneously to normal sinus rhythm. He was found to have elevated white count at 18.9 and denied any symptoms of infection or urinary complaints. TSH was normal, troponin levels were negative. He was asked to continue aspirin and metoprolol for that. If he contin u ed to have atrial fibrillation, they will consider antiarrhythmic therapy with or without a short term anticoagulation therapy. However, currently the patient is feeling reasonably good. He has no complaints. His CBC 7/22/13 showed WBC 17,000, hemoglob i n 16.4 and platelet count of 174,000. Back 9/28/13 his hemoglobin was 15.1, WBC 13.3 and platelets 197,000. He has had a persistently elevated WBC but he is totally asymptomatic. He does not have any fever, night sweats or lymphadenopathy. He is here to be evaluated for his elevated white count.       Peripheral blood flow cytometry done 08/16/13 shows 22% chronic lymphoid leukemia cells, and they expressed ZAP-70 but not CD38. The total number of cells approximated 60% T cells, 32% B cells and 1% natural k iller cells. The B cells were monoclonal and expressed CD19, CD20, CD22, CD5, CD23, CD11c, ZAP-70 and T cell antigens. There was a normal CD4/CD8 ratio. CT of the chest, abdomen and pelvis does not show evidence of metastasis. Peripheral blood for DILLON -2 was negative. It was negative for T11:14 translocation.       CT scan done on 08/21/2013 shows 5 to 6 mm noncalcified nodule in the left lower  lobe which is unchanged from December 2010. Otherwise no evidence of any lymphadenopathy or hepatosplenomegaly.       MRI of the spine shows arthritis and disc bulge and likely hemangiomas, with 1 lesion showing increased signal intensity at T2, which is likely a hemangioma. Bone scan could be done, but patient does not even have much pain there. CT scan of the chest, a bdomen and pelvis was done on 11/23/2014. He has tiny lymph nodes in the neck which are difficult to palpate. Right jugular one is 1.4 x 0.9 and left supraclavicular one is 1.5 x 1.1. He also has a subcarinal lymph node which is 1.7 x 1.2 cm, and he ha s a portocaval lymph node which has increased from 2.5 to 2.8. Patient is totally asymptomatic. He does not have any B symptoms, so will continue followup with observation.     Patient is a 69-year-old gentleman with history of chronic lymphocytic leukemia/SLL who recently got admitted to [facility] because of GI bleed.  He was seen by Dr. Montero.  He underwent EGD colonoscopy.  He was found to have angiodysplasia for which he underwent argon coagulation.  He required 3 units of blood.  Patient had a normal esophagus normal stomach and normal duodenum CLOtest was negative he was asked to take Protonix 40 mg daily.  Colonoscopy showed blood in the entire examined colon but there was a single bleeding colonic angiectasia which was treated with argon plasma coagulation.    He was admitted twice.  Initially he was admitted on July 10 at which time he stayed 3 days and he was evaluated for chest pain.  He underwent a cardiac work-up with stress test and echo.  The echo was normal but the stress test showed medium sized moderate severe severity fixed perfusion defect.  Of the inferior wall consistent with infarction.  However according to patient cardiology did not think he had any cardiac problems.  I have left a message for patient's cardiologist to call me today.  Patient subsequently got readmitted  with GI bleed and required 1/3 unit of transfusion.  He was found to have thrombocytopenia and hematology was consulted.    His hemoglobin had dropped down to as low as 7 and his platelets had gone down to 87.  Today it is 35 and his hemoglobin is 9.4 today.  He denies active bleeding.  He has lost weight recently.  He does not have any fever or night sweats.    He had ultrasound of spleen which showed enlarged spleen centimeters in length    Patient was started on Gazyva with Leukeran but subsequently switched to venetoclax with Gazyva.  His venetoclax dose is 100 mg daily and he receives Gazyva once a month.    CT scan done 2020 shows significant response     MEDICATIONS: The current medication list was reviewed with the patient and updated in the EMR this date per the medical assistant. Medication dosages and frequencies were confirmed to be accurate.       ALLERGIES: PENICILLIN which causes him to swell up. He is allergic to IV CONTRAST DYE.     SOCIAL HISTORY: He is . He smokes one pack per day for 35 years. He consumes three to four alcoholic drinks per week. He has no tattoos and no previous transfusions.       FAMILY HISTORY: Father  at 82 with MI. Mother  at 82 with bone cancer. He has one brother age 65 in good health and two sisters 69 and 57 in good health.   Past Medical History, Social History, and Family History are unchanged from my prior documentation on     Review of Systems   Constitutional: Negative for fatigue.  Negative for appetite change, chills, diaphoresis, fever and unexpected weight change.   HENT: Negative for hearing loss, sore throat and trouble swallowing.    Respiratory: Negative for cough, chest tightness, shortness of breath and wheezing.    Cardiovascular: Negative for chest pain, palpitations and leg swelling.   Gastrointestinal: Negative for abdominal distention, abdominal pain, constipation, nausea and vomiting.   Genitourinary:  Negative for dysuria, frequency, hematuria and urgency.   Musculoskeletal: Negative for joint swelling.        No muscle weakness.   Skin: Negative for rash and wound.   Neurological: Negative for seizures, syncope, speech difficulty, weakness, numbness and headaches.   Hematological: Negative for adenopathy. Does not bruise/bleed easily.   Psychiatric/Behavioral: Negative for behavioral problems, confusion and suicidal ideas.   All other systems reviewed and are negative.    I have reviewed and confirmed the accuracy of the ROS as documented by the MA/LPN/RN Susannah Moya MD        Patient Active Problem List   Diagnosis    CLL (chronic lymphocytic leukemia)    Acute on chronic anemia    Encounter for hepatitis C screening test for low risk patient     Thrombocytopenia    H/O heart artery stent    Stented coronary artery    Arteriosclerosis of coronary artery    Paroxysmal atrial fibrillation    Paroxysmal atrial fibrillation    Fall    Fracture, olecranon    Hyperlipidemia    Long term (current) use of anticoagulants    Lower GI bleed    Olecranon bursitis    Shoulder pain    Smoker    CLL (chronic lymphocytic leukemia)    Dehydration    Drug-induced nausea and vomiting    Encounter for long-term (current) use of other medications    Neutropenia    Calculus of gallbladder without cholecystitis without obstruction    Cholecystitis    Iron deficiency    Adverse effect of iron    Melena    Change in bowel habits    Acute upper GI bleed    Acute blood loss anemia    Type 2 diabetes mellitus, without long-term current use of insulin    Prolonged Q-T interval on ECG    Chest pain with high risk for cardiac etiology    Hyponatremia    GI bleed    Iron deficiency anemia due to chronic blood loss    Abnormal CT scan, colon    Diarrhea    Segmental colitis associated with diverticulosis    Gastric AVM    Angiectasia       MEDICATIONS:  Medication Reconciliation for the patient has been reviewed by me and documented in the  patients chart.    ALLERGIES:    Allergies   Allergen Reactions    Penicillins Swelling     As a child         Vitals:    07/18/24 1350   BP: 138/73   Pulse: 57   Resp: 16   Temp: 98.1 °F (36.7 °C)   SpO2: 97%                    7/18/2024     1:49 PM   Current Status   ECOG score 0      Physical exam:      This patient's ACP documentation is up to date, and there's nothing further left to document.    CONSTITUTIONAL:  Vital signs reviewed.  No distress, looks comfortable.  RESPIRATORY:  Normal respiratory effort.  Lungs clear to auscultation bilaterally.  CARDIOVASCULAR:  Normal S1, S2.  No murmurs rubs or gallops.  No significant lower extremity edema.  GASTROINTESTINAL: Abdomen appears unremarkable.  Nontender.  No hepatomegaly.  No splenomegaly.  LYMPHATIC:  No cervical, supraclavicular, axillary lymphadenopathy.  SKIN:  Warm.  No rashes.  PSYCHIATRIC:  Normal judgment and insight.  Normal mood and affect..    I have reexamined the patient and the results are consistent with the previously documented exam. Susannah Moya MD     RECENT LABS:  Results from last 7 days   Lab Units 07/18/24  1330   WBC 10*3/mm3 6.61   NEUTROS ABS 10*3/mm3 4.19   HEMOGLOBIN g/dL 12.2*   HEMATOCRIT % 37.6   PLATELETS 10*3/mm3 148           Iron sat 8%                Assessment & Plan   1. Stage 2 CLL without evidence of any disease progression.  I have reviewed the CT scan from 3/19/2018 there is minimal progression but clinically patient is asymptomatic and we will follow with observation.  Will monitor with labs. No evidence of any frequent infections, no major worsening of his white count. He has no fever or night sweats or any other symptoms.   Patient knows that he is prone for infections and he is mainly staying at home during the COVID-19 situation time  Recent admission to [facility] July 10 2020×2.  Initially admitted with chest pain and underwent stress test and echo.  Echo was negative.  Stress test is abnormal but  "have left message for patient's cardiologist to call us.  His cardiologist is been sent [place].  He then got admitted the second time with worsening GI bleed and required total of 3 units of blood.  EGD was negative but colonoscopy showed angiectasia for which he underwent argon ablation.  Currently hemoglobin stable and no active bleeding.  He was also found to have low platelets with platelets dropping down to 27k and hemoglobin was 7.  Ultrasound showed splenomegaly 15 cm.  Concern is whether he has progressed from his CLL point of view and requires treatment.  CT scan performed 7/20/2020 did show the increased splenomegaly, but interval decrease in size of the axillary nodes, and shotty mediastinal nodes.  No change in the shotty nodes within the neck and supraclavicular regions.  No new lymphadenopathy.  Bone marrow biopsy however showed preliminarily that there is bone marrow involvement.  Remainder of bone marrow biopsy report is pending.  Bone marrow shows hypercellular marrow with 100% involvement of chronic lymphocytic leukemia/small lymphocytic lymphoma and diffuse pattern with at least 98% of the total marrow cellularity.  Rare foci of erythropoiesis and rare megakaryocytes are noted.   Negative for BCL-2 and BCL 6.  Chromosomes shows normal karyotype.  I GVH mutation present.  Positive for gain of 1 q., monosomy 13 and loss of IGH.  Negative for deletion T p53, negative for gain of chromosomes 9 and 11, negative for 11, 14 fusion.  The combination of monosomy 13 and gain of 1 q. is thought to be associated with plasma cell myeloma.  However this patient does not have myeloma.  Scans were reviewed with the patient today.  Dr. Moya also discussed with the patient and recommended he initiate treatment with chlorambucil and Gazyva.  He would not be a good candidate for Imbruvica due to his history of A. fib\".  He cannot take anticoagulation at this time.  The patient was provided with chemotherapy " education today, including  Handouts.  He will need a port placed so that we can initiate treatment  8/13/2020: Gazyva day 1. Plan also [id number] mg p.o. on day 1 day 15.  We can increase the dose once we know he tolerates and his platelets improved.  Patient developing pancytopenia when reviewed cycle 1 day 15.  Chlorambucil dose modified to 10 mg for day 15 however it is felt that he cannot tolerate this ongoing.    Plan to switch to Venetoclax along with continuing Gazyva.    Patient due today for cycle 2-day 1 Gazyva.  He will proceed today as scheduled.  Venetoclax will be shipped to his home with plans to begin this next week on 9/14/2020.    Patient did receive 1 L normal saline and Neulasta on 9/28/2020 secondary to neutropenia with an ANC of 0.04.  Patient seen on 10/02/2020 continuing on venetoclax, currently on 100 mg dosing.  He would not increase his dose as he will start on voriconazole after being on venetoclax x1 week which affects the metabolism of venetoclax.  He is currently on day 5 of the 100 mg dosing.  Patient states overall he feels the same except for increased fatigue and nausea.  His nauseousness is controlled with ondansetron however.  Patient will be given 1 L normal saline in the office today as planned.  Patient returns on 10/12/2020 continuing on venetoclax 100 mg daily as well as voriconazole.  Patient is doing well with these treatments except fatigue.  Next cycle 4 due as of number ninth prior to which will obtain CT scans  November 9, 2020: White count down to 1.89 but patient started Pepcid.  We will hold off Pepcid.  11/17/2020 platelet count dropped to 35,000 patient was instructed to hold venetoclax.  Returns 11/23/2020 WBC up to 6.38, ANC 5.19, hemoglobin 12.4, platelets up to 121.  I have advised him to resume venetoclax 100 mg daily  December 7, 2020: Platelets 95K.  White count 3.0 with absolute neutrophil count of 2.01.  Cycle 5 Gazyva given.  Continue venetoclax  with voriconazole.   January 4, 2021: Platelets 84,000, white count 3,440, absolute neutrophil count 1,980. Cycle 6 Gazyva given.  2/23/2021: WBC 3.0, platelets 90,000, hemoglobin 14.6, ANC 1.65.  Counts overall stable.  Continue Venetoclax 100 mg daily.  January 4, 2021: Last dose of Gazyva.  March 16, 2021: Patient is on venetoclax along with voriconazole and tolerating well with plans to complete total of 1 year.  4/27/2021: Patient continues on venetoclax 100 mg daily with voriconazole and acyclovir for prophylaxis.  He is tolerating this well.  He is scheduled for scans in 5 weeks.  June 8, 2021: Patient is to continue venetoclax 100 mg daily with voriconazole and acyclovir prophylaxis.  He is tolerating it very well.  The plan is to continue 8 more weeks and then he will complete the protocol and will be followed with observation.  I have reviewed the CT scan done on June 2, 2021 and there is no evidence of any lymphadenopathy and the spleen size is decreased in size from 12.5-11.3.  He has mild thrombocytopenia and leukopenia but his hemoglobin is normal.  9/7/2021: Patient completed 1 year worth of venetoclax with voriconazole and 6 months of Gazyva.  He has had an excellent response for his CLL.  Spleen decreased in size.  Currently asymptomatic.  Discontinue venetoclax since he completed 1 year  March 28, 2022: Patient's hemoglobin back to normal at 13.1.  EGD showed Hess's esophagus and angiodysplasia for which she underwent argon plasma coagulation.  Also colonoscopy showed multiple polyps all of them benign and had 1 angiodysplasia for which he underwent argon coagulation by Dr. Hinds.  Currently asymptomatic  5/26/2022 patient without B symptoms or adenopathy.  Acute drop in counts felt to be related to infection (see further discussion below).  Scheduled for repeat CT scans 6/14/2022.  June 21, 2022: Patient underwent CT scan Bibiana 15, 2022 which showed multifocal pneumonia bilaterally likely  secondary to COVID-19 infection in the past.  There admitted they recommended repeat CT in 6 to 8 weeks.  There is a 0.8 cm pulmonary nodule in the left lower lobe is minimally increased in size but similar to that in March 19, 2018.  Patient is improving  WBC stable/normal at 6.3, 12% lymphs.  Platelets iron 30,000.   December 28, 2022: Reviewed CT scan which shows thickening of the descending colon and sigmoid colon and patient is anemic.  We will check iron studies.  Refer to Dr. Hinds for colonoscopy.  5/1/2023 WBC 5.34, 22% lymphs, platelets stable at 136,000  6/12/2023 WBC 5.03, 21% lymphocytes, 9 platelets stable at 131,000  October 27, 2023: Hemoglobin is 14.1, white count of 8.0 and platelet count of 190.  1/16/2024 WBC normal at 8.08, ALC 1.85, percent lymphs 22%.  Hgb dropping (see below regarding recurrent DAVID).  Platelets remain normal at 167,000.  No evidence of disease recurrence.    April 9, 2024: Patient totally asymptomatic with a normal CBC.  Hemoglobin 13.9, white count 6.97 and platelet of 151 with 64% neutrophils and 25% lymphs  July 18, 2024: Patient's hemoglobin is 12.2 with a normal white count and platelet count.  Patient is asymptomatic.  Ferritin is pending.  No evidence of lymphadenopathy.  WBC normal    2. History of intermittent thrombocytopenia  Historically platelets in the 150s.  Count did drop when patient was treated for CLL on chlorambucil.    Platelets also dropping on treatment with Venetoclax in the past.    CLL in remission.  Platelets are stable at 167,000    3.  Iron deficiency anemia in the setting of recurrent GI bleeding with underlying AVMs, complicated by anticoagulation.   Patient intolerant to oral iron.  1/2022: Patient with recurrent iron deficiency as further detailed below.  Patient referred to GI for further evaluation.  Will see Dr. Hinds 2/9/2022.  Patient hospitalized 4/10 - 4/13/2022 for acute GI bleed.  EGD showing Hess's esophagus.  Colonoscopy  showing angiectasias, cauterized.  During hospitalization, Coumadin was held.    4/22/2022 evaluated by gastroenterology, noted to have a declining hemoglobin so they advised him to be seen by our office.  Patient's Hgb dropping to 8.8, Iron saturation 8%, TIBC 403, ferritin 25.1.  Patient receiving IV Injectafer x2, 4/29 and 5/6/2022.  7/25-7/31/2022 hospitalized with recurrent GI bleeding in the setting of known AVMs and being on Xarelto. Xarelto held.  Patient undergoing EGD 7/28/2022 revealing multiple nonbleeding angioectasias treated with argon plasma coagulation.  Patient placed on a heparin drip and then transitioned back to Xarelto as cardiology felt he could not be off this for a long with new Watchman's device.  Hemoglobin at discharge 10.8.  8/15/2022 patient seen by GI with continued reports of GI bleeding with noted dark stools.  Hemoglobin hemoglobin having already dropped and just 2 weeks from 10.8 to 8.4, ferritin 22, iron sat 11%. Patient scheduled for GI for capsule endoscopy.  8/23/2022: Hgb 7.7.  Plan to transfuse patient and give additional IV iron with Injectafer x2.  Of note Xarelto was stopped and patient switched to Plavix with hopes of stopping GI bleeding.  Patient undergoing EGD 9/15/2022 with 2 separate angioectasias treated with argon plasma.  9/20/2022: Hgb improved to 11.5.  Iron studies pending though presumably improved following recent Injectafer.  Patient understands to call with recurrent bleeding.  December 20, 2022 my concern is that his hemoglobin dropped to 10.5 we will check iron studies again  March 28, 2023: Patient is s/p iron and his hemoglobin today is 15.1 with normal white count and platelet count.  His platelets are 132 which is stable and he has 70% neutrophils and 20% lymphocytes.  5/3/2023 hemoglobin today 12.7 which is down slightly from recent.  Ferritin iron studies however adequate with iron saturation 35% and ferritin 98.  Continue to monitor.  Patient  denies bleeding.  6/12/2023 hemoglobin 12.1 today, ferritin 15, iron saturation 22%  October 27, 2023: Hemoglobin 14.9.  Ferritin 45, iron sat 32%.    1/16/2024 Hgb down to 11.2, ferritin 21, iron sat 8%.  Discussed with patient concern that he is bleeding again in the setting of known AVM history.  He also admits that he stopped his Protonix a while ago thinking he did not need it.  Instructed to restart Protonix.  We will give him additional IV Injectafer as he cannot tolerate oral iron due to GI upset.  Refer back to Dr. Guevara to discuss whether he needs repeat scopes.  April 9, 2024: Reviewed EGD and colonoscopy from April 5, 2024 which showed duodenal nodule benign no H. pylori and colonoscopy showed a small polyp which was benign tubular adenoma.    4.  History of cluster headaches for which she has to be treated with steroids in the past and has followed up with Dr. Len Walker. Today with significant headaches.  Patient had CT of the head 8/20/2020 which was negative.  He was started on prednisone 10 mg daily which was not helpful.  Patient saw Dr. Bobby, neurology who gave him 2 shots of a new medication Emgality.  This is something that can be given once a month.    Resolved.    5.  Atrial fibrillation, off anticoagulation given his thrombocytopenia.  Patient continues on aspirin 81 mg daily if platelet count greater than 60,000.  Patient [facility] Christy at [facility] who follows his INR  Coumadin held during patient's recent hospitalization April/2022.  Patient wishes to discontinue Coumadin permanently.    Patient status post watchman's device 7/2022.  Started on Xarelto.  8/22/2022 Xarelto discontinued and switched to Plavix in the setting of ongoing GI bleeding per above.    6.  Chronic mild elevation of alkaline phosphatase.  Stable.      PLAN:   Patient currently asymptomatic  Continue port flushes every 6 weeks  Follow-up in 3 months with nurse practitioner and labs  Follow-up with Dr Dean in 6  months with labs    Susannah Moya MD   07/18/24      Cc: Dr. Kevin Gilmore

## 2024-08-29 ENCOUNTER — INFUSION (OUTPATIENT)
Dept: ONCOLOGY | Facility: HOSPITAL | Age: 74
End: 2024-08-29
Payer: MEDICARE

## 2024-08-29 DIAGNOSIS — C91.10 CLL (CHRONIC LYMPHOCYTIC LEUKEMIA): Primary | ICD-10-CM

## 2024-08-29 PROCEDURE — 25010000002 HEPARIN LOCK FLUSH PER 10 UNITS: Performed by: INTERNAL MEDICINE

## 2024-08-29 PROCEDURE — 96523 IRRIG DRUG DELIVERY DEVICE: CPT

## 2024-08-29 RX ORDER — SODIUM CHLORIDE 0.9 % (FLUSH) 0.9 %
10 SYRINGE (ML) INJECTION AS NEEDED
Status: DISCONTINUED | OUTPATIENT
Start: 2024-08-29 | End: 2024-08-29 | Stop reason: HOSPADM

## 2024-08-29 RX ORDER — HEPARIN SODIUM (PORCINE) LOCK FLUSH IV SOLN 100 UNIT/ML 100 UNIT/ML
500 SOLUTION INTRAVENOUS AS NEEDED
Status: DISCONTINUED | OUTPATIENT
Start: 2024-08-29 | End: 2024-08-29 | Stop reason: HOSPADM

## 2024-08-29 RX ORDER — HEPARIN SODIUM (PORCINE) LOCK FLUSH IV SOLN 100 UNIT/ML 100 UNIT/ML
500 SOLUTION INTRAVENOUS AS NEEDED
OUTPATIENT
Start: 2024-08-29

## 2024-08-29 RX ORDER — SODIUM CHLORIDE 0.9 % (FLUSH) 0.9 %
10 SYRINGE (ML) INJECTION AS NEEDED
OUTPATIENT
Start: 2024-08-29

## 2024-08-29 RX ADMIN — Medication 500 UNITS: at 13:42

## 2024-08-29 RX ADMIN — Medication 10 ML: at 13:42

## 2024-09-27 NOTE — PROGRESS NOTES
MTM Encounter via phone: Adherence/tolerance - Venetoclax    Spoke with Mr. Stephenson this morning who seemed to be doing well. He reports no side effects to venetoclax. He also reports no missed doses or med changes. Adherence/administration appear to be appropriate. He had no additional questions or concerns for us today. Pharmacy will continue to follow.     Padmini Sorenson, Pharmacy Intern   Male

## 2024-10-08 ENCOUNTER — TELEPHONE (OUTPATIENT)
Dept: ONCOLOGY | Facility: CLINIC | Age: 74
End: 2024-10-08

## 2024-10-14 ENCOUNTER — TELEPHONE (OUTPATIENT)
Dept: ONCOLOGY | Facility: CLINIC | Age: 74
End: 2024-10-14
Payer: MEDICARE

## 2024-10-14 ENCOUNTER — INFUSION (OUTPATIENT)
Dept: ONCOLOGY | Facility: HOSPITAL | Age: 74
End: 2024-10-14
Payer: MEDICARE

## 2024-10-14 DIAGNOSIS — E61.1 IRON DEFICIENCY: ICD-10-CM

## 2024-10-14 DIAGNOSIS — C91.10 CLL (CHRONIC LYMPHOCYTIC LEUKEMIA): Primary | ICD-10-CM

## 2024-10-14 DIAGNOSIS — D50.0 IRON DEFICIENCY ANEMIA DUE TO CHRONIC BLOOD LOSS: ICD-10-CM

## 2024-10-14 LAB
ALBUMIN SERPL-MCNC: 3.9 G/DL (ref 3.5–5.2)
ALBUMIN/GLOB SERPL: 2 G/DL
ALP SERPL-CCNC: 79 U/L (ref 39–117)
ALT SERPL W P-5'-P-CCNC: 11 U/L (ref 1–41)
ANION GAP SERPL CALCULATED.3IONS-SCNC: 9.9 MMOL/L (ref 5–15)
AST SERPL-CCNC: 14 U/L (ref 1–40)
BASOPHILS # BLD AUTO: 0.05 10*3/MM3 (ref 0–0.2)
BASOPHILS NFR BLD AUTO: 0.9 % (ref 0–1.5)
BILIRUB SERPL-MCNC: 0.5 MG/DL (ref 0–1.2)
BUN SERPL-MCNC: 11 MG/DL (ref 8–23)
BUN/CREAT SERPL: 8.9 (ref 7–25)
CALCIUM SPEC-SCNC: 9.2 MG/DL (ref 8.6–10.5)
CHLORIDE SERPL-SCNC: 105 MMOL/L (ref 98–107)
CO2 SERPL-SCNC: 24.1 MMOL/L (ref 22–29)
CREAT SERPL-MCNC: 1.23 MG/DL (ref 0.76–1.27)
DEPRECATED RDW RBC AUTO: 46.7 FL (ref 37–54)
EGFRCR SERPLBLD CKD-EPI 2021: 61.2 ML/MIN/1.73
EOSINOPHIL # BLD AUTO: 0.12 10*3/MM3 (ref 0–0.4)
EOSINOPHIL NFR BLD AUTO: 2.2 % (ref 0.3–6.2)
ERYTHROCYTE [DISTWIDTH] IN BLOOD BY AUTOMATED COUNT: 15.5 % (ref 12.3–15.4)
GLOBULIN UR ELPH-MCNC: 2 GM/DL
GLUCOSE SERPL-MCNC: 137 MG/DL (ref 65–99)
HAPTOGLOB SERPL-MCNC: 173 MG/DL (ref 30–200)
HCT VFR BLD AUTO: 31.1 % (ref 37.5–51)
HGB BLD-MCNC: 9 G/DL (ref 13–17.7)
HGB RETIC QN AUTO: 21.7 PG (ref 29.8–36.1)
IMM GRANULOCYTES # BLD AUTO: 0.02 10*3/MM3 (ref 0–0.05)
IMM GRANULOCYTES NFR BLD AUTO: 0.4 % (ref 0–0.5)
IMM RETICS NFR: 22.5 % (ref 3–15.8)
LDH SERPL-CCNC: 182 U/L (ref 135–225)
LYMPHOCYTES # BLD AUTO: 1.72 10*3/MM3 (ref 0.7–3.1)
LYMPHOCYTES NFR BLD AUTO: 31.6 % (ref 19.6–45.3)
MCH RBC QN AUTO: 24 PG (ref 26.6–33)
MCHC RBC AUTO-ENTMCNC: 28.9 G/DL (ref 31.5–35.7)
MCV RBC AUTO: 82.9 FL (ref 79–97)
MONOCYTES # BLD AUTO: 0.31 10*3/MM3 (ref 0.1–0.9)
MONOCYTES NFR BLD AUTO: 5.7 % (ref 5–12)
NEUTROPHILS NFR BLD AUTO: 3.22 10*3/MM3 (ref 1.7–7)
NEUTROPHILS NFR BLD AUTO: 59.2 % (ref 42.7–76)
NRBC BLD AUTO-RTO: 0 /100 WBC (ref 0–0.2)
PLATELET # BLD AUTO: 146 10*3/MM3 (ref 140–450)
PMV BLD AUTO: 10 FL (ref 6–12)
POTASSIUM SERPL-SCNC: 3.7 MMOL/L (ref 3.5–5.2)
PROT SERPL-MCNC: 5.9 G/DL (ref 6–8.5)
RBC # BLD AUTO: 3.75 10*6/MM3 (ref 4.14–5.8)
RETICS # AUTO: 0.09 10*6/MM3 (ref 0.02–0.13)
RETICS/RBC NFR AUTO: 2.35 % (ref 0.7–1.9)
SODIUM SERPL-SCNC: 139 MMOL/L (ref 136–145)
VIT B12 BLD-MCNC: 362 PG/ML (ref 211–946)
WBC NRBC COR # BLD AUTO: 5.44 10*3/MM3 (ref 3.4–10.8)

## 2024-10-14 PROCEDURE — 36591 DRAW BLOOD OFF VENOUS DEVICE: CPT

## 2024-10-14 PROCEDURE — 82728 ASSAY OF FERRITIN: CPT | Performed by: INTERNAL MEDICINE

## 2024-10-14 PROCEDURE — 85025 COMPLETE CBC W/AUTO DIFF WBC: CPT

## 2024-10-14 PROCEDURE — 85046 RETICYTE/HGB CONCENTRATE: CPT

## 2024-10-14 PROCEDURE — 84466 ASSAY OF TRANSFERRIN: CPT | Performed by: INTERNAL MEDICINE

## 2024-10-14 PROCEDURE — 82746 ASSAY OF FOLIC ACID SERUM: CPT | Performed by: INTERNAL MEDICINE

## 2024-10-14 PROCEDURE — 80053 COMPREHEN METABOLIC PANEL: CPT | Performed by: INTERNAL MEDICINE

## 2024-10-14 PROCEDURE — 83010 ASSAY OF HAPTOGLOBIN QUANT: CPT | Performed by: INTERNAL MEDICINE

## 2024-10-14 PROCEDURE — 82607 VITAMIN B-12: CPT | Performed by: INTERNAL MEDICINE

## 2024-10-14 PROCEDURE — 83540 ASSAY OF IRON: CPT | Performed by: INTERNAL MEDICINE

## 2024-10-14 PROCEDURE — 83615 LACTATE (LD) (LDH) ENZYME: CPT | Performed by: INTERNAL MEDICINE

## 2024-10-14 PROCEDURE — 25010000002 HEPARIN LOCK FLUSH PER 10 UNITS: Performed by: INTERNAL MEDICINE

## 2024-10-14 RX ORDER — HEPARIN SODIUM (PORCINE) LOCK FLUSH IV SOLN 100 UNIT/ML 100 UNIT/ML
500 SOLUTION INTRAVENOUS AS NEEDED
OUTPATIENT
Start: 2024-10-14

## 2024-10-14 RX ORDER — HEPARIN SODIUM (PORCINE) LOCK FLUSH IV SOLN 100 UNIT/ML 100 UNIT/ML
500 SOLUTION INTRAVENOUS AS NEEDED
Status: DISCONTINUED | OUTPATIENT
Start: 2024-10-14 | End: 2024-10-14 | Stop reason: HOSPADM

## 2024-10-14 RX ORDER — SODIUM CHLORIDE 0.9 % (FLUSH) 0.9 %
10 SYRINGE (ML) INJECTION AS NEEDED
Status: DISCONTINUED | OUTPATIENT
Start: 2024-10-14 | End: 2024-10-14 | Stop reason: HOSPADM

## 2024-10-14 RX ORDER — SODIUM CHLORIDE 0.9 % (FLUSH) 0.9 %
10 SYRINGE (ML) INJECTION AS NEEDED
OUTPATIENT
Start: 2024-10-14

## 2024-10-14 RX ADMIN — Medication 500 UNITS: at 13:08

## 2024-10-14 RX ADMIN — Medication 10 ML: at 13:08

## 2024-10-14 NOTE — TELEPHONE ENCOUNTER
Patient called in stating he was hospitalized for chest pain and SOB at [facility] last week. Patient was informed at discharge to follow up with Hematology. Spoke with Dr Dean (former Dr Moya patient) schedule patient to come in for labs today, patient to follow up with NP on Friday 10/18/24 as already scheduled and follow up with Cardiology for chest pain. Message sent to appointment desk to schedule Lab only appointment.    Patient v/u

## 2024-10-15 ENCOUNTER — TELEPHONE (OUTPATIENT)
Dept: ONCOLOGY | Facility: CLINIC | Age: 74
End: 2024-10-15
Payer: MEDICARE

## 2024-10-15 LAB
FERRITIN SERPL-MCNC: 23.1 NG/ML (ref 30–400)
FOLATE SERPL-MCNC: 10.3 NG/ML (ref 4.78–24.2)
IRON 24H UR-MRATE: 19 MCG/DL (ref 59–158)
IRON SATN MFR SERPL: 4 % (ref 20–50)
TIBC SERPL-MCNC: 496 MCG/DL (ref 298–536)
TRANSFERRIN SERPL-MCNC: 333 MG/DL (ref 200–360)

## 2024-10-15 RX ORDER — CETIRIZINE HYDROCHLORIDE 10 MG/1
10 TABLET ORAL ONCE
OUTPATIENT
Start: 2024-10-25 | End: 2024-10-24

## 2024-10-15 RX ORDER — FAMOTIDINE 10 MG/ML
20 INJECTION, SOLUTION INTRAVENOUS ONCE
Status: CANCELLED | OUTPATIENT
Start: 2024-10-18

## 2024-10-15 RX ORDER — CETIRIZINE HYDROCHLORIDE 10 MG/1
10 TABLET ORAL ONCE
Status: CANCELLED | OUTPATIENT
Start: 2024-10-18 | End: 2024-10-17

## 2024-10-15 RX ORDER — SODIUM CHLORIDE 9 MG/ML
20 INJECTION, SOLUTION INTRAVENOUS ONCE
OUTPATIENT
Start: 2024-10-25

## 2024-10-15 RX ORDER — FAMOTIDINE 10 MG/ML
20 INJECTION, SOLUTION INTRAVENOUS ONCE
OUTPATIENT
Start: 2024-10-25

## 2024-10-15 RX ORDER — SODIUM CHLORIDE 9 MG/ML
20 INJECTION, SOLUTION INTRAVENOUS ONCE
Status: CANCELLED | OUTPATIENT
Start: 2024-10-18

## 2024-10-15 NOTE — TELEPHONE ENCOUNTER
Spoke to patient's wife and informed her Dr Dean wants patient to receive Injectafer times 2 treatments. Will have to wait for insurance approval. Patient's wife stated patient needs a cardiac cath and the sooner he receives Iron the faster than can schedule cath.    Message sent to appointment desk and Pre-cert team    Patient v/u

## 2024-10-18 ENCOUNTER — INFUSION (OUTPATIENT)
Dept: ONCOLOGY | Facility: HOSPITAL | Age: 74
End: 2024-10-18
Payer: MEDICARE

## 2024-10-18 ENCOUNTER — APPOINTMENT (OUTPATIENT)
Dept: LAB | Facility: HOSPITAL | Age: 74
End: 2024-10-18
Payer: MEDICARE

## 2024-10-18 ENCOUNTER — OFFICE VISIT (OUTPATIENT)
Dept: ONCOLOGY | Facility: CLINIC | Age: 74
End: 2024-10-18
Payer: MEDICARE

## 2024-10-18 VITALS
OXYGEN SATURATION: 99 % | SYSTOLIC BLOOD PRESSURE: 146 MMHG | HEIGHT: 72 IN | RESPIRATION RATE: 16 BRPM | WEIGHT: 163.2 LBS | HEART RATE: 59 BPM | TEMPERATURE: 98.1 F | DIASTOLIC BLOOD PRESSURE: 75 MMHG | BODY MASS INDEX: 22.11 KG/M2

## 2024-10-18 DIAGNOSIS — D50.0 IRON DEFICIENCY ANEMIA DUE TO CHRONIC BLOOD LOSS: ICD-10-CM

## 2024-10-18 DIAGNOSIS — C91.10 CLL (CHRONIC LYMPHOCYTIC LEUKEMIA): Primary | ICD-10-CM

## 2024-10-18 DIAGNOSIS — T45.4X5A ADVERSE REACTION TO COMPOUND IRON PREPARATION, INITIAL ENCOUNTER: Primary | ICD-10-CM

## 2024-10-18 PROCEDURE — 25010000002 FERRIC CARBOXYMALTOSE 750 MG/15ML SOLUTION 15 ML VIAL: Performed by: INTERNAL MEDICINE

## 2024-10-18 PROCEDURE — 96374 THER/PROPH/DIAG INJ IV PUSH: CPT

## 2024-10-18 PROCEDURE — A9270 NON-COVERED ITEM OR SERVICE: HCPCS | Performed by: INTERNAL MEDICINE

## 2024-10-18 PROCEDURE — 63710000001 CETIRIZINE 10 MG TABLET: Performed by: INTERNAL MEDICINE

## 2024-10-18 PROCEDURE — 96375 TX/PRO/DX INJ NEW DRUG ADDON: CPT

## 2024-10-18 RX ORDER — SODIUM CHLORIDE 9 MG/ML
20 INJECTION, SOLUTION INTRAVENOUS ONCE
Status: DISCONTINUED | OUTPATIENT
Start: 2024-10-18 | End: 2024-10-18 | Stop reason: HOSPADM

## 2024-10-18 RX ORDER — CETIRIZINE HYDROCHLORIDE 10 MG/1
10 TABLET ORAL ONCE
Status: COMPLETED | OUTPATIENT
Start: 2024-10-18 | End: 2024-10-18

## 2024-10-18 RX ORDER — FAMOTIDINE 10 MG/ML
20 INJECTION, SOLUTION INTRAVENOUS ONCE
Status: COMPLETED | OUTPATIENT
Start: 2024-10-18 | End: 2024-10-18

## 2024-10-18 RX ADMIN — CETIRIZINE HYDROCHLORIDE 10 MG: 10 TABLET, FILM COATED ORAL at 13:34

## 2024-10-18 RX ADMIN — FERRIC CARBOXYMALTOSE INJECTION 750 MG: 50 INJECTION, SOLUTION INTRAVENOUS at 13:50

## 2024-10-18 RX ADMIN — FAMOTIDINE 20 MG: 10 INJECTION INTRAVENOUS at 13:34

## 2024-10-18 NOTE — PROGRESS NOTES
Subjective     REASON FOR FOLLOW-UP:    1. Chronic lymphoid leukemia, stage 4, presented initially with significant pancytopenia.  Currently on Gazyva with venetoclax  Cycle 1 Gazyva given on August 13, 2020    2.  History of angiodysplasia requiring cauterization and history of Hess's esophagus    3.  Bone marrow aspiration biopsy shows hypercellular marrow with 98% involvement with CLL    4. Iron deficiency anemia in the setting of GI bleeding    History of Present Illness   Patient is seen back today in follow-up with history of CLL in remission following Gazyva and venetoclax as well as followed for history of iron deficiency anemia in the setting of recurrent GI bleeding.    April 9, 2024: Patient has no complaints.  His ferritin is 88, iron saturation of 23 and TIBC of 365.  EGD showed erythema in the gastric antrum biopsies were taken for H. pylori and a single 5 mm mucosal nodule was found in the second portion of the duodenum biopsies were taken.  Colonoscopy showed a 2 mm polyp in the descending colon and the polyp was removed otherwise nonbleeding hemorrhoids.  This was done on April 5, 2024.  Showed adenoma in the duodenum.  No granuloma no evidence of H. pylori in the stomach and no malignancy.  Colon also had tubular adenoma    Globin is 13.9 a white count of 6.97 and platelet of 151.    July 19, 2024: Patient is currently doing well without complaints.  No fever night sweats or weight loss no lymphadenopathy.  Weight is stable.  Patient is still working full-time.    Interval History:  Patient returns today for follow up.  No recent weight loss, no new night sweats or lymphadenopathy.  Weight has increased.    PAST MEDICAL HISTORY:   Recurrent atrial fibrillation followed by cardiology. He has had drug eluting stent placed x 3.   High cholesterol.   Hypertension.       HEMATOLOGIC/ONCOLOGIC HISTORY:       The patient is 62-year-old gentleman with the above history currently here for followup. He  has no complaints. He denies fever, night sweats or weight loss. He does not have any lymphadenopathy. He feels good. He had some fatigue but his CBC shows a go od hemoglobin.   The patient is followed by Dr. Kevin Chandra. He had seen Dr. Chandra 7/18/13 for a history of coronary artery disease status drug eluting stent placement to the right coronary artery and left anterior descending artery in February 2011. Histor y of paroxysmal atrial fibrillation and hyperlipidemia. He presented to [facility] on 6/23/13 with palpitations and was found to have atrial fibrillation with rapid ventricular response. He was given IV Cardizem and converted spontaneously to normal sinus rhythm. He was found to have elevated white count at 18.9 and denied any symptoms of infection or urinary complaints. TSH was normal, troponin levels were negative. He was asked to continue aspirin and metoprolol for that. If he contin u ed to have atrial fibrillation, they will consider antiarrhythmic therapy with or without a short term anticoagulation therapy. However, currently the patient is feeling reasonably good. He has no complaints. His CBC 7/22/13 showed WBC 17,000, hemoglob i n 16.4 and platelet count of 174,000. Back 9/28/13 his hemoglobin was 15.1, WBC 13.3 and platelets 197,000. He has had a persistently elevated WBC but he is totally asymptomatic. He does not have any fever, night sweats or lymphadenopathy. He is here to be evaluated for his elevated white count.       Peripheral blood flow cytometry done 08/16/13 shows 22% chronic lymphoid leukemia cells, and they expressed ZAP-70 but not CD38. The total number of cells approximated 60% T cells, 32% B cells and 1% natural k iller cells. The B cells were monoclonal and expressed CD19, CD20, CD22, CD5, CD23, CD11c, ZAP-70 and T cell antigens. There was a normal CD4/CD8 ratio. CT of the chest, abdomen and pelvis does not show evidence of metastasis. Peripheral blood for DILLON -2  was negative. It was negative for T11:14 translocation.       CT scan done on 08/21/2013 shows 5 to 6 mm noncalcified nodule in the left lower lobe which is unchanged from December 2010. Otherwise no evidence of any lymphadenopathy or hepatosplenomegaly.       MRI of the spine shows arthritis and disc bulge and likely hemangiomas, with 1 lesion showing increased signal intensity at T2, which is likely a hemangioma. Bone scan could be done, but patient does not even have much pain there. CT scan of the chest, a bdomen and pelvis was done on 11/23/2014. He has tiny lymph nodes in the neck which are difficult to palpate. Right jugular one is 1.4 x 0.9 and left supraclavicular one is 1.5 x 1.1. He also has a subcarinal lymph node which is 1.7 x 1.2 cm, and he ha s a portocaval lymph node which has increased from 2.5 to 2.8. Patient is totally asymptomatic. He does not have any B symptoms, so will continue followup with observation.     Patient is a 69-year-old gentleman with history of chronic lymphocytic leukemia/SLL who recently got admitted to [facility] because of GI bleed.  He was seen by Dr. Montero.  He underwent EGD colonoscopy.  He was found to have angiodysplasia for which he underwent argon coagulation.  He required 3 units of blood.  Patient had a normal esophagus normal stomach and normal duodenum CLOtest was negative he was asked to take Protonix 40 mg daily.  Colonoscopy showed blood in the entire examined colon but there was a single bleeding colonic angiectasia which was treated with argon plasma coagulation.    He was admitted twice.  Initially he was admitted on July 10 at which time he stayed 3 days and he was evaluated for chest pain.  He underwent a cardiac work-up with stress test and echo.  The echo was normal but the stress test showed medium sized moderate severe severity fixed perfusion defect.  Of the inferior wall consistent with infarction.  However according to patient cardiology did  not think he had any cardiac problems.  I have left a message for patient's cardiologist to call me today.  Patient subsequently got readmitted with GI bleed and required 1/3 unit of transfusion.  He was found to have thrombocytopenia and hematology was consulted.    His hemoglobin had dropped down to as low as 7 and his platelets had gone down to 87.  Today it is 35 and his hemoglobin is 9.4 today.  He denies active bleeding.  He has lost weight recently.  He does not have any fever or night sweats.    He had ultrasound of spleen which showed enlarged spleen centimeters in length    Patient was started on Gazyva with Leukeran but subsequently switched to venetoclax with Gazyva.  His venetoclax dose is 100 mg daily and he receives Gazyva once a month.    CT scan done 2020 shows significant response     MEDICATIONS: The current medication list was reviewed with the patient and updated in the EMR this date per the medical assistant. Medication dosages and frequencies were confirmed to be accurate.       ALLERGIES: PENICILLIN which causes him to swell up. He is allergic to IV CONTRAST DYE.     SOCIAL HISTORY: He is . He smokes one pack per day for 35 years. He consumes three to four alcoholic drinks per week. He has no tattoos and no previous transfusions.       FAMILY HISTORY: Father  at 82 with MI. Mother  at 82 with bone cancer. He has one brother age 65 in good health and two sisters 69 and 57 in good health.   Past Medical History, Social History, and Family History are unchanged from my prior documentation on     Review of Systems   Constitutional: Negative for fatigue.  Negative for appetite change, chills, diaphoresis, fever and unexpected weight change.   HENT: Negative for hearing loss, sore throat and trouble swallowing.    Respiratory: Negative for cough, chest tightness, shortness of breath and wheezing.    Cardiovascular: Negative for chest pain, palpitations and  leg swelling.   Gastrointestinal: Negative for abdominal distention, abdominal pain, constipation, nausea and vomiting.   Genitourinary: Negative for dysuria, frequency, hematuria and urgency.   Musculoskeletal: Negative for joint swelling.        No muscle weakness.   Skin: Negative for rash and wound.   Neurological: Negative for seizures, syncope, speech difficulty, weakness, numbness and headaches.   Hematological: Negative for adenopathy. Does not bruise/bleed easily.   Psychiatric/Behavioral: Negative for behavioral problems, confusion and suicidal ideas.   All other systems reviewed and are negative.    I have reviewed and confirmed the accuracy of the ROS as documented by the MA/LPN/RN MARIAH Luna        Patient Active Problem List   Diagnosis    CLL (chronic lymphocytic leukemia)    Acute on chronic anemia    Encounter for hepatitis C screening test for low risk patient     Thrombocytopenia    H/O heart artery stent    Stented coronary artery    Arteriosclerosis of coronary artery    Paroxysmal atrial fibrillation    Paroxysmal atrial fibrillation    Fall    Fracture, olecranon    Hyperlipidemia    Long term (current) use of anticoagulants    Lower GI bleed    Olecranon bursitis    Shoulder pain    Smoker    CLL (chronic lymphocytic leukemia)    Dehydration    Drug-induced nausea and vomiting    Encounter for long-term (current) use of other medications    Neutropenia    Calculus of gallbladder without cholecystitis without obstruction    Cholecystitis    Iron deficiency    Adverse reaction to compound iron preparation, initial encounter    Melena    Change in bowel habits    Acute upper GI bleed    Acute blood loss anemia    Type 2 diabetes mellitus, without long-term current use of insulin    Prolonged Q-T interval on ECG    Chest pain with high risk for cardiac etiology    Hyponatremia    GI bleed    Iron deficiency anemia due to chronic blood loss    Abnormal CT scan, colon    Diarrhea     Segmental colitis associated with diverticulosis    Gastric AVM    Angiectasia       MEDICATIONS:  Medication Reconciliation for the patient has been reviewed by me and documented in the patients chart.    ALLERGIES:    Allergies   Allergen Reactions    Penicillins Swelling     As a child         Vitals:    10/18/24 1245   BP: 146/75   Pulse: 59   Resp: 16   Temp: 98.1 °F (36.7 °C)   SpO2: 99%                    10/18/2024    12:44 PM   Current Status   ECOG score 0      Physical exam:      This patient's ACP documentation is up to date, and there's nothing further left to document.    CONSTITUTIONAL:  Vital signs reviewed.  No distress, looks comfortable.  RESPIRATORY:  Normal respiratory effort.  Lungs clear to auscultation bilaterally.  CARDIOVASCULAR:  Normal S1, S2.  No murmurs rubs or gallops.  No significant lower extremity edema.  GASTROINTESTINAL: Abdomen appears unremarkable.  Nontender.  No hepatomegaly.  No splenomegaly.  LYMPHATIC:  No cervical, supraclavicular, axillary lymphadenopathy.  SKIN:  Warm.  No rashes.  PSYCHIATRIC:  Normal judgment and insight.  Normal mood and affect..    I have reexamined the patient and the results are consistent with the previously documented exam. MARIAH Luna     RECENT LABS:  Results from last 7 days   Lab Units 10/14/24  1307   WBC 10*3/mm3 5.44   NEUTROS ABS 10*3/mm3 3.22   HEMOGLOBIN g/dL 9.0*   HEMATOCRIT % 31.1*   PLATELETS 10*3/mm3 146       Results from last 7 days   Lab Units 10/14/24  1307   SODIUM mmol/L 139   POTASSIUM mmol/L 3.7   CHLORIDE mmol/L 105   CO2 mmol/L 24.1   BUN mg/dL 11   CREATININE mg/dL 1.23   CALCIUM mg/dL 9.2   ALBUMIN g/dL 3.9   BILIRUBIN mg/dL 0.5   ALK PHOS U/L 79   ALT (SGPT) U/L 11   AST (SGOT) U/L 14   GLUCOSE mg/dL 137*   FERRITIN ng/mL 23.10*   IRON mcg/dL 19*   TIBC mcg/dL 496     Iron sat 4%                Assessment & Plan   1. Stage 2 CLL without evidence of any disease progression.  I have reviewed the CT scan from  3/19/2018 there is minimal progression but clinically patient is asymptomatic and we will follow with observation.  Will monitor with labs. No evidence of any frequent infections, no major worsening of his white count. He has no fever or night sweats or any other symptoms.   Patient knows that he is prone for infections and he is mainly staying at home during the COVID-19 situation time  Recent admission to [facility] July 10 2020×2.  Initially admitted with chest pain and underwent stress test and echo.  Echo was negative.  Stress test is abnormal but have left message for patient's cardiologist to call us.  His cardiologist is been sent [facility].  He then got admitted the second time with worsening GI bleed and required total of 3 units of blood.  EGD was negative but colonoscopy showed angiectasia for which he underwent argon ablation.  Currently hemoglobin stable and no active bleeding.  He was also found to have low platelets with platelets dropping down to 27k and hemoglobin was 7.  Ultrasound showed splenomegaly 15 cm.  Concern is whether he has progressed from his CLL point of view and requires treatment.  CT scan performed 7/20/2020 did show the increased splenomegaly, but interval decrease in size of the axillary nodes, and shotty mediastinal nodes.  No change in the shotty nodes within the neck and supraclavicular regions.  No new lymphadenopathy.  Bone marrow biopsy however showed preliminarily that there is bone marrow involvement.  Remainder of bone marrow biopsy report is pending.  Bone marrow shows hypercellular marrow with 100% involvement of chronic lymphocytic leukemia/small lymphocytic lymphoma and diffuse pattern with at least 98% of the total marrow cellularity.  Rare foci of erythropoiesis and rare megakaryocytes are noted.   Negative for BCL-2 and BCL 6.  Chromosomes shows normal karyotype.  I GVH mutation present.  Positive for gain of 1 q., monosomy 13 and loss of IGH.  Negative for  "deletion T p53, negative for gain of chromosomes 9 and 11, negative for 11, 14 fusion.  The combination of monosomy 13 and gain of 1 q. is thought to be associated with plasma cell myeloma.  However this patient does not have myeloma.  Scans were reviewed with the patient today.  Dr. Moya also discussed with the patient and recommended he initiate treatment with chlorambucil and Gazyva.  He would not be a good candidate for Imbruvica due to his history of A. fib\".  He cannot take anticoagulation at this time.  The patient was provided with chemotherapy education today, including  Handouts.  He will need a port placed so that we can initiate treatment  8/13/2020: Gazyva day 1. Plan also [id number] mg p.o. on day 1 day 15.  We can increase the dose once we know he tolerates and his platelets improved.  Patient developing pancytopenia when reviewed cycle 1 day 15.  Chlorambucil dose modified to 10 mg for day 15 however it is felt that he cannot tolerate this ongoing.    Plan to switch to Venetoclax along with continuing Gazyva.    Patient due today for cycle 2-day 1 Gazyva.  He will proceed today as scheduled.  Venetoclax will be shipped to his home with plans to begin this next week on 9/14/2020.    Patient did receive 1 L normal saline and Neulasta on 9/28/2020 secondary to neutropenia with an ANC of 0.04.  Patient seen on 10/02/2020 continuing on venetoclax, currently on 100 mg dosing.  He would not increase his dose as he will start on voriconazole after being on venetoclax x1 week which affects the metabolism of venetoclax.  He is currently on day 5 of the 100 mg dosing.  Patient states overall he feels the same except for increased fatigue and nausea.  His nauseousness is controlled with ondansetron however.  Patient will be given 1 L normal saline in the office today as planned.  Patient returns on 10/12/2020 continuing on venetoclax 100 mg daily as well as voriconazole.  Patient is doing well with these " treatments except fatigue.  Next cycle 4 due as of number ninth prior to which will obtain CT scans  November 9, 2020: White count down to 1.89 but patient started Pepcid.  We will hold off Pepcid.  11/17/2020 platelet count dropped to 35,000 patient was instructed to hold venetoclax.  Returns 11/23/2020 WBC up to 6.38, ANC 5.19, hemoglobin 12.4, platelets up to 121.  I have advised him to resume venetoclax 100 mg daily  December 7, 2020: Platelets 95K.  White count 3.0 with absolute neutrophil count of 2.01.  Cycle 5 Gazyva given.  Continue venetoclax with voriconazole.   January 4, 2021: Platelets 84,000, white count 3,440, absolute neutrophil count 1,980. Cycle 6 Gazyva given.  2/23/2021: WBC 3.0, platelets 90,000, hemoglobin 14.6, ANC 1.65.  Counts overall stable.  Continue Venetoclax 100 mg daily.  January 4, 2021: Last dose of Gazyva.  March 16, 2021: Patient is on venetoclax along with voriconazole and tolerating well with plans to complete total of 1 year.  4/27/2021: Patient continues on venetoclax 100 mg daily with voriconazole and acyclovir for prophylaxis.  He is tolerating this well.  He is scheduled for scans in 5 weeks.  June 8, 2021: Patient is to continue venetoclax 100 mg daily with voriconazole and acyclovir prophylaxis.  He is tolerating it very well.  The plan is to continue 8 more weeks and then he will complete the protocol and will be followed with observation.  I have reviewed the CT scan done on June 2, 2021 and there is no evidence of any lymphadenopathy and the spleen size is decreased in size from 12.5-11.3.  He has mild thrombocytopenia and leukopenia but his hemoglobin is normal.  9/7/2021: Patient completed 1 year worth of venetoclax with voriconazole and 6 months of Gazyva.  He has had an excellent response for his CLL.  Spleen decreased in size.  Currently asymptomatic.  Discontinue venetoclax since he completed 1 year  March 28, 2022: Patient's hemoglobin back to normal at 13.1.   EGD showed Hess's esophagus and angiodysplasia for which she underwent argon plasma coagulation.  Also colonoscopy showed multiple polyps all of them benign and had 1 angiodysplasia for which he underwent argon coagulation by Dr. Hinds.  Currently asymptomatic  5/26/2022 patient without B symptoms or adenopathy.  Acute drop in counts felt to be related to infection (see further discussion below).  Scheduled for repeat CT scans 6/14/2022.  June 21, 2022: Patient underwent CT scan Bibiana 15, 2022 which showed multifocal pneumonia bilaterally likely secondary to COVID-19 infection in the past.  There admitted they recommended repeat CT in 6 to 8 weeks.  There is a 0.8 cm pulmonary nodule in the left lower lobe is minimally increased in size but similar to that in March 19, 2018.  Patient is improving  WBC stable/normal at 6.3, 12% lymphs.  Platelets iron 30,000.   December 28, 2022: Reviewed CT scan which shows thickening of the descending colon and sigmoid colon and patient is anemic.  We will check iron studies.  Refer to Dr. Hinds for colonoscopy.  5/1/2023 WBC 5.34, 22% lymphs, platelets stable at 136,000  6/12/2023 WBC 5.03, 21% lymphocytes, 9 platelets stable at 131,000  October 27, 2023: Hemoglobin is 14.1, white count of 8.0 and platelet count of 190.  1/16/2024 WBC normal at 8.08, ALC 1.85, percent lymphs 22%.  Hgb dropping (see below regarding recurrent DAVID).  Platelets remain normal at 167,000.  No evidence of disease recurrence.    April 9, 2024: Patient totally asymptomatic with a normal CBC.  Hemoglobin 13.9, white count 6.97 and platelet of 151 with 64% neutrophils and 25% lymphs  July 18, 2024: Patient's hemoglobin is 12.2 with a normal white count and platelet count.  Patient is asymptomatic.  Ferritin is pending.  No evidence of lymphadenopathy.  WBC normal  10/18/24 Returns today with labs a few days ago showing WBC 5.44, ALC 1.72    2. History of intermittent thrombocytopenia  Historically  platelets in the 150s.  Count did drop when patient was treated for CLL on chlorambucil.    Platelets also dropping on treatment with Venetoclax in the past.    CLL in remission.  Platelets are stable at 146,000    3.  Iron deficiency anemia in the setting of recurrent GI bleeding with underlying AVMs, complicated by anticoagulation.   Patient intolerant to oral iron.  1/2022: Patient with recurrent iron deficiency as further detailed below.  Patient referred to GI for further evaluation.  Will see Dr. Hinds 2/9/2022.  Patient hospitalized 4/10 - 4/13/2022 for acute GI bleed.  EGD showing Hess's esophagus.  Colonoscopy showing angiectasias, cauterized.  During hospitalization, Coumadin was held.    4/22/2022 evaluated by gastroenterology, noted to have a declining hemoglobin so they advised him to be seen by our office.  Patient's Hgb dropping to 8.8, Iron saturation 8%, TIBC 403, ferritin 25.1.  Patient receiving IV Injectafer x2, 4/29 and 5/6/2022.  7/25-7/31/2022 hospitalized with recurrent GI bleeding in the setting of known AVMs and being on Xarelto. Xarelto held.  Patient undergoing EGD 7/28/2022 revealing multiple nonbleeding angioectasias treated with argon plasma coagulation.  Patient placed on a heparin drip and then transitioned back to Xarelto as cardiology felt he could not be off this for a long with new Watchman's device.  Hemoglobin at discharge 10.8.  8/15/2022 patient seen by GI with continued reports of GI bleeding with noted dark stools.  Hemoglobin hemoglobin having already dropped and just 2 weeks from 10.8 to 8.4, ferritin 22, iron sat 11%. Patient scheduled for GI for capsule endoscopy.  8/23/2022: Hgb 7.7.  Plan to transfuse patient and give additional IV iron with Injectafer x2.  Of note Xarelto was stopped and patient switched to Plavix with hopes of stopping GI bleeding.  Patient undergoing EGD 9/15/2022 with 2 separate angioectasias treated with argon plasma.  9/20/2022: Hgb improved  to 11.5.  Iron studies pending though presumably improved following recent Injectafer.  Patient understands to call with recurrent bleeding.  December 20, 2022 my concern is that his hemoglobin dropped to 10.5 we will check iron studies again  March 28, 2023: Patient is s/p iron and his hemoglobin today is 15.1 with normal white count and platelet count.  His platelets are 132 which is stable and he has 70% neutrophils and 20% lymphocytes.  5/3/2023 hemoglobin today 12.7 which is down slightly from recent.  Ferritin iron studies however adequate with iron saturation 35% and ferritin 98.  Continue to monitor.  Patient denies bleeding.  6/12/2023 hemoglobin 12.1 today, ferritin 15, iron saturation 22%  October 27, 2023: Hemoglobin 14.9.  Ferritin 45, iron sat 32%.    1/16/2024 Hgb down to 11.2, ferritin 21, iron sat 8%.  Discussed with patient concern that he is bleeding again in the setting of known AVM history.  He also admits that he stopped his Protonix a while ago thinking he did not need it.  Instructed to restart Protonix.  We will give him additional IV Injectafer as he cannot tolerate oral iron due to GI upset.  Refer back to Dr. Guevara to discuss whether he needs repeat scopes.  April 9, 2024: Reviewed EGD and colonoscopy from April 5, 2024 which showed duodenal nodule benign no H. pylori and colonoscopy showed a small polyp which was benign tubular adenoma.  10/14/24 hemoglobin 9, ferritin 23, iron saturation 4%. Plan injectafer x 2.    4.  History of cluster headaches for which she has to be treated with steroids in the past and has followed up with Dr. Len Walker. Today with significant headaches.  Patient had CT of the head 8/20/2020 which was negative.  He was started on prednisone 10 mg daily which was not helpful.  Patient saw Dr. Bobby, neurology who gave him 2 shots of a new medication Emgality.  This is something that can be given once a month.    Resolved.    5.  Atrial fibrillation, off  anticoagulation given his thrombocytopenia.  Patient continues on aspirin 81 mg daily if platelet count greater than 60,000.  Patient sees [facility] Christy at [facility] who follows his INR  Coumadin held during patient's recent hospitalization April/2022.  Patient wishes to discontinue Coumadin permanently.    Patient status post watchman's device 7/2022.  Started on Xarelto.  8/22/2022 Xarelto discontinued and switched to Plavix in the setting of ongoing GI bleeding per above.    6.  Chronic mild elevation of alkaline phosphatase.  Stable.      PLAN:   Proceed with ijnectafer x 2  Continue port flushes every 6 weeks  Follow-up in 3 months with Jermaine Meléndez, APRN   10/18/24      Cc: Dr. Kevin Gilmore

## 2024-10-25 ENCOUNTER — INFUSION (OUTPATIENT)
Dept: ONCOLOGY | Facility: HOSPITAL | Age: 74
End: 2024-10-25
Payer: MEDICARE

## 2024-10-25 VITALS
RESPIRATION RATE: 16 BRPM | HEART RATE: 59 BPM | SYSTOLIC BLOOD PRESSURE: 159 MMHG | OXYGEN SATURATION: 97 % | WEIGHT: 158.8 LBS | BODY MASS INDEX: 21.75 KG/M2 | DIASTOLIC BLOOD PRESSURE: 76 MMHG | TEMPERATURE: 97.3 F

## 2024-10-25 DIAGNOSIS — T45.4X5A ADVERSE REACTION TO COMPOUND IRON PREPARATION, INITIAL ENCOUNTER: Primary | ICD-10-CM

## 2024-10-25 DIAGNOSIS — C91.10 CLL (CHRONIC LYMPHOCYTIC LEUKEMIA): ICD-10-CM

## 2024-10-25 DIAGNOSIS — D50.0 IRON DEFICIENCY ANEMIA DUE TO CHRONIC BLOOD LOSS: ICD-10-CM

## 2024-10-25 PROCEDURE — 96375 TX/PRO/DX INJ NEW DRUG ADDON: CPT

## 2024-10-25 PROCEDURE — 96374 THER/PROPH/DIAG INJ IV PUSH: CPT

## 2024-10-25 PROCEDURE — A9270 NON-COVERED ITEM OR SERVICE: HCPCS | Performed by: INTERNAL MEDICINE

## 2024-10-25 PROCEDURE — 25010000002 FERRIC CARBOXYMALTOSE 750 MG/15ML SOLUTION 15 ML VIAL: Performed by: INTERNAL MEDICINE

## 2024-10-25 PROCEDURE — 63710000001 CETIRIZINE 10 MG TABLET: Performed by: INTERNAL MEDICINE

## 2024-10-25 RX ORDER — FAMOTIDINE 10 MG/ML
20 INJECTION, SOLUTION INTRAVENOUS ONCE
Status: COMPLETED | OUTPATIENT
Start: 2024-10-25 | End: 2024-10-25

## 2024-10-25 RX ORDER — CETIRIZINE HYDROCHLORIDE 10 MG/1
10 TABLET ORAL ONCE
Status: COMPLETED | OUTPATIENT
Start: 2024-10-25 | End: 2024-10-25

## 2024-10-25 RX ORDER — SODIUM CHLORIDE 0.9 % (FLUSH) 0.9 %
10 SYRINGE (ML) INJECTION AS NEEDED
OUTPATIENT
Start: 2024-10-25

## 2024-10-25 RX ORDER — HEPARIN SODIUM (PORCINE) LOCK FLUSH IV SOLN 100 UNIT/ML 100 UNIT/ML
500 SOLUTION INTRAVENOUS AS NEEDED
OUTPATIENT
Start: 2024-10-25

## 2024-10-25 RX ORDER — SODIUM CHLORIDE 9 MG/ML
20 INJECTION, SOLUTION INTRAVENOUS ONCE
Status: DISCONTINUED | OUTPATIENT
Start: 2024-10-25 | End: 2024-10-25 | Stop reason: HOSPADM

## 2024-10-25 RX ADMIN — CETIRIZINE HYDROCHLORIDE 10 MG: 10 TABLET, FILM COATED ORAL at 13:47

## 2024-10-25 RX ADMIN — FAMOTIDINE 20 MG: 10 INJECTION INTRAVENOUS at 13:48

## 2024-10-25 RX ADMIN — SODIUM CHLORIDE 750 MG: 900 INJECTION, SOLUTION INTRAVENOUS at 14:18

## 2024-10-25 NOTE — NURSING NOTE
Appt for next port flush moved out to Dec 5th.  Port was used today and flushed at end of injectafer infusion.

## 2024-11-22 ENCOUNTER — HOSPITAL ENCOUNTER (OUTPATIENT)
Facility: HOSPITAL | Age: 74
Discharge: HOME | End: 2024-11-22
Payer: MEDICARE

## 2024-11-22 ENCOUNTER — OFFICE VISIT (OUTPATIENT)
Age: 74
End: 2024-11-22
Payer: MEDICARE

## 2024-11-22 VITALS
BODY MASS INDEX: 21.4 KG/M2 | WEIGHT: 158 LBS | HEIGHT: 72 IN | HEART RATE: 48 BPM | DIASTOLIC BLOOD PRESSURE: 78 MMHG | SYSTOLIC BLOOD PRESSURE: 171 MMHG

## 2024-11-22 DIAGNOSIS — I71.43 INFRARENAL ABDOMINAL AORTIC ANEURYSM (AAA) WITHOUT RUPTURE: Primary | ICD-10-CM

## 2024-11-22 DIAGNOSIS — I72.4 ANEURYSM OF ARTERY OF LOWER EXTREMITY: ICD-10-CM

## 2024-11-22 DIAGNOSIS — I77.72 DISSECTION OF RIGHT ILIAC ARTERY: ICD-10-CM

## 2024-11-22 DIAGNOSIS — F17.200 SMOKER: ICD-10-CM

## 2024-11-22 LAB
ABDOMINAL DIST AORTA AP: 2.2 CM
ABDOMINAL DIST AORTA TRANS: 2.2 CM
ABDOMINAL DIST AORTA VEL: 64.5 CM/S
ABDOMINAL LT COM ILIAC AP: 1.3 CM
ABDOMINAL LT COM ILIAC TRANS: 1.3 CM
ABDOMINAL LT COM ILIAC VEL: 189.3 CM/S
ABDOMINAL LT EXT ILIAC VEL: 136.4 CM/S
ABDOMINAL MID AORTA AP: 2 CM
ABDOMINAL MID AORTA TRANS: 2.1 CM
ABDOMINAL MID AORTA VEL: 56.8 CM/S
ABDOMINAL PROX AORTA AP: 3.6 CM
ABDOMINAL PROX AORTA TRANS: 3.6 CM
ABDOMINAL PROX AORTA VEL: 63.7 CM/S
ABDOMINAL RT COM ILIAC AP: 1.4 CM
ABDOMINAL RT COM ILIAC TRANS: 1.4 CM
ABDOMINAL RT COM ILIAC VEL: 234 CM/S
ABDOMINAL RT EXT ILIAC VEL: 125.6 CM/S
BH CV VAS ABD AO LT EXTERNAL ILIAC AP: 0.9 CM
BH CV VAS ABD AO RT EXTERNAL ILIAC AP: 1 CM
RIGHT COMMON ILIAC RATIO: 1.3

## 2024-11-22 PROCEDURE — 93978 VASCULAR STUDY: CPT

## 2024-11-22 NOTE — PROGRESS NOTES
Chief Complaint  Aortic Aneurysm    Subjective        Macho Stephenson presents to [facility] VASCULAR SURGERY  HPI   Macho Stephenson is a 74 y.o. male that has been followed in our office for an abdominal aortic aneurysm and a right common iliac artery dissection. He returns today in follow-up along with an aortic duplex. He   reports he was in the hospital for anemia.  He  denies any worsening abdominal pain, back pain, or pain that radiates to his groin. He denies any claudication symptoms, rest pain, or tissue loss.    Review of Systems   Constitutional:  Negative for fever.   Eyes:  Negative for visual disturbance.   Cardiovascular:  Negative for leg swelling.   Gastrointestinal:  Negative for abdominal pain.   Musculoskeletal:  Negative for back pain.   Skin:  Negative for color change, pallor and wound.   Neurological:  Negative for dizziness, facial asymmetry, speech difficulty and weakness.        Macho Stephenson  reports that he has been smoking cigarettes. He has a 20 pack-year smoking history. He has never used smokeless tobacco..   Tobacco Education/Cessation: Macho Stephenson  reports that he has been smoking cigarettes. He has a 20 pack-year smoking history. He has never used smokeless tobacco. I have educated him on the risk of diseases from using tobacco products such as cancer, COPD, heart disease, and arterial disease.     I advised him to quit and he is willing to quit. We have discussed the following method/s for tobacco cessation:  Counseling.      I spent 3  minutes counseling the patient.           Objective   Vital Signs:  Vitals:    11/22/24 0815   BP: 171/78   Pulse: (!) 48      Body mass index is 21.64 kg/m².   BMI is within normal parameters. No other follow-up for BMI required.       Physical Exam  Vitals reviewed.   Constitutional:       Appearance: Normal appearance.   HENT:      Head: Normocephalic.   Cardiovascular:      Rate and Rhythm: Normal rate and  regular rhythm.      Pulses: Normal pulses.           Dorsalis pedis pulses are 3+ on the right side and 3+ on the left side.        Posterior tibial pulses are 3+ on the right side and 3+ on the left side.   Pulmonary:      Effort: Pulmonary effort is normal.   Skin:     General: Skin is warm.   Neurological:      General: No focal deficit present.      Mental Status: He is alert and oriented to person, place, and time.   Psychiatric:         Mood and Affect: Mood normal.          Result Review :      Previous aortic duplex: Largest measurement of his aorta 3.67 cm.  Right common iliac artery 1.56 cm    Aortic duplex done today: Duplex Aorta IVC Iliac Graft Complete CAR (11/22/2024 08:10)                     Assessment and Plan     Diagnoses and all orders for this visit:    1. Infrarenal abdominal aortic aneurysm (AAA) without rupture (Primary)  -     Duplex Aorta IVC Iliac Graft Complete CAR; Future    2. Dissection of right iliac artery  -     Duplex Aorta IVC Iliac Graft Complete CAR; Future    3. Smoker        Patient presents today for follow up of his abdominal aortic aneurysm right common iliac artery aneurysm.  Today, his aorta is measuring 3.6 cm and his right common iliac artery is measuring 1.4 cm.  This is unchanged from previous imaging.  We discussed indications for surgical repair.  We discussed adequate blood pressure control.  He is on a statin for cholesterol control.  He  will return in 1 year along with aortic duplex.            Follow Up     Return in about 1 year (around 11/22/2025) for aortic duplex.  Patient was given instructions and counseling regarding his condition or for health maintenance advice. Please see specific information pulled into the AVS if appropriate.     MARIAH Johnson

## 2024-12-05 ENCOUNTER — INFUSION (OUTPATIENT)
Dept: ONCOLOGY | Facility: HOSPITAL | Age: 74
End: 2024-12-05
Payer: MEDICARE

## 2024-12-05 DIAGNOSIS — C91.10 CLL (CHRONIC LYMPHOCYTIC LEUKEMIA): Primary | ICD-10-CM

## 2024-12-05 PROCEDURE — 96523 IRRIG DRUG DELIVERY DEVICE: CPT

## 2024-12-05 RX ORDER — SODIUM CHLORIDE 0.9 % (FLUSH) 0.9 %
10 SYRINGE (ML) INJECTION AS NEEDED
OUTPATIENT
Start: 2024-12-05

## 2024-12-05 RX ORDER — HEPARIN SODIUM (PORCINE) LOCK FLUSH IV SOLN 100 UNIT/ML 100 UNIT/ML
500 SOLUTION INTRAVENOUS AS NEEDED
OUTPATIENT
Start: 2024-12-05

## 2025-01-02 ENCOUNTER — INFUSION (OUTPATIENT)
Dept: ONCOLOGY | Facility: HOSPITAL | Age: 75
End: 2025-01-02
Payer: MEDICARE

## 2025-01-02 ENCOUNTER — TELEPHONE (OUTPATIENT)
Dept: ONCOLOGY | Facility: CLINIC | Age: 75
End: 2025-01-02
Payer: MEDICARE

## 2025-01-02 DIAGNOSIS — C91.10 CLL (CHRONIC LYMPHOCYTIC LEUKEMIA): Primary | ICD-10-CM

## 2025-01-02 PROCEDURE — 96523 IRRIG DRUG DELIVERY DEVICE: CPT

## 2025-01-02 PROCEDURE — 25010000002 HEPARIN LOCK FLUSH PER 10 UNITS: Performed by: INTERNAL MEDICINE

## 2025-01-02 RX ORDER — SODIUM CHLORIDE 0.9 % (FLUSH) 0.9 %
10 SYRINGE (ML) INJECTION AS NEEDED
OUTPATIENT
Start: 2025-01-02

## 2025-01-02 RX ORDER — HEPARIN SODIUM (PORCINE) LOCK FLUSH IV SOLN 100 UNIT/ML 100 UNIT/ML
300 SOLUTION INTRAVENOUS ONCE
Status: COMPLETED | OUTPATIENT
Start: 2025-01-02 | End: 2025-01-02

## 2025-01-02 RX ORDER — HEPARIN SODIUM (PORCINE) LOCK FLUSH IV SOLN 100 UNIT/ML 100 UNIT/ML
500 SOLUTION INTRAVENOUS AS NEEDED
OUTPATIENT
Start: 2025-01-02

## 2025-01-02 RX ADMIN — Medication 300 UNITS: at 13:36

## 2025-01-06 ENCOUNTER — TELEPHONE (OUTPATIENT)
Dept: ONCOLOGY | Facility: CLINIC | Age: 75
End: 2025-01-06

## 2025-01-06 NOTE — TELEPHONE ENCOUNTER
Called pt and LM with appt change due to office being closed due to weather. Patients appt has been rescheduled to 1/10/25.

## 2025-01-10 ENCOUNTER — OFFICE VISIT (OUTPATIENT)
Dept: ONCOLOGY | Facility: CLINIC | Age: 75
End: 2025-01-10
Payer: MEDICARE

## 2025-01-10 ENCOUNTER — LAB (OUTPATIENT)
Dept: LAB | Facility: HOSPITAL | Age: 75
End: 2025-01-10
Payer: MEDICARE

## 2025-01-10 VITALS
WEIGHT: 160.1 LBS | DIASTOLIC BLOOD PRESSURE: 80 MMHG | RESPIRATION RATE: 16 BRPM | OXYGEN SATURATION: 96 % | SYSTOLIC BLOOD PRESSURE: 129 MMHG | BODY MASS INDEX: 21.68 KG/M2 | HEIGHT: 72 IN | TEMPERATURE: 97.8 F | HEART RATE: 68 BPM

## 2025-01-10 DIAGNOSIS — R73.9 ELEVATED RANDOM BLOOD GLUCOSE LEVEL: ICD-10-CM

## 2025-01-10 DIAGNOSIS — D50.0 IRON DEFICIENCY ANEMIA DUE TO CHRONIC BLOOD LOSS: ICD-10-CM

## 2025-01-10 DIAGNOSIS — C91.10 CLL (CHRONIC LYMPHOCYTIC LEUKEMIA): Primary | ICD-10-CM

## 2025-01-10 DIAGNOSIS — C91.10 CLL (CHRONIC LYMPHOCYTIC LEUKEMIA): ICD-10-CM

## 2025-01-10 DIAGNOSIS — T45.4X5A ADVERSE REACTION TO COMPOUND IRON PREPARATION, INITIAL ENCOUNTER: ICD-10-CM

## 2025-01-10 LAB
ALBUMIN SERPL-MCNC: 4 G/DL (ref 3.5–5.2)
ALBUMIN/GLOB SERPL: 1.9 G/DL
ALP SERPL-CCNC: 110 U/L (ref 39–117)
ALT SERPL W P-5'-P-CCNC: 29 U/L (ref 1–41)
ANION GAP SERPL CALCULATED.3IONS-SCNC: 10.1 MMOL/L (ref 5–15)
AST SERPL-CCNC: 29 U/L (ref 1–40)
BASOPHILS # BLD AUTO: 0.06 10*3/MM3 (ref 0–0.2)
BASOPHILS NFR BLD AUTO: 0.9 % (ref 0–1.5)
BILIRUB SERPL-MCNC: 0.6 MG/DL (ref 0–1.2)
BUN SERPL-MCNC: 11 MG/DL (ref 8–23)
BUN/CREAT SERPL: 11.6 (ref 7–25)
CALCIUM SPEC-SCNC: 9.6 MG/DL (ref 8.6–10.5)
CHLORIDE SERPL-SCNC: 100 MMOL/L (ref 98–107)
CO2 SERPL-SCNC: 25.9 MMOL/L (ref 22–29)
CREAT SERPL-MCNC: 0.95 MG/DL (ref 0.76–1.27)
DEPRECATED RDW RBC AUTO: 50.5 FL (ref 37–54)
EGFRCR SERPLBLD CKD-EPI 2021: 83.5 ML/MIN/1.73
EOSINOPHIL # BLD AUTO: 0.2 10*3/MM3 (ref 0–0.4)
EOSINOPHIL NFR BLD AUTO: 2.9 % (ref 0.3–6.2)
ERYTHROCYTE [DISTWIDTH] IN BLOOD BY AUTOMATED COUNT: 15.8 % (ref 12.3–15.4)
FERRITIN SERPL-MCNC: 104 NG/ML (ref 30–400)
FOLATE SERPL-MCNC: 12.6 NG/ML (ref 4.78–24.2)
GLOBULIN UR ELPH-MCNC: 2.1 GM/DL
GLUCOSE SERPL-MCNC: 309 MG/DL (ref 65–99)
HAPTOGLOB SERPL-MCNC: 160 MG/DL (ref 30–200)
HCT VFR BLD AUTO: 43.2 % (ref 37.5–51)
HGB BLD-MCNC: 14.2 G/DL (ref 13–17.7)
HGB RETIC QN AUTO: 34 PG (ref 29.8–36.1)
IMM GRANULOCYTES # BLD AUTO: 0.06 10*3/MM3 (ref 0–0.05)
IMM GRANULOCYTES NFR BLD AUTO: 0.9 % (ref 0–0.5)
IMM RETICS NFR: 12.9 % (ref 3–15.8)
IRON 24H UR-MRATE: 89 MCG/DL (ref 59–158)
IRON SATN MFR SERPL: 23 % (ref 20–50)
LDH SERPL-CCNC: 159 U/L (ref 135–225)
LYMPHOCYTES # BLD AUTO: 1.67 10*3/MM3 (ref 0.7–3.1)
LYMPHOCYTES NFR BLD AUTO: 24.5 % (ref 19.6–45.3)
MCH RBC QN AUTO: 29.9 PG (ref 26.6–33)
MCHC RBC AUTO-ENTMCNC: 32.9 G/DL (ref 31.5–35.7)
MCV RBC AUTO: 90.9 FL (ref 79–97)
MONOCYTES # BLD AUTO: 0.32 10*3/MM3 (ref 0.1–0.9)
MONOCYTES NFR BLD AUTO: 4.7 % (ref 5–12)
NEUTROPHILS NFR BLD AUTO: 4.51 10*3/MM3 (ref 1.7–7)
NEUTROPHILS NFR BLD AUTO: 66.1 % (ref 42.7–76)
NRBC BLD AUTO-RTO: 0 /100 WBC (ref 0–0.2)
PLATELET # BLD AUTO: 151 10*3/MM3 (ref 140–450)
PMV BLD AUTO: 9.1 FL (ref 6–12)
POTASSIUM SERPL-SCNC: 4.2 MMOL/L (ref 3.5–5.2)
PROT SERPL-MCNC: 6.1 G/DL (ref 6–8.5)
RBC # BLD AUTO: 4.75 10*6/MM3 (ref 4.14–5.8)
RETICS # AUTO: 0.11 10*6/MM3 (ref 0.02–0.13)
RETICS/RBC NFR AUTO: 2.27 % (ref 0.7–1.9)
SODIUM SERPL-SCNC: 136 MMOL/L (ref 136–145)
TIBC SERPL-MCNC: 387 MCG/DL (ref 298–536)
TRANSFERRIN SERPL-MCNC: 260 MG/DL (ref 200–360)
VIT B12 BLD-MCNC: 385 PG/ML (ref 211–946)
WBC NRBC COR # BLD AUTO: 6.82 10*3/MM3 (ref 3.4–10.8)

## 2025-01-10 PROCEDURE — 83615 LACTATE (LD) (LDH) ENZYME: CPT

## 2025-01-10 PROCEDURE — 82746 ASSAY OF FOLIC ACID SERUM: CPT | Performed by: INTERNAL MEDICINE

## 2025-01-10 PROCEDURE — 85025 COMPLETE CBC W/AUTO DIFF WBC: CPT

## 2025-01-10 PROCEDURE — 82728 ASSAY OF FERRITIN: CPT

## 2025-01-10 PROCEDURE — 84466 ASSAY OF TRANSFERRIN: CPT

## 2025-01-10 PROCEDURE — 83540 ASSAY OF IRON: CPT

## 2025-01-10 PROCEDURE — 85046 RETICYTE/HGB CONCENTRATE: CPT

## 2025-01-10 PROCEDURE — 83010 ASSAY OF HAPTOGLOBIN QUANT: CPT | Performed by: INTERNAL MEDICINE

## 2025-01-10 PROCEDURE — 82607 VITAMIN B-12: CPT | Performed by: INTERNAL MEDICINE

## 2025-01-10 PROCEDURE — 80053 COMPREHEN METABOLIC PANEL: CPT

## 2025-01-10 PROCEDURE — 36415 COLL VENOUS BLD VENIPUNCTURE: CPT

## 2025-01-10 NOTE — PROGRESS NOTES
"Subjective     CHIEF COMPLAINT:      Chief Complaint   Patient presents with    Follow-up     HISTORY OF PRESENT ILLNESS:     Macho Stephenson is a 74 y.o. male patient who returns today for follow up on his CLL and anemia. He returns today for follow pu reporting that he is feeling good. He is not having fever or chills. He is not noticing lymph node enlargement.     ROS:  Pertinent ROS is in the HPI.     Past medical, surgical, social and family history were reviewed.     MEDICATIONS:    Current Outpatient Medications:     atorvastatin (LIPITOR) 20 MG tablet, Take 1 tablet by mouth Daily., Disp: , Rfl:     ezetimibe (ZETIA) 10 MG tablet, Take 1 tablet by mouth Daily., Disp: , Rfl:     lisinopril (PRINIVIL,ZESTRIL) 10 MG tablet, Take 1 tablet by mouth Daily., Disp: , Rfl: 0    metFORMIN (GLUCOPHAGE) 500 MG tablet, Take 1 tablet by mouth Daily With Breakfast., Disp: , Rfl:     pantoprazole (PROTONIX) 40 MG EC tablet, Take 1 tablet by mouth 2 (Two) Times a Day., Disp: 180 tablet, Rfl: 3    sotalol (BETAPACE) 80 MG tablet, Take 1 tablet by mouth 2 (Two) Times a Day., Disp: , Rfl:   Objective     VITAL SIGNS:     Vitals:    01/10/25 0930   BP: 129/80   Pulse: 68   Resp: 16   Temp: 97.8 °F (36.6 °C)   TempSrc: Oral   SpO2: 96%   Weight: 72.6 kg (160 lb 1.6 oz)   Height: 182 cm (71.65\")   PainSc: 0-No pain     Body mass index is 21.92 kg/m².     Wt Readings from Last 5 Encounters:   01/10/25 72.6 kg (160 lb 1.6 oz)   11/22/24 71.7 kg (158 lb)   10/25/24 72 kg (158 lb 12.8 oz)   10/18/24 74 kg (163 lb 3.2 oz)   07/18/24 70.7 kg (155 lb 12.8 oz)     PHYSICAL EXAMINATION:   GENERAL: The patient appears in good general condition, not in acute distress.   SKIN: No Ecchymosis.  EYES: No jaundice. No Pallor.  LYMPHATIC:  No cervical, supraclavicular lymphadenopathy.  CHEST: Normal respiratory effort. Normal breathing sounds bilaterally. No added sounds. Pacemaker in the left upper chest.  CVS: Normal S1 and S2. No " Murmur.  ABDOMEN: Soft. No tenderness. No Hepatomegaly. No Splenomegaly. No masses.  EXTREMITIES: No joint deformity.     DIAGNOSTIC DATA:     Results from last 7 days   Lab Units 01/10/25  0914   WBC 10*3/mm3 6.82   NEUTROS ABS 10*3/mm3 4.51   HEMOGLOBIN g/dL 14.2   HEMATOCRIT % 43.2   PLATELETS 10*3/mm3 151     Results from last 7 days   Lab Units 01/10/25  0914   SODIUM mmol/L 136   POTASSIUM mmol/L 4.2   CHLORIDE mmol/L 100   CO2 mmol/L 25.9   BUN mg/dL 11   CREATININE mg/dL 0.95   CALCIUM mg/dL 9.6   ALBUMIN g/dL 4.0   BILIRUBIN mg/dL 0.6   ALK PHOS U/L 110   ALT (SGPT) U/L 29   AST (SGOT) U/L 29   GLUCOSE mg/dL 309*         Lab 01/10/25  0914   IRON 89   IRON SATURATION (TSAT) 23   TIBC 387   TRANSFERRIN 260   FERRITIN 104.00   FOLATE 12.60   VITAMIN B 12 385      Component      Latest Ref Rng 1/10/2025   Reticulocyte %      0.70 - 1.90 % 2.27 (H)       Component      Latest Ref Rng 1/10/2025   LDH      135 - 225 U/L 159      Assessment & Plan    *Chronic lymphocytic leukemia.  He was previously under the care of Dr. Moya at our office.  Bone marrow biopsy revealed 98% involvement with CLL.  He was previously treated with Gazyva and venetoclax in [id number].  Last dose of Gazyva was on 1/4/2021.  He achieved remission with this regimen.  1/10/2025: Lymphocyte count 1670.  Exam revealed no lymphadenopathy or splenomegaly.  I reassured the patient that there is no evidence of relapse of his CLL.    *Iron deficiency anemia secondary to GI bleeding.  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  He has history of Hess's esophagus.  He was recently on Xarelto which was stopped due to the bleeding.  He did not tolerate oral iron in the past.   He required IV iron therapy in the past.  The last IV iron infusions were in October 2024 (Injectafer 750 mg on 10/18/2024 and 10/25/2024).  1/10/2025: Hemoglobin 14.2.  Ferritin improved to 104.  Transferrin saturation 23%.    *Elevated blood  glucose.  1/10/2025: Glucose was 309.  Patient reports that he had a soda before coming to the office.  I recommended rechecking his glucose at home and limit his free sugar intake today  I asked him to recheck his glucose at home.    PLAN:    1.  Does not need IV iron at this time.  2.  Follow-up with NP in 3 months.  Will obtain CBC ferritin iron panel CMP and LDH.  3.  I will see him in follow-up in 6 months with repeat labs.      Fernando Dean MD  01/10/25

## 2025-02-17 ENCOUNTER — INFUSION (OUTPATIENT)
Dept: ONCOLOGY | Facility: HOSPITAL | Age: 75
End: 2025-02-17
Payer: MEDICARE

## 2025-02-17 DIAGNOSIS — C91.10 CLL (CHRONIC LYMPHOCYTIC LEUKEMIA): Primary | ICD-10-CM

## 2025-02-17 PROCEDURE — 25010000002 HEPARIN LOCK FLUSH PER 10 UNITS: Performed by: INTERNAL MEDICINE

## 2025-02-17 PROCEDURE — 96523 IRRIG DRUG DELIVERY DEVICE: CPT

## 2025-02-17 RX ORDER — SODIUM CHLORIDE 0.9 % (FLUSH) 0.9 %
10 SYRINGE (ML) INJECTION AS NEEDED
OUTPATIENT
Start: 2025-02-17

## 2025-02-17 RX ORDER — SODIUM CHLORIDE 9 MG/ML
10 INJECTION, SOLUTION INTRAMUSCULAR; INTRAVENOUS; SUBCUTANEOUS ONCE
Status: COMPLETED | OUTPATIENT
Start: 2025-02-17 | End: 2025-02-17

## 2025-02-17 RX ORDER — HEPARIN SODIUM (PORCINE) LOCK FLUSH IV SOLN 100 UNIT/ML 100 UNIT/ML
500 SOLUTION INTRAVENOUS ONCE
Status: COMPLETED | OUTPATIENT
Start: 2025-02-17 | End: 2025-02-17

## 2025-02-17 RX ORDER — HEPARIN SODIUM (PORCINE) LOCK FLUSH IV SOLN 100 UNIT/ML 100 UNIT/ML
500 SOLUTION INTRAVENOUS AS NEEDED
OUTPATIENT
Start: 2025-02-17

## 2025-02-17 RX ADMIN — Medication 500 UNITS: at 13:03

## 2025-02-17 RX ADMIN — SODIUM CHLORIDE 10 ML: 9 INJECTION, SOLUTION INTRAMUSCULAR; INTRAVENOUS; SUBCUTANEOUS at 13:03

## 2025-04-07 DIAGNOSIS — D50.0 IRON DEFICIENCY ANEMIA DUE TO CHRONIC BLOOD LOSS: ICD-10-CM

## 2025-04-07 DIAGNOSIS — C91.10 CLL (CHRONIC LYMPHOCYTIC LEUKEMIA): Primary | ICD-10-CM

## 2025-04-08 ENCOUNTER — OFFICE VISIT (OUTPATIENT)
Dept: ONCOLOGY | Facility: CLINIC | Age: 75
End: 2025-04-08
Payer: MEDICARE

## 2025-04-08 ENCOUNTER — INFUSION (OUTPATIENT)
Dept: ONCOLOGY | Facility: HOSPITAL | Age: 75
End: 2025-04-08
Payer: MEDICARE

## 2025-04-08 VITALS
OXYGEN SATURATION: 95 % | TEMPERATURE: 97.7 F | HEIGHT: 72 IN | HEART RATE: 60 BPM | WEIGHT: 160.3 LBS | BODY MASS INDEX: 21.71 KG/M2 | DIASTOLIC BLOOD PRESSURE: 81 MMHG | SYSTOLIC BLOOD PRESSURE: 151 MMHG

## 2025-04-08 DIAGNOSIS — D50.0 IRON DEFICIENCY ANEMIA DUE TO CHRONIC BLOOD LOSS: ICD-10-CM

## 2025-04-08 DIAGNOSIS — C91.10 CLL (CHRONIC LYMPHOCYTIC LEUKEMIA): Primary | ICD-10-CM

## 2025-04-08 DIAGNOSIS — C91.10 CLL (CHRONIC LYMPHOCYTIC LEUKEMIA): ICD-10-CM

## 2025-04-08 DIAGNOSIS — T45.4X5A ADVERSE REACTION TO COMPOUND IRON PREPARATION, INITIAL ENCOUNTER: Primary | ICD-10-CM

## 2025-04-08 LAB
ALBUMIN SERPL-MCNC: 3.8 G/DL (ref 3.5–5.2)
ALBUMIN/GLOB SERPL: 2.1 G/DL
ALP SERPL-CCNC: 94 U/L (ref 39–117)
ALT SERPL W P-5'-P-CCNC: 31 U/L (ref 1–41)
ANION GAP SERPL CALCULATED.3IONS-SCNC: 14 MMOL/L (ref 5–15)
AST SERPL-CCNC: 27 U/L (ref 1–40)
BASOPHILS # BLD AUTO: 0.05 10*3/MM3 (ref 0–0.2)
BASOPHILS NFR BLD AUTO: 0.8 % (ref 0–1.5)
BILIRUB SERPL-MCNC: 0.5 MG/DL (ref 0–1.2)
BUN SERPL-MCNC: 15 MG/DL (ref 8–23)
BUN/CREAT SERPL: 16.1 (ref 7–25)
CALCIUM SPEC-SCNC: 9.2 MG/DL (ref 8.6–10.5)
CHLORIDE SERPL-SCNC: 102 MMOL/L (ref 98–107)
CO2 SERPL-SCNC: 23 MMOL/L (ref 22–29)
CREAT SERPL-MCNC: 0.93 MG/DL (ref 0.76–1.27)
DEPRECATED RDW RBC AUTO: 42.4 FL (ref 37–54)
EGFRCR SERPLBLD CKD-EPI 2021: 85.6 ML/MIN/1.73
EOSINOPHIL # BLD AUTO: 0.32 10*3/MM3 (ref 0–0.4)
EOSINOPHIL NFR BLD AUTO: 5.4 % (ref 0.3–6.2)
ERYTHROCYTE [DISTWIDTH] IN BLOOD BY AUTOMATED COUNT: 12.9 % (ref 12.3–15.4)
FERRITIN SERPL-MCNC: 36.1 NG/ML (ref 30–400)
GLOBULIN UR ELPH-MCNC: 1.8 GM/DL
GLUCOSE SERPL-MCNC: 282 MG/DL (ref 65–99)
HCT VFR BLD AUTO: 39.8 % (ref 37.5–51)
HGB BLD-MCNC: 13.4 G/DL (ref 13–17.7)
IMM GRANULOCYTES # BLD AUTO: 0.01 10*3/MM3 (ref 0–0.05)
IMM GRANULOCYTES NFR BLD AUTO: 0.2 % (ref 0–0.5)
IRON 24H UR-MRATE: 55 MCG/DL (ref 59–158)
IRON SATN MFR SERPL: 13 % (ref 20–50)
LDH SERPL-CCNC: 136 U/L (ref 135–225)
LYMPHOCYTES # BLD AUTO: 1.52 10*3/MM3 (ref 0.7–3.1)
LYMPHOCYTES NFR BLD AUTO: 25.8 % (ref 19.6–45.3)
MCH RBC QN AUTO: 30.7 PG (ref 26.6–33)
MCHC RBC AUTO-ENTMCNC: 33.7 G/DL (ref 31.5–35.7)
MCV RBC AUTO: 91.3 FL (ref 79–97)
MONOCYTES # BLD AUTO: 0.29 10*3/MM3 (ref 0.1–0.9)
MONOCYTES NFR BLD AUTO: 4.9 % (ref 5–12)
NEUTROPHILS NFR BLD AUTO: 3.71 10*3/MM3 (ref 1.7–7)
NEUTROPHILS NFR BLD AUTO: 62.9 % (ref 42.7–76)
NRBC BLD AUTO-RTO: 0 /100 WBC (ref 0–0.2)
PLATELET # BLD AUTO: 142 10*3/MM3 (ref 140–450)
PMV BLD AUTO: 9.8 FL (ref 6–12)
POTASSIUM SERPL-SCNC: 4.2 MMOL/L (ref 3.5–5.2)
PROT SERPL-MCNC: 5.6 G/DL (ref 6–8.5)
RBC # BLD AUTO: 4.36 10*6/MM3 (ref 4.14–5.8)
SODIUM SERPL-SCNC: 139 MMOL/L (ref 136–145)
TIBC SERPL-MCNC: 416 MCG/DL (ref 298–536)
TRANSFERRIN SERPL-MCNC: 279 MG/DL (ref 200–360)
WBC NRBC COR # BLD AUTO: 5.9 10*3/MM3 (ref 3.4–10.8)

## 2025-04-08 PROCEDURE — 83615 LACTATE (LD) (LDH) ENZYME: CPT

## 2025-04-08 PROCEDURE — 83540 ASSAY OF IRON: CPT

## 2025-04-08 PROCEDURE — 82728 ASSAY OF FERRITIN: CPT

## 2025-04-08 PROCEDURE — 25010000002 HEPARIN LOCK FLUSH PER 10 UNITS: Performed by: INTERNAL MEDICINE

## 2025-04-08 PROCEDURE — A9270 NON-COVERED ITEM OR SERVICE: HCPCS | Performed by: NURSE PRACTITIONER

## 2025-04-08 PROCEDURE — 25010000002 FERRIC CARBOXYMALTOSE 750 MG/15ML SOLUTION: Performed by: NURSE PRACTITIONER

## 2025-04-08 PROCEDURE — 96375 TX/PRO/DX INJ NEW DRUG ADDON: CPT

## 2025-04-08 PROCEDURE — 85025 COMPLETE CBC W/AUTO DIFF WBC: CPT

## 2025-04-08 PROCEDURE — 96374 THER/PROPH/DIAG INJ IV PUSH: CPT

## 2025-04-08 PROCEDURE — 63710000001 CETIRIZINE 10 MG TABLET: Performed by: NURSE PRACTITIONER

## 2025-04-08 PROCEDURE — 36591 DRAW BLOOD OFF VENOUS DEVICE: CPT

## 2025-04-08 PROCEDURE — 84466 ASSAY OF TRANSFERRIN: CPT

## 2025-04-08 PROCEDURE — 25010000002 FAMOTIDINE 10 MG/ML SOLUTION: Performed by: NURSE PRACTITIONER

## 2025-04-08 PROCEDURE — 80053 COMPREHEN METABOLIC PANEL: CPT

## 2025-04-08 RX ORDER — FAMOTIDINE 10 MG/ML
20 INJECTION, SOLUTION INTRAVENOUS AS NEEDED
Status: CANCELLED | OUTPATIENT
Start: 2025-04-08

## 2025-04-08 RX ORDER — SODIUM CHLORIDE 9 MG/ML
20 INJECTION, SOLUTION INTRAVENOUS ONCE
OUTPATIENT
Start: 2025-04-15

## 2025-04-08 RX ORDER — FAMOTIDINE 10 MG/ML
20 INJECTION, SOLUTION INTRAVENOUS ONCE
OUTPATIENT
Start: 2025-04-15

## 2025-04-08 RX ORDER — DIPHENHYDRAMINE HYDROCHLORIDE 50 MG/ML
50 INJECTION, SOLUTION INTRAMUSCULAR; INTRAVENOUS AS NEEDED
OUTPATIENT
Start: 2025-04-15

## 2025-04-08 RX ORDER — SODIUM CHLORIDE 0.9 % (FLUSH) 0.9 %
10 SYRINGE (ML) INJECTION AS NEEDED
OUTPATIENT
Start: 2025-04-08

## 2025-04-08 RX ORDER — FAMOTIDINE 10 MG/ML
20 INJECTION, SOLUTION INTRAVENOUS AS NEEDED
OUTPATIENT
Start: 2025-04-15

## 2025-04-08 RX ORDER — SODIUM CHLORIDE 9 MG/ML
20 INJECTION, SOLUTION INTRAVENOUS ONCE
Status: CANCELLED | OUTPATIENT
Start: 2025-04-08

## 2025-04-08 RX ORDER — CETIRIZINE HYDROCHLORIDE 10 MG/1
10 TABLET ORAL ONCE
Status: COMPLETED | OUTPATIENT
Start: 2025-04-08 | End: 2025-04-08

## 2025-04-08 RX ORDER — FAMOTIDINE 10 MG/ML
20 INJECTION, SOLUTION INTRAVENOUS ONCE
Status: COMPLETED | OUTPATIENT
Start: 2025-04-08 | End: 2025-04-08

## 2025-04-08 RX ORDER — HYDROCORTISONE SODIUM SUCCINATE 100 MG/2ML
100 INJECTION INTRAMUSCULAR; INTRAVENOUS AS NEEDED
Status: CANCELLED | OUTPATIENT
Start: 2025-04-08

## 2025-04-08 RX ORDER — HEPARIN SODIUM (PORCINE) LOCK FLUSH IV SOLN 100 UNIT/ML 100 UNIT/ML
500 SOLUTION INTRAVENOUS AS NEEDED
Status: DISCONTINUED | OUTPATIENT
Start: 2025-04-08 | End: 2025-04-08 | Stop reason: HOSPADM

## 2025-04-08 RX ORDER — SODIUM CHLORIDE 0.9 % (FLUSH) 0.9 %
10 SYRINGE (ML) INJECTION AS NEEDED
Status: DISCONTINUED | OUTPATIENT
Start: 2025-04-08 | End: 2025-04-08 | Stop reason: HOSPADM

## 2025-04-08 RX ORDER — DIPHENHYDRAMINE HYDROCHLORIDE 50 MG/ML
50 INJECTION, SOLUTION INTRAMUSCULAR; INTRAVENOUS AS NEEDED
Status: CANCELLED | OUTPATIENT
Start: 2025-04-08

## 2025-04-08 RX ORDER — FAMOTIDINE 10 MG/ML
20 INJECTION, SOLUTION INTRAVENOUS ONCE
Status: CANCELLED | OUTPATIENT
Start: 2025-04-08

## 2025-04-08 RX ORDER — HEPARIN SODIUM (PORCINE) LOCK FLUSH IV SOLN 100 UNIT/ML 100 UNIT/ML
500 SOLUTION INTRAVENOUS AS NEEDED
OUTPATIENT
Start: 2025-04-08

## 2025-04-08 RX ORDER — HYDROCORTISONE SODIUM SUCCINATE 100 MG/2ML
100 INJECTION INTRAMUSCULAR; INTRAVENOUS AS NEEDED
OUTPATIENT
Start: 2025-04-15

## 2025-04-08 RX ORDER — CETIRIZINE HYDROCHLORIDE 10 MG/1
10 TABLET ORAL ONCE
Status: CANCELLED | OUTPATIENT
Start: 2025-04-08

## 2025-04-08 RX ORDER — CETIRIZINE HYDROCHLORIDE 10 MG/1
10 TABLET ORAL ONCE
OUTPATIENT
Start: 2025-04-15

## 2025-04-08 RX ADMIN — FERRIC CARBOXYMALTOSE INJECTION 750 MG: 50 INJECTION, SOLUTION INTRAVENOUS at 14:52

## 2025-04-08 RX ADMIN — Medication 10 ML: at 13:12

## 2025-04-08 RX ADMIN — Medication 500 UNITS: at 13:11

## 2025-04-08 RX ADMIN — CETIRIZINE HYDROCHLORIDE 10 MG: 10 TABLET, FILM COATED ORAL at 14:43

## 2025-04-08 RX ADMIN — FAMOTIDINE 20 MG: 10 INJECTION INTRAVENOUS at 14:44

## 2025-04-08 NOTE — PROGRESS NOTES
"Subjective     CHIEF COMPLAINT:      Chief Complaint   Patient presents with    Follow-up     Lab review     HISTORY OF PRESENT ILLNESS:     Macho Stephenson is a 74 y.o. male patient returns today for 3-month follow-up of his CLL and anemia.  From a CLL standpoint his WBC and ALC count remain normal.  He denies any fevers, sweats, and no new palpable adenopathy.  He is however iron deficient again with ferritin of 36, iron sat 8%.  He does state that he was expecting this, noting more fatigue recently.  We will therefore plan to proceed with IV iron today with Injectafer x 2.    ROS:  Pertinent ROS is in the HPI.     Past medical, surgical, social and family history were reviewed.     MEDICATIONS:    Current Outpatient Medications:     atorvastatin (LIPITOR) 20 MG tablet, Take 1 tablet by mouth Daily., Disp: , Rfl:     ezetimibe (ZETIA) 10 MG tablet, Take 1 tablet by mouth Daily., Disp: , Rfl:     lisinopril (PRINIVIL,ZESTRIL) 10 MG tablet, Take 1 tablet by mouth Daily., Disp: , Rfl: 0    metFORMIN (GLUCOPHAGE) 500 MG tablet, Take 1 tablet by mouth Daily With Breakfast., Disp: , Rfl:     pantoprazole (PROTONIX) 40 MG EC tablet, Take 1 tablet by mouth 2 (Two) Times a Day., Disp: 180 tablet, Rfl: 3    sotalol (BETAPACE) 80 MG tablet, Take 1 tablet by mouth 2 (Two) Times a Day., Disp: , Rfl:   No current facility-administered medications for this visit.  Objective     VITAL SIGNS:     Vitals:    04/08/25 1335   BP: 151/81   Pulse: 60   Temp: 97.7 °F (36.5 °C)   TempSrc: Oral   SpO2: 95%   Weight: 72.7 kg (160 lb 4.8 oz)   Height: 182 cm (71.65\")   PainSc: 0-No pain       Body mass index is 21.95 kg/m².     Wt Readings from Last 5 Encounters:   04/08/25 72.7 kg (160 lb 4.8 oz)   01/10/25 72.6 kg (160 lb 1.6 oz)   11/22/24 71.7 kg (158 lb)   10/25/24 72 kg (158 lb 12.8 oz)   10/18/24 74 kg (163 lb 3.2 oz)     PHYSICAL EXAMINATION:   GENERAL: The patient appears in good general condition, not in acute distress.   SKIN: " No Ecchymosis.  EYES: No jaundice. No Pallor.  LYMPHATIC:  No cervical, supraclavicular lymphadenopathy.  CHEST: Normal respiratory effort. Normal breathing sounds bilaterally. No added sounds. Pacemaker in the left upper chest.  CVS: Normal S1 and S2. No Murmur.  ABDOMEN: Soft. No tenderness. No Hepatomegaly. No Splenomegaly. No masses.  EXTREMITIES: No joint deformity.     DIAGNOSTIC DATA:     Results from last 7 days   Lab Units 04/08/25  1306   WBC 10*3/mm3 5.90   NEUTROS ABS 10*3/mm3 3.71   HEMOGLOBIN g/dL 13.4   HEMATOCRIT % 39.8   PLATELETS 10*3/mm3 142       Results from last 7 days   Lab Units 04/08/25  1306   SODIUM mmol/L 139   POTASSIUM mmol/L 4.2   CHLORIDE mmol/L 102   CO2 mmol/L 23.0   BUN mg/dL 15   CREATININE mg/dL 0.93   CALCIUM mg/dL 9.2   ALBUMIN g/dL 3.8   BILIRUBIN mg/dL 0.5   ALK PHOS U/L 94   ALT (SGPT) U/L 31   AST (SGOT) U/L 27   GLUCOSE mg/dL 282*           Lab 04/08/25  1306   IRON 55*   IRON SATURATION (TSAT) 13*   TIBC 416   TRANSFERRIN 279   FERRITIN 36.10        Component      Latest Ref Rng 1/10/2025   Reticulocyte %      0.70 - 1.90 % 2.27 (H)       Component      Latest Ref Rng 1/10/2025   LDH      135 - 225 U/L 159      Assessment & Plan    *Chronic lymphocytic leukemia.  He was previously under the care of Dr. Moya at our office.  Bone marrow biopsy revealed 98% involvement with CLL.  He was previously treated with Gazyva and venetoclax in [id number].  Last dose of Gazyva was on 1/4/2021.  He achieved remission with this regimen.  1/10/2025: Lymphocyte count 1670.  Exam revealed no lymphadenopathy or splenomegaly.  I reassured the patient that there is no evidence of relapse of his CLL.    *Iron deficiency anemia secondary to GI bleeding.  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  He has history of Hess's esophagus.  He was recently on Xarelto which was stopped due to the bleeding.  He did not tolerate oral iron in the past.   He required IV iron  therapy in the past.  The last IV iron infusions were in October 2024 (Injectafer 750 mg on 10/18/2024 and 10/25/2024).  1/10/2025: Hemoglobin 14.2. Ferritin improved to 104.  Transferrin saturation 23%.  4/8/2025: Hgb 13.4.  Ferritin 36, iron sat 8%.  Patient is once again iron deficient and we will proceed with IV Injectafer x 2.    *DM type II.  1/10/2025: Glucose was 309. Patient reports that he had a soda before coming to the office.  We recommended rechecking his glucose at home and limit his free sugar intake today  4/8/2025 blood sugar 282.  Patient again blames this on drinking a Coke right before coming here.  He continues on metformin and is managed by Dr. Len Collier.  Most recent HgbA1c on file was 6.7 from 6/5/2024.    PLAN:  No evidence of recurrence of his CLL.  Patient is however iron deficient again.  Proceed today with IV Injectafer to be given x 2 doses 1 week apart.  Otherwise return in 3 months for follow-up with Dr. Dean with CBC, ferritin, iron profile, CMP, LDH.      I spent 48 minutes caring for Macho on this date of service. This time includes time spent by me in the following activities: preparing for the visit, reviewing tests, performing a medically appropriate examination and/or evaluation, counseling and educating the patient/family/caregiver, referring and communicating with other health care professionals, documenting information in the medical record, independently interpreting results and communicating that information with the patient/family/caregiver, care coordination, ordering medications, ordering test(s), obtaining a separately obtained history, and reviewing a separately obtained history      Valarie Mclean, MARIAH  04/08/25

## 2025-04-15 ENCOUNTER — INFUSION (OUTPATIENT)
Dept: ONCOLOGY | Facility: HOSPITAL | Age: 75
End: 2025-04-15
Payer: MEDICARE

## 2025-04-15 VITALS
DIASTOLIC BLOOD PRESSURE: 74 MMHG | TEMPERATURE: 97.5 F | BODY MASS INDEX: 22.13 KG/M2 | RESPIRATION RATE: 16 BRPM | WEIGHT: 161.6 LBS | SYSTOLIC BLOOD PRESSURE: 160 MMHG | OXYGEN SATURATION: 98 % | HEART RATE: 61 BPM

## 2025-04-15 DIAGNOSIS — D50.0 IRON DEFICIENCY ANEMIA DUE TO CHRONIC BLOOD LOSS: ICD-10-CM

## 2025-04-15 DIAGNOSIS — T45.4X5A ADVERSE REACTION TO COMPOUND IRON PREPARATION, INITIAL ENCOUNTER: Primary | ICD-10-CM

## 2025-04-15 DIAGNOSIS — C91.10 CLL (CHRONIC LYMPHOCYTIC LEUKEMIA): ICD-10-CM

## 2025-04-15 PROCEDURE — 63710000001 CETIRIZINE 10 MG TABLET: Performed by: NURSE PRACTITIONER

## 2025-04-15 PROCEDURE — 25010000002 FERRIC CARBOXYMALTOSE 750 MG/15ML SOLUTION 15 ML VIAL: Performed by: NURSE PRACTITIONER

## 2025-04-15 PROCEDURE — 96374 THER/PROPH/DIAG INJ IV PUSH: CPT

## 2025-04-15 PROCEDURE — 25010000002 HEPARIN LOCK FLUSH PER 10 UNITS: Performed by: INTERNAL MEDICINE

## 2025-04-15 PROCEDURE — 25010000002 PROCHLORPERAZINE 10 MG/2ML SOLUTION

## 2025-04-15 PROCEDURE — 96375 TX/PRO/DX INJ NEW DRUG ADDON: CPT

## 2025-04-15 PROCEDURE — A9270 NON-COVERED ITEM OR SERVICE: HCPCS | Performed by: NURSE PRACTITIONER

## 2025-04-15 PROCEDURE — 25010000002 FAMOTIDINE 10 MG/ML SOLUTION: Performed by: NURSE PRACTITIONER

## 2025-04-15 RX ORDER — FAMOTIDINE 10 MG/ML
20 INJECTION, SOLUTION INTRAVENOUS ONCE
Status: COMPLETED | OUTPATIENT
Start: 2025-04-15 | End: 2025-04-15

## 2025-04-15 RX ORDER — HEPARIN SODIUM (PORCINE) LOCK FLUSH IV SOLN 100 UNIT/ML 100 UNIT/ML
500 SOLUTION INTRAVENOUS AS NEEDED
OUTPATIENT
Start: 2025-04-15

## 2025-04-15 RX ORDER — SODIUM CHLORIDE 0.9 % (FLUSH) 0.9 %
10 SYRINGE (ML) INJECTION AS NEEDED
Status: DISCONTINUED | OUTPATIENT
Start: 2025-04-15 | End: 2025-04-15 | Stop reason: HOSPADM

## 2025-04-15 RX ORDER — HEPARIN SODIUM (PORCINE) LOCK FLUSH IV SOLN 100 UNIT/ML 100 UNIT/ML
500 SOLUTION INTRAVENOUS AS NEEDED
Status: DISCONTINUED | OUTPATIENT
Start: 2025-04-15 | End: 2025-04-15 | Stop reason: HOSPADM

## 2025-04-15 RX ORDER — PROCHLORPERAZINE EDISYLATE 5 MG/ML
10 INJECTION INTRAMUSCULAR; INTRAVENOUS ONCE
Status: COMPLETED | OUTPATIENT
Start: 2025-04-15 | End: 2025-04-15

## 2025-04-15 RX ORDER — SODIUM CHLORIDE 0.9 % (FLUSH) 0.9 %
10 SYRINGE (ML) INJECTION AS NEEDED
OUTPATIENT
Start: 2025-04-15

## 2025-04-15 RX ORDER — CETIRIZINE HYDROCHLORIDE 10 MG/1
10 TABLET ORAL ONCE
Status: COMPLETED | OUTPATIENT
Start: 2025-04-15 | End: 2025-04-15

## 2025-04-15 RX ADMIN — FAMOTIDINE 20 MG: 10 INJECTION INTRAVENOUS at 15:12

## 2025-04-15 RX ADMIN — Medication 10 ML: at 16:06

## 2025-04-15 RX ADMIN — CETIRIZINE HYDROCHLORIDE 10 MG: 10 TABLET, FILM COATED ORAL at 15:11

## 2025-04-15 RX ADMIN — PROCHLORPERAZINE EDISYLATE 10 MG: 5 INJECTION INTRAMUSCULAR; INTRAVENOUS at 15:42

## 2025-04-15 RX ADMIN — FERRIC CARBOXYMALTOSE INJECTION 750 MG: 50 INJECTION, SOLUTION INTRAVENOUS at 15:20

## 2025-04-15 RX ADMIN — Medication 500 UNITS: at 16:06

## 2025-04-15 NOTE — NURSING NOTE
"Upon completion of Infectafer pt reports he's feeling \"sick to his stomach\". Pt appears slightly flushed and /92, HR 60 but patient states nausea easing up since infusion stopped.     R/w Arleth NP, order received for compazine 10 mg IV. Repeat /83, flushing resolved at time of compazine administration. Nausea was completely resolved within 10 minutes of getting the compazine and no other issues noted during his post observation.    Message sent to Dr. Dean to make him aware for any future iron infusions.  "

## 2025-05-20 ENCOUNTER — INFUSION (OUTPATIENT)
Dept: ONCOLOGY | Facility: HOSPITAL | Age: 75
End: 2025-05-20
Payer: MEDICARE

## 2025-05-20 DIAGNOSIS — C91.10 CLL (CHRONIC LYMPHOCYTIC LEUKEMIA): Primary | ICD-10-CM

## 2025-05-20 PROCEDURE — 96523 IRRIG DRUG DELIVERY DEVICE: CPT

## 2025-05-20 PROCEDURE — 25010000002 HEPARIN LOCK FLUSH PER 10 UNITS: Performed by: INTERNAL MEDICINE

## 2025-05-20 RX ORDER — SODIUM CHLORIDE 0.9 % (FLUSH) 0.9 %
10 SYRINGE (ML) INJECTION AS NEEDED
Status: DISCONTINUED | OUTPATIENT
Start: 2025-05-20 | End: 2025-05-20 | Stop reason: HOSPADM

## 2025-05-20 RX ORDER — HEPARIN SODIUM (PORCINE) LOCK FLUSH IV SOLN 100 UNIT/ML 100 UNIT/ML
500 SOLUTION INTRAVENOUS AS NEEDED
OUTPATIENT
Start: 2025-05-20

## 2025-05-20 RX ORDER — HEPARIN SODIUM (PORCINE) LOCK FLUSH IV SOLN 100 UNIT/ML 100 UNIT/ML
500 SOLUTION INTRAVENOUS AS NEEDED
Status: DISCONTINUED | OUTPATIENT
Start: 2025-05-20 | End: 2025-05-20 | Stop reason: HOSPADM

## 2025-05-20 RX ORDER — SODIUM CHLORIDE 0.9 % (FLUSH) 0.9 %
10 SYRINGE (ML) INJECTION AS NEEDED
OUTPATIENT
Start: 2025-05-20

## 2025-05-20 RX ADMIN — Medication 500 UNITS: at 13:03

## 2025-05-20 RX ADMIN — Medication 10 ML: at 13:03

## 2025-06-24 ENCOUNTER — OFFICE VISIT (OUTPATIENT)
Dept: ONCOLOGY | Facility: CLINIC | Age: 75
End: 2025-06-24
Payer: MEDICARE

## 2025-06-24 ENCOUNTER — INFUSION (OUTPATIENT)
Dept: ONCOLOGY | Facility: HOSPITAL | Age: 75
End: 2025-06-24
Payer: MEDICARE

## 2025-06-24 VITALS
SYSTOLIC BLOOD PRESSURE: 134 MMHG | HEART RATE: 55 BPM | HEIGHT: 72 IN | BODY MASS INDEX: 20.88 KG/M2 | OXYGEN SATURATION: 97 % | DIASTOLIC BLOOD PRESSURE: 67 MMHG | TEMPERATURE: 98 F | WEIGHT: 154.2 LBS

## 2025-06-24 DIAGNOSIS — C91.10 CLL (CHRONIC LYMPHOCYTIC LEUKEMIA): Primary | ICD-10-CM

## 2025-06-24 DIAGNOSIS — D50.0 IRON DEFICIENCY ANEMIA DUE TO CHRONIC BLOOD LOSS: ICD-10-CM

## 2025-06-24 DIAGNOSIS — R10.30 LOWER ABDOMINAL PAIN: ICD-10-CM

## 2025-06-24 LAB
ALBUMIN SERPL-MCNC: 4.3 G/DL (ref 3.5–5.2)
ALBUMIN/GLOB SERPL: 2.4 G/DL
ALP SERPL-CCNC: 119 U/L (ref 39–117)
ALT SERPL W P-5'-P-CCNC: 29 U/L (ref 1–41)
ANION GAP SERPL CALCULATED.3IONS-SCNC: 9.5 MMOL/L (ref 5–15)
AST SERPL-CCNC: 29 U/L (ref 1–40)
BASOPHILS # BLD AUTO: 0.07 10*3/MM3 (ref 0–0.2)
BASOPHILS NFR BLD AUTO: 1 % (ref 0–1.5)
BILIRUB SERPL-MCNC: 0.7 MG/DL (ref 0–1.2)
BUN SERPL-MCNC: 16.3 MG/DL (ref 8–23)
BUN/CREAT SERPL: 20.1 (ref 7–25)
CALCIUM SPEC-SCNC: 9.8 MG/DL (ref 8.6–10.5)
CHLORIDE SERPL-SCNC: 103 MMOL/L (ref 98–107)
CO2 SERPL-SCNC: 24.5 MMOL/L (ref 22–29)
CREAT SERPL-MCNC: 0.81 MG/DL (ref 0.76–1.27)
DEPRECATED RDW RBC AUTO: 45 FL (ref 37–54)
EGFRCR SERPLBLD CKD-EPI 2021: 91.9 ML/MIN/1.73
EOSINOPHIL # BLD AUTO: 0.21 10*3/MM3 (ref 0–0.4)
EOSINOPHIL NFR BLD AUTO: 2.9 % (ref 0.3–6.2)
ERYTHROCYTE [DISTWIDTH] IN BLOOD BY AUTOMATED COUNT: 13.2 % (ref 12.3–15.4)
FERRITIN SERPL-MCNC: 295 NG/ML (ref 30–400)
GLOBULIN UR ELPH-MCNC: 1.8 GM/DL
GLUCOSE SERPL-MCNC: 88 MG/DL (ref 65–99)
HCT VFR BLD AUTO: 41.9 % (ref 37.5–51)
HGB BLD-MCNC: 14.4 G/DL (ref 13–17.7)
IMM GRANULOCYTES # BLD AUTO: 0.03 10*3/MM3 (ref 0–0.05)
IMM GRANULOCYTES NFR BLD AUTO: 0.4 % (ref 0–0.5)
IRON 24H UR-MRATE: 80 MCG/DL (ref 59–158)
IRON SATN MFR SERPL: 25 % (ref 20–50)
LDH SERPL-CCNC: 138 U/L (ref 135–225)
LYMPHOCYTES # BLD AUTO: 1.94 10*3/MM3 (ref 0.7–3.1)
LYMPHOCYTES NFR BLD AUTO: 26.8 % (ref 19.6–45.3)
MCH RBC QN AUTO: 31.9 PG (ref 26.6–33)
MCHC RBC AUTO-ENTMCNC: 34.4 G/DL (ref 31.5–35.7)
MCV RBC AUTO: 92.7 FL (ref 79–97)
MONOCYTES # BLD AUTO: 0.41 10*3/MM3 (ref 0.1–0.9)
MONOCYTES NFR BLD AUTO: 5.7 % (ref 5–12)
NEUTROPHILS NFR BLD AUTO: 4.58 10*3/MM3 (ref 1.7–7)
NEUTROPHILS NFR BLD AUTO: 63.2 % (ref 42.7–76)
NRBC BLD AUTO-RTO: 0 /100 WBC (ref 0–0.2)
PLATELET # BLD AUTO: 155 10*3/MM3 (ref 140–450)
PMV BLD AUTO: 9 FL (ref 6–12)
POTASSIUM SERPL-SCNC: 4.1 MMOL/L (ref 3.5–5.2)
PROT SERPL-MCNC: 6.1 G/DL (ref 6–8.5)
RBC # BLD AUTO: 4.52 10*6/MM3 (ref 4.14–5.8)
SODIUM SERPL-SCNC: 137 MMOL/L (ref 136–145)
TIBC SERPL-MCNC: 322 MCG/DL (ref 298–536)
TRANSFERRIN SERPL-MCNC: 216 MG/DL (ref 200–360)
WBC NRBC COR # BLD AUTO: 7.24 10*3/MM3 (ref 3.4–10.8)

## 2025-06-24 PROCEDURE — 83615 LACTATE (LD) (LDH) ENZYME: CPT

## 2025-06-24 PROCEDURE — 83540 ASSAY OF IRON: CPT

## 2025-06-24 PROCEDURE — 80053 COMPREHEN METABOLIC PANEL: CPT

## 2025-06-24 PROCEDURE — 36591 DRAW BLOOD OFF VENOUS DEVICE: CPT

## 2025-06-24 PROCEDURE — 84466 ASSAY OF TRANSFERRIN: CPT

## 2025-06-24 PROCEDURE — 1159F MED LIST DOCD IN RCRD: CPT | Performed by: INTERNAL MEDICINE

## 2025-06-24 PROCEDURE — 82728 ASSAY OF FERRITIN: CPT

## 2025-06-24 PROCEDURE — 1126F AMNT PAIN NOTED NONE PRSNT: CPT | Performed by: INTERNAL MEDICINE

## 2025-06-24 PROCEDURE — G2211 COMPLEX E/M VISIT ADD ON: HCPCS | Performed by: INTERNAL MEDICINE

## 2025-06-24 PROCEDURE — 25010000002 HEPARIN LOCK FLUSH PER 10 UNITS: Performed by: INTERNAL MEDICINE

## 2025-06-24 PROCEDURE — 99214 OFFICE O/P EST MOD 30 MIN: CPT | Performed by: INTERNAL MEDICINE

## 2025-06-24 PROCEDURE — 85025 COMPLETE CBC W/AUTO DIFF WBC: CPT

## 2025-06-24 PROCEDURE — 1160F RVW MEDS BY RX/DR IN RCRD: CPT | Performed by: INTERNAL MEDICINE

## 2025-06-24 RX ORDER — SODIUM CHLORIDE 0.9 % (FLUSH) 0.9 %
10 SYRINGE (ML) INJECTION AS NEEDED
Status: DISCONTINUED | OUTPATIENT
Start: 2025-06-24 | End: 2025-06-24 | Stop reason: HOSPADM

## 2025-06-24 RX ORDER — SODIUM CHLORIDE 0.9 % (FLUSH) 0.9 %
10 SYRINGE (ML) INJECTION AS NEEDED
OUTPATIENT
Start: 2025-06-24

## 2025-06-24 RX ORDER — HEPARIN SODIUM (PORCINE) LOCK FLUSH IV SOLN 100 UNIT/ML 100 UNIT/ML
500 SOLUTION INTRAVENOUS AS NEEDED
OUTPATIENT
Start: 2025-06-24

## 2025-06-24 RX ORDER — HEPARIN SODIUM (PORCINE) LOCK FLUSH IV SOLN 100 UNIT/ML 100 UNIT/ML
500 SOLUTION INTRAVENOUS AS NEEDED
Status: DISCONTINUED | OUTPATIENT
Start: 2025-06-24 | End: 2025-06-24 | Stop reason: HOSPADM

## 2025-06-24 RX ADMIN — Medication 500 UNITS: at 14:48

## 2025-06-24 RX ADMIN — Medication 10 ML: at 14:48

## 2025-06-24 NOTE — PROGRESS NOTES
"Subjective     CHIEF COMPLAINT:      Chief Complaint   Patient presents with    Follow-up     Lab review     HISTORY OF PRESENT ILLNESS:     Macho Stephenson is a 74 y.o. male patient who returns today for follow up on his CLL and anemia.  He returns today for follow-up reporting feeling somewhat fatigued.  When he was seen on 4/8/2025, hemoglobin was down to 13.4.  Ferritin decreased to 36.1 and transferrin saturation was 13%.  He was given IV Injectafer x 2 doses, 1 week apart.  Although he noticed some improvement in his energy level, he continues to have some fatigue.  He also reports lower abdominal pain.  No blood in the stool.  He has episodes of diarrhea, occasionally explosive.    ROS:  Pertinent ROS is in the HPI.     Past medical, surgical, social and family history were reviewed.     MEDICATIONS:    Current Outpatient Medications:     atorvastatin (LIPITOR) 20 MG tablet, Take 1 tablet by mouth Daily., Disp: , Rfl:     ezetimibe (ZETIA) 10 MG tablet, Take 1 tablet by mouth Daily., Disp: , Rfl:     lisinopril (PRINIVIL,ZESTRIL) 10 MG tablet, Take 1 tablet by mouth Daily., Disp: , Rfl: 0    metFORMIN (GLUCOPHAGE) 500 MG tablet, Take 1 tablet by mouth Daily With Breakfast., Disp: , Rfl:     pantoprazole (PROTONIX) 40 MG EC tablet, Take 1 tablet by mouth 2 (Two) Times a Day., Disp: 180 tablet, Rfl: 3    sotalol (BETAPACE) 80 MG tablet, Take 1 tablet by mouth 2 (Two) Times a Day., Disp: , Rfl:   No current facility-administered medications for this visit.  Objective     VITAL SIGNS:     Vitals:    06/24/25 1542   BP: 134/67   Pulse: 55   Temp: 98 °F (36.7 °C)   TempSrc: Oral   SpO2: 97%   Weight: 69.9 kg (154 lb 3.2 oz)   Height: 182 cm (71.65\")   PainSc: 0-No pain     Body mass index is 21.12 kg/m².     Wt Readings from Last 5 Encounters:   06/24/25 69.9 kg (154 lb 3.2 oz)   04/15/25 73.3 kg (161 lb 9.6 oz)   04/08/25 72.7 kg (160 lb 4.8 oz)   01/10/25 72.6 kg (160 lb 1.6 oz)   11/22/24 71.7 kg (158 lb) "     PHYSICAL EXAMINATION:   GENERAL: The patient appears in good general condition, not in acute distress.   SKIN: No Ecchymosis.  EYES: No jaundice. No Pallor.  CHEST: Normal respiratory effort.   ABDOMEN: Soft.  Lower abdominal tenderness. No Hepatomegaly. No Splenomegaly. No masses.  EXTREMITIES: No joint deformity.     DIAGNOSTIC DATA:     Results from last 7 days   Lab Units 06/24/25  1446   WBC 10*3/mm3 7.24   NEUTROS ABS 10*3/mm3 4.58   HEMOGLOBIN g/dL 14.4   HEMATOCRIT % 41.9   PLATELETS 10*3/mm3 155     Results from last 7 days   Lab Units 06/24/25  1446   SODIUM mmol/L 137   POTASSIUM mmol/L 4.1   CHLORIDE mmol/L 103   CO2 mmol/L 24.5   BUN mg/dL 16.3   CREATININE mg/dL 0.81   CALCIUM mg/dL 9.8   ALBUMIN g/dL 4.3   BILIRUBIN mg/dL 0.7   ALK PHOS U/L 119*   ALT (SGPT) U/L 29   AST (SGOT) U/L 29   GLUCOSE mg/dL 88         Lab 06/24/25  1446   IRON 80   IRON SATURATION (TSAT) 25   TIBC 322   TRANSFERRIN 216   FERRITIN 295.00      Assessment & Plan    *Chronic lymphocytic leukemia.  He was previously under the care of Dr. Moya at our office.  Bone marrow biopsy revealed 98% involvement with CLL.  He was previously treated with Gazyva and venetoclax in [id number].  Last dose of Gazyva was on 1/4/2021.  He achieved remission with this regimen.  1/10/2025: Lymphocyte count 1670.  6/24/2025: Lymphocyte count normal at 1940.  Exam revealed no lymphadenopathy or splenomegaly.    *Iron deficiency anemia secondary to GI bleeding.  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  He has history of Hess's esophagus.  He was recently on Xarelto which was stopped due to the bleeding.  He did not tolerate oral iron in the past.   He required IV iron therapy in the past.  The last IV iron infusions were in October 2024 (Injectafer 750 mg on 10/18/2024 and 10/25/2024).  1/10/2025: Hemoglobin 14.2.  Ferritin improved to 104.  Transferrin saturation 23%.  4/8/2025: Hemoglobin 13.4.  Ferritin decreased to  36.  Transferrin saturation decreased to 13%.  S/P IV Injectafer 750 mg on 4/8/2025 and 4/15/2025.  6/24/2025: Hemoglobin improved to 14.4.  Ferritin improved to 295.  Transferrin saturation improved to 25%.    *Lower abdominal pain.  Patient reports having intermittent lower abdominal pain.  Exam today revealed tenderness in the area of concern.  I recommended obtaining CT scan of the abdomen pelvis.    PLAN:    1.  Patient does not need IV iron therapy at this time.  2.  We will obtain CT scan of the abdomen pelvis.  3.  Will refer the patient back to GI-Dr. Mahendra Landin.  4.  Return in 3 months for follow-up with NP.  CBC ferritin and iron panel will be obtained.  5.  I will see him in follow-up in 6 months with repeat labs.        Fernando Dean MD  06/24/25

## 2025-06-25 ENCOUNTER — TELEPHONE (OUTPATIENT)
Dept: ONCOLOGY | Facility: CLINIC | Age: 75
End: 2025-06-25
Payer: MEDICARE

## 2025-06-25 ENCOUNTER — INFUSION (OUTPATIENT)
Dept: ONCOLOGY | Facility: HOSPITAL | Age: 75
End: 2025-06-25
Payer: MEDICARE

## 2025-06-25 ENCOUNTER — HOSPITAL ENCOUNTER (OUTPATIENT)
Dept: PET IMAGING | Facility: HOSPITAL | Age: 75
Discharge: HOME | End: 2025-06-25
Admitting: INTERNAL MEDICINE
Payer: MEDICARE

## 2025-06-25 DIAGNOSIS — R10.30 LOWER ABDOMINAL PAIN: ICD-10-CM

## 2025-06-25 PROCEDURE — 96523 IRRIG DRUG DELIVERY DEVICE: CPT

## 2025-06-25 PROCEDURE — 74177 CT ABD & PELVIS W/CONTRAST: CPT

## 2025-06-25 PROCEDURE — 25510000002 DIATRIZOATE MEGLUMINE & SODIUM PER 1 ML: Performed by: INTERNAL MEDICINE

## 2025-06-25 PROCEDURE — 25510000001 IOPAMIDOL 61 % SOLUTION: Performed by: INTERNAL MEDICINE

## 2025-06-25 RX ORDER — IOPAMIDOL 612 MG/ML
100 INJECTION, SOLUTION INTRAVASCULAR
Status: COMPLETED | OUTPATIENT
Start: 2025-06-25 | End: 2025-06-25

## 2025-06-25 RX ORDER — DIATRIZOATE MEGLUMINE AND DIATRIZOATE SODIUM 660; 100 MG/ML; MG/ML
30 SOLUTION ORAL; RECTAL
Status: COMPLETED | OUTPATIENT
Start: 2025-06-25 | End: 2025-06-25

## 2025-06-25 RX ADMIN — DIATRIZOATE MEGLUMINE AND DIATRIZOATE SODIUM 30 ML: 660; 100 LIQUID ORAL; RECTAL at 08:27

## 2025-06-25 RX ADMIN — IOPAMIDOL 85 ML: 612 INJECTION, SOLUTION INTRAVENOUS at 08:27

## 2025-06-25 NOTE — TELEPHONE ENCOUNTER
Reviewed Dr. Dean's note with the pts wife, she v/u.        ----- Message from Fernando Dean sent at 6/24/2025  6:06 PM EDT -----  Please inform the patient that the results for the iron from today showed improvement in the iron levels since April 2025 indicating response to the IV iron infusions.  He does not need IV iron at this time.  Please cancel the appointments for the IV Injectafer.    Thank you

## 2025-07-03 ENCOUNTER — TELEPHONE (OUTPATIENT)
Dept: ONCOLOGY | Facility: CLINIC | Age: 75
End: 2025-07-03
Payer: MEDICARE

## 2025-07-03 NOTE — TELEPHONE ENCOUNTER
Reviewed Dr. Dean's note with the pts wife, jd v/u.        ----- Message from Fernando Dean sent at 7/3/2025  1:13 PM EDT -----  Please inform the patient that the CT scan did not show any abnormality to explain his abdominal pain-specifically no sign of a mass at the area of the pain.    Thank you

## (undated) DEVICE — FLEX ADVANTAGE 1500CC: Brand: FLEX ADVANTAGE

## (undated) DEVICE — LN SMPL CO2 SHTRM SD STREAM W/M LUER

## (undated) DEVICE — TUBING, SUCTION, 1/4" X 10', STRAIGHT: Brand: MEDLINE

## (undated) DEVICE — CANNULA THREADED DISP 8.5 X 72MM: Brand: CLEAR-TRAC

## (undated) DEVICE — GOWN ,SIRUS,NONREINFORCED 3XL: Brand: MEDLINE

## (undated) DEVICE — SKIN PREP TRAY W/CHG: Brand: MEDLINE INDUSTRIES, INC.

## (undated) DEVICE — ENDOPATH XCEL UNIVERSAL TROCAR STABLILITY SLEEVES: Brand: ENDOPATH XCEL

## (undated) DEVICE — SINGLE-USE BIOPSY FORCEPS: Brand: RADIAL JAW 4

## (undated) DEVICE — FIAPC® PROBE W/ FILTER 2200 A OD 2.3MM/6.9FR; L 2.2M/7.2FT: Brand: ERBE

## (undated) DEVICE — GLV SURG SIGNATURE ESSENTIAL PF LTX SZ7.5

## (undated) DEVICE — SUT VIC 5/0 PS2 18IN J495H

## (undated) DEVICE — SENSR O2 OXIMAX FNGR A/ 18IN NONSTR

## (undated) DEVICE — ENDOPATH XCEL BLADELESS TROCARS WITH STABILITY SLEEVES: Brand: ENDOPATH XCEL

## (undated) DEVICE — VIAL FORMLN CAP 10PCT 20ML

## (undated) DEVICE — ADHS SKIN SURG TISS VISC PREMIERPRO EXOFIN HI/VISC FAST/DRY

## (undated) DEVICE — KT ORCA ORCAPOD DISP STRL

## (undated) DEVICE — GLV SURG BIOGEL LTX PF 8

## (undated) DEVICE — PK LAP CHOLE BG

## (undated) DEVICE — SINGLE-USE POLYPECTOMY SNARE: Brand: SENSATION SHORT THROW

## (undated) DEVICE — THE SINGLE USE ETRAP – POLYP TRAP IS USED FOR SUCTION RETRIEVAL OF ENDOSCOPICALLY REMOVED POLYPS.: Brand: ETRAP

## (undated) DEVICE — ADHS SKIN DERMABOND TOP ADVANCED

## (undated) DEVICE — PATIENT RETURN ELECTRODE, SINGLE-USE, CONTACT QUALITY MONITORING, ADULT, WITH 9FT CORD, FOR PATIENTS WEIGING OVER 33LBS. (15KG): Brand: MEGADYNE

## (undated) DEVICE — LINER SURG CANSTR SXN S/RIGD 1500CC

## (undated) DEVICE — BIT DRL JUGGERKNOT 2.9MM

## (undated) DEVICE — BITEBLOCK OMNI BLOC

## (undated) DEVICE — SYR LL W/SCALE/MARK 3ML STRL

## (undated) DEVICE — VIAL FORMALIN CAP 10P 40ML

## (undated) DEVICE — ADAPT CLN BIOGUARD AIR/H2O DISP

## (undated) DEVICE — DRAPE,SHOULDER,BEACH ULTRAGARD: Brand: MEDLINE

## (undated) DEVICE — FRCP BX RADJAW4 NDL 2.8 240CM LG OG BX40

## (undated) DEVICE — CANN O2 ETCO2 FITS ALL CONN CO2 SMPL A/ 7IN DISP LF

## (undated) DEVICE — SUT VIC 3/0 SH 27IN J416H

## (undated) DEVICE — TP NDL SCORPION MULTIFIRE

## (undated) DEVICE — GLV SURG SIGNATURE ESSENTIAL PF LTX SZ8

## (undated) DEVICE — NDL HYPO PRECISIONGLIDE REG 25G 1 1/2

## (undated) DEVICE — ENDOPATH PNEUMONEEDLE INSUFFLATION NEEDLES WITH LUER LOCK CONNECTORS 120MM: Brand: ENDOPATH

## (undated) DEVICE — PK ARTHSCP SHLDR TOWER 40

## (undated) DEVICE — BW-412T DISP COMBO CLEANING BRUSH: Brand: SINGLE USE COMBINATION CLEANING BRUSH

## (undated) DEVICE — GOWN ISOL W/THUMB UNIV BLU BX/15

## (undated) DEVICE — ELECTRD BLD EDGE/INSUL1P 2.4X5.1MM STRL

## (undated) DEVICE — GLV SURG PREMIERPRO ORTHO LTX PF SZ7.5 BRN

## (undated) DEVICE — SUT PROLN 3/0 FS2 18IN 8665G

## (undated) DEVICE — PK PROC MINOR TOWER 40

## (undated) DEVICE — SNAR POLYP CAPTIVATOR/COLD STFF RND 10MM 240CM

## (undated) DEVICE — THE BITE BLOCK MAXI, LATEX FREE STRAP IS USED TO PROTECT THE ENDOSCOPE INSERTION TUBE FROM BEING BITTEN BY THE PATIENT.

## (undated) DEVICE — Device

## (undated) DEVICE — SUT VIC 0 TN 27IN DYED JTN0G

## (undated) DEVICE — SYR LUERLOK 20CC BX/50

## (undated) DEVICE — SOL IRR H2O BTL 1000ML STRL

## (undated) DEVICE — ABL ASP APOLLO RF XL 90D

## (undated) DEVICE — ERBE NESSY®PLATE 170 SPLIT; 168CM²; CABLE 3M: Brand: ERBE

## (undated) DEVICE — ENDOPOUCH RETRIEVER SPECIMEN RETRIEVAL BAGS: Brand: ENDOPOUCH RETRIEVER

## (undated) DEVICE — TBG PUMP ARTHSCP MAIN AR6400 16FT

## (undated) DEVICE — BUR SHAVER CLEARCUT 12FLUT 5MM 13CM

## (undated) DEVICE — MSK ENDO PORT O2 POM ELITE CURAPLEX A/

## (undated) DEVICE — CLEAR-TRAC 7.0 MM X 72 MM THREADED                                    CANNULA, WITH DISPOSABLE OBTURATOR,                                    GREY, STERILE: Brand: CLEAR-TRAC

## (undated) DEVICE — CANN NASL CO2 TRULINK W/O2 A/

## (undated) DEVICE — Device: Brand: DEFENDO AIR/WATER/SUCTION AND BIOPSY VALVE

## (undated) DEVICE — SYR LL TP 10ML STRL

## (undated) DEVICE — DRSNG GZ PETROLTM XEROFORM CURAD 1X8IN STRL

## (undated) DEVICE — BLANKT WARM LOWR/BDY 100X120CM

## (undated) DEVICE — DRP C/ARM 41X74IN

## (undated) DEVICE — BLD SHAVER BONECUTTER 5MM 13CM